# Patient Record
Sex: MALE | Race: WHITE | NOT HISPANIC OR LATINO
[De-identification: names, ages, dates, MRNs, and addresses within clinical notes are randomized per-mention and may not be internally consistent; named-entity substitution may affect disease eponyms.]

---

## 2017-01-06 ENCOUNTER — APPOINTMENT (OUTPATIENT)
Dept: CARDIOLOGY | Facility: CLINIC | Age: 72
End: 2017-01-06
Payer: MEDICARE

## 2017-01-06 PROCEDURE — 99214 OFFICE O/P EST MOD 30 MIN: CPT

## 2017-01-12 ENCOUNTER — OUTPATIENT (OUTPATIENT)
Dept: OUTPATIENT SERVICES | Facility: HOSPITAL | Age: 72
LOS: 1 days | End: 2017-01-12

## 2017-01-31 ENCOUNTER — OUTPATIENT (OUTPATIENT)
Dept: OUTPATIENT SERVICES | Facility: HOSPITAL | Age: 72
LOS: 1 days | End: 2017-01-31

## 2017-02-21 ENCOUNTER — OUTPATIENT (OUTPATIENT)
Dept: OUTPATIENT SERVICES | Facility: HOSPITAL | Age: 72
LOS: 1 days | End: 2017-02-21

## 2017-07-07 ENCOUNTER — OUTPATIENT (OUTPATIENT)
Dept: OUTPATIENT SERVICES | Facility: HOSPITAL | Age: 72
LOS: 1 days | End: 2017-07-07

## 2017-09-22 ENCOUNTER — APPOINTMENT (OUTPATIENT)
Dept: CARDIOLOGY | Facility: CLINIC | Age: 72
End: 2017-09-22
Payer: COMMERCIAL

## 2017-09-22 PROCEDURE — 99214 OFFICE O/P EST MOD 30 MIN: CPT

## 2017-09-28 ENCOUNTER — APPOINTMENT (OUTPATIENT)
Dept: CARDIOLOGY | Facility: CLINIC | Age: 72
End: 2017-09-28
Payer: COMMERCIAL

## 2017-09-28 PROCEDURE — 93880 EXTRACRANIAL BILAT STUDY: CPT

## 2017-09-28 PROCEDURE — 93306 TTE W/DOPPLER COMPLETE: CPT

## 2017-10-13 ENCOUNTER — APPOINTMENT (OUTPATIENT)
Dept: CARDIOLOGY | Facility: CLINIC | Age: 72
End: 2017-10-13
Payer: COMMERCIAL

## 2017-10-13 PROCEDURE — 99215 OFFICE O/P EST HI 40 MIN: CPT

## 2017-12-08 ENCOUNTER — OUTPATIENT (OUTPATIENT)
Dept: OUTPATIENT SERVICES | Facility: HOSPITAL | Age: 72
LOS: 1 days | End: 2017-12-08

## 2018-02-15 ENCOUNTER — RX RENEWAL (OUTPATIENT)
Age: 73
End: 2018-02-15

## 2018-03-06 ENCOUNTER — MEDICATION RENEWAL (OUTPATIENT)
Age: 73
End: 2018-03-06

## 2018-03-06 DIAGNOSIS — Z86.79 PERSONAL HISTORY OF OTHER DISEASES OF THE CIRCULATORY SYSTEM: ICD-10-CM

## 2018-03-06 DIAGNOSIS — Z86.19 PERSONAL HISTORY OF OTHER INFECTIOUS AND PARASITIC DISEASES: ICD-10-CM

## 2018-03-06 DIAGNOSIS — E66.3 OVERWEIGHT: ICD-10-CM

## 2018-03-06 DIAGNOSIS — Z98.890 OTHER SPECIFIED POSTPROCEDURAL STATES: ICD-10-CM

## 2018-03-06 DIAGNOSIS — Z86.39 PERSONAL HISTORY OF OTHER ENDOCRINE, NUTRITIONAL AND METABOLIC DISEASE: ICD-10-CM

## 2018-03-06 DIAGNOSIS — Z85.46 PERSONAL HISTORY OF MALIGNANT NEOPLASM OF PROSTATE: ICD-10-CM

## 2018-03-06 DIAGNOSIS — R80.9 PROTEINURIA, UNSPECIFIED: ICD-10-CM

## 2018-03-06 DIAGNOSIS — I47.2 VENTRICULAR TACHYCARDIA: ICD-10-CM

## 2018-03-06 DIAGNOSIS — Z87.39 PERSONAL HISTORY OF OTHER DISEASES OF THE MUSCULOSKELETAL SYSTEM AND CONNECTIVE TISSUE: ICD-10-CM

## 2018-03-06 DIAGNOSIS — Z78.9 OTHER SPECIFIED HEALTH STATUS: ICD-10-CM

## 2018-03-06 RX ORDER — ASPIRIN 81 MG/1
81 TABLET ORAL DAILY
Refills: 0 | Status: ACTIVE | COMMUNITY

## 2018-03-26 ENCOUNTER — OUTPATIENT (OUTPATIENT)
Dept: OUTPATIENT SERVICES | Facility: HOSPITAL | Age: 73
LOS: 1 days | End: 2018-03-26

## 2018-05-06 ENCOUNTER — RX RENEWAL (OUTPATIENT)
Age: 73
End: 2018-05-06

## 2018-05-07 ENCOUNTER — RX RENEWAL (OUTPATIENT)
Age: 73
End: 2018-05-07

## 2018-05-11 ENCOUNTER — APPOINTMENT (OUTPATIENT)
Dept: CARDIOLOGY | Facility: CLINIC | Age: 73
End: 2018-05-11
Payer: COMMERCIAL

## 2018-05-11 ENCOUNTER — NON-APPOINTMENT (OUTPATIENT)
Age: 73
End: 2018-05-11

## 2018-05-11 VITALS
HEIGHT: 70 IN | WEIGHT: 236 LBS | DIASTOLIC BLOOD PRESSURE: 70 MMHG | SYSTOLIC BLOOD PRESSURE: 128 MMHG | HEART RATE: 92 BPM | BODY MASS INDEX: 33.79 KG/M2

## 2018-05-11 DIAGNOSIS — R53.83 OTHER FATIGUE: ICD-10-CM

## 2018-05-11 PROCEDURE — 99215 OFFICE O/P EST HI 40 MIN: CPT

## 2018-05-11 PROCEDURE — 93000 ELECTROCARDIOGRAM COMPLETE: CPT

## 2018-05-21 ENCOUNTER — OUTPATIENT (OUTPATIENT)
Dept: OUTPATIENT SERVICES | Facility: HOSPITAL | Age: 73
LOS: 1 days | End: 2018-05-21

## 2018-05-29 ENCOUNTER — OUTPATIENT (OUTPATIENT)
Dept: OUTPATIENT SERVICES | Facility: HOSPITAL | Age: 73
LOS: 1 days | End: 2018-05-29

## 2018-08-03 ENCOUNTER — RX RENEWAL (OUTPATIENT)
Age: 73
End: 2018-08-03

## 2018-09-04 ENCOUNTER — RX RENEWAL (OUTPATIENT)
Age: 73
End: 2018-09-04

## 2018-09-12 ENCOUNTER — MEDICATION RENEWAL (OUTPATIENT)
Age: 73
End: 2018-09-12

## 2018-11-09 ENCOUNTER — APPOINTMENT (OUTPATIENT)
Dept: CARDIOLOGY | Facility: CLINIC | Age: 73
End: 2018-11-09
Payer: COMMERCIAL

## 2018-11-09 VITALS
SYSTOLIC BLOOD PRESSURE: 126 MMHG | WEIGHT: 240 LBS | HEIGHT: 70 IN | DIASTOLIC BLOOD PRESSURE: 58 MMHG | HEART RATE: 78 BPM | OXYGEN SATURATION: 97 % | BODY MASS INDEX: 34.36 KG/M2

## 2018-11-09 DIAGNOSIS — R60.0 LOCALIZED EDEMA: ICD-10-CM

## 2018-11-09 PROCEDURE — 99215 OFFICE O/P EST HI 40 MIN: CPT

## 2018-11-09 NOTE — REASON FOR VISIT
[Follow-Up - Clinic] : a clinic follow-up of [Abnormal ECG] : an abnormal ECG [Cardiomyopathy] : cardiomyopathy [Coronary Artery Disease] : coronary artery disease [Hyperlipidemia] : hyperlipidemia [Hypertension] : hypertension [Medication Management] : Medication management [Mitral Regurgitation] : mitral regurgitation [Peripheral Vascular Disease] : peripheral vascular disease

## 2018-11-10 PROBLEM — R60.0 BILATERAL LEG EDEMA: Status: ACTIVE | Noted: 2018-11-09

## 2018-11-10 NOTE — HISTORY OF PRESENT ILLNESS
[FreeTextEntry1] : NATANAEL ROWAN  is a 73 year old  M\par with a history of HTN, hyperlipidemia, known coronary artery disease s/p MI/CABG, dilated iCMP, PAD. \par There is no history of symptomatic congestive heart failure rheumatic heart disease or atrial fibrillation.\par \par In 2004, he presented with symptoms of dizziness, near syncope and was diagnosed with an acute myocardial infarction. Initially was seen at [facility] and underwent thrombolysis. Subsequently received 2 stents. \par \par Follow up echocardiogram demonstrated reduced LV function. This led to cardiac catheterization and subsequent bypass surgery. Catheterization in June 2014 LVEF 25% left main three-vessel disease four-vessel CABG [facility] Dr. Goodman June 2014.\par \par There was persistently reduced LV function and he has been seen by Dr. Santos and Dr. Valentino with adjustment of medical therapy. Also he was seen by Dr. Lugo, declines electrophysiology evaluation and AICD. \par \par After TENISHA, referred to vascular surgery. Initially prescribed exercise regimen for claudication. Persistent symptoms. Underwent endarterectomy with Dr. Montano. He reports less pain. No further claudication. \par \par At baseline, he is physically active. 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 \par No significant dyspnea on exertion, orthopnea\par There is dependent LE edema He does note indentation in his sock.\par No symptomatic palpitations, lightheadedness, dizziness or syncope. \par \par Echocardiogram. September 2017. Ejection fraction 35-40%. End diastolic dimension 6.7 cm left atrium 5 cm moderate mitral regurgitation mild tricuspid regurgitation normal pulmonary pressures. \par \par Carotid Doppler. September 2017. Moderate plaque.\par \par Cardiac catheterization from June 2014: Distal left main 70% stenosis. Ejection fraction at 30%. \par \par Nuclear stress test. July 2015. Gildardo protocol. 7 minutes. Heart rate 146. No symptoms. Large perfusion defect involving inferolateral & apical regions. Fixed defect. No ischemia. Dilated left ventricle. Global hypokinesis. Severely reduced LV function. Nondiagnostic EKG due to rate-related left bundle branch block. Ischemic cardiomyopathy. Large infarction in the apical & lateral regions. Infarction involving the inferolateral, apical and anterior regions.\par \par Last bloodwork hemoglobin 13.6, creatinine 0.8, . After review of blood requested change to medical therapy. This has been declined. \par

## 2018-11-10 NOTE — PHYSICAL EXAM
[General Appearance - Well Developed] : well developed [Normal Appearance] : normal appearance [Well Groomed] : well groomed [General Appearance - Well Nourished] : well nourished [No Deformities] : no deformities [General Appearance - In No Acute Distress] : no acute distress [Normal Conjunctiva] : the conjunctiva exhibited no abnormalities [Eyelids - No Xanthelasma] : the eyelids demonstrated no xanthelasmas [Normal Oral Mucosa] : normal oral mucosa [No Oral Pallor] : no oral pallor [No Oral Cyanosis] : no oral cyanosis [Normal Jugular Venous A Waves Present] : normal jugular venous A waves present [Normal Jugular Venous V Waves Present] : normal jugular venous V waves present [No Jugular Venous Donovan A Waves] : no jugular venous donovan A waves [Respiration, Rhythm And Depth] : normal respiratory rhythm and effort [Exaggerated Use Of Accessory Muscles For Inspiration] : no accessory muscle use [Auscultation Breath Sounds / Voice Sounds] : lungs were clear to auscultation bilaterally [Heart Rate And Rhythm] : heart rate and rhythm were normal [Heart Sounds] : normal S1 and S2 [Abdomen Soft] : soft [Abdomen Tenderness] : non-tender [Abdomen Mass (___ Cm)] : no abdominal mass palpated [Abnormal Walk] : normal gait [Gait - Sufficient For Exercise Testing] : the gait was sufficient for exercise testing [Nail Clubbing] : no clubbing of the fingernails [Cyanosis, Localized] : no localized cyanosis [Petechial Hemorrhages (___cm)] : no petechial hemorrhages [Skin Color & Pigmentation] : normal skin color and pigmentation [] : no rash [No Venous Stasis] : no venous stasis [Skin Lesions] : no skin lesions [No Skin Ulcers] : no skin ulcer [No Xanthoma] : no  xanthoma was observed [Oriented To Time, Place, And Person] : oriented to person, place, and time [Affect] : the affect was normal [Mood] : the mood was normal [No Anxiety] : not feeling anxious [FreeTextEntry1] : HSM apex

## 2018-11-10 NOTE — ASSESSMENT
[FreeTextEntry1] : Old myocardial infarction \par Presence of aortocoronary bypass graft \par Abnormal electrocardiogram\par Asymptomatic without angina symptoms.\par Last stress with fixed defects\par Daily antiplatelet therapy. \par High-intensity statin therapy. \par Beta blocker. He declines up titration of beta blocker. \par Continue ACE inhibitor.\par \par Ischemic cardiomyopathy, dilated\par Cardiomegaly\par MR\par Severely reduced LV function, despite optimal medical therapy. \par Edema\par Follow LV size and function\par Check BNP\par Beta blocker. He declines up titration of beta blocker. \par Continue ACE inhibitor. If renal function is stable, then addition of spironolactone. ? Entresto\par Discussed risk of sudden cardiac death associated with reduced LV function, iCMP. Understands risk of sudden cardiac death. Declines implantable defibrillator placement. Understands risks and benefits. \par Does not require SBE ppx\par \par Peripheral vascular disease\par Prior endarterectomy, improvement in claudication\par carotid disease\par Atherosclerosis of aorta\par Aspirin statin and blood pressure control\par Vascular follow up\par \par Hyperlipidemia, dyslipidemia, low HDL\par Labs requested\par No adverse effects\par Adjunctive dietary measures\par \par Declines change to medications and device therapy.

## 2018-11-27 ENCOUNTER — APPOINTMENT (OUTPATIENT)
Dept: CARDIOLOGY | Facility: CLINIC | Age: 73
End: 2018-11-27
Payer: COMMERCIAL

## 2018-11-27 PROCEDURE — 93306 TTE W/DOPPLER COMPLETE: CPT

## 2019-01-04 ENCOUNTER — APPOINTMENT (OUTPATIENT)
Dept: CARDIOLOGY | Facility: CLINIC | Age: 74
End: 2019-01-04
Payer: COMMERCIAL

## 2019-01-04 VITALS
WEIGHT: 230 LBS | HEIGHT: 69 IN | BODY MASS INDEX: 34.07 KG/M2 | OXYGEN SATURATION: 97 % | DIASTOLIC BLOOD PRESSURE: 64 MMHG | SYSTOLIC BLOOD PRESSURE: 102 MMHG | HEART RATE: 75 BPM

## 2019-01-04 DIAGNOSIS — I51.7 CARDIOMEGALY: ICD-10-CM

## 2019-01-04 PROCEDURE — 99215 OFFICE O/P EST HI 40 MIN: CPT

## 2019-01-04 NOTE — ASSESSMENT
[FreeTextEntry1] : Ischemic cardiomyopathy, dilated\par Moderate valvular heart disease. \par Severely reduced LV function despite optimal medical therapy. \par Shared decision-making\par Discussed risk of sudden cardiac death associated with reduced LV function, iCMP. \par Declines implantable defibrillator placement. Understands risks and benefits. \par All of his questions have been answered. \par Euvolemic\par Check BNP\par Beta blocker. \par Continue ACE inhibitor. \par If renal function is stable, then addition of spironolactone. ? Entresto\par Does not require SBE ppx\par \par Old myocardial infarction \par s/p PCI then CABG\par Last stress with fixed defects\par No angina sx\par Daily antiplatelet therapy. \par High-intensity statin therapy. \par Beta blocker. He declines up titration of beta blocker. \par Continue ACE inhibitor.\par \par Peripheral vascular disease\par Prior endarterectomy, improvement in claudication\par Carotid disease\par Atherosclerosis of aorta\par Differential blood pressures (RUE>>LUE).  Asx\par Aspirin statin and blood pressure control \par \par Dyslipidemia, low HDL\par Labs requested\par No adverse effects\par Adjunctive dietary measures\par \par Declines change to medications and device therapy !!

## 2019-01-04 NOTE — HISTORY OF PRESENT ILLNESS
[FreeTextEntry1] : NATANAEL ROWAN  is a 73 year old  M\par with a history of known coronary artery disease s/p MI/CABG, dilated iCMP, PAD, HTN, hyperlipidemia, \par There is no history of symptomatic congestive heart failure, rheumatic heart disease or atrial fibrillation.\par \par In 2004, he presented with symptoms of dizziness, near syncope and was diagnosed with an acute myocardial infarction. \par Initially was seen at [facility] and underwent thrombolysis. Subsequently received 2 stents. \par \par Follow up echocardiogram demonstrated reduced LV function. \par Catheterization in June 2014 LVEF 25% left main three-vessel disease \par Four-vessel CABG [facility] Dr. Goodman June 2014.\par \par There was persistently reduced LV function and he has been seen by Dr. Santos and Dr. Valentino with adjustment of medical therapy. \par Also he was seen by Dr. Lugo, declines electrophysiology evaluation, [facility]. \par \par After TENISHA, referred to vascular surgery. \par Initially prescribed exercise regimen for claudication. \par Persistent symptoms. \par Underwent endarterectomy with Dr. Montano. \par He reports less pain. \par No further claudication. \par \par At baseline, he is physically active. 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 \par No significant dyspnea on exertion, orthopnea\par No symptomatic palpitations, lightheadedness, dizziness or syncope. \par \par Echocardiogram demonstrates a dilated left ventricle with ejection fraction of 30. There is moderate mitral regurgitation, moderate aortic insufficiency. \par \par Carotid Doppler. September 2017. Moderate plaque.\par \par Cardiac catheterization from June 2014: Distal left main 70% stenosis. Ejection fraction at 30%. \par \par Nuclear stress test. July 2015. Gildardo protocol. 7 minutes. Heart rate 146. No symptoms. Large perfusion defect involving inferolateral & apical regions. Fixed defect. No ischemia. Dilated left ventricle. Global hypokinesis. Severely reduced LV function. Nondiagnostic EKG due to rate-related left bundle branch block. Ischemic cardiomyopathy. Large infarction in the apical & lateral regions. Infarction involving the inferolateral, apical and anterior regions.\par \par Last bloodwork hemoglobin 13.6, creatinine 0.8, .

## 2019-02-20 ENCOUNTER — OUTPATIENT (OUTPATIENT)
Dept: OUTPATIENT SERVICES | Facility: HOSPITAL | Age: 74
LOS: 1 days | End: 2019-02-20

## 2019-03-08 ENCOUNTER — NON-APPOINTMENT (OUTPATIENT)
Age: 74
End: 2019-03-08

## 2019-03-08 ENCOUNTER — APPOINTMENT (OUTPATIENT)
Dept: CARDIOLOGY | Facility: CLINIC | Age: 74
End: 2019-03-08
Payer: COMMERCIAL

## 2019-03-08 VITALS
HEIGHT: 69 IN | DIASTOLIC BLOOD PRESSURE: 70 MMHG | WEIGHT: 230 LBS | SYSTOLIC BLOOD PRESSURE: 140 MMHG | OXYGEN SATURATION: 97 % | BODY MASS INDEX: 34.07 KG/M2 | HEART RATE: 73 BPM

## 2019-03-08 DIAGNOSIS — R94.31 ABNORMAL ELECTROCARDIOGRAM [ECG] [EKG]: ICD-10-CM

## 2019-03-08 PROCEDURE — 99215 OFFICE O/P EST HI 40 MIN: CPT

## 2019-03-08 PROCEDURE — 93000 ELECTROCARDIOGRAM COMPLETE: CPT

## 2019-03-10 PROBLEM — R94.31 ABNORMAL ECG: Status: ACTIVE | Noted: 2018-05-12

## 2019-03-10 NOTE — HISTORY OF PRESENT ILLNESS
[FreeTextEntry1] : NATANAEL ROWAN  is a 73 year old  M\par with a history of known coronary artery disease s/p MI/CABG, dilated iCMP, PAD, HTN, hyperlipidemia, \par There is no history of symptomatic congestive heart failure, rheumatic heart disease or atrial fibrillation.\par \par In 2004, he presented with symptoms of dizziness, near syncope and was diagnosed with an acute myocardial infarction. \par Initially was seen at [facility] and underwent thrombolysis. Subsequently received 2 stents. \par \par Follow up echocardiogram demonstrated reduced LV function. \par Catheterization in June 2014 LVEF 25% left main three-vessel disease \par Four-vessel CABG [facility] Dr. Goodman June 2014.\par \par There was persistently reduced LV function and he has been seen by Dr. Santos and Dr. Valentino with adjustment of medical therapy. \par Also he was seen by Dr. Lugo, declined electrophysiology evaluation, AICD. \par \par After TENISHA, referred to vascular surgery. \par Initially prescribed exercise regimen for claudication. \par Persistent symptoms. \par Underwent endarterectomy with Dr. Montano. \par He reports less pain. \par No further claudication. \par \par At baseline, he is physically active. 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 \par No significant dyspnea on exertion, orthopnea\par No symptomatic palpitations, lightheadedness, dizziness or syncope. \par \par He reports his cholesterol was improved on Crestor. \par \par EKG demonstrates normal sinus rhythm with PVC IVCD and nonspecific ST changes.\par \par Echocardiogram demonstrates a dilated left ventricle with ejection fraction of 30. There is moderate mitral regurgitation, moderate aortic insufficiency. \par \par Carotid Doppler. September 2017. Moderate plaque.\par \par Cardiac catheterization from June 2014: Distal left main 70% stenosis. Ejection fraction at 30%. \par \par Nuclear stress test. July 2015. Gildardo protocol. 7 minutes. Heart rate 146. No symptoms. Large perfusion defect involving inferolateral & apical regions. Fixed defect. No ischemia. Dilated left ventricle. Global hypokinesis. Severely reduced LV function. Nondiagnostic EKG due to rate-related left bundle branch block. Ischemic cardiomyopathy. Large infarction in the apical & lateral regions. Infarction involving the inferolateral, apical and anterior regions.\par \par Blood work from February 2019, hemoglobin 14.0, potassium 4.3, creatinine 0.9, HDL 42, , hemoglobin A1c 7.1, , TSH 4.8.\par \par \par

## 2019-03-10 NOTE — ASSESSMENT
[FreeTextEntry1] : Ischemic cardiomyopathy, dilated\par Moderate valvular heart disease. \par Severely reduced LV function despite optimal medical therapy. \par Shared decision-making\par Discussed risk of sudden cardiac death associated with reduced LV function, iCMP. \par Referred to electrophysiology for ICD.\par PreviouslydDeclines implantable defibrillator placement. Understands risks and benefits. \par All of his questions have been answered. \par Euvolemic\par Beta blocker. \par Continue ACE inhibitor. \par If renal function is stable, then addition of spironolactone. ? Entresto\par Does not require SBE ppx\par \par CAD\par Old myocardial infarction \par s/p PCI then CABG\par Last stress with fixed defects\par No angina sx\par Daily antiplatelet therapy. \par High-intensity statin therapy. \par Beta blocker. He declines up titration of beta blocker. \par Continue ACE inhibitor.\par \par Peripheral vascular disease\par Prior endarterectomy, improvement in claudication\par Carotid disease\par Atherosclerosis of aorta\par Differential blood pressures (RUE>>LUE).  Asx\par Aspirin statin and blood pressure control \par \par Dyslipidemia, low HDL\par Return to Crestor. \par Potential candidate for clinical trial. \par No adverse effects\par Adjunctive dietary measures\par \par Follow up with primary physician regarding elevated TSH and hemoglobin A1c.\par

## 2019-03-26 ENCOUNTER — APPOINTMENT (OUTPATIENT)
Dept: ELECTROPHYSIOLOGY | Facility: CLINIC | Age: 74
End: 2019-03-26
Payer: COMMERCIAL

## 2019-03-26 VITALS
BODY MASS INDEX: 35.29 KG/M2 | HEART RATE: 92 BPM | WEIGHT: 239 LBS | SYSTOLIC BLOOD PRESSURE: 100 MMHG | OXYGEN SATURATION: 95 % | DIASTOLIC BLOOD PRESSURE: 74 MMHG

## 2019-03-26 PROCEDURE — 99204 OFFICE O/P NEW MOD 45 MIN: CPT

## 2019-03-26 NOTE — PHYSICAL EXAM
[Normal Appearance] : normal appearance [Well Groomed] : well groomed [General Appearance - Well Nourished] : well nourished [No Deformities] : no deformities [Normal Oral Mucosa] : normal oral mucosa [Normal Jugular Venous A Waves Present] : normal jugular venous A waves present [No Jugular Venous Donovan A Waves] : no jugular venous donovan A waves [Exaggerated Use Of Accessory Muscles For Inspiration] : no accessory muscle use [Heart Rate And Rhythm] : heart rate and rhythm were normal [Heart Sounds] : normal S1 and S2 [Abdomen Mass (___ Cm)] : no abdominal mass palpated [Gait - Sufficient For Exercise Testing] : the gait was sufficient for exercise testing [Petechial Hemorrhages (___cm)] : no petechial hemorrhages [Skin Lesions] : no skin lesions [No Skin Ulcers] : no skin ulcer [No Xanthoma] : no  xanthoma was observed [Oriented To Time, Place, And Person] : oriented to person, place, and time [No Anxiety] : not feeling anxious [General Appearance - Well Developed] : well developed [General Appearance - In No Acute Distress] : no acute distress [Normal Conjunctiva] : the conjunctiva exhibited no abnormalities [Eyelids - No Xanthelasma] : the eyelids demonstrated no xanthelasmas [No Oral Pallor] : no oral pallor [No Oral Cyanosis] : no oral cyanosis [Normal Jugular Venous V Waves Present] : normal jugular venous V waves present [5th Left ICS - MCL] : palpated at the 5th LICS in the midclavicular line [No Precordial Heave] : no precordial heave was noted [Normal Rate] : normal [Rhythm Regular] : regular [II] : a grade 2 [1+] : left 1+ [No Pitting Edema] : no pitting edema present [Respiration, Rhythm And Depth] : normal respiratory rhythm and effort [Auscultation Breath Sounds / Voice Sounds] : lungs were clear to auscultation bilaterally [Abdomen Soft] : soft [Abdomen Tenderness] : non-tender [Abnormal Walk] : normal gait [Nail Clubbing] : no clubbing of the fingernails [Cyanosis, Localized] : no localized cyanosis [Skin Color & Pigmentation] : normal skin color and pigmentation [] : no rash [No Venous Stasis] : no venous stasis [Affect] : the affect was normal [Mood] : the mood was normal [FreeTextEntry1] : overweight [Pericardial Rub] : no pericardial rub

## 2019-03-26 NOTE — HISTORY OF PRESENT ILLNESS
[FreeTextEntry1] : Patient is a 72 yo man seen in evaluation for primary prevention ICD\par He has prior h/o MI  2004 ( had thrombolysis at [facility]) and s/p CABG for 3 V D in 2014 at [facility]. He had prior coronary stents. His EF has been 30%. ICD evaluation was previously recommended but he declined. He denies chest pain, SOB, dizziness, lightheadedness, syncope or presyncope \par He has h/o HTN and PVD. No h/o CHF or AF\par He  is physically active. 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 There is moderate mitral regurgitation, moderate aortic insufficiency. \par \par \par Blood work from February 2019, hemoglobin 14.0, potassium 4.3, creatinine 0.9, HDL 42, , hemoglobin A1c 7.1, , TSH 4.8.\par \par \par

## 2019-03-26 NOTE — REVIEW OF SYSTEMS
[see HPI] : see HPI [Lower Ext Edema] : lower extremity edema [Joint Pain] : joint pain [Negative] : Heme/Lymph [Feeling Fatigued] : not feeling fatigued [Shortness Of Breath] : no shortness of breath [Dyspnea on exertion] : not dyspnea during exertion [FreeTextEntry2] : Gout

## 2019-03-26 NOTE — DISCUSSION/SUMMARY
[FreeTextEntry1] : He has an ischemic cardiomyopathy with EF 30%. He meets criteria for ICD based on MADIT II criteria. \par The mortality benefits were explained. The procedure and risk were also explained. \par He does not want ICD. I have explained the potential risk for SCD/Syncope

## 2019-03-26 NOTE — HISTORY OF PRESENT ILLNESS
[FreeTextEntry1] : Patient is a 74 yo man seen in evaluation for primary prevention ICD\par He has prior h/o MI  2004 ( had thrombolysis at [facility]) and s/p CABG for 3 V D in 2014 at [facility]. He had prior coronary stents. His EF has been 30%. ICD evaluation was previously recommended but he declined. He denies chest pain, SOB, dizziness, lightheadedness, syncope or presyncope \par He has h/o HTN and PVD. No h/o CHF or AF\par He  is physically active. 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 There is moderate mitral regurgitation, moderate aortic insufficiency. \par \par \par Blood work from February 2019, hemoglobin 14.0, potassium 4.3, creatinine 0.9, HDL 42, , hemoglobin A1c 7.1, , TSH 4.8.\par \par \par

## 2020-01-06 RX ORDER — ROSUVASTATIN CALCIUM 20 MG/1
20 TABLET, FILM COATED ORAL DAILY
Qty: 90 | Refills: 0 | Status: DISCONTINUED | COMMUNITY
Start: 2019-03-08 | End: 2020-01-06

## 2020-02-19 ENCOUNTER — OUTPATIENT (OUTPATIENT)
Dept: OUTPATIENT SERVICES | Facility: HOSPITAL | Age: 75
LOS: 1 days | End: 2020-02-19

## 2020-07-01 ENCOUNTER — APPOINTMENT (OUTPATIENT)
Dept: CARDIOLOGY | Facility: CLINIC | Age: 75
End: 2020-07-01

## 2020-08-02 DIAGNOSIS — I10 ESSENTIAL (PRIMARY) HYPERTENSION: ICD-10-CM

## 2020-08-03 PROBLEM — I10 HYPERTENSION: Status: ACTIVE | Noted: 2018-02-15

## 2020-09-15 ENCOUNTER — NON-APPOINTMENT (OUTPATIENT)
Age: 75
End: 2020-09-15

## 2020-09-15 ENCOUNTER — APPOINTMENT (OUTPATIENT)
Dept: CARDIOLOGY | Facility: CLINIC | Age: 75
End: 2020-09-15
Payer: COMMERCIAL

## 2020-09-15 VITALS
OXYGEN SATURATION: 98 % | DIASTOLIC BLOOD PRESSURE: 68 MMHG | HEART RATE: 58 BPM | WEIGHT: 238 LBS | BODY MASS INDEX: 35.25 KG/M2 | SYSTOLIC BLOOD PRESSURE: 142 MMHG | HEIGHT: 69 IN

## 2020-09-15 DIAGNOSIS — Z00.00 ENCOUNTER FOR GENERAL ADULT MEDICAL EXAMINATION W/OUT ABNORMAL FINDINGS: ICD-10-CM

## 2020-09-15 PROCEDURE — 99214 OFFICE O/P EST MOD 30 MIN: CPT

## 2020-09-15 PROCEDURE — 93000 ELECTROCARDIOGRAM COMPLETE: CPT

## 2020-09-19 ENCOUNTER — OUTPATIENT (OUTPATIENT)
Dept: OUTPATIENT SERVICES | Facility: HOSPITAL | Age: 75
LOS: 1 days | End: 2020-09-19

## 2020-09-23 NOTE — ASSESSMENT
[FreeTextEntry1] : Ischemic cardiomyopathy, dilated\par Moderate valvular heart disease. \par Severely reduced LV function despite optimal medical therapy. \par Shared decision-making\par Discussed risk of sudden cardiac death associated with reduced LV function, iCMP. \par Saw electrophysiology.  Declines defibrillator.  \par Declines up titration of medications.  \par All of his questions have been answered. \par Euvolemic\par Beta blocker. \par Continue ACE inhibitor. \par If renal function is stable, then addition of spironolactone. ? Entresto\par Does not require SBE ppx\par \par CAD\par Old myocardial infarction \par s/p PCI then CABG\par Last stress with fixed defects\par No angina sx\par Declined stress test.  \par Daily antiplatelet therapy. \par High-intensity statin therapy. \par Beta blocker. He declines up titration of beta blocker. \par Continue ACE inhibitor.\par \par Peripheral vascular disease\par Prior endarterectomy, improvement in claudication\par Carotid disease\par Atherosclerosis of aorta\par Differential blood pressures (RUE>LUE).  Asx\par Aspirin statin and blood pressure control \par \par Dyslipidemia, low HDL\par Return to Crestor. \par Potential candidate for clinical trial. \par No adverse effects\par Adjunctive dietary measures\par Blood work and ultrasound imaging have been requested. \par

## 2020-09-23 NOTE — HISTORY OF PRESENT ILLNESS
[FreeTextEntry1] : NATANAEL ROWAN  is a 75 year old  M\par \par with a history of known coronary artery disease s/p MI/CABG, dilated iCMP, PAD, HTN, hyperlipidemia, \par There is no history of symptomatic congestive heart failure, rheumatic heart disease or atrial fibrillation.\par \par In 2004, he presented with symptoms of dizziness, near syncope and was diagnosed with an acute myocardial infarction. \par Initially was seen at [facility] and underwent thrombolysis. Subsequently received 2 stents. \par \par Follow up echocardiogram demonstrated reduced LV function. \par Catheterization in June 2014 LVEF 25% left main three-vessel disease \par Four-vessel CABG [facility] Dr. Goodman June 2014.\par \par There was persistently reduced LV function and he has been seen by Dr. Santos and Dr. Valentino with adjustment of medical therapy. \par Also he was seen by Dr. Lugo, declined electrophysiology evaluation, AICD. \par \par After TENISHA, referred to vascular surgery. \par Initially prescribed exercise regimen for claudication. \par Persistent symptoms. \par Underwent endarterectomy with Dr. Montano. \par He reports less pain. \par No further claudication. \par \par At baseline, he is physically active. 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 \par No significant dyspnea on exertion, orthopnea\par No symptomatic palpitations, lightheadedness, dizziness or syncope. \par \par No recent medical follow-up.  No interim medical events.  \par \par EKG demonstrates normal sinus rhythm with frequent PVCs.\par \par Echocardiogram demonstrates a dilated left ventricle with ejection fraction of 30. There is moderate mitral regurgitation, moderate aortic insufficiency. \par \par Carotid Doppler. September 2017. Moderate plaque.\par \par Cardiac catheterization from June 2014: Distal left main 70% stenosis. Ejection fraction at 30%. \par \par Nuclear stress test. July 2015. Gildardo protocol. 7 minutes. Heart rate 146. No symptoms. Large perfusion defect involving inferolateral & apical regions. Fixed defect. No ischemia. Dilated left ventricle. Global hypokinesis. Severely reduced LV function. Nondiagnostic EKG due to rate-related left bundle branch block. Ischemic cardiomyopathy. Large infarction in the apical & lateral regions. Infarction involving the inferolateral, apical and anterior regions.\par \par Blood work from February 2019, hemoglobin 14.0, potassium 4.3, creatinine 0.9, HDL 42, , hemoglobin A1c 7.1, , TSH 4.8.\par \par

## 2020-09-25 ENCOUNTER — APPOINTMENT (OUTPATIENT)
Dept: CARDIOLOGY | Facility: CLINIC | Age: 75
End: 2020-09-25
Payer: COMMERCIAL

## 2020-09-25 PROCEDURE — 93306 TTE W/DOPPLER COMPLETE: CPT

## 2020-09-25 PROCEDURE — 93880 EXTRACRANIAL BILAT STUDY: CPT

## 2020-09-25 PROCEDURE — 93979 VASCULAR STUDY: CPT

## 2020-11-06 ENCOUNTER — APPOINTMENT (OUTPATIENT)
Dept: CARDIOLOGY | Facility: CLINIC | Age: 75
End: 2020-11-06
Payer: COMMERCIAL

## 2020-11-06 VITALS
SYSTOLIC BLOOD PRESSURE: 128 MMHG | HEART RATE: 50 BPM | DIASTOLIC BLOOD PRESSURE: 62 MMHG | BODY MASS INDEX: 35.25 KG/M2 | OXYGEN SATURATION: 96 % | HEIGHT: 69 IN | WEIGHT: 238 LBS

## 2020-11-06 DIAGNOSIS — I71.4 ABDOMINAL AORTIC ANEURYSM, W/OUT RUPTURE: ICD-10-CM

## 2020-11-06 DIAGNOSIS — I65.29 OCCLUSION AND STENOSIS OF UNSPECIFIED CAROTID ARTERY: ICD-10-CM

## 2020-11-06 DIAGNOSIS — I34.0 NONRHEUMATIC MITRAL (VALVE) INSUFFICIENCY: ICD-10-CM

## 2020-11-06 PROCEDURE — 99215 OFFICE O/P EST HI 40 MIN: CPT

## 2020-11-06 RX ORDER — EZETIMIBE 10 MG/1
10 TABLET ORAL
Qty: 90 | Refills: 0 | Status: DISCONTINUED | COMMUNITY
Start: 2020-09-29 | End: 2020-11-06

## 2020-11-07 PROBLEM — I65.29 CAROTID ATHEROSCLEROSIS, UNSPECIFIED LATERALITY: Status: ACTIVE | Noted: 2020-11-07

## 2020-11-07 PROBLEM — I34.0 NON-RHEUMATIC MITRAL REGURGITATION: Status: ACTIVE | Noted: 2018-05-12

## 2020-11-07 PROBLEM — I71.4 ABDOMINAL AORTIC ANEURYSM WITHOUT RUPTURE: Status: ACTIVE | Noted: 2020-11-07

## 2020-11-09 ENCOUNTER — NON-APPOINTMENT (OUTPATIENT)
Age: 75
End: 2020-11-09

## 2020-11-09 NOTE — HISTORY OF PRESENT ILLNESS
[FreeTextEntry1] : NATANAEL ROWAN  is a 75 year old  M\par \par \par \par with a history of known coronary artery disease s/p MI/CABG, dilated iCMP, PAD, HTN, hyperlipidemia (elevated CRP), \par There is no history of symptomatic congestive heart failure, rheumatic heart disease or atrial fibrillation.\par \par In 2004, he presented with symptoms of dizziness, near syncope and was diagnosed with an acute myocardial infarction. \par Initially was seen at [facility] and underwent thrombolysis. Subsequently received 2 stents. \par \par Follow up echocardiogram demonstrated reduced LV function. \par Catheterization in June 2014 LVEF 25% left main three-vessel disease \par Four-vessel CABG [facility] Dr. Goodman June 2014.\par \par There was persistently reduced LV function and he has been seen by Dr. Santos and Dr. Valentino with adjustment of medical therapy. \par Also he was seen by Dr. Lugo, declined electrophysiology evaluation, AICD. \par \par After TENISHA, referred to vascular surgery. \par Initially prescribed exercise regimen for claudication. \par Persistent symptoms. \par Underwent endarterectomy with Dr. Montano. \par He reports less pain. \par No further claudication. \par \par At baseline, he is physically active. \par He works in [employer], long hours at [employer] course in physical labor without limitations\par There has been no chest pain, pressure or discomfort. \par No significant dyspnea on exertion, orthopnea\par No symptomatic palpitations, lightheadedness, dizziness or syncope. \par \par Recommended addition of Zetia to regimen he declined\par \par Abdominal ultrasound aortic dimensions 3 cm moderate atherosclerosis \par Echocardiogram ejection fraction 20s moderate mitral regurgitation \par Carotid Doppler left side moderate stenosis\par \par Blood work September 2020 total cholesterol 195 HDL 43  hemoglobin A1c 7.1 \par last potassium 4.5 creatinine 0.9 note his C-reactive protein is also elevated BNP elevated \par EKG demonstrates normal sinus rhythm with frequent PVCs.\par Echocardiogram demonstrates a dilated left ventricle with ejection fraction of 30. There is moderate mitral regurgitation, moderate aortic insufficiency. \par Carotid Doppler. September 2017. Moderate plaque.\par Cardiac catheterization from June 2014: Distal left main 70% stenosis. Ejection fraction at 30%. \par \par Nuclear stress test. July 2015. Gildardo protocol. 7 minutes. Heart rate 146. No symptoms. Large perfusion defect involving inferolateral & apical regions. Fixed defect. No ischemia. Dilated left ventricle. Global hypokinesis. Severely reduced LV function. Nondiagnostic EKG due to rate-related left bundle branch block. Ischemic cardiomyopathy. Large infarction in the apical & lateral regions. Infarction involving the inferolateral, apical and anterior regions.\par

## 2020-11-09 NOTE — ASSESSMENT
[FreeTextEntry1] : I discussed change to Entresto, addition of aldactone he does not want any changes to his medications \par I discussed risk of sudden cardiac death he declines defibrillator\par \par Ischemic cardiomyopathy, dilated\par Moderate valvular heart disease\par BNP elevated\par Severely reduced LV function despite optimal medical therapy. \par Shared decision-making\par Discussed risk of sudden cardiac death associated with reduced LV function, iCMP. \par Saw electrophysiology.  Declines defibrillator.  \par Declines up titration of medications.  \par All of his questions have been answered. \par Euvolemic\par Beta blocker. \par Continue ACE inhibitor. \par Does not require SBE ppx\par \par CAD\par Old myocardial infarction \par s/p PCI then CABG\par Repeat stress test due to drop EF.  \par Daily antiplatelet therapy. \par High-intensity statin therapy. \par Beta blocker. He declines up titration of beta blocker. \par Continue ACE inhibitor.\par \par Peripheral vascular disease\par Prior endarterectomy, improvement in claudication\par Carotid disease\par Atherosclerosis of aorta\par Differential blood pressures (RUE>LUE).  Asx\par Aspirin statin and blood pressure control\par Monitor carotid, ao\par \par Dyslipidemia, low HDL\par CRP elevated\par Potential candidate for clinical trial. \par \par Follow-up with primary physician regarding Lyme disease and glucose management

## 2021-01-04 ENCOUNTER — APPOINTMENT (OUTPATIENT)
Dept: CARDIOLOGY | Facility: CLINIC | Age: 76
End: 2021-01-04

## 2021-01-08 ENCOUNTER — APPOINTMENT (OUTPATIENT)
Dept: CARDIOLOGY | Facility: CLINIC | Age: 76
End: 2021-01-08

## 2021-08-04 ENCOUNTER — RX RENEWAL (OUTPATIENT)
Age: 76
End: 2021-08-04

## 2021-09-03 ENCOUNTER — RX RENEWAL (OUTPATIENT)
Age: 76
End: 2021-09-03

## 2021-12-09 ENCOUNTER — RX RENEWAL (OUTPATIENT)
Age: 76
End: 2021-12-09

## 2021-12-27 ENCOUNTER — OUTPATIENT (OUTPATIENT)
Dept: OUTPATIENT SERVICES | Facility: HOSPITAL | Age: 76
LOS: 1 days | End: 2021-12-27

## 2022-01-10 ENCOUNTER — NON-APPOINTMENT (OUTPATIENT)
Age: 77
End: 2022-01-10

## 2022-02-18 ENCOUNTER — NON-APPOINTMENT (OUTPATIENT)
Age: 77
End: 2022-02-18

## 2022-02-18 ENCOUNTER — APPOINTMENT (OUTPATIENT)
Dept: CARDIOLOGY | Facility: CLINIC | Age: 77
End: 2022-02-18
Payer: COMMERCIAL

## 2022-02-18 VITALS
TEMPERATURE: 98 F | WEIGHT: 215 LBS | HEIGHT: 69 IN | OXYGEN SATURATION: 97 % | BODY MASS INDEX: 31.84 KG/M2 | SYSTOLIC BLOOD PRESSURE: 96 MMHG | HEART RATE: 75 BPM | DIASTOLIC BLOOD PRESSURE: 50 MMHG

## 2022-02-18 DIAGNOSIS — I25.2 OLD MYOCARDIAL INFARCTION: ICD-10-CM

## 2022-02-18 DIAGNOSIS — E78.5 HYPERLIPIDEMIA, UNSPECIFIED: ICD-10-CM

## 2022-02-18 DIAGNOSIS — Z95.5 PRESENCE OF CORONARY ANGIOPLASTY IMPLANT AND GRAFT: ICD-10-CM

## 2022-02-18 DIAGNOSIS — I25.5 ISCHEMIC CARDIOMYOPATHY: ICD-10-CM

## 2022-02-18 DIAGNOSIS — I73.9 PERIPHERAL VASCULAR DISEASE, UNSPECIFIED: ICD-10-CM

## 2022-02-18 DIAGNOSIS — Z95.1 PRESENCE OF AORTOCORONARY BYPASS GRAFT: ICD-10-CM

## 2022-02-18 PROCEDURE — 99214 OFFICE O/P EST MOD 30 MIN: CPT

## 2022-02-18 PROCEDURE — 93000 ELECTROCARDIOGRAM COMPLETE: CPT

## 2022-02-18 RX ORDER — ATORVASTATIN CALCIUM 20 MG/1
20 TABLET, FILM COATED ORAL
Qty: 90 | Refills: 3 | Status: ACTIVE | COMMUNITY
Start: 2018-05-06 | End: 1900-01-01

## 2022-02-18 NOTE — HISTORY OF PRESENT ILLNESS
[FreeTextEntry1] : NATANAEL ROWAN  is a 76 year old  M\par \par with a history of known coronary artery disease s/p MI/CABG, dilated iCMP, PAD, HTN, hyperlipidemia (elevated CRP), \par There is no history of symptomatic congestive heart failure, rheumatic heart disease or atrial fibrillation.\par \par In 2004, he presented with symptoms of dizziness, near syncope and was diagnosed with an acute myocardial infarction. \par Initially was seen at [facility] and underwent thrombolysis. Subsequently received 2 stents. \par \par Follow up echocardiogram demonstrated reduced LV function. \par Catheterization in June 2014 LVEF 25% left main three-vessel disease \par Four-vessel CABG [facility] Dr. Goodman June 2014.\par \par There was persistently reduced LV function and he has been seen by Dr. Santos and Dr. Valentino with adjustment of medical therapy. \par Also he was seen by Dr. Lugo, declined electrophysiology evaluation, AICD. \par \par After TENISHA, referred to vascular surgery. \par Initially prescribed exercise regimen for claudication. \par Persistent symptoms. \par Underwent endarterectomy with Dr. Montano. \par He reports less pain. \par No further claudication. \par \par At baseline, he is physically active. \par He works in [employer], long hours at [employer] course in physical labor without limitations\par There has been no chest pain, pressure or discomfort. \par No significant dyspnea on exertion, orthopnea\par No symptomatic palpitations, lightheadedness, dizziness or syncope. \par \par Today he is seen with his wife \par in the off-season he is active chopping wood \par he has symptoms of joint pain which he relates to the Lipitor\par he would like to reduce his medications\par he believes he is on too much statin \par I discussed use of injectable lipid-lowering therapy \par \par December 2021 hemoglobin 13.6 creatinine 0.8 LDL 89 A1c 6.8 TSH 5.3 \par Abdominal ultrasound aortic dimensions 3 cm moderate atherosclerosis \par Echocardiogram ejection fraction 20s moderate mitral regurgitation \par Carotid Doppler left side moderate stenosisd \par EKG demonstrates normal sinus rhythm with frequent PVCs.\par Cardiac catheterization from June 2014: Distal left main 70% stenosis. Ejection fraction at 30%. \par \par Nuclear stress test. July 2015. Gildardo protocol. 7 minutes. Heart rate 146. No symptoms. Large perfusion defect involving inferolateral & apical regions. Fixed defect. No ischemia. Dilated left ventricle. Global hypokinesis. Severely reduced LV function. Nondiagnostic EKG due to rate-related left bundle branch block. Ischemic cardiomyopathy. Large infarction in the apical & lateral regions. Infarction involving the inferolateral, apical and anterior regions.

## 2022-02-18 NOTE — REASON FOR VISIT
[Hyperlipidemia] : hyperlipidemia [Coronary Artery Disease] : coronary artery disease [Carotid, Aortic and Peripheral Vascular Disease] : carotid, aortic and peripheral vascular disease

## 2022-03-01 ENCOUNTER — APPOINTMENT (OUTPATIENT)
Dept: CARDIOLOGY | Facility: CLINIC | Age: 77
End: 2022-03-01
Payer: COMMERCIAL

## 2022-03-01 PROCEDURE — 93979 VASCULAR STUDY: CPT

## 2022-03-01 PROCEDURE — 93306 TTE W/DOPPLER COMPLETE: CPT

## 2022-03-01 PROCEDURE — 93880 EXTRACRANIAL BILAT STUDY: CPT

## 2022-03-08 RX ORDER — LISINOPRIL 10 MG/1
10 TABLET ORAL
Qty: 90 | Refills: 3 | Status: ACTIVE | COMMUNITY
Start: 2018-02-15 | End: 1900-01-01

## 2022-04-22 RX ORDER — CARVEDILOL 6.25 MG/1
6.25 TABLET, FILM COATED ORAL
Qty: 180 | Refills: 3 | Status: ACTIVE | COMMUNITY
Start: 2018-05-06 | End: 1900-01-01

## 2022-05-16 ENCOUNTER — NON-APPOINTMENT (OUTPATIENT)
Age: 77
End: 2022-05-16

## 2022-07-08 RX ORDER — EZETIMIBE 10 MG/1
10 TABLET ORAL DAILY
Qty: 90 | Refills: 0 | Status: ACTIVE | COMMUNITY
Start: 2022-02-18 | End: 1900-01-01

## 2022-07-24 ENCOUNTER — APPOINTMENT (OUTPATIENT)
Dept: NEUROSURGERY | Facility: HOSPITAL | Age: 77
End: 2022-07-24

## 2022-07-24 ENCOUNTER — EMERGENCY (EMERGENCY)
Facility: HOSPITAL | Age: 77
LOS: 1 days | Discharge: HOME | End: 2022-07-24
Admitting: EMERGENCY MEDICINE

## 2022-07-24 ENCOUNTER — TRANSCRIPTION ENCOUNTER (OUTPATIENT)
Age: 77
End: 2022-07-24

## 2022-07-24 ENCOUNTER — INPATIENT (INPATIENT)
Facility: HOSPITAL | Age: 77
LOS: 38 days | Discharge: NURSING, REHAB OR LONG-TERM CARE | DRG: 23 | End: 2022-09-01
Attending: HOSPITALIST | Admitting: NEUROLOGICAL SURGERY
Payer: COMMERCIAL

## 2022-07-24 VITALS
RESPIRATION RATE: 24 BRPM | SYSTOLIC BLOOD PRESSURE: 127 MMHG | DIASTOLIC BLOOD PRESSURE: 85 MMHG | OXYGEN SATURATION: 93 % | TEMPERATURE: 98 F | HEART RATE: 79 BPM

## 2022-07-24 DIAGNOSIS — E78.5 HYPERLIPIDEMIA, UNSPECIFIED: ICD-10-CM

## 2022-07-24 DIAGNOSIS — R29.705 NIHSS SCORE 5: ICD-10-CM

## 2022-07-24 DIAGNOSIS — I63.89 OTHER CEREBRAL INFARCTION: ICD-10-CM

## 2022-07-24 DIAGNOSIS — R53.1 WEAKNESS: ICD-10-CM

## 2022-07-24 DIAGNOSIS — I10 ESSENTIAL (PRIMARY) HYPERTENSION: ICD-10-CM

## 2022-07-24 LAB
ALBUMIN SERPL ELPH-MCNC: 3.8 G/DL — SIGNIFICANT CHANGE UP (ref 3.3–5.2)
ALP SERPL-CCNC: 101 U/L — SIGNIFICANT CHANGE UP (ref 40–120)
ALT FLD-CCNC: 26 U/L — SIGNIFICANT CHANGE UP
ANION GAP SERPL CALC-SCNC: 11 MMOL/L — SIGNIFICANT CHANGE UP (ref 5–17)
ANION GAP SERPL CALC-SCNC: 14 MMOL/L — SIGNIFICANT CHANGE UP (ref 5–17)
APTT BLD: 26.7 SEC — LOW (ref 27.5–35.5)
AST SERPL-CCNC: 30 U/L — SIGNIFICANT CHANGE UP
BASE EXCESS BLDA CALC-SCNC: 1 MMOL/L — SIGNIFICANT CHANGE UP (ref -2–3)
BASOPHILS # BLD AUTO: 0.05 K/UL — SIGNIFICANT CHANGE UP (ref 0–0.2)
BASOPHILS NFR BLD AUTO: 0.4 % — SIGNIFICANT CHANGE UP (ref 0–2)
BILIRUB DIRECT SERPL-MCNC: 0.3 MG/DL — SIGNIFICANT CHANGE UP (ref 0–0.3)
BILIRUB INDIRECT FLD-MCNC: 1 MG/DL — SIGNIFICANT CHANGE UP (ref 0.2–1)
BILIRUB SERPL-MCNC: 1.2 MG/DL — SIGNIFICANT CHANGE UP (ref 0.4–2)
BLD GP AB SCN SERPL QL: SIGNIFICANT CHANGE UP
BLOOD GAS COMMENTS ARTERIAL: SIGNIFICANT CHANGE UP
BUN SERPL-MCNC: 15.8 MG/DL — SIGNIFICANT CHANGE UP (ref 8–20)
BUN SERPL-MCNC: 17.5 MG/DL — SIGNIFICANT CHANGE UP (ref 8–20)
CALCIUM SERPL-MCNC: 8.1 MG/DL — LOW (ref 8.6–10.2)
CALCIUM SERPL-MCNC: 8.6 MG/DL — SIGNIFICANT CHANGE UP (ref 8.6–10.2)
CHLORIDE SERPL-SCNC: 104 MMOL/L — SIGNIFICANT CHANGE UP (ref 98–107)
CHLORIDE SERPL-SCNC: 106 MMOL/L — SIGNIFICANT CHANGE UP (ref 98–107)
CO2 SERPL-SCNC: 20 MMOL/L — LOW (ref 22–29)
CO2 SERPL-SCNC: 20 MMOL/L — LOW (ref 22–29)
CREAT SERPL-MCNC: 0.61 MG/DL — SIGNIFICANT CHANGE UP (ref 0.5–1.3)
CREAT SERPL-MCNC: 0.66 MG/DL — SIGNIFICANT CHANGE UP (ref 0.5–1.3)
EGFR: 100 ML/MIN/1.73M2 — SIGNIFICANT CHANGE UP
EGFR: 97 ML/MIN/1.73M2 — SIGNIFICANT CHANGE UP
EOSINOPHIL # BLD AUTO: 0.12 K/UL — SIGNIFICANT CHANGE UP (ref 0–0.5)
EOSINOPHIL NFR BLD AUTO: 1 % — SIGNIFICANT CHANGE UP (ref 0–6)
GAS PNL BLDA: SIGNIFICANT CHANGE UP
GLUCOSE SERPL-MCNC: 146 MG/DL — HIGH (ref 70–99)
GLUCOSE SERPL-MCNC: 266 MG/DL — HIGH (ref 70–99)
HCO3 BLDA-SCNC: 26 MMOL/L — SIGNIFICANT CHANGE UP (ref 21–28)
HCT VFR BLD CALC: 41.5 % — SIGNIFICANT CHANGE UP (ref 39–50)
HGB BLD-MCNC: 13.7 G/DL — SIGNIFICANT CHANGE UP (ref 13–17)
HOROWITZ INDEX BLDA+IHG-RTO: 100 — SIGNIFICANT CHANGE UP
IMM GRANULOCYTES NFR BLD AUTO: 1 % — SIGNIFICANT CHANGE UP (ref 0–1.5)
INR BLD: 1.14 RATIO — SIGNIFICANT CHANGE UP (ref 0.88–1.16)
INR BLD: 1.17 RATIO — HIGH (ref 0.88–1.16)
LYMPHOCYTES # BLD AUTO: 0.75 K/UL — LOW (ref 1–3.3)
LYMPHOCYTES # BLD AUTO: 6.1 % — LOW (ref 13–44)
MAGNESIUM SERPL-MCNC: 1.5 MG/DL — LOW (ref 1.6–2.6)
MCHC RBC-ENTMCNC: 31.7 PG — SIGNIFICANT CHANGE UP (ref 27–34)
MCHC RBC-ENTMCNC: 33 GM/DL — SIGNIFICANT CHANGE UP (ref 32–36)
MCV RBC AUTO: 96.1 FL — SIGNIFICANT CHANGE UP (ref 80–100)
MONOCYTES # BLD AUTO: 0.64 K/UL — SIGNIFICANT CHANGE UP (ref 0–0.9)
MONOCYTES NFR BLD AUTO: 5.2 % — SIGNIFICANT CHANGE UP (ref 2–14)
MRSA PCR RESULT.: SIGNIFICANT CHANGE UP
NEUTROPHILS # BLD AUTO: 10.53 K/UL — HIGH (ref 1.8–7.4)
NEUTROPHILS NFR BLD AUTO: 86.3 % — HIGH (ref 43–77)
NT-PROBNP SERPL-SCNC: 3029 PG/ML — HIGH (ref 0–300)
PCO2 BLDA: 43 MMHG — SIGNIFICANT CHANGE UP (ref 35–48)
PH BLDA: 7.39 — SIGNIFICANT CHANGE UP (ref 7.35–7.45)
PHOSPHATE SERPL-MCNC: 3.8 MG/DL — SIGNIFICANT CHANGE UP (ref 2.4–4.7)
PLATELET # BLD AUTO: 220 K/UL — SIGNIFICANT CHANGE UP (ref 150–400)
PO2 BLDA: 129 MMHG — HIGH (ref 83–108)
POTASSIUM SERPL-MCNC: 4.1 MMOL/L — SIGNIFICANT CHANGE UP (ref 3.5–5.3)
POTASSIUM SERPL-MCNC: 5.4 MMOL/L — HIGH (ref 3.5–5.3)
POTASSIUM SERPL-SCNC: 4.1 MMOL/L — SIGNIFICANT CHANGE UP (ref 3.5–5.3)
POTASSIUM SERPL-SCNC: 5.4 MMOL/L — HIGH (ref 3.5–5.3)
PROT SERPL-MCNC: 6.8 G/DL — SIGNIFICANT CHANGE UP (ref 6.6–8.7)
PROTHROM AB SERPL-ACNC: 13.3 SEC — SIGNIFICANT CHANGE UP (ref 10.5–13.4)
PROTHROM AB SERPL-ACNC: 13.6 SEC — HIGH (ref 10.5–13.4)
RBC # BLD: 4.32 M/UL — SIGNIFICANT CHANGE UP (ref 4.2–5.8)
RBC # FLD: 15.2 % — HIGH (ref 10.3–14.5)
S AUREUS DNA NOSE QL NAA+PROBE: SIGNIFICANT CHANGE UP
SAO2 % BLDA: 99.3 % — HIGH (ref 94–98)
SODIUM SERPL-SCNC: 135 MMOL/L — SIGNIFICANT CHANGE UP (ref 135–145)
SODIUM SERPL-SCNC: 140 MMOL/L — SIGNIFICANT CHANGE UP (ref 135–145)
TROPONIN T SERPL-MCNC: <0.01 NG/ML — SIGNIFICANT CHANGE UP (ref 0–0.06)
TROPONIN T SERPL-MCNC: <0.01 NG/ML — SIGNIFICANT CHANGE UP (ref 0–0.06)
WBC # BLD: 12.21 K/UL — HIGH (ref 3.8–10.5)
WBC # FLD AUTO: 12.21 K/UL — HIGH (ref 3.8–10.5)

## 2022-07-24 PROCEDURE — 71045 X-RAY EXAM CHEST 1 VIEW: CPT | Mod: 26,77

## 2022-07-24 PROCEDURE — 99285 EMERGENCY DEPT VISIT HI MDM: CPT

## 2022-07-24 PROCEDURE — 0042T: CPT | Mod: MA

## 2022-07-24 PROCEDURE — 70496 CT ANGIOGRAPHY HEAD: CPT | Mod: 26,MA

## 2022-07-24 PROCEDURE — 70496 CT ANGIOGRAPHY HEAD: CPT | Mod: 26

## 2022-07-24 PROCEDURE — 93306 TTE W/DOPPLER COMPLETE: CPT | Mod: 26

## 2022-07-24 PROCEDURE — 70450 CT HEAD/BRAIN W/O DYE: CPT | Mod: 26,59

## 2022-07-24 PROCEDURE — 71045 X-RAY EXAM CHEST 1 VIEW: CPT | Mod: 26

## 2022-07-24 PROCEDURE — 93010 ELECTROCARDIOGRAM REPORT: CPT | Mod: 76

## 2022-07-24 PROCEDURE — 70498 CT ANGIOGRAPHY NECK: CPT | Mod: 26,MA

## 2022-07-24 PROCEDURE — 61645 PERQ ART M-THROMBECT &/NFS: CPT | Mod: LT

## 2022-07-24 RX ORDER — ENOXAPARIN SODIUM 100 MG/ML
40 INJECTION SUBCUTANEOUS EVERY 24 HOURS
Refills: 0 | Status: DISCONTINUED | OUTPATIENT
Start: 2022-07-24 | End: 2022-08-21

## 2022-07-24 RX ORDER — PROPOFOL 10 MG/ML
5 INJECTION, EMULSION INTRAVENOUS
Qty: 1000 | Refills: 0 | Status: DISCONTINUED | OUTPATIENT
Start: 2022-07-24 | End: 2022-07-26

## 2022-07-24 RX ORDER — CHLORHEXIDINE GLUCONATE 213 G/1000ML
1 SOLUTION TOPICAL DAILY
Refills: 0 | Status: DISCONTINUED | OUTPATIENT
Start: 2022-07-24 | End: 2022-09-01

## 2022-07-24 RX ORDER — LABETALOL HCL 100 MG
10 TABLET ORAL
Refills: 0 | Status: DISCONTINUED | OUTPATIENT
Start: 2022-07-24 | End: 2022-07-28

## 2022-07-24 RX ORDER — DEXTROSE 50 % IN WATER 50 %
25 SYRINGE (ML) INTRAVENOUS ONCE
Refills: 0 | Status: DISCONTINUED | OUTPATIENT
Start: 2022-07-24 | End: 2022-09-01

## 2022-07-24 RX ORDER — FAMOTIDINE 10 MG/ML
20 INJECTION INTRAVENOUS DAILY
Refills: 0 | Status: DISCONTINUED | OUTPATIENT
Start: 2022-07-24 | End: 2022-07-29

## 2022-07-24 RX ORDER — ACETAMINOPHEN 500 MG
650 TABLET ORAL EVERY 6 HOURS
Refills: 0 | Status: DISCONTINUED | OUTPATIENT
Start: 2022-07-24 | End: 2022-07-24

## 2022-07-24 RX ORDER — MAGNESIUM SULFATE 500 MG/ML
2 VIAL (ML) INJECTION
Refills: 0 | Status: COMPLETED | OUTPATIENT
Start: 2022-07-24 | End: 2022-07-25

## 2022-07-24 RX ORDER — NOREPINEPHRINE BITARTRATE/D5W 8 MG/250ML
0.05 PLASTIC BAG, INJECTION (ML) INTRAVENOUS
Qty: 8 | Refills: 0 | Status: DISCONTINUED | OUTPATIENT
Start: 2022-07-24 | End: 2022-07-28

## 2022-07-24 RX ORDER — GLUCAGON INJECTION, SOLUTION 0.5 MG/.1ML
1 INJECTION, SOLUTION SUBCUTANEOUS ONCE
Refills: 0 | Status: DISCONTINUED | OUTPATIENT
Start: 2022-07-24 | End: 2022-09-01

## 2022-07-24 RX ORDER — FENTANYL CITRATE 50 UG/ML
25 INJECTION INTRAVENOUS EVERY 4 HOURS
Refills: 0 | Status: DISCONTINUED | OUTPATIENT
Start: 2022-07-24 | End: 2022-07-25

## 2022-07-24 RX ORDER — FAMOTIDINE 10 MG/ML
20 INJECTION INTRAVENOUS DAILY
Refills: 0 | Status: DISCONTINUED | OUTPATIENT
Start: 2022-07-24 | End: 2022-07-24

## 2022-07-24 RX ORDER — CHLORHEXIDINE GLUCONATE 213 G/1000ML
15 SOLUTION TOPICAL
Refills: 0 | Status: DISCONTINUED | OUTPATIENT
Start: 2022-07-24 | End: 2022-07-24

## 2022-07-24 RX ORDER — SODIUM CHLORIDE 9 MG/ML
1000 INJECTION INTRAMUSCULAR; INTRAVENOUS; SUBCUTANEOUS
Refills: 0 | Status: DISCONTINUED | OUTPATIENT
Start: 2022-07-24 | End: 2022-07-25

## 2022-07-24 RX ORDER — FUROSEMIDE 40 MG
10 TABLET ORAL ONCE
Refills: 0 | Status: COMPLETED | OUTPATIENT
Start: 2022-07-24 | End: 2022-07-24

## 2022-07-24 RX ORDER — HYDRALAZINE HCL 50 MG
10 TABLET ORAL
Refills: 0 | Status: DISCONTINUED | OUTPATIENT
Start: 2022-07-24 | End: 2022-07-28

## 2022-07-24 RX ORDER — ASPIRIN/CALCIUM CARB/MAGNESIUM 324 MG
300 TABLET ORAL DAILY
Refills: 0 | Status: DISCONTINUED | OUTPATIENT
Start: 2022-07-24 | End: 2022-07-24

## 2022-07-24 RX ORDER — ONDANSETRON 8 MG/1
4 TABLET, FILM COATED ORAL EVERY 6 HOURS
Refills: 0 | Status: DISCONTINUED | OUTPATIENT
Start: 2022-07-24 | End: 2022-09-01

## 2022-07-24 RX ORDER — FENTANYL CITRATE 50 UG/ML
50 INJECTION INTRAVENOUS ONCE
Refills: 0 | Status: DISCONTINUED | OUTPATIENT
Start: 2022-07-24 | End: 2022-07-24

## 2022-07-24 RX ORDER — ACETAMINOPHEN 500 MG
650 TABLET ORAL EVERY 6 HOURS
Refills: 0 | Status: DISCONTINUED | OUTPATIENT
Start: 2022-07-25 | End: 2022-07-31

## 2022-07-24 RX ORDER — FENTANYL CITRATE 50 UG/ML
50 INJECTION INTRAVENOUS EVERY 4 HOURS
Refills: 0 | Status: DISCONTINUED | OUTPATIENT
Start: 2022-07-24 | End: 2022-07-25

## 2022-07-24 RX ORDER — CHLORHEXIDINE GLUCONATE 213 G/1000ML
15 SOLUTION TOPICAL EVERY 12 HOURS
Refills: 0 | Status: DISCONTINUED | OUTPATIENT
Start: 2022-07-24 | End: 2022-08-10

## 2022-07-24 RX ORDER — SODIUM CHLORIDE 9 MG/ML
1000 INJECTION INTRAMUSCULAR; INTRAVENOUS; SUBCUTANEOUS
Refills: 0 | Status: DISCONTINUED | OUTPATIENT
Start: 2022-07-24 | End: 2022-07-24

## 2022-07-24 RX ORDER — ACETAMINOPHEN 500 MG
1000 TABLET ORAL ONCE
Refills: 0 | Status: COMPLETED | OUTPATIENT
Start: 2022-07-24 | End: 2022-07-24

## 2022-07-24 RX ADMIN — Medication 25 GRAM(S): at 21:13

## 2022-07-24 RX ADMIN — Medication 1000 MILLIGRAM(S): at 21:43

## 2022-07-24 RX ADMIN — Medication 10 MILLIGRAM(S): at 17:51

## 2022-07-24 RX ADMIN — FAMOTIDINE 20 MILLIGRAM(S): 10 INJECTION INTRAVENOUS at 17:26

## 2022-07-24 RX ADMIN — Medication 10 MILLIGRAM(S): at 14:11

## 2022-07-24 RX ADMIN — ENOXAPARIN SODIUM 40 MILLIGRAM(S): 100 INJECTION SUBCUTANEOUS at 21:13

## 2022-07-24 RX ADMIN — PROPOFOL 3 MICROGRAM(S)/KG/MIN: 10 INJECTION, EMULSION INTRAVENOUS at 21:13

## 2022-07-24 RX ADMIN — Medication 10 MILLIGRAM(S): at 15:28

## 2022-07-24 RX ADMIN — CHLORHEXIDINE GLUCONATE 1 APPLICATION(S): 213 SOLUTION TOPICAL at 13:50

## 2022-07-24 RX ADMIN — Medication 10 MILLIGRAM(S): at 13:48

## 2022-07-24 RX ADMIN — Medication 400 MILLIGRAM(S): at 21:13

## 2022-07-24 RX ADMIN — CHLORHEXIDINE GLUCONATE 15 MILLILITER(S): 213 SOLUTION TOPICAL at 17:26

## 2022-07-24 RX ADMIN — FENTANYL CITRATE 50 MICROGRAM(S): 50 INJECTION INTRAVENOUS at 16:49

## 2022-07-24 NOTE — H&P ADULT - CRITICAL CARE ATTENDING COMMENT
76M with LM1 occlusion, s/p TICI 2B MT, no tPA as wake-up stroke.  Acute on chronic HFrEF, LVHF <10%, reduced RV syst function, 1st degree AVbl. ?Component of neurogenic CMP.  Acute hypoxemic  resp failure, intubated.  PMH of CAD/CABG/HFrEF, HTN, HLD.    Pt with worsening mental status, desaturating; required intubation.   Denied any chest pain or discomfort prior    Plan:  neurochecks q1hr  sedation with Prop ggt  repeat CTh now   repeat EKG, trops; pads on  ASA if negative CTh  Cardiology consult  Levo PRN to maintain MAP >65, may require addition of Milrinone   gentle diuresis, maintain euvolemia to -500ml, monitor renal function  SCDs, SQL if stable Cth      Pt is critically ill and at high risk of rapid deterioration/death due to abovementioned conditions.   Still requires critical care interventions - ongoing frequent evaluations, interventions and management adjustment by the Attending and ICU team, - and included review of relevant history, clinical examination, review of data and images, discussion of treatment with the multidisciplinary team and any consultants involved in this patient’s care as well as family discussion.

## 2022-07-24 NOTE — PATIENT PROFILE ADULT - DOMESTIC TRAVEL HIGH RISK QUESTION
Hospitalist Progress Note      Synopsis: Patient admitted on 9/22/2021 Patient presented to the emergency department after having a syncopal event. Patient was walking to the bathroom became nauseated and can't remember anything after that. Patient reporting loose stools. Denies chest pain, shortness of breath, cough, fever, chills. Vital signs assessed in emergency department within normal limits and stable. Laboratory studies notable for BUN 33, creatinine 1.1, glucose 174, alk phos 127, WBC 15.2. CT head was unremarkable. CT C-spine was unremarkable with the exception of 5 mm right apical pleural-parenchymal nodule. Subjective    Clinically improving. Feeling better. Stable overnight. No other overnight issues reported. No CP, SOB, palpitations, blurred vision, HA, lightheadedness, LOC or focal neurological deficits    Exam:  BP (!) 179/78   Pulse 60   Temp 98.7 °F (37.1 °C) (Temporal)   Resp 18   Ht 5' 3\" (1.6 m)   Wt 112 lb (50.8 kg)   LMP  (LMP Unknown)   SpO2 95%   BMI 19.84 kg/m²   General appearance: No apparent distress, appears stated age and cooperative. HEENT: Pupils equal, round, and reactive to light. Conjunctivae/corneas clear. Neck: Supple. No jugular venous distention. Trachea midline. Respiratory:  Normal respiratory effort. Clear to auscultation, bilaterally without Rales/Wheezes/Rhonchi. Cardiovascular: Regular rate and rhythm with normal S1/S2 without murmurs, rubs or gallops. Abdomen: Soft, non-tender, non-distended with normal bowel sounds. Musculoskeletal: No clubbing, cyanosis or edema bilaterally. Brisk capillary refill. 2+ lower extremity pulses (dorsalis pedis).    Skin:  No rashes    Neurologic: awake, alert and following commands     Medications:  Reviewed    Infusion Medications    sodium chloride       Scheduled Medications    amLODIPine  5 mg Oral Daily    lisinopril  10 mg Oral Daily    sodium chloride flush  10 mL IntraVENous 2 times per day    enoxaparin  40 mg SubCUTAneous Daily    pantoprazole  40 mg Oral BID AC     PRN Meds: sodium chloride flush, sodium chloride, promethazine **OR** ondansetron, polyethylene glycol, acetaminophen **OR** acetaminophen, perflutren lipid microspheres    I/O    Intake/Output Summary (Last 24 hours) at 9/24/2021 7619  Last data filed at 9/23/2021 1421  Gross per 24 hour   Intake 532 ml   Output --   Net 532 ml       Labs:   Recent Labs     09/22/21  0146 09/23/21  0534   WBC 15.2* 6.1   HGB 14.2 13.8   HCT 44.4 43.1    204       Recent Labs     09/22/21  0146 09/23/21  0534    138   K 4.6 5.0    105   CO2 28 28   BUN 33* 29*   CREATININE 1.1* 0.8   CALCIUM 10.1 9.7       Recent Labs     09/22/21  0146 09/23/21  0534   PROT 5.8* 5.8*   ALKPHOS 127* 95   ALT 9 9   AST 18 17   BILITOT 0.3 0.3       No results for input(s): INR in the last 72 hours. No results for input(s): José Gaetano in the last 72 hours. Chronic labs:  Lab Results   Component Value Date    CHOL 271 (H) 09/16/2016    TRIG 268 (H) 09/16/2016    HDL 58 09/16/2016    LDLCALC 159 (H) 09/16/2016    TSH 0.803 09/22/2021    INR 1.0 07/20/2019    LABA1C 5.6 09/16/2016       Radiology:  Imaging studies reviewed today. ASSESSMENT:    Active Problems:    Syncope and collapse  Resolved Problems:    * No resolved hospital problems. *       PLAN:    -Troponins trended and negative  -CT of the spine did not show any acute findings  -Thyroid nodule-incidental-no further imaging recommended. TSH ordered and within normal limits  -Echo ordered-pending  -Orthostatic vitals negative   -5 mm right apical pleural parenchymal nodule-asymptomatic-follow-up imaging in 12 months  -On telemetry we will continue    Diet: ADULT DIET;  Regular  Code Status: Full Code  PT/OT Eval Status:   18/24  DVT Prophylaxis:   LMWH  Recommended disposition at discharge:  home    +++++++++++++++++++++++++++++++++++++++++++++++++  Jose Alfredo Luna MD   [facility] Stuart Renae.  +++++++++++++++++++++++++++++++++++++++++++++++++  NOTE: This report was transcribed using voice recognition software. Every effort was made to ensure accuracy; however, inadvertent computerized transcription errors may be present. No

## 2022-07-24 NOTE — H&P ADULT - NSHPREVIEWOFSYSTEMS_GEN_ALL_CORE
Constitutional: no fevers, chills, or new weakness  Eyes: No double vision, no change in visual acuity, no blurry vision, no occular discharge  Neurologic: + Right sided weakness, + R numbness. No headache, dizziness  Ears, nose, mouth throat:  No ottorrhea. No change in hearing, No anosmia, No oral lesions, No sore throat  Cardiovascular: No palpitations, no chest pain, no nocturnal or positional dyspnea.  Respiratory: + cough. No shortness of breath,   Gastrointestinal: No change in bowel habits, no change in appetite  Genitourinary: No Frequency, No Dysuria, no Hematuria  Musculoskeletal: + R sided weakness. No muscle wasting    All other systems reviewed and are negative

## 2022-07-24 NOTE — CHART NOTE - NSCHARTNOTEFT_GEN_A_CORE
Interventional Neuro- Radiology   Procedure Note PA-C    Procedure: Catheter Cerebral Angiogram and mechanical thrombectomy    Pre- Procedure Diagnosis: Left ICA occlusion   Post- Procedure Diagnosis: Left MCA occlusion, TICI 2B resultant     : Dr Michael Pierre   Physician Assistant: Delmi Saxena PA-C    Nurse:                   Tiara Pérez RN,  Trutpi Good RN            Radiologic Tech:  Robi Lara LRT,    Sage Szymanski LRT  Anesthesiologist: Dr Miguel A Real   Sheath:               8 Burkinan short sheath  6 Burkinan BMX sheath 80cm Purple Catheter 105cm  Fluoro time:         25 minutes  Milligray:              614 mGy      I/Os: EBL less than 10cc  IV fluids: Urine output 550cc Contrast 240  46cc             Vitals: /93       HR 91     Spo2 100%          Preliminary Report:  Using a 8 Burkinan short sheath to the right groin under general sedation left internal carotid artery and a [place] CT demonstrated a Left MCA occlusion. Patient underwent successful mechanical thrombectomy, with a TICI 2B resultant. Official note to follow.  Patient tolerated procedure well, hemodynamically stable, no change in neurological status compared to baseline. Results discussed with neuro ICU team, patient and patient's family. Right groin sheath was removed, manual compression held to hemostasis for 20 minutes, no active bleeding, no hematoma, quick clot and safeguard balloon dressing applied at 9:15am. Interventional Neuro- Radiology   Procedure Note PA-C    Procedure: Catheter Cerebral Angiogram and mechanical thrombectomy    Pre- Procedure Diagnosis: Left ICA occlusion   Post- Procedure Diagnosis: Left MCA occlusion, TICI 2B resultant     : Dr Michael Pierre   Physician Assistant: Delmi Saxena PA-C    Nurse:                   Tiara Pérez RN,  Trupti Good RN            Radiologic Tech:  Robi Lara LRT,    Sage Szymanski LRT  Anesthesiologist:  Dr Miguel A Real   Sheath:                8 Barbadian short sheath  6 Barbadian BMX sheath 80cm Purple Catheter 105cm  Fluoro time:          25 minutes  Milligray:              614 mGy      I/Os: EBL less than 10cc  IV fluids: Urine output 550cc Contrast 240  46cc             Vitals: /93       HR 91     Spo2 100%          Preliminary Report:  Using a 8 Barbadian short sheath to the right groin under general sedation left internal carotid artery and a [place]raine CT demonstrated a Left MCA occlusion. Patient underwent successful mechanical thrombectomy, with a TICI 2B resultant. Official note to follow.  Patient tolerated procedure well, hemodynamically stable, no change in neurological status compared to baseline. Results discussed with neuro ICU team, patient and patient's family. Right groin sheath was removed, manual compression held to hemostasis for 20 minutes, no active bleeding, no hematoma, quick clot and safeguard balloon dressing applied at 9:15am. Interventional Neuro- Radiology   Procedure Note PA-C    Procedure: Catheter Cerebral Angiogram and mechanical thrombectomy    Pre- Procedure Diagnosis: Left ICA occlusion   Post- Procedure Diagnosis: Left MCA occlusion, TICI 2B resultant     : Dr Michael Pierre   Physician Assistant: Delmi Saxena PA-C    Nurse:                  Tiara Pérez RN,  Trupti Good RN            Radiologic Tech:  Robi Lara LRT,    Sage Szymanski LRT  Anesthesiologist:  Dr Miguel A Real   Sheath:                8 Welsh short sheath  6 Welsh BMX sheath 80cm Purple Catheter 105cm  Fluoro time:         25 minutes  Milligray:              614 mGy      I/Os: EBL less than 10cc  IV fluids: Urine output 550cc Contrast 240  46cc             Vitals: /93       HR 91     Spo2 100%          Preliminary Report:  Using a 8 Welsh short sheath to the right groin under general sedation left internal carotid artery and a [place] CT demonstrated a Left MCA occlusion. Patient underwent successful mechanical thrombectomy, with a TICI 2B resultant. Official note to follow.  Patient tolerated procedure well, hemodynamically stable, no change in neurological status compared to baseline. Results discussed with neuro ICU team, patient and patient's family. Right groin sheath was removed, manual compression held to hemostasis for 20 minutes, no active bleeding, no hematoma, quick clot and safeguard balloon dressing applied at 9:15am. Interventional Neuro- Radiology   Procedure Note PA-C    Procedure: Catheter Cerebral Angiogram and mechanical thrombectomy    Pre- Procedure Diagnosis: Left ICA occlusion   Post- Procedure Diagnosis: Left MCA occlusion, TICI 2B resultant     : Dr Michael Pierre   Physician Assistant: Delmi Saxena PA-C    Nurse:                  Tiara Pérez RN,  Trupti Good RN            Radiologic Tech:  Robi Lara LRT,    Sage Szymanski LRT  Anesthesiologist:  Dr Miguel A Real   Sheath:                8 Brazilian short sheath  6 Brazilian BMX sheath 80cm Purple Catheter 105cm  Fluoro time:         25 minutes  Milligray:              614 mGy      I/Os: EBL less than 10cc  IV fluids: Urine output 550cc Contrast 240  46cc             Vitals: /93       HR 91     Spo2 100%          Preliminary Report:  Using a 8 Brazilian short sheath to the right groin under general sedation left internal carotid artery and a [place]raine CT demonstrated a Left MCA occlusion. Patient underwent successful mechanical thrombectomy, with a TICI 2B resultant. Official note to follow.  Patient tolerated procedure well, hemodynamically stable, no change in neurological status compared to baseline. Results discussed with neuro ICU team, patient and patient's family. Right groin sheath was removed, manual compression held to hemostasis for 20 minutes, no active bleeding, no hematoma, quick clot and safeguard balloon dressing applied at 9:15am. Interventional Neuro- Radiology   Procedure Note PA-C    Procedure: Catheter Cerebral Angiogram and stroke intervention with mechanical thrombectomy    Pre- Procedure Diagnosis: Left ICA occlusion   Post- Procedure Diagnosis: Left M1 occlusion, TICI 2B resultant     : Dr Michael Pierre   Physician Assistant: Delmi Saxena PA-C    Nurse:                  Tiara Pérez RN,  Trupti Good RN            Radiologic Tech:  Robi Lara LRT,    Sage Szymanski LRT  Anesthesiologist:  Dr Miguel A Real   Sheath:                8 Dominican short sheath  6 Dominican BMX sheath 80cm Purple Catheter 105cm  Fluoro time:         25 minutes  Milligray:              614 mGy  ACT:                     135      I/Os: EBL less than 10cc  IV fluids: Urine output 550cc Contrast 240  46cc             Vitals: /93       HR 91     Spo2 100%          Preliminary Report:  Using a 8 Dominican short sheath to the right groin under general sedation left internal carotid artery and a [place] CT demonstrated a Left MCA occlusion. Patient underwent successful mechanical thrombectomy, with a TICI 2B resultant. Official note to follow.  Patient tolerated procedure well, hemodynamically stable, no change in neurological status compared to baseline. Results discussed with neuro ICU team and patient's family. Patient's son and patient's wife. Right groin sheath was removed, manual compression held to hemostasis for 20 minutes, no active bleeding, no hematoma, quick clot and safeguard balloon dressing applied at 9:15am. Disposition Neuro Neuro ICU bed 5. Interventional Neuro- Radiology   Procedure Note PA-C    Procedure: Catheter Cerebral Angiogram and stroke intervention with mechanical thrombectomy    Pre- Procedure Diagnosis: Left ICA occlusion   Post- Procedure Diagnosis: Left M1 occlusion, TICI 2B resultant     : Dr Michael Pierre   Physician Assistant: Delmi Saxena PA-C    Nurse:                  Tiara Pérez RN,  Trupti Good RN            Radiologic Tech:  Robi Lara LRT,    Sage Szymanski LRT  Anesthesiologist:  Dr Miguel A Real   Sheath:                8 Surinamese short sheath  6 Surinamese BMX sheath 80cm Purple Catheter 105cm  Fluoro time:         25 minutes  Milligray:              614 mGy  ACT:                    135      I/Os: EBL less than 10cc  IV fluids: Urine output 550cc Contrast 240  46cc             Vitals: /93       HR 91     Spo2 100%          Preliminary Report:  Using a 8 Surinamese short sheath to the right groin under general sedation left internal carotid artery and a [place] CT demonstrated a Left MCA occlusion. Patient underwent successful mechanical thrombectomy, with a TICI 2B resultant. Official note to follow.  Patient tolerated procedure well, hemodynamically stable, no change in neurological status compared to baseline. Results discussed with neuro ICU team and patient's family. Patient's son and patient's wife. Right groin sheath was removed, manual compression held to hemostasis for 20 minutes, no active bleeding, no hematoma, quick clot and safeguard balloon dressing applied at 9:15am. Disposition Neuro Neuro ICU bed 5. Interventional Neuro- Radiology   Procedure Note PA-C    Procedure: Catheter Cerebral Angiogram and stroke intervention with mechanical thrombectomy    Pre- Procedure Diagnosis: Left ICA occlusion   Post- Procedure Diagnosis: Left M1 occlusion, TICI 2B resultant     : Dr Michael Pierre   Physician Assistant: Delmi Saxena PA-C    Nurse:                  Tiara Pérez RN,  Trupti Good RN            Radiologic Tech:  Robi Lara LRT,    Sage Szymanski LRT  Anesthesiologist:  Dr Miguel A Real   Sheath:                8 Stateless short sheath  6 Stateless BMX sheath 80cm Purple Catheter 105cm  Fluoro time:         25 minutes  Milligray:              614 mGy  ACT:                    135      I/Os: EBL less than 10cc  IV fluids: Urine output 550cc Contrast 240  46cc             Vitals: /93       HR 91     Spo2 100%          Preliminary Report:  Using a 8 Stateless short sheath to the right groin under general sedation left internal carotid artery and a [place] CT demonstrated a Left MCA occlusion. Patient underwent successful mechanical thrombectomy, with a TICI 2B resultant. Official note to follow.  Patient tolerated procedure well, hemodynamically stable, no change in neurological status compared to baseline. Results discussed with neuro ICU team and patient's family. Patient's son and patient's wife. Right groin sheath was removed, manual compression held to hemostasis for 20 minutes, no active bleeding, no hematoma, quick clot and safeguard balloon dressing applied at 9:15am. Disposition Neuro ICU bed 5. Interventional Neuro- Radiology   Procedure Note PA-C    Procedure: Catheter Cerebral Angiogram and stroke intervention with mechanical thrombectomy    Pre- Procedure Diagnosis: Left ICA occlusion   Post- Procedure Diagnosis: Left M1 occlusion, TICI 2B resultant     : Dr Michael Pierre   Physician Assistant: Delmi Saxena PA-C    Nurse:                  Tiara Pérez RN,  Trupti Good RN            Radiologic Tech:  Robi Lara LRT,    Sage Szymanski LRT  Anesthesiologist:  Dr Miguel A Real   Sheath:                8 Cayman Islander short sheath  6 Cay[place] Islander BMX sheath 80cm Purple Catheter 105cm  Fluoro time:         25 minutes  Milligray:              614 mGy  ACT:                    135      I/Os: EBL less than 10cc  IV fluids: 450cc Urine output 550cc Contrast 240  46cc             Vitals: /93       HR 91     Spo2 100%          Preliminary Report:  Using a 8 Cayman Islander short sheath to the right groin under general sedation left internal carotid artery and a [place] CT demonstrated a Left MCA occlusion. Patient underwent successful mechanical thrombectomy, with a TICI 2B resultant. Official note to follow.  Patient tolerated procedure well, hemodynamically stable, no change in neurological status compared to baseline. Results discussed with neuro ICU team and patient's family. Patient's son and patient's wife. Right groin sheath was removed, manual compression held to hemostasis for 20 minutes, no active bleeding, no hematoma, quick clot and safeguard balloon dressing applied at 9:15am. Disposition Neuro ICU bed 5. Interventional Neuro- Radiology   Procedure Note PA-C    Procedure: Catheter Cerebral Angiogram and stroke intervention with mechanical thrombectomy    Pre- Procedure Diagnosis: Left ICA occlusion   Post- Procedure Diagnosis: Left M1 occlusion, TICI 2B resultant     : Dr Michael Pierre   Physician Assistant: Delmi Saxena PA-C    Nurse:                  Tiara Pérez RN,  Trupti Good RN            Radiologic Tech:  Robi Lara LRT,    Sage Szymanski LRT  Anesthesiologist:  Dr Miguel A Real   Sheath:                8 Argentine short sheath 6 Argentine BMX sheath 80cm Purple Catheter 105cm  Fluoro time:         25 minutes  Milligray:              614 mGy  ACT:                    135      I/Os: EBL less than 10cc  IV fluids: 450cc Urine output 550cc Contrast 240  46cc             Vitals: /93       HR 91     Spo2 100%          Preliminary Report:  Using a 8 Argentine short sheath to the right groin under general sedation left internal carotid artery and a [place]raine CT demonstrated a Left MCA occlusion. Patient underwent successful mechanical thrombectomy, with a TICI 2B resultant. Official note to follow.  Patient tolerated procedure well, hemodynamically stable, no change in neurological status compared to baseline. Results discussed with neuro ICU team and patient's family. Patient's son and patient's wife. Right groin sheath was removed, manual compression held to hemostasis for 20 minutes, no active bleeding, no hematoma, quick clot and safeguard balloon dressing applied at 9:15am. Disposition Neuro ICU bed 5.

## 2022-07-24 NOTE — PATIENT PROFILE ADULT - FALL HARM RISK - HARM RISK INTERVENTIONS
Assistance with ambulation/Assistance OOB with selected safe patient handling equipment/Communicate Risk of Fall with Harm to all staff/Monitor for mental status changes/Monitor gait and stability/Reinforce activity limits and safety measures with patient and family/Tailored Fall Risk Interventions/Visual Cue: Yellow wristband and red socks/Bed in lowest position, wheels locked, appropriate side rails in place/Call bell, personal items and telephone in reach/Instruct patient to call for assistance before getting out of bed or chair/Non-slip footwear when patient is out of bed/[place] to call system/Physically safe environment - no spills, clutter or unnecessary equipment/Purposeful Proactive Rounding/Room/bathroom lighting operational, light cord in reach

## 2022-07-24 NOTE — DISCHARGE NOTE NURSING/CASE MANAGEMENT/SOCIAL WORK - PATIENT PORTAL LINK FT
You can access the FollowMyHealth Patient Portal offered by [facility] by registering at the following website: [email or web address]. By joining [employer]’s FollowMyHealth portal, you will also be able to view your health information using other applications (apps) compatible with our system.

## 2022-07-24 NOTE — H&P ADULT - NSHPPHYSICALEXAM_GEN_ALL_CORE
Constitutional: NAD, lying in bed   Neuro  	* Mental Status:  GCS 15: E4, V5, M6. Awake, alert, oriented to conversation. + mild expressive aphasia & word finding difficulty, mild dysarthria.   	* Cranial Nerves: PERRL, EOMI, tongue midline, no gaze deviation. Mild R facial droop.   	* Motor: RUE 5/5, LUE 5/5, RLE 5/5, LLE 5/5, RUE/RLE drift does not hit bed  	* Sensory: decreased sensation RUE/RLE when compared to L   	* Reflexes: Not assessed  	* Gait: Not assessed  Cardiovascular:  Regular rate and rhythm.  Eyes: See neurologic examination with detailed examination of eyes.  ENT: No JVD, Trachea Midline.  Respiratory: Clear to auscultation.  Gastrointestinal: Soft, nontender, nondistended.  Genitourinary: Brown [  ]  No Brown [ x ]   Musculoskeletal: No muscle wasting noted, (See neurologic assessment for full muscle strength assessment) No pretibial edema appreciated, no appreciable calf tenderness.  Skin:  no skin breakdown  Hematologic / Lymph / Immunologic: No bleeding from IV sites or wounds, No lymphadenopathy, No Hives or allergic type skin lesions

## 2022-07-24 NOTE — DISCHARGE NOTE NURSING/CASE MANAGEMENT/SOCIAL WORK - NSDCPEFALRISK_GEN_ALL_CORE
For information on Fall & Injury Prevention, visit: [email or web address] OR  [email or web address] OR  https://www.cdc.gov/steadi/patient.html

## 2022-07-24 NOTE — H&P ADULT - ASSESSMENT
76M with PMH CABG, HTN, HLD who presented with R facial droop, R sided weakness, found to have L M1 occlusion. Tx to [facility] for neuro IR intervention.      Plan:  Neuro:  - Q1 hour Neuro checks, Q1 hour Vitals  - HOB 30 degrees, Neck midline position  - Maintain normothermia, PO acetaminophen for temp>38 C or pain  - MRI per stroke w/u  - Pain management & Sedation: tylenol PRN   - Turn and Position Q2  /  Activity ad estefania, with assistance  	  CV:  - SBP Goal <140 s/p intervention  - BP regimen: hydralazine / labetalol PRN, nicardipine as needeed   - L radial line  - TTE per stroke w/u  - Lipid profile     Pulm:  - Supplemental O2 PRN to maintain Spo2>92%  - Chest PT, OOB, Pulmonary Toilet    GI:  - Nutrition: NPO   - Zofran PRN   	  Gu:  - Brown placement  - IV fluids while NPO  - I&O Q1 hour  - Monitor Electrolytes & Renal Function    Heme:  - Monitor H&H  - Chemical DVT prophylaxis: 	* Chemical DVT prophylaxis is contraindicated due to risk of bleeding  - Mechanical DVT Prophylaxis: Maintain B/L LE sequential compression devices  	  ID:  - Monitor WBC and Temperature  	    Endo  - Monitor BGL, maintain <180  - A1C, TSH

## 2022-07-24 NOTE — CHART NOTE - NSCHARTNOTEFT_GEN_A_CORE
Interventional Neuro Radiology  Pre-Procedure Note PA-C      This is a 76 year old right hand dominant male with PMH CABG, HTN, HLD who presents as transfer from [facility] for stroke. Last known well was last night 10pm prior to going to bed, woke up this morning around 0500 with R sided weakness and R facial droop. Presented to [facility], code stroke initiated, NIH at [facility] reportedly 8. CTA showed LM1 occlusion. Transferred to [facility] for possible neuro IR intervention. On arrival to [facility], NIH 6. Decision made to take patient directly to neuro IR for intervention.     NIH 6, mRS 0    NIH SS:  DATE: 7/24/22  TIME: 0710  1A: Level of consciousness (0-3): 0  1B: Questions (0-2): 0    1C: Commands (0-2): 0  2: Gaze (0-2): 0  3: Visual fields (0-3): 0  4: Facial palsy (0-3): 1  MOTOR:  5A: Left arm motor drift (0-4): 0  5B: Right arm motor drift (0-4): 1  6A: Left leg motor drift (0-4): 0  6B: Right leg motor drift (0-4): 1  7: Limb ataxia (0-2): 0  SENSORY:  8: Sensation (0-2): 1  SPEECH:  9: Language (0-3): 1  10: Dysarthria (0-2): 1  EXTINCTION:  11: Extinction/inattention (0-2): 0    TOTAL SCORE: 6 (24 Jul 2022 07:34)      Allergies: No Known Allergies      PAST MEDICAL & SURGICAL HISTORY:  HTN (hypertension)      HLD (hyperlipidemia)      S/P CABG x 1          Social History:     FAMILY HISTORY:      Current Medications: aspirin Suppository 300 milliGRAM(s) Rectal daily  sodium chloride 0.9%. 1000 milliLiter(s) IV Continuous <Continuous>      Labs:                   Blood Bank:       Assessment/Plan:     This is a 76y  year old right  hand dominant Male  presents with   Patient presents to neuro-IR for selective cerebral angiography.   Procedure, goals, risks, benefits and alternatives  were discussed with patient and patient's family.  All questions were answered.  Risks include but are not limited to stroke, vessel injury, hemorrhage, and or right  groin hematoma.  Patient demonstrates understanding  of all risks involved with this procedure and wishes to continue.   Appropriate  content was obtained from patient and consent is in the patient's chart. Interventional Neuro Radiology  Pre-Procedure Note PA-C      This is a 76 year old right hand dominant male with PMH CABG, HTN, HLD who presents as transfer from [facility] for stroke. Last known well was last night 10pm prior to going to bed, woke up this morning around 0500 with R sided weakness and R facial droop. Presented to [facility], code stroke initiated, NIH at [facility] reportedly 8. CTA showed LM1 occlusion. Transferred to [facility] for possible neuro IR intervention. On arrival to [facility], NIH 6. Decision made to take patient directly to neuro IR for intervention.     NIH 6, mRS 0    NIH SS:  DATE: 7/24/22  TIME: 0710  1A: Level of consciousness (0-3): 0  1B: Questions (0-2): 0    1C: Commands (0-2): 0  2: Gaze (0-2): 0  3: Visual fields (0-3): 0  4: Facial palsy (0-3): 1  MOTOR:  5A: Left arm motor drift (0-4): 0  5B: Right arm motor drift (0-4): 1  6A: Left leg motor drift (0-4): 0  6B: Right leg motor drift (0-4): 1  7: Limb ataxia (0-2): 0  SENSORY:  8: Sensation (0-2): 1  SPEECH:  9: Language (0-3): 1  10: Dysarthria (0-2): 1  EXTINCTION:  11: Extinction/inattention (0-2): 0    TOTAL SCORE: 6 (24 Jul 2022 07:34)    Allergies: No Known Allergies  PMHX: hypertension, hyperlipidemia)  PSHX: CABG x 1  Social History:   FAMILY HISTORY:      Current Medications: aspirin Suppository 300 milliGRAM(s) Rectal daily  sodium chloride 0.9%. 1000 milliLiter(s) IV Continuous       Labs:                   Blood Bank:       Assessment/Plan:   This is a 76 year old right hand dominant male with a                                                                                        Patient presents to neuro-IR for selective cerebral angiography.   Procedure, goals, risks, benefits and alternatives  were discussed with patient and patient's family.  All questions were answered.  Risks include but are not limited to stroke, vessel injury, hemorrhage, and or right  groin hematoma.  Patient demonstrates understanding  of all risks involved with this procedure and wishes to continue.   Appropriate  content was obtained from patient and consent is in the patient's chart. Interventional Neuro Radiology  Pre-Procedure Note PA-C      This is a 76 year old right hand dominant male with PMH CABG, HTN, HLD who presents as transfer from [facility] for stroke. Last known well was last night 10pm prior to going to bed, woke up this morning around 0500 with R sided weakness and R facial droop. Presented to [facility], code stroke initiated, NIH at [facility] reportedly 8. CTA showed LM1 occlusion. Transferred to [facility] for possible neuro IR intervention. On arrival to [facility], NIH 6. Decision made to take patient directly to neuro IR for intervention.     NIH 6, mRS 0    NIH SS:  DATE: 7/24/22  TIME: 0710  1A: Level of consciousness (0-3): 0  1B: Questions (0-2): 0    1C: Commands (0-2): 0  2: Gaze (0-2): 0  3: Visual fields (0-3): 0  4: Facial palsy (0-3): 1  MOTOR:  5A: Left arm motor drift (0-4): 0  5B: Right arm motor drift (0-4): 1  6A: Left leg motor drift (0-4): 0  6B: Right leg motor drift (0-4): 1  7: Limb ataxia (0-2): 0  SENSORY:  8: Sensation (0-2): 1  SPEECH:  9: Language (0-3): 1  10: Dysarthria (0-2): 1  EXTINCTION:  11: Extinction/inattention (0-2): 0    TOTAL SCORE: 6 (24 Jul 2022 07:34)    Allergies: No Known Allergies  PMHX: hypertension, hyperlipidemia)  PSHX: CABG x 1  Social History:   FAMILY HISTORY:      Current Medications: aspirin Suppository 300 milliGRAM(s) Rectal daily  sodium chloride 0.9%. 1000 milliLiter(s) IV Continuous   Covid: not detected     Labs:                   Blood Bank: O positive available       Assessment/Plan:   This is a 76 year old right hand dominant male with a left MCA occlusion,                                                                                         Patient presents to neuro-IR for selective cerebral angiography.   Procedure, goals, risks, benefits and alternatives  were discussed with patient and patient's family.  All questions were answered.  Risks include but are not limited to stroke, vessel injury, hemorrhage, and or right  groin hematoma.  Patient demonstrates understanding  of all risks involved with this procedure and wishes to continue.   Appropriate  content was obtained from patient and consent is in the patient's chart. Interventional Neuro Radiology  Pre-Procedure Note PA-C      This is a 76 year old right hand dominant male with PMH CABG, HTN, HLD who presents as transfer from [facility] for stroke. Last known well was last night 10pm prior to going to bed, woke up this morning around 0500 with R sided weakness and R facial droop. Presented to [facility], code stroke initiated, NIH at [facility] reportedly 8. CTA showed LM1 occlusion. Transferred to [facility] for possible neuro IR intervention. On arrival to [facility], NIH 6. Patient was transported to IR suite for a catheter cerebral angiogram and stroke therapy.     NIH 6, mRS 0    NIH SS:  DATE: 7/24/22  TIME: 07:10  1A: Level of consciousness (0-3): 0  1B: Questions (0-2): 0    1C: Commands (0-2): 0  2: Gaze (0-2): 0  3: Visual fields (0-3): 0  4: Facial palsy (0-3): 1  MOTOR:  5A: Left arm motor drift (0-4): 0  5B: Right arm motor drift (0-4): 1  6A: Left leg motor drift (0-4): 0  6B: Right leg motor drift (0-4): 1  7: Limb ataxia (0-2): 0  SENSORY:  8: Sensation (0-2): 1  SPEECH:  9: Language (0-3): 1  10: Dysarthria (0-2): 1  EXTINCTION:  11: Extinction/inattention (0-2): 0    TOTAL SCORE: 6 (24 Jul 2022 07:34)    Allergies: No Known Allergies  PMHX: hypertension, hyperlipidemia)  PSHX: CABG x 1  Social History:    FAMILY HISTORY: non-contributory       Current Medications: aspirin Suppository 300 milliGRAM(s) Rectal daily  sodium chloride 0.9%. 1000 milliLiter(s) IV Continuous   Covid: not detected     CBC:            13.7  12.21  41.5   220    135 104  17.5     5.4   20  0.66  266      Blood Bank: O positive available       Assessment/Plan:   This is a 76 year old right hand dominant male with a left MCA occlusion, NIHSS of 6, transported to IR/ angiosuite for a catheter cerebral angiogram and stroke therapy. Procedure, goals, risks, benefits and alternatives were discussed with patient. All questions were answered. Risks include but are not limited to stroke, vessel injury, hemorrhage, right wrist pain and or right groin hematoma. Patient demonstrates understanding of all risks involved with this procedure and wishes to continue. Appropriate consent was obtained from patient and consent is in the patient's chart. Interventional Neuro Radiology  Pre-Procedure Note PA-C      This is a 76 year old right hand dominant male with PMH CABG, HTN, HLD who [place] [facility] [place]. Last known well was last night 10pm prior to going to bed, patient woke up this morning around 05:00 with right sided weakness and right facial droop. [place] [facility], NIHSS [place] [facility] [place]. [place] Angio revealed a Left M1 occlusion. Patient was Transferred to [facility] for possible Neuro IR intervention. On arrival to [facility], NIH 6. Patient was transported to IR suite for a catheter cerebral angiogram and stroke therapy.     NIH 6, mRS 0    NIH SS:  DATE: 7/24/22  TIME: 07:10  1A: Level of consciousness (0-3): 0  1B: Questions (0-2): 0    1C: Commands (0-2): 0  2: Gaze (0-2): 0  3: Visual fields (0-3): 0  4: Facial palsy (0-3): 1  MOTOR:  5A: Left arm motor drift (0-4): 0  5B: Right arm motor drift (0-4): 1  6A: Left leg motor drift (0-4): 0  6B: Right leg motor drift (0-4): 1  7: Limb ataxia (0-2): 0  SENSORY:  8: Sensation (0-2): 1  SPEECH:  9: Language (0-3): 1  10: Dysarthria (0-2): 1  EXTINCTION:  11: Extinction/inattention (0-2): 0    TOTAL SCORE: 6 (24 Jul 2022 07:34)    Allergies: No Known Allergies  PMHX: hypertension, hyperlipidemia)  PSHX: CABG x 1  Social History:    FAMILY HISTORY: non-contributory       Current Medications: aspirin Suppository 300 milliGRAM(s) Rectal daily  sodium chloride 0.9%. 1000 milliLiter(s) IV Continuous   Covid: not detected     CBC:            13.7  12.21  41.5   220    135 104  17.5     5.4   20  0.66  266      Blood Bank: O positive available       Assessment/Plan:   This is a 76 year old right hand dominant male with a left MCA occlusion, NIHSS of 6, transported to IR/ angiosuite for a catheter cerebral angiogram and stroke therapy. Procedure, goals, risks, benefits and alternatives were discussed with patient. All questions were answered. Risks include but are not limited to stroke, vessel injury, hemorrhage, right wrist pain and or right groin hematoma. Patient demonstrates understanding of all risks involved with this procedure and wishes to continue. Appropriate consent was obtained from patient and consent is in the patient's chart.

## 2022-07-24 NOTE — H&P ADULT - HISTORY OF PRESENT ILLNESS
76M with PMH CABG, HTN, HLD who [place] [facility] [place]. Last known well was last night 10pm prior to going to bed, woke up this morning around 0500 with R sided weakness and R facial droop. [place] [facility] code stroke initiated, NIH at [facility] reportedly 8. CTA showed LM1 occlusion. Transferred to [facility] for possible neuro IR intervention. On arrival to [facility], NIH 6. Decision made to take patient directly to neuro IR for intervention.     NIH 6, mRS 0    NIH SS:  DATE: 7/24/22  TIME: 0710  1A: Level of consciousness (0-3): 0  1B: Questions (0-2): 0    1C: Commands (0-2): 0  2: Gaze (0-2): 0  3: Visual fields (0-3): 0  4: Facial palsy (0-3): 1  MOTOR:  5A: Left arm motor drift (0-4): 0  5B: Right arm motor drift (0-4): 1  6A: Left leg motor drift (0-4): 0  6B: Right leg motor drift (0-4): 1  7: Limb ataxia (0-2): 0  SENSORY:  8: Sensation (0-2): 1  SPEECH:  9: Language (0-3): 1  10: Dysarthria (0-2): 1  EXTINCTION:  11: Extinction/inattention (0-2): 0    TOTAL SCORE: 6

## 2022-07-25 DIAGNOSIS — I63.9 CEREBRAL INFARCTION, UNSPECIFIED: ICD-10-CM

## 2022-07-25 DIAGNOSIS — I25.10 ATHEROSCLEROTIC HEART DISEASE OF NATIVE CORONARY ARTERY WITHOUT ANGINA PECTORIS: ICD-10-CM

## 2022-07-25 DIAGNOSIS — I25.5 ISCHEMIC CARDIOMYOPATHY: ICD-10-CM

## 2022-07-25 LAB
A1C WITH ESTIMATED AVERAGE GLUCOSE RESULT: 7 % — HIGH (ref 4–5.6)
ANION GAP SERPL CALC-SCNC: 12 MMOL/L — SIGNIFICANT CHANGE UP (ref 5–17)
APPEARANCE UR: ABNORMAL
BACTERIA # UR AUTO: ABNORMAL
BILIRUB UR-MCNC: NEGATIVE — SIGNIFICANT CHANGE UP
BUN SERPL-MCNC: 16.6 MG/DL — SIGNIFICANT CHANGE UP (ref 8–20)
CALCIUM SERPL-MCNC: 8.2 MG/DL — LOW (ref 8.6–10.2)
CHLORIDE SERPL-SCNC: 99 MMOL/L — SIGNIFICANT CHANGE UP (ref 98–107)
CHOLEST SERPL-MCNC: 167 MG/DL — SIGNIFICANT CHANGE UP
CK SERPL-CCNC: 82 U/L — SIGNIFICANT CHANGE UP (ref 30–200)
CO2 SERPL-SCNC: 24 MMOL/L — SIGNIFICANT CHANGE UP (ref 22–29)
COLOR SPEC: YELLOW — SIGNIFICANT CHANGE UP
CREAT SERPL-MCNC: 0.69 MG/DL — SIGNIFICANT CHANGE UP (ref 0.5–1.3)
DIFF PNL FLD: ABNORMAL
EGFR: 96 ML/MIN/1.73M2 — SIGNIFICANT CHANGE UP
EPI CELLS # UR: SIGNIFICANT CHANGE UP
ESTIMATED AVERAGE GLUCOSE: 154 MG/DL — HIGH (ref 68–114)
GLUCOSE BLDC GLUCOMTR-MCNC: 137 MG/DL — HIGH (ref 70–99)
GLUCOSE BLDC GLUCOMTR-MCNC: 149 MG/DL — HIGH (ref 70–99)
GLUCOSE BLDC GLUCOMTR-MCNC: 154 MG/DL — HIGH (ref 70–99)
GLUCOSE SERPL-MCNC: 183 MG/DL — HIGH (ref 70–99)
GLUCOSE UR QL: NEGATIVE — SIGNIFICANT CHANGE UP
HCT VFR BLD CALC: 38.6 % — LOW (ref 39–50)
HCV AB S/CO SERPL IA: 0.1 S/CO — SIGNIFICANT CHANGE UP (ref 0–0.99)
HCV AB SERPL-IMP: SIGNIFICANT CHANGE UP
HDLC SERPL-MCNC: 44 MG/DL — SIGNIFICANT CHANGE UP
HGB BLD-MCNC: 13.2 G/DL — SIGNIFICANT CHANGE UP (ref 13–17)
KETONES UR-MCNC: NEGATIVE — SIGNIFICANT CHANGE UP
LACTATE SERPL-SCNC: 1.2 MMOL/L — SIGNIFICANT CHANGE UP (ref 0.5–2)
LEUKOCYTE ESTERASE UR-ACNC: ABNORMAL
LIPID PNL WITH DIRECT LDL SERPL: 97 MG/DL — SIGNIFICANT CHANGE UP
MAGNESIUM SERPL-MCNC: 2.7 MG/DL — HIGH (ref 1.6–2.6)
MCHC RBC-ENTMCNC: 32.3 PG — SIGNIFICANT CHANGE UP (ref 27–34)
MCHC RBC-ENTMCNC: 34.2 GM/DL — SIGNIFICANT CHANGE UP (ref 32–36)
MCV RBC AUTO: 94.4 FL — SIGNIFICANT CHANGE UP (ref 80–100)
NITRITE UR-MCNC: POSITIVE
NON HDL CHOLESTEROL: 123 MG/DL — SIGNIFICANT CHANGE UP
PH UR: 5 — SIGNIFICANT CHANGE UP (ref 5–8)
PHOSPHATE SERPL-MCNC: 3.9 MG/DL — SIGNIFICANT CHANGE UP (ref 2.4–4.7)
PLATELET # BLD AUTO: 200 K/UL — SIGNIFICANT CHANGE UP (ref 150–400)
POTASSIUM SERPL-MCNC: 4.2 MMOL/L — SIGNIFICANT CHANGE UP (ref 3.5–5.3)
POTASSIUM SERPL-SCNC: 4.2 MMOL/L — SIGNIFICANT CHANGE UP (ref 3.5–5.3)
PROT UR-MCNC: 100
RBC # BLD: 4.09 M/UL — LOW (ref 4.2–5.8)
RBC # FLD: 15.5 % — HIGH (ref 10.3–14.5)
RBC CASTS # UR COMP ASSIST: ABNORMAL /HPF (ref 0–4)
SODIUM SERPL-SCNC: 135 MMOL/L — SIGNIFICANT CHANGE UP (ref 135–145)
SP GR SPEC: 1.02 — SIGNIFICANT CHANGE UP (ref 1.01–1.02)
TRIGL SERPL-MCNC: 132 MG/DL — SIGNIFICANT CHANGE UP
TROPONIN T SERPL-MCNC: <0.01 NG/ML — SIGNIFICANT CHANGE UP (ref 0–0.06)
TSH SERPL-MCNC: 0.91 UIU/ML — SIGNIFICANT CHANGE UP (ref 0.27–4.2)
UROBILINOGEN FLD QL: NEGATIVE — SIGNIFICANT CHANGE UP
WBC # BLD: 13.75 K/UL — HIGH (ref 3.8–10.5)
WBC # FLD AUTO: 13.75 K/UL — HIGH (ref 3.8–10.5)
WBC UR QL: ABNORMAL /HPF (ref 0–5)

## 2022-07-25 PROCEDURE — 99222 1ST HOSP IP/OBS MODERATE 55: CPT

## 2022-07-25 PROCEDURE — 99291 CRITICAL CARE FIRST HOUR: CPT

## 2022-07-25 RX ORDER — ASPIRIN/CALCIUM CARB/MAGNESIUM 324 MG
81 TABLET ORAL DAILY
Refills: 0 | Status: DISCONTINUED | OUTPATIENT
Start: 2022-07-25 | End: 2022-09-01

## 2022-07-25 RX ORDER — SENNA PLUS 8.6 MG/1
2 TABLET ORAL AT BEDTIME
Refills: 0 | Status: DISCONTINUED | OUTPATIENT
Start: 2022-07-25 | End: 2022-07-30

## 2022-07-25 RX ORDER — MILRINONE LACTATE 1 MG/ML
0.38 INJECTION, SOLUTION INTRAVENOUS
Qty: 20 | Refills: 0 | Status: DISCONTINUED | OUTPATIENT
Start: 2022-07-25 | End: 2022-07-25

## 2022-07-25 RX ORDER — INSULIN LISPRO 100/ML
VIAL (ML) SUBCUTANEOUS EVERY 6 HOURS
Refills: 0 | Status: DISCONTINUED | OUTPATIENT
Start: 2022-07-25 | End: 2022-08-03

## 2022-07-25 RX ORDER — SODIUM CHLORIDE 9 MG/ML
1000 INJECTION, SOLUTION INTRAVENOUS
Refills: 0 | Status: DISCONTINUED | OUTPATIENT
Start: 2022-07-25 | End: 2022-09-01

## 2022-07-25 RX ORDER — POLYETHYLENE GLYCOL 3350 17 G/17G
17 POWDER, FOR SOLUTION ORAL DAILY
Refills: 0 | Status: DISCONTINUED | OUTPATIENT
Start: 2022-07-25 | End: 2022-07-30

## 2022-07-25 RX ORDER — DEXTROSE 50 % IN WATER 50 %
15 SYRINGE (ML) INTRAVENOUS ONCE
Refills: 0 | Status: DISCONTINUED | OUTPATIENT
Start: 2022-07-25 | End: 2022-09-01

## 2022-07-25 RX ORDER — FUROSEMIDE 40 MG
20 TABLET ORAL DAILY
Refills: 0 | Status: DISCONTINUED | OUTPATIENT
Start: 2022-07-25 | End: 2022-07-29

## 2022-07-25 RX ORDER — FENTANYL CITRATE 50 UG/ML
25 INJECTION INTRAVENOUS EVERY 4 HOURS
Refills: 0 | Status: DISCONTINUED | OUTPATIENT
Start: 2022-07-25 | End: 2022-07-30

## 2022-07-25 RX ORDER — ATORVASTATIN CALCIUM 80 MG/1
80 TABLET, FILM COATED ORAL DAILY
Refills: 0 | Status: DISCONTINUED | OUTPATIENT
Start: 2022-07-25 | End: 2022-07-31

## 2022-07-25 RX ADMIN — Medication 650 MILLIGRAM(S): at 15:25

## 2022-07-25 RX ADMIN — Medication 650 MILLIGRAM(S): at 22:00

## 2022-07-25 RX ADMIN — PROPOFOL 3 MICROGRAM(S)/KG/MIN: 10 INJECTION, EMULSION INTRAVENOUS at 23:58

## 2022-07-25 RX ADMIN — SENNA PLUS 2 TABLET(S): 8.6 TABLET ORAL at 15:26

## 2022-07-25 RX ADMIN — Medication 9.38 MICROGRAM(S)/KG/MIN: at 11:40

## 2022-07-25 RX ADMIN — PROPOFOL 3 MICROGRAM(S)/KG/MIN: 10 INJECTION, EMULSION INTRAVENOUS at 15:37

## 2022-07-25 RX ADMIN — Medication 650 MILLIGRAM(S): at 21:26

## 2022-07-25 RX ADMIN — CHLORHEXIDINE GLUCONATE 15 MILLILITER(S): 213 SOLUTION TOPICAL at 15:26

## 2022-07-25 RX ADMIN — Medication 2: at 18:11

## 2022-07-25 RX ADMIN — ATORVASTATIN CALCIUM 80 MILLIGRAM(S): 80 TABLET, FILM COATED ORAL at 11:27

## 2022-07-25 RX ADMIN — POLYETHYLENE GLYCOL 3350 17 GRAM(S): 17 POWDER, FOR SOLUTION ORAL at 15:26

## 2022-07-25 RX ADMIN — Medication 81 MILLIGRAM(S): at 11:26

## 2022-07-25 RX ADMIN — ENOXAPARIN SODIUM 40 MILLIGRAM(S): 100 INJECTION SUBCUTANEOUS at 21:25

## 2022-07-25 RX ADMIN — CHLORHEXIDINE GLUCONATE 15 MILLILITER(S): 213 SOLUTION TOPICAL at 05:15

## 2022-07-25 RX ADMIN — Medication 20 MILLIGRAM(S): at 06:39

## 2022-07-25 RX ADMIN — CHLORHEXIDINE GLUCONATE 1 APPLICATION(S): 213 SOLUTION TOPICAL at 11:27

## 2022-07-25 RX ADMIN — PROPOFOL 3 MICROGRAM(S)/KG/MIN: 10 INJECTION, EMULSION INTRAVENOUS at 05:16

## 2022-07-25 RX ADMIN — Medication 650 MILLIGRAM(S): at 16:25

## 2022-07-25 RX ADMIN — FAMOTIDINE 20 MILLIGRAM(S): 10 INJECTION INTRAVENOUS at 11:35

## 2022-07-25 RX ADMIN — Medication 25 GRAM(S): at 00:30

## 2022-07-25 NOTE — CONSULT NOTE ADULT - PROBLEM SELECTOR RECOMMENDATION 2
-pt denied chest pain prior to intubation as per notes  -EKG with ischemic changes, no previous available  -troponin negative x2  -continue ASA and statin/Zetia  -will restart beta blocker when off pressors  -keep K>4 and Mg>2 to minimize ectopy

## 2022-07-25 NOTE — PROGRESS NOTE ADULT - ASSESSMENT
Assessment:  76M who presented with R sided weakness and droop, found to have L M1 occlusion. Taken for thrombectomy, TICI 2B.   - POD1  - Exam worsened post-angio requiring reintubation      Plan:  - Seen and discussed with Dr. Pierre  - Q1 hour neuro checks  - SBP goal 100-160, requiring levo to maintain SBP goal   - ASA 81, lovenox 40   - LDL 97, atorvastatin 80  - A1C 7%  - TTE performed, results above   - MRI pending   - Cardiology following, plan for IMTIAZ  - Further care per NSICU team

## 2022-07-25 NOTE — CONSULT NOTE ADULT - ASSESSMENT
IMPRESSION:  - Left MCA Stroke.  S/P suction thrombectomy for L MCA M1 occlusion with TICI 2b reperfusion.    Mechanism ESUS  cardioembolic versus Large artery atherosclerosis    ASSESSMENT/ PLAN:     - Neuro checks and vital signs Q 2 hours.  - SBP goal permissive up to 160 mm hg for 24 hours.  - ASA 81 mg PO or 300 NE QD.  - Lipitor for LDL goal of < 70 .  - Telemetry monitoring.  - CT/CTA/CTP -images and reports were reviewed.   - MRI Brain stroke protocol when stable.  - Check fasting Lipid panel and HbA1c  - TTE  -Reports as above were reviewed. . EF < 10%  - Cardiology consultation appreciated..  - SCD/ SQ Lovenox for DVT prophylaxis.

## 2022-07-25 NOTE — PROGRESS NOTE ADULT - SUBJECTIVE AND OBJECTIVE BOX
Patient is a 76y old  Male who presents with a chief complaint of stroke (25 Jul 2022 00:00)    HPI:  76M with PMH CABG, HTN, HLD who presents as transfer from [facility] for stroke. Last known well was last night 10pm prior to going to bed, woke up this morning around 0500 with R sided weakness and R facial droop. Presented to [facility], code stroke initiated, NIH at [facility] reportedly 8. CTA showed LM1 occlusion. Transferred to [facility] for possible neuro IR intervention. On arrival to [facility], NIH 6. Decision made to take patient directly to neuro IR for intervention.     NIH 6, mRS 0 (24 Jul 2022 07:34)      Interval history:  Patient seen and examined by neuro IR team. Patient's exam poor post procedure, aphasic with R neglect, declined yesterday afternoon, required reintubation.       Vital Signs Last 24 Hrs  T(C): 37.9 (25 Jul 2022 18:30), Max: 38.1 (24 Jul 2022 22:15)  T(F): 100.2 (25 Jul 2022 18:30), Max: 100.6 (24 Jul 2022 22:15)  HR: 82 (25 Jul 2022 18:30) (73 - 108)  BP: 107/82 (25 Jul 2022 17:00) (66/55 - 121/59)  BP(mean): 89 (25 Jul 2022 17:00) (46 - 93)  RR: 23 (25 Jul 2022 18:30) (16 - 36)  SpO2: 97% (25 Jul 2022 18:30) (95% - 100%)    Parameters below as of 25 Jul 2022 11:15  Patient On (Oxygen Delivery Method): ventilator,cpap/psv      Physical Exam:  Constitutional: intubated, lightly sedated   Neuro  * Mental Status: EO to noxious?, not following commands, moves LUE/LLE spontaneously   * Cranial Nerves: PERRL brisk, +cough  * Motor: LUE spontaneous / antigravity, LLE spontaneous, RUE/RLE no movement to noxious   * Sensory: Sensation intact LUE/LLE, unable to determine RUE/RLE   * Reflexes: not assessed   Groin: mild ecchymoses, no hematoma      LABS:                        13.2   13.75 )-----------( 200      ( 25 Jul 2022 03:51 )             38.6     07-25    135  |  99  |  16.6  ----------------------------<  183<H>  4.2   |  24.0  |  0.69    Ca    8.2<L>      25 Jul 2022 03:51  Phos  3.9     07-25  Mg     2.7     07-25    TPro  6.8  /  Alb  3.8  /  TBili  1.2  /  DBili  0.3  /  AST  30  /  ALT  26  /  AlkPhos  101  07-24    PT/INR - ( 24 Jul 2022 15:45 )   PT: 13.3 sec;   INR: 1.14 ratio    PTT - ( 24 Jul 2022 15:45 )  PTT:26.7 sec      Medications:  MEDICATIONS  (STANDING):  aspirin  chewable 81 milliGRAM(s) Oral daily  atorvastatin 80 milliGRAM(s) Oral daily  chlorhexidine 0.12% Liquid 15 milliLiter(s) Oral Mucosa every 12 hours  chlorhexidine 2% Cloths 1 Application(s) Topical daily  dextrose 5%. 1000 milliLiter(s) (50 mL/Hr) IV Continuous <Continuous>  dextrose 5%. 1000 milliLiter(s) (100 mL/Hr) IV Continuous <Continuous>  dextrose 50% Injectable 25 Gram(s) IV Push once  enoxaparin Injectable 40 milliGRAM(s) SubCutaneous every 24 hours  famotidine Injectable 20 milliGRAM(s) IV Push daily  furosemide   Injectable 20 milliGRAM(s) IV Push daily  glucagon  Injectable 1 milliGRAM(s) IntraMuscular once  insulin lispro (ADMELOG) corrective regimen sliding scale   SubCutaneous every 6 hours  norepinephrine Infusion 0.05 MICROgram(s)/kG/Min (9.38 mL/Hr) IV Continuous <Continuous>  polyethylene glycol 3350 17 Gram(s) Oral daily  propofol Infusion 5 MICROgram(s)/kG/Min (3 mL/Hr) IV Continuous <Continuous>  senna 2 Tablet(s) Oral at bedtime    MEDICATIONS  (PRN):  acetaminophen     Tablet .. 650 milliGRAM(s) Oral every 6 hours PRN Temp greater or equal to 38C (100.4F), Mild Pain (1 - 3)  dextrose Oral Gel 15 Gram(s) Oral once PRN Blood Glucose LESS THAN 70 milliGRAM(s)/deciliter  fentaNYL    Injectable 50 MICROGram(s) IV Push every 4 hours PRN Severe Pain (7 - 10)  fentaNYL    Injectable 25 MICROGram(s) IV Push every 4 hours PRN Moderate Pain (4 - 6)  hydrALAZINE Injectable 10 milliGRAM(s) IV Push every 2 hours PRN systolic bloodpressure >160  labetalol Injectable 10 milliGRAM(s) IV Push every 2 hours PRN Systolic blood pressure > 160  ondansetron Injectable 4 milliGRAM(s) IV Push every 6 hours PRN Nausea and/or Vomiting      RADIOLOGY & ADDITIONAL STUDIES:  < from: CT Head No Cont (07.24.22 @ 18:40) >  IMPRESSION:  Suspicion for small acute infarct involving the left insula and its   junction with the left frontal lobe.  No hemorrhagic conversion.    --- End of Report ---    SEVERO CHANEL MD; Attending Radiologist  This document has been electronically signed. Jul 24 2022  7:11PM    < end of copied text >      7/24/22 TTE:  Summary:   1. Endocardial visualization was enhanced with intravenous echo contrast.   2. Severely enlarged left atrium.   3. Global diffuse akinesis. Left ventricular ejection fraction, by   visual estimation, is <10%.   4. Elevated mean left atrial pressure. (>30 mm Hg)   5. Normal right atrial size.   6. Mildly enlarged right ventricle. Moderately reduced RV systolic   function.   7. Mild mitral valve regurgitation.   8. Mild tricuspid regurgitation.   9. Estimated pulmonary artery systolic pressure is 42 mmHg - mild   pulmonary hypertension.  10. There is no evidence of pericardial effusion.  11. Team informed of the findings.    MD Millie, RPVI Electronically signed on 7/24/2022 at 3:49:14 PM

## 2022-07-25 NOTE — CONSULT NOTE ADULT - ASSESSMENT
This is 75 y/o male with hx of CAD s/p stents x2 (2004) and CABG x4 ([facility], 2014), ischemic cardiomyopathy (declined AICD), HTN, HLD, PVD s/p femoral endarterectomy who presented with right-sided weakness and facial droop [place] [facility] [place] was found to have acute stroke. Pt was transferred to [facility] for neuro IR intervention and underwent mechanical thrombectomy for left MCA occlusion. Post procedure pt developed acute hypoxic respiratory failure and was intubated. He is now sedated on propofol drip, not following commands but moving all extremities. EKG shows SR with 1st degree AVB and TWI in lateral leads, no previous EKG available. Telemetry shows SR with frequent multifocal PVCs. Echocardiogram reveals LVEF<10% and moderately reduced RV function. Troponin negative x2, proBNP 3029. Pt follows with Dr. Jonny Clemnets from [place].

## 2022-07-25 NOTE — PROGRESS NOTE ADULT - SUBJECTIVE AND OBJECTIVE BOX
HPI:  76M with PMH CABG, HTN, HLD who presents as transfer from [facility] for stroke. Last known well was last night 10pm prior to going to bed, woke up this morning around 0500 with R sided weakness and R facial droop. Presented to [facility], code stroke initiated, NIH at [facility] reportedly 8. CTA showed LM1 occlusion. Transferred to [facility] for possible neuro IR intervention. On arrival to [facility], NIH 6. Decision made to take patient directly to neuro IR for intervention.   NIH 6, mRS 0    NIH SS:  DATE: 7/24/22  TIME: 0710  1A: Level of consciousness (0-3): 0  1B: Questions (0-2): 0    1C: Commands (0-2): 0  2: Gaze (0-2): 0  3: Visual fields (0-3): 0  4: Facial palsy (0-3): 1  MOTOR:  5A: Left arm motor drift (0-4): 0  5B: Right arm motor drift (0-4): 1  6A: Left leg motor drift (0-4): 0  6B: Right leg motor drift (0-4): 1  7: Limb ataxia (0-2): 0  SENSORY:  8: Sensation (0-2): 1  SPEECH:  9: Language (0-3): 1  10: Dysarthria (0-2): 1  EXTINCTION:  11: Extinction/inattention (0-2): 0    TOTAL SCORE: 6 (24 Jul 2022 07:34)    No o/n events.    VITALS:  T(C): , Max: 38.3 (07-25-22 @ 21:00)  HR:  (73 - 108)  BP:  (71/50 - 109/82)  ABP:  (91/52 - 158/62)  RR:  (16 - 36)  SpO2:  (93% - 100%)  Wt(kg): --  Device: Avea, Mode: CPAP with PS, FiO2: 30, PEEP: 5, PS: 10, MAP: 10    07-24-22 @ 07:01  -  07-25-22 @ 07:00  --------------------------------------------------------  IN: 1048.1 mL / OUT: 3410 mL / NET: -2361.9 mL    07-25-22 @ 07:01  -  07-25-22 @ 23:28  --------------------------------------------------------  IN: 830.8 mL / OUT: 1365 mL / NET: -534.2 mL      LABS:  Na: 135 (07-25 @ 03:51), 140 (07-24 @ 15:45), 135 (07-24 @ 10:27)  K: 4.2 (07-25 @ 03:51), 4.1 (07-24 @ 15:45), 5.4 (07-24 @ 10:27)  Cl: 99 (07-25 @ 03:51), 106 (07-24 @ 15:45), 104 (07-24 @ 10:27)  CO2: 24.0 (07-25 @ 03:51), 20.0 (07-24 @ 15:45), 20.0 (07-24 @ 10:27)  BUN: 16.6 (07-25 @ 03:51), 15.8 (07-24 @ 15:45), 17.5 (07-24 @ 10:27)  Cr: 0.69 (07-25 @ 03:51), 0.61 (07-24 @ 15:45), 0.66 (07-24 @ 10:27)  Glu: 183(07-25 @ 03:51), 146(07-24 @ 15:45), 266(07-24 @ 10:27)    Hgb: 13.2 (07-25 @ 03:51), 13.7 (07-24 @ 10:27)  Hct: 38.6 (07-25 @ 03:51), 41.5 (07-24 @ 10:27)  WBC: 13.75 (07-25 @ 03:51), 12.21 (07-24 @ 10:27)  Plt: 200 (07-25 @ 03:51), 220 (07-24 @ 10:27)      IMAGING:   Recent imaging studies were reviewed.    MEDICATIONS:  acetaminophen     Tablet .. 650 milliGRAM(s) Oral every 6 hours PRN  aspirin  chewable 81 milliGRAM(s) Oral daily  atorvastatin 80 milliGRAM(s) Oral daily  chlorhexidine 0.12% Liquid 15 milliLiter(s) Oral Mucosa every 12 hours  chlorhexidine 2% Cloths 1 Application(s) Topical daily  dextrose 5%. 1000 milliLiter(s) IV Continuous <Continuous>  dextrose 5%. 1000 milliLiter(s) IV Continuous <Continuous>  dextrose 50% Injectable 25 Gram(s) IV Push once  dextrose Oral Gel 15 Gram(s) Oral once PRN  enoxaparin Injectable 40 milliGRAM(s) SubCutaneous every 24 hours  famotidine Injectable 20 milliGRAM(s) IV Push daily  fentaNYL    Injectable 25 MICROGram(s) IV Push every 4 hours PRN  furosemide   Injectable 20 milliGRAM(s) IV Push daily  glucagon  Injectable 1 milliGRAM(s) IntraMuscular once  hydrALAZINE Injectable 10 milliGRAM(s) IV Push every 2 hours PRN  insulin lispro (ADMELOG) corrective regimen sliding scale   SubCutaneous every 6 hours  labetalol Injectable 10 milliGRAM(s) IV Push every 2 hours PRN  norepinephrine Infusion 0.05 MICROgram(s)/kG/Min IV Continuous <Continuous>  ondansetron Injectable 4 milliGRAM(s) IV Push every 6 hours PRN  polyethylene glycol 3350 17 Gram(s) Oral daily  propofol Infusion 5 MICROgram(s)/kG/Min IV Continuous <Continuous>  senna 2 Tablet(s) Oral at bedtime    EXAMINATION:  General:  calm, NAD  Neuro:  awake, alert, no FC, EO to verbal, moves L side, RLE wdrl, RUE 0/5.  Cards:  S1S2 present  Respiratory:  no respiratory distress, intubated.  Abdomen:  soft  Extremities:  no edema  Skin:  warm/dry

## 2022-07-25 NOTE — CONSULT NOTE ADULT - SUBJECTIVE AND OBJECTIVE BOX
[facility]                                              CARDIOLOGY AT [address], [place] [address]                                             Telephone: [phone]. Fax:[phone]                                                       CARDIOLOGY CONSULTATION NOTE                                                                                             History obtained by: Patient and medical record  Community Cardiologist: Dr Jonny Castillo   obtained: n/a  Reason for Consultation: cardiomyopathy  Available out pt records reviewed: Yes     Chief complaint:  transferred from [facility] for neuro IR intervention      HPI:  This is 75 y/o male with hx of CAD s/p stents x2 (2004) and CABG x4 ([facility], 2014), ischemic cardiomyopathy (declined AICD), HTN, HLD, PVD s/p femoral endarterectomy who presented with right-sided weakness and facial droop to [facility] and was found to have acute stroke. Pt was transferred to [facility] for neuro IR intervention and underwent mechanical thrombectomy for left MCA occlusion. Post procedure pt developed acute hypoxic respiratory failure and was intubated. He is now sedated on propofol drip, not following commands but moving all extremities. EKG shows SR with 1st degree AVB and TWI in lateral leads, no previous EKG available. Telemetry shows SR with frequent multifocal PVCs. Echocardiogram reveals LVEF<10% and moderately reduced RV function. Troponin negative x2, proBNP 3029. Pt follows with Dr. Jonny Clements from [place].         CARDIAC TESTING   ECHO:  < from: TTE Echo Complete w/ Contrast w/ Doppler (07.24.22 @ 14:01) >  Summary:   1. Endocardial visualization was enhanced with intravenous echo contrast.   2. Severely enlarged left atrium.   3. Global diffuse akinesis. Left ventricular ejection fraction, by   visual estimation, is <10%.   4. Elevated mean left atrial pressure. (>30 mm Hg)   5. Normal right atrial size.   6. Mildly enlarged right ventricle. Moderately reduced RV systolic   function.   7. Mild mitral valve regurgitation.   8. Mild tricuspid regurgitation.   9. Estimated pulmonary artery systolic pressure is 42 mmHg - mild   pulmonary hypertension.  10. There is no evidence of pericardial effusion.  11. Team informed of the findings.    MD Millie, RPVI Electronically signed on 7/24/2022 at 3:49:14 PM    < end of copied text >      STRESS: n/a    CATH: n/a    ELECTROPHYSIOLOGY: n/a    PAST MEDICAL HISTORY  HTN (hypertension)    HLD (hyperlipidemia)    S/P CABG x 4  s/p PCI and stents x2  ischemic cardiomyopathy  PVD s/p femoral endarterectomy        PAST SURGICAL HISTORY      SOCIAL HISTORY:    CIGARETTES:   unknown  ALCOHOL: unknown  DRUGS: unknown    FAMILY HISTORY: unable to obtain    Family History of Cardiovascular Disease:  Yes [  ] No [  ]  Coronary Artery Disease in first degree relative: Yes [  ] No [  ]  Sudden Cardiac Death in First degree relative: Yes [  ] No [  ]    HOME MEDICATIONS:  ASA 81 mg  Lipitor 40 mg  Zetia 10 mg  Coreg 6.25 mg bid  Lisinopril 10 mg        CURRENT CARDIAC MEDICATIONS:  hydrALAZINE Injectable 10 milliGRAM(s) IV Push every 2 hours PRN systolic bloodpressure >160  labetalol Injectable 10 milliGRAM(s) IV Push every 2 hours PRN Systolic blood pressure > 160  norepinephrine Infusion 0.05 MICROgram(s)/kG/Min IV Continuous <Continuous>      CURRENT OTHER MEDICATIONS:  acetaminophen     Tablet .. 650 milliGRAM(s) Oral every 6 hours PRN Temp greater or equal to 38C (100.4F), Mild Pain (1 - 3)  fentaNYL    Injectable 50 MICROGram(s) IV Push every 4 hours PRN Severe Pain (7 - 10)  fentaNYL    Injectable 25 MICROGram(s) IV Push every 4 hours PRN Moderate Pain (4 - 6)  ondansetron Injectable 4 milliGRAM(s) IV Push every 6 hours PRN Nausea and/or Vomiting  propofol Infusion 5 MICROgram(s)/kG/Min (3 mL/Hr) IV Continuous <Continuous>  famotidine Injectable 20 milliGRAM(s) IV Push daily  chlorhexidine 0.12% Liquid 15 milliLiter(s) Oral Mucosa every 12 hours  chlorhexidine 2% Cloths 1 Application(s) Topical daily  dextrose 50% Injectable 25 Gram(s) IV Push once, Stop order after: 1 Doses  enoxaparin Injectable 40 milliGRAM(s) SubCutaneous every 24 hours  glucagon  Injectable 1 milliGRAM(s) IntraMuscular once, Stop order after: 1 Doses  magnesium sulfate  IVPB 2 Gram(s) IV Intermittent every 2 hours, Stop order after: 2 Doses  sodium chloride 0.9%. 1000 milliLiter(s) (30 mL/Hr) IV Continuous <Continuous>      ALLERGIES:   No Known Allergies      REVIEW OF SYMPTOMS: pt sedated, unable to obtain    VITAL SIGNS:  T(C): 37.9 (07-24-22 @ 23:45), Max: 38.1 (07-24-22 @ 22:15)  T(F): 100.2 (07-24-22 @ 23:45), Max: 100.6 (07-24-22 @ 22:15)  HR: 77 (07-24-22 @ 23:45) (66 - 89)  BP: 82/30 (07-24-22 @ 23:30) (66/55 - 168/93)  RR: 21 (07-24-22 @ 23:45) (14 - 28)  SpO2: 98% (07-24-22 @ 23:45) (91% - 100%)    INTAKE AND OUTPUT:     07-24 @ 07:01  -  07-25 @ 00:02  --------------------------------------------------------  IN: 609 mL / OUT: 2275 mL / NET: -1666 mL        PHYSICAL EXAM:  Constitutional: intubated on mechanical ventilator  HEENT: Atraumatic and normocephalic , neck is supple . no JVD. No carotid bruit.  CNS: sedated, moves all extremities, does not follow commands  Respiratory: CTAB, unlabored   Cardiovascular: RRR normal s1 s2. No murmurs  Gastrointestinal: Soft, non-tender. +Bowel sounds.   Extremities: 2+ Peripheral Pulses, No clubbing, cyanosis, or edema  Psychiatric: sedated  Skin: Warm and dry, no ulcers on extremities     LABS:  ( 24 Jul 2022 18:58 )  Troponin T  <0.01,  CPK  X    , CKMB  X    , BNP X        , ( 24 Jul 2022 10:27 )  Troponin T  <0.01,  CPK  X    , CKMB  X    , BNP 3029<H>                              13.7   12.21 )-----------( 220      ( 24 Jul 2022 10:27 )             41.5     07-24    140  |  106  |  15.8  ----------------------------<  146<H>  4.1   |  20.0<L>  |  0.61    Ca    8.6      24 Jul 2022 15:45  Phos  3.8     07-24  Mg     1.5     07-24    TPro  6.8  /  Alb  3.8  /  TBili  1.2  /  DBili  0.3  /  AST  30  /  ALT  26  /  AlkPhos  101  07-24    PT/INR - ( 24 Jul 2022 15:45 )   PT: 13.3 sec;   INR: 1.14 ratio         PTT - ( 24 Jul 2022 15:45 )  PTT:26.7 sec        INTERPRETATION OF TELEMETRY: SR with frequent PVCs    ECG: SR with 1 degree AVB, TWI in lateral leads  Prior ECG: No     RADIOLOGY & ADDITIONAL STUDIES:    X-ray:    CT scan:   < from: CT Head No Cont (07.24.22 @ 18:40) >  IMPRESSION:  Suspicion for small acute infarct involving the left insula and its   junction with the left frontal lobe.  No hemorrhagic conversion.    --- End of Report ---      SEVERO CHANEL MD; Attending Radiologist  This document has been electronically signed. Jul 24 2022  7:11PM    < end of copied text >    MRI:   US:

## 2022-07-25 NOTE — CONSULT NOTE ADULT - SUBJECTIVE AND OBJECTIVE BOX
Neurology consult    NATANAEL ROWAN 76y Male     Patient is a 76y old  Male who presents with a chief complaint of stroke (25 Jul 2022 18:37)      HPI:  76M with PMH CABG, HTN, HLD who presents as transfer from [facility] for stroke. Last known well was last night 10pm prior to going to bed, woke up this morning around 0500 with R sided weakness and R facial droop. Presented to [facility], code stroke initiated, NIH at [facility] reportedly 8. CTA showed LM1 occlusion. Transferred to [facility] for possible neuro IR intervention. On arrival to [facility], NIH 6. Decision made to take patient directly to neuro IR for intervention.     Initial NIH 6, mRS 0    PMH: HTN (hypertension)    HLD (hyperlipidemia)    S/P CABG x 1         PSH:       FAMILY HISTORY:      SOCIAL HISTORY:  No history of tobacco or alcohol use     Allergies    No Known Allergies    Intolerances            Vital Signs Last 24 Hrs  T(C): 37.9 (25 Jul 2022 19:00), Max: 38.1 (24 Jul 2022 22:15)  T(F): 100.2 (25 Jul 2022 19:00), Max: 100.6 (24 Jul 2022 22:15)  HR: 106 (25 Jul 2022 20:11) (73 - 108)  BP: 94/69 (25 Jul 2022 19:00) (66/55 - 121/59)  BP(mean): 76 (25 Jul 2022 19:00) (46 - 93)  RR: 28 (25 Jul 2022 20:11) (16 - 36)  SpO2: 95% (25 Jul 2022 20:11) (95% - 100%)    Parameters below as of 25 Jul 2022 20:11  Patient On (Oxygen Delivery Method): ventilator    O2 Concentration (%): 30  MEDICATIONS    acetaminophen     Tablet .. 650 milliGRAM(s) Oral every 6 hours PRN  aspirin  chewable 81 milliGRAM(s) Oral daily  atorvastatin 80 milliGRAM(s) Oral daily  chlorhexidine 0.12% Liquid 15 milliLiter(s) Oral Mucosa every 12 hours  chlorhexidine 2% Cloths 1 Application(s) Topical daily  dextrose 5%. 1000 milliLiter(s) IV Continuous <Continuous>  dextrose 5%. 1000 milliLiter(s) IV Continuous <Continuous>  dextrose 50% Injectable 25 Gram(s) IV Push once  dextrose Oral Gel 15 Gram(s) Oral once PRN  enoxaparin Injectable 40 milliGRAM(s) SubCutaneous every 24 hours  famotidine Injectable 20 milliGRAM(s) IV Push daily  fentaNYL    Injectable 50 MICROGram(s) IV Push every 4 hours PRN  fentaNYL    Injectable 25 MICROGram(s) IV Push every 4 hours PRN  furosemide   Injectable 20 milliGRAM(s) IV Push daily  glucagon  Injectable 1 milliGRAM(s) IntraMuscular once  hydrALAZINE Injectable 10 milliGRAM(s) IV Push every 2 hours PRN  insulin lispro (ADMELOG) corrective regimen sliding scale   SubCutaneous every 6 hours  labetalol Injectable 10 milliGRAM(s) IV Push every 2 hours PRN  norepinephrine Infusion 0.05 MICROgram(s)/kG/Min IV Continuous <Continuous>  ondansetron Injectable 4 milliGRAM(s) IV Push every 6 hours PRN  polyethylene glycol 3350 17 Gram(s) Oral daily  propofol Infusion 5 MICROgram(s)/kG/Min IV Continuous <Continuous>  senna 2 Tablet(s) Oral at bedtime        LABS:  CBC Full  -  ( 25 Jul 2022 03:51 )  WBC Count : 13.75 K/uL  RBC Count : 4.09 M/uL  Hemoglobin : 13.2 g/dL  Hematocrit : 38.6 %  Platelet Count - Automated : 200 K/uL  Mean Cell Volume : 94.4 fl  Mean Cell Hemoglobin : 32.3 pg  Mean Cell Hemoglobin Concentration : 34.2 gm/dL  Auto Neutrophil # : x  Auto Lymphocyte # : x  Auto Monocyte # : x  Auto Eosinophil # : x  Auto Basophil # : x  Auto Neutrophil % : x  Auto Lymphocyte % : x  Auto Monocyte % : x  Auto Eosinophil % : x  Auto Basophil % : x      07-25    135  |  99  |  16.6  ----------------------------<  183<H>  4.2   |  24.0  |  0.69    Ca    8.2<L>      25 Jul 2022 03:51  Phos  3.9     07-25  Mg     2.7     07-25    TPro  6.8  /  Alb  3.8  /  TBili  1.2  /  DBili  0.3  /  AST  30  /  ALT  26  /  AlkPhos  101  07-24    LIVER FUNCTIONS - ( 24 Jul 2022 18:58 )  Alb: 3.8 g/dL / Pro: 6.8 g/dL / ALK PHOS: 101 U/L / ALT: 26 U/L / AST: 30 U/L / GGT: x           Hemoglobin A1C:   Lipid Panel 07-25 @ 03:51  Total Cholesterol, Serum 167  LDL --  Triglycerides 132      PT/INR - ( 24 Jul 2022 15:45 )   PT: 13.3 sec;   INR: 1.14 ratio         PTT - ( 24 Jul 2022 15:45 )  PTT:26.7 sec          On Neurological Examination:  Patient is intubated and sedated for respiratory failure.  Sedation was briefly held for exam.  Mental Status - No Eye opening to noxious..    Cranial Nerves -Pupils are 2 and reactive. , EOMs are conjugate in primary gaze.,  Cough present .  Motor Exam -   Right side -no movement to noxious. Left side withdraws to noxious stim.         RADIOLOGY ( All neurological imaging studies were independently reviewed and interpreted by me)  CTH   CTA  CTP    IMPRESSION:    CT PERFUSION:  The CT perfusion is technically limited by truncation of the arterial input function and venous outflow function.    Core infarct (CBF < 30%:): 0 ml  Penumbra (Tmax >6s): 4 mL  Mismatched volume: 0 ml  Mismatched ratio: None    Interpretation:  Based on CBF < 30%, there is no evidence of a core infarct. At CBF < 34%, a small perfusion defect of 4 mL is located in the left inferior frontal gyrus and corresponds to matched perfusion abnormality of Tmax >6 seconds. On Tmax > 4s, there is a much larger perfusion defect throughout the majority of the left frontoparietal convexity, which may represent an ischemic penumbra in the left MCA vascular territory.        CT ANGIOGRAPHY NECK:  1. Poor opacification of the cervical vasculature.  2. Suspicion for moderate greater than 50% stenosis in the proximal right internal carotid artery. Suspicion for moderate to severe stenosis in the proximal left internal carotid artery that may be greater than 70%. No evidence of ICA occlusion in the neck.  3. The right vertebral artery is grossly patent.. The V1 segment of the left vertebral artery cannot be visualized. The V2 and V3 segments the left vertebral artery are grossly patent with multiple stenoses.  4. There is limited visualization of the common carotid artery origins and subclavian artery origins. Underlying stenoses cannot be excluded. There is suspicion for a severe stenosis in the proximal left subclavian artery.      CT ANGIOGRAPHY BRAIN:  1. Poor opacification of the intracranial vasculature.  2. There appears to be a focal filling defect at the left MCA bifurcation with distal flow. There is marked attenuation of M2 branches of the left middle cerebral artery. Vessel density analysis of the MCA territory shows a greater than 50% reduction of vessel density in the left MCA territory compared to the contralateral side.  3. There are mild to moderate stenoses in the supraclinoid segments of the internal carotid arteries bilaterally, without occlusion.  4. There are stenoses in the intradural vertebral arteries bilaterally, without occlusion.    Repeat CTA or MRI/MRA is recommended for further evaluation.  Peak arterial opacification on the CT perfusion occurs at approximately 38 seconds. If the CTA is repeated, a long delay is required after contrast injection to achieve adequate opacification of the vasculature.    The findings were discussed with JACK Light in the emergency room on 07/24/2022 at 5:45 AM. Read back verification was obtained.    --- End of Report ---            SEVERO CHANEL MD; Attending Radiologist  This document has been electronically signed    MRI:  TTE  TTE Echo Complete w/ Contrast w/ Doppler (07.24.22 @ 14:01) >    Summary:   1. Endocardial visualization was enhanced with intravenous echo contrast.   2. Severely enlarged left atrium.   3. Global diffuse akinesis. Left ventricular ejection fraction, by   visual estimation, is <10%.   4. Elevated mean left atrial pressure. (>30 mm Hg)   5. Normal right atrial size.   6. Mildly enlarged right ventricle. Moderately reduced RV systolic   function.   7. Mild mitral valve regurgitation.   8. Mild tricuspid regurgitation.   9. Estimated pulmonary artery systolic pressure is 42 mmHg - mild   pulmonary hypertension.  10. There is no evidence of pericardial effusion.  11. Team informed of the findings.    MD Millie, RPVI Electronically signed on 7/24/2022 at 3:49:14 PM

## 2022-07-25 NOTE — PROGRESS NOTE ADULT - ASSESSMENT
76M with LM1 occlusion, s/p TICI 2B MT, no tPA as wake-up stroke.  Acute on chronic HFrEF, LVHF <10%, reduced RV syst function, 1st degree AVbl. ?Component of neurogenic CMP.  Acute hypoxemic  resp failure, intubated.  PMH of CAD/CABG/HFrEF, HTN, HLD.    Plan:  neurochecks q1hr  sedation with Prop ggt, switch to Precedex  repeat CTh in am   ASA, statin  Cardiology involved  Levo PRN to maintain MAP >65, may require addition of Milrinone if increasing Levo requirements  gentle diuresis, maintain euvolemia to -500ml, monitor renal function, lactate, get VBG in am  vent support  monitor e-lytes  SCDs, SQL

## 2022-07-26 LAB
ANION GAP SERPL CALC-SCNC: 13 MMOL/L — SIGNIFICANT CHANGE UP (ref 5–17)
BUN SERPL-MCNC: 18.1 MG/DL — SIGNIFICANT CHANGE UP (ref 8–20)
CALCIUM SERPL-MCNC: 8.8 MG/DL — SIGNIFICANT CHANGE UP (ref 8.6–10.2)
CHLORIDE SERPL-SCNC: 103 MMOL/L — SIGNIFICANT CHANGE UP (ref 98–107)
CO2 SERPL-SCNC: 25 MMOL/L — SIGNIFICANT CHANGE UP (ref 22–29)
CREAT SERPL-MCNC: 0.7 MG/DL — SIGNIFICANT CHANGE UP (ref 0.5–1.3)
EGFR: 95 ML/MIN/1.73M2 — SIGNIFICANT CHANGE UP
GLUCOSE BLDC GLUCOMTR-MCNC: 144 MG/DL — HIGH (ref 70–99)
GLUCOSE BLDC GLUCOMTR-MCNC: 156 MG/DL — HIGH (ref 70–99)
GLUCOSE BLDC GLUCOMTR-MCNC: 160 MG/DL — HIGH (ref 70–99)
GLUCOSE BLDC GLUCOMTR-MCNC: 160 MG/DL — HIGH (ref 70–99)
GLUCOSE SERPL-MCNC: 175 MG/DL — HIGH (ref 70–99)
GRAM STN FLD: SIGNIFICANT CHANGE UP
HCT VFR BLD CALC: 43.7 % — SIGNIFICANT CHANGE UP (ref 39–50)
HGB BLD-MCNC: 14.2 G/DL — SIGNIFICANT CHANGE UP (ref 13–17)
LACTATE SERPL-SCNC: 1.3 MMOL/L — SIGNIFICANT CHANGE UP (ref 0.5–2)
MAGNESIUM SERPL-MCNC: 2.1 MG/DL — SIGNIFICANT CHANGE UP (ref 1.6–2.6)
MCHC RBC-ENTMCNC: 30.9 PG — SIGNIFICANT CHANGE UP (ref 27–34)
MCHC RBC-ENTMCNC: 32.5 GM/DL — SIGNIFICANT CHANGE UP (ref 32–36)
MCV RBC AUTO: 95.2 FL — SIGNIFICANT CHANGE UP (ref 80–100)
PHOSPHATE SERPL-MCNC: 2.6 MG/DL — SIGNIFICANT CHANGE UP (ref 2.4–4.7)
PLATELET # BLD AUTO: 203 K/UL — SIGNIFICANT CHANGE UP (ref 150–400)
POTASSIUM SERPL-MCNC: 4.1 MMOL/L — SIGNIFICANT CHANGE UP (ref 3.5–5.3)
POTASSIUM SERPL-SCNC: 4.1 MMOL/L — SIGNIFICANT CHANGE UP (ref 3.5–5.3)
PROCALCITONIN SERPL-MCNC: 0.43 NG/ML — HIGH (ref 0.02–0.1)
RBC # BLD: 4.59 M/UL — SIGNIFICANT CHANGE UP (ref 4.2–5.8)
RBC # FLD: 15.3 % — HIGH (ref 10.3–14.5)
SODIUM SERPL-SCNC: 141 MMOL/L — SIGNIFICANT CHANGE UP (ref 135–145)
SPECIMEN SOURCE: SIGNIFICANT CHANGE UP
WBC # BLD: 15.78 K/UL — HIGH (ref 3.8–10.5)
WBC # FLD AUTO: 15.78 K/UL — HIGH (ref 3.8–10.5)

## 2022-07-26 PROCEDURE — 71045 X-RAY EXAM CHEST 1 VIEW: CPT | Mod: 26

## 2022-07-26 PROCEDURE — 76775 US EXAM ABDO BACK WALL LIM: CPT | Mod: 26

## 2022-07-26 PROCEDURE — 99232 SBSQ HOSP IP/OBS MODERATE 35: CPT

## 2022-07-26 PROCEDURE — 70551 MRI BRAIN STEM W/O DYE: CPT | Mod: 26

## 2022-07-26 PROCEDURE — 93010 ELECTROCARDIOGRAM REPORT: CPT

## 2022-07-26 PROCEDURE — 70450 CT HEAD/BRAIN W/O DYE: CPT | Mod: 26

## 2022-07-26 RX ORDER — CEFTRIAXONE 500 MG/1
1000 INJECTION, POWDER, FOR SOLUTION INTRAMUSCULAR; INTRAVENOUS EVERY 24 HOURS
Refills: 0 | Status: COMPLETED | OUTPATIENT
Start: 2022-07-27 | End: 2022-07-29

## 2022-07-26 RX ORDER — DEXMEDETOMIDINE HYDROCHLORIDE IN 0.9% SODIUM CHLORIDE 4 UG/ML
0.2 INJECTION INTRAVENOUS
Qty: 200 | Refills: 0 | Status: DISCONTINUED | OUTPATIENT
Start: 2022-07-26 | End: 2022-07-27

## 2022-07-26 RX ORDER — CEFTRIAXONE 500 MG/1
1000 INJECTION, POWDER, FOR SOLUTION INTRAMUSCULAR; INTRAVENOUS ONCE
Refills: 0 | Status: COMPLETED | OUTPATIENT
Start: 2022-07-26 | End: 2022-07-26

## 2022-07-26 RX ORDER — CEFTRIAXONE 500 MG/1
INJECTION, POWDER, FOR SOLUTION INTRAMUSCULAR; INTRAVENOUS
Refills: 0 | Status: COMPLETED | OUTPATIENT
Start: 2022-07-26 | End: 2022-07-30

## 2022-07-26 RX ADMIN — Medication 81 MILLIGRAM(S): at 12:15

## 2022-07-26 RX ADMIN — Medication 650 MILLIGRAM(S): at 05:35

## 2022-07-26 RX ADMIN — DEXMEDETOMIDINE HYDROCHLORIDE IN 0.9% SODIUM CHLORIDE 5 MICROGRAM(S)/KG/HR: 4 INJECTION INTRAVENOUS at 17:39

## 2022-07-26 RX ADMIN — FAMOTIDINE 20 MILLIGRAM(S): 10 INJECTION INTRAVENOUS at 12:15

## 2022-07-26 RX ADMIN — PROPOFOL 3 MICROGRAM(S)/KG/MIN: 10 INJECTION, EMULSION INTRAVENOUS at 15:45

## 2022-07-26 RX ADMIN — PROPOFOL 3 MICROGRAM(S)/KG/MIN: 10 INJECTION, EMULSION INTRAVENOUS at 05:35

## 2022-07-26 RX ADMIN — PROPOFOL 3 MICROGRAM(S)/KG/MIN: 10 INJECTION, EMULSION INTRAVENOUS at 21:50

## 2022-07-26 RX ADMIN — POLYETHYLENE GLYCOL 3350 17 GRAM(S): 17 POWDER, FOR SOLUTION ORAL at 12:15

## 2022-07-26 RX ADMIN — CHLORHEXIDINE GLUCONATE 15 MILLILITER(S): 213 SOLUTION TOPICAL at 17:33

## 2022-07-26 RX ADMIN — CHLORHEXIDINE GLUCONATE 1 APPLICATION(S): 213 SOLUTION TOPICAL at 12:16

## 2022-07-26 RX ADMIN — DEXMEDETOMIDINE HYDROCHLORIDE IN 0.9% SODIUM CHLORIDE 5 MICROGRAM(S)/KG/HR: 4 INJECTION INTRAVENOUS at 21:50

## 2022-07-26 RX ADMIN — SENNA PLUS 2 TABLET(S): 8.6 TABLET ORAL at 21:50

## 2022-07-26 RX ADMIN — Medication 650 MILLIGRAM(S): at 06:05

## 2022-07-26 RX ADMIN — Medication 20 MILLIGRAM(S): at 05:29

## 2022-07-26 RX ADMIN — CEFTRIAXONE 100 MILLIGRAM(S): 500 INJECTION, POWDER, FOR SOLUTION INTRAMUSCULAR; INTRAVENOUS at 12:36

## 2022-07-26 RX ADMIN — Medication 2: at 23:16

## 2022-07-26 RX ADMIN — DEXMEDETOMIDINE HYDROCHLORIDE IN 0.9% SODIUM CHLORIDE 5 MICROGRAM(S)/KG/HR: 4 INJECTION INTRAVENOUS at 23:24

## 2022-07-26 RX ADMIN — ENOXAPARIN SODIUM 40 MILLIGRAM(S): 100 INJECTION SUBCUTANEOUS at 21:49

## 2022-07-26 RX ADMIN — Medication 2: at 17:34

## 2022-07-26 RX ADMIN — ATORVASTATIN CALCIUM 80 MILLIGRAM(S): 80 TABLET, FILM COATED ORAL at 12:15

## 2022-07-26 RX ADMIN — Medication 9.38 MICROGRAM(S)/KG/MIN: at 23:24

## 2022-07-26 RX ADMIN — Medication 62.5 MILLIMOLE(S): at 07:50

## 2022-07-26 RX ADMIN — CHLORHEXIDINE GLUCONATE 15 MILLILITER(S): 213 SOLUTION TOPICAL at 05:27

## 2022-07-26 RX ADMIN — Medication 2: at 05:27

## 2022-07-26 RX ADMIN — Medication 9.38 MICROGRAM(S)/KG/MIN: at 05:35

## 2022-07-26 NOTE — PROGRESS NOTE ADULT - PROBLEM SELECTOR PLAN 1
telemetry monitoring  LV function now worsened (EF 20-25% in 3/2022, now <10%)  daily for gentle diuresis with Lasix 20mg   monitor bun/creatine with diuresis  hold Coreg and Lisinopril due to hypotension (pt currently on Levophed drip).  Would not start milrinone as exam is not suggestive of cardiogenic shock. Continue levophed for a  MAP >70  IN: 1401.4 mL / OUT: 2215 mL / NET: -813.6 mL

## 2022-07-26 NOTE — PROGRESS NOTE ADULT - SUBJECTIVE AND OBJECTIVE BOX
HPI:  76M with PMH CABG, HTN, HLD who [place] [facility] [place]. Last known well was last night 10pm prior to going to bed, woke up this morning around 0500 with R sided weakness and R facial droop. Presented to [facility], code stroke initiated, NIH at [facility] reportedly 8. CTA showed LM1 occlusion. Transferred to [facility] for possible neuro IR intervention. On arrival to [facility], NIH 6. Decision made to take patient directly to neuro IR for intervention.   NIH 6, mRS 0    NIH SS:  DATE: 7/24/22  TIME: 0710  1A: Level of consciousness (0-3): 0  1B: Questions (0-2): 0    1C: Commands (0-2): 0  2: Gaze (0-2): 0  3: Visual fields (0-3): 0  4: Facial palsy (0-3): 1  MOTOR:  5A: Left arm motor drift (0-4): 0  5B: Right arm motor drift (0-4): 1  6A: Left leg motor drift (0-4): 0  6B: Right leg motor drift (0-4): 1  7: Limb ataxia (0-2): 0  SENSORY:  8: Sensation (0-2): 1  SPEECH:  9: Language (0-3): 1  10: Dysarthria (0-2): 1  EXTINCTION:  11: Extinction/inattention (0-2): 0    TOTAL SCORE: 6 (24 Jul 2022 07:34)    No o/n events.    VITALS:  T(C): , Max: 38.3 (07-25-22 @ 21:00)  HR:  (73 - 108)  BP:  (71/50 - 109/82)  ABP:  (91/52 - 158/62)  RR:  (16 - 36)  SpO2:  (93% - 100%)  Wt(kg): --  Device: Avea, Mode: CPAP with PS, FiO2: 30, PEEP: 5, PS: 10, MAP: 10    07-24-22 @ 07:01  -  07-25-22 @ 07:00  --------------------------------------------------------  IN: 1048.1 mL / OUT: 3410 mL / NET: -2361.9 mL    07-25-22 @ 07:01  -  07-25-22 @ 23:28  --------------------------------------------------------  IN: 830.8 mL / OUT: 1365 mL / NET: -534.2 mL      LABS:  Na: 135 (07-25 @ 03:51), 140 (07-24 @ 15:45), 135 (07-24 @ 10:27)  K: 4.2 (07-25 @ 03:51), 4.1 (07-24 @ 15:45), 5.4 (07-24 @ 10:27)  Cl: 99 (07-25 @ 03:51), 106 (07-24 @ 15:45), 104 (07-24 @ 10:27)  CO2: 24.0 (07-25 @ 03:51), 20.0 (07-24 @ 15:45), 20.0 (07-24 @ 10:27)  BUN: 16.6 (07-25 @ 03:51), 15.8 (07-24 @ 15:45), 17.5 (07-24 @ 10:27)  Cr: 0.69 (07-25 @ 03:51), 0.61 (07-24 @ 15:45), 0.66 (07-24 @ 10:27)  Glu: 183(07-25 @ 03:51), 146(07-24 @ 15:45), 266(07-24 @ 10:27)    Hgb: 13.2 (07-25 @ 03:51), 13.7 (07-24 @ 10:27)  Hct: 38.6 (07-25 @ 03:51), 41.5 (07-24 @ 10:27)  WBC: 13.75 (07-25 @ 03:51), 12.21 (07-24 @ 10:27)  Plt: 200 (07-25 @ 03:51), 220 (07-24 @ 10:27)      IMAGING:   Recent imaging studies were reviewed.    MEDICATIONS:  acetaminophen     Tablet .. 650 milliGRAM(s) Oral every 6 hours PRN  aspirin  chewable 81 milliGRAM(s) Oral daily  atorvastatin 80 milliGRAM(s) Oral daily  chlorhexidine 0.12% Liquid 15 milliLiter(s) Oral Mucosa every 12 hours  chlorhexidine 2% Cloths 1 Application(s) Topical daily  dextrose 5%. 1000 milliLiter(s) IV Continuous <Continuous>  dextrose 5%. 1000 milliLiter(s) IV Continuous <Continuous>  dextrose 50% Injectable 25 Gram(s) IV Push once  dextrose Oral Gel 15 Gram(s) Oral once PRN  enoxaparin Injectable 40 milliGRAM(s) SubCutaneous every 24 hours  famotidine Injectable 20 milliGRAM(s) IV Push daily  fentaNYL    Injectable 25 MICROGram(s) IV Push every 4 hours PRN  furosemide   Injectable 20 milliGRAM(s) IV Push daily  glucagon  Injectable 1 milliGRAM(s) IntraMuscular once  hydrALAZINE Injectable 10 milliGRAM(s) IV Push every 2 hours PRN  insulin lispro (ADMELOG) corrective regimen sliding scale   SubCutaneous every 6 hours  labetalol Injectable 10 milliGRAM(s) IV Push every 2 hours PRN  norepinephrine Infusion 0.05 MICROgram(s)/kG/Min IV Continuous <Continuous>  ondansetron Injectable 4 milliGRAM(s) IV Push every 6 hours PRN  polyethylene glycol 3350 17 Gram(s) Oral daily  propofol Infusion 5 MICROgram(s)/kG/Min IV Continuous <Continuous>  senna 2 Tablet(s) Oral at bedtime    EXAMINATION:  General:  calm, NAD  Neuro:  awake, alert, no FC, EO to verbal, moves L side, RLE wdrl, RUE 0/5.  Cards:  S1S2 present  Respiratory:  no respiratory distress, intubated.  Abdomen:  soft  Extremities:  no edema  Skin:  warm/dry HPI:  76M with PMH CABG, HTN, HLD who presents as transfer from [facility] for stroke. Last known well was last night 10pm prior to going to bed, woke up this morning around 0500 with R sided weakness and R facial droop. Presented to [facility], code stroke initiated, NIH at [facility] reportedly 8. CTA showed LM1 occlusion. Transferred to [facility] for possible neuro IR intervention. On arrival to [facility], NIH 6. Decision made to take patient directly to neuro IR for intervention.   NIH 6, mRS 0    NIH SS:  DATE: 7/24/22  TIME: 0710  1A: Level of consciousness (0-3): 0  1B: Questions (0-2): 0    1C: Commands (0-2): 0  2: Gaze (0-2): 0  3: Visual fields (0-3): 0  4: Facial palsy (0-3): 1  MOTOR:  5A: Left arm motor drift (0-4): 0  5B: Right arm motor drift (0-4): 1  6A: Left leg motor drift (0-4): 0  6B: Right leg motor drift (0-4): 1  7: Limb ataxia (0-2): 0  SENSORY:  8: Sensation (0-2): 1  SPEECH:  9: Language (0-3): 1  10: Dysarthria (0-2): 1  EXTINCTION:  11: Extinction/inattention (0-2): 0    TOTAL SCORE: 6 (24 Jul 2022 07:34)    No o/n events.  Evolving large L MCA stroke wit small heme transformation.  VS, labs, imaging reviewed.    EXAMINATION:  General:  calm, NAD  Neuro:  somnolent, no FC, EO to noxious, moves L side, RLE wdrl, RUE 0/5.  Cards:  S1S2 present  Respiratory:  no respiratory distress, intubated.  Abdomen:  soft  Extremities:  no edema  Skin:  warm/dry

## 2022-07-26 NOTE — PROGRESS NOTE ADULT - SUBJECTIVE AND OBJECTIVE BOX
[facility]                                                         CARDIOLOGY AT [place] [address]                                                         Telephone: [phone]. Fax:[phone]                                                                             PROGRESS NOTE    Reason for follow up:   Stroke and neuro intervention  Update:   Lying in neuro ICU, vented on pressor,  norepi to maintain MAP > 70, afebrile on a cooling blanket, procalcitonin elevated also     Review of symptoms:   Cardiac:  No chest pain. No dyspnea. No palpitations.  Respiratory: no cough. No dyspnea  Gastrointestinal: No diarrhea. No abdominal pain. No bleeding.   Neuro: No focal neuro complaints.  Vitals:  T(C): 37.5 (07-26-22 @ 16:00), Max: 38.3 (07-25-22 @ 21:00)  HR: 96 (07-26-22 @ 16:05) (82 - 107)  BP: 156/78 (07-26-22 @ 16:00) (79/57 - 171/90)  RR: 27 (07-26-22 @ 16:00) (19 - 36)  SpO2: 97% (07-26-22 @ 16:05) (93% - 100%)    I&O's Summary  25 Jul 2022 07:01  -  26 Jul 2022 07:00  --------------------------------------------------------  IN: 1401.4 mL / OUT: 2215 mL / NET: -813.6 mL  26 Jul 2022 07:01  -  26 Jul 2022 16:14  --------------------------------------------------------  IN: 920 mL / OUT: 700 mL / NET: 220 mL    Weight (kg): 100 (07-24 @ 12:26)    PHYSICAL EXAM:  Appearance: vented  HEENT:  Atraumatic. Normocephalic.  dry oral muscoa  Neurologic: A & O x 0, obtunded  Cardiovascular: RRR S1 S2, No murmur, no rubs/gallops. No JVD  Respiratory: Lungs clear to auscultation, unlabored   Gastrointestinal:  Soft, Non-tender, + BS  Lower Extremities: 2+ Peripheral Pulses, No clubbing, cyanosis, or edema  Psychiatry: Patient is obtunded.    Skin: cool and dry.      CURRENT CARDIAC MEDICATIONS:  furosemide   Injectable 20 milliGRAM(s) IV Push daily  hydrALAZINE Injectable 10 milliGRAM(s) IV Push every 2 hours PRN  labetalol Injectable 10 milliGRAM(s) IV Push every 2 hours PRN  norepinephrine Infusion 0.05 MICROgram(s)/kG/Min IV Continuous <Continuous>    CURRENT OTHER MEDICATIONS:  cefTRIAXone   IVPB      acetaminophen     Tablet .. 650 milliGRAM(s) Oral every 6 hours PRN Temp greater or equal to 38C (100.4F), Mild Pain (1 - 3)  fentaNYL    Injectable 25 MICROGram(s) IV Push every 4 hours PRN agitation/vent synchrony  ondansetron Injectable 4 milliGRAM(s) IV Push every 6 hours PRN Nausea and/or Vomiting  propofol Infusion 5 MICROgram(s)/kG/Min (3 mL/Hr) IV Continuous <Continuous>  famotidine Injectable 20 milliGRAM(s) IV Push daily  polyethylene glycol 3350 17 Gram(s) Oral daily  senna 2 Tablet(s) Oral at bedtime  atorvastatin 80 milliGRAM(s) Oral daily  dextrose 50% Injectable 25 Gram(s) IV Push once, Stop order after: 1 Doses  dextrose Oral Gel 15 Gram(s) Oral once, Stop order after: 1 Doses PRN Blood Glucose LESS THAN 70 milliGRAM(s)/deciliter  glucagon  Injectable 1 milliGRAM(s) IntraMuscular once, Stop order after: 1 Doses  insulin lispro (ADMELOG) corrective regimen sliding scale   SubCutaneous every 6 hours  aspirin  chewable 81 milliGRAM(s) Oral daily  chlorhexidine 0.12% Liquid 15 milliLiter(s) Oral Mucosa every 12 hours  chlorhexidine 2% Cloths 1 Application(s) Topical daily  dextrose 5%. 1000 milliLiter(s) (50 mL/Hr) IV Continuous <Continuous>  dextrose 5%. 1000 milliLiter(s) (100 mL/Hr) IV Continuous <Continuous>  enoxaparin Injectable 40 milliGRAM(s) SubCutaneous every 24 hours    LABS:	 	  ( 25 Jul 2022 03:51 )  Troponin T  <0.01,  CPK  82   , CKMB  X    , BNP X        , ( 24 Jul 2022 18:58 )  Troponin T  <0.01,  CPK  X    , CKMB  X    , BNP X        , ( 24 Jul 2022 10:27 )  Troponin T  <0.01,  CPK  X    , CKMB  X    , BNP 3029<H>                       14.2   15.78 )-----------( 203      ( 26 Jul 2022 04:21 )             43.7     07-26  141  |  103  |  18.1  ----------------------------<  175<H>  4.1   |  25.0  |  0.70  Ca    8.8      26 Jul 2022 04:21  Phos  2.6     07-26  Mg     2.1     07-26  TPro  6.8  /  Alb  3.8  /  TBili  1.2  /  DBili  0.3  /  AST  30  /  ALT  26  /  AlkPhos  101  07-24  PT/INR/PTT ( 24 Jul 2022 15:45 )      13.3         :       26.7                  .        .                   .              .           .       1.14        .                                       Lipid Profile: Date: 07-25 @ 03:51  Total cholesterol 167; Direct LDL: --; HDL: 44; Triglycerides:132  HgA1c:   TSH: Thyroid Stimulating Hormone, Serum: 0.91 uIU/mL  TELEMETRY:  Sr PJC, PVC's, multiple focal pvc's.     	                                                                [facility]                                                         CARDIOLOGY AT [place] [address]                                                         Telephone: [phone]. Fax:[phone]                                                                             PROGRESS NOTE    Reason for follow up:   Stroke  Update:   Lying in neuro ICU, vented on pressor,  norepi to maintain MAP > 70, afebrile on a cooling blanket, procalcitonin elevated also     Review of symptoms:   Cardiac:  No chest pain. No dyspnea. No palpitations.  Respiratory: no cough. No dyspnea  Gastrointestinal: No diarrhea. No abdominal pain. No bleeding.   Neuro: No focal neuro complaints.  Vitals:  T(C): 37.5 (07-26-22 @ 16:00), Max: 38.3 (07-25-22 @ 21:00)  HR: 96 (07-26-22 @ 16:05) (82 - 107)  BP: 156/78 (07-26-22 @ 16:00) (79/57 - 171/90)  RR: 27 (07-26-22 @ 16:00) (19 - 36)  SpO2: 97% (07-26-22 @ 16:05) (93% - 100%)    I&O's Summary  25 Jul 2022 07:01  -  26 Jul 2022 07:00  --------------------------------------------------------  IN: 1401.4 mL / OUT: 2215 mL / NET: -813.6 mL  26 Jul 2022 07:01  -  26 Jul 2022 16:14  --------------------------------------------------------  IN: 920 mL / OUT: 700 mL / NET: 220 mL    Weight (kg): 100 (07-24 @ 12:26)    PHYSICAL EXAM:  Appearance: vented  HEENT:  Atraumatic. Normocephalic.  dry oral muscoa  Neurologic: A & O x 0, obtunded  Cardiovascular: RRR S1 S2, No murmur, no rubs/gallops. No JVD  Respiratory: Lungs clear to auscultation, unlabored   Gastrointestinal:  Soft, Non-tender, + BS  Lower Extremities: 2+ Peripheral Pulses, No clubbing, cyanosis, or edema  Psychiatry: Patient is obtunded.    Skin: cool and dry.      CURRENT CARDIAC MEDICATIONS:  furosemide   Injectable 20 milliGRAM(s) IV Push daily  hydrALAZINE Injectable 10 milliGRAM(s) IV Push every 2 hours PRN  labetalol Injectable 10 milliGRAM(s) IV Push every 2 hours PRN  norepinephrine Infusion 0.05 MICROgram(s)/kG/Min IV Continuous <Continuous>    CURRENT OTHER MEDICATIONS:  cefTRIAXone   IVPB      acetaminophen     Tablet .. 650 milliGRAM(s) Oral every 6 hours PRN Temp greater or equal to 38C (100.4F), Mild Pain (1 - 3)  fentaNYL    Injectable 25 MICROGram(s) IV Push every 4 hours PRN agitation/vent synchrony  ondansetron Injectable 4 milliGRAM(s) IV Push every 6 hours PRN Nausea and/or Vomiting  propofol Infusion 5 MICROgram(s)/kG/Min (3 mL/Hr) IV Continuous <Continuous>  famotidine Injectable 20 milliGRAM(s) IV Push daily  polyethylene glycol 3350 17 Gram(s) Oral daily  senna 2 Tablet(s) Oral at bedtime  atorvastatin 80 milliGRAM(s) Oral daily  dextrose 50% Injectable 25 Gram(s) IV Push once, Stop order after: 1 Doses  dextrose Oral Gel 15 Gram(s) Oral once, Stop order after: 1 Doses PRN Blood Glucose LESS THAN 70 milliGRAM(s)/deciliter  glucagon  Injectable 1 milliGRAM(s) IntraMuscular once, Stop order after: 1 Doses  insulin lispro (ADMELOG) corrective regimen sliding scale   SubCutaneous every 6 hours  aspirin  chewable 81 milliGRAM(s) Oral daily  chlorhexidine 0.12% Liquid 15 milliLiter(s) Oral Mucosa every 12 hours  chlorhexidine 2% Cloths 1 Application(s) Topical daily  dextrose 5%. 1000 milliLiter(s) (50 mL/Hr) IV Continuous <Continuous>  dextrose 5%. 1000 milliLiter(s) (100 mL/Hr) IV Continuous <Continuous>  enoxaparin Injectable 40 milliGRAM(s) SubCutaneous every 24 hours    LABS:	 	  ( 25 Jul 2022 03:51 )  Troponin T  <0.01,  CPK  82   , CKMB  X    , BNP X        , ( 24 Jul 2022 18:58 )  Troponin T  <0.01,  CPK  X    , CKMB  X    , BNP X        , ( 24 Jul 2022 10:27 )  Troponin T  <0.01,  CPK  X    , CKMB  X    , BNP 3029<H>                       14.2   15.78 )-----------( 203      ( 26 Jul 2022 04:21 )             43.7     07-26  141  |  103  |  18.1  ----------------------------<  175<H>  4.1   |  25.0  |  0.70  Ca    8.8      26 Jul 2022 04:21  Phos  2.6     07-26  Mg     2.1     07-26  TPro  6.8  /  Alb  3.8  /  TBili  1.2  /  DBili  0.3  /  AST  30  /  ALT  26  /  AlkPhos  101  07-24  PT/INR/PTT ( 24 Jul 2022 15:45 )      13.3         :       26.7                  .        .                   .              .           .       1.14        .                                       Lipid Profile: Date: 07-25 @ 03:51  Total cholesterol 167; Direct LDL: --; HDL: 44; Triglycerides:132  HgA1c:   TSH: Thyroid Stimulating Hormone, Serum: 0.91 uIU/mL  TELEMETRY:  Sr PJC, PVC's, multiple focal pvc's.     	                                                                [facility]                                                         CARDIOLOGY AT [address], [place] [address]                                                         Telephone: [phone]. Fax:[phone]                                                                             PROGRESS NOTE    Reason for follow up:   Stroke  Update:   Lying in neuro ICU, vented on pressor,  norepi to maintain MAP > 70, afebrile on a cooling blanket, procalcitonin elevated also     Review of symptoms:   Unable to attain  Vitals:  T(C): 37.5 (07-26-22 @ 16:00), Max: 38.3 (07-25-22 @ 21:00)  HR: 96 (07-26-22 @ 16:05) (82 - 107)  BP: 156/78 (07-26-22 @ 16:00) (79/57 - 171/90)  RR: 27 (07-26-22 @ 16:00) (19 - 36)  SpO2: 97% (07-26-22 @ 16:05) (93% - 100%)    I&O's Summary  25 Jul 2022 07:01  -  26 Jul 2022 07:00  --------------------------------------------------------  IN: 1401.4 mL / OUT: 2215 mL / NET: -813.6 mL  26 Jul 2022 07:01  -  26 Jul 2022 16:14  --------------------------------------------------------  IN: 920 mL / OUT: 700 mL / NET: 220 mL    Weight (kg): 100 (07-24 @ 12:26)    PHYSICAL EXAM:  Appearance: vented  HEENT:  Atraumatic. Normocephalic.  dry oral muscoa  Neurologic: A & O x 0, obtunded  Cardiovascular: RRR S1 S2, No murmur, no rubs/gallops. No JVD  Respiratory: Lungs clear to auscultation, unlabored   Gastrointestinal:  Soft, Non-tender, + BS  Lower Extremities: 2+ Peripheral Pulses, No clubbing, cyanosis, or edema  Psychiatry: Patient is obtunded.    Skin: cool and dry.      CURRENT CARDIAC MEDICATIONS:  furosemide   Injectable 20 milliGRAM(s) IV Push daily  hydrALAZINE Injectable 10 milliGRAM(s) IV Push every 2 hours PRN  labetalol Injectable 10 milliGRAM(s) IV Push every 2 hours PRN  norepinephrine Infusion 0.05 MICROgram(s)/kG/Min IV Continuous <Continuous>    CURRENT OTHER MEDICATIONS:  cefTRIAXone   IVPB      acetaminophen     Tablet .. 650 milliGRAM(s) Oral every 6 hours PRN Temp greater or equal to 38C (100.4F), Mild Pain (1 - 3)  fentaNYL    Injectable 25 MICROGram(s) IV Push every 4 hours PRN agitation/vent synchrony  ondansetron Injectable 4 milliGRAM(s) IV Push every 6 hours PRN Nausea and/or Vomiting  propofol Infusion 5 MICROgram(s)/kG/Min (3 mL/Hr) IV Continuous <Continuous>  famotidine Injectable 20 milliGRAM(s) IV Push daily  polyethylene glycol 3350 17 Gram(s) Oral daily  senna 2 Tablet(s) Oral at bedtime  atorvastatin 80 milliGRAM(s) Oral daily  dextrose 50% Injectable 25 Gram(s) IV Push once, Stop order after: 1 Doses  dextrose Oral Gel 15 Gram(s) Oral once, Stop order after: 1 Doses PRN Blood Glucose LESS THAN 70 milliGRAM(s)/deciliter  glucagon  Injectable 1 milliGRAM(s) IntraMuscular once, Stop order after: 1 Doses  insulin lispro (ADMELOG) corrective regimen sliding scale   SubCutaneous every 6 hours  aspirin  chewable 81 milliGRAM(s) Oral daily  chlorhexidine 0.12% Liquid 15 milliLiter(s) Oral Mucosa every 12 hours  chlorhexidine 2% Cloths 1 Application(s) Topical daily  dextrose 5%. 1000 milliLiter(s) (50 mL/Hr) IV Continuous <Continuous>  dextrose 5%. 1000 milliLiter(s) (100 mL/Hr) IV Continuous <Continuous>  enoxaparin Injectable 40 milliGRAM(s) SubCutaneous every 24 hours    LABS:	 	  ( 25 Jul 2022 03:51 )  Troponin T  <0.01,  CPK  82   , CKMB  X    , BNP X        , ( 24 Jul 2022 18:58 )  Troponin T  <0.01,  CPK  X    , CKMB  X    , BNP X        , ( 24 Jul 2022 10:27 )  Troponin T  <0.01,  CPK  X    , CKMB  X    , BNP 3029<H>                       14.2   15.78 )-----------( 203      ( 26 Jul 2022 04:21 )             43.7     07-26  141  |  103  |  18.1  ----------------------------<  175<H>  4.1   |  25.0  |  0.70  Ca    8.8      26 Jul 2022 04:21  Phos  2.6     07-26  Mg     2.1     07-26  TPro  6.8  /  Alb  3.8  /  TBili  1.2  /  DBili  0.3  /  AST  30  /  ALT  26  /  AlkPhos  101  07-24  PT/INR/PTT ( 24 Jul 2022 15:45 )      13.3         :       26.7                  .        .                   .              .           .       1.14        .                                       Lipid Profile: Date: 07-25 @ 03:51  Total cholesterol 167; Direct LDL: --; HDL: 44; Triglycerides:132  HgA1c:   TSH: Thyroid Stimulating Hormone, Serum: 0.91 uIU/mL  TELEMETRY:  Sr PJC, PVC's, multiple focal pvc's.

## 2022-07-26 NOTE — DIETITIAN INITIAL EVALUATION ADULT - PERTINENT MEDS FT
MEDICATIONS  (STANDING):  aspirin  chewable 81 milliGRAM(s) Oral daily  atorvastatin 80 milliGRAM(s) Oral daily  chlorhexidine 0.12% Liquid 15 milliLiter(s) Oral Mucosa every 12 hours  chlorhexidine 2% Cloths 1 Application(s) Topical daily  dextrose 5%. 1000 milliLiter(s) (50 mL/Hr) IV Continuous <Continuous>  dextrose 5%. 1000 milliLiter(s) (100 mL/Hr) IV Continuous <Continuous>  dextrose 50% Injectable 25 Gram(s) IV Push once  enoxaparin Injectable 40 milliGRAM(s) SubCutaneous every 24 hours  famotidine Injectable 20 milliGRAM(s) IV Push daily  furosemide   Injectable 20 milliGRAM(s) IV Push daily  glucagon  Injectable 1 milliGRAM(s) IntraMuscular once  insulin lispro (ADMELOG) corrective regimen sliding scale   SubCutaneous every 6 hours  norepinephrine Infusion 0.05 MICROgram(s)/kG/Min (9.38 mL/Hr) IV Continuous <Continuous>  polyethylene glycol 3350 17 Gram(s) Oral daily  propofol Infusion 5 MICROgram(s)/kG/Min (3 mL/Hr) IV Continuous <Continuous>  senna 2 Tablet(s) Oral at bedtime    MEDICATIONS  (PRN):  acetaminophen     Tablet .. 650 milliGRAM(s) Oral every 6 hours PRN Temp greater or equal to 38C (100.4F), Mild Pain (1 - 3)  dextrose Oral Gel 15 Gram(s) Oral once PRN Blood Glucose LESS THAN 70 milliGRAM(s)/deciliter  fentaNYL    Injectable 25 MICROGram(s) IV Push every 4 hours PRN agitation/vent synchrony  hydrALAZINE Injectable 10 milliGRAM(s) IV Push every 2 hours PRN systolic bloodpressure >160  labetalol Injectable 10 milliGRAM(s) IV Push every 2 hours PRN Systolic blood pressure > 160  ondansetron Injectable 4 milliGRAM(s) IV Push every 6 hours PRN Nausea and/or Vomiting

## 2022-07-26 NOTE — PROGRESS NOTE ADULT - ASSESSMENT
76M hx CAD s/p PCI x2, CABG, carotid stenosis, PVD endarterectomy, ICM LVEF 20% in march 2022, presents with L MCA M1 occlusion s/p M.T. Tici 2 B reperfusion. Course c/b HfRef ef 10%, NSVT, pulmonary vascular congestion and fever pending work up.     PLAN:  - Pt seen and examined with Dr. Pierre  -neuro checks q1 hour  -plan for MRI tonight  -SBP goal requiring levo to maintain SBP goal  -continue aspirin for stroke prophylaxis, continue  lovenox for DVT prophylaxis  - LDL 97, continue statin  - A1C 7%MISS  - TTE performed. cardiology consulted appreciated. recommending future IMTIAZ for CVA work up and possible LHC if ef remains <10%  -PT/OT/SLP when appropriate  - Further care per NSICU team      76M hx CAD s/p PCI x2, CABG, carotid stenosis, PVD endarterectomy, ICM LVEF 20% in march 2022, presents with L MCA M1 occlusion s/p M.T. Tici 2 B reperfusion. Course c/b HfRef ef 10%, NSVT, pulmonary vascular congestion and fever pending work up.     PLAN:  - Pt seen and examined with Dr. Pierre  -neuro checks q1 hour  -plan for MRI tonight  -SBP goal requiring levo to maintain SBP goal  -continue aspirin for stroke prophylaxis, continue  lovenox for DVT prophylaxis  - LDL 97, continue statin  - A1C 7%MISS  - TTE performed. cardiology consulted appreciated. recommending future IMTIAZ for CVA work up and possible LHC if ef remains <10%  -PT/OT/SLP when appropriate  - Further care per NSICU team   - no further KAYCEE interventions  -recommend stroke work up with stroke neurology team to follow

## 2022-07-26 NOTE — PROGRESS NOTE ADULT - ASSESSMENT
IMPRESSION:  - Left MCA Stroke.  S/P suction thrombectomy for L MCA M1 occlusion with TICI 2b reperfusion. on 7-24-22.    Mechanism ESUS  cardioembolic versus Large artery atherosclerosis    ASSESSMENT/ PLAN:     - Neuro checks and vital signs Q 2 hours.  - SBP goal permissive up to 160 mm Hg for 24 hours.  - ASA 81 mg PO or 300 CA QD.  - Lipitor for LDL goal of < 70 .  - Telemetry monitoring.  - CT/CTA/CTP -images and reports were reviewed.   - MRI Brain stroke protocol when stable.- if delayed will repeat CT head.  - Check fasting Lipid panel and HbA1c  - TTE  -Reports as above were reviewed. . EF < 10%  - Cardiology consultation appreciated..  - SCD/ SQ Lovenox for DVT prophylaxis.

## 2022-07-26 NOTE — DIETITIAN INITIAL EVALUATION ADULT - NS FNS DIET ORDER
Diet, NPO with Tube Feed:   Tube Feeding Modality: Nasogastric  Glucerna 1.5 [place] (GLUCERNA1.5)  Total Volume for 24 Hours (mL): 1440  Continuous  Starting Tube Feed Rate {mL per Hour}: 10  Increase Tube Feed Rate by (mL): 10     Every 4 hours  Until Goal Tube Feed Rate (mL per Hour): 60  Tube Feed Duration (in Hours): 24  Tube Feed Start Time: 12:30 (07-25-22 @ 12:02)

## 2022-07-26 NOTE — PROGRESS NOTE ADULT - ASSESSMENT
76M with LM1 occlusion, s/p TICI 2B MT, no tPA as wake-up stroke.  Acute on chronic HFrEF, LVHF <10%, reduced RV syst function, 1st degree AVbl. ?Component of neurogenic CMP.  Acute hypoxemic  resp failure, intubated.  PMH of CAD/CABG/HFrEF, HTN, HLD.    Plan:  neurochecks q1hr  sedation with Prop ggt, switch to Precedex  repeat CTh in am   ASA, statin  Cardiology involved  Levo PRN to maintain MAP >65, may require addition of Milrinone if increasing Levo requirements  gentle diuresis, maintain euvolemia to -500ml, monitor renal function, lactate, get VBG in am  vent support  monitor e-lytes  SCDs, SQL     76M with LM1 occlusion, s/p TICI 2B MT, no tPA as wake-up stroke.  Large evolving L MCA stroke with small area of hemorrhage.  Acute on chronic HFrEF, LVHF <10%, reduced RV syst function, 1st degree AVbl. ?Component of neurogenic CMP.  Acute hypoxemic  resp failure, intubated.  PMH of CAD/CABG/HFrEF, HTN, HLD.  UTI.    Plan:  neurochecks q1hr  sedation with Prop ggt, switch to Precedex   ASA, statin  Cardiology involved; plan for IMTIAZ in am  Levo PRN to maintain MAP >65, may require addition of Milrinone if increasing Levo requirements  gentle diuresis, maintain euvolemia to -500ml, monitor renal function, lactate, get VBG in am  vent support  monitor e-lytes  SCDs, SQL  Ceftriaxone IV for UTI, renal US - pending

## 2022-07-26 NOTE — DIETITIAN INITIAL EVALUATION ADULT - PERTINENT LABORATORY DATA
07-26    141  |  103  |  18.1  ----------------------------<  175<H>  4.1   |  25.0  |  0.70    Ca    8.8      26 Jul 2022 04:21  Phos  2.6     07-26  Mg     2.1     07-26    TPro  6.8  /  Alb  3.8  /  TBili  1.2  /  DBili  0.3  /  AST  30  /  ALT  26  /  AlkPhos  101  07-24  POCT Blood Glucose.: 160 mg/dL (07-26-22 @ 05:23)  A1C with Estimated Average Glucose Result: 7.0 % (07-25-22 @ 03:51)

## 2022-07-26 NOTE — PROGRESS NOTE ADULT - SUBJECTIVE AND OBJECTIVE BOX
HPI:  76M with PMH CABG, HTN, HLD who [place] [facility] [place]. Last known well was last night 10pm prior to going to bed, woke up this morning around 0500 with R sided weakness and R facial droop. [place] [facility] initiated, NIH at [facility] [place] 8. CTA showed LM1 occlusion. Transferred to [facility] for possible neuro IR intervention. On arrival to [facility], NIH 6. Decision made to take patient directly to neuro IR for intervention.     NIH 6, mRS 0    NIH SS:  DATE: 22  TIME: 0710  1A: Level of consciousness (0-3): 0  1B: Questions (0-2): 0    1C: Commands (0-2): 0  2: Gaze (0-2): 0  3: Visual fields (0-3): 0  4: Facial palsy (0-3): 1  MOTOR:  5A: Left arm motor drift (0-4): 0  5B: Right arm motor drift (0-4): 1  6A: Left leg motor drift (0-4): 0  6B: Right leg motor drift (0-4): 1  7: Limb ataxia (0-2): 0  SENSORY:  8: Sensation (0-2): 1  SPEECH:  9: Language (0-3): 1  10: Dysarthria (0-2): 1  EXTINCTION:  11: Extinction/inattention (0-2): 0    TOTAL SCORE: 6 (2022 07:34)      INTERVAL HPI/OVERNIGHT EVENTS:  Sedation paused for my exam. no spontaneous EO, midline gaze, no tracking/attending. not following commands. L side localizes to noxious, RUE extensor posturing, RLE TF. pan culture for fever. +UA. abx started    Vital Signs Last 24 Hrs  T(C): 37.4 (2022 16:30), Max: 38.3 (2022 21:00)  T(F): 99.3 (2022 16:30), Max: 100.9 (2022 21:00)  HR: 100 (2022 16:30) (82 - 107)  BP: 148/95 (2022 16:30) (79/57 - 171/90)  BP(mean): 106 (2022 16:30) (65 - 140)  RR: 23 (2022 16:30) (19 - 36)  SpO2: 99% (2022 16:30) (93% - 100%)    Parameters below as of 2022 10:00      O2 Concentration (%): 30    PHYSICAL EXAM:  GENERAL: NAD, sedated. propofol paused for my exam  HEAD:  Atraumatic, normocephalic  WOUND: R groin dressing removed. c/d/i. no active bleeding, no groin hematoma  JORGE COMA SCORE: E- V- M- = 7T       E:  1= no opening       V: 1T= intubated       M: 5= localizes  MENTAL STATUS: Sedated; no EO, nonverbal/ intubated, not following commands   CRANIAL NERVES: Midline gaze does not track/ attend PERRL 3mm b/l. +corneals weaker on right side. no blink to threat on right eye. LUE localizing to noxious., LLE spontaneously moving. RUE extensor posturing, RLE TF.   REFLEXES: PERRL. Corneals L>R, +gag/cough  MOTOR: LUE localizing to noxious, LLE spontaneously moving, RUE extensor posturing, RLE TF  SENSATION: grossly intact to light touch all extremities  COORDINATION: Gait not assessed    LABS:                        14.2   15.78 )-----------( 203      ( 2022 04:21 )             43.7     -    141  |  103  |  18.1  ----------------------------<  175<H>  4.1   |  25.0  |  0.70    Ca    8.8      2022 04:21  Phos  2.6     -  Mg     2.1     -    TPro  6.8  /  Alb  3.8  /  TBili  1.2  /  DBili  0.3  /  AST  30  /  ALT  26  /  AlkPhos  101  07-24      Urinalysis Basic - ( 2022 22:56 )    Color: Yellow / Appearance: Slightly Turbid / S.025 / pH: x  Gluc: x / Ketone: Negative  / Bili: Negative / Urobili: Negative   Blood: x / Protein: 100 / Nitrite: Positive   Leuk Esterase: Moderate / RBC: 6-10 /HPF / WBC 26-50 /HPF   Sq Epi: x / Non Sq Epi: Occasional / Bacteria: TNTC         @ 07: @ 07:00  --------------------------------------------------------  IN: 1401.4 mL / OUT: 2215 mL / NET: -813.6 mL     @ 07: @ 16:54  --------------------------------------------------------  IN: 920 mL / OUT: 700 mL / NET: 220 mL        RADIOLOGY & ADDITIONAL TESTS:  < from: IR Neuro (22 @ 10:12) >      Findings:    Left common and internal carotid artery: There is atherosclerotic plaque   at the left carotid bifurcation causing a mild degree of stenosis.   Intracranially, there is normal transit of contrast through the arterial,   capillary and venous phases. There is a complete occlusion within the   distal M1 portion of the middle cerebral artery.  There is no evidence of   intracranial aneurysm, arteriovenous malformation or arteriovenous   shunting. Multiple superficial cortical veins are identified. The   superior sagittal sinus drains into both transverse and sigmoid sinuses.   The vein of Rosa Elena is identified. The basal vein of Jess is   identified.  The internal cerebral vein drains into the great vein of   [place] and the straight sinus. The sylvian venous system drains into the   cavernous and inferior petrosal sinus.    Left internal carotid artery, post-procedure: The M1 segment of the left   middle cerebral artery is now recanalized. The superior M2 division in   the proximal posterior M2 division are recanalized but a major branch   within the posterior MCA territory remains occluded. TICI 2B reperfusion.    There is no evidence of contrast extravasation or vascular injury.    ZEFERINO CT:  The cerebral vasculature is now faintly opacified with   contrast.  There is no evidence of subarachnoid or intraparenchymal   hemorrhage.  The ventricles are normal in their configuration.    Abdominal aortogram: There is significant atherosclerotic disease of the   abdominal aorta and bilateral iliac arteries. There is no evidence of   contrast extravasation, pseudoaneurysm or vascular injury.      Impression: Successful mechanical thrombectomy of the occluded left MCA   resulting in TICI 2B reperfusion.    --- End of Report ---            GEORGETTE WELLS LINK   This document has been electronically signed. 2022 10:31AM    < end of copied text >

## 2022-07-26 NOTE — PROGRESS NOTE ADULT - ASSESSMENT
This is 75 y/o male with hx of CAD s/p stents x2 (2004) and CABG x4 ([facility], 2014), ischemic cardiomyopathy (declined AICD), HTN, HLD, PVD s/p femoral endarterectomy who presented with right-sided weakness and facial droop to [facility] [place] and was found to have acute stroke. Pt was transferred to [facility] for neuro IR intervention and underwent mechanical thrombectomy for left MCA occlusion. Post procedure pt developed acute hypoxic respiratory failure and was intubated. He is now sedated on propofol drip, not following commands but moving all extremities. EKG shows SR with 1st degree AVB and TWI in lateral leads, no previous EKG available. Telemetry shows SR with frequent multifocal PVCs. Echocardiogram reveals LVEF<10% and moderately reduced RV function. Troponin negative x2, proBNP 3029. Pt follows with Dr. Jonny Clements from [place].    This is 75 y/o male with hx of CAD s/p stents x2 (2004) and CABG x4 ([facility], 2014), ischemic cardiomyopathy (declined AICD), HTN, HLD, PVD s/p femoral endarterectomy who [place] right-sided weakness [place] [facility] [place] found to have [place]. Pt was transferred to [facility] for neuro IR intervention and underwent mechanical thrombectomy for left MCA occlusion. Post procedure pt developed acute hypoxic respiratory failure and was intubated. He is now sedated on propofol drip, not following commands but moving all extremities. EKG shows SR with 1st degree AVB and TWI in lateral leads, no previous EKG available. Telemetry shows SR with frequent multifocal PVCs. Echocardiogram reveals LVEF<10% and moderately reduced RV function. Troponin negative x2, proBNP 3029. Pt follows with Dr. Jonny Clements from [place].

## 2022-07-26 NOTE — PROGRESS NOTE ADULT - SUBJECTIVE AND OBJECTIVE BOX
Neurology   NATANAEL ROWAN 76y Male     Patient is a 76y old  Male who presents with a chief complaint of stroke (2022 18:37)      HPI:  76M with PMH CABG, HTN, HLD who [place] [facility] [place]. Last known well was last night 10pm prior to going to bed, woke up this morning around 0500 with R sided weakness and R facial droop. [place]ed [place] [facility] [place], code stroke initiated, NIH at [facility] [place] reportedly 8. CTA showed LM1 occlusion. Transferred to [facility] for possible neuro IR intervention. On arrival to [facility], NIH 6. Decision made to take patient directly to neuro IR for intervention.     Initial NIH 6, mRS 0    PMH: HTN (hypertension)    HLD (hyperlipidemia)    S/P CABG x 1         PSH:       FAMILY HISTORY:      SOCIAL HISTORY:  No history of tobacco or alcohol use     Allergies    No Known Allergies    Intolerances            Vital Signs Last 24 Hrs  T(C): 37.4 (2022 16:30), Max: 38.3 (2022 21:00)  T(F): 99.3 (2022 16:30), Max: 100.9 (2022 21:00)  HR: 100 (2022 16:30) (82 - 107)  BP: 148/95 (2022 16:30) (79/57 - 171/90)  BP(mean): 106 (2022 16:30) (65 - 140)  RR: 23 (2022 16:30) (19 - 36)  SpO2: 99% (2022 16:30) (93% - 100%)    Parameters below as of 2022 10:00      O2 Concentration (%): 30    MEDICATIONS    acetaminophen     Tablet .. 650 milliGRAM(s) Oral every 6 hours PRN  aspirin  chewable 81 milliGRAM(s) Oral daily  atorvastatin 80 milliGRAM(s) Oral daily  cefTRIAXone   IVPB      chlorhexidine 0.12% Liquid 15 milliLiter(s) Oral Mucosa every 12 hours  chlorhexidine 2% Cloths 1 Application(s) Topical daily  dextrose 5%. 1000 milliLiter(s) IV Continuous <Continuous>  dextrose 5%. 1000 milliLiter(s) IV Continuous <Continuous>  dextrose 50% Injectable 25 Gram(s) IV Push once  dextrose Oral Gel 15 Gram(s) Oral once PRN  enoxaparin Injectable 40 milliGRAM(s) SubCutaneous every 24 hours  famotidine Injectable 20 milliGRAM(s) IV Push daily  fentaNYL    Injectable 25 MICROGram(s) IV Push every 4 hours PRN  furosemide   Injectable 20 milliGRAM(s) IV Push daily  glucagon  Injectable 1 milliGRAM(s) IntraMuscular once  hydrALAZINE Injectable 10 milliGRAM(s) IV Push every 2 hours PRN  insulin lispro (ADMELOG) corrective regimen sliding scale   SubCutaneous every 6 hours  labetalol Injectable 10 milliGRAM(s) IV Push every 2 hours PRN  norepinephrine Infusion 0.05 MICROgram(s)/kG/Min IV Continuous <Continuous>  ondansetron Injectable 4 milliGRAM(s) IV Push every 6 hours PRN  polyethylene glycol 3350 17 Gram(s) Oral daily  propofol Infusion 5 MICROgram(s)/kG/Min IV Continuous <Continuous>  senna 2 Tablet(s) Oral at bedtime         LABS:  CBC Full  -  ( 2022 04:21 )  WBC Count : 15.78 K/uL  RBC Count : 4.59 M/uL  Hemoglobin : 14.2 g/dL  Hematocrit : 43.7 %  Platelet Count - Automated : 203 K/uL  Mean Cell Volume : 95.2 fl  Mean Cell Hemoglobin : 30.9 pg  Mean Cell Hemoglobin Concentration : 32.5 gm/dL  Auto Neutrophil # : x  Auto Lymphocyte # : x  Auto Monocyte # : x  Auto Eosinophil # : x  Auto Basophil # : x  Auto Neutrophil % : x  Auto Lymphocyte % : x  Auto Monocyte % : x  Auto Eosinophil % : x  Auto Basophil % : x    Urinalysis Basic - ( 2022 22:56 )    Color: Yellow / Appearance: Slightly Turbid / S.025 / pH: x  Gluc: x / Ketone: Negative  / Bili: Negative / Urobili: Negative   Blood: x / Protein: 100 / Nitrite: Positive   Leuk Esterase: Moderate / RBC: 6-10 /HPF / WBC 26-50 /HPF   Sq Epi: x / Non Sq Epi: Occasional / Bacteria: TNTC          141  |  103  |  18.1  ----------------------------<  175<H>  4.1   |  25.0  |  0.70    Ca    8.8      2022 04:21  Phos  2.6       Mg     2.1         TPro  6.8  /  Alb  3.8  /  TBili  1.2  /  DBili  0.3  /  AST  30  /  ALT  26  /  AlkPhos  101      LIVER FUNCTIONS - ( 2022 18:58 )  Alb: 3.8 g/dL / Pro: 6.8 g/dL / ALK PHOS: 101 U/L / ALT: 26 U/L / AST: 30 U/L / GGT: x           On Neurological Examination:  Patient is intubated and sedated for respiratory failure.  Sedation was briefly held for exam.  Mental Status - No Eye opening to noxious..    Cranial Nerves -Pupils are 2 and reactive. , EOMs are conjugate in primary gaze.,  Cough present .  Motor Exam -   Right side -trace movement to noxious. Left UE localizes briskly and Left LE withdraws to noxious stim.         RADIOLOGY ( All neurological imaging studies were independently reviewed and interpreted by me)  [facility]   CTA  CTP    IMPRESSION:    CT PERFUSION:  The CT perfusion is technically limited by truncation of the arterial input function and venous outflow function.    Core infarct (CBF < 30%:): 0 ml  Penumbra (Tmax >6s): 4 mL  Mismatched volume: 0 ml  Mismatched ratio: None    Interpretation:  Based on CBF < 30%, there is no evidence of a core infarct. At CBF < 34%, a small perfusion defect of 4 mL is located in the left inferior frontal gyrus and corresponds to matched perfusion abnormality of Tmax >6 seconds. On Tmax > 4s, there is a much larger perfusion defect throughout the majority of the left frontoparietal convexity, which may represent an ischemic penumbra in the left MCA vascular territory.        CT ANGIOGRAPHY NECK:  1. Poor opacification of the cervical vasculature.  2. Suspicion for moderate greater than 50% stenosis in the proximal right internal carotid artery. Suspicion for moderate to severe stenosis in the proximal left internal carotid artery that may be greater than 70%. No evidence of ICA occlusion in the neck.  3. The right vertebral artery is grossly patent.. The V1 segment of the left vertebral artery cannot be visualized. The V2 and V3 segments the left vertebral artery are grossly patent with multiple stenoses.  4. There is limited visualization of the common carotid artery origins and subclavian artery origins. Underlying stenoses cannot be excluded. There is suspicion for a severe stenosis in the proximal left subclavian artery.      CT ANGIOGRAPHY BRAIN:  1. Poor opacification of the intracranial vasculature.  2. There appears to be a focal filling defect at the left MCA bifurcation with distal flow. There is marked attenuation of M2 branches of the left middle cerebral artery. Vessel density analysis of the MCA territory shows a greater than 50% reduction of vessel density in the left MCA territory compared to the contralateral side.  3. There are mild to moderate stenoses in the supraclinoid segments of the internal carotid arteries bilaterally, without occlusion.  4. There are stenoses in the intradural vertebral arteries bilaterally, without occlusion.    Repeat CTA or MRI/MRA is recommended for further evaluation.  Peak arterial opacification on the CT perfusion occurs at approximately 38 seconds. If the CTA is repeated, a long delay is required after contrast injection to achieve adequate opacification of the vasculature.    The findings were discussed with JACK Light in the emergency room on 2022 at 5:45 AM. Read back verification was obtained.    --- End of Report ---            SEVERO CHANEL MD; Attending Radiologist  This document has been electronically signed    MRI:  TTE  TTE Echo Complete w/ Contrast w/ Doppler (22 @ 14:01) >    Summary:   1. Endocardial visualization was enhanced with intravenous echo contrast.   2. Severely enlarged left atrium.   3. Global diffuse akinesis. Left ventricular ejection fraction, by   visual estimation, is <10%.   4. Elevated mean left atrial pressure. (>30 mm Hg)   5. Normal right atrial size.   6. Mildly enlarged right ventricle. Moderately reduced RV systolic   function.   7. Mild mitral valve regurgitation.   8. Mild tricuspid regurgitation.   9. Estimated pulmonary artery systolic pressure is 42 mmHg - mild   pulmonary hypertension.  10. There is no evidence of pericardial effusion.  11. Team informed of the findings.    MD Millie, RPVI Electronically signed on 2022 at 3:49:14 PM

## 2022-07-26 NOTE — PROGRESS NOTE ADULT - PROBLEM SELECTOR PLAN 3
s/p mechanical thrombectomy  continue ASA and statin  no evidence of AFib on telemetry.  Ct telemetry  IMTIAZ s/p mechanical thrombectomy  continue ASA and statin  no evidence of AFib on telemetry.  Ct telemetry  IMTIAZ to evaluate etiology of stroke  Declined AICD in the past   with EF < 10% consider LHC.

## 2022-07-26 NOTE — DIETITIAN INITIAL EVALUATION ADULT - OTHER INFO
Pt is a 76 year old male with PMH CABG, HTN, HLD who presents as transfer from [facility] [place] for stroke. Last known well was last night 10pm prior to going to bed, woke up this morning around 0500 with R sided weakness and R facial droop. Presented to [facility] [place], code stroke initiated, NIH at [facility] reportedly 8. CTA showed LM1 occlusion. Transferred to [facility] for possible neuro IR intervention. On arrival to [facility], NIH 6. Decision made to take patient directly to neuro IR for intervention.   Pt with with LM1 occlusion, Acute hypoxemic  resp failure, intubated on vent support.

## 2022-07-26 NOTE — DIETITIAN INITIAL EVALUATION ADULT - NSFNSGIIOFT_GEN_A_CORE
07-25-22 @ 07:01  -  07-26-22 @ 07:00  --------------------------------------------------------  OUT:  Total OUT: 0 mL    Total NET: 320 mL

## 2022-07-26 NOTE — PROGRESS NOTE ADULT - PROBLEM SELECTOR PLAN 2
EKG with ischemic changes, no previous available  troponin negative x2, ct to trend  continue ASA and statin/Zetia   will restart beta blocker when off pressors  keep K>4 and Mg>2 to minimize ectopy.  When tolerating PO cardiac diet/DASH diet

## 2022-07-27 LAB
ANION GAP SERPL CALC-SCNC: 12 MMOL/L — SIGNIFICANT CHANGE UP (ref 5–17)
BUN SERPL-MCNC: 31.3 MG/DL — HIGH (ref 8–20)
CALCIUM SERPL-MCNC: 8.9 MG/DL — SIGNIFICANT CHANGE UP (ref 8.4–10.5)
CHLORIDE SERPL-SCNC: 100 MMOL/L — SIGNIFICANT CHANGE UP (ref 98–107)
CO2 SERPL-SCNC: 27 MMOL/L — SIGNIFICANT CHANGE UP (ref 22–29)
CREAT SERPL-MCNC: 0.71 MG/DL — SIGNIFICANT CHANGE UP (ref 0.5–1.3)
EGFR: 95 ML/MIN/1.73M2 — SIGNIFICANT CHANGE UP
GLUCOSE BLDC GLUCOMTR-MCNC: 124 MG/DL — HIGH (ref 70–99)
GLUCOSE BLDC GLUCOMTR-MCNC: 155 MG/DL — HIGH (ref 70–99)
GLUCOSE BLDC GLUCOMTR-MCNC: 160 MG/DL — HIGH (ref 70–99)
GLUCOSE SERPL-MCNC: 138 MG/DL — HIGH (ref 70–99)
HCT VFR BLD CALC: 42.1 % — SIGNIFICANT CHANGE UP (ref 39–50)
HGB BLD-MCNC: 14 G/DL — SIGNIFICANT CHANGE UP (ref 13–17)
MAGNESIUM SERPL-MCNC: 2.2 MG/DL — SIGNIFICANT CHANGE UP (ref 1.6–2.6)
MCHC RBC-ENTMCNC: 31.3 PG — SIGNIFICANT CHANGE UP (ref 27–34)
MCHC RBC-ENTMCNC: 33.3 GM/DL — SIGNIFICANT CHANGE UP (ref 32–36)
MCV RBC AUTO: 94.2 FL — SIGNIFICANT CHANGE UP (ref 80–100)
PHOSPHATE SERPL-MCNC: 3.2 MG/DL — SIGNIFICANT CHANGE UP (ref 2.4–4.7)
PLATELET # BLD AUTO: 191 K/UL — SIGNIFICANT CHANGE UP (ref 150–400)
POTASSIUM SERPL-MCNC: 4.1 MMOL/L — SIGNIFICANT CHANGE UP (ref 3.5–5.3)
POTASSIUM SERPL-SCNC: 4.1 MMOL/L — SIGNIFICANT CHANGE UP (ref 3.5–5.3)
RBC # BLD: 4.47 M/UL — SIGNIFICANT CHANGE UP (ref 4.2–5.8)
RBC # FLD: 15.2 % — HIGH (ref 10.3–14.5)
SODIUM SERPL-SCNC: 139 MMOL/L — SIGNIFICANT CHANGE UP (ref 135–145)
WBC # BLD: 11.66 K/UL — HIGH (ref 3.8–10.5)
WBC # FLD AUTO: 11.66 K/UL — HIGH (ref 3.8–10.5)

## 2022-07-27 PROCEDURE — 99233 SBSQ HOSP IP/OBS HIGH 50: CPT

## 2022-07-27 PROCEDURE — 99232 SBSQ HOSP IP/OBS MODERATE 35: CPT

## 2022-07-27 RX ADMIN — Medication 650 MILLIGRAM(S): at 06:15

## 2022-07-27 RX ADMIN — DEXMEDETOMIDINE HYDROCHLORIDE IN 0.9% SODIUM CHLORIDE 5 MICROGRAM(S)/KG/HR: 4 INJECTION INTRAVENOUS at 10:08

## 2022-07-27 RX ADMIN — Medication 2: at 17:39

## 2022-07-27 RX ADMIN — ATORVASTATIN CALCIUM 80 MILLIGRAM(S): 80 TABLET, FILM COATED ORAL at 11:39

## 2022-07-27 RX ADMIN — POLYETHYLENE GLYCOL 3350 17 GRAM(S): 17 POWDER, FOR SOLUTION ORAL at 11:39

## 2022-07-27 RX ADMIN — ENOXAPARIN SODIUM 40 MILLIGRAM(S): 100 INJECTION SUBCUTANEOUS at 21:42

## 2022-07-27 RX ADMIN — CHLORHEXIDINE GLUCONATE 1 APPLICATION(S): 213 SOLUTION TOPICAL at 11:38

## 2022-07-27 RX ADMIN — CEFTRIAXONE 100 MILLIGRAM(S): 500 INJECTION, POWDER, FOR SOLUTION INTRAMUSCULAR; INTRAVENOUS at 11:46

## 2022-07-27 RX ADMIN — FAMOTIDINE 20 MILLIGRAM(S): 10 INJECTION INTRAVENOUS at 11:39

## 2022-07-27 RX ADMIN — CHLORHEXIDINE GLUCONATE 15 MILLILITER(S): 213 SOLUTION TOPICAL at 05:23

## 2022-07-27 RX ADMIN — Medication 650 MILLIGRAM(S): at 05:45

## 2022-07-27 RX ADMIN — Medication 650 MILLIGRAM(S): at 22:40

## 2022-07-27 RX ADMIN — Medication 20 MILLIGRAM(S): at 05:23

## 2022-07-27 RX ADMIN — Medication 650 MILLIGRAM(S): at 00:00

## 2022-07-27 RX ADMIN — SENNA PLUS 2 TABLET(S): 8.6 TABLET ORAL at 21:42

## 2022-07-27 RX ADMIN — Medication 2: at 11:46

## 2022-07-27 RX ADMIN — CHLORHEXIDINE GLUCONATE 15 MILLILITER(S): 213 SOLUTION TOPICAL at 17:40

## 2022-07-27 RX ADMIN — Medication 81 MILLIGRAM(S): at 11:38

## 2022-07-27 NOTE — PROGRESS NOTE ADULT - SUBJECTIVE AND OBJECTIVE BOX
[facility]                                                         CARDIOLOGY AT [facility]                                                                  [address] [address]                                                         Telephone: [phone]. Fax:[phone]                                                                             PROGRESS NOTE    Reason for follow up: Follow up on CVA  Update: Remains intubated     Review of symptoms:   Unable to get    Vitals:  T(C): 38 (07-27-22 @ 09:00), Max: 38.4 (07-27-22 @ 06:00)  HR: 89 (07-27-22 @ 09:09) (73 - 100)  BP: 101/60 (07-27-22 @ 09:00) (78/47 - 171/90)  RR: 21 (07-27-22 @ 09:00) (16 - 28)  SpO2: 99% (07-27-22 @ 09:09) (89% - 100%)  Wt(kg): --  I&O's Summary    26 Jul 2022 07:01  -  27 Jul 2022 07:00  --------------------------------------------------------  IN: 1420.1 mL / OUT: 1665 mL / NET: -244.9 mL    27 Jul 2022 07:01  -  27 Jul 2022 09:31  --------------------------------------------------------  IN: 10.2 mL / OUT: 420 mL / NET: -409.8 mL    Weight (kg): 100 (07-24 @ 12:26)      PHYSICAL EXAM:  Appearance: Comfortable. No acute distress  Orally intubated  Ngt in place  HEENT:  Atraumatic. Normocephalic.  Normal oral mucosa  Neurologic: A & O x 3, no gross focal deficits.  Cardiovascular: RRR S1 S2, Regular  Respiratory: Lungs clear to auscultation, unlabored   Gastrointestinal:  Soft, Non-tender, + BS  Lower Extremities: No edema  Psychiatry: Patient is calm. No agitation.   Skin: warm and dry.      CURRENT MEDICATIONS:  MEDICATIONS  (STANDING):  aspirin  chewable 81 milliGRAM(s) Oral daily  atorvastatin 80 milliGRAM(s) Oral daily  cefTRIAXone   IVPB 1000 milliGRAM(s) IV Intermittent every 24 hours  chlorhexidine 0.12% Liquid 15 milliLiter(s) Oral Mucosa every 12 hours  chlorhexidine 2% Cloths 1 Application(s) Topical daily  dexMEDEtomidine Infusion 0.2 MICROgram(s)/kG/Hr (5 mL/Hr) IV Continuous <Continuous>  enoxaparin Injectable 40 milliGRAM(s) SubCutaneous every 24 hours  famotidine Injectable 20 milliGRAM(s) IV Push daily  furosemide   Injectable 20 milliGRAM(s) IV Push daily  glucagon  Injectable 1 milliGRAM(s) IntraMuscular once  insulin lispro (ADMELOG) corrective regimen sliding scale   SubCutaneous every 6 hours  norepinephrine Infusion 0.05 MICROgram(s)/kG/Min (9.38 mL/Hr) IV Continuous <Continuous>  polyethylene glycol 3350 17 Gram(s) Oral daily  senna 2 Tablet(s) Oral at bedtime      LABS:	 	  CARDIAC MARKERS ( 25 Jul 2022 03:51 )  x     / <0.01 ng/mL / 82 U/L / x     / x      p-BNP 25 Jul 2022 03:51: x    , CARDIAC MARKERS ( 24 Jul 2022 18:58 )  x     / <0.01 ng/mL / x     / x     / x      p-BNP 24 Jul 2022 18:58: x    , CARDIAC MARKERS ( 24 Jul 2022 10:27 )  x     / <0.01 ng/mL / x     / x     / x      p-BNP 24 Jul 2022 10:27: 3029 pg/mL                          14.0   11.66 )-----------( 191      ( 27 Jul 2022 04:14 )             42.1     07-27    139  |  100  |  31.3<H>  ----------------------------<  138<H>  4.1   |  27.0  |  0.71    Ca    8.9      27 Jul 2022 04:14  Phos  3.2     07-27  Mg     2.2     07-27      proBNP: Serum Pro-Brain Natriuretic Peptide: 3029 pg/mL (07-24 @ 10:27)    Lipid Profile: Date: 07-25 @ 03:51  Total cholesterol 167; Direct LDL: --; HDL: 44; Triglycerides:132    HgA1c:   TSH: Thyroid Stimulating Hormone, Serum: 0.91 uIU/mL    TELEMETRY  NSR no atrial fib    < from: TTE Echo Complete w/ Contrast w/ Doppler (07.24.22 @ 14:01) >  Summary:   1. Endocardial visualization was enhanced with intravenous echo contrast.   2. Severely enlarged left atrium.   3. Global diffuse akinesis. Left ventricular ejection fraction, by   visual estimation, is <10%.   4. Elevated mean left atrial pressure. (>30 mm Hg)   5. Normal right atrial size.   6. Mildly enlarged right ventricle. Moderately reduced RV systolic   function.   7. Mild mitral valve regurgitation.   8. Mild tricuspid regurgitation.   9. Estimated pulmonary artery systolic pressure is 42 mmHg - mild   pulmonary hypertension.  10. There is no evidence of pericardial effusion.  11. Team informed of the findings.      < end of copied text >

## 2022-07-27 NOTE — PROGRESS NOTE ADULT - ASSESSMENT
76M with LM1 occlusion, s/p TICI 2B MT, no tPA as wake-up stroke.  Large evolving L MCA stroke with small area of hemorrhage.  Acute on chronic HFrEF, LVHF <10%, reduced RV syst function, 1st degree AVbl. ?Component of neurogenic CMP.  Acute hypoxemic  resp failure, intubated.  PMH of CAD/CABG/HFrEF, HTN, HLD.  UTI.    Plan:  neurochecks q1hr  sedation with Prop ggt, switch to Precedex   ASA, statin  Cardiology involved; plan for IMTIAZ in am  Levo PRN to maintain MAP >65, may require addition of Milrinone if increasing Levo requirements  gentle diuresis, maintain euvolemia to -500ml, monitor renal function, lactate, get VBG in am  vent support  monitor e-lytes  SCDs, SQL  Ceftriaxone IV for UTI, renal US - pending     76M with LM1 occlusion, s/p TICI 2B MT, no tPA as wake-up stroke.  Large evolving L MCA stroke with small area of hemorrhage.  Acute on chronic HFrEF, LVHF <10%, reduced RV syst function, 1st degree AVbl. ?Component of neurogenic CMP.  Acute hypoxemic  resp failure, intubated.  PMH of CAD/CABG/HFrEF ( lipitor , coreg , lisinopril ), HTN, HLD.  UTI.    Plan:  neurochecks q1hr  sedation with Prop ggt, switch to Precedex   ASA, statin  ABG in am   Check EEG  Cardiology involved; cardiology will wait when patient afebrile   Pull back ETT 1.5 cm   Levo PRN to maintain MAP >65, may require addition of Milrinone if increasing Levo requirements  gentle diuresis, maintain euvolemia to -500ml, monitor renal function, lactate,   GI prophylaxsis - pepcid  vent support  monitor e-lytes  SCDs, SQL   S/P fever- Ceftriaxone IV for UTI,   Renal ultrasound- no mass, no calculi  DVT prophylaxsis

## 2022-07-27 NOTE — PROGRESS NOTE ADULT - ASSESSMENT
car 75 y/o male with hx of CAD s/p stents x2 (2004) and CABG x4 ([facility], 2014), ischemic cardiomyopathy (declined AICD), HTN, HLD, PVD s/p femoral endarterectomy who presented with right-sided weakness and facial droop to [facility] and was found to have acute stroke. Pt was transferred to [facility] for neuro IR intervention and underwent mechanical thrombectomy for left MCA occlusion. Post procedure pt developed acute hypoxic respiratory failure and was intubated. He is now sedated on propofol drip, not following commands but moving all extremities. EKG shows SR with 1st degree AVB and TWI in lateral leads, no previous EKG available. Telemetry shows SR with frequent multifocal PVCs. Echocardiogram reveals LVEF<10% and moderately reduced RV function. Troponin negative x2, proBNP 3029.

## 2022-07-27 NOTE — CHART NOTE - NSCHARTNOTEFT_GEN_A_CORE
IMTIAZ cancelled due to fever  Call placed to son Emily who appears very frustrated  All questions answered for him  Number to reach me was provided

## 2022-07-27 NOTE — PROGRESS NOTE ADULT - SUBJECTIVE AND OBJECTIVE BOX
HPI:  76M with PMH CABG, HTN, HLD who presents as transfer from [facility] for stroke. Last known well was last night 10pm prior to going to bed, woke up this morning around 0500 with R sided weakness and R facial droop. Presented to [facility], code stroke initiated, NIH at [facility] reportedly 8. CTA showed LM1 occlusion. Transferred to [facility] for possible neuro IR intervention. On arrival to [facility], NIH 6. Decision made to take patient directly to neuro IR for intervention.   NIH 6, mRS 0    NIH SS:  DATE: 7/24/22  TIME: 0710  1A: Level of consciousness (0-3): 0  1B: Questions (0-2): 0    1C: Commands (0-2): 0  2: Gaze (0-2): 0  3: Visual fields (0-3): 0  4: Facial palsy (0-3): 1  MOTOR:  5A: Left arm motor drift (0-4): 0  5B: Right arm motor drift (0-4): 1  6A: Left leg motor drift (0-4): 0  6B: Right leg motor drift (0-4): 1  7: Limb ataxia (0-2): 0  SENSORY:  8: Sensation (0-2): 1  SPEECH:  9: Language (0-3): 1  10: Dysarthria (0-2): 1  EXTINCTION:  11: Extinction/inattention (0-2): 0    TOTAL SCORE: 6 (24 Jul 2022 07:34)    No o/n events.  Evolving large L MCA stroke wit small heme transformation.    ICU Vital Signs Last 24 Hrs  T(C): 38 (27 Jul 2022 09:00), Max: 38.4 (27 Jul 2022 06:00)  T(F): 100.4 (27 Jul 2022 09:00), Max: 101.1 (27 Jul 2022 06:00)  HR: 89 (27 Jul 2022 09:09) (73 - 100)  BP: 101/60 (27 Jul 2022 09:00) (78/47 - 171/90)  BP(mean): 73 (27 Jul 2022 09:00) (53 - 140)  RR: 21 (27 Jul 2022 09:00) (16 - 28)  SpO2: 99% (27 Jul 2022 09:09) (89% - 100%)    O2 Parameters below as of 27 Jul 2022 08:00  Patient On (Oxygen Delivery Method): ventilator    O2 Concentration (%): 50      26 Jul 2022 07:01  -  27 Jul 2022 07:00  --------------------------------------------------------  IN:    Dexmedetomidine: 66.3 mL    Enteral Tube Flush: 100 mL    Glucerna 1.5: 740 mL    IV PiggyBack: 250 mL    Norepinephrine: 140.8 mL    Propofol: 123 mL  Total IN: 1420.1 mL    OUT:    Indwelling Catheter - Urethral (mL): 1665 mL  Total OUT: 1665 mL    Total NET: -244.9 mL      27 Jul 2022 07:01  -  27 Jul 2022 09:45  --------------------------------------------------------  IN:    Dexmedetomidine: 10.2 mL  Total IN: 10.2 mL    OUT:    Glucerna 1.5: 0 mL    Indwelling Catheter - Urethral (mL): 420 mL    Norepinephrine: 0 mL  Total OUT: 420 mL    Total NET: -409.8 mL    MEDICATIONS  (STANDING):  aspirin  chewable 81 milliGRAM(s) Oral daily  atorvastatin 80 milliGRAM(s) Oral daily  cefTRIAXone   IVPB      cefTRIAXone   IVPB 1000 milliGRAM(s) IV Intermittent every 24 hours  chlorhexidine 0.12% Liquid 15 milliLiter(s) Oral Mucosa every 12 hours  chlorhexidine 2% Cloths 1 Application(s) Topical daily  dexMEDEtomidine Infusion 0.2 MICROgram(s)/kG/Hr (5 mL/Hr) IV Continuous <Continuous>  dextrose 5%. 1000 milliLiter(s) (50 mL/Hr) IV Continuous <Continuous>  dextrose 5%. 1000 milliLiter(s) (100 mL/Hr) IV Continuous <Continuous>  dextrose 50% Injectable 25 Gram(s) IV Push once  enoxaparin Injectable 40 milliGRAM(s) SubCutaneous every 24 hours  famotidine Injectable 20 milliGRAM(s) IV Push daily  furosemide   Injectable 20 milliGRAM(s) IV Push daily  glucagon  Injectable 1 milliGRAM(s) IntraMuscular once  insulin lispro (ADMELOG) corrective regimen sliding scale   SubCutaneous every 6 hours  norepinephrine Infusion 0.05 MICROgram(s)/kG/Min (9.38 mL/Hr) IV Continuous <Continuous>  polyethylene glycol 3350 17 Gram(s) Oral daily  senna 2 Tablet(s) Oral at bedtime    MEDICATIONS  (PRN):  acetaminophen     Tablet .. 650 milliGRAM(s) Oral every 6 hours PRN Temp greater or equal to 38C (100.4F), Mild Pain (1 - 3)  dextrose Oral Gel 15 Gram(s) Oral once PRN Blood Glucose LESS THAN 70 milliGRAM(s)/deciliter  fentaNYL    Injectable 25 MICROGram(s) IV Push every 4 hours PRN agitation/vent synchrony  hydrALAZINE Injectable 10 milliGRAM(s) IV Push every 2 hours PRN systolic bloodpressure >160  labetalol Injectable 10 milliGRAM(s) IV Push every 2 hours PRN Systolic blood pressure > 160  ondansetron Injectable 4 milliGRAM(s) IV Push every 6 hours PRN Nausea and/or Vomiting    07-27    139  |  100  |  31.3<H>  ----------------------------<  138<H>  4.1   |  27.0  |  0.71    Ca    8.9      27 Jul 2022 04:14  Phos  3.2     07-27  Mg     2.2     07-27                          14.0   11.66 )-----------( 191      ( 27 Jul 2022 04:14 )             42.1     CAPILLARY BLOOD GLUCOSE      POCT Blood Glucose.: 124 mg/dL (27 Jul 2022 05:09)  POCT Blood Glucose.: 160 mg/dL (26 Jul 2022 23:13)  POCT Blood Glucose.: 156 mg/dL (26 Jul 2022 17:32)  POCT Blood Glucose.: 144 mg/dL (26 Jul 2022 11:59)      Culture - Sputum (collected 25 Jul 2022 23:15)  Source: ET Tube ET Tube  Gram Stain (26 Jul 2022 14:53):    Moderate polymorphonuclear leukocytes per low power field    Rare Squamous epithelial cells per low power field    Rare Gram Positive Cocci in Pairs and Chains per oil power field    Rare Gram Positive Rods per oil power field    Culture - Blood (collected 25 Jul 2022 23:05)  Source: .Blood Blood-Peripheral  Preliminary Report (27 Jul 2022 03:01):    No growth to date.    Culture - Blood (collected 25 Jul 2022 23:05)  Source: .Blood Blood-Peripheral  Preliminary Report (27 Jul 2022 03:01):    No growth to date.      Culture - Sputum (collected 25 Jul 2022 23:15)  Source: ET Tube ET Tube  Gram Stain (26 Jul 2022 14:53):    Moderate polymorphonuclear leukocytes per low power field    Rare Squamous epithelial cells per low power field    Rare Gram Positive Cocci in Pairs and Chains per oil power field    Rare Gram Positive Rods per oil power field    Culture - Blood (collected 25 Jul 2022 23:05)  Source: .Blood Blood-Peripheral  Preliminary Report (27 Jul 2022 03:01):    No growth to date.    Culture - Blood (collected 25 Jul 2022 23:05)  Source: .Blood Blood-Peripheral  Preliminary Report (27 Jul 2022 03:01):    No growth to date.    US Renal (07.26.22 @ 23:01) >      INTERPRETATION:  CLINICAL INFORMATION: Acute kidney injury.    COMPARISON: None available.    TECHNIQUE: Sonography of the kidneys and bladder.    FINDINGS:  Right kidney: 9.9 cm. No renal mass, hydronephrosis or calculi.    Left kidney: 11.5 cm. No renal mass, hydronephrosis or calculi.    Urinary bladder: Within normal limits.    IMPRESSION:  No renal mass, hydronephrosis or calculus is visualized sonographically.      MR Head No Cont (07.26.22 @ 20:58) >  COMPARISON: CT head 7/26/2022.    FINDINGS:  Acute infarcts are seen throughout the left MCA territory with   involvement of the left precentral gyrus. Hemorrhagic transformation is   again seen within the left inferior frontal lobe and insula, grossly   stable since comparison study.    Additional acute punctate infarct is seen within the medial left frontal   lobe (4-31).    Trace hemorrhage noted within the bilateral occipital horns. No   extra-axial fluid collections. The skull base flow voids are present.    The visualized intraorbital contents are normal. Mucosal thickening with   air-fluid level in the left sphenoid sinus. The mastoid air cells are   clear. The visualized osseous structures, soft tissues and partially   visualized parotid glands appear normal.    IMPRESSION:    Acute left MCA territory infarcts with hemorrhagic transformation,   grossly stable since comparison CT.    Additional punctate acute infarct involving the medial left frontal lobe   in the SUGEY territory.      CT Head No Cont (07.26.22 @ 20:25) >  INTERPRETATION:  CLINICAL INDICATION: Left M1 occlusion status post TICI   2B recanalization, follow-up    5mm axial sections of the brain were obtained from base to vertex,   without the intravenous administration of contrast material. Coronal and   sagittal computer generated reconstructed views are available.    Comparison is made with the prior CT of 7/24/2022.    Mild atrophy is identified with ventricular and sulcal prominence. There   is more lucency identified in the left insular cortex and left frontal   cortex compared to the prior exam consistent with normal evolution of an   infarct in the left middle cerebral artery territory. There is asmall   amount of hemorrhage in the left frontal region and left anterior   temporal lobe which is new since the prior exam.    Impression: Large lucency in the left frontal temporal cortex in the left   middle cerebral artery distribution compared with 7/24/2022 consistent   with normal evolving acute left middle cerebral artery infarct. Small   amount of new hemorrhage.    Xray Chest 1 View- PORTABLE-Urgent (Xray Chest 1 View- PORTABLE-Urgent .) (07.24.22 @ 20:45) >  IMPRESSION:    ET tube and NG tube, in adequate position.    Cardiomegaly. Unchanged pulmonary vascular congestion. Mild hazy opacity   right midlung field. Underlying infiltrate not excluded.     TTE Echo Complete w/ Contrast w/ Doppler (07.24.22 @ 14:01) >  Summary:   1. Endocardial visualization was enhanced with intravenous echo contrast.   2. Severely enlarged left atrium.   3. Global diffuse akinesis. Left ventricular ejection fraction, by   visual estimation, is <10%.   4. Elevated mean left atrial pressure. (>30 mm Hg)   5. Normal right atrial size.   6. Mildly enlarged right ventricle. Moderately reduced RV systolic   function.   7. Mild mitral valve regurgitation.   8. Mild tricuspid regurgitation.   9. Estimated pulmonary artery systolic pressure is 42 mmHg - mild   pulmonary hypertension.  10. There is no evidence of pericardial effusion.          EXAMINATION:  General:  calm, NAD  Neuro:  somnolent, no FC, EO to noxious, moves L side, RLE wdrl, RUE 0/5.Cards:  S1S2 present  Respiratory:  no respiratory distress, intubated.  Abdomen:  soft  Extremities:  no edema  Skin:  warm/dry HPI:  76M with PMH CABG, HTN, HLD who presents as transfer from [facility] for stroke. Last known well was last night 10pm prior to going to bed, woke up this morning around 0500 with R sided weakness and R facial droop. Presented to [facility], code stroke initiated, NIH at [facility] reportedly 8. CTA showed LM1 occlusion. Transferred to [facility] for possible neuro IR intervention. On arrival to [facility], NIH 6. Decision made to take patient directly to neuro IR for intervention.   NIH 6, mRS 0    NIH SS:  DATE: 7/24/22  TIME: 0710  1A: Level of consciousness (0-3): 0  1B: Questions (0-2): 0    1C: Commands (0-2): 0  2: Gaze (0-2): 0  3: Visual fields (0-3): 0  4: Facial palsy (0-3): 1  MOTOR:  5A: Left arm motor drift (0-4): 0  5B: Right arm motor drift (0-4): 1  6A: Left leg motor drift (0-4): 0  6B: Right leg motor drift (0-4): 1  7: Limb ataxia (0-2): 0  SENSORY:  8: Sensation (0-2): 1  SPEECH:  9: Language (0-3): 1  10: Dysarthria (0-2): 1  EXTINCTION:  11: Extinction/inattention (0-2): 0    TOTAL SCORE: 6 (24 Jul 2022 07:34)    No o/n events.  Evolving large L MCA stroke wit small heme transformation.    ICU Vital Signs Last 24 Hrs  T(C): 38 (27 Jul 2022 09:00), Max: 38.4 (27 Jul 2022 06:00)  T(F): 100.4 (27 Jul 2022 09:00), Max: 101.1 (27 Jul 2022 06:00)  HR: 89 (27 Jul 2022 09:09) (73 - 100)  BP: 101/60 (27 Jul 2022 09:00) (78/47 - 171/90)  BP(mean): 73 (27 Jul 2022 09:00) (53 - 140)  RR: 21 (27 Jul 2022 09:00) (16 - 28)  SpO2: 99% (27 Jul 2022 09:09) (89% - 100%)    O2 Parameters below as of 27 Jul 2022 08:00  Patient On (Oxygen Delivery Method): ventilator    O2 Concentration (%): 50      26 Jul 2022 07:01  -  27 Jul 2022 07:00  --------------------------------------------------------  IN:    Dexmedetomidine: 66.3 mL    Enteral Tube Flush: 100 mL    Glucerna 1.5: 740 mL    IV PiggyBack: 250 mL    Norepinephrine: 140.8 mL    Propofol: 123 mL  Total IN: 1420.1 mL    OUT:    Indwelling Catheter - Urethral (mL): 1665 mL  Total OUT: 1665 mL    Total NET: -244.9 mL      27 Jul 2022 07:01  -  27 Jul 2022 09:45  --------------------------------------------------------  IN:    Dexmedetomidine: 10.2 mL  Total IN: 10.2 mL    OUT:    Glucerna 1.5: 0 mL    Indwelling Catheter - Urethral (mL): 420 mL    Norepinephrine: 0 mL  Total OUT: 420 mL    Total NET: -409.8 mL    MEDICATIONS  (STANDING):  aspirin  chewable 81 milliGRAM(s) Oral daily  atorvastatin 80 milliGRAM(s) Oral daily  cefTRIAXone   IVPB      cefTRIAXone   IVPB 1000 milliGRAM(s) IV Intermittent every 24 hours  chlorhexidine 0.12% Liquid 15 milliLiter(s) Oral Mucosa every 12 hours  chlorhexidine 2% Cloths 1 Application(s) Topical daily  dexMEDEtomidine Infusion 0.2 MICROgram(s)/kG/Hr (5 mL/Hr) IV Continuous <Continuous>  dextrose 5%. 1000 milliLiter(s) (50 mL/Hr) IV Continuous <Continuous>  dextrose 5%. 1000 milliLiter(s) (100 mL/Hr) IV Continuous <Continuous>  dextrose 50% Injectable 25 Gram(s) IV Push once  enoxaparin Injectable 40 milliGRAM(s) SubCutaneous every 24 hours  famotidine Injectable 20 milliGRAM(s) IV Push daily  furosemide   Injectable 20 milliGRAM(s) IV Push daily  glucagon  Injectable 1 milliGRAM(s) IntraMuscular once  insulin lispro (ADMELOG) corrective regimen sliding scale   SubCutaneous every 6 hours  norepinephrine Infusion 0.05 MICROgram(s)/kG/Min (9.38 mL/Hr) IV Continuous <Continuous>  polyethylene glycol 3350 17 Gram(s) Oral daily  senna 2 Tablet(s) Oral at bedtime    MEDICATIONS  (PRN):  acetaminophen     Tablet .. 650 milliGRAM(s) Oral every 6 hours PRN Temp greater or equal to 38C (100.4F), Mild Pain (1 - 3)  dextrose Oral Gel 15 Gram(s) Oral once PRN Blood Glucose LESS THAN 70 milliGRAM(s)/deciliter  fentaNYL    Injectable 25 MICROGram(s) IV Push every 4 hours PRN agitation/vent synchrony  hydrALAZINE Injectable 10 milliGRAM(s) IV Push every 2 hours PRN systolic bloodpressure >160  labetalol Injectable 10 milliGRAM(s) IV Push every 2 hours PRN Systolic blood pressure > 160  ondansetron Injectable 4 milliGRAM(s) IV Push every 6 hours PRN Nausea and/or Vomiting    Home Medications:      07-27    139  |  100  |  31.3<H>  ----------------------------<  138<H>  4.1   |  27.0  |  0.71    Ca    8.9      27 Jul 2022 04:14  Phos  3.2     07-27  Mg     2.2     07-27                          14.0   11.66 )-----------( 191      ( 27 Jul 2022 04:14 )             42.1     CAPILLARY BLOOD GLUCOSE      POCT Blood Glucose.: 124 mg/dL (27 Jul 2022 05:09)  POCT Blood Glucose.: 160 mg/dL (26 Jul 2022 23:13)  POCT Blood Glucose.: 156 mg/dL (26 Jul 2022 17:32)  POCT Blood Glucose.: 144 mg/dL (26 Jul 2022 11:59)          Culture - Sputum (collected 25 Jul 2022 23:15)  Source: ET Tube ET Tube  Gram Stain (26 Jul 2022 14:53):    Moderate polymorphonuclear leukocytes per low power field    Rare Squamous epithelial cells per low power field    Rare Gram Positive Cocci in Pairs and Chains per oil power field    Rare Gram Positive Rods per oil power field    Culture - Blood (collected 25 Jul 2022 23:05)  Source: .Blood Blood-Peripheral  Preliminary Report (27 Jul 2022 03:01):    No growth to date.    Culture - Blood (collected 25 Jul 2022 23:05)  Source: .Blood Blood-Peripheral  Preliminary Report (27 Jul 2022 03:01):    No growth to date.      Culture - Sputum (collected 25 Jul 2022 23:15)  Source: ET Tube ET Tube  Gram Stain (26 Jul 2022 14:53):    Moderate polymorphonuclear leukocytes per low power field    Rare Squamous epithelial cells per low power field    Rare Gram Positive Cocci in Pairs and Chains per oil power field    Rare Gram Positive Rods per oil power field    Culture - Blood (collected 25 Jul 2022 23:05)  Source: .Blood Blood-Peripheral  Preliminary Report (27 Jul 2022 03:01):    No growth to date.    Culture - Blood (collected 25 Jul 2022 23:05)  Source: .Blood Blood-Peripheral  Preliminary Report (27 Jul 2022 03:01):    No growth to date.    US Renal (07.26.22 @ 23:01) >      INTERPRETATION:  CLINICAL INFORMATION: Acute kidney injury.    COMPARISON: None available.    TECHNIQUE: Sonography of the kidneys and bladder.    FINDINGS:  Right kidney: 9.9 cm. No renal mass, hydronephrosis or calculi.    Left kidney: 11.5 cm. No renal mass, hydronephrosis or calculi.    Urinary bladder: Within normal limits.    IMPRESSION:  No renal mass, hydronephrosis or calculus is visualized sonographically.      MR Head No Cont (07.26.22 @ 20:58) >  COMPARISON: CT head 7/26/2022.    FINDINGS:  Acute infarcts are seen throughout the left MCA territory with   involvement of the left precentral gyrus. Hemorrhagic transformation is   again seen within the left inferior frontal lobe and insula, grossly   stable since comparison study.    Additional acute punctate infarct is seen within the medial left frontal   lobe (4-31).    Trace hemorrhage noted within the bilateral occipital horns. No   extra-axial fluid collections. The skull base flow voids are present.    The visualized intraorbital contents are normal. Mucosal thickening with   air-fluid level in the left sphenoid sinus. The mastoid air cells are   clear. The visualized osseous structures, soft tissues and partially   visualized parotid glands appear normal.    IMPRESSION:    Acute left MCA territory infarcts with hemorrhagic transformation,   grossly stable since comparison CT.    Additional punctate acute infarct involving the medial left frontal lobe   in the SUGEY territory.      CT Head No Cont (07.26.22 @ 20:25) >  INTERPRETATION:  CLINICAL INDICATION: Left M1 occlusion status post TICI   2B recanalization, follow-up    5mm axial sections of the brain were obtained from base to vertex,   without the intravenous administration of contrast material. Coronal and   sagittal computer generated reconstructed views are available.    Comparison is made with the prior CT of 7/24/2022.    Mild atrophy is identified with ventricular and sulcal prominence. There   is more lucency identified in the left insular cortex and left frontal   cortex compared to the prior exam consistent with normal evolution of an   infarct in the left middle cerebral artery territory. There is asmall   amount of hemorrhage in the left frontal region and left anterior   temporal lobe which is new since the prior exam.    Impression: Large lucency in the left frontal temporal cortex in the left   middle cerebral artery distribution compared with 7/24/2022 consistent   with normal evolving acute left middle cerebral artery infarct. Small   amount of new hemorrhage.    Xray Chest 1 View- PORTABLE-Urgent (Xray Chest 1 View- PORTABLE-Urgent .) (07.24.22 @ 20:45) >  IMPRESSION:    ET tube and NG tube, in adequate position.    Cardiomegaly. Unchanged pulmonary vascular congestion. Mild hazy opacity   right midlung field. Underlying infiltrate not excluded.     TTE Echo Complete w/ Contrast w/ Doppler (07.24.22 @ 14:01) >  Summary:   1. Endocardial visualization was enhanced with intravenous echo contrast.   2. Severely enlarged left atrium.   3. Global diffuse akinesis. Left ventricular ejection fraction, by   visual estimation, is <10%.   4. Elevated mean left atrial pressure. (>30 mm Hg)   5. Normal right atrial size.   6. Mildly enlarged right ventricle. Moderately reduced RV systolic   function.   7. Mild mitral valve regurgitation.   8. Mild tricuspid regurgitation.   9. Estimated pulmonary artery systolic pressure is 42 mmHg - mild   pulmonary hypertension.  10. There is no evidence of pericardial effusion.          EXAMINATION:  General:  calm, NAD  Neuro:  somnolent, no FC, EO to noxious, moves L side, RLE wdrl, RUE 0/5.Cards:  S1S2 present  Respiratory:  no respiratory distress, intubated.  Abdomen:  soft  Extremities:  no edema  Skin:  warm/dry

## 2022-07-27 NOTE — PROGRESS NOTE ADULT - PROBLEM SELECTOR PLAN 2
c/w asa atorvastatin   Nor epi for b/p support   Hx of EF 10% and CVA will plan on long term anticoag when cleared by Neuro  Telemetry monitoring no atrial fibrillation       Cardiac meds  aspirin  chewable 81 milliGRAM(s) Oral daily  atorvastatin 80 milliGRAM(s) Oral daily  furosemide   Injectable 20 milliGRAM(s) IV Push daily  norepinephrine Infusion 0.05 MICROgram(s)/kG/Min (9.38 mL/Hr) IV Continuous <Continuous>

## 2022-07-27 NOTE — PROGRESS NOTE ADULT - SUBJECTIVE AND OBJECTIVE BOX
Neurology   NATANAEL ROWAN 76y Male     Patient is a 76y old  Male who presents with a chief complaint of stroke (2022 18:37)      HPI:  76M with PMH CABG, HTN, HLD who presents as transfer from [facility] for stroke. Last known well was last night 10pm prior to going to bed, woke up this morning around 0500 with R sided weakness and R facial droop. Presented to [facility], code stroke initiated, NIH at [facility] reportedly 8. CTA showed LM1 occlusion. Transferred to [facility] for possible neuro IR intervention. On arrival to [facility], NIH 6. Decision made to take patient directly to neuro IR for intervention.     Initial NIH 6, mRS 0    PMH: HTN (hypertension)    HLD (hyperlipidemia)    S/P CABG x 1         PSH:       FAMILY HISTORY:      SOCIAL HISTORY:  No history of tobacco or alcohol use     Allergies    No Known Allergies    Intolerances      Vital Signs Last 24 Hrs  T(C): 37.6 (2022 16:00), Max: 38.4 (2022 06:00)  T(F): 99.7 (2022 16:00), Max: 101.1 (2022 06:00)  HR: 90 (2022 16:00) (73 - 99)  BP: 126/65 (2022 16:00) (78/47 - 149/66)  BP(mean): 84 (2022 16:00) (53 - 109)  RR: 24 (2022 16:00) (16 - 28)  SpO2: 98% (2022 16:00) (89% - 100%)    Parameters below as of 2022 08:00  Patient On (Oxygen Delivery Method): ventilator    O2 Concentration (%): 50    MEDICATIONS    acetaminophen     Tablet .. 650 milliGRAM(s) Oral every 6 hours PRN  aspirin  chewable 81 milliGRAM(s) Oral daily  atorvastatin 80 milliGRAM(s) Oral daily  cefTRIAXone   IVPB      cefTRIAXone   IVPB 1000 milliGRAM(s) IV Intermittent every 24 hours  chlorhexidine 0.12% Liquid 15 milliLiter(s) Oral Mucosa every 12 hours  chlorhexidine 2% Cloths 1 Application(s) Topical daily  dexMEDEtomidine Infusion 0.2 MICROgram(s)/kG/Hr IV Continuous <Continuous>  dextrose 5%. 1000 milliLiter(s) IV Continuous <Continuous>  dextrose 5%. 1000 milliLiter(s) IV Continuous <Continuous>  dextrose 50% Injectable 25 Gram(s) IV Push once  dextrose Oral Gel 15 Gram(s) Oral once PRN  enoxaparin Injectable 40 milliGRAM(s) SubCutaneous every 24 hours  famotidine Injectable 20 milliGRAM(s) IV Push daily  fentaNYL    Injectable 25 MICROGram(s) IV Push every 4 hours PRN  furosemide   Injectable 20 milliGRAM(s) IV Push daily  glucagon  Injectable 1 milliGRAM(s) IntraMuscular once  hydrALAZINE Injectable 10 milliGRAM(s) IV Push every 2 hours PRN  insulin lispro (ADMELOG) corrective regimen sliding scale   SubCutaneous every 6 hours  labetalol Injectable 10 milliGRAM(s) IV Push every 2 hours PRN  norepinephrine Infusion 0.05 MICROgram(s)/kG/Min IV Continuous <Continuous>  ondansetron Injectable 4 milliGRAM(s) IV Push every 6 hours PRN  polyethylene glycol 3350 17 Gram(s) Oral daily  senna 2 Tablet(s) Oral at bedtime         LABS:  CBC Full  -  ( 2022 04:14 )  WBC Count : 11.66 K/uL  RBC Count : 4.47 M/uL  Hemoglobin : 14.0 g/dL  Hematocrit : 42.1 %  Platelet Count - Automated : 191 K/uL  Mean Cell Volume : 94.2 fl  Mean Cell Hemoglobin : 31.3 pg  Mean Cell Hemoglobin Concentration : 33.3 gm/dL  Auto Neutrophil # : x  Auto Lymphocyte # : x  Auto Monocyte # : x  Auto Eosinophil # : x  Auto Basophil # : x  Auto Neutrophil % : x  Auto Lymphocyte % : x  Auto Monocyte % : x  Auto Eosinophil % : x  Auto Basophil % : x    Urinalysis Basic - ( 2022 22:56 )    Color: Yellow / Appearance: Slightly Turbid / S.025 / pH: x  Gluc: x / Ketone: Negative  / Bili: Negative / Urobili: Negative   Blood: x / Protein: 100 / Nitrite: Positive   Leuk Esterase: Moderate / RBC: 6-10 /HPF / WBC 26-50 /HPF   Sq Epi: x / Non Sq Epi: Occasional / Bacteria: TNTC          139  |  100  |  31.3<H>  ----------------------------<  138<H>  4.1   |  27.0  |  0.71    Ca    8.9      2022 04:14  Phos  3.2     07-27  Mg     2.2     07-27        Hemoglobin A1C:     On Neurological Examination:  Patient is intubated and sedated for respiratory failure.  Sedation was briefly held for exam.  Mental Status - No Eye opening to noxious..    Cranial Nerves -Pupils are 2 and reactive. , EOMs are conjugate in primary gaze and on occulocephalics.   Cough reflex is present .  Motor Exam -   Right side -trace movement to noxious. Left UE localizes briskly and Left LE withdraws to noxious stim.         RADIOLOGY ( All neurological imaging studies were independently reviewed and interpreted by me)  [facility]   CTA  CTP    IMPRESSION:    CT PERFUSION:  The CT perfusion is technically limited by truncation of the arterial input function and venous outflow function.    Core infarct (CBF < 30%:): 0 ml  Penumbra (Tmax >6s): 4 mL  Mismatched volume: 0 ml  Mismatched ratio: None    Interpretation:  Based on CBF < 30%, there is no evidence of a core infarct. At CBF < 34%, a small perfusion defect of 4 mL is located in the left inferior frontal gyrus and corresponds to matched perfusion abnormality of Tmax >6 seconds. On Tmax > 4s, there is a much larger perfusion defect throughout the majority of the left frontoparietal convexity, which may represent an ischemic penumbra in the left MCA vascular territory.        CT ANGIOGRAPHY NECK:  1. Poor opacification of the cervical vasculature.  2. Suspicion for moderate greater than 50% stenosis in the proximal right internal carotid artery. Suspicion for moderate to severe stenosis in the proximal left internal carotid artery that may be greater than 70%. No evidence of ICA occlusion in the neck.  3. The right vertebral artery is grossly patent.. The V1 segment of the left vertebral artery cannot be visualized. The V2 and V3 segments the left vertebral artery are grossly patent with multiple stenoses.  4. There is limited visualization of the common carotid artery origins and subclavian artery origins. Underlying stenoses cannot be excluded. There is suspicion for a severe stenosis in the proximal left subclavian artery.      CT ANGIOGRAPHY BRAIN:  1. Poor opacification of the intracranial vasculature.  2. There appears to be a focal filling defect at the left MCA bifurcation with distal flow. There is marked attenuation of M2 branches of the left middle cerebral artery. Vessel density analysis of the MCA territory shows a greater than 50% reduction of vessel density in the left MCA territory compared to the contralateral side.  3. There are mild to moderate stenoses in the supraclinoid segments of the internal carotid arteries bilaterally, without occlusion.  4. There are stenoses in the intradural vertebral arteries bilaterally, without occlusion.    Repeat CTA or MRI/MRA is recommended for further evaluation.  Peak arterial opacification on the CT perfusion occurs at approximately 38 seconds. If the CTA is repeated, a long delay is required after contrast injection to achieve adequate opacification of the vasculature.    The findings were discussed with JACK Light in the emergency room on 2022 at 5:45 AM. Read back verification was obtained.      SEVERO CHANEL MD; Attending Radiologist  This document has been electronically signed    MRI:    MR Head No Cont (22 @ 20:58) >  IMPRESSION:    Acute left MCA territory infarcts with hemorrhagic transformation,   grossly stable since comparison CT.    Additional punctate acute infarct involving the medial left frontal lobe   in the SUGEY territory.    ORION ANDRADE MD; Attending Radiologist        TTE  TTE Echo Complete w/ Contrast w/ Doppler (22 @ 14:01) >    Summary:   1. Endocardial visualization was enhanced with intravenous echo contrast.   2. Severely enlarged left atrium.   3. Global diffuse akinesis. Left ventricular ejection fraction, by   visual estimation, is <10%.   4. Elevated mean left atrial pressure. (>30 mm Hg)   5. Normal right atrial size.   6. Mildly enlarged right ventricle. Moderately reduced RV systolic   function.   7. Mild mitral valve regurgitation.   8. Mild tricuspid regurgitation.   9. Estimated pulmonary artery systolic pressure is 42 mmHg - mild   pulmonary hypertension.  10. There is no evidence of pericardial effusion.  11. Team informed of the findings.    MD Millie, RPVI Electronically signed on 2022 at 3:49:14 PM

## 2022-07-27 NOTE — PROGRESS NOTE ADULT - ASSESSMENT
IMPRESSION:  - Left MCA Stroke.  S/P suction thrombectomy for L MCA M1 occlusion with TICI 2b reperfusion. on 7-24-22.    Mechanism ESUS  cardioembolic versus Large artery atherosclerosis    ASSESSMENT/ PLAN:     - Neuro checks and vital signs Q 2 hours.  - SBP goal keep normotensive.  - ASA 81 mg PO or 300 NH QD.   - Will eventually need anticoagulation .  - Lipitor for LDL goal of < 70 .  - Telemetry monitoring.  - CT/CTA/CTP -images and reports were reviewed.   - MRI Brain stroke protocol  -images and reports were reviewed. .  - TTE  -Reports as above were reviewed. . EF < 10%.  - Needs IMTIAZ  - Cardiology consultation appreciated..  - SCD/ SQ Lovenox for DVT prophylaxis.

## 2022-07-28 LAB
-  AMIKACIN: SIGNIFICANT CHANGE UP
-  AMOXICILLIN/CLAVULANIC ACID: SIGNIFICANT CHANGE UP
-  AMPICILLIN/SULBACTAM: SIGNIFICANT CHANGE UP
-  AMPICILLIN: SIGNIFICANT CHANGE UP
-  AZTREONAM: SIGNIFICANT CHANGE UP
-  CEFAZOLIN: SIGNIFICANT CHANGE UP
-  CEFEPIME: SIGNIFICANT CHANGE UP
-  CEFOXITIN: SIGNIFICANT CHANGE UP
-  CEFTRIAXONE: SIGNIFICANT CHANGE UP
-  CIPROFLOXACIN: SIGNIFICANT CHANGE UP
-  ERTAPENEM: SIGNIFICANT CHANGE UP
-  GENTAMICIN: SIGNIFICANT CHANGE UP
-  IMIPENEM: SIGNIFICANT CHANGE UP
-  LEVOFLOXACIN: SIGNIFICANT CHANGE UP
-  MEROPENEM: SIGNIFICANT CHANGE UP
-  NITROFURANTOIN: SIGNIFICANT CHANGE UP
-  PIPERACILLIN/TAZOBACTAM: SIGNIFICANT CHANGE UP
-  TIGECYCLINE: SIGNIFICANT CHANGE UP
-  TOBRAMYCIN: SIGNIFICANT CHANGE UP
-  TRIMETHOPRIM/SULFAMETHOXAZOLE: SIGNIFICANT CHANGE UP
ANION GAP SERPL CALC-SCNC: 11 MMOL/L — SIGNIFICANT CHANGE UP (ref 5–17)
BUN SERPL-MCNC: 45.8 MG/DL — HIGH (ref 8–20)
CALCIUM SERPL-MCNC: 9 MG/DL — SIGNIFICANT CHANGE UP (ref 8.4–10.5)
CHLORIDE SERPL-SCNC: 104 MMOL/L — SIGNIFICANT CHANGE UP (ref 98–107)
CO2 SERPL-SCNC: 29 MMOL/L — SIGNIFICANT CHANGE UP (ref 22–29)
CREAT SERPL-MCNC: 0.75 MG/DL — SIGNIFICANT CHANGE UP (ref 0.5–1.3)
CULTURE RESULTS: SIGNIFICANT CHANGE UP
CULTURE RESULTS: SIGNIFICANT CHANGE UP
EGFR: 94 ML/MIN/1.73M2 — SIGNIFICANT CHANGE UP
GAS PNL BLDA: SIGNIFICANT CHANGE UP
GLUCOSE BLDC GLUCOMTR-MCNC: 174 MG/DL — HIGH (ref 70–99)
GLUCOSE BLDC GLUCOMTR-MCNC: 177 MG/DL — HIGH (ref 70–99)
GLUCOSE BLDC GLUCOMTR-MCNC: 185 MG/DL — HIGH (ref 70–99)
GLUCOSE BLDC GLUCOMTR-MCNC: 187 MG/DL — HIGH (ref 70–99)
GLUCOSE SERPL-MCNC: 180 MG/DL — HIGH (ref 70–99)
HCT VFR BLD CALC: 41.6 % — SIGNIFICANT CHANGE UP (ref 39–50)
HGB BLD-MCNC: 13.8 G/DL — SIGNIFICANT CHANGE UP (ref 13–17)
MAGNESIUM SERPL-MCNC: 2.6 MG/DL — SIGNIFICANT CHANGE UP (ref 1.6–2.6)
MCHC RBC-ENTMCNC: 31.7 PG — SIGNIFICANT CHANGE UP (ref 27–34)
MCHC RBC-ENTMCNC: 33.2 GM/DL — SIGNIFICANT CHANGE UP (ref 32–36)
MCV RBC AUTO: 95.4 FL — SIGNIFICANT CHANGE UP (ref 80–100)
METHOD TYPE: SIGNIFICANT CHANGE UP
ORGANISM # SPEC MICROSCOPIC CNT: SIGNIFICANT CHANGE UP
ORGANISM # SPEC MICROSCOPIC CNT: SIGNIFICANT CHANGE UP
PHOSPHATE SERPL-MCNC: 3.3 MG/DL — SIGNIFICANT CHANGE UP (ref 2.4–4.7)
PLATELET # BLD AUTO: 219 K/UL — SIGNIFICANT CHANGE UP (ref 150–400)
POTASSIUM SERPL-MCNC: 4.3 MMOL/L — SIGNIFICANT CHANGE UP (ref 3.5–5.3)
POTASSIUM SERPL-SCNC: 4.3 MMOL/L — SIGNIFICANT CHANGE UP (ref 3.5–5.3)
RBC # BLD: 4.36 M/UL — SIGNIFICANT CHANGE UP (ref 4.2–5.8)
RBC # FLD: 15.4 % — HIGH (ref 10.3–14.5)
SARS-COV-2 RNA SPEC QL NAA+PROBE: SIGNIFICANT CHANGE UP
SODIUM SERPL-SCNC: 144 MMOL/L — SIGNIFICANT CHANGE UP (ref 135–145)
SPECIMEN SOURCE: SIGNIFICANT CHANGE UP
SPECIMEN SOURCE: SIGNIFICANT CHANGE UP
WBC # BLD: 10.51 K/UL — HIGH (ref 3.8–10.5)
WBC # FLD AUTO: 10.51 K/UL — HIGH (ref 3.8–10.5)

## 2022-07-28 PROCEDURE — 99232 SBSQ HOSP IP/OBS MODERATE 35: CPT

## 2022-07-28 PROCEDURE — 99233 SBSQ HOSP IP/OBS HIGH 50: CPT

## 2022-07-28 PROCEDURE — 93010 ELECTROCARDIOGRAM REPORT: CPT

## 2022-07-28 RX ORDER — ACETAMINOPHEN 500 MG
1000 TABLET ORAL ONCE
Refills: 0 | Status: COMPLETED | OUTPATIENT
Start: 2022-07-28 | End: 2022-07-28

## 2022-07-28 RX ORDER — CARVEDILOL PHOSPHATE 80 MG/1
6.25 CAPSULE, EXTENDED RELEASE ORAL EVERY 12 HOURS
Refills: 0 | Status: DISCONTINUED | OUTPATIENT
Start: 2022-07-28 | End: 2022-07-29

## 2022-07-28 RX ORDER — METOPROLOL TARTRATE 50 MG
5 TABLET ORAL ONCE
Refills: 0 | Status: COMPLETED | OUTPATIENT
Start: 2022-07-28 | End: 2022-07-28

## 2022-07-28 RX ADMIN — FAMOTIDINE 20 MILLIGRAM(S): 10 INJECTION INTRAVENOUS at 12:25

## 2022-07-28 RX ADMIN — Medication 2: at 12:30

## 2022-07-28 RX ADMIN — Medication 650 MILLIGRAM(S): at 06:44

## 2022-07-28 RX ADMIN — Medication 650 MILLIGRAM(S): at 13:25

## 2022-07-28 RX ADMIN — Medication 2: at 00:54

## 2022-07-28 RX ADMIN — Medication 2: at 17:32

## 2022-07-28 RX ADMIN — ENOXAPARIN SODIUM 40 MILLIGRAM(S): 100 INJECTION SUBCUTANEOUS at 21:46

## 2022-07-28 RX ADMIN — Medication 5 MILLIGRAM(S): at 12:25

## 2022-07-28 RX ADMIN — CARVEDILOL PHOSPHATE 6.25 MILLIGRAM(S): 80 CAPSULE, EXTENDED RELEASE ORAL at 17:31

## 2022-07-28 RX ADMIN — CEFTRIAXONE 100 MILLIGRAM(S): 500 INJECTION, POWDER, FOR SOLUTION INTRAMUSCULAR; INTRAVENOUS at 12:24

## 2022-07-28 RX ADMIN — Medication 2: at 05:36

## 2022-07-28 RX ADMIN — Medication 5 MILLIGRAM(S): at 21:46

## 2022-07-28 RX ADMIN — Medication 20 MILLIGRAM(S): at 05:37

## 2022-07-28 RX ADMIN — CHLORHEXIDINE GLUCONATE 1 APPLICATION(S): 213 SOLUTION TOPICAL at 12:45

## 2022-07-28 RX ADMIN — CHLORHEXIDINE GLUCONATE 15 MILLILITER(S): 213 SOLUTION TOPICAL at 17:32

## 2022-07-28 RX ADMIN — Medication 400 MILLIGRAM(S): at 18:48

## 2022-07-28 RX ADMIN — Medication 1000 MILLIGRAM(S): at 19:18

## 2022-07-28 RX ADMIN — Medication 650 MILLIGRAM(S): at 12:28

## 2022-07-28 RX ADMIN — ATORVASTATIN CALCIUM 80 MILLIGRAM(S): 80 TABLET, FILM COATED ORAL at 12:25

## 2022-07-28 RX ADMIN — Medication 650 MILLIGRAM(S): at 06:55

## 2022-07-28 RX ADMIN — CHLORHEXIDINE GLUCONATE 15 MILLILITER(S): 213 SOLUTION TOPICAL at 05:37

## 2022-07-28 RX ADMIN — Medication 81 MILLIGRAM(S): at 12:25

## 2022-07-28 NOTE — PROGRESS NOTE ADULT - PROBLEM SELECTOR PLAN 1
Plan for IMTIAZ tomorrow if afrebrile  Telemetry monitoring no atrial fibrillation  NPO after midnight

## 2022-07-28 NOTE — PROGRESS NOTE ADULT - PROBLEM SELECTOR PLAN 2
b/p soft for heart failure meds - restart when able  c/w asa atorvastatin   Nor epi titrated off.    Hx of EF 10% and CVA will plan on long term anticoag when cleared by Neuro

## 2022-07-28 NOTE — PROGRESS NOTE ADULT - ASSESSMENT
75 y/o male with hx of CAD s/p stents x2 (2004) and CABG x4 ([facility], 2014), ischemic cardiomyopathy (declined AICD), HTN, HLD, PVD s/p femoral endarterectomy who presented with right-sided weakness and facial droop to [facility] and was found to have acute stroke. Pt was transferred to [facility] for neuro IR intervention and underwent mechanical thrombectomy for left MCA occlusion. Post procedure pt developed acute hypoxic respiratory failure and was intubated. He is now sedated on propofol drip, not following commands but moving all extremities. EKG shows SR with 1st degree AVB and TWI in lateral leads, no previous EKG available. Telemetry shows SR with frequent multifocal PVCs. Echocardiogram reveals LVEF<10% and moderately reduced RV function. Troponin negative x2, proBNP 3029.

## 2022-07-28 NOTE — PROGRESS NOTE ADULT - SUBJECTIVE AND OBJECTIVE BOX
[facility]                                                         CARDIOLOGY AT [facility]                                                                  [address] [place] [place] [address]- [facility]                                                         Telephone: [phone]. Fax:[phone]                                                                             PROGRESS NOTE    Reason for follow up:  Stroke  Update:   Remains vented, febrile diaphoretic, Pressor currently being held due to stabilized bp.    Review of symptoms:   Unable to attain    Vitals:  T(C): 37.8 (07-28-22 @ 10:00), Max: 38.2 (07-27-22 @ 22:00)  HR: 105 (07-28-22 @ 10:00) (85 - 122)  BP: 151/74 (07-28-22 @ 10:00) (86/64 - 173/95)  RR: 26 (07-28-22 @ 10:00) (20 - 35)  SpO2: 97% (07-28-22 @ 10:00) (97% - 100%)  I&O's Summary    27 Jul 2022 07:01  -  28 Jul 2022 07:00  --------------------------------------------------------  IN: 1198.3 mL / OUT: 1555 mL / NET: -356.7 mL  28 Jul 2022 07:01  -  28 Jul 2022 10:57  --------------------------------------------------------  IN: 240 mL / OUT: 425 mL / NET: -185 mL  Weight (kg): 100 (07-24 @ 12:26)    PHYSICAL EXAM:  Appearance: obtunded, not following commands, VANESSA with passive ROM only  HEENT:  ET tube in place, dry oral mucosa  Neurologic: A & O x 0, no responding to tactile or verbal stimuli  Cardiovascular: irregular S1 S2, No murmur, no rubs/gallops. No JVD  Respiratory:  coarse bs,   Gastrointestinal:  Soft, Non-tender, + BS  Lower Extremities: + Peripheral Pulses, No clubbing, cyanosis, or edema  Psychiatry: Obtunded  Skin:  cool and wet    CURRENT CARDIAC MEDICATIONS:  furosemide   Injectable 20 milliGRAM(s) IV Push daily  norepinephrine Infusion 0.05 MICROgram(s)/kG/Min IV Continuous <Continuous - currently being held due to elevated bp 130/65    CURRENT OTHER MEDICATIONS:  cefTRIAXone   IVPB      cefTRIAXone   IVPB 1000 milliGRAM(s) IV Intermittent every 24 hours  acetaminophen     Tablet .. 650 milliGRAM(s) Oral every 6 hours PRN Temp greater or equal to 38C (100.4F), Mild Pain (1 - 3)  fentaNYL    Injectable 25 MICROGram(s) IV Push every 4 hours PRN agitation/vent synchrony  ondansetron Injectable 4 milliGRAM(s) IV Push every 6 hours PRN Nausea and/or Vomiting  famotidine Injectable 20 milliGRAM(s) IV Push daily  polyethylene glycol 3350 17 Gram(s) Oral daily  senna 2 Tablet(s) Oral at bedtime  atorvastatin 80 milliGRAM(s) Oral daily  insulin lispro (ADMELOG) corrective regimen sliding scale   SubCutaneous every 6 hours  aspirin  chewable 81 milliGRAM(s) Oral daily  chlorhexidine 0.12% Liquid 15 milliLiter(s) Oral Mucosa every 12 hours  chlorhexidine 2% Cloths 1 Application(s) Topical daily  enoxaparin Injectable 40 milliGRAM(s) SubCutaneous every 24 hours    LABS:	 	  ( 25 Jul 2022 03:51 )  Troponin T  <0.01,  CPK  82   , CKMB  X    , BNP X        , ( 24 Jul 2022 18:58 )  Troponin T  <0.01,  CPK  X    , CKMB  X    , BNP X        , ( 24 Jul 2022 10:27 )  Troponin T  <0.01,  CPK  X    , CKMB  X    , BNP 3029<H>                        13.8   10.51 )-----------( 219      ( 28 Jul 2022 04:09 )             41.6     07-28  144  |  104  |  45.8<H>  ----------------------------<  180<H>  4.3   |  29.0  |  0.75  Ca    9.0      28 Jul 2022 04:09  Phos  3.3     07-28  Mg     2.6     07-28    PT/INR/PTT ( 24 Jul 2022 15:45 )      13.3         :       26.7                  .        .                   .              .           .       1.14        .                                       Lipid Profile: Date: 07-25 @ 03:51  Total cholesterol 167; Direct LDL: --; HDL: 44; Triglycerides:132    TSH: Thyroid Stimulating Hormone, Serum: 0.91 uIU/mL    TELEMETRY:   Sr-st with frequent NSVT runs 3 to 4 beats, bigemini and multiple focal

## 2022-07-28 NOTE — PROGRESS NOTE ADULT - SUBJECTIVE AND OBJECTIVE BOX
HPI:  76M with PMH CABG, HTN, HLD who presents as [place] from [facility] for [place]. Last known well was last night 10pm prior to going to bed, woke up this morning around 0500 with R sided weakness and R facial droop. Presented to [facility], code stroke initiated, NIH at [facility] reportedly 8. CTA showed LM1 occlusion. Transferred to [facility] for possible neuro IR intervention. On arrival to [facility], NIH 6. Decision made to take patient directly to neuro IR for intervention.   NIH 6, mRS 0    NIH SS:  DATE: 7/24/22  TIME: 0710  1A: Level of consciousness (0-3): 0  1B: Questions (0-2): 0    1C: Commands (0-2): 0  2: Gaze (0-2): 0  3: Visual fields (0-3): 0  4: Facial palsy (0-3): 1  MOTOR:  5A: Left arm motor drift (0-4): 0  5B: Right arm motor drift (0-4): 1  6A: Left leg motor drift (0-4): 0  6B: Right leg motor drift (0-4): 1  7: Limb ataxia (0-2): 0  SENSORY:  8: Sensation (0-2): 1  SPEECH:  9: Language (0-3): 1  10: Dysarthria (0-2): 1  EXTINCTION:  11: Extinction/inattention (0-2): 0    TOTAL SCORE: 6 (24 Jul 2022 07:34)    No o/n events.  Evolving large L MCA stroke wit small heme transformation.    ICU Vital Signs Last 24 Hrs  T(C): 37.6 (28 Jul 2022 08:00), Max: 38.2 (27 Jul 2022 22:00)  T(F): 99.7 (28 Jul 2022 08:00), Max: 100.8 (27 Jul 2022 22:00)  HR: 105 (28 Jul 2022 08:00) (85 - 122)  BP: 129/73 (28 Jul 2022 08:00) (86/64 - 173/95)  BP(mean): 89 (28 Jul 2022 08:00) (64 - 118)  RR: 25 (28 Jul 2022 08:00) (19 - 35)  SpO2: 98% (28 Jul 2022 08:00) (97% - 100%)    O2 Parameters below as of 28 Jul 2022 08:00  Patient On (Oxygen Delivery Method): ventilator,C-pap    O2 Concentration (%): 40      27 Jul 2022 07:01  -  28 Jul 2022 07:00  --------------------------------------------------------  IN:    Dexmedetomidine: 61.2 mL    Enteral Tube Flush: 100 mL    Glucerna 1.5: 1020 mL    Norepinephrine: 17.1 mL  Total IN: 1198.3 mL    OUT:    Indwelling Catheter - Urethral (mL): 1555 mL  Total OUT: 1555 mL    Total NET: -356.7 mL      28 Jul 2022 07:01  -  28 Jul 2022 08:30  --------------------------------------------------------  IN:    Glucerna 1.5: 60 mL  Total IN: 60 mL    OUT:    Indwelling Catheter - Urethral (mL): 325 mL  Total OUT: 325 mL    Total NET: -265 mL    MEDICATIONS  (STANDING):  aspirin  chewable 81 milliGRAM(s) Oral daily  atorvastatin 80 milliGRAM(s) Oral daily  cefTRIAXone   IVPB      cefTRIAXone   IVPB 1000 milliGRAM(s) IV Intermittent every 24 hours  chlorhexidine 0.12% Liquid 15 milliLiter(s) Oral Mucosa every 12 hours  chlorhexidine 2% Cloths 1 Application(s) Topical daily  dextrose 5%. 1000 milliLiter(s) (50 mL/Hr) IV Continuous <Continuous>  dextrose 5%. 1000 milliLiter(s) (100 mL/Hr) IV Continuous <Continuous>  dextrose 50% Injectable 25 Gram(s) IV Push once  enoxaparin Injectable 40 milliGRAM(s) SubCutaneous every 24 hours  famotidine Injectable 20 milliGRAM(s) IV Push daily  furosemide   Injectable 20 milliGRAM(s) IV Push daily  glucagon  Injectable 1 milliGRAM(s) IntraMuscular once  insulin lispro (ADMELOG) corrective regimen sliding scale   SubCutaneous every 6 hours  norepinephrine Infusion 0.05 MICROgram(s)/kG/Min (9.38 mL/Hr) IV Continuous <Continuous>  polyethylene glycol 3350 17 Gram(s) Oral daily  senna 2 Tablet(s) Oral at bedtime    MEDICATIONS  (PRN):  acetaminophen     Tablet .. 650 milliGRAM(s) Oral every 6 hours PRN Temp greater or equal to 38C (100.4F), Mild Pain (1 - 3)  dextrose Oral Gel 15 Gram(s) Oral once PRN Blood Glucose LESS THAN 70 milliGRAM(s)/deciliter  fentaNYL    Injectable 25 MICROGram(s) IV Push every 4 hours PRN agitation/vent synchrony  ondansetron Injectable 4 milliGRAM(s) IV Push every 6 hours PRN Nausea and/or Vomiting          07-28    144  |  104  |  45.8<H>  ----------------------------<  180<H>  4.3   |  29.0  |  0.75    Ca    9.0      28 Jul 2022 04:09  Phos  3.3     07-28  Mg     2.6     07-28        07-27    139  |  100  |  31.3<H>  ----------------------------<  138<H>  4.1   |  27.0  |  0.71    Ca    8.9      27 Jul 2022 04:14  Phos  3.2     07-27  Mg     2.2     07-27                            13.8   10.51 )-----------( 219      ( 28 Jul 2022 04:09 )             41.6              CAPILLARY BLOOD GLUCOSE  POCT Blood Glucose.: 174 mg/dL (28 Jul 2022 05:20)  POCT Blood Glucose.: 187 mg/dL (27 Jul 2022 23:59)  POCT Blood Glucose.: 155 mg/dL (27 Jul 2022 17:37)  POCT Blood Glucose.: 160 mg/dL (27 Jul 2022 11:38)        Culture - Sputum (collected 25 Jul 2022 23:15)  Source: ET Tube ET Tube  Gram Stain (26 Jul 2022 14:53):    Moderate polymorphonuclear leukocytes per low power field    Rare Squamous epithelial cells per low power field    Rare Gram Positive Cocci in Pairs and Chains per oil power field    Rare Gram Positive Rods per oil power field    Culture - Blood (collected 25 Jul 2022 23:05)  Source: .Blood Blood-Peripheral  Preliminary Report (27 Jul 2022 03:01):    No growth to date.    Culture - Blood (collected 25 Jul 2022 23:05)  Source: .Blood Blood-Peripheral  Preliminary Report (27 Jul 2022 03:01):    No growth to date.      Culture - Sputum (collected 25 Jul 2022 23:15)  Source: ET Tube ET Tube  Gram Stain (26 Jul 2022 14:53):    Moderate polymorphonuclear leukocytes per low power field    Rare Squamous epithelial cells per low power field    Rare Gram Positive Cocci in Pairs and Chains per oil power field    Rare Gram Positive Rods per oil power field    Culture - Blood (collected 25 Jul 2022 23:05)  Source: .Blood Blood-Peripheral  Preliminary Report (27 Jul 2022 03:01):    No growth to date.    Culture - Blood (collected 25 Jul 2022 23:05)  Source: .Blood Blood-Peripheral  Preliminary Report (27 Jul 2022 03:01):    No growth to date.    US Renal (07.26.22 @ 23:01) >      INTERPRETATION:  CLINICAL INFORMATION: Acute kidney injury.    COMPARISON: None available.    TECHNIQUE: Sonography of the kidneys and bladder.    FINDINGS:  Right kidney: 9.9 cm. No renal mass, hydronephrosis or calculi.    Left kidney: 11.5 cm. No renal mass, hydronephrosis or calculi.    Urinary bladder: Within normal limits.    IMPRESSION:  No renal mass, hydronephrosis or calculus is visualized sonographically.      MR Head No Cont (07.26.22 @ 20:58) >  COMPARISON: CT head 7/26/2022.    FINDINGS:  Acute infarcts are seen throughout the left MCA territory with   involvement of the left precentral gyrus. Hemorrhagic transformation is   again seen within the left inferior frontal lobe and insula, grossly   stable since comparison study.    Additional acute punctate infarct is seen within the medial left frontal   lobe (4-31).    Trace hemorrhage noted within the bilateral occipital horns. No   extra-axial fluid collections. The skull base flow voids are present.    The visualized intraorbital contents are normal. Mucosal thickening with   air-fluid level in the left sphenoid sinus. The mastoid air cells are   clear. The visualized osseous structures, soft tissues and partially   visualized parotid glands appear normal.    IMPRESSION:    Acute left MCA territory infarcts with hemorrhagic transformation,   grossly stable since comparison CT.    Additional punctate acute infarct involving the medial left frontal lobe   in the SUGEY territory.      CT Head No Cont (07.26.22 @ 20:25) >  INTERPRETATION:  CLINICAL INDICATION: Left M1 occlusion status post TICI   2B recanalization, follow-up    5mm axial sections of the brain were obtained from base to vertex,   without the intravenous administration of contrast material. Coronal and   sagittal computer generated reconstructed views are available.    Comparison is made with the prior CT of 7/24/2022.    Mild atrophy is identified with ventricular and sulcal prominence. There   is more lucency identified in the left insular cortex and left frontal   cortex compared to the prior exam consistent with normal evolution of an   infarct in the left middle cerebral artery territory. There is asmall   amount of hemorrhage in the left frontal region and left anterior   temporal lobe which is new since the prior exam.    Impression: Large lucency in the left frontal temporal cortex in the left   middle cerebral artery distribution compared with 7/24/2022 consistent   with normal evolving acute left middle cerebral artery infarct. Small   amount of new hemorrhage.    Xray Chest 1 View- PORTABLE-Urgent (Xray Chest 1 View- PORTABLE-Urgent .) (07.24.22 @ 20:45) >  IMPRESSION:    ET tube and NG tube, in adequate position.    Cardiomegaly. Unchanged pulmonary vascular congestion. Mild hazy opacity   right midlung field. Underlying infiltrate not excluded.     TTE Echo Complete w/ Contrast w/ Doppler (07.24.22 @ 14:01) >  Summary:   1. Endocardial visualization was enhanced with intravenous echo contrast.   2. Severely enlarged left atrium.   3. Global diffuse akinesis. Left ventricular ejection fraction, by   visual estimation, is <10%.   4. Elevated mean left atrial pressure. (>30 mm Hg)   5. Normal right atrial size.   6. Mildly enlarged right ventricle. Moderately reduced RV systolic   function.   7. Mild mitral valve regurgitation.   8. Mild tricuspid regurgitation.   9. Estimated pulmonary artery systolic pressure is 42 mmHg - mild   pulmonary hypertension.  10. There is no evidence of pericardial effusion.          EXAMINATION:  General:  calm, NAD  Neuro:  somnolent, no FC, EO to noxious, moves L side, RLE wdrl, RUE 0/5.Cards:  S1S2 present  Respiratory:  no respiratory distress, intubated.  Abdomen:  soft  Extremities:  no edema  Skin:  warm/dry HPI:  76M with PMH CABG, HTN, HLD who presents as transfer from [facility] [place] [facility] for stroke. Last known well was last night 10pm prior to going to bed, woke up this morning around 0500 with R sided weakness and R facial droop. Presented to [facility], code stroke initiated, NIH at [facility] reportedly 8. CTA showed LM1 occlusion. Transferred to [facility] for possible neuro IR intervention. On arrival to [facility], NIH 6. Decision made to take patient directly to neuro IR for intervention.   NIH 6, mRS 0    NIH SS:  DATE: 7/24/22  TIME: 0710  1A: Level of consciousness (0-3): 0  1B: Questions (0-2): 0    1C: Commands (0-2): 0  2: Gaze (0-2): 0  3: Visual fields (0-3): 0  4: Facial palsy (0-3): 1  MOTOR:  5A: Left arm motor drift (0-4): 0  5B: Right arm motor drift (0-4): 1  6A: Left leg motor drift (0-4): 0  6B: Right leg motor drift (0-4): 1  7: Limb ataxia (0-2): 0  SENSORY:  8: Sensation (0-2): 1  SPEECH:  9: Language (0-3): 1  10: Dysarthria (0-2): 1  EXTINCTION:  11: Extinction/inattention (0-2): 0    TOTAL SCORE: 6 (24 Jul 2022 07:34)    No o/n events.  Evolving large L MCA stroke wit small heme transformation.    ICU Vital Signs Last 24 Hrs  T(C): 37.6 (28 Jul 2022 08:00), Max: 38.2 (27 Jul 2022 22:00)  T(F): 99.7 (28 Jul 2022 08:00), Max: 100.8 (27 Jul 2022 22:00)  HR: 105 (28 Jul 2022 08:00) (85 - 122)  BP: 129/73 (28 Jul 2022 08:00) (86/64 - 173/95)  BP(mean): 89 (28 Jul 2022 08:00) (64 - 118)  RR: 25 (28 Jul 2022 08:00) (19 - 35)  SpO2: 98% (28 Jul 2022 08:00) (97% - 100%)    O2 Parameters below as of 28 Jul 2022 08:00  Patient On (Oxygen Delivery Method): ventilator,C-pap    O2 Concentration (%): 40      27 Jul 2022 07:01  -  28 Jul 2022 07:00  --------------------------------------------------------  IN:    Dexmedetomidine: 61.2 mL    Enteral Tube Flush: 100 mL    Glucerna 1.5: 1020 mL    Norepinephrine: 17.1 mL  Total IN: 1198.3 mL    OUT:    Indwelling Catheter - Urethral (mL): 1555 mL  Total OUT: 1555 mL    Total NET: -356.7 mL      28 Jul 2022 07:01  -  28 Jul 2022 08:30  --------------------------------------------------------  IN:    Glucerna 1.5: 60 mL  Total IN: 60 mL    OUT:    Indwelling Catheter - Urethral (mL): 325 mL  Total OUT: 325 mL    Total NET: -265 mL    MEDICATIONS  (STANDING):  aspirin  chewable 81 milliGRAM(s) Oral daily  atorvastatin 80 milliGRAM(s) Oral daily  cefTRIAXone   IVPB      cefTRIAXone   IVPB 1000 milliGRAM(s) IV Intermittent every 24 hours  chlorhexidine 0.12% Liquid 15 milliLiter(s) Oral Mucosa every 12 hours  chlorhexidine 2% Cloths 1 Application(s) Topical daily  dextrose 5%. 1000 milliLiter(s) (50 mL/Hr) IV Continuous <Continuous>  dextrose 5%. 1000 milliLiter(s) (100 mL/Hr) IV Continuous <Continuous>  dextrose 50% Injectable 25 Gram(s) IV Push once  enoxaparin Injectable 40 milliGRAM(s) SubCutaneous every 24 hours  famotidine Injectable 20 milliGRAM(s) IV Push daily  furosemide   Injectable 20 milliGRAM(s) IV Push daily  glucagon  Injectable 1 milliGRAM(s) IntraMuscular once  insulin lispro (ADMELOG) corrective regimen sliding scale   SubCutaneous every 6 hours  norepinephrine Infusion 0.05 MICROgram(s)/kG/Min (9.38 mL/Hr) IV Continuous <Continuous>  polyethylene glycol 3350 17 Gram(s) Oral daily  senna 2 Tablet(s) Oral at bedtime    MEDICATIONS  (PRN):  acetaminophen     Tablet .. 650 milliGRAM(s) Oral every 6 hours PRN Temp greater or equal to 38C (100.4F), Mild Pain (1 - 3)  dextrose Oral Gel 15 Gram(s) Oral once PRN Blood Glucose LESS THAN 70 milliGRAM(s)/deciliter  fentaNYL    Injectable 25 MICROGram(s) IV Push every 4 hours PRN agitation/vent synchrony  ondansetron Injectable 4 milliGRAM(s) IV Push every 6 hours PRN Nausea and/or Vomiting          07-28    144  |  104  |  45.8<H>  ----------------------------<  180<H>  4.3   |  29.0  |  0.75    Ca    9.0      28 Jul 2022 04:09  Phos  3.3     07-28  Mg     2.6     07-28    ABG - ( 28 Jul 2022 08:57 )  pH, Arterial: 7.510 pH, Blood: x     /  pCO2: 42    /  pO2: 141   / HCO3: 34    / Base Excess: 10.5  /  SaO2: 97.2        07-27    139  |  100  |  31.3<H>  ----------------------------<  138<H>  4.1   |  27.0  |  0.71    Ca    8.9      27 Jul 2022 04:14  Phos  3.2     07-27  Mg     2.2     07-27                            13.8   10.51 )-----------( 219      ( 28 Jul 2022 04:09 )             41.6              CAPILLARY BLOOD GLUCOSE  POCT Blood Glucose.: 174 mg/dL (28 Jul 2022 05:20)  POCT Blood Glucose.: 187 mg/dL (27 Jul 2022 23:59)  POCT Blood Glucose.: 155 mg/dL (27 Jul 2022 17:37)  POCT Blood Glucose.: 160 mg/dL (27 Jul 2022 11:38)      Culture - Urine (collected 25 Jul 2022 23:44)  Source: Clean Catch Clean Catch (Midstream)  Preliminary Report (27 Jul 2022 11:52):    >100,000 CFU/ml Escherichia coli    Culture - Sputum (collected 25 Jul 2022 23:15)  Source: ET Tube ET Tube  Gram Stain (26 Jul 2022 14:53):    Moderate polymorphonuclear leukocytes per low power field    Rare Squamous epithelial cells per low power field    Rare Gram Positive Cocci in Pairs and Chains per oil power field    Rare Gram Positive Rods per oil power field  Final Report (28 Jul 2022 10:15):    Normal Respiratory Margo present    Culture - Blood (collected 25 Jul 2022 23:05)  Source: .Blood Blood-Peripheral  Preliminary Report (27 Jul 2022 03:01):    No growth to date.    Culture - Blood (collected 25 Jul 2022 23:05)  Source: .Blood Blood-Peripheral  Preliminary Report (27 Jul 2022 03:01):    No growth to date.      US Renal (07.26.22 @ 23:01) >      INTERPRETATION:  CLINICAL INFORMATION: Acute kidney injury.    COMPARISON: None available.    TECHNIQUE: Sonography of the kidneys and bladder.    FINDINGS:  Right kidney: 9.9 cm. No renal mass, hydronephrosis or calculi.    Left kidney: 11.5 cm. No renal mass, hydronephrosis or calculi.    Urinary bladder: Within normal limits.    IMPRESSION:  No renal mass, hydronephrosis or calculus is visualized sonographically.      MR Head No Cont (07.26.22 @ 20:58) >  COMPARISON: CT head 7/26/2022.    FINDINGS:  Acute infarcts are seen throughout the left MCA territory with   involvement of the left precentral gyrus. Hemorrhagic transformation is   again seen within the left inferior frontal lobe and insula, grossly   stable since comparison study.    Additional acute punctate infarct is seen within the medial left frontal   lobe (4-31).    Trace hemorrhage noted within the bilateral occipital horns. No   extra-axial fluid collections. The skull base flow voids are present.    The visualized intraorbital contents are normal. Mucosal thickening with   air-fluid level in the left sphenoid sinus. The mastoid air cells are   clear. The visualized osseous structures, soft tissues and partially   visualized parotid glands appear normal.    IMPRESSION:    Acute left MCA territory infarcts with hemorrhagic transformation,   grossly stable since comparison CT.    Additional punctate acute infarct involving the medial left frontal lobe   in the SUGEY territory.      CT Head No Cont (07.26.22 @ 20:25) >  INTERPRETATION:  CLINICAL INDICATION: Left M1 occlusion status post TICI   2B recanalization, follow-up    5mm axial sections of the brain were obtained from base to vertex,   without the intravenous administration of contrast material. Coronal and   sagittal computer generated reconstructed views are available.    Comparison is made with the prior CT of 7/24/2022.    Mild atrophy is identified with ventricular and sulcal prominence. There   is more lucency identified in the left insular cortex and left frontal   cortex compared to the prior exam consistent with normal evolution of an   infarct in the left middle cerebral artery territory. There is asmall   amount of hemorrhage in the left frontal region and left anterior   temporal lobe which is new since the prior exam.    Impression: Large lucency in the left frontal temporal cortex in the left   middle cerebral artery distribution compared with 7/24/2022 consistent   with normal evolving acute left middle cerebral artery infarct. Small   amount of new hemorrhage.    Xray Chest 1 View- PORTABLE-Urgent (Xray Chest 1 View- PORTABLE-Urgent .) (07.24.22 @ 20:45) >  IMPRESSION:    ET tube and NG tube, in adequate position.    Cardiomegaly. Unchanged pulmonary vascular congestion. Mild hazy opacity   right midlung field. Underlying infiltrate not excluded.     TTE Echo Complete w/ Contrast w/ Doppler (07.24.22 @ 14:01) >  Summary:   1. Endocardial visualization was enhanced with intravenous echo contrast.   2. Severely enlarged left atrium.   3. Global diffuse akinesis. Left ventricular ejection fraction, by   visual estimation, is <10%.   4. Elevated mean left atrial pressure. (>30 mm Hg)   5. Normal right atrial size.   6. Mildly enlarged right ventricle. Moderately reduced RV systolic   function.   7. Mild mitral valve regurgitation.   8. Mild tricuspid regurgitation.   9. Estimated pulmonary artery systolic pressure is 42 mmHg - mild   pulmonary hypertension.  10. There is no evidence of pericardial effusion.          EXAMINATION:  General:  calm, NAD  Neuro:  somnolent, no FC,  attempts EO to noxious, moves L side, RLE wdrl, RUE 0/5.Cards:  S1S2 present  Respiratory:  no respiratory distress, intubated.  Abdomen:  soft  Extremities:  no edema  Skin:  warm/dry

## 2022-07-28 NOTE — ADVANCED PRACTICE NURSE CONSULT - ASSESSMENT
Received patient laying supine in bed, turned to left (AirTAP body positioning system with wedges in place), HOB elevated 30 degrees. Pt intubated and sedated, total care with ADLs. Patient bedbound, very limited mobility in bed. 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 Ordered for NPO with tube feed (Jevity 1.5) diet, current Blaine score of 11. With assistance, patient turned patient to side for skin assessment.  Patient with large semi-formed fecal incontinence episode at time of assessment.  Patient cleaned with primary RN and NA present. Skin assessment as below:    Patient found to have Moisture Associated Skin Damage (MASD) to the intergluteal fold.  B/L buttock and gluteal cleft with evoling partial-thickness injury with noted butterfly pattern or kissing lesions on Left and Right Buttocks due to IAD and Intertriginous Dermatitis (ITD).      ·	Coccyx into B/L Upper Buttocks - DTI with blistering to center (7.7x5.5cm) - persistent purple intact discoloration, irregular diffuse borders, boggy, warm, with intact scattered blister formation to center at gluteal cleft.  Periwound skin moist, boggy, blanching, intact

## 2022-07-28 NOTE — PROGRESS NOTE ADULT - ASSESSMENT
76M with LM1 occlusion, s/p TICI 2B MT, no tPA as wake-up stroke.  Large evolving L MCA stroke with small area of hemorrhage.  Acute on chronic HFrEF, LVHF <10%, reduced RV syst function, 1st degree AVbl. ?Component of neurogenic CMP.  Acute hypoxemic  resp failure, intubated.  PMH of CAD/CABG/HFrEF ( lipitor , coreg , lisinopril ), HTN, HLD.  UTI.    Plan:  neurochecks q1hr  sedation with Prop ggt, switch to Precedex   ASA, statin  ABG in am   Check EEG  Cardiology involved; cardiology will wait when patient afebrile   Pull back ETT 1.5 cm   Levo PRN to maintain MAP >65, may require addition of Milrinone if increasing Levo requirements  gentle diuresis, maintain euvolemia to -500ml, monitor renal function, lactate,   GI prophylaxsis - pepcid  vent support  monitor e-lytes  SCDs, SQL   S/P fever- Ceftriaxone IV for UTI,   Renal ultrasound- no mass, no calculi  DVT prophylaxsis     76M with LM1 occlusion, s/p TICI 2B MT, no tPA as wake-up stroke.  Large evolving L MCA stroke with small area of hemorrhage.  Acute on chronic HFrEF, LVHF <10%, reduced RV syst function, 1st degree AVbl. ?Component of neurogenic CMP.  Acute hypoxemic  resp failure, intubated.  PMH of CAD/CABG/HFrEF ( lipitor , coreg , lisinopril ), HTN, HLD.  Ischemic Cardiomyopathy  UTI.    Plan:  neurochecks q1hr  Off sedation n, fentanyl prn   CHF  - Coreg 6.25mg  12 and vasotec 5mg q12  ASA, statin  IMTIAZ on Fri / NPO after MN   Off Levo  Check EKG  Check EEG  Cardiology involved; cardiology will wait when patient afebrile   Pull back ETT 1.5 cm   Hold  diuresis, maintain euvolemia to -500ml, monitor renal function, lactate,   GI prophylaxsis - pepcid  vent support- CPAP 5/10- ABG noted - Met alkalosis   monitor e-lytes  SCDs, SQL   S/P fever- Ceftriaxone IV for UTI- F/U susceptibility , W/U - Blood < CXR  - non significant   Renal ultrasound- no mass, no calculi  DVT prophylaxsis

## 2022-07-28 NOTE — ADVANCED PRACTICE NURSE CONSULT - RECOMMEDATIONS
·	Coccyx - Cavilon (liquid skin barrier), Cover center of wound with Aquacel or Adaptic, cover with Allevyn foam dressing (DO NOT PLACE ALLEVYN DIRECTLY ONTO SKIN).    ·	Turn and Reposition Q2H,   ·	Offload sacral-coccygeal area with positioner pillow, alternate sides Q2H,   ·	Incontinence care with repositioning Q2H    -Continue turning/positioning patient from side-to-side q2h while in bed, q1h when/if OOB chair, or in accordance w/ pt's plan of care. Utilize pillows and/or Spry positioner pillow to assist w/ turning/positioning. When/if OOB chair, utilize pillows or chair cushion to offload pressure.   -Continue to offload heels from bed surface with soft pillow under calfs or by applying offloading boots to BLEs.   -Continue applying Coloplast Gudelia Protect moisture barrier cream to buttock and perineal area daily and prn after each incontinent episode.    -Continue utilizing one underpad underneath patient to contain incontinence episodes; change pad when saturated/soiled.   -Consider utilizing condom catheter (if patient candidate) to contain any urinary incontinence.   -Consider utilizing external fecal  (if patient candidate) or fecal management system/rectal tube/DigniShield (if patient candidate; MD order required) to contain loose fecal incontinence.   -Continue nutrition consult for optimal wound healing & nutritional status.   -Assess skin/wound qshift, report changes to primary provider.     Plan of Care: Primary RN made aware of above recs. Spoke w/ covering MD Jaron Archuleta in regards to above. No further needs/recs from [facility] service at this time. Staff RN to perform routine skin/wound assessment and manage wound care. Questions or concerns or if wound worsens and reconsult needed, please contact [facility]

## 2022-07-28 NOTE — PROGRESS NOTE ADULT - SUBJECTIVE AND OBJECTIVE BOX
Neurology   NATANAEL ROWAN 76y Male     Patient is a 76y old  Male who presents with a chief complaint of stroke (25 Jul 2022 18:37)      HPI:  76M with PMH CABG, HTN, HLD who presents as transfer from [facility] for stroke. Last known well was last night 10pm prior to going to bed, woke up this morning around 0500 with R sided weakness and R facial droop. Presented to [facility], code stroke initiated, NIH at [facility] reportedly 8. CTA showed LM1 occlusion. Transferred to [facility] for possible neuro IR intervention. On arrival to [facility], NIH 6. Decision made to take patient directly to neuro IR for intervention.     Initial NIH 6, mRS 0    PMH: HTN (hypertension)    HLD (hyperlipidemia)    S/P CABG x 1         PSH:       FAMILY HISTORY:      SOCIAL HISTORY:  No history of tobacco or alcohol use     Allergies    No Known Allergies    Intolerances      Vital Signs Last 24 Hrs  T(C): 38.1 (28 Jul 2022 22:00), Max: 38.6 (28 Jul 2022 13:00)  T(F): 100.6 (28 Jul 2022 22:00), Max: 101.5 (28 Jul 2022 13:00)  HR: 96 (28 Jul 2022 22:00) (92 - 122)  BP: 132/70 (28 Jul 2022 22:00) (120/61 - 173/95)  BP(mean): 84 (28 Jul 2022 22:00) (71 - 118)  RR: 29 (28 Jul 2022 22:00) (20 - 35)  SpO2: 97% (28 Jul 2022 22:00) (95% - 100%)    Parameters below as of 28 Jul 2022 20:40  Patient On (Oxygen Delivery Method): ventilator    O2 Concentration (%): 40    MEDICATIONS    acetaminophen     Tablet .. 650 milliGRAM(s) Oral every 6 hours PRN  aspirin  chewable 81 milliGRAM(s) Oral daily  atorvastatin 80 milliGRAM(s) Oral daily  carvedilol 6.25 milliGRAM(s) Oral every 12 hours  cefTRIAXone   IVPB      cefTRIAXone   IVPB 1000 milliGRAM(s) IV Intermittent every 24 hours  chlorhexidine 0.12% Liquid 15 milliLiter(s) Oral Mucosa every 12 hours  chlorhexidine 2% Cloths 1 Application(s) Topical daily  dextrose 5%. 1000 milliLiter(s) IV Continuous <Continuous>  dextrose 5%. 1000 milliLiter(s) IV Continuous <Continuous>  dextrose 50% Injectable 25 Gram(s) IV Push once  dextrose Oral Gel 15 Gram(s) Oral once PRN  enalapril 5 milliGRAM(s) Oral <User Schedule>  enoxaparin Injectable 40 milliGRAM(s) SubCutaneous every 24 hours  famotidine Injectable 20 milliGRAM(s) IV Push daily  fentaNYL    Injectable 25 MICROGram(s) IV Push every 4 hours PRN  furosemide   Injectable 20 milliGRAM(s) IV Push daily  glucagon  Injectable 1 milliGRAM(s) IntraMuscular once  insulin lispro (ADMELOG) corrective regimen sliding scale   SubCutaneous every 6 hours  ondansetron Injectable 4 milliGRAM(s) IV Push every 6 hours PRN  polyethylene glycol 3350 17 Gram(s) Oral daily  senna 2 Tablet(s) Oral at bedtime         LABS:  CBC Full  -  ( 28 Jul 2022 04:09 )  WBC Count : 10.51 K/uL  RBC Count : 4.36 M/uL  Hemoglobin : 13.8 g/dL  Hematocrit : 41.6 %  Platelet Count - Automated : 219 K/uL  Mean Cell Volume : 95.4 fl  Mean Cell Hemoglobin : 31.7 pg  Mean Cell Hemoglobin Concentration : 33.2 gm/dL  Auto Neutrophil # : x  Auto Lymphocyte # : x  Auto Monocyte # : x  Auto Eosinophil # : x  Auto Basophil # : x  Auto Neutrophil % : x  Auto Lymphocyte % : x  Auto Monocyte % : x  Auto Eosinophil % : x  Auto Basophil % : x      07-28    144  |  104  |  45.8<H>  ----------------------------<  180<H>  4.3   |  29.0  |  0.75    Ca    9.0      28 Jul 2022 04:09  Phos  3.3     07-28  Mg     2.6     07-28      On Neurological Examination:  Patient is intubated and sedated for respiratory failure.  Sedation was briefly held for exam.  Mental Status - No Eye opening to noxious..    Cranial Nerves -Pupils are 2 and reactive. , EOMs are conjugate in primary gaze and on occulocephalics.   Cough reflex is present .  Motor Exam -   Right side -trace movement to noxious. Left UE localizes briskly and Left LE withdraws to noxious stim.         RADIOLOGY ( All neurological imaging studies were independently reviewed and interpreted by me)  CTH   CTA  CTP    IMPRESSION:    CT PERFUSION:  The CT perfusion is technically limited by truncation of the arterial input function and venous outflow function.    Core infarct (CBF < 30%:): 0 ml  Penumbra (Tmax >6s): 4 mL  Mismatched volume: 0 ml  Mismatched ratio: None    Interpretation:  Based on CBF < 30%, there is no evidence of a core infarct. At CBF < 34%, a small perfusion defect of 4 mL is located in the left inferior frontal gyrus and corresponds to matched perfusion abnormality of Tmax >6 seconds. On Tmax > 4s, there is a much larger perfusion defect throughout the majority of the left frontoparietal convexity, which may represent an ischemic penumbra in the left MCA vascular territory.        CT ANGIOGRAPHY NECK:  1. Poor opacification of the cervical vasculature.  2. Suspicion for moderate greater than 50% stenosis in the proximal right internal carotid artery. Suspicion for moderate to severe stenosis in the proximal left internal carotid artery that may be greater than 70%. No evidence of ICA occlusion in the neck.  3. The right vertebral artery is grossly patent.. The V1 segment of the left vertebral artery cannot be visualized. The V2 and V3 segments the left vertebral artery are grossly patent with multiple stenoses.  4. There is limited visualization of the common carotid artery origins and subclavian artery origins. Underlying stenoses cannot be excluded. There is suspicion for a severe stenosis in the proximal left subclavian artery.      CT ANGIOGRAPHY BRAIN:  1. Poor opacification of the intracranial vasculature.  2. There appears to be a focal filling defect at the left MCA bifurcation with distal flow. There is marked attenuation of M2 branches of the left middle cerebral artery. Vessel density analysis of the MCA territory shows a greater than 50% reduction of vessel density in the left MCA territory compared to the contralateral side.  3. There are mild to moderate stenoses in the supraclinoid segments of the internal carotid arteries bilaterally, without occlusion.  4. There are stenoses in the intradural vertebral arteries bilaterally, without occlusion.    Repeat CTA or MRI/MRA is recommended for further evaluation.  Peak arterial opacification on the CT perfusion occurs at approximately 38 seconds. If the CTA is repeated, a long delay is required after contrast injection to achieve adequate opacification of the vasculature.    The findings were discussed with JACK Light in the emergency room on 07/24/2022 at 5:45 AM. Read back verification was obtained.      SEVERO CHANEL MD; Attending Radiologist  This document has been electronically signed    MRI:    MR Head No Cont (07.26.22 @ 20:58) >  IMPRESSION:    Acute left MCA territory infarcts with hemorrhagic transformation,   grossly stable since comparison CT.    Additional punctate acute infarct involving the medial left frontal lobe   in the SUGEY territory.    ORION ANDRADE MD; Attending Radiologist        TTE  TTE Echo Complete w/ Contrast w/ Doppler (07.24.22 @ 14:01) >    Summary:   1. Endocardial visualization was enhanced with intravenous echo contrast.   2. Severely enlarged left atrium.   3. Global diffuse akinesis. Left ventricular ejection fraction, by   visual estimation, is <10%.   4. Elevated mean left atrial pressure. (>30 mm Hg)   5. Normal right atrial size.   6. Mildly enlarged right ventricle. Moderately reduced RV systolic   function.   7. Mild mitral valve regurgitation.   8. Mild tricuspid regurgitation.   9. Estimated pulmonary artery systolic pressure is 42 mmHg - mild   pulmonary hypertension.  10. There is no evidence of pericardial effusion.  11. Team informed of the findings.    MD Millie, RPVI Electronically signed on 7/24/2022 at 3:49:14 PM

## 2022-07-28 NOTE — PROGRESS NOTE ADULT - ASSESSMENT
IMPRESSION:  - Left MCA Stroke.  S/P suction thrombectomy for L MCA M1 occlusion with TICI 2b reperfusion. on 7-24-22.    Mechanism ESUS  cardioembolic versus Large artery atherosclerosis    ASSESSMENT/ PLAN:     - Neuro checks and vital signs Q 2 hours.  - SBP goal keep normotensive.  - ASA 81 mg PO or 300 AK QD.   - Will repeat CT head on 8-1-22 and if stable will clear him for anticoagulation. .  - Lipitor for LDL goal of < 70 .  - Telemetry monitoring.  - CT/CTA/CTP -images and reports were reviewed.   - MRI Brain stroke protocol  -images and reports were reviewed. .  - TTE  -Reports as above were reviewed. . EF < 10%.  - Needs IMTIAZ  - Cardiology consultation appreciated..  - SCD/ SQ Lovenox for DVT prophylaxis.

## 2022-07-29 LAB
ALBUMIN SERPL ELPH-MCNC: 3.3 G/DL — SIGNIFICANT CHANGE UP (ref 3.3–5.2)
ALP SERPL-CCNC: 203 U/L — HIGH (ref 40–120)
ALT FLD-CCNC: 89 U/L — HIGH
ANION GAP SERPL CALC-SCNC: 10 MMOL/L — SIGNIFICANT CHANGE UP (ref 5–17)
AST SERPL-CCNC: 126 U/L — HIGH
BASOPHILS # BLD AUTO: 0.04 K/UL — SIGNIFICANT CHANGE UP (ref 0–0.2)
BASOPHILS NFR BLD AUTO: 0.3 % — SIGNIFICANT CHANGE UP (ref 0–2)
BILIRUB DIRECT SERPL-MCNC: 0.3 MG/DL — SIGNIFICANT CHANGE UP (ref 0–0.3)
BILIRUB INDIRECT FLD-MCNC: 0.4 MG/DL — SIGNIFICANT CHANGE UP (ref 0.2–1)
BILIRUB SERPL-MCNC: 0.8 MG/DL — SIGNIFICANT CHANGE UP (ref 0.4–2)
BUN SERPL-MCNC: 53.4 MG/DL — HIGH (ref 8–20)
CALCIUM SERPL-MCNC: 9.1 MG/DL — SIGNIFICANT CHANGE UP (ref 8.4–10.5)
CHLORIDE SERPL-SCNC: 110 MMOL/L — HIGH (ref 98–107)
CO2 SERPL-SCNC: 32 MMOL/L — HIGH (ref 22–29)
CREAT SERPL-MCNC: 0.84 MG/DL — SIGNIFICANT CHANGE UP (ref 0.5–1.3)
EGFR: 90 ML/MIN/1.73M2 — SIGNIFICANT CHANGE UP
EOSINOPHIL # BLD AUTO: 0.04 K/UL — SIGNIFICANT CHANGE UP (ref 0–0.5)
EOSINOPHIL NFR BLD AUTO: 0.3 % — SIGNIFICANT CHANGE UP (ref 0–6)
GLUCOSE BLDC GLUCOMTR-MCNC: 144 MG/DL — HIGH (ref 70–99)
GLUCOSE BLDC GLUCOMTR-MCNC: 156 MG/DL — HIGH (ref 70–99)
GLUCOSE BLDC GLUCOMTR-MCNC: 174 MG/DL — HIGH (ref 70–99)
GLUCOSE BLDC GLUCOMTR-MCNC: 201 MG/DL — HIGH (ref 70–99)
GLUCOSE SERPL-MCNC: 172 MG/DL — HIGH (ref 70–99)
HCT VFR BLD CALC: 41 % — SIGNIFICANT CHANGE UP (ref 39–50)
HCT VFR BLD CALC: 43.6 % — SIGNIFICANT CHANGE UP (ref 39–50)
HGB BLD-MCNC: 13.1 G/DL — SIGNIFICANT CHANGE UP (ref 13–17)
HGB BLD-MCNC: 14.2 G/DL — SIGNIFICANT CHANGE UP (ref 13–17)
IMM GRANULOCYTES NFR BLD AUTO: 0.8 % — SIGNIFICANT CHANGE UP (ref 0–1.5)
LIDOCAIN IGE QN: 32 U/L — SIGNIFICANT CHANGE UP (ref 22–51)
LYMPHOCYTES # BLD AUTO: 0.85 K/UL — LOW (ref 1–3.3)
LYMPHOCYTES # BLD AUTO: 6.7 % — LOW (ref 13–44)
MAGNESIUM SERPL-MCNC: 3.2 MG/DL — HIGH (ref 1.6–2.6)
MCHC RBC-ENTMCNC: 31.6 PG — SIGNIFICANT CHANGE UP (ref 27–34)
MCHC RBC-ENTMCNC: 31.6 PG — SIGNIFICANT CHANGE UP (ref 27–34)
MCHC RBC-ENTMCNC: 32 GM/DL — SIGNIFICANT CHANGE UP (ref 32–36)
MCHC RBC-ENTMCNC: 32.6 GM/DL — SIGNIFICANT CHANGE UP (ref 32–36)
MCV RBC AUTO: 96.9 FL — SIGNIFICANT CHANGE UP (ref 80–100)
MCV RBC AUTO: 98.8 FL — SIGNIFICANT CHANGE UP (ref 80–100)
MONOCYTES # BLD AUTO: 1.23 K/UL — HIGH (ref 0–0.9)
MONOCYTES NFR BLD AUTO: 9.7 % — SIGNIFICANT CHANGE UP (ref 2–14)
NEUTROPHILS # BLD AUTO: 10.47 K/UL — HIGH (ref 1.8–7.4)
NEUTROPHILS NFR BLD AUTO: 82.2 % — HIGH (ref 43–77)
PHOSPHATE SERPL-MCNC: 4 MG/DL — SIGNIFICANT CHANGE UP (ref 2.4–4.7)
PLATELET # BLD AUTO: 201 K/UL — SIGNIFICANT CHANGE UP (ref 150–400)
PLATELET # BLD AUTO: 245 K/UL — SIGNIFICANT CHANGE UP (ref 150–400)
POTASSIUM SERPL-MCNC: 4.6 MMOL/L — SIGNIFICANT CHANGE UP (ref 3.5–5.3)
POTASSIUM SERPL-SCNC: 4.6 MMOL/L — SIGNIFICANT CHANGE UP (ref 3.5–5.3)
PROCALCITONIN SERPL-MCNC: 0.79 NG/ML — HIGH (ref 0.02–0.1)
PROT SERPL-MCNC: 8.2 G/DL — SIGNIFICANT CHANGE UP (ref 6.6–8.7)
RBC # BLD: 4.15 M/UL — LOW (ref 4.2–5.8)
RBC # BLD: 4.5 M/UL — SIGNIFICANT CHANGE UP (ref 4.2–5.8)
RBC # FLD: 15.7 % — HIGH (ref 10.3–14.5)
RBC # FLD: 16 % — HIGH (ref 10.3–14.5)
SODIUM SERPL-SCNC: 152 MMOL/L — HIGH (ref 135–145)
WBC # BLD: 10.12 K/UL — SIGNIFICANT CHANGE UP (ref 3.8–10.5)
WBC # BLD: 12.73 K/UL — HIGH (ref 3.8–10.5)
WBC # FLD AUTO: 10.12 K/UL — SIGNIFICANT CHANGE UP (ref 3.8–10.5)
WBC # FLD AUTO: 12.73 K/UL — HIGH (ref 3.8–10.5)

## 2022-07-29 PROCEDURE — 99291 CRITICAL CARE FIRST HOUR: CPT

## 2022-07-29 PROCEDURE — 71045 X-RAY EXAM CHEST 1 VIEW: CPT | Mod: 26

## 2022-07-29 PROCEDURE — 95720 EEG PHY/QHP EA INCR W/VEEG: CPT

## 2022-07-29 PROCEDURE — 99232 SBSQ HOSP IP/OBS MODERATE 35: CPT

## 2022-07-29 RX ORDER — METOPROLOL TARTRATE 50 MG
5 TABLET ORAL ONCE
Refills: 0 | Status: COMPLETED | OUTPATIENT
Start: 2022-07-29 | End: 2022-07-29

## 2022-07-29 RX ORDER — FAMOTIDINE 10 MG/ML
20 INJECTION INTRAVENOUS EVERY 12 HOURS
Refills: 0 | Status: DISCONTINUED | OUTPATIENT
Start: 2022-07-29 | End: 2022-08-11

## 2022-07-29 RX ORDER — METOPROLOL TARTRATE 50 MG
25 TABLET ORAL EVERY 8 HOURS
Refills: 0 | Status: DISCONTINUED | OUTPATIENT
Start: 2022-07-29 | End: 2022-07-30

## 2022-07-29 RX ADMIN — ENOXAPARIN SODIUM 40 MILLIGRAM(S): 100 INJECTION SUBCUTANEOUS at 21:54

## 2022-07-29 RX ADMIN — Medication 81 MILLIGRAM(S): at 12:11

## 2022-07-29 RX ADMIN — CHLORHEXIDINE GLUCONATE 15 MILLILITER(S): 213 SOLUTION TOPICAL at 05:35

## 2022-07-29 RX ADMIN — CHLORHEXIDINE GLUCONATE 1 APPLICATION(S): 213 SOLUTION TOPICAL at 12:15

## 2022-07-29 RX ADMIN — Medication 2: at 12:18

## 2022-07-29 RX ADMIN — Medication 650 MILLIGRAM(S): at 08:45

## 2022-07-29 RX ADMIN — CHLORHEXIDINE GLUCONATE 15 MILLILITER(S): 213 SOLUTION TOPICAL at 17:22

## 2022-07-29 RX ADMIN — Medication 5 MILLIGRAM(S): at 08:17

## 2022-07-29 RX ADMIN — Medication 5 MILLIGRAM(S): at 14:56

## 2022-07-29 RX ADMIN — CEFTRIAXONE 100 MILLIGRAM(S): 500 INJECTION, POWDER, FOR SOLUTION INTRAMUSCULAR; INTRAVENOUS at 12:16

## 2022-07-29 RX ADMIN — Medication 650 MILLIGRAM(S): at 00:21

## 2022-07-29 RX ADMIN — Medication 20 MILLIGRAM(S): at 05:35

## 2022-07-29 RX ADMIN — ATORVASTATIN CALCIUM 80 MILLIGRAM(S): 80 TABLET, FILM COATED ORAL at 12:11

## 2022-07-29 RX ADMIN — Medication 25 MILLIGRAM(S): at 21:54

## 2022-07-29 RX ADMIN — Medication 4: at 00:21

## 2022-07-29 RX ADMIN — FAMOTIDINE 20 MILLIGRAM(S): 10 INJECTION INTRAVENOUS at 12:15

## 2022-07-29 RX ADMIN — SENNA PLUS 2 TABLET(S): 8.6 TABLET ORAL at 21:54

## 2022-07-29 RX ADMIN — Medication 650 MILLIGRAM(S): at 00:51

## 2022-07-29 RX ADMIN — CARVEDILOL PHOSPHATE 6.25 MILLIGRAM(S): 80 CAPSULE, EXTENDED RELEASE ORAL at 05:35

## 2022-07-29 RX ADMIN — Medication 650 MILLIGRAM(S): at 09:45

## 2022-07-29 RX ADMIN — Medication 2: at 17:22

## 2022-07-29 RX ADMIN — Medication 5 MILLIGRAM(S): at 22:18

## 2022-07-29 NOTE — PROGRESS NOTE ADULT - ASSESSMENT
76M with LM1 occlusion, s/p TICI 2B MT, no tPA as wake-up stroke.  Large evolving L MCA stroke with small area of hemorrhage.  Acute on chronic HFrEF, LVHF <10%, reduced RV syst function, 1st degree AVbl. ?Component of neurogenic CMP.  Acute hypoxemic  resp failure, intubated.  PMH of CAD/CABG/HFrEF ( lipitor , coreg , lisinopril ), HTN, HLD.  Ischemic Cardiomyopathy  UTI.    Plan:  neurochecks q1hr  Off sedation n, fentanyl prn   CHF  - Coreg 6.25mg  12 and vasotec 5mg q12  Hold Lasix - re-evaluate daily  ASA, statin  IMTIAZ on Fri / NPO after MN   Check EKG  Check EEG  Cardiology involved; cardiology will wait when patient afebrile    Hold  diuresis, maintain euvolemia to -500ml, monitor renal function, lactate,   GI prophylaxsis - pepcid  vent support- CPAP 5/10- ABG noted - Met alkalosis   monitor e-lytes- metabolic alkalosis , non compensated, Correct electrolytes   SCDs, SQL   S/P fever- Ceftriaxone IV for UTI- Pan Sensitive - Day 4/7   Recurrent fever- check CBC with diff, lipase, LFT, procalcitonin, CXR , Blood Culture x2 ,   Renal ultrasound- no mass, no calculi  DVT prophylaxsis     76M with LM1 occlusion, s/p TICI 2B MT, no tPA as wake-up stroke.  Large evolving L MCA stroke with small area of hemorrhage.  Acute on chronic HFrEF, LVHF <10%, reduced RV syst function, 1st degree AVbl. ?Component of neurogenic CMP.  Acute hypoxemic  resp failure, intubated.  PMH of CAD/CABG/HFrEF ( lipitor , coreg , lisinopril ), HTN, HLD.  Ischemic Cardiomyopathy  UTI.    Plan:  neurochecks q1hr  Off sedation n, fentanyl prn   CHF  - Coreg 6.25mg  12 and vasotec 5mg q12  Occ PVS - Lopressor X1 and Normal electrolytes.   Hold Lasix - re-evaluate daily  ASA, statin  IMTIAZ on Fri / NPO after MN   Check EKG  Check EEG  Cardiology involved; cardiology will wait when patient afebrile    Hold  diuresis, maintain euvolemia to -500ml, monitor renal function, lactate,   GI prophylaxsis - pepcid  vent support- CPAP 5/10- ABG noted - Met alkalosis   monitor e-lytes- metabolic alkalosis , non compensated, Correct electrolytes   SCDs, SQL   S/P fever- Ceftriaxone IV for UTI- Pan Sensitive - Day 4/7   Recurrent fever- check CBC with diff, lipase, LFT, procalcitonin, CXR , Blood Culture x2 ,   Renal ultrasound- no mass, no calculi  DVT prophylaxsis     76M with LM1 occlusion, s/p TICI 2B MT, no tPA as wake-up stroke.  Large evolving L MCA stroke with small area of hemorrhage.  Acute on chronic HFrEF, LVHF <10%, reduced RV syst function, 1st degree AVbl. ?Component of neurogenic CMP.  Acute hypoxemic  resp failure, intubated.  PMH of CAD/CABG/HFrEF ( lipitor , coreg , lisinopril ), HTN, HLD.  Ischemic Cardiomyopathy  UTI.    Plan:  neurochecks q1hr  Off sedation n, fentanyl prn   CHF  - Coreg 6.25mg  12 and vasotec 5mg q12  Occ PVS - Lopressor X1 and Normal electrolytes.   Hold Lasix - re-evaluate daily  ASA, statin  IMTIAZ on Fri / NPO after MN   Check EKG  Check EEG  Cardiology involved; cardiology will wait when patient afebrile    Hold  diuresis, maintain euvolemia to -500ml, monitor renal function, lactate,   GI prophylaxis - pepcid  vent support- CPAP 5/10- ABG noted - Met alkalosis   monitor e-lytes- metabolic alkalosis , non compensated, Correct electrolytes   SCDs, SQL  S/P fever- Ceftriaxone IV for UTI- Pan Sensitive - Day 4/7   Recurrent fever- check CBC with diff, lipase, LFT, procalcitonin, CXR , Blood Culture x2 ,   Renal ultrasound- no mass, no calculi  Contraction alkalosis - hold lasix , free H20 q 6, monitor I/O;  Na and Bun/Cr - increased   DVT prophylaxsis

## 2022-07-29 NOTE — PROGRESS NOTE ADULT - ASSESSMENT
IMPRESSION:  - Left MCA Stroke.  S/P suction thrombectomy for L MCA M1 occlusion with TICI 2b reperfusion. on 7-24-22.    Mechanism ESUS  cardioembolic versus Large artery atherosclerosis    ASSESSMENT/ PLAN:     - Neuro checks and vital signs Q 2 hours.  - SBP goal keep normotensive.  - ASA 81 mg PO or 300 MN QD.   - Will repeat CT head on 8-1-22 and if stable will clear him for anticoagulation. .  - Lipitor for LDL goal of < 70 .  - Telemetry monitoring.  - CT/CTA/CTP -images and reports were reviewed.   - MRI Brain stroke protocol  -images and reports were reviewed. .  - TTE  -Reports as above were reviewed. . EF < 10%.  - Needs IMTIAZ (  postponed due to fever)  - Cardiology consultation appreciated..  - SCD/ SQ Lovenox for DVT prophylaxis.

## 2022-07-29 NOTE — PROGRESS NOTE ADULT - SUBJECTIVE AND OBJECTIVE BOX
Neurology   NATANAEL ROWAN 76y Male           HPI:  76M with PMH CABG, HTN, HLD who presents as transfer from [facility] for stroke. Last known well was last night 10pm prior to going to bed, woke up this morning around 0500 with R sided weakness and R facial droop. Presented to [facility], code stroke initiated, NIH at [facility] reportedly 8. CTA showed LM1 occlusion. Transferred to [facility] for possible neuro IR intervention. On arrival to [facility], NIH 6. Decision made to take patient directly to neuro IR for intervention.     Initial NIH 6, mRS 0    PMH: HTN (hypertension)    HLD (hyperlipidemia)    S/P CABG x 1         PSH:       FAMILY HISTORY:      SOCIAL HISTORY:  No history of tobacco or alcohol use     Allergies    No Known Allergies    Intolerances          Vital Signs Last 24 Hrs  T(C): 37 (29 Jul 2022 15:00), Max: 38.7 (29 Jul 2022 10:30)  T(F): 98.6 (29 Jul 2022 15:00), Max: 101.7 (29 Jul 2022 10:30)  HR: 83 (29 Jul 2022 15:00) (83 - 106)  BP: 124/51 (29 Jul 2022 15:00) (96/75 - 155/55)  BP(mean): 67 (29 Jul 2022 15:00) (67 - 109)  RR: 21 (29 Jul 2022 15:00) (17 - 30)  SpO2: 96% (29 Jul 2022 15:00) (94% - 99%)    Parameters below as of 29 Jul 2022 10:00  Patient On (Oxygen Delivery Method): ventilator        MEDICATIONS    acetaminophen     Tablet .. 650 milliGRAM(s) Oral every 6 hours PRN  aspirin  chewable 81 milliGRAM(s) Oral daily  atorvastatin 80 milliGRAM(s) Oral daily  carvedilol 6.25 milliGRAM(s) Oral every 12 hours  chlorhexidine 0.12% Liquid 15 milliLiter(s) Oral Mucosa every 12 hours  chlorhexidine 2% Cloths 1 Application(s) Topical daily  dextrose 5%. 1000 milliLiter(s) IV Continuous <Continuous>  dextrose 5%. 1000 milliLiter(s) IV Continuous <Continuous>  dextrose 50% Injectable 25 Gram(s) IV Push once  dextrose Oral Gel 15 Gram(s) Oral once PRN  enalapril 5 milliGRAM(s) Oral <User Schedule>  enoxaparin Injectable 40 milliGRAM(s) SubCutaneous every 24 hours  famotidine Injectable 20 milliGRAM(s) IV Push every 12 hours  fentaNYL    Injectable 25 MICROGram(s) IV Push every 4 hours PRN  glucagon  Injectable 1 milliGRAM(s) IntraMuscular once  insulin lispro (ADMELOG) corrective regimen sliding scale   SubCutaneous every 6 hours  ondansetron Injectable 4 milliGRAM(s) IV Push every 6 hours PRN  polyethylene glycol 3350 17 Gram(s) Oral daily  senna 2 Tablet(s) Oral at bedtime         LABS:  CBC Full  -  ( 29 Jul 2022 14:00 )  WBC Count : 12.73 K/uL  RBC Count : 4.15 M/uL  Hemoglobin : 13.1 g/dL  Hematocrit : 41.0 %  Platelet Count - Automated : 201 K/uL  Mean Cell Volume : 98.8 fl  Mean Cell Hemoglobin : 31.6 pg  Mean Cell Hemoglobin Concentration : 32.0 gm/dL  Auto Neutrophil # : 10.47 K/uL  Auto Lymphocyte # : 0.85 K/uL  Auto Monocyte # : 1.23 K/uL  Auto Eosinophil # : 0.04 K/uL  Auto Basophil # : 0.04 K/uL  Auto Neutrophil % : 82.2 %  Auto Lymphocyte % : 6.7 %  Auto Monocyte % : 9.7 %  Auto Eosinophil % : 0.3 %  Auto Basophil % : 0.3 %      07-29    152<H>  |  110<H>  |  53.4<H>  ----------------------------<  172<H>  4.6   |  32.0<H>  |  0.84    Ca    9.1      29 Jul 2022 03:00  Phos  4.0     07-29  Mg     3.2     07-29    TPro  8.2  /  Alb  3.3  /  TBili  0.8  /  DBili  0.3  /  AST  126<H>  /  ALT  89<H>  /  AlkPhos  203<H>  07-29    LIVER FUNCTIONS - ( 29 Jul 2022 14:00 )  Alb: 3.3 g/dL / Pro: 8.2 g/dL / ALK PHOS: 203 U/L / ALT: 89 U/L / AST: 126 U/L / GGT: x           On Neurological Examination:  Patient is intubated off sedation.  Mental Status - No Eye opening to noxious..    Cranial Nerves -Pupils are 2 and reactive. , EOMs are conjugate in primary gaze and on occulocephalics.   Cough reflex is present .  Motor Exam -   Right side -trace movement to noxious. Left UE trace withdrawal  and Left LE withdraws to noxious stim.         RADIOLOGY ( All neurological imaging studies were independently reviewed and interpreted by me)  [facility]   CTA  CTP    IMPRESSION:    CT PERFUSION:  The CT perfusion is technically limited by truncation of the arterial input function and venous outflow function.    Core infarct (CBF < 30%:): 0 ml  Penumbra (Tmax >6s): 4 mL  Mismatched volume: 0 ml  Mismatched ratio: None    Interpretation:  Based on CBF < 30%, there is no evidence of a core infarct. At CBF < 34%, a small perfusion defect of 4 mL is located in the left inferior frontal gyrus and corresponds to matched perfusion abnormality of Tmax >6 seconds. On Tmax > 4s, there is a much larger perfusion defect throughout the majority of the left frontoparietal convexity, which may represent an ischemic penumbra in the left MCA vascular territory.        CT ANGIOGRAPHY NECK:  1. Poor opacification of the cervical vasculature.  2. Suspicion for moderate greater than 50% stenosis in the proximal right internal carotid artery. Suspicion for moderate to severe stenosis in the proximal left internal carotid artery that may be greater than 70%. No evidence of ICA occlusion in the neck.  3. The right vertebral artery is grossly patent.. The V1 segment of the left vertebral artery cannot be visualized. The V2 and V3 segments the left vertebral artery are grossly patent with multiple stenoses.  4. There is limited visualization of the common carotid artery origins and subclavian artery origins. Underlying stenoses cannot be excluded. There is suspicion for a severe stenosis in the proximal left subclavian artery.      CT ANGIOGRAPHY BRAIN:  1. Poor opacification of the intracranial vasculature.  2. There appears to be a focal filling defect at the left MCA bifurcation with distal flow. There is marked attenuation of M2 branches of the left middle cerebral artery. Vessel density analysis of the MCA territory shows a greater than 50% reduction of vessel density in the left MCA territory compared to the contralateral side.  3. There are mild to moderate stenoses in the supraclinoid segments of the internal carotid arteries bilaterally, without occlusion.  4. There are stenoses in the intradural vertebral arteries bilaterally, without occlusion.    Repeat CTA or MRI/MRA is recommended for further evaluation.  Peak arterial opacification on the CT perfusion occurs at approximately 38 seconds. If the CTA is repeated, a long delay is required after contrast injection to achieve adequate opacification of the vasculature.    The findings were discussed with JACK Light in the emergency room on 07/24/2022 at 5:45 AM. Read back verification was obtained.      SEVERO CHANEL MD; Attending Radiologist  This document has been electronically signed    MRI:    MR Head No Cont (07.26.22 @ 20:58) >  IMPRESSION:    Acute left MCA territory infarcts with hemorrhagic transformation,   grossly stable since comparison CT.    Additional punctate acute infarct involving the medial left frontal lobe   in the SUGEY territory.    ORION ANDRADE MD; Attending Radiologist        TTE  TTE Echo Complete w/ Contrast w/ Doppler (07.24.22 @ 14:01) >    Summary:   1. Endocardial visualization was enhanced with intravenous echo contrast.   2. Severely enlarged left atrium.   3. Global diffuse akinesis. Left ventricular ejection fraction, by   visual estimation, is <10%.   4. Elevated mean left atrial pressure. (>30 mm Hg)   5. Normal right atrial size.   6. Mildly enlarged right ventricle. Moderately reduced RV systolic   function.   7. Mild mitral valve regurgitation.   8. Mild tricuspid regurgitation.   9. Estimated pulmonary artery systolic pressure is 42 mmHg - mild   pulmonary hypertension.  10. There is no evidence of pericardial effusion.  11. Team informed of the findings.    MD Millie, RPVI Electronically signed on 7/24/2022 at 3:49:14 PM

## 2022-07-29 NOTE — PROGRESS NOTE ADULT - ASSESSMENT
77 y/o male with hx of CAD s/p stents x2 (2004) and CABG x4 ([facility], 2014), ischemic cardiomyopathy (declined AICD), HTN, HLD, PVD s/p femoral endarterectomy who presented with right-sided weakness and facial droop to [facility] and was found to have acute stroke. Pt was transferred to [facility] for neuro IR intervention and underwent mechanical thrombectomy for left MCA occlusion. Post procedure pt developed acute hypoxic respiratory failure and was intubated. He is now sedated on propofol drip, not following commands, not on pressors.  JODI randomly on left.

## 2022-07-29 NOTE — PROGRESS NOTE ADULT - PROBLEM SELECTOR PLAN 1
IMTIAZ if not febrile  NPO since midnight last night.   Telemonitoring with no afib and no acute noted.

## 2022-07-29 NOTE — PROGRESS NOTE ADULT - SUBJECTIVE AND OBJECTIVE BOX
HPI:  76M with PMH CABG, HTN, HLD who presents as transfer from [facility] for stroke. Last known well was last night 10pm prior to going to bed, woke up this morning around 0500 with R sided weakness and R facial droop. Presented to [facility], code stroke initiated, NIH at [facility] reportedly 8. CTA showed LM1 occlusion. Transferred to [facility] for possible neuro IR intervention. On arrival to [facility], NIH 6. Decision made to take patient directly to neuro IR for intervention.   NIH 6, mRS 0    NIH SS:  DATE: 7/24/22  TIME: 0710  1A: Level of consciousness (0-3): 0  1B: Questions (0-2): 0    1C: Commands (0-2): 0  2: Gaze (0-2): 0  3: Visual fields (0-3): 0  4: Facial palsy (0-3): 1  MOTOR:  5A: Left arm motor drift (0-4): 0  5B: Right arm motor drift (0-4): 1  6A: Left leg motor drift (0-4): 0  6B: Right leg motor drift (0-4): 1  7: Limb ataxia (0-2): 0  SENSORY:  8: Sensation (0-2): 1  SPEECH:  9: Language (0-3): 1  10: Dysarthria (0-2): 1  EXTINCTION:  11: Extinction/inattention (0-2): 0    TOTAL SCORE: 6 (24 Jul 2022 07:34)    No o/n events.  Evolving large L MCA stroke wit small heme transformation.    ICU Vital Signs Last 24 Hrs  T(C): 37.7 (29 Jul 2022 12:09), Max: 38.7 (29 Jul 2022 10:30)  T(F): 99.9 (29 Jul 2022 12:09), Max: 101.7 (29 Jul 2022 10:30)  HR: 91 (29 Jul 2022 12:40) (91 - 106)  BP: 111/77 (29 Jul 2022 11:00) (96/75 - 155/55)  BP(mean): 86 (29 Jul 2022 11:00) (75 - 109)  RR: 20 (29 Jul 2022 11:00) (17 - 30)  SpO2: 95% (29 Jul 2022 12:40) (94% - 99%)    O2 Parameters below as of 29 Jul 2022 10:00  Patient On (Oxygen Delivery Method): ventilator        28 Jul 2022 07:01  -  29 Jul 2022 07:00  --------------------------------------------------------  IN:    Enteral Tube Flush: 250 mL    Glucerna 1.5: 960 mL    IV PiggyBack: 100 mL    IV PiggyBack: 100 mL  Total IN: 1410 mL    OUT:    Indwelling Catheter - Urethral (mL): 2190 mL  Total OUT: 2190 mL    Total NET: -780 mL    27 Jul 2022 07:01  -  28 Jul 2022 07:00  --------------------------------------------------------  IN:    Dexmedetomidine: 61.2 mL    Enteral Tube Flush: 100 mL    Glucerna 1.5: 1020 mL    Norepinephrine: 17.1 mL  Total IN: 1198.3 mL    OUT:    Indwelling Catheter - Urethral (mL): 1555 mL  Total OUT: 1555 mL    Total NET: -356.7 mL      28 Jul 2022 07:01  -  28 Jul 2022 08:30  --------------------------------------------------------  IN:    Glucerna 1.5: 60 mL  Total IN: 60 mL    OUT:    Indwelling Catheter - Urethral (mL): 325 mL  Total OUT: 325 mL    Total NET: -265 mL    MEDICATIONS  (STANDING):  aspirin  chewable 81 milliGRAM(s) Oral daily  atorvastatin 80 milliGRAM(s) Oral daily  carvedilol 6.25 milliGRAM(s) Oral every 12 hours  chlorhexidine 0.12% Liquid 15 milliLiter(s) Oral Mucosa every 12 hours  chlorhexidine 2% Cloths 1 Application(s) Topical daily  dextrose 5%. 1000 milliLiter(s) (50 mL/Hr) IV Continuous <Continuous>  dextrose 5%. 1000 milliLiter(s) (100 mL/Hr) IV Continuous <Continuous>  dextrose 50% Injectable 25 Gram(s) IV Push once  enalapril 5 milliGRAM(s) Oral <User Schedule>  enoxaparin Injectable 40 milliGRAM(s) SubCutaneous every 24 hours  famotidine Injectable 20 milliGRAM(s) IV Push daily  furosemide   Injectable 20 milliGRAM(s) IV Push daily  glucagon  Injectable 1 milliGRAM(s) IntraMuscular once  insulin lispro (ADMELOG) corrective regimen sliding scale   SubCutaneous every 6 hours  polyethylene glycol 3350 17 Gram(s) Oral daily  senna 2 Tablet(s) Oral at bedtime    MEDICATIONS  (PRN):  acetaminophen     Tablet .. 650 milliGRAM(s) Oral every 6 hours PRN Temp greater or equal to 38C (100.4F), Mild Pain (1 - 3)  dextrose Oral Gel 15 Gram(s) Oral once PRN Blood Glucose LESS THAN 70 milliGRAM(s)/deciliter  fentaNYL    Injectable 25 MICROGram(s) IV Push every 4 hours PRN agitation/vent synchrony  ondansetron Injectable 4 milliGRAM(s) IV Push every 6 hours PRN Nausea and/or Vomiting      07-29    152<H>  |  110<H>  |  53.4<H>  ----------------------------<  172<H>  4.6   |  32.0<H>  |  0.84    Ca    9.1      29 Jul 2022 03:00  Phos  4.0     07-29  Mg     3.2     07-29                            14.2   10.12 )-----------( 245      ( 29 Jul 2022 03:00 )             43.6     CAPILLARY BLOOD GLUCOSE  POCT Blood Glucose.: 174 mg/dL (29 Jul 2022 12:17)  POCT Blood Glucose.: 144 mg/dL (29 Jul 2022 05:39)  POCT Blood Glucose.: 201 mg/dL (29 Jul 2022 00:11)  POCT Blood Glucose.: 177 mg/dL (28 Jul 2022 17:30)      Culture - Urine (collected 25 Jul 2022 23:44)  Source: Clean Catch Clean Catch (Midstream)  Preliminary Report (27 Jul 2022 11:52):    >100,000 CFU/ml Escherichia coli- Pan Sens    Culture - Sputum (collected 25 Jul 2022 23:15)  Source: ET Tube ET Tube  Gram Stain (26 Jul 2022 14:53):    Moderate polymorphonuclear leukocytes per low power field    Rare Squamous epithelial cells per low power field    Rare Gram Positive Cocci in Pairs and Chains per oil power field    Rare Gram Positive Rods per oil power field  Final Report (28 Jul 2022 10:15):    Normal Respiratory Margo present    Culture - Blood (collected 25 Jul 2022 23:05)  Source: .Blood Blood-Peripheral  Preliminary Report (27 Jul 2022 03:01):    No growth to date.    Culture - Blood (collected 25 Jul 2022 23:05)  Source: .Blood Blood-Peripheral  Preliminary Report (27 Jul 2022 03:01):    No growth to date.      US Renal (07.26.22 @ 23:01) >      INTERPRETATION:  CLINICAL INFORMATION: Acute kidney injury.    COMPARISON: None available.    TECHNIQUE: Sonography of the kidneys and bladder.    FINDINGS:  Right kidney: 9.9 cm. No renal mass, hydronephrosis or calculi.    Left kidney: 11.5 cm. No renal mass, hydronephrosis or calculi.    Urinary bladder: Within normal limits.    IMPRESSION:  No renal mass, hydronephrosis or calculus is visualized sonographically.      MR Head No Cont (07.26.22 @ 20:58) >  COMPARISON: CT head 7/26/2022.    FINDINGS:  Acute infarcts are seen throughout the left MCA territory with   involvement of the left precentral gyrus. Hemorrhagic transformation is   again seen within the left inferior frontal lobe and insula, grossly   stable since comparison study.    Additional acute punctate infarct is seen within the medial left frontal   lobe (4-31).    Trace hemorrhage noted within the bilateral occipital horns. No   extra-axial fluid collections. The skull base flow voids are present.    The visualized intraorbital contents are normal. Mucosal thickening with   air-fluid level in the left sphenoid sinus. The mastoid air cells are   clear. The visualized osseous structures, soft tissues and partially   visualized parotid glands appear normal.    IMPRESSION:    Acute left MCA territory infarcts with hemorrhagic transformation,   grossly stable since comparison CT.    Additional punctate acute infarct involving the medial left frontal lobe   in the SUGEY territory.      CT Head No Cont (07.26.22 @ 20:25) >  INTERPRETATION:  CLINICAL INDICATION: Left M1 occlusion status post TICI   2B recanalization, follow-up    5mm axial sections of the brain were obtained from base to vertex,   without the intravenous administration of contrast material. Coronal and   sagittal computer generated reconstructed views are available.    Comparison is made with the prior CT of 7/24/2022.    Mild atrophy is identified with ventricular and sulcal prominence. There   is more lucency identified in the left insular cortex and left frontal   cortex compared to the prior exam consistent with normal evolution of an   infarct in the left middle cerebral artery territory. There is asmall   amount of hemorrhage in the left frontal region and left anterior   temporal lobe which is new since the prior exam.    Impression: Large lucency in the left frontal temporal cortex in the left   middle cerebral artery distribution compared with 7/24/2022 consistent   with normal evolving acute left middle cerebral artery infarct. Small   amount of new hemorrhage.    Xray Chest 1 View- PORTABLE-Urgent (Xray Chest 1 View- PORTABLE-Urgent .) (07.24.22 @ 20:45) >  IMPRESSION:    ET tube and NG tube, in adequate position.    Cardiomegaly. Unchanged pulmonary vascular congestion. Mild hazy opacity   right midlung field. Underlying infiltrate not excluded.     TTE Echo Complete w/ Contrast w/ Doppler (07.24.22 @ 14:01) >  Summary:   1. Endocardial visualization was enhanced with intravenous echo contrast.   2. Severely enlarged left atrium.   3. Global diffuse akinesis. Left ventricular ejection fraction, by   visual estimation, is <10%.   4. Elevated mean left atrial pressure. (>30 mm Hg)   5. Normal right atrial size.   6. Mildly enlarged right ventricle. Moderately reduced RV systolic   function.   7. Mild mitral valve regurgitation.   8. Mild tricuspid regurgitation.   9. Estimated pulmonary artery systolic pressure is 42 mmHg - mild   pulmonary hypertension.  10. There is no evidence of pericardial effusion.          EXAMINATION:  General:  calm, NAD  Neuro:  somnolent, no FC,  attempts EO to noxious, moves L side, RLE wdrl, RUE 0/5.Cards:  S1S2 present  Respiratory:  no respiratory distress, intubated.  Abdomen:  soft  Extremities:  no edema  Skin:  warm/dry

## 2022-07-29 NOTE — PROGRESS NOTE ADULT - SUBJECTIVE AND OBJECTIVE BOX
[facility]                                                         CARDIOLOGY AT [facility]                                                                  [address] [facility], [address] [place] [address]                                                         Telephone: [phone]. Fax:[phone]                                                                             PROGRESS NOTE    Reason for follow up:   Stroke  Update:   Remains vented, febrile this am at 38.3, Pressors have been discontinued.  IMTIAZ bedside if afebrile with IV Tylenol  Ruby Hawkins.    Review of symptoms:   Unable to obtain    Vitals:  T(C): 38.4 (07-29-22 @ 09:00), Max: 38.6 (07-28-22 @ 13:00)  HR: 101 (07-29-22 @ 09:00) (92 - 110)  BP: 96/75 (07-29-22 @ 09:00) (96/75 - 162/76)  RR: 17 (07-29-22 @ 09:00) (17 - 30)  SpO2: 94% (07-29-22 @ 09:00) (94% - 99%)  I&O's Summary  28 Jul 2022 07:01  -  29 Jul 2022 07:00  --------------------------------------------------------  IN: 1410 mL / OUT: 2190 mL / NET: -780 mL  29 Jul 2022 07:01  -  29 Jul 2022 09:35  --------------------------------------------------------  IN: 0 mL / OUT: 265 mL / NET: -265 mL  Weight (kg): 100 (07-24 @ 12:26)    PHYSICAL EXAM:  Appearance: Comfortable. No acute distress  HEENT:  ET in place, dry muscoa  Neurologic: A & O x 0, eyes closed,  Unresponsive to tactile stimuli,  moving left extremities randomly.     Cardiovascular: RRR S1 S2, No murmur, no rubs/gallops. No JVD  Respiratory: Coarse bs bilaterally    Gastrointestinal:  Soft, Non-tender, + BS  Lower Extremities: + Peripheral Pulses, No clubbing, cyanosis, or edema  Psychiatry: Patient is calm. No agitation.   Skin: wet and cool     CURRENT CARDIAC MEDICATIONS:  carvedilol 6.25 milliGRAM(s) Oral every 12 hours  enalapril 5 milliGRAM(s) Oral <User Schedule>  furosemide   Injectable 20 milliGRAM(s) IV Push daily    CURRENT OTHER MEDICATIONS:  cefTRIAXone   IVPB      cefTRIAXone   IVPB 1000 milliGRAM(s) IV Intermittent every 24 hours  acetaminophen     Tablet .. 650 milliGRAM(s) Oral every 6 hours PRN Temp greater or equal to 38C (100.4F), Mild Pain (1 - 3)  fentaNYL    Injectable 25 MICROGram(s) IV Push every 4 hours PRN agitation/vent synchrony  ondansetron Injectable 4 milliGRAM(s) IV Push every 6 hours PRN Nausea and/or Vomiting  famotidine Injectable 20 milliGRAM(s) IV Push daily  polyethylene glycol 3350 17 Gram(s) Oral daily  senna 2 Tablet(s) Oral at bedtime  atorvastatin 80 milliGRAM(s) Oral daily  dextrose 50% Injectable 25 Gram(s) IV Push once, Stop order after: 1 Doses  dextrose Oral Gel 15 Gram(s) Oral once, Stop order after: 1 Doses PRN Blood Glucose LESS THAN 70 milliGRAM(s)/deciliter  glucagon  Injectable 1 milliGRAM(s) IntraMuscular once, Stop order after: 1 Doses  insulin lispro (ADMELOG) corrective regimen sliding scale   SubCutaneous every 6 hours  aspirin  chewable 81 milliGRAM(s) Oral daily  chlorhexidine 0.12% Liquid 15 milliLiter(s) Oral Mucosa every 12 hours  chlorhexidine 2% Cloths 1 Application(s) Topical daily  enoxaparin Injectable 40 milliGRAM(s) SubCutaneous every 24 hours      LABS:	 	  ( 25 Jul 2022 03:51 )  Troponin T  <0.01,  CPK  82   , CKMB  X    , BNP X        , ( 24 Jul 2022 18:58 )  Troponin T  <0.01,  CPK  X    , CKMB  X    , BNP X        , ( 24 Jul 2022 10:27 )  Troponin T  <0.01,  CPK  X    , CKMB  X    , BNP 3029<H>                        14.2   10.12 )-----------( 245      ( 29 Jul 2022 03:00 )             43.6   07-29  152<H>  |  110<H>  |  53.4<H>  ----------------------------<  172<H>  4.6   |  32.0<H>  |  0.84  Ca    9.1      29 Jul 2022 03:00  Phos  4.0     07-29  Mg     3.2     07-29  PT/INR/PTT ( 24 Jul 2022 15:45 )      13.3         :       26.7                  .        .                   .              .           .       1.14        .                                       Lipid Profile: Date: 07-25 @ 03:51  Total cholesterol 167; Direct LDL: --; HDL: 44; Triglycerides:132  TSH: Thyroid Stimulating Hormone, Serum: 0.91 uIU/mL    TELEMETRY:

## 2022-07-29 NOTE — PROGRESS NOTE ADULT - PROBLEM [facility] PLAN 2
c/w asa atorvastatin   Nor epi titrated off.    Hx of EF 10% and CVA will plan on long term anticoag when cleared by Neuro.  Ct lasix 20 IV daily  Coreg and enalapril started c/w asa atorvastatin   Nor epi titrated off.    Hx of EF 10% and CVA will plan on long term anticoag when cleared by Neuro.  Coreg and enalapril started

## 2022-07-30 LAB
ANION GAP SERPL CALC-SCNC: 14 MMOL/L — SIGNIFICANT CHANGE UP (ref 5–17)
BUN SERPL-MCNC: 90.6 MG/DL — HIGH (ref 8–20)
CALCIUM SERPL-MCNC: 9.1 MG/DL — SIGNIFICANT CHANGE UP (ref 8.4–10.5)
CHLORIDE SERPL-SCNC: 106 MMOL/L — SIGNIFICANT CHANGE UP (ref 98–107)
CO2 SERPL-SCNC: 29 MMOL/L — SIGNIFICANT CHANGE UP (ref 22–29)
CREAT SERPL-MCNC: 1.21 MG/DL — SIGNIFICANT CHANGE UP (ref 0.5–1.3)
EGFR: 62 ML/MIN/1.73M2 — SIGNIFICANT CHANGE UP
GLUCOSE BLDC GLUCOMTR-MCNC: 181 MG/DL — HIGH (ref 70–99)
GLUCOSE BLDC GLUCOMTR-MCNC: 183 MG/DL — HIGH (ref 70–99)
GLUCOSE BLDC GLUCOMTR-MCNC: 189 MG/DL — HIGH (ref 70–99)
GLUCOSE BLDC GLUCOMTR-MCNC: 208 MG/DL — HIGH (ref 70–99)
GLUCOSE SERPL-MCNC: 185 MG/DL — HIGH (ref 70–99)
GRAM STN FLD: SIGNIFICANT CHANGE UP
HCT VFR BLD CALC: 42.2 % — SIGNIFICANT CHANGE UP (ref 39–50)
HGB BLD-MCNC: 13.7 G/DL — SIGNIFICANT CHANGE UP (ref 13–17)
MAGNESIUM SERPL-MCNC: 3.4 MG/DL — HIGH (ref 1.6–2.6)
MCHC RBC-ENTMCNC: 31.4 PG — SIGNIFICANT CHANGE UP (ref 27–34)
MCHC RBC-ENTMCNC: 32.5 GM/DL — SIGNIFICANT CHANGE UP (ref 32–36)
MCV RBC AUTO: 96.8 FL — SIGNIFICANT CHANGE UP (ref 80–100)
NT-PROBNP SERPL-SCNC: 1768 PG/ML — HIGH (ref 0–300)
PHOSPHATE SERPL-MCNC: 4.7 MG/DL — SIGNIFICANT CHANGE UP (ref 2.4–4.7)
PLATELET # BLD AUTO: 258 K/UL — SIGNIFICANT CHANGE UP (ref 150–400)
POTASSIUM SERPL-MCNC: 4.3 MMOL/L — SIGNIFICANT CHANGE UP (ref 3.5–5.3)
POTASSIUM SERPL-SCNC: 4.3 MMOL/L — SIGNIFICANT CHANGE UP (ref 3.5–5.3)
RBC # BLD: 4.36 M/UL — SIGNIFICANT CHANGE UP (ref 4.2–5.8)
RBC # FLD: 16.1 % — HIGH (ref 10.3–14.5)
SODIUM SERPL-SCNC: 149 MMOL/L — HIGH (ref 135–145)
SPECIMEN SOURCE: SIGNIFICANT CHANGE UP
TROPONIN T SERPL-MCNC: 0.16 NG/ML — HIGH (ref 0–0.06)
TROPONIN T SERPL-MCNC: 0.23 NG/ML — HIGH (ref 0–0.06)
WBC # BLD: 11.46 K/UL — HIGH (ref 3.8–10.5)
WBC # FLD AUTO: 11.46 K/UL — HIGH (ref 3.8–10.5)

## 2022-07-30 PROCEDURE — 70450 CT HEAD/BRAIN W/O DYE: CPT | Mod: 26

## 2022-07-30 PROCEDURE — 76705 ECHO EXAM OF ABDOMEN: CPT | Mod: 26

## 2022-07-30 PROCEDURE — 99232 SBSQ HOSP IP/OBS MODERATE 35: CPT

## 2022-07-30 RX ORDER — METOPROLOL TARTRATE 50 MG
5 TABLET ORAL ONCE
Refills: 0 | Status: COMPLETED | OUTPATIENT
Start: 2022-07-30 | End: 2022-07-30

## 2022-07-30 RX ORDER — CIPROFLOXACIN LACTATE 400MG/40ML
500 VIAL (ML) INTRAVENOUS EVERY 12 HOURS
Refills: 0 | Status: DISCONTINUED | OUTPATIENT
Start: 2022-07-30 | End: 2022-08-01

## 2022-07-30 RX ORDER — METOPROLOL TARTRATE 50 MG
50 TABLET ORAL EVERY 8 HOURS
Refills: 0 | Status: DISCONTINUED | OUTPATIENT
Start: 2022-07-30 | End: 2022-08-03

## 2022-07-30 RX ORDER — FENTANYL CITRATE 50 UG/ML
25 INJECTION INTRAVENOUS EVERY 4 HOURS
Refills: 0 | Status: DISCONTINUED | OUTPATIENT
Start: 2022-07-30 | End: 2022-08-03

## 2022-07-30 RX ADMIN — CHLORHEXIDINE GLUCONATE 15 MILLILITER(S): 213 SOLUTION TOPICAL at 06:16

## 2022-07-30 RX ADMIN — Medication 50 MILLIGRAM(S): at 21:52

## 2022-07-30 RX ADMIN — Medication 25 MILLIGRAM(S): at 06:20

## 2022-07-30 RX ADMIN — Medication 50 MILLIGRAM(S): at 13:26

## 2022-07-30 RX ADMIN — Medication 5 MILLIGRAM(S): at 08:55

## 2022-07-30 RX ADMIN — Medication 500 MILLIGRAM(S): at 17:38

## 2022-07-30 RX ADMIN — Medication 5 MILLIGRAM(S): at 21:52

## 2022-07-30 RX ADMIN — Medication 4: at 17:39

## 2022-07-30 RX ADMIN — POLYETHYLENE GLYCOL 3350 17 GRAM(S): 17 POWDER, FOR SOLUTION ORAL at 13:26

## 2022-07-30 RX ADMIN — ENOXAPARIN SODIUM 40 MILLIGRAM(S): 100 INJECTION SUBCUTANEOUS at 21:52

## 2022-07-30 RX ADMIN — Medication 2: at 06:21

## 2022-07-30 RX ADMIN — Medication 500 MILLIGRAM(S): at 06:20

## 2022-07-30 RX ADMIN — ATORVASTATIN CALCIUM 80 MILLIGRAM(S): 80 TABLET, FILM COATED ORAL at 13:26

## 2022-07-30 RX ADMIN — Medication 81 MILLIGRAM(S): at 13:27

## 2022-07-30 RX ADMIN — Medication 5 MILLIGRAM(S): at 12:20

## 2022-07-30 RX ADMIN — CHLORHEXIDINE GLUCONATE 15 MILLILITER(S): 213 SOLUTION TOPICAL at 17:37

## 2022-07-30 RX ADMIN — FAMOTIDINE 20 MILLIGRAM(S): 10 INJECTION INTRAVENOUS at 13:26

## 2022-07-30 RX ADMIN — Medication 2: at 00:20

## 2022-07-30 RX ADMIN — CHLORHEXIDINE GLUCONATE 1 APPLICATION(S): 213 SOLUTION TOPICAL at 13:27

## 2022-07-30 RX ADMIN — FAMOTIDINE 20 MILLIGRAM(S): 10 INJECTION INTRAVENOUS at 00:20

## 2022-07-30 NOTE — EEG REPORT - NS EEG TEXT BOX
[facility]   COMPREHENSIVE EPILEPSY CENTER   REPORT OF LONG-TERM VIDEO EEG     [facility]: [address], [place] [address], Ph#: [phone]  [facility]: [id number], [place] [address], Ph#: [phone]  [facility]: [address] [place] [address], Ph#: [phone]    Patient Name: NATANAEL ROWAN  Age and : 76y ([birth year])  MRN #: [id number]  Location: [place] [address]  Referring Physician: Michael Pierre    Start Time/Date: 15:09 on 22  End Time/Date: 08:00 on 22  Duration: 16h 20m    _____________________________________________________________  STUDY INFORMATION    EEG Recording Technique:  The patient underwent continuous Video-EEG monitoring, using Telemetry System hardware on the XLTek Digital System. EEG and video data were stored on a computer hard drive with important events saved in digital archive files. The material was reviewed by a physician (electroencephalographer / epileptologist) on a daily basis. Williams and seizure detection algorithms were utilized and reviewed. An EEG Technician attended to the patient, and was available throughout daytime work hours.  The epilepsy center neurologist was available in person or on call 24-hours per day.    EEG Placement and Labeling of Electrodes:  The EEG was performed utilizing 20 channel referential EEG connections (coronal over temporal over parasagittal montage) using all standard 10-20 electrode placements with EKG, with additional electrodes placed in the inferior temporal region using the modified 10-10 montage electrode placements for elective admissions, or if deemed necessary. Recording was at a sampling rate of 256 samples per second per channel. Time synchronized digital video recording was done simultaneously with EEG recording. A low light infrared camera was used for low light recording.     _____________________________________________________________  HISTORY    Patient is a 76y old  Male who presents with a chief complaint of stroke (2022 16:08)      PERTINENT MEDICATION:  none    _____________________________________________________________  STUDY INTERPRETATION    Findings: The background was continuous and reactive. During wakefulness, the posterior dominant rhythm consisted of asymmetric, poorly-modulated 7 Hz activity, with amplitude to 30 uV, that was better forms over the right hemisphere.    Background Slowing:  Diffuse theta and polymorphic delta slowing.    Focal Slowing:   Continuous theta/delta max slowing in the left frontotemporal region.     Sleep Background:  Drowsiness was characterized by fragmentation, attenuation, and slowing of the background activity.    Stage II sleep transients were not recorded.    Other Non-Epileptiform Findings:  None were present.  Excess diffuse beta activity.  Attenuation of voltage / and fast activity in the xx.  Breach effect max in the xx characterized by higher amplitude, sharply contoured waveforms and fast activities.    Interictal Epileptiform Activity:   - Occasional left frontal/frontotemporal (Fp1/F3/F7) sharp wave discharges.  - Rare right frontal (Fp2/F4) sharp wave discharges.    Events:  No event or seizure recorded.    Activation Procedures:   Hyperventilation was not performed.    Photic stimulation was not performed.     Artifacts:  Intermittent myogenic and movement artifacts were noted.    ECG:  The heart rate on single channel ECG was predominantly between 70-80 BPM.    _____________________________________________________________  EEG SUMMARY/CLASSIFICATION    Abnormal EEG in an altered patient.  - Occasional left frontal/frontotemporal (Fp1/F3/F7) sharp wave discharges.  - Rare right frontal (Fp2/F4) sharp wave discharges.  - Continuous theta/delta max slowing in the left frontotemporal region.   - Mild to moderate generalized slowing.    _____________________________________________________________  EEG IMPRESSION/CLINICAL CORRELATE    Abnormal EEG study.  1. Potential epileptogenic foci in the left frontotemporal and right frontal regions.   2. Structural abnormality in the left frontotemporal region.   3. Mild to moderate nonspecific diffuse or multifocal cerebral dysfunction.   4. No seizure seen.     _____________________________________________________________    Mayi Prather MD  Director, Epilepsy/EMU - [facility]  [facility]   COMPREHENSIVE EPILEPSY CENTER   REPORT OF LONG-TERM VIDEO EEG     [facility]: [address], [place] [address], Ph#: [phone]  [facility]: [id number], [place] [address], Ph#: [phone]  [facility]: [address] [place] [address], Ph#: [phone]    Patient Name: NATANAEL ROWAN  Age and : 76y ([birth year])  MRN #: [id number]  Location: [place] [address]  Referring Physician: Michael Pierre    Start Time/Date: 15:09 on 22  End Time/Date: 08:00 on 22  Duration: 16h 20m    _____________________________________________________________  STUDY INFORMATION    EEG Recording Technique:  The patient underwent continuous Video-EEG monitoring, using Telemetry System hardware on the XLTek Digital System. EEG and video data were stored on a computer hard drive with important events saved in digital archive files. The material was reviewed by a physician (electroencephalographer / epileptologist) on a daily basis. Williams and seizure detection algorithms were utilized and reviewed. An EEG Technician attended to the patient, and was available throughout daytime work hours.  The epilepsy center neurologist was available in person or on call 24-hours per day.    EEG Placement and Labeling of Electrodes:  The EEG was performed utilizing 20 channel referential EEG connections (coronal over temporal over parasagittal montage) using all standard 10-20 electrode placements with EKG, with additional electrodes placed in the inferior temporal region using the modified 10-10 montage electrode placements for elective admissions, or if deemed necessary. Recording was at a sampling rate of 256 samples per second per channel. Time synchronized digital video recording was done simultaneously with EEG recording. A low light infrared camera was used for low light recording.     _____________________________________________________________  HISTORY    Patient is a 76y old  Male who presents with a chief complaint of stroke (2022 16:08)      PERTINENT MEDICATION:  none    _____________________________________________________________  STUDY INTERPRETATION    Findings: The background was continuous and reactive. During wakefulness, the posterior dominant rhythm consisted of asymmetric, poorly-modulated 7 Hz activity, with amplitude to 30 uV, that was better forms over the right hemisphere.    Background Slowing:  Diffuse theta and polymorphic delta slowing.    Focal Slowing:   Continuous theta/delta max slowing in the left frontotemporal region.     Sleep Background:  Drowsiness was characterized by fragmentation, attenuation, and slowing of the background activity.    Stage II sleep transients were not recorded.    Other Non-Epileptiform Findings:  None were present.    Interictal Epileptiform Activity:   - Occasional left frontal/frontotemporal (Fp1/F3/F7) sharp wave discharges.  - Rare right frontal (Fp2/F4) sharp wave discharges.    Events:  No event or seizure recorded.    Activation Procedures:   Hyperventilation was not performed.    Photic stimulation was not performed.     Artifacts:  Intermittent myogenic and movement artifacts were noted.    ECG:  The heart rate on single channel ECG was predominantly between 70-80 BPM.    _____________________________________________________________  EEG SUMMARY/CLASSIFICATION    Abnormal EEG in an altered patient.  - Occasional left frontal/frontotemporal (Fp1/F3/F7) sharp wave discharges.  - Rare right frontal (Fp2/F4) sharp wave discharges.  - Continuous theta/delta max slowing in the left frontotemporal region.   - Mild to moderate generalized slowing.    _____________________________________________________________  EEG IMPRESSION/CLINICAL CORRELATE    Abnormal EEG study.  1. Potential epileptogenic foci in the left frontotemporal and right frontal regions.   2. Structural abnormality in the left frontotemporal region.   3. Mild to moderate nonspecific diffuse or multifocal cerebral dysfunction.   4. No seizure seen.     _____________________________________________________________    Mayi Prather MD  Director, Epilepsy/EMU - [facility]

## 2022-07-30 NOTE — PROGRESS NOTE ADULT - ASSESSMENT
IMPRESSION:  - Left MCA Stroke.  S/P suction thrombectomy for L MCA M1 occlusion with TICI 2b reperfusion. on 7-24-22.    Mechanism ESUS  cardioembolic versus Large artery atherosclerosis    ASSESSMENT/ PLAN:     - Neuro checks and vital signs Q 2 hours.  - SBP goal keep normotensive.  - ASA 81 mg PO or 300 OR QD.   - Will repeat CT head on 8-1-22 and if stable will clear him for anticoagulation. .  - Lipitor for LDL goal of < 70 .  - Telemetry monitoring.  - CT/CTA/CTP -images and reports were reviewed.   - MRI Brain stroke protocol  -images and reports were reviewed. .  - TTE  -Reports as above were reviewed. . EF < 10%.  - Needs IMTIAZ (  postponed due to fever)  - Cardiology consultation appreciated..  - SCD/ SQ Lovenox for DVT prophylaxis.

## 2022-07-30 NOTE — OCCUPATIONAL THERAPY INITIAL EVALUATION ADULT - LIVES WITH, PROFILE
Return call to patient; she is requesting medical records from 2017- 2018.  Transferred to Medical Release.   spouse

## 2022-07-30 NOTE — OCCUPATIONAL THERAPY INITIAL EVALUATION ADULT - PERSONAL SAFETY AND JUDGMENT, REHAB EVAL
Left wrist restraint due to spontaneous movement of left UE, to prevent pulling of lines, maintain patient safety, due to decreased awareness./impaired

## 2022-07-30 NOTE — OCCUPATIONAL THERAPY INITIAL EVALUATION ADULT - LEVEL OF INDEPENDENCE: SUPINE/SIT, REHAB EVAL
Assess once pt more alert.  Pt unarousable at time of eval, not safe to attempt mobility at this time.  If OOB desired, use of monisha lift recommended.

## 2022-07-30 NOTE — OCCUPATIONAL THERAPY INITIAL EVALUATION ADULT - PERTINENT HX OF CURRENT PROBLEM, REHAB EVAL
Acute left MCA territory infarcts with hemorrhagic transformation, additional punctate acute infarct involving the medial left frontal lobe.

## 2022-07-30 NOTE — OCCUPATIONAL THERAPY INITIAL EVALUATION ADULT - LEVEL OF INDEPENDENCE: BED TO CHAIR, REHAB EVAL
Not safe at time of eval.  If OOB desired, use of mechanical lift recommended.  Formal assessment held until pt more alert and able to participate.

## 2022-07-30 NOTE — PROGRESS NOTE ADULT - SUBJECTIVE AND OBJECTIVE BOX
HPI:  76M with PMH CABG, HTN, HLD who [place] [facility] [place]. Last known well was last night 10pm prior to going to bed, woke up this morning around 0500 with R sided weakness and R facial droop. [place]ed [place] [facility] [place], code [place] initiated, NIH at [facility] [place] reportedly 8. CTA showed LM1 occlusion. Transferred to [facility] for possible neuro IR intervention. On arrival to [facility], NIH 6. Decision made to take patient directly to neuro IR for intervention.   NIH 6, mRS 0    NIH SS:  DATE: 7/24/22  TIME: 0710  1A: Level of consciousness (0-3): 0  1B: Questions (0-2): 0    1C: Commands (0-2): 0  2: Gaze (0-2): 0  3: Visual fields (0-3): 0  4: Facial palsy (0-3): 1  MOTOR:  5A: Left arm motor drift (0-4): 0  5B: Right arm motor drift (0-4): 1  6A: Left leg motor drift (0-4): 0  6B: Right leg motor drift (0-4): 1  7: Limb ataxia (0-2): 0  SENSORY:  8: Sensation (0-2): 1  SPEECH:  9: Language (0-3): 1  10: Dysarthria (0-2): 1  EXTINCTION:  11: Extinction/inattention (0-2): 0    TOTAL SCORE: 6 (24 Jul 2022 07:34)    No o/n events.  Evolving large L MCA stroke wit small heme transformation.    ICU Vital Signs Last 24 Hrs  T(C): 38.2 (30 Jul 2022 08:00), Max: 38.7 (29 Jul 2022 10:30)  T(F): 100.8 (30 Jul 2022 08:00), Max: 101.7 (29 Jul 2022 10:30)  HR: 97 (30 Jul 2022 08:01) (79 - 102)  BP: 134/67 (30 Jul 2022 08:00) (105/62 - 149/74)  BP(mean): 85 (30 Jul 2022 08:00) (67 - 95)  RR: 26 (30 Jul 2022 08:00) (15 - 27)  SpO2: 98% (30 Jul 2022 08:01) (94% - 100%)    O2 Parameters below as of 30 Jul 2022 08:00  Patient On (Oxygen Delivery Method): ventilator        29 Jul 2022 07:01  -  30 Jul 2022 07:00  --------------------------------------------------------  IN:    Enteral Tube Flush: 800 mL    Glucerna 1.5: 900 mL  Total IN: 1700 mL    OUT:    Indwelling Catheter - Urethral (mL): 265 mL    Voided (mL): 600 mL  Total OUT: 865 mL    Total NET: 835 mL      30 Jul 2022 07:01  -  30 Jul 2022 09:25  --------------------------------------------------------  IN:    Glucerna 1.5: 120 mL  Total IN: 120 mL    OUT:  Total OUT: 0 mL    Total NET: 120 mL          28 Jul 2022 07:01  -  29 Jul 2022 07:00  Total NET: -780 mL    27 Jul 2022 07:01  -  28 Jul 2022 07:00  Total NET: -356.7 mL      MEDICATIONS  (STANDING):  aspirin  chewable 81 milliGRAM(s) Oral daily  atorvastatin 80 milliGRAM(s) Oral daily  chlorhexidine 0.12% Liquid 15 milliLiter(s) Oral Mucosa every 12 hours  chlorhexidine 2% Cloths 1 Application(s) Topical daily  ciprofloxacin     Tablet 500 milliGRAM(s) Oral every 12 hours  dextrose 5%. 1000 milliLiter(s) (50 mL/Hr) IV Continuous <Continuous>  dextrose 5%. 1000 milliLiter(s) (100 mL/Hr) IV Continuous <Continuous>  dextrose 50% Injectable 25 Gram(s) IV Push once  enalapril 5 milliGRAM(s) Oral <User Schedule>  enoxaparin Injectable 40 milliGRAM(s) SubCutaneous every 24 hours  famotidine Injectable 20 milliGRAM(s) IV Push every 12 hours  glucagon  Injectable 1 milliGRAM(s) IntraMuscular once  insulin lispro (ADMELOG) corrective regimen sliding scale   SubCutaneous every 6 hours  metoprolol tartrate 25 milliGRAM(s) Oral every 8 hours  polyethylene glycol 3350 17 Gram(s) Oral daily  senna 2 Tablet(s) Oral at bedtime    MEDICATIONS  (PRN):  acetaminophen     Tablet .. 650 milliGRAM(s) Oral every 6 hours PRN Temp greater or equal to 38C (100.4F), Mild Pain (1 - 3)  dextrose Oral Gel 15 Gram(s) Oral once PRN Blood Glucose LESS THAN 70 milliGRAM(s)/deciliter  fentaNYL    Injectable 25 MICROGram(s) IV Push every 4 hours PRN agitation/vent synchrony  ondansetron Injectable 4 milliGRAM(s) IV Push every 6 hours PRN Nausea and/or Vomiting    07-30    149<H>  |  106  |  90.6<H>  ----------------------------<  185<H>  4.3   |  29.0  |  1.21    Ca    9.1      30 Jul 2022 01:24  Phos  4.7     07-30  Mg     3.4     07-30    TPro  8.2  /  Alb  3.3  /  TBili  0.8  /  DBili  0.3  /  AST  126<H>  /  ALT  89<H>  /  AlkPhos  203<H>  07-29 07-29    152<H>  |  110<H>  |  53.4<H>  ----------------------------<  172<H>  4.6   |  32.0<H>  |  0.84    Procalcitonin- 0.79                            13.7   11.46 )-----------( 258      ( 30 Jul 2022 01:24 )             42.2       CAPILLARY BLOOD GLUCOSE  POCT Blood Glucose.: 181 mg/dL (30 Jul 2022 06:21)  POCT Blood Glucose.: 189 mg/dL (30 Jul 2022 00:07)  POCT Blood Glucose.: 156 mg/dL (29 Jul 2022 17:21)  POCT Blood Glucose.: 174 mg/dL (29 Jul 2022 12:17)      Culture - Urine (collected 25 Jul 2022 23:44)  Source: Clean Catch Clean Catch (Midstream)  Preliminary Report (27 Jul 2022 11:52):    >100,000 CFU/ml Escherichia coli- Pan Sens    Culture - Sputum (collected 25 Jul 2022 23:15)  Source: ET Tube ET Tube  Gram Stain (26 Jul 2022 14:53):    Moderate polymorphonuclear leukocytes per low power field    Rare Squamous epithelial cells per low power field    Rare Gram Positive Cocci in Pairs and Chains per oil power field    Rare Gram Positive Rods per oil power field  Final Report (28 Jul 2022 10:15):    Normal Respiratory Margo present    Culture - Blood (collected 25 Jul 2022 23:05)  Source: .Blood Blood-Peripheral  Preliminary Report (27 Jul 2022 03:01):    No growth to date.    Culture - Blood (collected 25 Jul 2022 23:05)  Source: .Blood Blood-Peripheral  Preliminary Report (27 Jul 2022 03:01):    No growth to date.      US Renal (07.26.22 @ 23:01) >      INTERPRETATION:  CLINICAL INFORMATION: Acute kidney injury.    COMPARISON: None available.    TECHNIQUE: Sonography of the kidneys and bladder.    FINDINGS:  Right kidney: 9.9 cm. No renal mass, hydronephrosis or calculi.    Left kidney: 11.5 cm. No renal mass, hydronephrosis or calculi.    Urinary bladder: Within normal limits.    IMPRESSION:  No renal mass, hydronephrosis or calculus is visualized sonographically.      MR Head No Cont (07.26.22 @ 20:58) >  COMPARISON: CT head 7/26/2022.    FINDINGS:  Acute infarcts are seen throughout the left MCA territory with   involvement of the left precentral gyrus. Hemorrhagic transformation is   again seen within the left inferior frontal lobe and insula, grossly   stable since comparison study.    Additional acute punctate infarct is seen within the medial left frontal   lobe (4-31).    Trace hemorrhage noted within the bilateral occipital horns. No   extra-axial fluid collections. The skull base flow voids are present.    The visualized intraorbital contents are normal. Mucosal thickening with   air-fluid level in the left sphenoid sinus. The mastoid air cells are   clear. The visualized osseous structures, soft tissues and partially   visualized parotid glands appear normal.    IMPRESSION:    Acute left MCA territory infarcts with hemorrhagic transformation,   grossly stable since comparison CT.    Additional punctate acute infarct involving the medial left frontal lobe   in the SUGEY territory.      CT Head No Cont (07.26.22 @ 20:25) >  INTERPRETATION:  CLINICAL INDICATION: Left M1 occlusion status post TICI   2B recanalization, follow-up    5mm axial sections of the brain were obtained from base to vertex,   without the intravenous administration of contrast material. Coronal and   sagittal computer generated reconstructed views are available.    Comparison is made with the prior CT of 7/24/2022.    Mild atrophy is identified with ventricular and sulcal prominence. There   is more lucency identified in the left insular cortex and left frontal   cortex compared to the prior exam consistent with normal evolution of an   infarct in the left middle cerebral artery territory. There is asmall   amount of hemorrhage in the left frontal region and left anterior   temporal lobe which is new since the prior exam.    Impression: Large lucency in the left frontal temporal cortex in the left   middle cerebral artery distribution compared with 7/24/2022 consistent   with normal evolving acute left middle cerebral artery infarct. Small   amount of new hemorrhage.    Xray Chest 1 View- PORTABLE-Urgent (Xray Chest 1 View- PORTABLE-Urgent .) (07.24.22 @ 20:45) >  IMPRESSION:    ET tube and NG tube, in adequate position.    Cardiomegaly. Unchanged pulmonary vascular congestion. Mild hazy opacity   right midlung field. Underlying infiltrate not excluded.     TTE Echo Complete w/ Contrast w/ Doppler (07.24.22 @ 14:01) >  Summary:   1. Endocardial visualization was enhanced with intravenous echo contrast.   2. Severely enlarged left atrium.   3. Global diffuse akinesis. Left ventricular ejection fraction, by   visual estimation, is <10%.   4. Elevated mean left atrial pressure. (>30 mm Hg)   5. Normal right atrial size.   6. Mildly enlarged right ventricle. Moderately reduced RV systolic   function.   7. Mild mitral valve regurgitation.   8. Mild tricuspid regurgitation.   9. Estimated pulmonary artery systolic pressure is 42 mmHg - mild   pulmonary hypertension.  10. There is no evidence of pericardial effusion.          EXAMINATION:  General:  calm, NAD  Neuro:  somnolent, no FC,  attempts EO to noxious, moves L side, RLE wdrl, RUE 0/5.Cards:  S1S2 present  Respiratory:  no respiratory distress, intubated.  Abdomen:  soft  Extremities:  no edema  Skin:  warm/dry HPI:  76M with PMH CABG, HTN, HLD who presents as transfer from [facility] [place] for stroke. Last known well was last night 10pm prior to going to bed, woke up this morning around 0500 with R sided weakness and R facial droop. Presented to [facility] [place], code stroke initiated, NIH at [facility] [place] reportedly 8. CTA showed LM1 occlusion. Transferred to [facility] for possible neuro IR intervention. On arrival to [facility], NIH 6. Decision made to take patient directly to neuro IR for intervention.   NIH 6, mRS 0    NIH SS:  DATE: 7/24/22  TIME: 0710  1A: Level of consciousness (0-3): 0  1B: Questions (0-2): 0    1C: Commands (0-2): 0  2: Gaze (0-2): 0  3: Visual fields (0-3): 0  4: Facial palsy (0-3): 1  MOTOR:  5A: Left arm motor drift (0-4): 0  5B: Right arm motor drift (0-4): 1  6A: Left leg motor drift (0-4): 0  6B: Right leg motor drift (0-4): 1  7: Limb ataxia (0-2): 0  SENSORY:  8: Sensation (0-2): 1  SPEECH:  9: Language (0-3): 1  10: Dysarthria (0-2): 1  EXTINCTION:  11: Extinction/inattention (0-2): 0    TOTAL SCORE: 6 (24 Jul 2022 07:34)    No o/n events.  Evolving large L MCA stroke wit small heme transformation.    ICU Vital Signs Last 24 Hrs  T(C): 38.2 (30 Jul 2022 08:00), Max: 38.7 (29 Jul 2022 10:30)  T(F): 100.8 (30 Jul 2022 08:00), Max: 101.7 (29 Jul 2022 10:30)  HR: 97 (30 Jul 2022 08:01) (79 - 102)  BP: 134/67 (30 Jul 2022 08:00) (105/62 - 149/74)  BP(mean): 85 (30 Jul 2022 08:00) (67 - 95)  RR: 26 (30 Jul 2022 08:00) (15 - 27)  SpO2: 98% (30 Jul 2022 08:01) (94% - 100%)    O2 Parameters below as of 30 Jul 2022 08:00  Patient On (Oxygen Delivery Method): ventilator        29 Jul 2022 07:01  -  30 Jul 2022 07:00  --------------------------------------------------------  IN:    Enteral Tube Flush: 800 mL    Glucerna 1.5: 900 mL  Total IN: 1700 mL    OUT:    Indwelling Catheter - Urethral (mL): 265 mL    Voided (mL): 600 mL  Total OUT: 865 mL    Total NET: 835 mL      30 Jul 2022 07:01  -  30 Jul 2022 09:25  --------------------------------------------------------  IN:    Glucerna 1.5: 120 mL  Total IN: 120 mL    OUT:  Total OUT: 0 mL    Total NET: 120 mL          28 Jul 2022 07:01  -  29 Jul 2022 07:00  Total NET: -780 mL    27 Jul 2022 07:01  -  28 Jul 2022 07:00  Total NET: -356.7 mL      MEDICATIONS  (STANDING):  aspirin  chewable 81 milliGRAM(s) Oral daily  atorvastatin 80 milliGRAM(s) Oral daily  chlorhexidine 0.12% Liquid 15 milliLiter(s) Oral Mucosa every 12 hours  chlorhexidine 2% Cloths 1 Application(s) Topical daily  ciprofloxacin     Tablet 500 milliGRAM(s) Oral every 12 hours  dextrose 5%. 1000 milliLiter(s) (50 mL/Hr) IV Continuous <Continuous>  dextrose 5%. 1000 milliLiter(s) (100 mL/Hr) IV Continuous <Continuous>  dextrose 50% Injectable 25 Gram(s) IV Push once  enalapril 5 milliGRAM(s) Oral <User Schedule>  enoxaparin Injectable 40 milliGRAM(s) SubCutaneous every 24 hours  famotidine Injectable 20 milliGRAM(s) IV Push every 12 hours  glucagon  Injectable 1 milliGRAM(s) IntraMuscular once  insulin lispro (ADMELOG) corrective regimen sliding scale   SubCutaneous every 6 hours  metoprolol tartrate 25 milliGRAM(s) Oral every 8 hours  polyethylene glycol 3350 17 Gram(s) Oral daily  senna 2 Tablet(s) Oral at bedtime    MEDICATIONS  (PRN):  acetaminophen     Tablet .. 650 milliGRAM(s) Oral every 6 hours PRN Temp greater or equal to 38C (100.4F), Mild Pain (1 - 3)  dextrose Oral Gel 15 Gram(s) Oral once PRN Blood Glucose LESS THAN 70 milliGRAM(s)/deciliter  fentaNYL    Injectable 25 MICROGram(s) IV Push every 4 hours PRN agitation/vent synchrony  ondansetron Injectable 4 milliGRAM(s) IV Push every 6 hours PRN Nausea and/or Vomiting    07-30    149<H>  |  106  |  90.6<H>  ----------------------------<  185<H>  4.3   |  29.0  |  1.21    Ca    9.1      30 Jul 2022 01:24  Phos  4.7     07-30  Mg     3.4     07-30    TPro  8.2  /  Alb  3.3  /  TBili  0.8  /  DBili  0.3  /  AST  126<H>  /  ALT  89<H>  /  AlkPhos  203<H>  07-29 07-29    152<H>  |  110<H>  |  53.4<H>  ----------------------------<  172<H>  4.6   |  32.0<H>  |  0.84    Procalcitonin- 0.43---->0.79                            13.7   11.46 )-----------( 258      ( 30 Jul 2022 01:24 )             42.2       CAPILLARY BLOOD GLUCOSE  POCT Blood Glucose.: 181 mg/dL (30 Jul 2022 06:21)  POCT Blood Glucose.: 189 mg/dL (30 Jul 2022 00:07)  POCT Blood Glucose.: 156 mg/dL (29 Jul 2022 17:21)  POCT Blood Glucose.: 174 mg/dL (29 Jul 2022 12:17)      Culture - Urine (collected 25 Jul 2022 23:44)  Source: Clean Catch Clean Catch (Midstream)  Preliminary Report (27 Jul 2022 11:52):    >100,000 CFU/ml Escherichia coli- Pan Sens      Sputum - P      Blood - P     CXR- CLEAR     Culture - Sputum (collected 25 Jul 2022 23:15)  Source: ET Tube ET Tube  Gram Stain (26 Jul 2022 14:53):    Moderate polymorphonuclear leukocytes per low power field    Rare Squamous epithelial cells per low power field    Rare Gram Positive Cocci in Pairs and Chains per oil power field    Rare Gram Positive Rods per oil power field  Final Report (28 Jul 2022 10:15):    Normal Respiratory Margo present    Culture - Blood (collected 25 Jul 2022 23:05)  Source: .Blood Blood-Peripheral  Preliminary Report (27 Jul 2022 03:01):    No growth to date.    Culture - Blood (collected 25 Jul 2022 23:05)  Source: .Blood Blood-Peripheral  Preliminary Report (27 Jul 2022 03:01):    No growth to date.      US Renal (07.26.22 @ 23:01) >      INTERPRETATION:  CLINICAL INFORMATION: Acute kidney injury.    COMPARISON: None available.    TECHNIQUE: Sonography of the kidneys and bladder.    FINDINGS:  Right kidney: 9.9 cm. No renal mass, hydronephrosis or calculi.    Left kidney: 11.5 cm. No renal mass, hydronephrosis or calculi.    Urinary bladder: Within normal limits.    IMPRESSION:  No renal mass, hydronephrosis or calculus is visualized sonographically.      MR Head No Cont (07.26.22 @ 20:58) >  COMPARISON: CT head 7/26/2022.    FINDINGS:  Acute infarcts are seen throughout the left MCA territory with   involvement of the left precentral gyrus. Hemorrhagic transformation is   again seen within the left inferior frontal lobe and insula, grossly   stable since comparison study.    Additional acute punctate infarct is seen within the medial left frontal   lobe (4-31).    Trace hemorrhage noted within the bilateral occipital horns. No   extra-axial fluid collections. The skull base flow voids are present.    The visualized intraorbital contents are normal. Mucosal thickening with   air-fluid level in the left sphenoid sinus. The mastoid air cells are   clear. The visualized osseous structures, soft tissues and partially   visualized parotid glands appear normal.    IMPRESSION:    Acute left MCA territory infarcts with hemorrhagic transformation,   grossly stable since comparison CT.    Additional punctate acute infarct involving the medial left frontal lobe   in the SUGEY territory.      CT Head No Cont (07.26.22 @ 20:25) >  INTERPRETATION:  CLINICAL INDICATION: Left M1 occlusion status post TICI   2B recanalization, follow-up    5mm axial sections of the brain were obtained from base to vertex,   without the intravenous administration of contrast material. Coronal and   sagittal computer generated reconstructed views are available.    Comparison is made with the prior CT of 7/24/2022.    Mild atrophy is identified with ventricular and sulcal prominence. There   is more lucency identified in the left insular cortex and left frontal   cortex compared to the prior exam consistent with normal evolution of an   infarct in the left middle cerebral artery territory. There is asmall   amount of hemorrhage in the left frontal region and left anterior   temporal lobe which is new since the prior exam.    Impression: Large lucency in the left frontal temporal cortex in the left   middle cerebral artery distribution compared with 7/24/2022 consistent   with normal evolving acute left middle cerebral artery infarct. Small   amount of new hemorrhage.    Xray Chest 1 View- PORTABLE-Urgent (Xray Chest 1 View- PORTABLE-Urgent .) (07.24.22 @ 20:45) >  IMPRESSION:    ET tube and NG tube, in adequate position.    Cardiomegaly. Unchanged pulmonary vascular congestion. Mild hazy opacity   right midlung field. Underlying infiltrate not excluded.     TTE Echo Complete w/ Contrast w/ Doppler (07.24.22 @ 14:01) >  Summary:   1. Endocardial visualization was enhanced with intravenous echo contrast.   2. Severely enlarged left atrium.   3. Global diffuse akinesis. Left ventricular ejection fraction, by   visual estimation, is <10%.   4. Elevated mean left atrial pressure. (>30 mm Hg)   5. Normal right atrial size.   6. Mildly enlarged right ventricle. Moderately reduced RV systolic   function.   7. Mild mitral valve regurgitation.   8. Mild tricuspid regurgitation.   9. Estimated pulmonary artery systolic pressure is 42 mmHg - mild   pulmonary hypertension.  10. There is no evidence of pericardial effusion.          EXAMINATION:  General:  calm, NAD  Neuro:  somnolent, no FC,  attempts EO to noxious, moves L side, RLE wdrl, RUE 0/5.Cards:  S1S2 present  Respiratory:  no respiratory distress, intubated.  Abdomen:  soft  Extremities:  no edema  Skin:  warm/dry

## 2022-07-30 NOTE — OCCUPATIONAL THERAPY INITIAL EVALUATION ADULT - RANGE OF MOTION EXAMINATION, UPPER EXTREMITY
Bilateral passive ROM to 90 degrees shoulder flexion and abduction (limited by writer due to right UE flaccidity, left UE resistance at 90), bilateral elbows to digits passively WFL without signs of discomfort.

## 2022-07-30 NOTE — PROGRESS NOTE ADULT - SUBJECTIVE AND OBJECTIVE BOX
Neurology   NATANAEL ROWAN 76y Male           HPI:  76M with PMH CABG, HTN, HLD who presents as transfer from [facility] for stroke. Last known well was last night 10pm prior to going to bed, woke up this morning around 0500 with R sided weakness and R facial droop. Presented to [facility], code stroke initiated, NIH at [facility] reportedly 8. CTA showed LM1 occlusion. Transferred to [facility] for possible neuro IR intervention. On arrival to [facility], NIH 6. Decision made to take patient directly to neuro IR for intervention.     Initial NIH 6, mRS 0    PMH: HTN (hypertension)    HLD (hyperlipidemia)    S/P CABG x 1         PSH:       FAMILY HISTORY:      SOCIAL HISTORY:  No history of tobacco or alcohol use     Allergies    No Known Allergies    Intolerances          Vital Signs Last 24 Hrs  T(C): 38.2 (30 Jul 2022 08:00), Max: 38.2 (30 Jul 2022 07:00)  T(F): 100.8 (30 Jul 2022 08:00), Max: 100.8 (30 Jul 2022 07:00)  HR: 106 (30 Jul 2022 12:22) (79 - 106)  BP: 134/67 (30 Jul 2022 08:00) (107/86 - 149/74)  BP(mean): 85 (30 Jul 2022 08:00) (67 - 95)  RR: 26 (30 Jul 2022 08:00) (15 - 27)  SpO2: 96% (30 Jul 2022 12:22) (96% - 100%)    Parameters below as of 30 Jul 2022 08:00  Patient On (Oxygen Delivery Method): ventilator        MEDICATIONS    acetaminophen     Tablet .. 650 milliGRAM(s) Oral every 6 hours PRN  aspirin  chewable 81 milliGRAM(s) Oral daily  atorvastatin 80 milliGRAM(s) Oral daily  chlorhexidine 0.12% Liquid 15 milliLiter(s) Oral Mucosa every 12 hours  chlorhexidine 2% Cloths 1 Application(s) Topical daily  ciprofloxacin     Tablet 500 milliGRAM(s) Oral every 12 hours  dextrose 5%. 1000 milliLiter(s) IV Continuous <Continuous>  dextrose 5%. 1000 milliLiter(s) IV Continuous <Continuous>  dextrose 50% Injectable 25 Gram(s) IV Push once  dextrose Oral Gel 15 Gram(s) Oral once PRN  enalapril 5 milliGRAM(s) Oral <User Schedule>  enoxaparin Injectable 40 milliGRAM(s) SubCutaneous every 24 hours  famotidine Injectable 20 milliGRAM(s) IV Push every 12 hours  fentaNYL    Injectable 25 MICROGram(s) IV Push every 4 hours PRN  glucagon  Injectable 1 milliGRAM(s) IntraMuscular once  insulin lispro (ADMELOG) corrective regimen sliding scale   SubCutaneous every 6 hours  metoprolol tartrate 50 milliGRAM(s) Oral every 8 hours  metoprolol tartrate Injectable 5 milliGRAM(s) IV Push once  ondansetron Injectable 4 milliGRAM(s) IV Push every 6 hours PRN  polyethylene glycol 3350 17 Gram(s) Oral daily  senna 2 Tablet(s) Oral at bedtime         LABS:  CBC Full  -  ( 30 Jul 2022 01:24 )  WBC Count : 11.46 K/uL  RBC Count : 4.36 M/uL  Hemoglobin : 13.7 g/dL  Hematocrit : 42.2 %  Platelet Count - Automated : 258 K/uL  Mean Cell Volume : 96.8 fl  Mean Cell Hemoglobin : 31.4 pg  Mean Cell Hemoglobin Concentration : 32.5 gm/dL  Auto Neutrophil # : x  Auto Lymphocyte # : x  Auto Monocyte # : x  Auto Eosinophil # : x  Auto Basophil # : x  Auto Neutrophil % : x  Auto Lymphocyte % : x  Auto Monocyte % : x  Auto Eosinophil % : x  Auto Basophil % : x      07-30    149<H>  |  106  |  90.6<H>  ----------------------------<  185<H>  4.3   |  29.0  |  1.21    Ca    9.1      30 Jul 2022 01:24  Phos  4.7     07-30  Mg     3.4     07-30    TPro  8.2  /  Alb  3.3  /  TBili  0.8  /  DBili  0.3  /  AST  126<H>  /  ALT  89<H>  /  AlkPhos  203<H>  07-29    LIVER FUNCTIONS - ( 29 Jul 2022 14:00 )  Alb: 3.3 g/dL / Pro: 8.2 g/dL / ALK PHOS: 203 U/L / ALT: 89 U/L / AST: 126 U/L / GGT: x           Hemoglobin A1C:         On Neurological Examination:  Patient is intubated off sedation.  Mental Status - No Eye opening to noxious..    Cranial Nerves -Pupils are 2 and reactive. , EOMs are conjugate in primary gaze and on occulocephalics.   Cough reflex is present .  Motor Exam -   Right side -trace movement to noxious. Left UE trace withdrawal  and Left LE withdraws to noxious stim.         RADIOLOGY ( All neurological imaging studies were independently reviewed and interpreted by me)  CTH   CTA  CTP    IMPRESSION:    CT PERFUSION:  The CT perfusion is technically limited by truncation of the arterial input function and venous outflow function.    Core infarct (CBF < 30%:): 0 ml  Penumbra (Tmax >6s): 4 mL  Mismatched volume: 0 ml  Mismatched ratio: None    Interpretation:  Based on CBF < 30%, there is no evidence of a core infarct. At CBF < 34%, a small perfusion defect of 4 mL is located in the left inferior frontal gyrus and corresponds to matched perfusion abnormality of Tmax >6 seconds. On Tmax > 4s, there is a much larger perfusion defect throughout the majority of the left frontoparietal convexity, which may represent an ischemic penumbra in the left MCA vascular territory.        CT ANGIOGRAPHY NECK:  1. Poor opacification of the cervical vasculature.  2. Suspicion for moderate greater than 50% stenosis in the proximal right internal carotid artery. Suspicion for moderate to severe stenosis in the proximal left internal carotid artery that may be greater than 70%. No evidence of ICA occlusion in the neck.  3. The right vertebral artery is grossly patent.. The V1 segment of the left vertebral artery cannot be visualized. The V2 and V3 segments the left vertebral artery are grossly patent with multiple stenoses.  4. There is limited visualization of the common carotid artery origins and subclavian artery origins. Underlying stenoses cannot be excluded. There is suspicion for a severe stenosis in the proximal left subclavian artery.      CT ANGIOGRAPHY BRAIN:  1. Poor opacification of the intracranial vasculature.  2. There appears to be a focal filling defect at the left MCA bifurcation with distal flow. There is marked attenuation of M2 branches of the left middle cerebral artery. Vessel density analysis of the MCA territory shows a greater than 50% reduction of vessel density in the left MCA territory compared to the contralateral side.  3. There are mild to moderate stenoses in the supraclinoid segments of the internal carotid arteries bilaterally, without occlusion.  4. There are stenoses in the intradural vertebral arteries bilaterally, without occlusion.    Repeat CTA or MRI/MRA is recommended for further evaluation.  Peak arterial opacification on the CT perfusion occurs at approximately 38 seconds. If the CTA is repeated, a long delay is required after contrast injection to achieve adequate opacification of the vasculature.    The findings were discussed with JACK Light in the emergency room on 07/24/2022 at 5:45 AM. Read back verification was obtained.      SEVERO CHANEL MD; Attending Radiologist  This document has been electronically signed    MRI:    MR Head No Cont (07.26.22 @ 20:58) >  IMPRESSION:    Acute left MCA territory infarcts with hemorrhagic transformation,   grossly stable since comparison CT.    Additional punctate acute infarct involving the medial left frontal lobe   in the SUGEY territory.    ORION ANDRADE MD; Attending Radiologist        TTE  TTE Echo Complete w/ Contrast w/ Doppler (07.24.22 @ 14:01) >    Summary:   1. Endocardial visualization was enhanced with intravenous echo contrast.   2. Severely enlarged left atrium.   3. Global diffuse akinesis. Left ventricular ejection fraction, by   visual estimation, is <10%.   4. Elevated mean left atrial pressure. (>30 mm Hg)   5. Normal right atrial size.   6. Mildly enlarged right ventricle. Moderately reduced RV systolic   function.   7. Mild mitral valve regurgitation.   8. Mild tricuspid regurgitation.   9. Estimated pulmonary artery systolic pressure is 42 mmHg - mild   pulmonary hypertension.  10. There is no evidence of pericardial effusion.  11. Team informed of the findings.    MD Millie, RPVI Electronically signed on 7/24/2022 at 3:49:14 PM

## 2022-07-30 NOTE — OCCUPATIONAL THERAPY INITIAL EVALUATION ADULT - GENERAL OBSERVATIONS, REHAB EVAL
Pt received supine in bed, +bilateral CAIR boots, +IV, +ace, +cardiac monitor with , +vent, +OG tube, +continuous EEG, +left wrist restraint.

## 2022-07-30 NOTE — OCCUPATIONAL THERAPY INITIAL EVALUATION ADULT - PLANNED THERAPY INTERVENTIONS, OT EVAL
Coma stim initially/ADL retraining/balance training/bed mobility training/cognitive, visual perceptual/fine motor coordination training/motor coordination training/neuromuscular re-education/ROM/strengthening/transfer training

## 2022-07-30 NOTE — PROGRESS NOTE ADULT - ASSESSMENT
76M with LM1 occlusion, s/p TICI 2B MT, no tPA as wake-up stroke.  Large evolving L MCA stroke with small area of hemorrhage.  Acute on chronic HFrEF, LVHF <10%, reduced RV syst function, 1st degree AVbl. ?Component of neurogenic CMP.  Acute hypoxemic  resp failure, intubated.  PMH of CAD/CABG/HFrEF ( lipitor , coreg , lisinopril ), HTN, HLD.  Ischemic Cardiomyopathy  UTI.    Plan:  neurochecks q1hr  Off sedation n, fentanyl prn   ASA, statin  MRI as above  F/U EEG report- 7/29  Abnormal EEG study.  1. Potential epileptogenic foci in the left frontotemporal and right frontal regions.   2. Structural abnormality in the left frontotemporal region.   3. Mild to moderate nonspecific diffuse or multifocal cerebral dysfunction.   4. No seizure seen.       Resp- Resp failure secondary to neuro injury  Maintain O2 > 92 %  - Wean FIO2 to 30 %  - monitor e-lytes- metabolic alkalosis , non compensated, Correct electrolytes     Card- Ischemic cardiomyopathy HFrEF - Compensated CHF, Ectopy  Lopressor + ACE- Inhibitor if BP Hold Aldosterone antag due to volume status  IMTIAZ rescheduled for Mon due to current fever  monitor e-lytes- metabolic alkalosis , non compensated, Correct electrolytes   Check EKG  Check EEG  Cardiology involved; cardiology will wait when patient afebrile    Hold  diuresis, maintain euvolemia to -500ml, monitor renal function, lactate,       Renal- Uremia - likely secondary to vol status  - monitor e-lytes- metabolic alkalosis , non compensated, Correct electrolytes   Hold Lasix - re-evaluate daily  Increase Free H2O      GI prophylaxis -   pepcid; Stool softeners  vent support- CPAP 5/10- ABG noted - Met alkalosis   monitor e-lytes- metabolic alkalosis , non compensated, Correct electrolytes   SCDs, SQL    ID  S/P fever- Ceftriaxone IV for UTI- Pan Sensitive - Day 4/7   Recurrent fever- check CBC with diff, lipase, LFT, procalcitonin, CXR , Blood Culture x2   No obvious source of fever- cultures performed and awaiting results  Transaminitis with elevated alk Phos - will obtain RUQ ultrasound  Renal ultrasound- no mass, no calculi  Contraction alkalosis - hold lasix , Increase free H20 q 6, monitor I/O;  Na and Bun/Cr - increased     Heme  DVT prophylaxsis  Stable WBC     76M with LM1 occlusion, s/p TICI 2B MT, no tPA as wake-up stroke.  Large evolving L MCA stroke with small area of hemorrhage.  Acute on chronic HFrEF, LVHF <10%, reduced RV syst function, 1st degree AVbl. ?Component of neurogenic CMP.  Acute hypoxemic  resp failure, intubated.  PMH of CAD/CABG/HFrEF ( lipitor , coreg , lisinopril ), HTN, HLD.  Ischemic Cardiomyopathy  UTI.    Plan:  neurochecks q1hr  Off sedation n, fentanyl prn   ASA, statin  CT Of Brain today  MRI as above  F/U EEG report- 7/29  Abnormal EEG study.  1. Potential epileptogenic foci in the left frontotemporal and right frontal regions.   2. Structural abnormality in the left frontotemporal region.   3. Mild to moderate nonspecific diffuse or multifocal cerebral dysfunction.   4. No seizure seen.       Resp- Resp failure secondary to neuro injury  Maintain O2 > 92 %  - Wean FIO2 to 30 %  CXR - No infiltrate- 7/29/22  - monitor e-lytes- metabolic alkalosis , non compensated, Correct electrolytes     Card- Ischemic cardiomyopathy HFrEF - Compensated CHF, Ectopy  Lopressor 50mg q8  + ACE- Inhibitor increase to 7.5 q 12 if BP Hold Aldosterone antag due to volume status  IMTIAZ rescheduled for Mon due to current fever  monitor e-lytes- metabolic alkalosis , non compensated, Correct electrolytes   Check EKG- troponin ; Pro BNP   Cardiology involved; cardiology will wait when patient afebrile    Hold  diuresis, maintain euvolemia to -500ml, monitor renal function, lactate,       Renal- Uremia - likely secondary to vol status  - monitor e-lytes- metabolic alkalosis , non compensated, Correct electrolytes   Hold Lasix - re-evaluate daily  Increase Free H2O      GI prophylaxis -   pepcid; Stool softeners  Last BM 7/30/22    ID  S/P fever- Ceftriaxone IV for UTI- Pan Sensitive - Day 4/7; ? prostatitis - therfore change to Cipro   Recurrent fever- check CBC with diff, lipase, LFT- transaminitis , procalcitonin, CXR- Clear  , Blood Culture x2 - P   No obvious source of fever- cultures performed and awaiting results  Transaminitis with elevated alk Phos - will obtain RUQ ultrasound  Renal ultrasound- no mass, no calculi    Heme  DVT prophylaxsis  Stable WBC

## 2022-07-30 NOTE — OCCUPATIONAL THERAPY INITIAL EVALUATION ADULT - ADDITIONAL COMMENTS
Pt unable to provide any information, as he is intubated and unarousable.  As per son's report in chart, pt lives in private home with 1 step to enter, no steps inside.  He was independent prior to admission and used cane as needed for Gout flareups only, otherwise no device.  He drives.

## 2022-07-30 NOTE — OCCUPATIONAL THERAPY INITIAL EVALUATION ADULT - IMPAIRMENTS CONTRIBUTING IMPAIRED BED MOBILITY, REHAB EVAL
impaired balance/cognition/impaired coordination/decreased flexibility/impaired motor control/abnormal muscle tone/impaired postural control/decreased ROM/decreased sensation/impaired sensory feedback/decreased strength

## 2022-07-31 DIAGNOSIS — I63.9 CEREBRAL INFARCTION, UNSPECIFIED: ICD-10-CM

## 2022-07-31 DIAGNOSIS — I49.3 VENTRICULAR PREMATURE DEPOLARIZATION: ICD-10-CM

## 2022-07-31 DIAGNOSIS — I47.2 VENTRICULAR TACHYCARDIA: ICD-10-CM

## 2022-07-31 LAB
ALBUMIN SERPL ELPH-MCNC: 2.8 G/DL — LOW (ref 3.3–5.2)
ALBUMIN SERPL ELPH-MCNC: 2.9 G/DL — LOW (ref 3.3–5.2)
ALP SERPL-CCNC: 328 U/L — HIGH (ref 40–120)
ALP SERPL-CCNC: 334 U/L — HIGH (ref 40–120)
ALT FLD-CCNC: 501 U/L — HIGH
ALT FLD-CCNC: 559 U/L — HIGH
ANION GAP SERPL CALC-SCNC: 10 MMOL/L — SIGNIFICANT CHANGE UP (ref 5–17)
ANION GAP SERPL CALC-SCNC: 17 MMOL/L — SIGNIFICANT CHANGE UP (ref 5–17)
AST SERPL-CCNC: 798 U/L — HIGH
AST SERPL-CCNC: 844 U/L — HIGH
BASE EXCESS BLDA CALC-SCNC: 9.6 MMOL/L — HIGH (ref -2–3)
BASOPHILS # BLD AUTO: 0.05 K/UL — SIGNIFICANT CHANGE UP (ref 0–0.2)
BASOPHILS NFR BLD AUTO: 0.4 % — SIGNIFICANT CHANGE UP (ref 0–2)
BILIRUB DIRECT SERPL-MCNC: 0.2 MG/DL — SIGNIFICANT CHANGE UP (ref 0–0.3)
BILIRUB INDIRECT FLD-MCNC: 0.3 MG/DL — SIGNIFICANT CHANGE UP (ref 0.2–1)
BILIRUB SERPL-MCNC: 0.4 MG/DL — SIGNIFICANT CHANGE UP (ref 0.4–2)
BILIRUB SERPL-MCNC: 0.5 MG/DL — SIGNIFICANT CHANGE UP (ref 0.4–2)
BLOOD GAS COMMENTS ARTERIAL: SIGNIFICANT CHANGE UP
BUN SERPL-MCNC: 105.2 MG/DL — HIGH (ref 8–20)
BUN SERPL-MCNC: 99.7 MG/DL — HIGH (ref 8–20)
CALCIUM SERPL-MCNC: 8.4 MG/DL — SIGNIFICANT CHANGE UP (ref 8.4–10.5)
CALCIUM SERPL-MCNC: 8.8 MG/DL — SIGNIFICANT CHANGE UP (ref 8.4–10.5)
CHLORIDE SERPL-SCNC: 108 MMOL/L — HIGH (ref 98–107)
CHLORIDE SERPL-SCNC: 112 MMOL/L — HIGH (ref 98–107)
CHLORIDE UR-SCNC: <27 MMOL/L — SIGNIFICANT CHANGE UP
CHLORIDE UR-SCNC: <27 MMOL/L — SIGNIFICANT CHANGE UP
CO2 SERPL-SCNC: 27 MMOL/L — SIGNIFICANT CHANGE UP (ref 22–29)
CO2 SERPL-SCNC: 30 MMOL/L — HIGH (ref 22–29)
CREAT ?TM UR-MCNC: 57 MG/DL — SIGNIFICANT CHANGE UP
CREAT SERPL-MCNC: 0.95 MG/DL — SIGNIFICANT CHANGE UP (ref 0.5–1.3)
CREAT SERPL-MCNC: 1.07 MG/DL — SIGNIFICANT CHANGE UP (ref 0.5–1.3)
CULTURE RESULTS: SIGNIFICANT CHANGE UP
CULTURE RESULTS: SIGNIFICANT CHANGE UP
EGFR: 72 ML/MIN/1.73M2 — SIGNIFICANT CHANGE UP
EGFR: 83 ML/MIN/1.73M2 — SIGNIFICANT CHANGE UP
EOSINOPHIL # BLD AUTO: 0.05 K/UL — SIGNIFICANT CHANGE UP (ref 0–0.5)
EOSINOPHIL NFR BLD AUTO: 0.4 % — SIGNIFICANT CHANGE UP (ref 0–6)
GAS PNL BLDA: SIGNIFICANT CHANGE UP
GLUCOSE BLDC GLUCOMTR-MCNC: 162 MG/DL — HIGH (ref 70–99)
GLUCOSE BLDC GLUCOMTR-MCNC: 179 MG/DL — HIGH (ref 70–99)
GLUCOSE BLDC GLUCOMTR-MCNC: 197 MG/DL — HIGH (ref 70–99)
GLUCOSE BLDC GLUCOMTR-MCNC: 200 MG/DL — HIGH (ref 70–99)
GLUCOSE BLDC GLUCOMTR-MCNC: 212 MG/DL — HIGH (ref 70–99)
GLUCOSE SERPL-MCNC: 209 MG/DL — HIGH (ref 70–99)
GLUCOSE SERPL-MCNC: 209 MG/DL — HIGH (ref 70–99)
HCO3 BLDA-SCNC: 33 MMOL/L — HIGH (ref 21–28)
HCT VFR BLD CALC: 46.4 % — SIGNIFICANT CHANGE UP (ref 39–50)
HGB BLD-MCNC: 15.1 G/DL — SIGNIFICANT CHANGE UP (ref 13–17)
HOROWITZ INDEX BLDA+IHG-RTO: SIGNIFICANT CHANGE UP
IMM GRANULOCYTES NFR BLD AUTO: 2 % — HIGH (ref 0–1.5)
LACTATE SERPL-SCNC: 3.2 MMOL/L — HIGH (ref 0.5–2)
LYMPHOCYTES # BLD AUTO: 1.04 K/UL — SIGNIFICANT CHANGE UP (ref 1–3.3)
LYMPHOCYTES # BLD AUTO: 7.8 % — LOW (ref 13–44)
MAGNESIUM SERPL-MCNC: 3.6 MG/DL — HIGH (ref 1.6–2.6)
MCHC RBC-ENTMCNC: 31.2 PG — SIGNIFICANT CHANGE UP (ref 27–34)
MCHC RBC-ENTMCNC: 32.5 GM/DL — SIGNIFICANT CHANGE UP (ref 32–36)
MCV RBC AUTO: 95.9 FL — SIGNIFICANT CHANGE UP (ref 80–100)
MONOCYTES # BLD AUTO: 1.4 K/UL — HIGH (ref 0–0.9)
MONOCYTES NFR BLD AUTO: 10.5 % — SIGNIFICANT CHANGE UP (ref 2–14)
NEUTROPHILS # BLD AUTO: 10.53 K/UL — HIGH (ref 1.8–7.4)
NEUTROPHILS NFR BLD AUTO: 78.9 % — HIGH (ref 43–77)
OSMOLALITY SERPL: 374 MOSMOL/KG — HIGH (ref 280–301)
OSMOLALITY UR: 702 MOSM/KG — SIGNIFICANT CHANGE UP (ref 300–1000)
PCO2 BLDA: 42 MMHG — SIGNIFICANT CHANGE UP (ref 35–48)
PH BLDA: 7.5 — HIGH (ref 7.35–7.45)
PHOSPHATE SERPL-MCNC: 3.5 MG/DL — SIGNIFICANT CHANGE UP (ref 2.4–4.7)
PLATELET # BLD AUTO: 304 K/UL — SIGNIFICANT CHANGE UP (ref 150–400)
PO2 BLDA: 103 MMHG — SIGNIFICANT CHANGE UP (ref 83–108)
POTASSIUM SERPL-MCNC: 4.5 MMOL/L — SIGNIFICANT CHANGE UP (ref 3.5–5.3)
POTASSIUM SERPL-MCNC: 4.8 MMOL/L — SIGNIFICANT CHANGE UP (ref 3.5–5.3)
POTASSIUM SERPL-SCNC: 4.5 MMOL/L — SIGNIFICANT CHANGE UP (ref 3.5–5.3)
POTASSIUM SERPL-SCNC: 4.8 MMOL/L — SIGNIFICANT CHANGE UP (ref 3.5–5.3)
POTASSIUM UR-SCNC: 58 MMOL/L — SIGNIFICANT CHANGE UP
PROT SERPL-MCNC: 7.4 G/DL — SIGNIFICANT CHANGE UP (ref 6.6–8.7)
PROT SERPL-MCNC: 7.8 G/DL — SIGNIFICANT CHANGE UP (ref 6.6–8.7)
RBC # BLD: 4.84 M/UL — SIGNIFICANT CHANGE UP (ref 4.2–5.8)
RBC # FLD: 16.3 % — HIGH (ref 10.3–14.5)
SAO2 % BLDA: 99.7 % — HIGH (ref 94–98)
SODIUM SERPL-SCNC: 152 MMOL/L — HIGH (ref 135–145)
SODIUM SERPL-SCNC: 152 MMOL/L — HIGH (ref 135–145)
SODIUM UR-SCNC: <30 MMOL/L — SIGNIFICANT CHANGE UP
SPECIMEN SOURCE: SIGNIFICANT CHANGE UP
SPECIMEN SOURCE: SIGNIFICANT CHANGE UP
TROPONIN T SERPL-MCNC: 0.19 NG/ML — HIGH (ref 0–0.06)
TROPONIN T SERPL-MCNC: 0.2 NG/ML — HIGH (ref 0–0.06)
TROPONIN T SERPL-MCNC: 0.25 NG/ML — HIGH (ref 0–0.06)
WBC # BLD: 12.92 K/UL — HIGH (ref 3.8–10.5)
WBC # FLD AUTO: 12.92 K/UL — HIGH (ref 3.8–10.5)

## 2022-07-31 PROCEDURE — 99232 SBSQ HOSP IP/OBS MODERATE 35: CPT

## 2022-07-31 PROCEDURE — 36215 PLACE CATHETER IN ARTERY: CPT

## 2022-07-31 PROCEDURE — 99223 1ST HOSP IP/OBS HIGH 75: CPT

## 2022-07-31 PROCEDURE — 95720 EEG PHY/QHP EA INCR W/VEEG: CPT

## 2022-07-31 RX ORDER — ALBUMIN HUMAN 25 %
250 VIAL (ML) INTRAVENOUS ONCE
Refills: 0 | Status: COMPLETED | OUTPATIENT
Start: 2022-07-31 | End: 2022-07-31

## 2022-07-31 RX ORDER — SODIUM CHLORIDE 9 MG/ML
250 INJECTION, SOLUTION INTRAVENOUS ONCE
Refills: 0 | Status: DISCONTINUED | OUTPATIENT
Start: 2022-07-31 | End: 2022-07-31

## 2022-07-31 RX ORDER — SODIUM CHLORIDE 9 MG/ML
250 INJECTION INTRAMUSCULAR; INTRAVENOUS; SUBCUTANEOUS ONCE
Refills: 0 | Status: COMPLETED | OUTPATIENT
Start: 2022-07-31 | End: 2022-07-31

## 2022-07-31 RX ORDER — HUMAN INSULIN 100 [IU]/ML
4 INJECTION, SUSPENSION SUBCUTANEOUS EVERY 8 HOURS
Refills: 0 | Status: DISCONTINUED | OUTPATIENT
Start: 2022-07-31 | End: 2022-08-01

## 2022-07-31 RX ADMIN — Medication 125 MILLILITER(S): at 22:33

## 2022-07-31 RX ADMIN — Medication 50 MILLIGRAM(S): at 21:17

## 2022-07-31 RX ADMIN — Medication 4: at 05:07

## 2022-07-31 RX ADMIN — Medication 2: at 19:24

## 2022-07-31 RX ADMIN — CHLORHEXIDINE GLUCONATE 1 APPLICATION(S): 213 SOLUTION TOPICAL at 12:52

## 2022-07-31 RX ADMIN — Medication 50 MILLIGRAM(S): at 13:33

## 2022-07-31 RX ADMIN — Medication 2: at 00:30

## 2022-07-31 RX ADMIN — Medication 81 MILLIGRAM(S): at 12:52

## 2022-07-31 RX ADMIN — Medication 500 MILLIGRAM(S): at 05:08

## 2022-07-31 RX ADMIN — CHLORHEXIDINE GLUCONATE 15 MILLILITER(S): 213 SOLUTION TOPICAL at 05:08

## 2022-07-31 RX ADMIN — CHLORHEXIDINE GLUCONATE 15 MILLILITER(S): 213 SOLUTION TOPICAL at 19:27

## 2022-07-31 RX ADMIN — Medication 5 MILLIGRAM(S): at 09:28

## 2022-07-31 RX ADMIN — FAMOTIDINE 20 MILLIGRAM(S): 10 INJECTION INTRAVENOUS at 12:52

## 2022-07-31 RX ADMIN — Medication 4: at 15:18

## 2022-07-31 RX ADMIN — ENOXAPARIN SODIUM 40 MILLIGRAM(S): 100 INJECTION SUBCUTANEOUS at 21:17

## 2022-07-31 RX ADMIN — FAMOTIDINE 20 MILLIGRAM(S): 10 INJECTION INTRAVENOUS at 00:38

## 2022-07-31 RX ADMIN — Medication 5 MILLIGRAM(S): at 21:17

## 2022-07-31 RX ADMIN — Medication 50 MILLIGRAM(S): at 05:09

## 2022-07-31 RX ADMIN — Medication 125 MILLILITER(S): at 15:57

## 2022-07-31 RX ADMIN — SODIUM CHLORIDE 250 MILLILITER(S): 9 INJECTION INTRAMUSCULAR; INTRAVENOUS; SUBCUTANEOUS at 15:20

## 2022-07-31 RX ADMIN — Medication 500 MILLIGRAM(S): at 17:57

## 2022-07-31 RX ADMIN — HUMAN INSULIN 4 UNIT(S): 100 INJECTION, SUSPENSION SUBCUTANEOUS at 21:51

## 2022-07-31 NOTE — PROGRESS NOTE ADULT - SUBJECTIVE AND OBJECTIVE BOX
Neurology   NATANAEL ROWAN 76y Male           HPI:  76M with PMH CABG, HTN, HLD who presents as transfer from [facility] for stroke. Last known well was last night 10pm prior to going to bed, woke up this morning around 0500 with R sided weakness and R facial droop. Presented to [facility], code stroke initiated, NIH at [facility] reportedly 8. CTA showed LM1 occlusion. Transferred to [facility] for possible neuro IR intervention. On arrival to [facility], NIH 6. Decision made to take patient directly to neuro IR for intervention.     Initial NIH 6, mRS 0    PMH: HTN (hypertension)    HLD (hyperlipidemia)    S/P CABG x 1         PSH:       FAMILY HISTORY:      SOCIAL HISTORY:  No history of tobacco or alcohol use     Allergies    No Known Allergies    Intolerances        Vital Signs Last 24 Hrs  T(C): 36.7 (31 Jul 2022 11:00), Max: 38.3 (30 Jul 2022 17:00)  T(F): 98.1 (31 Jul 2022 11:00), Max: 100.9 (30 Jul 2022 17:00)  HR: 94 (31 Jul 2022 11:23) (81 - 100)  BP: 143/111 (31 Jul 2022 11:00) (90/71 - 160/74)  BP(mean): 122 (31 Jul 2022 11:00) (64 - 122)  RR: 24 (31 Jul 2022 11:00) (16 - 29)  SpO2: 98% (31 Jul 2022 11:23) (96% - 100%)    Parameters below as of 31 Jul 2022 08:00  Patient On (Oxygen Delivery Method): ventilator        MEDICATIONS    aspirin  chewable 81 milliGRAM(s) Oral daily  chlorhexidine 0.12% Liquid 15 milliLiter(s) Oral Mucosa every 12 hours  chlorhexidine 2% Cloths 1 Application(s) Topical daily  ciprofloxacin     Tablet 500 milliGRAM(s) Oral every 12 hours  dextrose 5%. 1000 milliLiter(s) IV Continuous <Continuous>  dextrose 5%. 1000 milliLiter(s) IV Continuous <Continuous>  dextrose 50% Injectable 25 Gram(s) IV Push once  dextrose Oral Gel 15 Gram(s) Oral once PRN  enalapril 5 milliGRAM(s) Oral <User Schedule>  enoxaparin Injectable 40 milliGRAM(s) SubCutaneous every 24 hours  famotidine Injectable 20 milliGRAM(s) IV Push every 12 hours  fentaNYL    Injectable 25 MICROGram(s) IV Push every 4 hours PRN  glucagon  Injectable 1 milliGRAM(s) IntraMuscular once  insulin lispro (ADMELOG) corrective regimen sliding scale   SubCutaneous every 6 hours  metoprolol tartrate 50 milliGRAM(s) Oral every 8 hours  multiple electrolytes Injection Type 1 Bolus 250 milliLiter(s) IV Bolus once  ondansetron Injectable 4 milliGRAM(s) IV Push every 6 hours PRN         LABS:  CBC Full  -  ( 31 Jul 2022 03:09 )  WBC Count : 12.92 K/uL  RBC Count : 4.84 M/uL  Hemoglobin : 15.1 g/dL  Hematocrit : 46.4 %  Platelet Count - Automated : 304 K/uL  Mean Cell Volume : 95.9 fl  Mean Cell Hemoglobin : 31.2 pg  Mean Cell Hemoglobin Concentration : 32.5 gm/dL  Auto Neutrophil # : 10.53 K/uL  Auto Lymphocyte # : 1.04 K/uL  Auto Monocyte # : 1.40 K/uL  Auto Eosinophil # : 0.05 K/uL  Auto Basophil # : 0.05 K/uL  Auto Neutrophil % : 78.9 %  Auto Lymphocyte % : 7.8 %  Auto Monocyte % : 10.5 %  Auto Eosinophil % : 0.4 %  Auto Basophil % : 0.4 %      07-31    152<H>  |  108<H>  |  99.7<H>  ----------------------------<  209<H>  4.8   |  27.0  |  1.07    Ca    8.8      31 Jul 2022 03:09  Phos  3.5     07-31  Mg     3.6     07-31    TPro  7.8  /  Alb  2.9<L>  /  TBili  0.5  /  DBili  x   /  AST  844<H>  /  ALT  501<H>  /  AlkPhos  334<H>  07-31    LIVER FUNCTIONS - ( 31 Jul 2022 03:09 )  Alb: 2.9 g/dL / Pro: 7.8 g/dL / ALK PHOS: 334 U/L / ALT: 501 U/L / AST: 844 U/L / GGT: x             On Neurological Examination:  Patient is intubated off sedation.  Mental Status - No Eye opening to noxious..    Cranial Nerves -Pupils are 2 and reactive. , EOMs are conjugate in primary gaze and on occulocephalics.   Cough reflex is present .  Motor Exam -   Right side -trace movement to noxious. Left UE and LE trace withdrawal  to noxious stim.         RADIOLOGY ( All neurological imaging studies were independently reviewed and interpreted by me)  [facility]   CTA  CTP    IMPRESSION:    CT PERFUSION:  The CT perfusion is technically limited by truncation of the arterial input function and venous outflow function.    Core infarct (CBF < 30%:): 0 ml  Penumbra (Tmax >6s): 4 mL  Mismatched volume: 0 ml  Mismatched ratio: None    Interpretation:  Based on CBF < 30%, there is no evidence of a core infarct. At CBF < 34%, a small perfusion defect of 4 mL is located in the left inferior frontal gyrus and corresponds to matched perfusion abnormality of Tmax >6 seconds. On Tmax > 4s, there is a much larger perfusion defect throughout the majority of the left frontoparietal convexity, which may represent an ischemic penumbra in the left MCA vascular territory.        CT ANGIOGRAPHY NECK:  1. Poor opacification of the cervical vasculature.  2. Suspicion for moderate greater than 50% stenosis in the proximal right internal carotid artery. Suspicion for moderate to severe stenosis in the proximal left internal carotid artery that may be greater than 70%. No evidence of ICA occlusion in the neck.  3. The right vertebral artery is grossly patent.. The V1 segment of the left vertebral artery cannot be visualized. The V2 and V3 segments the left vertebral artery are grossly patent with multiple stenoses.  4. There is limited visualization of the common carotid artery origins and subclavian artery origins. Underlying stenoses cannot be excluded. There is suspicion for a severe stenosis in the proximal left subclavian artery.      CT ANGIOGRAPHY BRAIN:  1. Poor opacification of the intracranial vasculature.  2. There appears to be a focal filling defect at the left MCA bifurcation with distal flow. There is marked attenuation of M2 branches of the left middle cerebral artery. Vessel density analysis of the MCA territory shows a greater than 50% reduction of vessel density in the left MCA territory compared to the contralateral side.  3. There are mild to moderate stenoses in the supraclinoid segments of the internal carotid arteries bilaterally, without occlusion.  4. There are stenoses in the intradural vertebral arteries bilaterally, without occlusion.    Repeat CTA or MRI/MRA is recommended for further evaluation.  Peak arterial opacification on the CT perfusion occurs at approximately 38 seconds. If the CTA is repeated, a long delay is required after contrast injection to achieve adequate opacification of the vasculature.    The findings were discussed with JACK Ligth in the emergency room on 07/24/2022 at 5:45 AM. Read back verification was obtained.      SEVERO CHANEL MD; Attending Radiologist  This document has been electronically signed    MRI:    MR Head No Cont (07.26.22 @ 20:58) >  IMPRESSION:    Acute left MCA territory infarcts with hemorrhagic transformation,   grossly stable since comparison CT.    Additional punctate acute infarct involving the medial left frontal lobe   in the SUGEY territory.    ORION ANDRADE MD; Attending Radiologist        TTE  TTE Echo Complete w/ Contrast w/ Doppler (07.24.22 @ 14:01) >    Summary:   1. Endocardial visualization was enhanced with intravenous echo contrast.   2. Severely enlarged left atrium.   3. Global diffuse akinesis. Left ventricular ejection fraction, by   visual estimation, is <10%.   4. Elevated mean left atrial pressure. (>30 mm Hg)   5. Normal right atrial size.   6. Mildly enlarged right ventricle. Moderately reduced RV systolic   function.   7. Mild mitral valve regurgitation.   8. Mild tricuspid regurgitation.   9. Estimated pulmonary artery systolic pressure is 42 mmHg - mild   pulmonary hypertension.  10. There is no evidence of pericardial effusion.  11. Team informed of the findings.    MD Millie, RPVI Electronically signed on 7/24/2022 at 3:49:14 PM      < from: CT Head No Cont (07.30.22 @ 12:47) >    IMPRESSION:  Aging inferior left frontal and inferolateral left parietal infarcts.   Mild stable cortical blood products associated with the frontal infarct.    VERNELL LOYA MD; Attending Radiologist          EEG IMPRESSION/CLINICAL CORRELATE 7/31/22    Abnormal EEG study.  1. Potential epileptogenic foci in the bilateral frontotemporal regions.   2. Structural abnormality and cortical dysfunction in the left frontotemporal region.   3. Mild to moderate nonspecific diffuse or multifocal cerebral dysfunction.   4. No seizure seen.     _____________________________________________________________    Mayi Prather MD  Director, Epilepsy/EMU - [facility]       Electronic Signatures:  Mayi Prather)  (Signed 31-Jul-2022 09:06)EEG IMPRESSION/CLINICAL CORRELATE

## 2022-07-31 NOTE — PROGRESS NOTE ADULT - ASSESSMENT
77 y/o male with hx of CAD s/p stents x2 (2004) and CABG x4 ([facility], 2014), ischemic cardiomyopathy (declined AICD), HTN, HLD, PVD s/p femoral endarterectomy who presented with right-sided weakness and facial droop to [facility] and was found to have acute stroke. Pt was transferred to [facility] for neuro IR intervention and underwent mechanical thrombectomy for left MCA occlusion. Post procedure pt developed acute hypoxic respiratory failure and was int

## 2022-07-31 NOTE — PROGRESS NOTE ADULT - SUBJECTIVE AND OBJECTIVE BOX
HPI:  76M with PMH CABG, HTN, HLD who presents as transfer from [facility] [place] for stroke. Last known well was last night 10pm prior to going to bed, woke up this morning around 0500 with R sided weakness and R facial droop. Presented to [facility], code stroke initiated, NIH at [facility] reportedly 8. CTA showed LM1 occlusion. Transferred to [facility] for possible neuro IR intervention. On arrival to [facility], NIH 6. Decision made to take patient directly to neuro IR for intervention.   NIH 6, mRS 0    NIH SS:  DATE: 7/24/22  TIME: 0710  1A: Level of consciousness (0-3): 0  1B: Questions (0-2): 0    1C: Commands (0-2): 0  2: Gaze (0-2): 0  3: Visual fields (0-3): 0  4: Facial palsy (0-3): 1  MOTOR:  5A: Left arm motor drift (0-4): 0  5B: Right arm motor drift (0-4): 1  6A: Left leg motor drift (0-4): 0  6B: Right leg motor drift (0-4): 1  7: Limb ataxia (0-2): 0  SENSORY:  8: Sensation (0-2): 1  SPEECH:  9: Language (0-3): 1  10: Dysarthria (0-2): 1  EXTINCTION:  11: Extinction/inattention (0-2): 0    TOTAL SCORE: 6 (24 Jul 2022 07:34)    No o/n events.  Evolving large L MCA stroke wit small heme transformation.    ICU Vital Signs Last 24 Hrs  T(C): 38.2 (30 Jul 2022 08:00), Max: 38.7 (29 Jul 2022 10:30)  T(F): 100.8 (30 Jul 2022 08:00), Max: 101.7 (29 Jul 2022 10:30)  HR: 97 (30 Jul 2022 08:01) (79 - 102)  BP: 134/67 (30 Jul 2022 08:00) (105/62 - 149/74)  BP(mean): 85 (30 Jul 2022 08:00) (67 - 95)  RR: 26 (30 Jul 2022 08:00) (15 - 27)  SpO2: 98% (30 Jul 2022 08:01) (94% - 100%)    O2 Parameters below as of 30 Jul 2022 08:00  Patient On (Oxygen Delivery Method): ventilator        29 Jul 2022 07:01  -  30 Jul 2022 07:00  --------------------------------------------------------  IN:    Enteral Tube Flush: 800 mL    Glucerna 1.5: 900 mL  Total IN: 1700 mL    OUT:    Indwelling Catheter - Urethral (mL): 265 mL    Voided (mL): 600 mL  Total OUT: 865 mL    Total NET: 835 mL      30 Jul 2022 07:01  -  30 Jul 2022 09:25  --------------------------------------------------------  IN:    Glucerna 1.5: 120 mL  Total IN: 120 mL    OUT:  Total OUT: 0 mL    Total NET: 120 mL          28 Jul 2022 07:01  -  29 Jul 2022 07:00  Total NET: -780 mL    27 Jul 2022 07:01  -  28 Jul 2022 07:00  Total NET: -356.7 mL      MEDICATIONS  (STANDING):  aspirin  chewable 81 milliGRAM(s) Oral daily  atorvastatin 80 milliGRAM(s) Oral daily  chlorhexidine 0.12% Liquid 15 milliLiter(s) Oral Mucosa every 12 hours  chlorhexidine 2% Cloths 1 Application(s) Topical daily  ciprofloxacin     Tablet 500 milliGRAM(s) Oral every 12 hours  dextrose 5%. 1000 milliLiter(s) (50 mL/Hr) IV Continuous <Continuous>  dextrose 5%. 1000 milliLiter(s) (100 mL/Hr) IV Continuous <Continuous>  dextrose 50% Injectable 25 Gram(s) IV Push once  enalapril 5 milliGRAM(s) Oral <User Schedule>  enoxaparin Injectable 40 milliGRAM(s) SubCutaneous every 24 hours  famotidine Injectable 20 milliGRAM(s) IV Push every 12 hours  glucagon  Injectable 1 milliGRAM(s) IntraMuscular once  insulin lispro (ADMELOG) corrective regimen sliding scale   SubCutaneous every 6 hours  metoprolol tartrate 25 milliGRAM(s) Oral every 8 hours  polyethylene glycol 3350 17 Gram(s) Oral daily  senna 2 Tablet(s) Oral at bedtime    MEDICATIONS  (PRN):  acetaminophen     Tablet .. 650 milliGRAM(s) Oral every 6 hours PRN Temp greater or equal to 38C (100.4F), Mild Pain (1 - 3)  dextrose Oral Gel 15 Gram(s) Oral once PRN Blood Glucose LESS THAN 70 milliGRAM(s)/deciliter  fentaNYL    Injectable 25 MICROGram(s) IV Push every 4 hours PRN agitation/vent synchrony  ondansetron Injectable 4 milliGRAM(s) IV Push every 6 hours PRN Nausea and/or Vomiting    07-30    149<H>  |  106  |  90.6<H>  ----------------------------<  185<H>  4.3   |  29.0  |  1.21    Ca    9.1      30 Jul 2022 01:24  Phos  4.7     07-30  Mg     3.4     07-30    TPro  8.2  /  Alb  3.3  /  TBili  0.8  /  DBili  0.3  /  AST  126<H>  /  ALT  89<H>  /  AlkPhos  203<H>  07-29 07-29    152<H>  |  110<H>  |  53.4<H>  ----------------------------<  172<H>  4.6   |  32.0<H>  |  0.84    Procalcitonin- 0.43---->0.79                            13.7   11.46 )-----------( 258      ( 30 Jul 2022 01:24 )             42.2       CAPILLARY BLOOD GLUCOSE  POCT Blood Glucose.: 181 mg/dL (30 Jul 2022 06:21)  POCT Blood Glucose.: 189 mg/dL (30 Jul 2022 00:07)  POCT Blood Glucose.: 156 mg/dL (29 Jul 2022 17:21)  POCT Blood Glucose.: 174 mg/dL (29 Jul 2022 12:17)    BNP -- 3000----> 1760  Trop - 0.16---> 0.25      Culture - Urine (collected 25 Jul 2022 23:44)  Source: Clean Catch Clean Catch (Midstream)  Preliminary Report (27 Jul 2022 11:52):    >100,000 CFU/ml Escherichia coli- Pan Sens      Sputum - P      Blood - 7/29 - No growth     CXR- CLEAR - 7/29    US Abdomen Upper Quadrant Right (07.30.22 @ 20:27) >  INTERPRETATION:  CLINICAL INFORMATION: Abnormal liver function tests.    COMPARISON: Renal ultrasound dated 07/26/2022.    TECHNIQUE: Sonography of the right upper quadrant.    FINDINGS:  Liver: Normal in size and echogenicity. No focal lesions identified. The   main portal vein is patent with normal directional flow.  Bile ducts: No intrahepatic biliary dilatation. Common bile duct is not   well visualized.  Gallbladder: The gallbladder is only mildly distended. No stones or   gallbladder wall thickening.  Pancreas: Poorly visualized.  Right kidney: 10.4 cm. No hydronephrosis. There is 1.9 cm simple   appearing cyst in the lower pole.  Ascites: None.  IVC: Visualized portions are within normal limits.    IMPRESSION:  The liver is normal in appearance.    Limited visualization of the pancreas and common bile duct.      US Renal (07.26.22 @ 23:01) >      INTERPRETATION:  CLINICAL INFORMATION: Acute kidney injury.    COMPARISON: None available.    TECHNIQUE: Sonography of the kidneys and bladder.    FINDINGS:  Right kidney: 9.9 cm. No renal mass, hydronephrosis or calculi.    Left kidney: 11.5 cm. No renal mass, hydronephrosis or calculi.    Urinary bladder: Within normal limits.    IMPRESSION:  No renal mass, hydronephrosis or calculus is visualized sonographically.      MR Head No Cont (07.26.22 @ 20:58) >  COMPARISON: CT head 7/26/2022.    FINDINGS:  Acute infarcts are seen throughout the left MCA territory with   involvement of the left precentral gyrus. Hemorrhagic transformation is   again seen within the left inferior frontal lobe and insula, grossly   stable since comparison study.    Additional acute punctate infarct is seen within the medial left frontal   lobe (4-31).    Trace hemorrhage noted within the bilateral occipital horns. No   extra-axial fluid collections. The skull base flow voids are present.    The visualized intraorbital contents are normal. Mucosal thickening with   air-fluid level in the left sphenoid sinus. The mastoid air cells are   clear. The visualized osseous structures, soft tissues and partially   visualized parotid glands appear normal.    IMPRESSION:    Acute left MCA territory infarcts with hemorrhagic transformation,   grossly stable since comparison CT.    Additional punctate acute infarct involving the medial left frontal lobe   in the SUGEY territory.      CT Head No Cont (07.26.22 @ 20:25) >  INTERPRETATION:  CLINICAL INDICATION: Left M1 occlusion status post TICI   2B recanalization, follow-up    5mm axial sections of the brain were obtained from base to vertex,   without the intravenous administration of contrast material. Coronal and   sagittal computer generated reconstructed views are available.    Comparison is made with the prior CT of 7/24/2022.    Mild atrophy is identified with ventricular and sulcal prominence. There   is more lucency identified in the left insular cortex and left frontal   cortex compared to the prior exam consistent with normal evolution of an   infarct in the left middle cerebral artery territory. There is asmall   amount of hemorrhage in the left frontal region and left anterior   temporal lobe which is new since the prior exam.    Impression: Large lucency in the left frontal temporal cortex in the left   middle cerebral artery distribution compared with 7/24/2022 consistent   with normal evolving acute left middle cerebral artery infarct. Small   amount of new hemorrhage.    Xray Chest 1 View- PORTABLE-Urgent (Xray Chest 1 View- PORTABLE-Urgent .) (07.24.22 @ 20:45) >  IMPRESSION:    ET tube and NG tube, in adequate position.    Cardiomegaly. Unchanged pulmonary vascular congestion. Mild hazy opacity   right midlung field. Underlying infiltrate not excluded.     TTE Echo Complete w/ Contrast w/ Doppler (07.24.22 @ 14:01) >  Summary:   1. Endocardial visualization was enhanced with intravenous echo contrast.   2. Severely enlarged left atrium.   3. Global diffuse akinesis. Left ventricular ejection fraction, by   visual estimation, is <10%.   4. Elevated mean left atrial pressure. (>30 mm Hg)   5. Normal right atrial size.   6. Mildly enlarged right ventricle. Moderately reduced RV systolic   function.   7. Mild mitral valve regurgitation.   8. Mild tricuspid regurgitation.   9. Estimated pulmonary artery systolic pressure is 42 mmHg - mild   pulmonary hypertension.  10. There is no evidence of pericardial effusion.          EXAMINATION:  General:  calm, NAD  Neuro:  somnolent, no FC,  attempts EO to noxious, moves L side, RLE wdrl, RUE 0/5.Cards:  S1S2 present  Respiratory:  no respiratory distress, intubated.  Abdomen:  soft  Extremities:  no edema  Skin:  warm/dry HPI:  76M with PMH CABG, HTN, HLD who presents as transfer from [facility] [place] for stroke. Last known well was last night 10pm prior to going to bed, woke up this morning around 0500 with R sided weakness and R facial droop. Presented to [facility] [place], code stroke initiated, NIH at [facility] [place] [facility] reportedly 8. CTA showed LM1 occlusion. Transferred to [facility] for possible neuro IR intervention. On arrival to [facility], NIH 6. Decision made to take patient directly to neuro IR for intervention.   NIH 6, mRS 0    NIH SS:  DATE: 7/24/22  TIME: 0710  1A: Level of consciousness (0-3): 0  1B: Questions (0-2): 0    1C: Commands (0-2): 0  2: Gaze (0-2): 0  3: Visual fields (0-3): 0  4: Facial palsy (0-3): 1  MOTOR:  5A: Left arm motor drift (0-4): 0  5B: Right arm motor drift (0-4): 1  6A: Left leg motor drift (0-4): 0  6B: Right leg motor drift (0-4): 1  7: Limb ataxia (0-2): 0  SENSORY:  8: Sensation (0-2): 1  SPEECH:  9: Language (0-3): 1  10: Dysarthria (0-2): 1  EXTINCTION:  11: Extinction/inattention (0-2): 0    TOTAL SCORE: 6 (24 Jul 2022 07:34)    No o/n events.  Evolving large L MCA stroke wit small heme transformation.    ICU Vital Signs Last 24 Hrs  ICU Vital Signs Last 24 Hrs  T(C): 36.7 (31 Jul 2022 11:00), Max: 38.3 (30 Jul 2022 17:00)  T(F): 98.1 (31 Jul 2022 11:00), Max: 100.9 (30 Jul 2022 17:00)  HR: 94 (31 Jul 2022 11:23) (81 - 113)  BP: 143/111 (31 Jul 2022 11:00) (90/71 - 174/74)  BP(mean): 122 (31 Jul 2022 11:00) (15 - 122)  RR: 24 (31 Jul 2022 11:00) (16 - 29)  SpO2: 98% (31 Jul 2022 11:23) (96% - 100%)    O2 Parameters below as of 31 Jul 2022 08:00  Patient On (Oxygen Delivery Method): ventilator        30 Jul 2022 07:01  -  31 Jul 2022 07:00  --------------------------------------------------------  IN:    Enteral Tube Flush: 1100 mL    Glucerna 1.5: 1440 mL  Total IN: 2540 mL    OUT:    Voided (mL): 1800 mL  Total OUT: 1800 mL    Total NET: 740 mL      31 Jul 2022 07:01  -  31 Jul 2022 11:38  --------------------------------------------------------  IN:    Enteral Tube Flush: 300 mL    Glucerna 1.5: 300 mL  Total IN: 600 mL    OUT:  Total OUT: 0 mL    Total NET: 600 mL          29 Jul 2022 07:01  -  30 Jul 2022 07:00  --------------------------------------------------------  IN:    Enteral Tube Flush: 800 mL    Glucerna 1.5: 900 mL  Total IN: 1700 mL    OUT:    Indwelling Catheter - Urethral (mL): 265 mL    Voided (mL): 600 mL  Total OUT: 865 mL    Total NET: 835 mL      30 Jul 2022 07:01  -  30 Jul 2022 09:25  --------------------------------------------------------  IN:    Glucerna 1.5: 120 mL  Total IN: 120 mL    OUT:  Total OUT: 0 mL    Total NET: 120 mL          28 Jul 2022 07:01  -  29 Jul 2022 07:00  Total NET: -780 mL    27 Jul 2022 07:01  -  28 Jul 2022 07:00  Total NET: -356.7 mL      MEDICATIONS  (STANDING):  aspirin  chewable 81 milliGRAM(s) Oral daily  chlorhexidine 0.12% Liquid 15 milliLiter(s) Oral Mucosa every 12 hours  chlorhexidine 2% Cloths 1 Application(s) Topical daily  ciprofloxacin     Tablet 500 milliGRAM(s) Oral every 12 hours  dextrose 5%. 1000 milliLiter(s) (50 mL/Hr) IV Continuous <Continuous>  dextrose 5%. 1000 milliLiter(s) (100 mL/Hr) IV Continuous <Continuous>  dextrose 50% Injectable 25 Gram(s) IV Push once  enalapril 5 milliGRAM(s) Oral <User Schedule>  enoxaparin Injectable 40 milliGRAM(s) SubCutaneous every 24 hours  famotidine Injectable 20 milliGRAM(s) IV Push every 12 hours  glucagon  Injectable 1 milliGRAM(s) IntraMuscular once  insulin lispro (ADMELOG) corrective regimen sliding scale   SubCutaneous every 6 hours  metoprolol tartrate 25 milliGRAM(s) Oral every 8 hours    MEDICATIONS  (PRN):  acetaminophen     Tablet .. 650 milliGRAM(s) Oral every 6 hours PRN Temp greater or equal to 38C (100.4F), Mild Pain (1 - 3)  dextrose Oral Gel 15 Gram(s) Oral once PRN Blood Glucose LESS THAN 70 milliGRAM(s)/deciliter  fentaNYL    Injectable 25 MICROGram(s) IV Push every 4 hours PRN agitation/vent synchrony  ondansetron Injectable 4 milliGRAM(s) IV Push every 6 hours PRN Nausea and/or Vomiting      07-31    152<H>  |  108<H>  |  99.7<H>  ----------------------------<  209<H>  ( H2C03- 20)  4.8   |  27.0  |  1.07    Ca    8.8      31 Jul 2022 03:09  Phos  3.5     07-31  Mg     3.6     07-31    TPro  7.8  /  Alb  2.9<L>  /  TBili  0.5  /  DBili  x   /  AST  844<H>  /  ALT  501<H>  /  AlkPhos  334<H>  07-31 07-30    149<H>  |  106  |  90.6<H>  ----------------------------<  185<H>  4.3   |  29.0  |  1.21                        15.1   12.92 )-----------( 304      ( 31 Jul 2022 03:09 )             46.4     Ca    9.1      30 Jul 2022 01:24  Phos  4.7     07-30  Mg     3.4     07-30    TPro  8.2  /  Alb  3.3  /  TBili  0.8  /  DBili  0.3  /  AST  126<H>  /  ALT  89<H>  /  AlkPhos  203<H>  07-29          Procalcitonin- 0.43---->0.79                            15.1   12.92 )-----------( 304      ( 31 Jul 2022 03:09 )             46.4                         13.7   11.46 )-----------( 258      ( 30 Jul 2022 01:24 )             42.2       CAPILLARY BLOOD GLUCOSE  POCT Blood Glucose.: 200 mg/dL (31 Jul 2022 00:20)  POCT Blood Glucose.: 208 mg/dL (30 Jul 2022 17:34)  POCT Blood Glucose.: 183 mg/dL (30 Jul 2022 13:16)      BNP -- 3000----> 1760  Trop - 0.16---> 0.25---> 0.19      Culture - Urine (collected 25 Jul 2022 23:44)  Source: Clean Catch Clean Catch (Midstream)  Preliminary Report (27 Jul 2022 11:52):    >100,000 CFU/ml Escherichia coli- Pan Sens      Sputum - P      Blood - 7/29 - No growth     CXR- CLEAR - 7/29     CT Head No Cont (07.30.22 @ 12:47) >  CLINICAL HISTORY: stroke    TECHNIQUE:  Noncontrast CT.  Axial Acquisition.  Sagittal and coronal reformations.    COMPARISON:  Compared to study dated 7/26/2022    FINDINGS:  *  HEMORRHAGE/BRAIN PARENCHYMA: Aging inferior left frontal and   inferolateral left parietal infarcts. Mild stable cortical hemorrhage   associated with the left frontal infarct. Mild decreased associated mass   effect. Patient has moderate underlying brain atrophy. Mild chronic   periventricular white matter ischemic changes are seen  *  VENTRICLES / SHIFT:  No hydrocephalus. No midline shift.  *  EXTRA-AXIAL / BASAL CISTERNS:  No extra-axial mass.Basal cisterns   preserved.  *  CALVARIUM AND EXTRACRANIAL SOFT TISSUES:  No depressed calvarial   fracture.  *  SINUSES, ORBITS, MASTOIDS: Mild fluid within the left sphenoid sinus.   A nasogastric tube courses through the left nasal cavity    IMPRESSION:  Aging inferior left frontal and inferolateral left parietal infarcts.   Mild stable cortical blood products associated with the frontal infarct.    < end of copied text >      US Abdomen Upper Quadrant Right (07.30.22 @ 20:27) >  INTERPRETATION:  CLINICAL INFORMATION: Abnormal liver function tests.    COMPARISON: Renal ultrasound dated 07/26/2022.    TECHNIQUE: Sonography of the right upper quadrant.    FINDINGS:  Liver: Normal in size and echogenicity. No focal lesions identified. The   main portal vein is patent with normal directional flow.  Bile ducts: No intrahepatic biliary dilatation. Common bile duct is not   well visualized.  Gallbladder: The gallbladder is only mildly distended. No stones or   gallbladder wall thickening.  Pancreas: Poorly visualized.  Right kidney: 10.4 cm. No hydronephrosis. There is 1.9 cm simple   appearing cyst in the lower pole.  Ascites: None.  IVC: Visualized portions are within normal limits.    IMPRESSION:  The liver is normal in appearance.    Limited visualization of the pancreas and common bile duct.      US Renal (07.26.22 @ 23:01) >      INTERPRETATION:  CLINICAL INFORMATION: Acute kidney injury.    COMPARISON: None available.    TECHNIQUE: Sonography of the kidneys and bladder.    FINDINGS:  Right kidney: 9.9 cm. No renal mass, hydronephrosis or calculi.    Left kidney: 11.5 cm. No renal mass, hydronephrosis or calculi.    Urinary bladder: Within normal limits.    IMPRESSION:  No renal mass, hydronephrosis or calculus is visualized sonographically.      MR Head No Cont (07.26.22 @ 20:58) >  COMPARISON: CT head 7/26/2022.    FINDINGS:  Acute infarcts are seen throughout the left MCA territory with   involvement of the left precentral gyrus. Hemorrhagic transformation is   again seen within the left inferior frontal lobe and insula, grossly   stable since comparison study.    Additional acute punctate infarct is seen within the medial left frontal   lobe (4-31).    Trace hemorrhage noted within the bilateral occipital horns. No   extra-axial fluid collections. The skull base flow voids are present.    The visualized intraorbital contents are normal. Mucosal thickening with   air-fluid level in the left sphenoid sinus. The mastoid air cells are   clear. The visualized osseous structures, soft tissues and partially   visualized parotid glands appear normal.    IMPRESSION:    Acute left MCA territory infarcts with hemorrhagic transformation,   grossly stable since comparison CT.    Additional punctate acute infarct involving the medial left frontal lobe   in the SUGEY territory.      CT Head No Cont (07.26.22 @ 20:25) >  INTERPRETATION:  CLINICAL INDICATION: Left M1 occlusion status post TICI   2B recanalization, follow-up    5mm axial sections of the brain were obtained from base to vertex,   without the intravenous administration of contrast material. Coronal and   sagittal computer generated reconstructed views are available.    Comparison is made with the prior CT of 7/24/2022.    Mild atrophy is identified with ventricular and sulcal prominence. There   is more lucency identified in the left insular cortex and left frontal   cortex compared to the prior exam consistent with normal evolution of an   infarct in the left middle cerebral artery territory. There is asmall   amount of hemorrhage in the left frontal region and left anterior   temporal lobe which is new since the prior exam.    Impression: Large lucency in the left frontal temporal cortex in the left   middle cerebral artery distribution compared with 7/24/2022 consistent   with normal evolving acute left middle cerebral artery infarct. Small   amount of new hemorrhage.    Xray Chest 1 View- PORTABLE-Urgent (Xray Chest 1 View- PORTABLE-Urgent .) (07.24.22 @ 20:45) >  IMPRESSION:    ET tube and NG tube, in adequate position.    Cardiomegaly. Unchanged pulmonary vascular congestion. Mild hazy opacity   right midlung field. Underlying infiltrate not excluded.     TTE Echo Complete w/ Contrast w/ Doppler (07.24.22 @ 14:01) >  Summary:   1. Endocardial visualization was enhanced with intravenous echo contrast.   2. Severely enlarged left atrium.   3. Global diffuse akinesis. Left ventricular ejection fraction, by   visual estimation, is <10%.   4. Elevated mean left atrial pressure. (>30 mm Hg)   5. Normal right atrial size.   6. Mildly enlarged right ventricle. Moderately reduced RV systolic   function.   7. Mild mitral valve regurgitation.   8. Mild tricuspid regurgitation.   9. Estimated pulmonary artery systolic pressure is 42 mmHg - mild   pulmonary hypertension.  10. There is no evidence of pericardial effusion.          EXAMINATION:  General:  calm, NAD  Neuro:  somnolent, no FC,  attempts EO to noxious, moves L side, RLE wdrl, RUE 0/5.Cards:  S1S2 present  Respiratory:  no respiratory distress, intubated.  Abdomen:  soft  Extremities:  no edema  Skin:  warm/dry

## 2022-07-31 NOTE — PROGRESS NOTE ADULT - ASSESSMENT
IMPRESSION:  - Left MCA Stroke.  S/P suction thrombectomy for L MCA M1 occlusion with TICI 2b reperfusion. on 7-24-22.    Mechanism ESUS  cardioembolic versus Large artery atherosclerosis    ASSESSMENT/ PLAN:     - Neuro checks and vital signs Q 2 hours.  - SBP goal keep normotensive.  - ASA 81 mg PO or 300 ME QD.   - CT Head of 7-29-22 reviewed. Stable left frontal hemorrhagic products within the infarct.    - Will repeat CT head on 8-4-22 and if stable will clear him for anticoagulation. .  - Lipitor for LDL goal of < 70 .  - EEG -Report as above reviewed.   - CT/CTA/CTP -images and reports were reviewed.   - MRI Brain stroke protocol  -images and reports were reviewed. .  - TTE  -Reports as above were reviewed. . EF < 10%.  - IMTIAZ (  postponed due to fever)  - Cardiology follow up appreciated..  - SCD/ SQ Lovenox for DVT prophylaxis.

## 2022-07-31 NOTE — PROGRESS NOTE ADULT - SUBJECTIVE AND OBJECTIVE BOX
[facility]                                                         CARDIOLOGY AT [address], [place] [address]                                                         Telephone: [phone]. Fax:[phone]                                                                             PROGRESS NOTE    Reason for follow up:   Update:     Rima in NICU, vented, EEG tracing in progress, no pressors and currently afebrile  Preparation for IMTIAZ in progress unless fever spikes.  NPO tonight.    Review of symptoms:   Unable to obtain    Vitals:  T(C): 37.1 (07-31-22 @ 08:00), Max: 38.3 (07-30-22 @ 17:00)  HR: 91 (07-31-22 @ 08:00) (81 - 113)  BP: 150/51 (07-31-22 @ 08:00) (90/71 - 174/74)  RR: 23 (07-31-22 @ 08:00) (16 - 29)  SpO2: 100% (07-31-22 @ 08:00) (96% - 100%)    I&O's Summary  30 Jul 2022 07:01  -  31 Jul 2022 07:00  --------------------------------------------------------  IN: 2540 mL / OUT: 1800 mL / NET: 740 mL  31 Jul 2022 07:01  -  31 Jul 2022 08:59  --------------------------------------------------------  IN: 120 mL / OUT: 0 mL / NET: 120 mL      PHYSICAL EXAM:  Appearance: Vented, unresponsive  HEENT:  ET tube in place, head wrapping for eeg,   Neurologic: A & O x 0, random movement noted on left  Cardiovascular: IRR S1 S2, No murmur, no rubs/gallops. No JVD  Respiratory: Lungs clear to auscultation, unlabored   Gastrointestinal:  Soft, Non-tender, + BS  Lower Extremities: + Peripheral Pulses, No clubbing, cyanosis, or edema  Psychiatry: A and O x 0, nonresponsvie  Skin:  Wet and cool    CURRENT CARDIAC MEDICATIONS:  enalapril 5 milliGRAM(s) Oral <User Schedule>  metoprolol tartrate 50 milliGRAM(s) Oral every 8 hours    CURRENT OTHER MEDICATIONS:  ciprofloxacin     Tablet 500 milliGRAM(s) Oral every 12 hours  fentaNYL    Injectable 25 MICROGram(s) IV Push every 4 hours PRN agitation/vent synchrony  ondansetron Injectable 4 milliGRAM(s) IV Push every 6 hours PRN Nausea and/or Vomiting  famotidine Injectable 20 milliGRAM(s) IV Push every 12 hours  dextrose 50% Injectable 25 Gram(s) IV Push once, Stop order after: 1 Doses  dextrose Oral Gel 15 Gram(s) Oral once, Stop order after: 1 Doses PRN Blood Glucose LESS THAN 70 milliGRAM(s)/deciliter  glucagon  Injectable 1 milliGRAM(s) IntraMuscular once, Stop order after: 1 Doses  insulin lispro (ADMELOG) corrective regimen sliding scale   SubCutaneous every 6 hours  aspirin  chewable 81 milliGRAM(s) Oral daily  chlorhexidine 0.12% Liquid 15 milliLiter(s) Oral Mucosa every 12 hours  chlorhexidine 2% Cloths 1 Application(s) Topical daily  enoxaparin Injectable 40 milliGRAM(s) SubCutaneous every 24 hours    LABS:	 	  ( 31 Jul 2022 03:09 )  Troponin T  0.25<H>,  CPK  X    , CKMB  X    , BNP X        , ( 30 Jul 2022 22:23 )  Troponin T  0.23<H>,  CPK  X    , CKMB  X    , BNP X        , ( 30 Jul 2022 13:30 )  Troponin T  0.16<H>,  CPK  X    , CKMB  X    , BNP 1768<H>                       15.1   12.92 )-----------( 304      ( 31 Jul 2022 03:09 )             46.4     07-31  152<H>  |  108<H>  |  99.7<H>  ----------------------------<  209<H>  4.8   |  27.0  |  1.07  Ca    8.8      31 Jul 2022 03:09  Phos  3.5     07-31  Mg     3.6     07-31  TPro  7.8  /  Alb  2.9<L>  /  TBili  0.5  /  DBili  x   /  AST  844<H>  /  ALT  501<H>  /  AlkPhos  334<H>  07-31  PT/INR/PTT ( 24 Jul 2022 15:45 )      13.3         :       26.7                  .        .                   .              .           .       1.14        .                                       Lipid Profile: Date: 07-25 @ 03:51  Total cholesterol 167; Direct LDL: --; HDL: 44; Triglycerides:132  TSH: Thyroid Stimulating Hormone, Serum: 0.91 uIU/mL  TELEMETRY:   Sr with multple foccal pvc's, NSVT 2 and 3 beat runs, bigenmny.   100%                                                              [facility]                                                         CARDIOLOGY AT [facility]                                                                  [address] [facility] [place] [place] [address]                                                         Telephone: [phone]. Fax:[phone]                                                                             PROGRESS NOTE    Reason for follow up:   Update:   Rima in Neuro ICU, vented, EEG tracing in progress, no pressors and currently afebrile  No IMTIAZ at this time due to multi organ failure.   Telemetry with multiple PVC's 3 to 4 beats runs of NSVT.    Review of symptoms:   Unable to obtain  Vitals:  T(C): 37.1 (07-31-22 @ 08:00), Max: 38.3 (07-30-22 @ 17:00)  HR: 91 (07-31-22 @ 08:00) (81 - 113)  BP: 150/51 (07-31-22 @ 08:00) (90/71 - 174/74)  RR: 23 (07-31-22 @ 08:00) (16 - 29)  SpO2: 100% (07-31-22 @ 08:00) (96% - 100%)    I&O's Summary  30 Jul 2022 07:01  -  31 Jul 2022 07:00  --------------------------------------------------------  IN: 2540 mL / OUT: 1800 mL / NET: 740 mL  31 Jul 2022 07:01  -  31 Jul 2022 08:59  --------------------------------------------------------  IN: 120 mL / OUT: 0 mL / NET: 120 mL      PHYSICAL EXAM:  Appearance: Vented, unresponsive  HEENT:  ET tube in place, head wrapping for eeg,   Neurologic: A & O x 0, random movement noted on left  Cardiovascular: IRR S1 S2, No murmur, no rubs/gallops. No JVD  Respiratory: Lungs clear to auscultation, unlabored   Gastrointestinal:  Soft, Non-tender, + BS  Lower Extremities: + Peripheral Pulses, No clubbing, cyanosis, or edema  Psychiatry: A and O x 0, nonresponsvie  Skin:  Wet and cool    CURRENT CARDIAC MEDICATIONS:  enalapril 5 milliGRAM(s) Oral <User Schedule>  metoprolol tartrate 50 milliGRAM(s) Oral every 8 hours    CURRENT OTHER MEDICATIONS:  ciprofloxacin     Tablet 500 milliGRAM(s) Oral every 12 hours  fentaNYL    Injectable 25 MICROGram(s) IV Push every 4 hours PRN agitation/vent synchrony  ondansetron Injectable 4 milliGRAM(s) IV Push every 6 hours PRN Nausea and/or Vomiting  famotidine Injectable 20 milliGRAM(s) IV Push every 12 hours  dextrose 50% Injectable 25 Gram(s) IV Push once, Stop order after: 1 Doses  dextrose Oral Gel 15 Gram(s) Oral once, Stop order after: 1 Doses PRN Blood Glucose LESS THAN 70 milliGRAM(s)/deciliter  glucagon  Injectable 1 milliGRAM(s) IntraMuscular once, Stop order after: 1 Doses  insulin lispro (ADMELOG) corrective regimen sliding scale   SubCutaneous every 6 hours  aspirin  chewable 81 milliGRAM(s) Oral daily  chlorhexidine 0.12% Liquid 15 milliLiter(s) Oral Mucosa every 12 hours  chlorhexidine 2% Cloths 1 Application(s) Topical daily  enoxaparin Injectable 40 milliGRAM(s) SubCutaneous every 24 hours    LABS:	 	  ( 31 Jul 2022 03:09 )  Troponin T  0.25<H>,  CPK  X    , CKMB  X    , BNP X        , ( 30 Jul 2022 22:23 )  Troponin T  0.23<H>,  CPK  X    , CKMB  X    , BNP X        , ( 30 Jul 2022 13:30 )  Troponin T  0.16<H>,  CPK  X    , CKMB  X    , BNP 1768<H>                       15.1   12.92 )-----------( 304      ( 31 Jul 2022 03:09 )             46.4     07-31  152<H>  |  108<H>  |  99.7<H>  ----------------------------<  209<H>  4.8   |  27.0  |  1.07  Ca    8.8      31 Jul 2022 03:09  Phos  3.5     07-31  Mg     3.6     07-31  TPro  7.8  /  Alb  2.9<L>  /  TBili  0.5  /  DBili  x   /  AST  844<H>  /  ALT  501<H>  /  AlkPhos  334<H>  07-31  PT/INR/PTT ( 24 Jul 2022 15:45 )      13.3         :       26.7                  .        .                   .              .           .       1.14        .                                       Lipid Profile: Date: 07-25 @ 03:51  Total cholesterol 167; Direct LDL: --; HDL: 44; Triglycerides:132  TSH: Thyroid Stimulating Hormone, Serum: 0.91 uIU/mL  TELEMETRY:   Sr with multple foccal pvc's, NSVT 2 and 3 beat runs, bigenmny.   100%

## 2022-07-31 NOTE — PROGRESS NOTE ADULT - ASSESSMENT
76M with LM1 occlusion, s/p TICI 2B MT, no tPA as wake-up stroke.  Large evolving L MCA stroke with small area of hemorrhage.  Acute on chronic HFrEF, LVHF <10%, reduced RV syst function, 1st degree AVbl. ?Component of neurogenic CMP.  Acute hypoxemic  resp failure, intubated.  PMH of CAD/CABG/HFrEF ( lipitor , coreg , lisinopril ), HTN, HLD.  Ischemic Cardiomyopathy  UTI.    Plan:  neurochecks q1hr  Off sedation n, fentanyl prn   ASA, statin  CT Of Brain today  MRI as above  F/U EEG report- 7/29  Abnormal EEG study.  1. Potential epileptogenic foci in the left frontotemporal and right frontal regions.   2. Structural abnormality in the left frontotemporal region.   3. Mild to moderate nonspecific diffuse or multifocal cerebral dysfunction.   4. No seizure seen.       Resp- Resp failure secondary to neuro injury  Maintain O2 > 92 %  - Wean FIO2 to 30 %  CXR - No infiltrate- 7/29/22  - monitor e-lytes- metabolic alkalosis , non compensated, Correct electrolytes     Card- Ischemic cardiomyopathy HFrEF - Compensated CHF, Ectopy  Lopressor 50mg q8  + ACE- Inhibitor increase to 7.5 q 12 if BP Hold Aldosterone antag due to volume status  IMTIAZ rescheduled for Mon due to current fever  monitor e-lytes- metabolic alkalosis , non compensated, Correct electrolytes   Check EKG- troponin ; Pro BNP   Cardiology involved; cardiology will wait when patient afebrile    Hold  diuresis, maintain euvolemia to -500ml, monitor renal function, lactate,       Renal- Uremia - likely secondary to vol status  - monitor e-lytes- metabolic alkalosis , non compensated, Correct electrolytes   Hold Lasix - re-evaluate daily  Increase Free H2O      GI prophylaxis -   pepcid; Stool softeners  Last BM 7/30/22    ID  S/P fever- Ceftriaxone IV for UTI- Pan Sensitive - Day 4/7; ? prostatitis - therfore change to Cipro   Recurrent fever- check CBC with diff, lipase, LFT- transaminitis , procalcitonin, CXR- Clear  , Blood Culture x2 - P   No obvious source of fever- cultures performed and awaiting results  Transaminitis with elevated alk Phos - will obtain RUQ ultrasound  Renal ultrasound- no mass, no calculi    Heme  DVT prophylaxsis  Stable WBC     76M with LM1 occlusion, s/p TICI 2B MT, no tPA as wake-up stroke.  Large evolving L MCA stroke with small area of hemorrhage.  Acute on chronic HFrEF, LVHF <10%, reduced RV syst function, 1st degree AVbl. ?Component of neurogenic CMP.  Acute hypoxemic  resp failure, intubated.  PMH of CAD/CABG/HFrEF ( lipitor , coreg , lisinopril ), HTN, HLD.  Ischemic Cardiomyopathy  UTI.    Plan:  neurochecks q1hr  Off sedation n, fentanyl prn   ASA, statin  CT Of Brain today  MRI as above  F/U EEG report- 7/29  Abnormal EEG study.  1. Potential epileptogenic foci in the left frontotemporal and right frontal regions.   2. Structural abnormality in the left frontotemporal region.   3. Mild to moderate nonspecific diffuse or multifocal cerebral dysfunction.   4. No seizure seen.       Resp- Resp failure secondary to neuro injury  Maintain O2 > 92 %  - Wean FIO2 to 30 %  CXR - No infiltrate- 7/29/22  - monitor e-lytes- metabolic alkalosis , non compensated, Correct electrolytes     Card- Ischemic cardiomyopathy HFrEF - Compensated CHF, Ectopy  Lopressor 50mg q8  + ACE- Inhibitor increase to 7.5 q 12 if BP Hold Aldosterone antag due to volume status  IMTIAZ rescheduled for Mon due to current fever  monitor e-lytes- metabolic alkalosis , non compensated, Correct electrolytes   Check EKG- troponin ; Pro BNP   Cardiology involved; cardiology will wait when patient afebrile    Hold  diuresis, maintain euvolemia to -500ml, monitor renal function, lactate,       Renal- Uremia - likely contarction alkalosis  - monitor e-lytes- metabolic alkalosis , non compensated, Correct electrolytes   Hold Lasix - re-evaluate daily  Check urine for Cl  Increase Free H2O      GI prophylaxis -   pepcid; Stool softeners  Last BM 7/30/22    ID  S/P fever- Ceftriaxone IV for UTI- Pan Sensitive - Day 4/7; ? prostatitis - therfore change to Cipro   Recurrent fever- check CBC with diff, lipase, LFT- transaminitis , procalcitonin, CXR- Clear  , Blood Culture x2 - P   No obvious source of fever- cultures performed and awaiting results  Transaminitis with elevated alk Phos - will obtain RUQ ultrasound  Renal ultrasound- no mass, no calculi    Heme  DVT prophylaxsis  Stable WBC

## 2022-07-31 NOTE — PROGRESS NOTE ADULT - PROBLEM SELECTOR PLAN 2
c/w asa atorvastatin   No pressor  Hx of EF 10% and CVA will plan on long term anticoag when cleared by Neuro.  Lopressor and enalapril ct  Eventually switch enalapril to Entresto. Will need 36 hour washout period.   Patient planned for IMTIAZ due to acute CVA, however was febrile this AM. If remains febrile, will need to postpone.  Given severely decreased LVEF and CVA, can empirically anticoagulate if okay from neurology perspective   If EF remains <10%, would plan for repeat LHC pending hospital course  Baby aspirin 81mg po daily ct  discuss ICD with patient once extubated and mental status improves (previously declined). If he is not agreeable, would benefit from ILR. c/w asa atorvastatin   No pressors currently  Hx of EF 10% and CVA will plan on long term anticoag when cleared by Neuro.  Heart failure consult  Lopressor and enalapril ct  Eventually switch enalapril to Entresto. Will need 36 hour washout period.   Given severely decreased LVEF and CVA, can empirically anticoagulate if okay from neurology perspective   If EF remains <10%, would plan for repeat LHC pending hospital course  Baby aspirin 81mg po daily ct  discuss ICD with patient once extubated and mental status improves (previously declined). If he is not agreeable, would benefit from ILR.  Heart failure consult   EP consult due to NSVT multiple runs and multiple focal PVC's.

## 2022-07-31 NOTE — EEG REPORT - NS EEG TEXT BOX
[facility]   COMPREHENSIVE EPILEPSY CENTER   REPORT OF LONG-TERM VIDEO EEG     [facility]: [address], [place] [address], Ph#: [phone]  [facility]: [address], [place] [address], Ph#: [phone]  [facility]: [address] [place] [address], Ph#: [phone]    Patient Name: NATANAEL ROWAN  Age and : 76y ([birth year])  MRN #: [id number]  Location: [place] [address]  Referring Physician: Michael Pierre    Start Time/Date: 08:00 on 22  End Time/Date: 08:00 on 22  Duration: 24h    _____________________________________________________________  STUDY INFORMATION    EEG Recording Technique:  The patient underwent continuous Video-EEG monitoring, using Telemetry System hardware on the XLTek Digital System. EEG and video data were stored on a computer hard drive with important events saved in digital archive files. The material was reviewed by a physician (electroencephalographer / epileptologist) on a daily basis. Williams and seizure detection algorithms were utilized and reviewed. An EEG Technician attended to the patient, and was available throughout daytime work hours.  The epilepsy center neurologist was available in person or on call 24-hours per day.    EEG Placement and Labeling of Electrodes:  The EEG was performed utilizing 20 channel referential EEG connections (coronal over temporal over parasagittal montage) using all standard 10-20 electrode placements with EKG, with additional electrodes placed in the inferior temporal region using the modified 10-10 montage electrode placements for elective admissions, or if deemed necessary. Recording was at a sampling rate of 256 samples per second per channel. Time synchronized digital video recording was done simultaneously with EEG recording. A low light infrared camera was used for low light recording.     _____________________________________________________________  HISTORY    Patient is a 76y old  Male who presents with a chief complaint of stroke (2022 16:08)      PERTINENT MEDICATION:  none    _____________________________________________________________  STUDY INTERPRETATION    Findings: The background was continuous and reactive. During wakefulness, the posterior dominant rhythm consisted of asymmetric, poorly-modulated 7 Hz activity, with amplitude to 30 uV, that was better forms over the right hemisphere.    Background Slowing:  Diffuse theta and polymorphic delta slowing.    Focal Slowing:   Continuous theta/delta max slowing in the left frontotemporal region.     Sleep Background:  Drowsiness was characterized by fragmentation, attenuation, and slowing of the background activity.    Stage II sleep transients were not recorded.    Other Non-Epileptiform Findings:  Subtle attenuation of fast activity over the left hemisphere.    Interictal Epileptiform Activity:   - Occasional left frontal/frontotemporal (Fp1/F3/F7/T7) sharp wave discharges.  - Occasional right frontal/frontotemporal  (Fp2/F4/F8/T8) sharp wave discharges.    Events:  No event or seizure recorded.    Activation Procedures:   Hyperventilation was not performed.    Photic stimulation was not performed.     Artifacts:  Intermittent myogenic and movement artifacts were noted.    ECG:  The heart rate on single channel ECG was predominantly between 70-80 BPM.    _____________________________________________________________  EEG SUMMARY/CLASSIFICATION    Abnormal EEG in an altered patient.  - Occasional left frontal/frontotemporal (Fp1/F3/F7/T7) sharp wave discharges.  - Occasional right frontal/frontotemporal  (Fp2/F4/F8/T8) sharp wave discharges.  - Continuous theta/delta max slowing in the left frontotemporal region.   - Subtle attenuation of fast activity over the left hemisphere.  - Mild to moderate generalized slowing.    _____________________________________________________________  EEG IMPRESSION/CLINICAL CORRELATE    Abnormal EEG study.  1. Potential epileptogenic foci in the bilateral frontotemporal regions.   2. Structural abnormality and cortical dysfunction in the left frontotemporal region.   3. Mild to moderate nonspecific diffuse or multifocal cerebral dysfunction.   4. No seizure seen.     _____________________________________________________________    Mayi Prather MD  Director, Epilepsy/EMU - [facility]

## 2022-07-31 NOTE — PROGRESS NOTE ADULT - PROBLEM SELECTOR PLAN 1
IMTIAZ if not febrile  NPO tonight after midnight  Telemonitoring with no afib and no acute noted.  EEG tracing in place  Neuro following IMTIAZ on hold due to multi organ failure  Telemonitoring with no afib and no acute noted.  EEG tracing in place  Neuro following  Consider IMTIAZ when stable

## 2022-07-31 NOTE — CONSULT NOTE ADULT - ASSESSMENT
Patricia Stout is a 76 year old male with carotid stenosis, CAD s/p CABG (4V, [facility] 6/2014) with ischemic CMY (LVEF: 20-25% 3/2022; pt previously refused ICD), HTN, HL, PVD s/p endarterectomy (2015), who presented to [facility] with an acute CVA (right sided weakness and facial droop) due to LM1 occlusion who was transferred to [facility] for acute neuro-IR intervention with mechanical thrombectomy of L MCA occlusion. After his intervention he developed acute hypoxic respiratory failure requiring intubation. He has had NSR with frequent multifocal PVCs. TTE revealed a severely depressed LVEF of <10% with moderately reduced RV function. He now also has worsening renal function and transaminitis. EP consulted for NSVT and PVCs.     Patricia Stout is a 76 year old male with carotid stenosis, CAD s/p CABG (4V, [facility] 6/2014) with ischemic CMY (LVEF: 20-25% 3/2022; pt previously refused ICD), HTN, HL, PVD s/p endarterectomy (2015), who [place] [facility] [place] CVA (right sided weakness and facial droop) due to LM1 occlusion who was transferred to [facility] for acute neuro-IR intervention with mechanical thrombectomy of L MCA occlusion. After his intervention he developed acute hypoxic respiratory failure requiring intubation. He has had NSR with frequent multifocal PVCs. TTE revealed a severely depressed LVEF of <10% with moderately reduced RV function. He now also has worsening renal function and transaminitis. EP consulted for NSVT and PVCs.    Most therapy to reduce NSVT & PVCs involves medications that are hepatically eliminated (procainamide, lidocaine & amiodarone) and treatment with these meds can lead to potential drug toxicity (lidocaine - delirium/ams; amiodarone - worsening transaminitis). Would prefer to use beta blockers. Can consider using Esmolol gtt but this may lower BP, but is more easily titrated. Agree with advanced CHF and possible RHC to determine true hemodynamics to determine cause of poor end organ perfusion (kidneys, liver). Would also repeat lactate.    Recommendations:  - Augment beta blockers as tolerated by BP/HR  - Would avoid lidocaine/procainamide/amiodarone for now  - Consider advanced CHF consult +/- RHC to understand cause of poor end organ perfusion  - Check lactate  - Keep Mg>2, K>4  - If patient recovers, can revisit primary prevention ICD (if still appropriate for patient's condition)  - Can consider ILR implant for surveillance for AF (if appropriate for patient's condition)    Discussed with Dr. Jones    Thank you for this consult. We will follow with you.    Raymon Piña MD  Clinical Cardiac Electrophysiology

## 2022-07-31 NOTE — CONSULT NOTE ADULT - SUBJECTIVE AND OBJECTIVE BOX
[facility]                                                               CARDIAC ELECTROPHYSIOLOGY                                                       [facility] [place]                                                      Office: [address] [facility], [place] [address]                                                       Telephone: [phone]. Fax:[phone]    Cardiologist: Dr. Santos    HPI:  Patricia Stout is a 76 year old male with carotid stenosis, CAD s/p CABG (4V, [facility] 2014) with ischemic CMY (LVEF: 20-25% 3/2022; pt previously refused ICD), HTN, HL, PVD s/p endarterectomy (), who presented to [facility] with an acute CVA (right sided weakness and facial droop) due to LM1 occlusion who was transferred to [facility] for acute neuro-IR intervention with mechanical thrombectomy of L MCA occlusion. After his intervention he developed acute hypoxic respiratory failure requiring intubation. He has had NSR with frequent multifocal PVCs. TTE revealed a severely depressed LVEF of <10% with moderately reduced RV function.    He now also has worsening renal function and transaminitis. Interestingly, most recent lactate is normal and ABG does not show acidosis.    Currently not arousable despite not being on sedatives.    EP is consulted for frequent PVCs.    EC22: Sinus tachycardia with IVCD. Non-specific ST-T wave changes. RBRS axis PVC.    Echo:  22 TTE: LVEF: <10%. G3DD. RV mildly enlarged with moderately reduced RV function. Severely enlarged LA. Normal RA. Mild-MR. Sclerotic AoV. RVSP 41.9 mmHg. Small PFO with L>R shunting.    < from: US Abdomen Upper Quadrant Right (22 @ 20:27) >  IMPRESSION:  The liver is normal in appearance.    Limited visualization of the pancreas and common bile duct.    < from: CT Head No Cont (22 @ 12:47) >  IMPRESSION:  Aging inferior left frontal and inferolateral left parietal infarcts.   Mild stable cortical blood products associated with the frontal infarct.    < from: US Renal (22 @ 23:01) >  IMPRESSION:  No renal mass, hydronephrosis or calculus is visualized sonographically.      PAST MEDICAL & SURGICAL HISTORY:  HTN (hypertension)  HLD (hyperlipidemia)  S/P CABG x 1    REVIEW OF SYSTEMS: As per HPI. Remaining 10 point ROS were reviewed and are negative.    MEDICATIONS  (STANDING):  aspirin  chewable 81 milliGRAM(s) Oral daily  chlorhexidine 0.12% Liquid 15 milliLiter(s) Oral Mucosa every 12 hours  chlorhexidine 2% Cloths 1 Application(s) Topical daily  ciprofloxacin     Tablet 500 milliGRAM(s) Oral every 12 hours  dextrose 5%. 1000 milliLiter(s) (50 mL/Hr) IV Continuous <Continuous>  dextrose 5%. 1000 milliLiter(s) (100 mL/Hr) IV Continuous <Continuous>  dextrose 50% Injectable 25 Gram(s) IV Push once  enalapril 5 milliGRAM(s) Oral <User Schedule>  enoxaparin Injectable 40 milliGRAM(s) SubCutaneous every 24 hours  famotidine Injectable 20 milliGRAM(s) IV Push every 12 hours  glucagon  Injectable 1 milliGRAM(s) IntraMuscular once  insulin lispro (ADMELOG) corrective regimen sliding scale   SubCutaneous every 6 hours  metoprolol tartrate 50 milliGRAM(s) Oral every 8 hours  sodium chloride 0.9% Bolus 250 milliLiter(s) IV Bolus once    MEDICATIONS  (PRN):  dextrose Oral Gel 15 Gram(s) Oral once PRN Blood Glucose LESS THAN 70 milliGRAM(s)/deciliter  fentaNYL    Injectable 25 MICROGram(s) IV Push every 4 hours PRN agitation/vent synchrony  ondansetron Injectable 4 milliGRAM(s) IV Push every 6 hours PRN Nausea and/or Vomiting    Allergies  No Known Allergies    SOCIAL HISTORY:  no tobacco or etoh use    FAMILY HISTORY:      Vital Signs Last 24 Hrs  T(C): 36.9 (2022 14:00), Max: 38.3 (2022 17:00)  T(F): 98.4 (2022 14:00), Max: 100.9 (2022 17:00)  HR: 101 (2022 14:00) (81 - 101)  BP: 111/57 (2022 14:00) (90/71 - 160/74)  BP(mean): 74 (2022 14:00) (64 - 143)  RR: 28 (2022 14:00) (16 - 29)  SpO2: 98% (2022 14:00) (96% - 100%)    Parameters below as of 2022 08:00  Patient On (Oxygen Delivery Method): ventilator    Physical Exam:  Appearance: Intubated, not sedated  HEENT: Normal oral mucosa  Cardiovascular: Normal S1 S2, No JVD, No cardiac murmurs, No carotid bruits, No peripheral edema  Respiratory: Lungs clear to auscultation, unlabored, no rhonchi/rales/wheezes  Gastrointestinal:  Soft, Non-tender, + BS, no bruits	  Skin: No rashes, No ecchymoses, No cyanosis  Neurologic: Unresponsive to verbal or noxious stimuli, no spontaneous movements.  Extremities: No clubbing, cyanosis    LABS:             15.1   12.92 )-----------( 304      ( 2022 03:09 )             46.4       152<H>  |  108<H>  |  99.7<H>  ----------------------------<  209<H>  4.8   |  27.0  |  1.07    Ca    8.8      2022 03:09  Phos  3.5       Mg     3.6         TPro  7.8  /  Alb  2.9<L>  /  TBili  0.5  /  DBili  x   /  AST  844<H>  /  ALT  501<H>  /  AlkPhos  334<H>    LIVER FUNCTIONS - ( 2022 03:09 )  Alb: 2.9 g/dL / Pro: 7.8 g/dL / ALK PHOS: 334 U/L / ALT: 501 U/L / AST: 844 U/L / GGT: x           CARDIAC MARKERS ( 2022 10:45 )  x     / 0.19 ng/mL / x     / x     / x      CARDIAC MARKERS ( 2022 03:09 )  x     / 0.25 ng/mL / x     / x     / x      CARDIAC MARKERS ( 2022 22:23 )  x     / 0.23 ng/mL / x     / x     / x      CARDIAC MARKERS ( 2022 13:30 )  x     / 0.16 ng/mL / x     / x     / x

## 2022-08-01 LAB
ALBUMIN SERPL ELPH-MCNC: 2.7 G/DL — LOW (ref 3.3–5.2)
ALP SERPL-CCNC: 334 U/L — HIGH (ref 40–120)
ALT FLD-CCNC: 551 U/L — HIGH
ANION GAP SERPL CALC-SCNC: 12 MMOL/L — SIGNIFICANT CHANGE UP (ref 5–17)
ANION GAP SERPL CALC-SCNC: 12 MMOL/L — SIGNIFICANT CHANGE UP (ref 5–17)
AST SERPL-CCNC: 700 U/L — HIGH
BASOPHILS # BLD AUTO: 0.05 K/UL — SIGNIFICANT CHANGE UP (ref 0–0.2)
BASOPHILS NFR BLD AUTO: 0.3 % — SIGNIFICANT CHANGE UP (ref 0–2)
BILIRUB DIRECT SERPL-MCNC: 0.2 MG/DL — SIGNIFICANT CHANGE UP (ref 0–0.3)
BILIRUB INDIRECT FLD-MCNC: 0.3 MG/DL — SIGNIFICANT CHANGE UP (ref 0.2–1)
BILIRUB SERPL-MCNC: 0.5 MG/DL — SIGNIFICANT CHANGE UP (ref 0.4–2)
BUN SERPL-MCNC: 126.5 MG/DL — HIGH (ref 8–20)
BUN SERPL-MCNC: 134 MG/DL — HIGH (ref 8–20)
C DIFF BY PCR RESULT: DETECTED
C DIFF TOX GENS STL QL NAA+PROBE: SIGNIFICANT CHANGE UP
CALCIUM SERPL-MCNC: 8.3 MG/DL — LOW (ref 8.4–10.5)
CALCIUM SERPL-MCNC: 8.6 MG/DL — SIGNIFICANT CHANGE UP (ref 8.4–10.5)
CHLORIDE SERPL-SCNC: 109 MMOL/L — HIGH (ref 98–107)
CHLORIDE SERPL-SCNC: 111 MMOL/L — HIGH (ref 98–107)
CK MB CFR SERPL CALC: 3.7 NG/ML — SIGNIFICANT CHANGE UP (ref 0–6.7)
CK SERPL-CCNC: 726 U/L — HIGH (ref 30–200)
CO2 SERPL-SCNC: 27 MMOL/L — SIGNIFICANT CHANGE UP (ref 22–29)
CO2 SERPL-SCNC: 28 MMOL/L — SIGNIFICANT CHANGE UP (ref 22–29)
CREAT SERPL-MCNC: 1.06 MG/DL — SIGNIFICANT CHANGE UP (ref 0.5–1.3)
CREAT SERPL-MCNC: 1.23 MG/DL — SIGNIFICANT CHANGE UP (ref 0.5–1.3)
CULTURE RESULTS: SIGNIFICANT CHANGE UP
EGFR: 61 ML/MIN/1.73M2 — SIGNIFICANT CHANGE UP
EGFR: 73 ML/MIN/1.73M2 — SIGNIFICANT CHANGE UP
EOSINOPHIL # BLD AUTO: 0.12 K/UL — SIGNIFICANT CHANGE UP (ref 0–0.5)
EOSINOPHIL NFR BLD AUTO: 0.7 % — SIGNIFICANT CHANGE UP (ref 0–6)
GAS PNL BLDA: SIGNIFICANT CHANGE UP
GLUCOSE BLDC GLUCOMTR-MCNC: 197 MG/DL — HIGH (ref 70–99)
GLUCOSE BLDC GLUCOMTR-MCNC: 214 MG/DL — HIGH (ref 70–99)
GLUCOSE BLDC GLUCOMTR-MCNC: 230 MG/DL — HIGH (ref 70–99)
GLUCOSE BLDC GLUCOMTR-MCNC: 304 MG/DL — HIGH (ref 70–99)
GLUCOSE SERPL-MCNC: 235 MG/DL — HIGH (ref 70–99)
GLUCOSE SERPL-MCNC: 257 MG/DL — HIGH (ref 70–99)
GRAM STN FLD: SIGNIFICANT CHANGE UP
HAV IGM SER-ACNC: ABNORMAL
HBV CORE IGM SER-ACNC: SIGNIFICANT CHANGE UP
HBV SURFACE AG SER-ACNC: SIGNIFICANT CHANGE UP
HCT VFR BLD CALC: 42.9 % — SIGNIFICANT CHANGE UP (ref 39–50)
HCT VFR BLD CALC: 43.6 % — SIGNIFICANT CHANGE UP (ref 39–50)
HCV AB S/CO SERPL IA: 0.3 S/CO — SIGNIFICANT CHANGE UP (ref 0–0.99)
HCV AB SERPL-IMP: SIGNIFICANT CHANGE UP
HGB BLD-MCNC: 14 G/DL — SIGNIFICANT CHANGE UP (ref 13–17)
HGB BLD-MCNC: 14.4 G/DL — SIGNIFICANT CHANGE UP (ref 13–17)
IMM GRANULOCYTES NFR BLD AUTO: 2.2 % — HIGH (ref 0–1.5)
LACTATE SERPL-SCNC: 1.6 MMOL/L — SIGNIFICANT CHANGE UP (ref 0.5–2)
LYMPHOCYTES # BLD AUTO: 1.08 K/UL — SIGNIFICANT CHANGE UP (ref 1–3.3)
LYMPHOCYTES # BLD AUTO: 6 % — LOW (ref 13–44)
MAGNESIUM SERPL-MCNC: 3.6 MG/DL — HIGH (ref 1.6–2.6)
MCHC RBC-ENTMCNC: 31.1 PG — SIGNIFICANT CHANGE UP (ref 27–34)
MCHC RBC-ENTMCNC: 31.4 PG — SIGNIFICANT CHANGE UP (ref 27–34)
MCHC RBC-ENTMCNC: 32.6 GM/DL — SIGNIFICANT CHANGE UP (ref 32–36)
MCHC RBC-ENTMCNC: 33 GM/DL — SIGNIFICANT CHANGE UP (ref 32–36)
MCV RBC AUTO: 95 FL — SIGNIFICANT CHANGE UP (ref 80–100)
MCV RBC AUTO: 95.3 FL — SIGNIFICANT CHANGE UP (ref 80–100)
MONOCYTES # BLD AUTO: 0.98 K/UL — HIGH (ref 0–0.9)
MONOCYTES NFR BLD AUTO: 5.4 % — SIGNIFICANT CHANGE UP (ref 2–14)
MRSA PCR RESULT.: SIGNIFICANT CHANGE UP
NEUTROPHILS # BLD AUTO: 15.37 K/UL — HIGH (ref 1.8–7.4)
NEUTROPHILS NFR BLD AUTO: 85.4 % — HIGH (ref 43–77)
NT-PROBNP SERPL-SCNC: 1446 PG/ML — HIGH (ref 0–300)
PHOSPHATE SERPL-MCNC: 4.3 MG/DL — SIGNIFICANT CHANGE UP (ref 2.4–4.7)
PLATELET # BLD AUTO: 317 K/UL — SIGNIFICANT CHANGE UP (ref 150–400)
PLATELET # BLD AUTO: 318 K/UL — SIGNIFICANT CHANGE UP (ref 150–400)
POTASSIUM SERPL-MCNC: 4.4 MMOL/L — SIGNIFICANT CHANGE UP (ref 3.5–5.3)
POTASSIUM SERPL-MCNC: 4.8 MMOL/L — SIGNIFICANT CHANGE UP (ref 3.5–5.3)
POTASSIUM SERPL-SCNC: 4.4 MMOL/L — SIGNIFICANT CHANGE UP (ref 3.5–5.3)
POTASSIUM SERPL-SCNC: 4.8 MMOL/L — SIGNIFICANT CHANGE UP (ref 3.5–5.3)
PROT SERPL-MCNC: 6.9 G/DL — SIGNIFICANT CHANGE UP (ref 6.6–8.7)
RBC # BLD: 4.5 M/UL — SIGNIFICANT CHANGE UP (ref 4.2–5.8)
RBC # BLD: 4.59 M/UL — SIGNIFICANT CHANGE UP (ref 4.2–5.8)
RBC # FLD: 16.2 % — HIGH (ref 10.3–14.5)
RBC # FLD: 16.2 % — HIGH (ref 10.3–14.5)
S AUREUS DNA NOSE QL NAA+PROBE: SIGNIFICANT CHANGE UP
SODIUM SERPL-SCNC: 148 MMOL/L — HIGH (ref 135–145)
SODIUM SERPL-SCNC: 151 MMOL/L — HIGH (ref 135–145)
SPECIMEN SOURCE: SIGNIFICANT CHANGE UP
SPECIMEN SOURCE: SIGNIFICANT CHANGE UP
TROPONIN T SERPL-MCNC: 0.26 NG/ML — HIGH (ref 0–0.06)
WBC # BLD: 17.35 K/UL — HIGH (ref 3.8–10.5)
WBC # BLD: 17.99 K/UL — HIGH (ref 3.8–10.5)
WBC # FLD AUTO: 17.35 K/UL — HIGH (ref 3.8–10.5)
WBC # FLD AUTO: 17.99 K/UL — HIGH (ref 3.8–10.5)

## 2022-08-01 PROCEDURE — 99223 1ST HOSP IP/OBS HIGH 75: CPT

## 2022-08-01 PROCEDURE — 99291 CRITICAL CARE FIRST HOUR: CPT

## 2022-08-01 PROCEDURE — 99222 1ST HOSP IP/OBS MODERATE 55: CPT

## 2022-08-01 PROCEDURE — 71045 X-RAY EXAM CHEST 1 VIEW: CPT | Mod: 26

## 2022-08-01 PROCEDURE — 99232 SBSQ HOSP IP/OBS MODERATE 35: CPT

## 2022-08-01 RX ORDER — HYDRALAZINE HCL 50 MG
10 TABLET ORAL EVERY 6 HOURS
Refills: 0 | Status: DISCONTINUED | OUTPATIENT
Start: 2022-08-01 | End: 2022-08-02

## 2022-08-01 RX ORDER — VANCOMYCIN HCL 1 G
125 VIAL (EA) INTRAVENOUS EVERY 6 HOURS
Refills: 0 | Status: DISCONTINUED | OUTPATIENT
Start: 2022-08-01 | End: 2022-08-02

## 2022-08-01 RX ORDER — VANCOMYCIN HCL 1 G
125 VIAL (EA) INTRAVENOUS EVERY 6 HOURS
Refills: 0 | Status: DISCONTINUED | OUTPATIENT
Start: 2022-08-01 | End: 2022-08-01

## 2022-08-01 RX ORDER — HUMAN INSULIN 100 [IU]/ML
8 INJECTION, SUSPENSION SUBCUTANEOUS EVERY 8 HOURS
Refills: 0 | Status: DISCONTINUED | OUTPATIENT
Start: 2022-08-01 | End: 2022-08-02

## 2022-08-01 RX ORDER — PHENYLEPHRINE HYDROCHLORIDE 10 MG/ML
100 INJECTION INTRAVENOUS ONCE
Refills: 0 | Status: DISCONTINUED | OUTPATIENT
Start: 2022-08-01 | End: 2022-08-01

## 2022-08-01 RX ORDER — METOPROLOL TARTRATE 50 MG
5 TABLET ORAL ONCE
Refills: 0 | Status: COMPLETED | OUTPATIENT
Start: 2022-08-01 | End: 2022-08-01

## 2022-08-01 RX ORDER — METOPROLOL TARTRATE 50 MG
5 TABLET ORAL ONCE
Refills: 0 | Status: DISCONTINUED | OUTPATIENT
Start: 2022-08-01 | End: 2022-08-02

## 2022-08-01 RX ADMIN — Medication 2: at 00:01

## 2022-08-01 RX ADMIN — Medication 10 MILLIGRAM(S): at 18:07

## 2022-08-01 RX ADMIN — FAMOTIDINE 20 MILLIGRAM(S): 10 INJECTION INTRAVENOUS at 00:03

## 2022-08-01 RX ADMIN — Medication 81 MILLIGRAM(S): at 11:54

## 2022-08-01 RX ADMIN — Medication 125 MILLIGRAM(S): at 18:07

## 2022-08-01 RX ADMIN — CHLORHEXIDINE GLUCONATE 1 APPLICATION(S): 213 SOLUTION TOPICAL at 11:54

## 2022-08-01 RX ADMIN — Medication 125 MILLIGRAM(S): at 23:45

## 2022-08-01 RX ADMIN — HUMAN INSULIN 4 UNIT(S): 100 INJECTION, SUSPENSION SUBCUTANEOUS at 05:41

## 2022-08-01 RX ADMIN — Medication 4: at 05:40

## 2022-08-01 RX ADMIN — FAMOTIDINE 20 MILLIGRAM(S): 10 INJECTION INTRAVENOUS at 23:50

## 2022-08-01 RX ADMIN — HUMAN INSULIN 4 UNIT(S): 100 INJECTION, SUSPENSION SUBCUTANEOUS at 14:34

## 2022-08-01 RX ADMIN — FAMOTIDINE 20 MILLIGRAM(S): 10 INJECTION INTRAVENOUS at 11:54

## 2022-08-01 RX ADMIN — Medication 50 MILLIGRAM(S): at 22:31

## 2022-08-01 RX ADMIN — ENOXAPARIN SODIUM 40 MILLIGRAM(S): 100 INJECTION SUBCUTANEOUS at 22:24

## 2022-08-01 RX ADMIN — Medication 500 MILLIGRAM(S): at 05:39

## 2022-08-01 RX ADMIN — Medication 10 MILLIGRAM(S): at 11:54

## 2022-08-01 RX ADMIN — CHLORHEXIDINE GLUCONATE 15 MILLILITER(S): 213 SOLUTION TOPICAL at 18:08

## 2022-08-01 RX ADMIN — Medication 50 MILLIGRAM(S): at 14:34

## 2022-08-01 RX ADMIN — Medication 5 MILLIGRAM(S): at 20:28

## 2022-08-01 RX ADMIN — HUMAN INSULIN 8 UNIT(S): 100 INJECTION, SUSPENSION SUBCUTANEOUS at 22:25

## 2022-08-01 RX ADMIN — Medication 4: at 18:08

## 2022-08-01 RX ADMIN — Medication 50 MILLIGRAM(S): at 05:40

## 2022-08-01 RX ADMIN — CHLORHEXIDINE GLUCONATE 15 MILLILITER(S): 213 SOLUTION TOPICAL at 05:39

## 2022-08-01 RX ADMIN — Medication 2: at 11:55

## 2022-08-01 NOTE — PROGRESS NOTE ADULT - SUBJECTIVE AND OBJECTIVE BOX
Pt remains intubated, unarousable off sedatives >3 days.   Tele: sinus rhythm w/ intact conduction; frequent PVCs of at least 2 morphologies, occasional couplets & triplets. No sustained arrhythmias.     MEDICATIONS  (STANDING):  aspirin  chewable 81 milliGRAM(s) Oral daily  chlorhexidine 0.12% Liquid 15 milliLiter(s) Oral Mucosa every 12 hours  chlorhexidine 2% Cloths 1 Application(s) Topical daily  ciprofloxacin     Tablet 500 milliGRAM(s) Oral every 12 hours  dextrose 5%. 1000 milliLiter(s) (50 mL/Hr) IV Continuous <Continuous>  dextrose 5%. 1000 milliLiter(s) (100 mL/Hr) IV Continuous <Continuous>  dextrose 50% Injectable 25 Gram(s) IV Push once  enoxaparin Injectable 40 milliGRAM(s) SubCutaneous every 24 hours  famotidine Injectable 20 milliGRAM(s) IV Push every 12 hours  glucagon  Injectable 1 milliGRAM(s) IntraMuscular once  hydrALAZINE 10 milliGRAM(s) Oral every 6 hours  insulin lispro (ADMELOG) corrective regimen sliding scale   SubCutaneous every 6 hours  insulin NPH human recombinant 4 Unit(s) SubCutaneous every 8 hours  metoprolol tartrate 50 milliGRAM(s) Oral every 8 hours    MEDICATIONS  (PRN):  dextrose Oral Gel 15 Gram(s) Oral once PRN Blood Glucose LESS THAN 70 milliGRAM(s)/deciliter  fentaNYL    Injectable 25 MICROGram(s) IV Push every 4 hours PRN agitation/vent synchrony  ondansetron Injectable 4 milliGRAM(s) IV Push every 6 hours PRN Nausea and/or Vomiting      Allergies  No Known Allergies    Vital Signs Last 24 Hrs  T(C): 37.7 (01 Aug 2022 10:00), Max: 38.7 (01 Aug 2022 07:00)  T(F): 99.9 (01 Aug 2022 10:00), Max: 101.7 (01 Aug 2022 07:00)  HR: 99 (01 Aug 2022 10:00) (89 - 106)  BP: 112/60 (01 Aug 2022 10:00) (99/50 - 153/136)  BP(mean): 75 (01 Aug 2022 10:00) (65 - 143)  RR: 32 (01 Aug 2022 10:00) (19 - 34)  SpO2: 97% (01 Aug 2022 10:00) (94% - 99%)    Parameters below as of 01 Aug 2022 08:00  Patient On (Oxygen Delivery Method): ventilator,C-pap    Physical Exam:  Constitutional: Intubated, unarousable.   Cardiovascular: +S1S2 RRR  Pulmonary: mechanical BS  GI: soft NTND +BS  Extremities: no pedal edema, +distal pulses b/l  Neuro: unarousable with minimal response to noxious stimulus    LABS:                        14.0   17.35 )-----------( 317      ( 01 Aug 2022 04:54 )             42.9     08-01    148<H>  |  109<H>  |  126.5<H>  ----------------------------<  235<H>  4.8   |  27.0  |  1.23    Ca    8.3<L>      01 Aug 2022 04:54  Phos  4.3     08-01  Mg     3.6     08-01    TPro  7.4  /  Alb  2.8<L>  /  TBili  0.4  /  DBili  0.2  /  AST  798<H>  /  ALT  559<H>  /  AlkPhos  328<H>  07-31    RADIOLOGY & ADDITIONAL TESTS:  < from: TTE Echo Complete w/ Contrast w/ Doppler (07.24.22 @ 14:01) >  PHYSICIAN INTERPRETATION:  Left Ventricle: Endocardial visualization was enhanced with intravenous   echo contrast. The left ventricular internal cavity size is moderately   increased. Left ventricular wall thickness is normal.  Global LV systolic function was severely decreased. Left ventricular   ejection fraction, by visual estimation, is <10%. Spectral Doppler shows   restrictive pattern ofleft ventricular myocardial filling (Grade III   diastolic dysfunction). Elevated mean left atrial pressure.  Right Ventricle: The right ventricular size is mildly enlarged. RV   systolic function is moderately reduced.  Left Atrium: Severely enlarged left atrium.  Right Atrium: Normal right atrial size.  Pericardium: There is no evidence of pericardial effusion.  Mitral Valve: The mitral valve leaflets are tethered which is due to   reduced systolic function and elevated LVDP. Mild mitral valve   regurgitation is seen. The MR jet is centrally-directed.  Tricuspid Valve: The tricuspid valve is normal in structure. Mild   tricuspid regurgitation is visualized. Estimated pulmonary artery   systolic pressure is 41.9 mmHg assuming a right atrial pressure of 3   mmHg, which is consistent with mild pulmonary hypertension.  Aortic Valve: The aortic valve is trileaflet. Sclerotic aortic valve with   decreased opening. Trivial aortic valve regurgitation is seen.  Pulmonic Valve: The pulmonic valveis normal. Trace pulmonic valve   regurgitation.  Aorta: The aortic root and ascending aorta are structurally normal, with   no evidence of dilitation. There is mild aortic root calcification.  Pulmonary Artery: The main pulmonary artery is normal insize. PADP = 18   mm Hg.  Venous: The inferior vena cava was normal sized, with respiratory size   variation greater than 50%.  Shunts: A small patent foramen ovale is detected, with predominantly left   to right shunting across the atrial septum.    Summary:   1. Endocardial visualization was enhanced with intravenous echo contrast.   2. Severely enlarged left atrium.   3. Global diffuse akinesis. Left ventricular ejection fraction, by   visual estimation, is <10%.   4. Elevated mean left atrial pressure. (>30 mm Hg)   5. Normal right atrial size.   6. Mildly enlarged right ventricle. Moderately reduced RV systolic   function.   7. Mild mitral valve regurgitation.   8. Mild tricuspid regurgitation.   9. Estimated pulmonary artery systolic pressure is 42 mmHg - mild   pulmonary hypertension.  10. There is no evidence of pericardial effusion.  11. Team informed of the findings.    MD Millie, RPVI Electronically signed on 7/24/2022 at 3:49:14 PM    < end of copied text >

## 2022-08-01 NOTE — PROGRESS NOTE ADULT - PROBLEM SELECTOR PLAN 2
Heart failure consult and EP consulted.   Lopressor and enalapril ct  Eventually switch enalapril to Entresto. Will need 36 hour washout period.   Given severely decreased LVEF and CVA, can empirically anticoagulate if okay from neurology perspective   If EF remains <10%, would plan for repeat LHC pending hospital course  Baby aspirin 81mg po daily ct  discuss ICD with patient once extubated and mental status improves (previously declined). If he is not agreeable, would benefit from ILR  EP following  Urine lytes. check serum and urine osmolality.  check urine sodium.  check for DI.   check lactate.   Daily bladder scan consider ace if significant residuals.

## 2022-08-01 NOTE — PROGRESS NOTE ADULT - SUBJECTIVE AND OBJECTIVE BOX
[facility]                                                         CARDIOLOGY AT [facility]                                                                  [address] [facility], [place] [address]                                                         Telephone: [phone]. Fax:[phone]                                                                             PROGRESS NOTE    Reason for follow up:   Update:   Lying in bed, vented, no pressors.      Review of symptoms:   Unable to obtain  Vitals:  T(C): 37.5 (08-01-22 @ 12:00), Max: 38.7 (08-01-22 @ 07:00)  HR: 79 (08-01-22 @ 15:35) (79 - 102)  BP: 168/69 (08-01-22 @ 12:00) (99/50 - 168/69)  RR: 31 (08-01-22 @ 12:00) (19 - 34)  SpO2: 99% (08-01-22 @ 15:35) (94% - 99%)  I&O's Summary  31 Jul 2022 07:01  -  01 Aug 2022 07:00  --------------------------------------------------------  IN: 2850 mL / OUT: 800 mL / NET: 2050 mL  01 Aug 2022 07:01  -  01 Aug 2022 16:15  --------------------------------------------------------  IN: 600 mL / OUT: 950 mL / NET: -350 mL    PHYSICAL EXAM:  Appearance: obtunded  HEENT:  ET and NG tube  Neurologic: A & O x  O, gamez with passive rom, left side with random movement noted.    Cardiovascular: IRR S1 S2, No murmur, no rubs/gallops. No JVD  Respiratory: Lungs clear to auscultation, unlabored   Gastrointestinal:  Soft, Non-tender, + BS  Lower Extremities: + Peripheral Pulses, No clubbing, cyanosis, or edema  Psychiatry:  Unresponsive   Skin:  Cold and wet    CURRENT CARDIAC MEDICATIONS:  hydrALAZINE 10 milliGRAM(s) Oral every 6 hours  metoprolol tartrate 50 milliGRAM(s) Oral every 8 hours    CURRENT OTHER MEDICATIONS:  vancomycin    Solution 125 milliGRAM(s) Enteral Tube every 6 hours  fentaNYL    Injectable 25 MICROGram(s) IV Push every 4 hours PRN agitation/vent synchrony  ondansetron Injectable 4 milliGRAM(s) IV Push every 6 hours PRN Nausea and/or Vomiting  famotidine Injectable 20 milliGRAM(s) IV Push every 12 hours  dextrose 50% Injectable 25 Gram(s) IV Push once, Stop order after: 1 Doses  dextrose Oral Gel 15 Gram(s) Oral once, Stop order after: 1 Doses PRN Blood Glucose LESS THAN 70 milliGRAM(s)/deciliter  glucagon  Injectable 1 milliGRAM(s) IntraMuscular once, Stop order after: 1 Doses  insulin lispro (ADMELOG) corrective regimen sliding scale   SubCutaneous every 6 hours  insulin NPH human recombinant 4 Unit(s) SubCutaneous every 8 hours  aspirin  chewable 81 milliGRAM(s) Oral daily  chlorhexidine 0.12% Liquid 15 milliLiter(s) Oral Mucosa every 12 hours  chlorhexidine 2% Cloths 1 Application(s) Topical daily  enoxaparin Injectable 40 milliGRAM(s) SubCutaneous every 24 hours    LABS:	 	  ( 01 Aug 2022 09:00 )  Troponin T  X    ,  CPK  X    , CKMB  X    , BNP 1446<H>    , ( 01 Aug 2022 04:54 )  Troponin T  0.26<H>,  CPK  726<H>, CKMB  X    , BNP X        , ( 31 Jul 2022 17:44 )  Troponin T  0.20<H>,  CPK  X    , CKMB  X    , BNP X                           14.4   17.99 )-----------( 318      ( 01 Aug 2022 15:00 )             43.6     08-01  151<H>  |  111<H>  |  134.0<H>  ----------------------------<  257<H>  4.4   |  28.0  |  1.06  Ca    8.6      01 Aug 2022 15:00  Phos  4.3     08-01  Mg     3.6     08-01  TPro  6.9  /  Alb  2.7<L>  /  TBili  0.5  /  DBili  0.2  /  AST  700<H>  /  ALT  551<H>  /  AlkPhos  334<H>  08-01  PT/INR/PTT ( 24 Jul 2022 15:45 )      13.3         :       26.7                  .        .                   .              .           .       1.14        .                                       Lipid Profile: Date: 07-25 @ 03:51  Total cholesterol 167; Direct LDL: --; HDL: 44; Triglycerides:132    TELEMETRY:   SR - ST, pvc's, mutliple ectopy, NSVT 2 to 3 beats runs.

## 2022-08-01 NOTE — CONSULT NOTE ADULT - PROBLEM SELECTOR RECOMMENDATION 3
-s/p mechanical thrombectomy  -continue ASA and statin  -no evidence of AFib on telemetry
- s/p TICI 2b thrombectomy 7/24  - on ASA/statin therapy  - plan for repeat head CT 8/4 prior to starting AC

## 2022-08-01 NOTE — PROGRESS NOTE ADULT - ASSESSMENT
76M with LM1 occlusion, s/p TICI 2B MT, no tPA as wake-up stroke.  Large evolving L MCA stroke with small area of hemorrhage.  Acute on chronic HFrEF, LVHF <10%, reduced RV syst function, 1st degree AVbl. ?Component of neurogenic CMP.  Acute hypoxemic  resp failure, intubated.  PMH of CAD/CABG/HFrEF ( lipitor , coreg , lisinopril ), HTN, HLD.  Ischemic Cardiomyopathy  UTI.    Plan:  neurochecks q1hr  Off sedation n, fentanyl prn   ASA, statin  CT Of Brain today  MRI as above  F/U EEG report- 7/29  Abnormal EEG study.  1. Potential epileptogenic foci in the left frontotemporal and right frontal regions.   2. Structural abnormality in the left frontotemporal region.   3. Mild to moderate nonspecific diffuse or multifocal cerebral dysfunction.   4. No seizure seen.       Resp- Resp failure secondary to neuro injury  Maintain O2 > 92 %  - Wean FIO2 to 30 %  CXR - No infiltrate- 7/29/22  - monitor e-lytes- metabolic alkalosis , non compensated, Correct electrolytes     Card- Ischemic cardiomyopathy HFrEF - Compensated CHF, Ectopy  Lopressor 50mg q8  +  D/C ACE- Inhibitor increase with increasing BUN and  worsening pre -renal   IMTIAZ cancelled due to fever  EPS following   CHF consultation - discuss adding hydralazine as afterload reduction . Currently CI and SVV on SAGAR track noted  With frequent PVC's would hold on ionotropic support and adequate CI  monitor e-lytes- metabolic alkalosis , non compensated, Correct electrolytes   Check EKG- troponin ; Pro BNP   Cardiology involved; cardiology will wait when patient afebrile    Hold  diuresis, maintain euvolemia to -500ml, monitor renal function, lactate,       Renal- Uremia - likely contarction alkalosis  - monitor e-lytes- metabolic alkalosis , non compensated, Correct electrolytes   Hold Lasix - re-evaluate daily  FeNa- <1- pre renal  U osm- no evidence of DI      GI prophylaxis -   pepcid; Stool softeners  Last BM 7/30/22    ID  S/P fever- Ceftriaxone IV for UTI- Pan Sensitive - Day 4/7; ? prostatitis - therfore change to Cipro   Recurrent fever- check CBC with diff, lipase, LFT- transaminitis , procalcitonin, CXR- Clear  , Blood Culture x2 - P   No obvious source of fever- cultures performed and awaiting results  Transaminitis with elevated alk Phos - will obtain RUQ ultrasound  Renal ultrasound- no mass, no calculi    Heme  DVT prophylaxsis  Stable WBC     76M with LM1 occlusion, s/p TICI 2B MT, no tPA as wake-up stroke.  Large evolving L MCA stroke with small area of hemorrhage.  Acute on chronic HFrEF, LVHF <10%, reduced RV syst function, 1st degree AVbl. ?Component of neurogenic CMP.  Acute hypoxemic  resp failure, intubated.  PMH of CAD/CABG/HFrEF ( lipitor , coreg , lisinopril ), HTN, HLD.  Ischemic Cardiomyopathy  UTI.    Plan:  neurochecks q1hr  Off sedation n, fentanyl prn   ASA, statin  CT Of Brain today  MRI as above  F/U EEG report- 7/29  Abnormal EEG study.  1. Potential epileptogenic foci in the left frontotemporal and right frontal regions.   2. Structural abnormality in the left frontotemporal region.   3. Mild to moderate nonspecific diffuse or multifocal cerebral dysfunction.   4. No seizure seen.       Resp- Resp failure secondary to neuro injury  Maintain O2 > 92 %  - Wean FIO2 to 30 %  CXR - No infiltrate- 7/29/22  - monitor e-lytes- metabolic alkalosis , non compensated, Correct electrolytes     Card- Ischemic cardiomyopathy HFrEF - Compensated CHF, Ectopy  Lopressor 50mg q8  +  D/C ACE- Inhibitor increase with increasing BUN and  worsening pre -renal   IMTIAZ cancelled due to fever  EPS following   CHF consultation - discuss adding hydralazine as afterload reduction . Currently CI and SVV on SAGAR track noted  With frequent PVC's would hold on ionotropic support and adequate CI  monitor e-lytes- metabolic alkalosis , non compensated, Correct electrolytes   Check EKG- troponin ; Pro BNP   Cardiology involved; cardiology will wait when patient afebrile    Hold  diuresis, maintain euvolemia to -500ml, monitor renal function, lactate,       Renal- Uremia - likely contarction alkalosis ; pre- renal azothermia,   - monitor e-lytes- metabolic alkalosis , non compensated, Correct electrolytes   Hold Lasix - re-evaluate daily  FeNa- <1- pre renal, U Cl <30  U osm- no evidence of DI      GI prophylaxis -   pepcid; Stool softeners  Last BM 7/30/22    ID  S/P fever- Ceftriaxone IV for UTI- Pan Sensitive - Day 4/7; ? prostatitis - therfore change to Cipro   Recurrent fever- check CBC with diff, lipase, LFT- transaminitis , procalcitonin, CXR- Clear  , Blood Culture x2 - P   No obvious source of fever- cultures performed and awaiting results  Transaminitis with elevated alk Phos - will obtain RUQ ultrasound  Renal ultrasound- no mass, no calculi    Heme  DVT prophylaxsis  Stable WBC     76M with LM1 occlusion, s/p TICI 2B MT, no tPA as wake-up stroke.  Large evolving L MCA stroke with small area of hemorrhage.  Acute on chronic HFrEF, LVHF <10%, reduced RV syst function, 1st degree AVbl. ?Component of neurogenic CMP.  Acute hypoxemic  resp failure, intubated.  PMH of CAD/CABG/HFrEF ( lipitor , coreg , lisinopril ), HTN, HLD.  Ischemic Cardiomyopathy  UTI.  Metabolic Encephalopathy- stroke + uremia     Plan:  neurochecks q1hr  Off sedation n, fentanyl prn   ASA, statin  CT - stable   MRI as above  F/U EEG report- 7/29  Abnormal EEG study.  1. Potential epileptogenic foci in the left frontotemporal and right frontal regions.   2. Structural abnormality in the left frontotemporal region.   3. Mild to moderate nonspecific diffuse or multifocal cerebral dysfunction.   4. No seizure seen.       Resp- Resp failure secondary to neuro injury  Maintain O2 > 92 %  - Wean FIO2 to 30 %  - Advanced ETT tube and ? RML infiltrate - repeat Sputum   - - CXR - No infiltrate- 7/29/22  - FIO2 - 40 % ; PeeP- 5       Card- Ischemic cardiomyopathy HFrEF - Compensated CHF, Ectopy  Lopressor 50mg q8  +  D/C ACE- Inhibitor increase with increasing BUN and  worsening pre -renal   IMTIAZ cancelled due to fever  EPS following   CHF consultation - discuss adding hydralazine as afterload reduction . Currently CI and SVV on SAGAR track noted  With frequent PVC's would hold on ionotropic support and adequate CI  monitor e-lytes- metabolic alkalosis , non compensated, Correct electrolytes   Check EKG- troponin ; Pro BNP   Cardiology involved; cardiology will wait when patient afebrile    Hold  diuresis, maintain euvolemia to -500ml, monitor renal function, lactate,       Renal- Uremia - likely contarction alkalosis ; pre- renal azothermia,   - monitor e-lytes- metabolic alkalosis , non compensated, Correct electrolytes   Hold Lasix - re-evaluate daily  FeNa- <1- pre renal, U Cl <30  U osm- no evidence of DI      GI prophylaxis - Diarrhea/ Transaminitis- med induced   Less likely hepatic congestion  Check Hep B and C   Monitor LFT  C Difficile  Change formula to vital 60cc/hr  ( 21Kcal/kg )  pepcid; Stool softeners  Last BM 8/1/22    ID  S/P fever- Ceftriaxone IV for UTI- Pan Sensitive - Day 4/7; ? prostatitis - therfore change to Cipro   Recurrent fever- check CBC with diff, lipase, LFT- transaminitis , procalcitonin- , CXR- Clear  , Blood Culture x2 - P   No obvious source of fever- cultures performed and awaiting results  Transaminitis with elevated alk Phos - will obtain RUQ ultrasound  Lipase - nl   MRSA nasal swab- neg  Repeat Nasal swab  Renal ultrasound- no mass, no calculi    Heme- leucocytosis - possible stress related  Will f/U cultures  If source of potential infection - change ABX - meropenem and Vanco   DVT prophylaxsis  Stable WBC     76M with LM1 occlusion, s/p TICI 2B MT, no tPA as wake-up stroke.  Large evolving L MCA stroke with small area of hemorrhage.  Acute on chronic HFrEF, LVHF <10%, reduced RV syst function, 1st degree AVbl. ?Component of neurogenic CMP.  Acute hypoxemic  resp failure, intubated.  PMH of CAD/CABG/HFrEF ( lipitor , coreg , lisinopril ), HTN, HLD.  Ischemic Cardiomyopathy  UTI.  Metabolic Encephalopathy- stroke + uremia     Plan:  neurochecks q1hr  Off sedation n, fentanyl prn   ASA, statin  CT - stable   MRI as above  F/U EEG report- 7/29  Abnormal EEG study.  1. Potential epileptogenic foci in the left frontotemporal and right frontal regions.   2. Structural abnormality in the left frontotemporal region.   3. Mild to moderate nonspecific diffuse or multifocal cerebral dysfunction.   4. No seizure seen.       Resp- Resp failure secondary to neuro injury  Maintain O2 > 92 %  - Wean FIO2 to 30 %  - Advanced ETT tube and ? RML infiltrate - repeat Sputum   - - CXR - No infiltrate- 7/29/22  - FIO2 - 40 % ; PeeP- 5       Card- Ischemic cardiomyopathy HFrEF - Compensated CHF, Ectopy  Lopressor 50mg q8  +  D/C ACE- Inhibitor increase with increasing BUN and  worsening pre -renal   IMTIAZ cancelled due to fever  EPS following   CHF consultation - discuss adding hydralazine as afterload reduction . Currently CI and SVV on SAGAR track noted  With frequent PVC's would hold on ionotropic support and adequate CI  monitor e-lytes- metabolic alkalosis , non compensated, Correct electrolytes   Check EKG- troponin ; Pro BNP   Cardiology involved; cardiology will wait when patient afebrile    Hold  diuresis, maintain euvolemia to -500ml, monitor renal function, lactate,       Renal- Uremia - likely contarction alkalosis ; pre- renal azothermia,   - monitor e-lytes- metabolic alkalosis , non compensated, Correct electrolytes   Hold Lasix - re-evaluate daily  FeNa- <1- pre renal, U Cl <30  U osm- no evidence of DI      GI prophylaxis - Diarrhea/ Transaminitis- med induced   Less likely hepatic congestion  Check Hep B and C   Monitor LFT  C Difficile  Change formula to vital 60cc/hr  ( 21Kcal/kg )  pepcid; Stool softeners  Last BM 8/1/22    ID  S/P fever- completed course of Abx for E Coli UTI- D/C cipro ; C Diffcile  POS  125mg q6.  Recurrent fever- check CBC with diff, lipase, LFT- transaminitis , procalcitonin- , CXR- Clear  , Blood Culture x2 - P   Transaminitis with elevated alk Phos - will obtain RUQ ultrasound  Lipase - nl   MRSA nasal swab- neg  Repeat Nasal swab  Renal ultrasound- no mass, no calculi    Heme- leucocytosis - possible stress related  Will f/U cultures  If source of potential infection - change ABX - meropenem and Vanco   DVT prophylaxsis  Stable WBC     76M with LM1 occlusion, s/p TICI 2B MT, no tPA as wake-up stroke.  Large evolving L MCA stroke with small area of hemorrhage.  Acute on chronic HFrEF, LVHF <10%, reduced RV syst function, 1st degree AVbl. ?Component of neurogenic CMP.  Acute hypoxemic  resp failure, intubated.  PMH of CAD/CABG/HFrEF ( lipitor , coreg , lisinopril ), HTN, HLD.  Ischemic Cardiomyopathy  UTI.  Metabolic Encephalopathy- stroke + uremia     Plan:  neurochecks q1hr  Off sedation n, fentanyl prn   ASA, statin  CT - stable   MRI as above  F/U EEG report- 7/29  Abnormal EEG study.  1. Potential epileptogenic foci in the left frontotemporal and right frontal regions.   2. Structural abnormality in the left frontotemporal region.   3. Mild to moderate nonspecific diffuse or multifocal cerebral dysfunction.   4. No seizure seen.       Resp- Resp failure secondary to neuro injury  Maintain O2 > 92 %  - Wean FIO2 to 30 %  - Advanced ETT tube and ? RML infiltrate - repeat Sputum   - - CXR - No infiltrate- 7/29/22  - FIO2 - 40 % ; PeeP- 5       Card- Ischemic cardiomyopathy HFrEF - Compensated CHF, Ectopy  Lopressor 50mg q8  +  D/C ACE- Inhibitor increase with increasing BUN and  worsening pre -renal   IMTIAZ cancelled due to fever  EPS following   CHF consultation - discuss adding hydralazine as afterload reduction . Currently CI and SVV on SAGAR track noted  With frequent PVC's would hold on ionotropic support and adequate CI  monitor e-lytes- metabolic alkalosis , non compensated, Correct electrolytes   Check EKG- troponin ; Pro BNP   Cardiology involved; cardiology will wait when patient afebrile    Hold  diuresis, maintain euvolemia to -500ml, monitor renal function, lactate,       Renal- Uremia - likely contarction alkalosis ; pre- renal azothermia,   - monitor e-lytes- metabolic alkalosis , non compensated, Correct electrolytes   Hold Lasix - re-evaluate daily  FeNa- <1- pre renal, U Cl <30  U osm- no evidence of DI      GI prophylaxis - Diarrhea/ Transaminitis- med induced   Less likely hepatic congestion  Check Hep B and C   Monitor LFT  C Difficile  Change formula to vital 60cc/hr  ( 21Kcal/kg )  pepcid; Stool softeners  Last BM 8/1/22    ID  S/P fever- completed course of Abx for E Coli UTI- D/C cipro ; C Diffcile  POS  125mg q6 - Day 1/10  Recurrent fever- check CBC with diff, lipase, LFT- transaminitis , procalcitonin- , CXR- Clear  , Blood Culture x2 - P   Transaminitis with elevated alk Phos - will obtain RUQ ultrasound  Lipase - nl   MRSA nasal swab- neg  Repeat Nasal swab  Renal ultrasound- no mass, no calculi    Heme- leucocytosis - possible stress related  Will f/U cultures  If source of potential infection - change ABX - meropenem and Vanco   DVT prophylaxsis  Stable WBC

## 2022-08-01 NOTE — CONSULT NOTE ADULT - ASSESSMENT
75 y/o male with HFrEF, ICMP, s/p CABG ([facility] 6/2014), HTN, HLD and carotid stenosis s/p endarterectomy (2015) who initially presented to [facility] with c/o right sided weakness and right facial droop. He was transferred to [facility] 7/24 for acute neuro-IR intervention w/ mechanical thrombectomy of L MCA occlusion. He required intubation post intervention for respiratory failure. His transaminitis worsened and there was concern for shock w/ end organ dysfunction. He required vasopressors which were weaned 7/29.    Lactate has improved from 3 to 1.6. BUN increasing. Noted to have worsening hyponatremia and leukocytosis on today's labs. He was also febrile overnight. BC negative to date. Currently pt is not sedated and tolerating Cpap. BP improved.       Pertinent Studies:    TTE 7/24/22: LVEF <10%, LVIDd 6.79cm, mild RVE with moderately reduced systolic function, grade III DD, mild MR, mild TR, PASP 41.9 mmHg (RAP 3), LVOT VTI 10.3, small PFO with left to right shunting.     CTA neck showed moderate stenosis in the proximal right internal carotid artery and moderate to severe stenosis in proximal left internal carotid artery. There was no evidence of ICA occlusion in the neck.

## 2022-08-01 NOTE — CONSULT NOTE ADULT - NS ATTEND AMEND GEN_ALL_CORE FT
75 y/o with h/o chronic systolic HF ACC/AHA stage C, ICMP LVEF 22, CAD s/p PCI ’04 CABG ‘14, carotid stenosis, declined ICD, PVD, HTN, DLP who was transferred from an OSH for neuroIR intervention after waking up with R sided facial droop and R sided weakness. He was found to have left M1 occlusion on CTA and required thrombectomy on 7/24/22. His hospital course has been complicated by acute hypoxic respiratory failure post procedure requiring mechanical ventilation, hypotension requiring temporary pressors, frequent PVCs, E. coli UTI, KE and fevers. He had a TTE that showed LVEF 19%, LVIDd 6.79cm, LVOT VTI 10.3cm, moderately RV dysfunction and RVSP 42mmHg (RAP 3mmHg).   Today, the patient remains intubated. He’s currently hypertensive while on CPAP with /60 (off pressors). Clinically looks euvolemic, lukewarm extremities. His lactate peaked at 3.2 but has now normalized. He has significant leukocytosis and down-trending transaminitis.   Recommendations:   -Keep I/O close to even  -OK to use hydralazine 10mg PO q8hrs for afterload reduction   -Plan to add GDMT once extubated  -Started on Lopressor by EP. Will eventually switch to Toprol XL  -Appreciate nephrology recommendations. Needs close follow up due to uptrending BUN  -We will continue to follow with you
Patient intubated/sedated at time of exam. History obtained from chart and wife and daughter at bedside.     75 y/o M with PMH CAD s/p MI, PCI x 2 2004, 4v CABG at [facility] 6/2014, carotid stenosis with mild to moderate b/l ICA disease, PVD s/p endarterectomy 2015, ICM (LVEF 20-25% in 3/2022), last NST in 7/2015 showed large inferolateral/apical lateral/apical/anterior infarction. Patient previously refused ICD. Initially presented to [facility] with R sided weakness and facial droop; was found to have acute CVA with LM1 occlusion, s/p mechanical thrombectomy at [facility]. TTE on this admission showed LVEF <10%.   EKG with LBBB (previous outpatient EKGs with IVCD/borderline LBBB)  SR with episodes of NSVT on telemetry   Trop negative x 3  pBNP 3000  CXR with pulmonary vascular congestion  Warm extremities  Normal Cr, LFTs  No lactate/mixed venous saturation available for review  Would not start milrinone as exam is not suggestive of cardiogenic shock. Continue levophed.  MAP >70  I&O's Summary- IN: 1048.1 mL / OUT: 3410 mL / NET: -2361.9 mL  Continue lasix 20mg IV q12  Continue ASA, statin   Restart GDMT once hemodynamically stable off pressors- add beta blocker first in view of NSVT, followed by ACE/ARB/ARNI, then assess for spironolactone and SGLT2 if BP is able to tolerate   Patient will likely need eventual IMTIAZ due to acute CVA. If LVEF remains <10%, would consider [facility]   Will also discuss ICD with patient, which he previously declined. If he is not agreeable, would benefit from ILR     Will continue to follow

## 2022-08-01 NOTE — PROGRESS NOTE ADULT - ASSESSMENT
IMPRESSION:  - Left MCA Stroke.  S/P suction thrombectomy for L MCA M1 occlusion with TICI 2b reperfusion. on 7-24-22.    Mechanism ESUS  cardioembolic versus Large artery atherosclerosis    ASSESSMENT/ PLAN:     - Neuro checks and vital signs Q 2 hours.  - SBP goal keep normotensive.  - ASA 81 mg PO or 300 OH QD.   - CT Head of 7-29-22 reviewed. Stable left frontal hemorrhagic products within the infarct.    - Will repeat CT head on 8-4-22 and if stable will clear him for anticoagulation. .  - Lipitor for LDL goal of < 70 .  - EEG -Report as above reviewed.   - CT/CTA/CTP -images and reports were reviewed.   - MRI Brain stroke protocol  -images and reports were reviewed. .  - TTE  -Reports as above were reviewed. . EF < 10%.  - IMTIAZ (  postponed due to fever)  - Cardiology follow up appreciated..  - SCD/ SQ Lovenox for DVT prophylaxis.

## 2022-08-01 NOTE — PROGRESS NOTE ADULT - ASSESSMENT
75 y/o male with hx of CAD s/p stents x2 (2004) and CABG x4 ([facility], 2014), ischemic cardiomyopathy (declined AICD), HTN, HLD, PVD s/p femoral endarterectomy who presented with right-sided weakness and facial droop to [facility] and was found to have acute stroke. Pt was transferred to [facility] for neuro IR intervention and underwent mechanical thrombectomy for left MCA occlusion. Post procedure pt developed acute hypoxic respiratory failure and was int

## 2022-08-01 NOTE — PROGRESS NOTE ADULT - ASSESSMENT
76 year old male with carotid stenosis, CAD s/p CABG (4V, [facility] 6/2014) with ischemic CMY (LVEF: 20-25% 3/2022; pt previously refused ICD), HTN, HL, PVD s/p endarterectomy (2015), who [place] [facility] [place] an acute CVA (right sided weakness and facial droop) due to LM1 occlusion who was transferred to [facility] for acute neuro-IR intervention with mechanical thrombectomy of L MCA occlusion. After his intervention he developed acute hypoxic respiratory failure requiring intubation. He has had NSR with frequent multifocal PVCs. TTE revealed a severely depressed LVEF of <10% with moderately reduced RV function. He now also has worsening renal function and transaminitis. EP consulted for NSVT and PVCs.    Recommendations:  - Continue beta blocker tx as tolerated by BP/HR  - PVC suppression likely unnecessary and antiarrhythmic options limited by transaminitis, worsening renal function  - Consider advanced CHF consult +/- RHC to understand cause of poor end organ perfusion  - Consider palliative consult  - Lactate 3.2 yesterday, now normalized at 1.6.   - Monitor electrolytes closely and replete as needed to achieve and maintain K>4 & Mg>2  - Pt previously declined primary prevention ICD. Could readdress if pt recovers neuro status and w/in pt GOC.   - Could consider ILR implant for surveillance for AF  if pt recovers neuro status and w/in pt GOC.   - No acute EP intervention.   - Will sign off as to EP. Please recall EP service as pt's neuro status improves if primary prevention ICD w/in pt GOC, or with questions, concerns or further arrhythmia issues.     Will d/w Dr Brody; further recs to follow.

## 2022-08-01 NOTE — EEG REPORT - NS EEG TEXT BOX
[facility]   COMPREHENSIVE EPILEPSY CENTER   REPORT OF LONG-TERM VIDEO EEG     [facility]: [address], [place] [address], Ph#: [phone]  [facility]: [id number] [place] [address], Ph#: [phone]  [facility]: [address] [place] [address], Ph#: [phone]    Patient Name: NATANAEL ROWAN  Age and : 76y ([birth year])  MRN #: [id number]  Location: [place] [address]  Referring Physician: Michael Pierre    Start Time/Date: 08:00 on 22  End Time/Date: 08:00 on 22  Duration: 24h    _____________________________________________________________  STUDY INFORMATION    EEG Recording Technique:  The patient underwent continuous Video-EEG monitoring, using Telemetry System hardware on the XLTek Digital System. EEG and video data were stored on a computer hard drive with important events saved in digital archive files. The material was reviewed by a physician (electroencephalographer / epileptologist) on a daily basis. Williams and seizure detection algorithms were utilized and reviewed. An EEG Technician attended to the patient, and was available throughout daytime work hours.  The epilepsy center neurologist was available in person or on call 24-hours per day.    EEG Placement and Labeling of Electrodes:  The EEG was performed utilizing 20 channel referential EEG connections (coronal over temporal over parasagittal montage) using all standard 10-20 electrode placements with EKG, with additional electrodes placed in the inferior temporal region using the modified 10-10 montage electrode placements for elective admissions, or if deemed necessary. Recording was at a sampling rate of 256 samples per second per channel. Time synchronized digital video recording was done simultaneously with EEG recording. A low light infrared camera was used for low light recording.     _____________________________________________________________  HISTORY    Patient is a 76y old  Male who presents with a chief complaint of stroke (2022 16:08)      PERTINENT MEDICATION:  none    _____________________________________________________________  STUDY INTERPRETATION    Findings: The background was continuous and reactive. During wakefulness, the posterior dominant rhythm consisted of asymmetric, poorly-modulated 7 Hz activity, with amplitude to 30 uV, that was better forms over the right hemisphere.    Background Slowing:  Diffuse theta and polymorphic delta slowing.    Focal Slowing:   Continuous theta/delta max slowing in the left frontotemporal region.     Sleep Background:  Drowsiness was characterized by fragmentation, attenuation, and slowing of the background activity.    Stage II sleep transients were not recorded.    Other Non-Epileptiform Findings:  Subtle attenuation of fast activity over the left hemisphere.    Interictal Epileptiform Activity:   - Rare to occasional left frontal/frontotemporal (Fp1/F3/F7/T7) sharp wave discharges.  - Rare to occasional right frontal/frontotemporal  (Fp2/F4/F8/T8) sharp wave discharges.    Events:  No event or seizure recorded.    Activation Procedures:   Hyperventilation was not performed.    Photic stimulation was not performed.     Artifacts:  Intermittent myogenic and movement artifacts were noted.    ECG:  The heart rate on single channel ECG was predominantly between 70-80 BPM.    _____________________________________________________________  EEG SUMMARY/CLASSIFICATION    Abnormal EEG in an altered patient.  - Rare to occasional left frontal/frontotemporal (Fp1/F3/F7/T7) sharp wave discharges.  - Rare to occasional right frontal/frontotemporal  (Fp2/F4/F8/T8) sharp wave discharges.  - Continuous theta/delta max slowing in the left frontotemporal region.   - Subtle attenuation of fast activity over the left hemisphere.  - Mild to moderate generalized slowing.    _____________________________________________________________  EEG IMPRESSION/CLINICAL CORRELATE    Abnormal EEG study.  1. Potential epileptogenic foci in the bilateral frontotemporal regions.   2. Structural abnormality and cortical dysfunction in the left frontotemporal region.   3. Mild to moderate nonspecific diffuse or multifocal cerebral dysfunction.   4. No seizure seen.     Today's study demonstrated no significant change from prior 24hr recording; slight decrease in burden of epileptiform discharges.     _____________________________________________________________    Mayi Prather MD  Director, Epilepsy/EMU - [facility]

## 2022-08-01 NOTE — PROGRESS NOTE ADULT - SUBJECTIVE AND OBJECTIVE BOX
HPI:  76M with PMH CABG, HTN, HLD who [place] [facility]. Last known well was last night 10pm prior to going to bed, woke up this morning around 0500 with R sided weakness and R facial droop. [place] [facility] initiated, NIH at [facility] reportedly 8. CTA showed LM1 occlusion. Transferred to [facility] for possible neuro IR intervention. On arrival to [facility], NIH 6. Decision made to take patient directly to neuro IR for intervention.   NIH 6, mRS 0    NIH SS:  DATE: 7/24/22  TIME: 0710  1A: Level of consciousness (0-3): 0  1B: Questions (0-2): 0    1C: Commands (0-2): 0  2: Gaze (0-2): 0  3: Visual fields (0-3): 0  4: Facial palsy (0-3): 1  MOTOR:  5A: Left arm motor drift (0-4): 0  5B: Right arm motor drift (0-4): 1  6A: Left leg motor drift (0-4): 0  6B: Right leg motor drift (0-4): 1  7: Limb ataxia (0-2): 0  SENSORY:  8: Sensation (0-2): 1  SPEECH:  9: Language (0-3): 1  10: Dysarthria (0-2): 1  EXTINCTION:  11: Extinction/inattention (0-2): 0    TOTAL SCORE: 6 (24 Jul 2022 07:34)    No o/n events.  Evolving large L MCA stroke wit small heme transformation.    ICU Vital Signs Last 24 Hrs  T(C): 38.7 (01 Aug 2022 07:00), Max: 38.7 (01 Aug 2022 07:00)  T(F): 101.7 (01 Aug 2022 07:00), Max: 101.7 (01 Aug 2022 07:00)  HR: 96 (01 Aug 2022 07:00) (89 - 106)  BP: 108/52 (01 Aug 2022 07:00) (99/50 - 160/74)  BP(mean): 70 (01 Aug 2022 07:00) (65 - 143)  ABP: 112/50 (01 Aug 2022 07:00) (100/42 - 135/55)  ABP(mean): 69 (01 Aug 2022 07:00) (63 - 81)  RR: 34 (01 Aug 2022 07:00) (19 - 34)  SpO2: 96% (01 Aug 2022 07:00) (94% - 100%)    O2 Parameters below as of 31 Jul 2022 21:26        31 Jul 2022 07:01  -  01 Aug 2022 07:00    Total NET: 2050 mL          30 Jul 2022 07:01  -  31 Jul 2022 07:00    Total NET: 740 mL      29 Jul 2022 07:01  -  30 Jul 2022 07:00    Total NET: 835 mL      30 Jul 2022 07:01  -  30 Jul 2022 09:25    Total NET: 120 mL      28 Jul 2022 07:01  -  29 Jul 2022 07:00  Total NET: -780 mL    27 Jul 2022 07:01  -  28 Jul 2022 07:00  Total NET: -356.7 mL        31 Jul 2022 07:01  -  01 Aug 2022 07:00  --------------------------------------------------------  IN:    Albumin 5%  - 250 mL: 500 mL    Enteral Tube Flush: 900 mL    Glucerna 1.5: 1200 mL    Sodium Chloride 0.9% Bolus: 250 mL  Total IN: 2850 mL    OUT:    Voided (mL): 800 mL  Total OUT: 800 mL    Total NET: 2050 mL        08-01    148<H>  |  109<H>  |  126.5<H>  ----------------------------<  235<H>  4.8   |  27.0  |  1.23    Ca    8.3<L>      01 Aug 2022 04:54  Phos  4.3     08-01  Mg     3.6     08-01    TPro  7.4  /  Alb  2.8<L>  /  TBili  0.4  /  DBili  0.2  /  AST  798<H>  /  ALT  559<H>  /  AlkPhos  328<H>  07-31 07-31    152<H>  |  108<H>  |  99.7<H>  ----------------------------<  209<H>  ( H2C03- 20)  4.8   |  27.0  |  1.07    Ca    8.8      31 Jul 2022 03:09  Phos  3.5     07-31  Mg     3.6     07-31    TPro  7.8  /  Alb  2.9<L>  /  TBili  0.5  /  DBili  x   /  AST  844<H>  /  ALT  501<H>  /  AlkPhos  334<H>  07-31    07-30    149<H>  |  106  |  90.6<H>  ----------------------------<  185<H>  4.3   |  29.0  |  1.21                          14.0   17.35 )-----------( 317      ( 01 Aug 2022 04:54 )             42.9                           15.1   12.92 )-----------( 304      ( 31 Jul 2022 03:09 )             46.4     Ca    9.1      30 Jul 2022 01:24  Phos  4.7     07-30  Mg     3.4     07-30    TPro  8.2  /  Alb  3.3  /  TBili  0.8  /  DBili  0.3  /  AST  126<H>  /  ALT  89<H>  /  AlkPhos  203<H>  07-29          Procalcitonin- 0.43---->0.79                          14.0   17.35 )-----------( 317      ( 01 Aug 2022 04:54 )             42.9                           15.1   12.92 )-----------( 304      ( 31 Jul 2022 03:09 )             46.4                         13.7   11.46 )-----------( 258      ( 30 Jul 2022 01:24 )             42.2       CAPILLARY BLOOD GLUCOSE  POCT Blood Glucose.: 214 mg/dL (01 Aug 2022 05:31)  POCT Blood Glucose.: 197 mg/dL (31 Jul 2022 23:43)  POCT Blood Glucose.: 162 mg/dL (31 Jul 2022 21:22)  POCT Blood Glucose.: 179 mg/dL (31 Jul 2022 18:02)  POCT Blood Glucose.: 212 mg/dL (31 Jul 2022 13:28)        BNP -- 3000----> 1760  Trop - 0.16---> 0.25---> 0.19      Culture - Urine (collected 25 Jul 2022 23:44)  Source: Clean Catch Clean Catch (Midstream)  Preliminary Report (27 Jul 2022 11:52):    >100,000 CFU/ml Escherichia coli- Pan Sens      Sputum - Nl Resp Margo- 7/30/22      Blood - 7/29 - No growth     CXR- CLEAR - 7/29    CT Head No Cont (07.30.22 @ 12:47) >  CLINICAL HISTORY: stroke    TECHNIQUE:  Noncontrast CT.  Axial Acquisition.  Sagittal and coronal reformations.    COMPARISON:  Compared to study dated 7/26/2022    FINDINGS:  *  HEMORRHAGE/BRAIN PARENCHYMA: Aging inferior left frontal and   inferolateral left parietal infarcts. Mild stable cortical hemorrhage   associated with the left frontal infarct. Mild decreased associated mass   effect. Patient has moderate underlying brain atrophy. Mild chronic   periventricular white matter ischemic changes are seen  *  VENTRICLES / SHIFT:  No hydrocephalus. No midline shift.  *  EXTRA-AXIAL / BASAL CISTERNS:  No extra-axial mass.Basal cisterns   preserved.  *  CALVARIUM AND EXTRACRANIAL SOFT TISSUES:  No depressed calvarial   fracture.  *  SINUSES, ORBITS, MASTOIDS: Mild fluid within the left sphenoid sinus.   A nasogastric tube courses through the left nasal cavity    IMPRESSION:  Aging inferior left frontal and inferolateral left parietal infarcts.   Mild stable cortical blood products associated with the frontal infarct.    < end of copied text >      US Abdomen Upper Quadrant Right (07.30.22 @ 20:27) >  INTERPRETATION:  CLINICAL INFORMATION: Abnormal liver function tests.    COMPARISON: Renal ultrasound dated 07/26/2022.    TECHNIQUE: Sonography of the right upper quadrant.    FINDINGS:  Liver: Normal in size and echogenicity. No focal lesions identified. The   main portal vein is patent with normal directional flow.  Bile ducts: No intrahepatic biliary dilatation. Common bile duct is not   well visualized.  Gallbladder: The gallbladder is only mildly distended. No stones or   gallbladder wall thickening.  Pancreas: Poorly visualized.  Right kidney: 10.4 cm. No hydronephrosis. There is 1.9 cm simple   appearing cyst in the lower pole.  Ascites: None.  IVC: Visualized portions are within normal limits.    IMPRESSION:  The liver is normal in appearance.    Limited visualization of the pancreas and common bile duct.      US Renal (07.26.22 @ 23:01) >      INTERPRETATION:  CLINICAL INFORMATION: Acute kidney injury.    COMPARISON: None available.    TECHNIQUE: Sonography of the kidneys and bladder.    FINDINGS:  Right kidney: 9.9 cm. No renal mass, hydronephrosis or calculi.    Left kidney: 11.5 cm. No renal mass, hydronephrosis or calculi.    Urinary bladder: Within normal limits.    IMPRESSION:  No renal mass, hydronephrosis or calculus is visualized sonographically.      MR Head No Cont (07.26.22 @ 20:58) >  COMPARISON: CT head 7/26/2022.    FINDINGS:  Acute infarcts are seen throughout the left MCA territory with   involvement of the left precentral gyrus. Hemorrhagic transformation is   again seen within the left inferior frontal lobe and insula, grossly   stable since comparison study.    Additional acute punctate infarct is seen within the medial left frontal   lobe (4-31).    Trace hemorrhage noted within the bilateral occipital horns. No   extra-axial fluid collections. The skull base flow voids are present.    The visualized intraorbital contents are normal. Mucosal thickening with   air-fluid level in the left sphenoid sinus. The mastoid air cells are   clear. The visualized osseous structures, soft tissues and partially   visualized parotid glands appear normal.    IMPRESSION:    Acute left MCA territory infarcts with hemorrhagic transformation,   grossly stable since comparison CT.    Additional punctate acute infarct involving the medial left frontal lobe   in the SUGEY territory.      CT Head No Cont (07.26.22 @ 20:25) >  INTERPRETATION:  CLINICAL INDICATION: Left M1 occlusion status post TICI   2B recanalization, follow-up    5mm axial sections of the brain were obtained from base to vertex,   without the intravenous administration of contrast material. Coronal and   sagittal computer generated reconstructed views are available.    Comparison is made with the prior CT of 7/24/2022.    Mild atrophy is identified with ventricular and sulcal prominence. There   is more lucency identified in the left insular cortex and left frontal   cortex compared to the prior exam consistent with normal evolution of an   infarct in the left middle cerebral artery territory. There is asmall   amount of hemorrhage in the left frontal region and left anterior   temporal lobe which is new since the prior exam.    Impression: Large lucency in the left frontal temporal cortex in the left   middle cerebral artery distribution compared with 7/24/2022 consistent   with normal evolving acute left middle cerebral artery infarct. Small   amount of new hemorrhage.    Xray Chest 1 View- PORTABLE-Urgent (Xray Chest 1 View- PORTABLE-Urgent .) (07.24.22 @ 20:45) >  IMPRESSION:    ET tube and NG tube, in adequate position.    Cardiomegaly. Unchanged pulmonary vascular congestion. Mild hazy opacity   right midlung field. Underlying infiltrate not excluded.     TTE Echo Complete w/ Contrast w/ Doppler (07.24.22 @ 14:01) >  Summary:   1. Endocardial visualization was enhanced with intravenous echo contrast.   2. Severely enlarged left atrium.   3. Global diffuse akinesis. Left ventricular ejection fraction, by   visual estimation, is <10%.   4. Elevated mean left atrial pressure. (>30 mm Hg)   5. Normal right atrial size.   6. Mildly enlarged right ventricle. Moderately reduced RV systolic   function.   7. Mild mitral valve regurgitation.   8. Mild tricuspid regurgitation.   9. Estimated pulmonary artery systolic pressure is 42 mmHg - mild   pulmonary hypertension.  10. There is no evidence of pericardial effusion.          EXAMINATION:  General:  calm, NAD  Neuro:  somnolent, no FC,  attempts EO to noxious, moves L side, RLE wdrl, RUE 0/5.Cards:  S1S2 present  Respiratory:  no respiratory distress, intubated.  Abdomen:  soft  Extremities:  no edema  Skin:  warm/dry HPI:  76M with PMH CABG, HTN, HLD who presents as transfer from [facility] [place] for stroke. Last known well was last night 10pm prior to going to bed, woke up this morning around 0500 with R sided weakness and R facial droop. Presented to [facility] [place], code stroke initiated, NIH at [facility] [place] reportedly 8. CTA showed LM1 occlusion. Transferred to [facility] for possible neuro IR intervention. On arrival to [facility], NIH 6. Decision made to take patient directly to neuro IR for intervention.   NIH 6, mRS 0    NIH SS:  DATE: 7/24/22  TIME: 0710  1A: Level of consciousness (0-3): 0  1B: Questions (0-2): 0    1C: Commands (0-2): 0  2: Gaze (0-2): 0  3: Visual fields (0-3): 0  4: Facial palsy (0-3): 1  MOTOR:  5A: Left arm motor drift (0-4): 0  5B: Right arm motor drift (0-4): 1  6A: Left leg motor drift (0-4): 0  6B: Right leg motor drift (0-4): 1  7: Limb ataxia (0-2): 0  SENSORY:  8: Sensation (0-2): 1  SPEECH:  9: Language (0-3): 1  10: Dysarthria (0-2): 1  EXTINCTION:  11: Extinction/inattention (0-2): 0    TOTAL SCORE: 6 (24 Jul 2022 07:34)    No o/n events.  Evolving large L MCA stroke wit small heme transformation.    ICU Vital Signs Last 24 Hrs  T(C): 38.7 (01 Aug 2022 07:00), Max: 38.7 (01 Aug 2022 07:00)  T(F): 101.7 (01 Aug 2022 07:00), Max: 101.7 (01 Aug 2022 07:00)  HR: 96 (01 Aug 2022 07:00) (89 - 106)  BP: 108/52 (01 Aug 2022 07:00) (99/50 - 160/74)  BP(mean): 70 (01 Aug 2022 07:00) (65 - 143)  ABP: 112/50 (01 Aug 2022 07:00) (100/42 - 135/55)  ABP(mean): 69 (01 Aug 2022 07:00) (63 - 81)  RR: 34 (01 Aug 2022 07:00) (19 - 34)  SpO2: 96% (01 Aug 2022 07:00) (94% - 100%)    O2 Parameters below as of 31 Jul 2022 21:26        31 Jul 2022 07:01  -  01 Aug 2022 07:00    Total NET: 2050 mL          30 Jul 2022 07:01  -  31 Jul 2022 07:00    Total NET: 740 mL      29 Jul 2022 07:01  -  30 Jul 2022 07:00    Total NET: 835 mL      30 Jul 2022 07:01  -  30 Jul 2022 09:25    Total NET: 120 mL      28 Jul 2022 07:01  -  29 Jul 2022 07:00  Total NET: -780 mL    27 Jul 2022 07:01  -  28 Jul 2022 07:00  Total NET: -356.7 mL        31 Jul 2022 07:01  -  01 Aug 2022 07:00  --------------------------------------------------------  IN:    Albumin 5%  - 250 mL: 500 mL    Enteral Tube Flush: 900 mL    Glucerna 1.5: 1200 mL    Sodium Chloride 0.9% Bolus: 250 mL  Total IN: 2850 mL    OUT:    Voided (mL): 800 mL  Total OUT: 800 mL    Total NET: 2050 mL        08-01    148<H>  |  109<H>  |  126.5<H>  ----------------------------<  235<H>  4.8   |  27.0  |  1.23    Ca    8.3<L>      01 Aug 2022 04:54  Phos  4.3     08-01  Mg     3.6     08-01    TPro  7.4  /  Alb  2.8<L>  /  TBili  0.4  /  DBili  0.2  /  AST  798<H>  /  ALT  559<H>  /  AlkPhos  328<H>  07-31 07-31    152<H>  |  108<H>  |  99.7<H>  ----------------------------<  209<H>  ( H2C03- 20)  4.8   |  27.0  |  1.07    Ca    8.8      31 Jul 2022 03:09  Phos  3.5     07-31  Mg     3.6     07-31    TPro  7.8  /  Alb  2.9<L>  /  TBili  0.5  /  DBili  x   /  AST  844<H>  /  ALT  501<H>  /  AlkPhos  334<H>  07-31 07-30    149<H>  |  106  |  90.6<H>  ----------------------------<  185<H>  4.3   |  29.0  |  1.21                          14.0   17.35 )-----------( 317      ( 01 Aug 2022 04:54 )             42.9                           15.1   12.92 )-----------( 304      ( 31 Jul 2022 03:09 )             46.4     Ca    9.1      30 Jul 2022 01:24  Phos  4.7     07-30  Mg     3.4     07-30    TPro  8.2  /  Alb  3.3  /  TBili  0.8  /  DBili  0.3  /  AST  126<H>  /  ALT  89<H>  /  AlkPhos  203<H>  07-29    MEDICATIONS  (STANDING):  aspirin  chewable 81 milliGRAM(s) Oral daily  chlorhexidine 0.12% Liquid 15 milliLiter(s) Oral Mucosa every 12 hours  chlorhexidine 2% Cloths 1 Application(s) Topical daily  ciprofloxacin     Tablet 500 milliGRAM(s) Oral every 12 hours  dextrose 5%. 1000 milliLiter(s) (50 mL/Hr) IV Continuous <Continuous>  dextrose 5%. 1000 milliLiter(s) (100 mL/Hr) IV Continuous <Continuous>  dextrose 50% Injectable 25 Gram(s) IV Push once  enoxaparin Injectable 40 milliGRAM(s) SubCutaneous every 24 hours  famotidine Injectable 20 milliGRAM(s) IV Push every 12 hours  glucagon  Injectable 1 milliGRAM(s) IntraMuscular once  hydrALAZINE 10 milliGRAM(s) Oral every 6 hours  insulin lispro (ADMELOG) corrective regimen sliding scale   SubCutaneous every 6 hours  insulin NPH human recombinant 4 Unit(s) SubCutaneous every 8 hours  metoprolol tartrate 50 milliGRAM(s) Oral every 8 hours      MEDICATIONS  (PRN):  dextrose Oral Gel 15 Gram(s) Oral once PRN Blood Glucose LESS THAN 70 milliGRAM(s)/deciliter  fentaNYL    Injectable 25 MICROGram(s) IV Push every 4 hours PRN agitation/vent synchrony  ondansetron Injectable 4 milliGRAM(s) IV Push every 6 hours PRN Nausea and/or Vomiting            Procalcitonin- 0.43---->0.79                          14.0   17.35 )-----------( 317      ( 01 Aug 2022 04:54 )             42.9                           15.1   12.92 )-----------( 304      ( 31 Jul 2022 03:09 )             46.4                         13.7   11.46 )-----------( 258      ( 30 Jul 2022 01:24 )             42.2       CAPILLARY BLOOD GLUCOSE  POCT Blood Glucose.: 214 mg/dL (01 Aug 2022 05:31)  POCT Blood Glucose.: 197 mg/dL (31 Jul 2022 23:43)  POCT Blood Glucose.: 162 mg/dL (31 Jul 2022 21:22)  POCT Blood Glucose.: 179 mg/dL (31 Jul 2022 18:02)  POCT Blood Glucose.: 212 mg/dL (31 Jul 2022 13:28)        BNP -- 3000----> 1760  Trop - 0.16---> 0.25---> 0.19      Culture - Urine (collected 25 Jul 2022 23:44)  Source: Clean Catch Clean Catch (Midstream)  Preliminary Report (27 Jul 2022 11:52):    >100,000 CFU/ml Escherichia coli- Pan Sens      Sputum - Nl Resp Margo- 7/30/22      Blood - 7/29 - No growth     CXR- CLEAR - 7/29    CT Head No Cont (07.30.22 @ 12:47) >  CLINICAL HISTORY: stroke    TECHNIQUE:  Noncontrast CT.  Axial Acquisition.  Sagittal and coronal reformations.    COMPARISON:  Compared to study dated 7/26/2022    FINDINGS:  *  HEMORRHAGE/BRAIN PARENCHYMA: Aging inferior left frontal and   inferolateral left parietal infarcts. Mild stable cortical hemorrhage   associated with the left frontal infarct. Mild decreased associated mass   effect. Patient has moderate underlying brain atrophy. Mild chronic   periventricular white matter ischemic changes are seen  *  VENTRICLES / SHIFT:  No hydrocephalus. No midline shift.  *  EXTRA-AXIAL / BASAL CISTERNS:  No extra-axial mass.Basal cisterns   preserved.  *  CALVARIUM AND EXTRACRANIAL SOFT TISSUES:  No depressed calvarial   fracture.  *  SINUSES, ORBITS, MASTOIDS: Mild fluid within the left sphenoid sinus.   A nasogastric tube courses through the left nasal cavity    IMPRESSION:  Aging inferior left frontal and inferolateral left parietal infarcts.   Mild stable cortical blood products associated with the frontal infarct.    < end of copied text >      US Abdomen Upper Quadrant Right (07.30.22 @ 20:27) >  INTERPRETATION:  CLINICAL INFORMATION: Abnormal liver function tests.    COMPARISON: Renal ultrasound dated 07/26/2022.    TECHNIQUE: Sonography of the right upper quadrant.    FINDINGS:  Liver: Normal in size and echogenicity. No focal lesions identified. The   main portal vein is patent with normal directional flow.  Bile ducts: No intrahepatic biliary dilatation. Common bile duct is not   well visualized.  Gallbladder: The gallbladder is only mildly distended. No stones or   gallbladder wall thickening.  Pancreas: Poorly visualized.  Right kidney: 10.4 cm. No hydronephrosis. There is 1.9 cm simple   appearing cyst in the lower pole.  Ascites: None.  IVC: Visualized portions are within normal limits.    IMPRESSION:  The liver is normal in appearance.    Limited visualization of the pancreas and common bile duct.      US Renal (07.26.22 @ 23:01) >      INTERPRETATION:  CLINICAL INFORMATION: Acute kidney injury.    COMPARISON: None available.    TECHNIQUE: Sonography of the kidneys and bladder.    FINDINGS:  Right kidney: 9.9 cm. No renal mass, hydronephrosis or calculi.    Left kidney: 11.5 cm. No renal mass, hydronephrosis or calculi.    Urinary bladder: Within normal limits.    IMPRESSION:  No renal mass, hydronephrosis or calculus is visualized sonographically.      MR Head No Cont (07.26.22 @ 20:58) >  COMPARISON: CT head 7/26/2022.    FINDINGS:  Acute infarcts are seen throughout the left MCA territory with   involvement of the left precentral gyrus. Hemorrhagic transformation is   again seen within the left inferior frontal lobe and insula, grossly   stable since comparison study.    Additional acute punctate infarct is seen within the medial left frontal   lobe (4-31).    Trace hemorrhage noted within the bilateral occipital horns. No   extra-axial fluid collections. The skull base flow voids are present.    The visualized intraorbital contents are normal. Mucosal thickening with   air-fluid level in the left sphenoid sinus. The mastoid air cells are   clear. The visualized osseous structures, soft tissues and partially   visualized parotid glands appear normal.    IMPRESSION:    Acute left MCA territory infarcts with hemorrhagic transformation,   grossly stable since comparison CT.    Additional punctate acute infarct involving the medial left frontal lobe   in the SUGEY territory.      CT Head No Cont (07.26.22 @ 20:25) >  INTERPRETATION:  CLINICAL INDICATION: Left M1 occlusion status post TICI   2B recanalization, follow-up    5mm axial sections of the brain were obtained from base to vertex,   without the intravenous administration of contrast material. Coronal and   sagittal computer generated reconstructed views are available.    Comparison is made with the prior CT of 7/24/2022.    Mild atrophy is identified with ventricular and sulcal prominence. There   is more lucency identified in the left insular cortex and left frontal   cortex compared to the prior exam consistent with normal evolution of an   infarct in the left middle cerebral artery territory. There is asmall   amount of hemorrhage in the left frontal region and left anterior   temporal lobe which is new since the prior exam.    Impression: Large lucency in the left frontal temporal cortex in the left   middle cerebral artery distribution compared with 7/24/2022 consistent   with normal evolving acute left middle cerebral artery infarct. Small   amount of new hemorrhage.    Xray Chest 1 View- PORTABLE-Urgent (Xray Chest 1 View- PORTABLE-Urgent .) (07.24.22 @ 20:45) >  IMPRESSION:    ET tube and NG tube, in adequate position.    Cardiomegaly. Unchanged pulmonary vascular congestion. Mild hazy opacity   right midlung field. Underlying infiltrate not excluded.     TTE Echo Complete w/ Contrast w/ Doppler (07.24.22 @ 14:01) >  Summary:   1. Endocardial visualization was enhanced with intravenous echo contrast.   2. Severely enlarged left atrium.   3. Global diffuse akinesis. Left ventricular ejection fraction, by   visual estimation, is <10%.   4. Elevated mean left atrial pressure. (>30 mm Hg)   5. Normal right atrial size.   6. Mildly enlarged right ventricle. Moderately reduced RV systolic   function.   7. Mild mitral valve regurgitation.   8. Mild tricuspid regurgitation.   9. Estimated pulmonary artery systolic pressure is 42 mmHg - mild   pulmonary hypertension.  10. There is no evidence of pericardial effusion.          EXAMINATION:  General:  calm, NAD  Neuro:  somnolent, no FC,  attempts EO to noxious, moves L side, RLE wdrl, RUE 0/5.Cards:  S1S2 present  Respiratory:  no respiratory distress, intubated.  Abdomen:  soft  Extremities:  no edema  Skin:  warm/dry

## 2022-08-01 NOTE — PROGRESS NOTE ADULT - PROBLEM SELECTOR PLAN 1
IMTIAZ on hold due to multi organ failure  Telemonitoring with no afib and no acute noted.  EEG tracing in place  Neuro following  Consider IMTIAZ when stable.  SBP goal keep normotensive.  ASA 81 mg PO or 300 VA QD.

## 2022-08-01 NOTE — CONSULT NOTE ADULT - NS ATTEND OPT1A GEN_ALL_CORE
Letters printed and put in outgoing mail. Attempted to contact patient. No answer and voicemail box not set up.     Mary Womack, RN-BSN  Care Coordination, Behavioral Health       History/Exam/Medical decision making

## 2022-08-01 NOTE — CHART NOTE - NSCHARTNOTEFT_GEN_A_CORE
Source: Patient [ ]  Family [ ]   other [x ] EMR and discussed in A.M. rounds     Current Diet: Diet, NPO with Tube Feed:   Tube Feeding Modality: Nasogastric  Vital 1.5 Jonathan (VITAL1.5)  Total Volume for 24 Hours (mL): 1440  Continuous  Starting Tube Feed Rate {mL per Hour}: 10  Increase Tube Feed Rate by (mL): 10     Every 4 hours  Until Goal Tube Feed Rate (mL per Hour): 60  Tube Feed Duration (in Hours): 24  Tube Feed Start Time: 10:30 (08-01-22 @ 10:26)    Current Weight:   7/31: 99 kg   7/29: 67.4 kg   7/26: 94.4 kg   7/24: 99.9 kg   (Generalized edema)     % Weight Change: Unsure of accuracy of weights; will continue to monitor weights for trends.     Pertinent Medications: MEDICATIONS  (STANDING):  aspirin  chewable 81 milliGRAM(s) Oral daily  chlorhexidine 0.12% Liquid 15 milliLiter(s) Oral Mucosa every 12 hours  chlorhexidine 2% Cloths 1 Application(s) Topical daily  ciprofloxacin     Tablet 500 milliGRAM(s) Oral every 12 hours  dextrose 5%. 1000 milliLiter(s) (50 mL/Hr) IV Continuous <Continuous>  dextrose 5%. 1000 milliLiter(s) (100 mL/Hr) IV Continuous <Continuous>  dextrose 50% Injectable 25 Gram(s) IV Push once  enoxaparin Injectable 40 milliGRAM(s) SubCutaneous every 24 hours  famotidine Injectable 20 milliGRAM(s) IV Push every 12 hours  glucagon  Injectable 1 milliGRAM(s) IntraMuscular once  hydrALAZINE 10 milliGRAM(s) Oral every 6 hours  insulin lispro (ADMELOG) corrective regimen sliding scale   SubCutaneous every 6 hours  insulin NPH human recombinant 4 Unit(s) SubCutaneous every 8 hours  metoprolol tartrate 50 milliGRAM(s) Oral every 8 hours    MEDICATIONS  (PRN):  dextrose Oral Gel 15 Gram(s) Oral once PRN Blood Glucose LESS THAN 70 milliGRAM(s)/deciliter  fentaNYL    Injectable 25 MICROGram(s) IV Push every 4 hours PRN agitation/vent synchrony  ondansetron Injectable 4 milliGRAM(s) IV Push every 6 hours PRN Nausea and/or Vomiting    Pertinent Labs: CBC Full  -  ( 01 Aug 2022 04:54 )  WBC Count : 17.35 K/uL  RBC Count : 4.50 M/uL  Hemoglobin : 14.0 g/dL  Hematocrit : 42.9 %  Platelet Count - Automated : 317 K/uL  Mean Cell Volume : 95.3 fl  Mean Cell Hemoglobin : 31.1 pg  Mean Cell Hemoglobin Concentration : 32.6 gm/dL  Auto Neutrophil # : x  Auto Lymphocyte # : x  Auto Monocyte # : x  Auto Eosinophil # : x  Auto Basophil # : x  Auto Neutrophil % : x  Auto Lymphocyte % : x  Auto Monocyte % : x  Auto Eosinophil % : x  Auto Basophil % : x        Skin: Suspected DTI to sacrum/coccyx     Nutrition focused physical exam- not conducted at this time - found signs of malnutrition [ ]absent [ ]present    Subcutaneous fat loss: [ ] Orbital fat pads region, [ ]Buccal fat region, [ ]Triceps region,  [ ]Ribs region    Muscle wasting: [ ]Temples region, [ ]Clavicle region, [ ]Shoulder region, [ ]Scapula region, [ ]Interosseous region,  [ ]thigh region, [ ]Calf region    Estimated Needs:   [x ] no change since previous assessment  [ ] recalculated:     Hospital Course:   Pt is a 76 year old M with PMH CABG, HTN, HLD who [place] [facility] [place]. Last known well was last night 10pm prior to going to bed, woke up this morning around 0500 with R sided weakness and R facial droop. [place] [facility] code stroke initiated, NIH at [facility] reportedly 8. CTA showed LM1 occlusion. Transferred to [facility] for possible neuro IR intervention. On arrival to [facility], NIH 6. Decision made to take patient directly to neuro IR for intervention.       Current Nutrition Diagnosis:  Pt remains at high nutrition risk secondary to increased nutrient needs related to increased physiologic demand for nutrient as evidenced by s/p LM1 occlusion, s/p TICI 2B MT, acute hypoxemic resp failure requiring vent support and skin breakdown. Pt remains on vent support; currently receiving enteral feeds of Vital @ 60ml/hr (x20 hours) 1200ml/day; 1800kcal, 80gm protein. Recommendations below:     Recommendations:   1. RX: MVI and Vit. C daily (500mg).   2. Check weight daily to monitor trends   3. Consider changing enteral formula to Pivot @ 60ml/hr (x20 hours) 1200ml/day; 1800kcal, 113gm protein to better meet pt's estimated protein needs and aid in healing.     Monitoring and Evaluation:   [ ] PO intake [x ] Tolerance to diet prescription [X] Weights  [X] Follow up per protocol [X] Labs:

## 2022-08-01 NOTE — CONSULT NOTE ADULT - PROBLEM SELECTOR RECOMMENDATION 9
-telemetry monitoring  -LV function now worsened (EF 20-25% in 3/2022, now <10%)  -start inotropic support with Milrinone drip at 0.375 mcg/kg/min  -pt received Lasix 20 mg IV, will continue daily for gentle diuresis  -hold Coreg and Lisinopril due to hypotension (pt currently on Levophed drip)
- H/o CAD and CABG with known systolic dysfunction (LVEF ~25%)  - TTE showed LVEF <10% with RV dysfunction  - Likely exacerbated by neurogenic insult   - Clinically close to euvolemia on exam with lukewarm extremities  - Would avoid using inotropes as this will increase ectopy burden, pt already w/ frequent multifocal PVCs/couplets  - Gentle diuresis recommended to maintain net even fluid status  - Consider hydralazine 25mg TID for afterload reduction depending on degree of permissive HTN recommended by neuro. Slow GDMT optimization as tolerated.   - Please check markers of perfusion daily (serum lactate, LFTs, T Bili)  - Document strict I&O  - Device: previously declined ICD per reports

## 2022-08-01 NOTE — CONSULT NOTE ADULT - NSCONSULTADDITIONALINFOA_GEN_ALL_CORE
#Non Oliguric Acute Kidney Injury   #Elevated BUN  #HFrEF  #Hypernatremia  #Metabolic Alkalosis    -Creatinine on arrival ranged 0.6-0.7mg/dL (baseline renal function)  -Notable for acute kidney injury starting on 07/30/2022 with creatinine today at 1.2mg/dL   -Suspecting prerenal etiology with ? progression to ATN ? given constellation of recent diuretic / ACEi use + hypernatremia + metabolic alkalosis   -UA (prior to onset of KE) showed specific gravity 1.025, +protein, +nitrite, +leukocyte esterase, moderate blood, 6-10RBC, 26-50 WBC, TNTC bacteria  -Urine culture positive for E coli   -Recommend repeating UA pending resolution of UTI   -R abdominal U/S on 07/30/2022 showed R kidney 10.4cm with simple cyst without hydronephrosis   -Diuretics and ACEi have been held   -Remains non oliguric at this time    -Recommend changing formulation of current tube feed to one with less proteins given worsening BUN  -No acute indication for renal replacement therapy at this time  -Will continue to monitor    Thank you for the consultation.     Kwame West DO  Nephrology  Office Number [phone] #Non Oliguric Acute Kidney Injury   #Elevated BUN  #HFrEF  #Hypernatremia  #Metabolic Alkalosis    -Creatinine on arrival ranged 0.6-0.7mg/dL (baseline renal function)  -Notable for acute kidney injury starting on 07/30/2022 with creatinine today at 1.2mg/dL   -Suspecting prerenal etiology with ? progression to ATN ? given constellation of recent diuretic / ACEi use + hypernatremia + metabolic alkalosis   -UA (prior to onset of KE) showed specific gravity 1.025, +protein, +nitrite, +leukocyte esterase, moderate blood, 6-10RBC, 26-50 WBC, TNTC bacteria  -Urine culture positive for E coli   -Recommend repeating UA pending resolution of UTI   -R abdominal U/S on 07/30/2022 showed R kidney 10.4cm with simple cyst without hydronephrosis    -Remains non oliguric at this time    -Recommend changing formulation of current tube feed to one with less proteins given worsening BUN  -Hypernatremia improving with free water flushes - would continue at current volume / frequency    -Hemodynamically stable at this time - diuretics and ACEi have been held - would continue to hold at this time   -No acute indication for renal replacement therapy at this time  -Will continue to monitor    Thank you for the consultation.     Kwame West DO  Nephrology  Office Number [phone]

## 2022-08-01 NOTE — EEG REPORT - NS EEG TEXT BOX
[facility]   COMPREHENSIVE EPILEPSY CENTER   REPORT OF LONG-TERM VIDEO EEG     [facility]: [address], [place] [address], Ph#: [phone]  [facility]: [id number], [place] [address], Ph#: [phone]  [facility]: [address] [place] [address], Ph#: [phone]    Patient Name: NATANAEL ROWAN  Age and : 76y ([birth year])  MRN #: [id number]  Location: [place] [address]  Referring Physician: Michael Pierre    Start Time/Date: 08:00 on 22  End Time/Date: 08:00 on 22  Duration: 3h 12m    _____________________________________________________________  STUDY INFORMATION    EEG Recording Technique:  The patient underwent continuous Video-EEG monitoring, using Telemetry System hardware on the XLTek Digital System. EEG and video data were stored on a computer hard drive with important events saved in digital archive files. The material was reviewed by a physician (electroencephalographer / epileptologist) on a daily basis. Williams and seizure detection algorithms were utilized and reviewed. An EEG Technician attended to the patient, and was available throughout daytime work hours.  The epilepsy center neurologist was available in person or on call 24-hours per day.    EEG Placement and Labeling of Electrodes:  The EEG was performed utilizing 20 channel referential EEG connections (coronal over temporal over parasagittal montage) using all standard 10-20 electrode placements with EKG, with additional electrodes placed in the inferior temporal region using the modified 10-10 montage electrode placements for elective admissions, or if deemed necessary. Recording was at a sampling rate of 256 samples per second per channel. Time synchronized digital video recording was done simultaneously with EEG recording. A low light infrared camera was used for low light recording.     _____________________________________________________________  HISTORY    Patient is a 76y old  Male who presents with a chief complaint of stroke (2022 16:08)      PERTINENT MEDICATION:  none    _____________________________________________________________  STUDY INTERPRETATION    Findings: The background was continuous and reactive. During wakefulness, the posterior dominant rhythm consisted of asymmetric, poorly-modulated 7 Hz activity, with amplitude to 30 uV, that was better forms over the right hemisphere.    Background Slowing:  Diffuse theta and polymorphic delta slowing.    Focal Slowing:   Continuous theta/delta max slowing in the left frontotemporal region.     Sleep Background:  Drowsiness was characterized by fragmentation, attenuation, and slowing of the background activity.    Stage II sleep transients were not recorded.    Other Non-Epileptiform Findings:  Subtle attenuation of fast activity over the left hemisphere.    Interictal Epileptiform Activity:   - Rare to occasional left frontal/frontotemporal (Fp1/F3/F7/T7) sharp wave discharges.  - Rare to occasional right frontal/frontotemporal  (Fp2/F4/F8/T8) sharp wave discharges.    Events:  No event or seizure recorded.    Activation Procedures:   Hyperventilation was not performed.    Photic stimulation was not performed.     Artifacts:  Intermittent myogenic and movement artifacts were noted.    ECG:  The heart rate on single channel ECG was predominantly between 70-80 BPM.    _____________________________________________________________  EEG SUMMARY/CLASSIFICATION    Abnormal EEG in an altered patient.  - Rare to occasional left frontal/frontotemporal (Fp1/F3/F7/T7) sharp wave discharges.  - Rare to occasional right frontal/frontotemporal  (Fp2/F4/F8/T8) sharp wave discharges.  - Continuous theta/delta max slowing in the left frontotemporal region.   - Subtle attenuation of fast activity over the left hemisphere.  - Mild to moderate generalized slowing.    _____________________________________________________________  EEG IMPRESSION/CLINICAL CORRELATE    Abnormal EEG study.  1. Potential epileptogenic foci in the bilateral frontotemporal regions.   2. Structural abnormality and cortical dysfunction in the left frontotemporal region.   3. Mild to moderate nonspecific diffuse or multifocal cerebral dysfunction.   4. No seizure seen.     _____________________________________________________________    Mayi Prather MD  Director, Epilepsy/EMU - [facility]

## 2022-08-01 NOTE — CONSULT NOTE ADULT - ASSESSMENT
The patient is a 76 year old male with past medical history of HTN, HLD, CAD s/p CABG, carotid stenosis, and PVD who was transferred from an outside hospital for neuro IR intervention after waking up with R sided facial droop and R sided weakness; found to have left M1 occlusion on CTA s/p thrombectomy on 07/24/2022. Hospital course notable for acute hypoxic respiratory failure post procedure s/p mechanical ventilation. Also with ischemic cardiomyopathy, LVEF<10% and moderately reduced RV function, transiently requiring pressors. Also with E coli UTI, KE, and fevers. Nephrology was consulted for elevated BUN.

## 2022-08-01 NOTE — CONSULT NOTE ADULT - SUBJECTIVE AND OBJECTIVE BOX
[facility] Advanced Heart Failure  Office: [address] [place] [address]  Telephone: [phone]/Fax: [phone]    HPI:   75 y/o male with HFrEF, ICMP, s/p CABG ([facility] 2014), HTN, HLD and carotid stenosis s/p endarterectomy () who initially presented to [facility] with c/o right sided weakness and right facial droop. He was transferred to [facility]  for acute neuro-IR intervention w/ mechanical thrombectomy of L MCA occlusion. He underwent thrombectomy with TICI 2b reperfusion on .   Post procedure he required intubation for hypoxic respiratory failure. There was concern for shock given worsening transaminitis/end organ dysfunction. Pressors were weaned and lactate has improved. Patient now tolerating CPAP, BP improving.    TTE showed worsened systolic dysfunction ~10% from 25%.     PAST MEDICAL & SURGICAL HISTORY:  HTN (hypertension)    HLD (hyperlipidemia)    S/P CABG     HFrEF (EF ~25%)    Carotid stenosis s/p endarterectomy    REVIEW OF SYSTEMS:  Unable to obtain complete ROS due to patient being intubated.    MEDICATIONS  (STANDING):  aspirin  chewable 81 milliGRAM(s) Oral daily  chlorhexidine 0.12% Liquid 15 milliLiter(s) Oral Mucosa every 12 hours  chlorhexidine 2% Cloths 1 Application(s) Topical daily  ciprofloxacin     Tablet 500 milliGRAM(s) Oral every 12 hours  dextrose 5%. 1000 milliLiter(s) (50 mL/Hr) IV Continuous <Continuous>  dextrose 5%. 1000 milliLiter(s) (100 mL/Hr) IV Continuous <Continuous>  dextrose 50% Injectable 25 Gram(s) IV Push once  enoxaparin Injectable 40 milliGRAM(s) SubCutaneous every 24 hours  famotidine Injectable 20 milliGRAM(s) IV Push every 12 hours  glucagon  Injectable 1 milliGRAM(s) IntraMuscular once  hydrALAZINE 10 milliGRAM(s) Oral every 6 hours  insulin lispro (ADMELOG) corrective regimen sliding scale   SubCutaneous every 6 hours  insulin NPH human recombinant 4 Unit(s) SubCutaneous every 8 hours  metoprolol tartrate 50 milliGRAM(s) Oral every 8 hours    MEDICATIONS  (PRN):  dextrose Oral Gel 15 Gram(s) Oral once PRN Blood Glucose LESS THAN 70 milliGRAM(s)/deciliter  fentaNYL    Injectable 25 MICROGram(s) IV Push every 4 hours PRN agitation/vent synchrony  ondansetron Injectable 4 milliGRAM(s) IV Push every 6 hours PRN Nausea and/or Vomiting    Home Medications:  atorvastatin 20 mg oral tablet: 1 tab(s) orally once a day (2022 07:56)  carvedilol 6.25 mg oral tablet: 1 tab(s) orally 2 times a day (2022 07:56)  lisinopril 10 mg oral tablet: 1 tab(s) orally once a day (2022 07:56)    Allergies: No Known Allergies    Social History:  Unable to obtain information from patient    Family History:  Unable to obtain information from patient    ICU Vital Signs Last 24 Hrs  T(C): 37.5, Max: 38.7 ( @ 07:00)  HR: 94 (89 - 102)  BP: 168/69 (99/50 - 168/69)  BP(mean): 98 (65 - 98)  ABP: 155/63 (100/42 - 155/63)  ABP(mean): 1 (1 - 81)  RR: 31 (19 - 34)  SpO2: 97% (94% - 99%)    Weight in k.9 (22)  Weight in k.4 (22)      I&O's Summary Last 24 Hrs    IN: 2850 mL / OUT: 800 mL / NET: 2050 mL    Tele: SR, frequent multifocal PVCs    Physical Exam:  General: Intubated, in no acute distress  Neck: Neck supple. JVP not elevated  Chest: anterior rhonchi  CV: RRR. Normal S1 and S2. No murmurs, rub, or gallops. Warm peripherally.  PV: No edema noted.  Abdomen: Soft, non-distended  Skin: warm, dry, no rash  Neuro: not sedated. Opens eyes to noxious stimuli.    Labs:    ( 22 @ 04:54 )               14.0   17.35 )--------( 317                  42.9     148  |  109  |  126.5  ---------------------<  235  4.8  |  27.0  |  1.23    Ca 8.3  Phos 4.3  Mg 3.6    ( 22 @ 17:44 )  TPro  7.4  /  Alb  2.8  /  TBili  0.4  /  DBili  0.2  /  AST  798  /  ALT  559  /  AlkPhos  328  ( 22 @ 03:09 )  TPro  7.8  /  Alb  2.9  /  TBili  0.5  /  DBili  x   /  AST  844  /  ALT  501  /  AlkPhos  334      ( 22 @ 04:54 )  TropHS x     /    / CKMB x      ( 22 @ 03:51 )  TropHS x     / CK 82    / CKMB x        Serum Pro-Brain Natriuretic Peptide: 1446 (22 @ 09:00)    ABG - ( 22 @ 22:45 )  pH: 7.490 / pCO2: 43    / pO2: 78    / HCO3: 33    / Base Excess: 9.5   / SaO2: 97.1     Lactate, Blood: 1.6 (22 @ 04:54)    TTE Echo Complete w/ Contrast w/ Doppler (22 @ 14:01)     Summary:   1. Endocardial visualization was enhanced with intravenous echo contrast.   2. Severely enlarged left atrium.   3. Global diffuse akinesis. Left ventricular ejection fraction, by   visual estimation, is <10%.   4. Elevated mean left atrial pressure. (>30 mm Hg)   5. Normal right atrial size.   6. Mildly enlarged right ventricle. Moderately reduced RV systolic   function.   7. Mild mitral valve regurgitation.   8. Mild tricuspid regurgitation.   9. Estimated pulmonary artery systolic pressure is 42 mmHg - mild   pulmonary hypertension.  10. There is no evidence of pericardial effusion.  11. Team informed of the findings.    MD Millie, RPVI Electronically signed on 2022 at 3:49:14 PM    < end of copied text >

## 2022-08-01 NOTE — PHYSICAL THERAPY INITIAL EVALUATION ADULT - MANUAL MUSCLE TESTING RESULTS, REHAB EVAL
flaccid RUE and RLE, Spontaneous LUE and LLE movement noted, pt not following commands for formal assessment.

## 2022-08-01 NOTE — CONSULT NOTE ADULT - SUBJECTIVE AND OBJECTIVE BOX
The patient is a 76 year old male with past medical history of HTN, HLD, CAD s/p CABG, carotid stenosis, and PVD who was transferred from an outside hospital for neuro IR intervention after waking up with R sided facial droop and R sided weakness; found to have left M1 occlusion on CTA s/p thrombectomy on 07/24/2022. Hospital course notable for acute hypoxic respiratory failure post procedure s/p mechanical ventilation. Also with ischemic cardiomyopathy, LVEF<10% and moderately reduced RV function, transiently requiring pressors. Also with E coli UTI, KE, and fevers. Nephrology was consulted for elevated BUN.     Physical Exam  General: WDWN male in NAD  HEENT: Intubated, +NT tube  Cardiac: S1S2 RRR  Respiratory: Transmitted breath sounds  Abdomen: Soft, NT  Extremities: No appreciable edema  Neuro: Unresponsive

## 2022-08-01 NOTE — PROGRESS NOTE ADULT - SUBJECTIVE AND OBJECTIVE BOX
Neurology   NATANAEL ROWAN 76y Male           HPI:  76M with PMH CABG, HTN, HLD who presents as transfer from [facility] for stroke. Last known well was last night 10pm prior to going to bed, woke up this morning around 0500 with R sided weakness and R facial droop. Presented to [facility], code stroke initiated, NIH at [facility] reportedly 8. CTA showed LM1 occlusion. Transferred to [facility] for possible neuro IR intervention. On arrival to [facility], NIH 6. Decision made to take patient directly to neuro IR for intervention.     Initial NIH 6, mRS 0    PMH: HTN (hypertension)    HLD (hyperlipidemia)    S/P CABG x 1         PSH:       FAMILY HISTORY:      SOCIAL HISTORY:  No history of tobacco or alcohol use     Allergies    No Known Allergies    Intolerances        Vital Signs Last 24 Hrs  T(C): 37.5 (01 Aug 2022 12:00), Max: 38.7 (01 Aug 2022 07:00)  T(F): 99.5 (01 Aug 2022 12:00), Max: 101.7 (01 Aug 2022 07:00)  HR: 94 (01 Aug 2022 12:00) (89 - 106)  BP: 168/69 (01 Aug 2022 12:00) (99/50 - 168/69)  BP(mean): 98 (01 Aug 2022 12:00) (65 - 100)  RR: 31 (01 Aug 2022 12:00) (19 - 34)  SpO2: 97% (01 Aug 2022 12:00) (94% - 99%)    Parameters below as of 01 Aug 2022 12:00  Patient On (Oxygen Delivery Method): ventilator,C-pap        MEDICATIONS    aspirin  chewable 81 milliGRAM(s) Oral daily  chlorhexidine 0.12% Liquid 15 milliLiter(s) Oral Mucosa every 12 hours  chlorhexidine 2% Cloths 1 Application(s) Topical daily  ciprofloxacin     Tablet 500 milliGRAM(s) Oral every 12 hours  dextrose 5%. 1000 milliLiter(s) IV Continuous <Continuous>  dextrose 5%. 1000 milliLiter(s) IV Continuous <Continuous>  dextrose 50% Injectable 25 Gram(s) IV Push once  dextrose Oral Gel 15 Gram(s) Oral once PRN  enoxaparin Injectable 40 milliGRAM(s) SubCutaneous every 24 hours  famotidine Injectable 20 milliGRAM(s) IV Push every 12 hours  fentaNYL    Injectable 25 MICROGram(s) IV Push every 4 hours PRN  glucagon  Injectable 1 milliGRAM(s) IntraMuscular once  hydrALAZINE 10 milliGRAM(s) Oral every 6 hours  insulin lispro (ADMELOG) corrective regimen sliding scale   SubCutaneous every 6 hours  insulin NPH human recombinant 4 Unit(s) SubCutaneous every 8 hours  metoprolol tartrate 50 milliGRAM(s) Oral every 8 hours  ondansetron Injectable 4 milliGRAM(s) IV Push every 6 hours PRN         LABS:  CBC Full  -  ( 01 Aug 2022 04:54 )  WBC Count : 17.35 K/uL  RBC Count : 4.50 M/uL  Hemoglobin : 14.0 g/dL  Hematocrit : 42.9 %  Platelet Count - Automated : 317 K/uL  Mean Cell Volume : 95.3 fl  Mean Cell Hemoglobin : 31.1 pg  Mean Cell Hemoglobin Concentration : 32.6 gm/dL  Auto Neutrophil # : x  Auto Lymphocyte # : x  Auto Monocyte # : x  Auto Eosinophil # : x  Auto Basophil # : x  Auto Neutrophil % : x  Auto Lymphocyte % : x  Auto Monocyte % : x  Auto Eosinophil % : x  Auto Basophil % : x      08-01    148<H>  |  109<H>  |  126.5<H>  ----------------------------<  235<H>  4.8   |  27.0  |  1.23    Ca    8.3<L>      01 Aug 2022 04:54  Phos  4.3     08-01  Mg     3.6     08-01    TPro  7.4  /  Alb  2.8<L>  /  TBili  0.4  /  DBili  0.2  /  AST  798<H>  /  ALT  559<H>  /  AlkPhos  328<H>  07-31    LIVER FUNCTIONS - ( 31 Jul 2022 17:44 )  Alb: 2.8 g/dL / Pro: 7.4 g/dL / ALK PHOS: 328 U/L / ALT: 559 U/L / AST: 798 U/L / GGT: x           On Neurological Examination:  Patient is intubated off sedation.  Mental Status -  Slightly better today. There is  Eye opening to noxious. and at times to voice as well.    Cranial Nerves -Pupils are 2 and reactive. , EOMs are conjugate in primary gaze and on occulocephalics.   Cough reflex is present .  Motor Exam -   Right side -trace movement to noxious. Left UE withdrawal  to noxious stim. LLE moved spontaneously.         RADIOLOGY ( All neurological imaging studies were independently reviewed and interpreted by me)  [facility]   CTA  CTP    IMPRESSION:    CT PERFUSION:  The CT perfusion is technically limited by truncation of the arterial input function and venous outflow function.    Core infarct (CBF < 30%:): 0 ml  Penumbra (Tmax >6s): 4 mL  Mismatched volume: 0 ml  Mismatched ratio: None    Interpretation:  Based on CBF < 30%, there is no evidence of a core infarct. At CBF < 34%, a small perfusion defect of 4 mL is located in the left inferior frontal gyrus and corresponds to matched perfusion abnormality of Tmax >6 seconds. On Tmax > 4s, there is a much larger perfusion defect throughout the majority of the left frontoparietal convexity, which may represent an ischemic penumbra in the left MCA vascular territory.        CT ANGIOGRAPHY NECK:  1. Poor opacification of the cervical vasculature.  2. Suspicion for moderate greater than 50% stenosis in the proximal right internal carotid artery. Suspicion for moderate to severe stenosis in the proximal left internal carotid artery that may be greater than 70%. No evidence of ICA occlusion in the neck.  3. The right vertebral artery is grossly patent.. The V1 segment of the left vertebral artery cannot be visualized. The V2 and V3 segments the left vertebral artery are grossly patent with multiple stenoses.  4. There is limited visualization of the common carotid artery origins and subclavian artery origins. Underlying stenoses cannot be excluded. There is suspicion for a severe stenosis in the proximal left subclavian artery.      CT ANGIOGRAPHY BRAIN:  1. Poor opacification of the intracranial vasculature.  2. There appears to be a focal filling defect at the left MCA bifurcation with distal flow. There is marked attenuation of M2 branches of the left middle cerebral artery. Vessel density analysis of the MCA territory shows a greater than 50% reduction of vessel density in the left MCA territory compared to the contralateral side.  3. There are mild to moderate stenoses in the supraclinoid segments of the internal carotid arteries bilaterally, without occlusion.  4. There are stenoses in the intradural vertebral arteries bilaterally, without occlusion.    Repeat CTA or MRI/MRA is recommended for further evaluation.  Peak arterial opacification on the CT perfusion occurs at approximately 38 seconds. If the CTA is repeated, a long delay is required after contrast injection to achieve adequate opacification of the vasculature.    The findings were discussed with JACK Light in the emergency room on 07/24/2022 at 5:45 AM. Read back verification was obtained.      SEVERO CHANEL MD; Attending Radiologist  This document has been electronically signed    MRI:    MR Head No Cont (07.26.22 @ 20:58) >  IMPRESSION:    Acute left MCA territory infarcts with hemorrhagic transformation,   grossly stable since comparison CT.    Additional punctate acute infarct involving the medial left frontal lobe   in the SUGEY territory.    ORION ANDRADE MD; Attending Radiologist        TTE  TTE Echo Complete w/ Contrast w/ Doppler (07.24.22 @ 14:01) >    Summary:   1. Endocardial visualization was enhanced with intravenous echo contrast.   2. Severely enlarged left atrium.   3. Global diffuse akinesis. Left ventricular ejection fraction, by   visual estimation, is <10%.   4. Elevated mean left atrial pressure. (>30 mm Hg)   5. Normal right atrial size.   6. Mildly enlarged right ventricle. Moderately reduced RV systolic   function.   7. Mild mitral valve regurgitation.   8. Mild tricuspid regurgitation.   9. Estimated pulmonary artery systolic pressure is 42 mmHg - mild   pulmonary hypertension.  10. There is no evidence of pericardial effusion.  11. Team informed of the findings.    MD Millie, RPVI Electronically signed on 7/24/2022 at 3:49:14 PM      < from: CT Head No Cont (07.30.22 @ 12:47) >    IMPRESSION:  Aging inferior left frontal and inferolateral left parietal infarcts.   Mild stable cortical blood products associated with the frontal infarct.    VERNELL LOYA MD; Attending Radiologist          EEG IMPRESSION/CLINICAL CORRELATE 7/31/22    Abnormal EEG study.  1. Potential epileptogenic foci in the bilateral frontotemporal regions.   2. Structural abnormality and cortical dysfunction in the left frontotemporal region.   3. Mild to moderate nonspecific diffuse or multifocal cerebral dysfunction.   4. No seizure seen.     _____________________________________________________________    Mayi Prather MD  Director, Epilepsy/EMU [facility]       Electronic Signatures:  Mayi Prather)  (Signed 31-Jul-2022 09:06)EEG IMPRESSION/CLINICAL CORRELATE

## 2022-08-02 LAB
ALBUMIN SERPL ELPH-MCNC: 2.6 G/DL — LOW (ref 3.3–5.2)
ALP SERPL-CCNC: 310 U/L — HIGH (ref 40–120)
ALT FLD-CCNC: 425 U/L — HIGH
ANION GAP SERPL CALC-SCNC: 11 MMOL/L — SIGNIFICANT CHANGE UP (ref 5–17)
APPEARANCE UR: ABNORMAL
AST SERPL-CCNC: 416 U/L — HIGH
BACTERIA # UR AUTO: ABNORMAL
BILIRUB DIRECT SERPL-MCNC: 0.2 MG/DL — SIGNIFICANT CHANGE UP (ref 0–0.3)
BILIRUB INDIRECT FLD-MCNC: 0.2 MG/DL — SIGNIFICANT CHANGE UP (ref 0.2–1)
BILIRUB SERPL-MCNC: 0.4 MG/DL — SIGNIFICANT CHANGE UP (ref 0.4–2)
BILIRUB UR-MCNC: NEGATIVE — SIGNIFICANT CHANGE UP
BUN SERPL-MCNC: 134.3 MG/DL — HIGH (ref 8–20)
CALCIUM SERPL-MCNC: 8.9 MG/DL — SIGNIFICANT CHANGE UP (ref 8.4–10.5)
CHLORIDE SERPL-SCNC: 111 MMOL/L — HIGH (ref 98–107)
CO2 SERPL-SCNC: 28 MMOL/L — SIGNIFICANT CHANGE UP (ref 22–29)
COLOR SPEC: YELLOW — SIGNIFICANT CHANGE UP
CREAT SERPL-MCNC: 1.13 MG/DL — SIGNIFICANT CHANGE UP (ref 0.5–1.3)
DIFF PNL FLD: ABNORMAL
EGFR: 67 ML/MIN/1.73M2 — SIGNIFICANT CHANGE UP
EPI CELLS # UR: SIGNIFICANT CHANGE UP
GLUCOSE BLDC GLUCOMTR-MCNC: 257 MG/DL — HIGH (ref 70–99)
GLUCOSE BLDC GLUCOMTR-MCNC: 259 MG/DL — HIGH (ref 70–99)
GLUCOSE BLDC GLUCOMTR-MCNC: 260 MG/DL — HIGH (ref 70–99)
GLUCOSE BLDC GLUCOMTR-MCNC: 281 MG/DL — HIGH (ref 70–99)
GLUCOSE BLDC GLUCOMTR-MCNC: 286 MG/DL — HIGH (ref 70–99)
GLUCOSE BLDC GLUCOMTR-MCNC: 291 MG/DL — HIGH (ref 70–99)
GLUCOSE SERPL-MCNC: 295 MG/DL — HIGH (ref 70–99)
GLUCOSE UR QL: NEGATIVE MG/DL — SIGNIFICANT CHANGE UP
HCT VFR BLD CALC: 45 % — SIGNIFICANT CHANGE UP (ref 39–50)
HGB BLD-MCNC: 14.8 G/DL — SIGNIFICANT CHANGE UP (ref 13–17)
KETONES UR-MCNC: NEGATIVE — SIGNIFICANT CHANGE UP
LACTATE SERPL-SCNC: 1.6 MMOL/L — SIGNIFICANT CHANGE UP (ref 0.5–2)
LEUKOCYTE ESTERASE UR-ACNC: NEGATIVE — SIGNIFICANT CHANGE UP
MAGNESIUM SERPL-MCNC: 3.9 MG/DL — HIGH (ref 1.8–2.6)
MCHC RBC-ENTMCNC: 31 PG — SIGNIFICANT CHANGE UP (ref 27–34)
MCHC RBC-ENTMCNC: 32.9 GM/DL — SIGNIFICANT CHANGE UP (ref 32–36)
MCV RBC AUTO: 94.1 FL — SIGNIFICANT CHANGE UP (ref 80–100)
NITRITE UR-MCNC: NEGATIVE — SIGNIFICANT CHANGE UP
PH UR: 6 — SIGNIFICANT CHANGE UP (ref 5–8)
PHOSPHATE SERPL-MCNC: 4.8 MG/DL — HIGH (ref 2.4–4.7)
PLATELET # BLD AUTO: 337 K/UL — SIGNIFICANT CHANGE UP (ref 150–400)
POTASSIUM SERPL-MCNC: 4.8 MMOL/L — SIGNIFICANT CHANGE UP (ref 3.5–5.3)
POTASSIUM SERPL-SCNC: 4.8 MMOL/L — SIGNIFICANT CHANGE UP (ref 3.5–5.3)
PROCALCITONIN SERPL-MCNC: 0.64 NG/ML — HIGH (ref 0.02–0.1)
PROT SERPL-MCNC: 6.6 G/DL — SIGNIFICANT CHANGE UP (ref 6.6–8.7)
PROT UR-MCNC: 15
RBC # BLD: 4.78 M/UL — SIGNIFICANT CHANGE UP (ref 4.2–5.8)
RBC # FLD: 16.1 % — HIGH (ref 10.3–14.5)
RBC CASTS # UR COMP ASSIST: ABNORMAL /HPF (ref 0–4)
SODIUM SERPL-SCNC: 150 MMOL/L — HIGH (ref 135–145)
SP GR SPEC: 1.01 — SIGNIFICANT CHANGE UP (ref 1.01–1.02)
UROBILINOGEN FLD QL: NEGATIVE MG/DL — SIGNIFICANT CHANGE UP
WBC # BLD: 21.58 K/UL — HIGH (ref 3.8–10.5)
WBC # FLD AUTO: 21.58 K/UL — HIGH (ref 3.8–10.5)
WBC UR QL: SIGNIFICANT CHANGE UP /HPF (ref 0–5)

## 2022-08-02 PROCEDURE — 99497 ADVNCD CARE PLAN 30 MIN: CPT | Mod: 25

## 2022-08-02 PROCEDURE — 71045 X-RAY EXAM CHEST 1 VIEW: CPT | Mod: 26

## 2022-08-02 PROCEDURE — 99232 SBSQ HOSP IP/OBS MODERATE 35: CPT

## 2022-08-02 PROCEDURE — 99222 1ST HOSP IP/OBS MODERATE 55: CPT

## 2022-08-02 RX ORDER — VANCOMYCIN HCL 1 G
1000 VIAL (EA) INTRAVENOUS EVERY 12 HOURS
Refills: 0 | Status: DISCONTINUED | OUTPATIENT
Start: 2022-08-02 | End: 2022-08-02

## 2022-08-02 RX ORDER — METRONIDAZOLE 500 MG
500 TABLET ORAL EVERY 8 HOURS
Refills: 0 | Status: DISCONTINUED | OUTPATIENT
Start: 2022-08-02 | End: 2022-08-07

## 2022-08-02 RX ORDER — MEROPENEM 1 G/30ML
1000 INJECTION INTRAVENOUS EVERY 8 HOURS
Refills: 0 | Status: DISCONTINUED | OUTPATIENT
Start: 2022-08-02 | End: 2022-08-05

## 2022-08-02 RX ORDER — VANCOMYCIN HCL 1 G
1250 VIAL (EA) INTRAVENOUS EVERY 12 HOURS
Refills: 0 | Status: DISCONTINUED | OUTPATIENT
Start: 2022-08-02 | End: 2022-08-02

## 2022-08-02 RX ORDER — HUMAN INSULIN 100 [IU]/ML
8 INJECTION, SUSPENSION SUBCUTANEOUS EVERY 6 HOURS
Refills: 0 | Status: DISCONTINUED | OUTPATIENT
Start: 2022-08-02 | End: 2022-08-03

## 2022-08-02 RX ORDER — NOREPINEPHRINE BITARTRATE/D5W 8 MG/250ML
0.05 PLASTIC BAG, INJECTION (ML) INTRAVENOUS
Qty: 8 | Refills: 0 | Status: DISCONTINUED | OUTPATIENT
Start: 2022-08-02 | End: 2022-08-03

## 2022-08-02 RX ORDER — VANCOMYCIN HCL 1 G
1500 VIAL (EA) INTRAVENOUS EVERY 12 HOURS
Refills: 0 | Status: DISCONTINUED | OUTPATIENT
Start: 2022-08-02 | End: 2022-08-02

## 2022-08-02 RX ORDER — ALBUMIN HUMAN 25 %
250 VIAL (ML) INTRAVENOUS ONCE
Refills: 0 | Status: COMPLETED | OUTPATIENT
Start: 2022-08-02 | End: 2022-08-02

## 2022-08-02 RX ORDER — METHOCARBAMOL 500 MG/1
500 TABLET, FILM COATED ORAL EVERY 6 HOURS
Refills: 0 | Status: DISCONTINUED | OUTPATIENT
Start: 2022-08-02 | End: 2022-08-03

## 2022-08-02 RX ORDER — VANCOMYCIN HCL 1 G
500 VIAL (EA) INTRAVENOUS EVERY 6 HOURS
Refills: 0 | Status: DISCONTINUED | OUTPATIENT
Start: 2022-08-02 | End: 2022-08-07

## 2022-08-02 RX ADMIN — Medication 6: at 12:51

## 2022-08-02 RX ADMIN — Medication 166.67 MILLIGRAM(S): at 02:07

## 2022-08-02 RX ADMIN — Medication 6: at 23:28

## 2022-08-02 RX ADMIN — MEROPENEM 100 MILLIGRAM(S): 1 INJECTION INTRAVENOUS at 14:29

## 2022-08-02 RX ADMIN — CHLORHEXIDINE GLUCONATE 1 APPLICATION(S): 213 SOLUTION TOPICAL at 12:50

## 2022-08-02 RX ADMIN — Medication 81 MILLIGRAM(S): at 12:51

## 2022-08-02 RX ADMIN — Medication 125 MILLILITER(S): at 02:07

## 2022-08-02 RX ADMIN — Medication 50 MILLIGRAM(S): at 06:01

## 2022-08-02 RX ADMIN — Medication 125 MILLIGRAM(S): at 06:04

## 2022-08-02 RX ADMIN — HUMAN INSULIN 8 UNIT(S): 100 INJECTION, SUSPENSION SUBCUTANEOUS at 17:51

## 2022-08-02 RX ADMIN — HUMAN INSULIN 8 UNIT(S): 100 INJECTION, SUSPENSION SUBCUTANEOUS at 14:28

## 2022-08-02 RX ADMIN — Medication 500 MILLIGRAM(S): at 17:51

## 2022-08-02 RX ADMIN — CHLORHEXIDINE GLUCONATE 15 MILLILITER(S): 213 SOLUTION TOPICAL at 06:03

## 2022-08-02 RX ADMIN — ENOXAPARIN SODIUM 40 MILLIGRAM(S): 100 INJECTION SUBCUTANEOUS at 21:32

## 2022-08-02 RX ADMIN — FAMOTIDINE 20 MILLIGRAM(S): 10 INJECTION INTRAVENOUS at 23:36

## 2022-08-02 RX ADMIN — Medication 6: at 06:02

## 2022-08-02 RX ADMIN — Medication 100 MILLIGRAM(S): at 12:49

## 2022-08-02 RX ADMIN — Medication 100 MILLIGRAM(S): at 22:12

## 2022-08-02 RX ADMIN — Medication 250 MILLIGRAM(S): at 14:29

## 2022-08-02 RX ADMIN — CHLORHEXIDINE GLUCONATE 15 MILLILITER(S): 213 SOLUTION TOPICAL at 17:49

## 2022-08-02 RX ADMIN — Medication 6: at 00:35

## 2022-08-02 RX ADMIN — HUMAN INSULIN 8 UNIT(S): 100 INJECTION, SUSPENSION SUBCUTANEOUS at 23:27

## 2022-08-02 RX ADMIN — Medication 500 MILLIGRAM(S): at 23:31

## 2022-08-02 RX ADMIN — Medication 50 MILLIGRAM(S): at 14:29

## 2022-08-02 RX ADMIN — MEROPENEM 100 MILLIGRAM(S): 1 INJECTION INTRAVENOUS at 21:32

## 2022-08-02 RX ADMIN — HUMAN INSULIN 8 UNIT(S): 100 INJECTION, SUSPENSION SUBCUTANEOUS at 06:02

## 2022-08-02 RX ADMIN — Medication 50 MILLIGRAM(S): at 21:33

## 2022-08-02 RX ADMIN — FAMOTIDINE 20 MILLIGRAM(S): 10 INJECTION INTRAVENOUS at 12:49

## 2022-08-02 RX ADMIN — Medication 6: at 17:50

## 2022-08-02 RX ADMIN — MEROPENEM 100 MILLIGRAM(S): 1 INJECTION INTRAVENOUS at 02:07

## 2022-08-02 RX ADMIN — Medication 10 MILLIGRAM(S): at 06:01

## 2022-08-02 RX ADMIN — Medication 500 MILLIGRAM(S): at 12:49

## 2022-08-02 NOTE — PROGRESS NOTE ADULT - SUBJECTIVE AND OBJECTIVE BOX
Interval events:    VITALS:  T(C): , Max: 38.5 (08-02-22 @ 02:00)  HR:  (79 - 100)  BP:  (68/40 - 168/69)  ABP:  (70/35 - 179/66)  RR:  (19 - 33)  SpO2:  (96% - 100%)  Wt(kg): --  Mode: CPAP with PS, FiO2: 40, PEEP: 5, PS: 10    08-01-22 @ 07:01  -  08-02-22 @ 07:00  --------------------------------------------------------  IN: 2335 mL / OUT: 3050 mL / NET: -715 mL      LABS:  Na: 150 (08-02 @ 01:28), 151 (08-01 @ 15:00), 148 (08-01 @ 04:54), 152 (07-31 @ 17:44), 152 (07-31 @ 03:09)  K: 4.8 (08-02 @ 01:28), 4.4 (08-01 @ 15:00), 4.8 (08-01 @ 04:54), 4.5 (07-31 @ 17:44), 4.8 (07-31 @ 03:09)  Cl: 111 (08-02 @ 01:28), 111 (08-01 @ 15:00), 109 (08-01 @ 04:54), 112 (07-31 @ 17:44), 108 (07-31 @ 03:09)  CO2: 28.0 (08-02 @ 01:28), 28.0 (08-01 @ 15:00), 27.0 (08-01 @ 04:54), 30.0 (07-31 @ 17:44), 27.0 (07-31 @ 03:09)  BUN: 134.3 (08-02 @ 01:28), 134.0 (08-01 @ 15:00), 126.5 (08-01 @ 04:54), 105.2 (07-31 @ 17:44), 99.7 (07-31 @ 03:09)  Cr: 1.13 (08-02 @ 01:28), 1.06 (08-01 @ 15:00), 1.23 (08-01 @ 04:54), 0.95 (07-31 @ 17:44), 1.07 (07-31 @ 03:09)  Glu: 295(08-02 @ 01:28), 257(08-01 @ 15:00), 235(08-01 @ 04:54), 209(07-31 @ 17:44), 209(07-31 @ 03:09)    Hgb: 14.8 (08-02 @ 01:28), 14.4 (08-01 @ 15:00), 14.0 (08-01 @ 04:54), 15.1 (07-31 @ 03:09)  Hct: 45.0 (08-02 @ 01:28), 43.6 (08-01 @ 15:00), 42.9 (08-01 @ 04:54), 46.4 (07-31 @ 03:09)  WBC: 21.58 (08-02 @ 01:28), 17.99 (08-01 @ 15:00), 17.35 (08-01 @ 04:54), 12.92 (07-31 @ 03:09)  Plt: 337 (08-02 @ 01:28), 318 (08-01 @ 15:00), 317 (08-01 @ 04:54), 304 (07-31 @ 03:09)    MEDICATIONS:  aspirin  chewable 81 milliGRAM(s) Oral daily  chlorhexidine 0.12% Liquid 15 milliLiter(s) Oral Mucosa every 12 hours  chlorhexidine 2% Cloths 1 Application(s) Topical daily  dextrose 5%. 1000 milliLiter(s) IV Continuous <Continuous>  dextrose 5%. 1000 milliLiter(s) IV Continuous <Continuous>  dextrose 50% Injectable 25 Gram(s) IV Push once  dextrose Oral Gel 15 Gram(s) Oral once PRN  enoxaparin Injectable 40 milliGRAM(s) SubCutaneous every 24 hours  famotidine Injectable 20 milliGRAM(s) IV Push every 12 hours  fentaNYL    Injectable 25 MICROGram(s) IV Push every 4 hours PRN  glucagon  Injectable 1 milliGRAM(s) IntraMuscular once  hydrALAZINE 10 milliGRAM(s) Oral every 6 hours  insulin lispro (ADMELOG) corrective regimen sliding scale   SubCutaneous every 6 hours  insulin NPH human recombinant 8 Unit(s) SubCutaneous every 8 hours  meropenem  IVPB 1000 milliGRAM(s) IV Intermittent every 8 hours  metoprolol tartrate 50 milliGRAM(s) Oral every 8 hours  norepinephrine Infusion 0.05 MICROgram(s)/kG/Min IV Continuous <Continuous>  ondansetron Injectable 4 milliGRAM(s) IV Push every 6 hours PRN  vancomycin    Solution 125 milliGRAM(s) Enteral Tube every 6 hours  vancomycin  IVPB 1000 milliGRAM(s) IV Intermittent every 12 hours    EXAMINATION:  General:  in NAD  HEENT:  MMM  Neuro:  awake, alert, oriented x 3, follows commands, EOMI, face symmetric, no PD, DF 5/5   Cards:  RRR  Respiratory:  no respiratory distress  Abdomen:  soft  Extremities:  no LE edema    Assessment/Plan:    Interval events:    VITALS:  T(C): , Max: 38.5 (08-02-22 @ 02:00)  HR:  (79 - 100)  BP:  (68/40 - 168/69)  ABP:  (70/35 - 179/66)  RR:  (19 - 33)  SpO2:  (96% - 100%)  Wt(kg): --  Mode: CPAP with PS, FiO2: 40, PEEP: 5, PS: 10    08-01-22 @ 07:01  -  08-02-22 @ 07:00  --------------------------------------------------------  IN: 2335 mL / OUT: 3050 mL / NET: -715 mL      LABS:  Na: 150 (08-02 @ 01:28), 151 (08-01 @ 15:00), 148 (08-01 @ 04:54), 152 (07-31 @ 17:44), 152 (07-31 @ 03:09)  K: 4.8 (08-02 @ 01:28), 4.4 (08-01 @ 15:00), 4.8 (08-01 @ 04:54), 4.5 (07-31 @ 17:44), 4.8 (07-31 @ 03:09)  Cl: 111 (08-02 @ 01:28), 111 (08-01 @ 15:00), 109 (08-01 @ 04:54), 112 (07-31 @ 17:44), 108 (07-31 @ 03:09)  CO2: 28.0 (08-02 @ 01:28), 28.0 (08-01 @ 15:00), 27.0 (08-01 @ 04:54), 30.0 (07-31 @ 17:44), 27.0 (07-31 @ 03:09)  BUN: 134.3 (08-02 @ 01:28), 134.0 (08-01 @ 15:00), 126.5 (08-01 @ 04:54), 105.2 (07-31 @ 17:44), 99.7 (07-31 @ 03:09)  Cr: 1.13 (08-02 @ 01:28), 1.06 (08-01 @ 15:00), 1.23 (08-01 @ 04:54), 0.95 (07-31 @ 17:44), 1.07 (07-31 @ 03:09)  Glu: 295(08-02 @ 01:28), 257(08-01 @ 15:00), 235(08-01 @ 04:54), 209(07-31 @ 17:44), 209(07-31 @ 03:09)    Hgb: 14.8 (08-02 @ 01:28), 14.4 (08-01 @ 15:00), 14.0 (08-01 @ 04:54), 15.1 (07-31 @ 03:09)  Hct: 45.0 (08-02 @ 01:28), 43.6 (08-01 @ 15:00), 42.9 (08-01 @ 04:54), 46.4 (07-31 @ 03:09)  WBC: 21.58 (08-02 @ 01:28), 17.99 (08-01 @ 15:00), 17.35 (08-01 @ 04:54), 12.92 (07-31 @ 03:09)  Plt: 337 (08-02 @ 01:28), 318 (08-01 @ 15:00), 317 (08-01 @ 04:54), 304 (07-31 @ 03:09)    MEDICATIONS:  aspirin  chewable 81 milliGRAM(s) Oral daily  chlorhexidine 0.12% Liquid 15 milliLiter(s) Oral Mucosa every 12 hours  chlorhexidine 2% Cloths 1 Application(s) Topical daily  dextrose 5%. 1000 milliLiter(s) IV Continuous <Continuous>  dextrose 5%. 1000 milliLiter(s) IV Continuous <Continuous>  dextrose 50% Injectable 25 Gram(s) IV Push once  dextrose Oral Gel 15 Gram(s) Oral once PRN  enoxaparin Injectable 40 milliGRAM(s) SubCutaneous every 24 hours  famotidine Injectable 20 milliGRAM(s) IV Push every 12 hours  fentaNYL    Injectable 25 MICROGram(s) IV Push every 4 hours PRN  glucagon  Injectable 1 milliGRAM(s) IntraMuscular once  hydrALAZINE 10 milliGRAM(s) Oral every 6 hours  insulin lispro (ADMELOG) corrective regimen sliding scale   SubCutaneous every 6 hours  insulin NPH human recombinant 8 Unit(s) SubCutaneous every 8 hours  meropenem  IVPB 1000 milliGRAM(s) IV Intermittent every 8 hours  metoprolol tartrate 50 milliGRAM(s) Oral every 8 hours  norepinephrine Infusion 0.05 MICROgram(s)/kG/Min IV Continuous <Continuous>  ondansetron Injectable 4 milliGRAM(s) IV Push every 6 hours PRN  vancomycin    Solution 125 milliGRAM(s) Enteral Tube every 6 hours  vancomycin  IVPB 1000 milliGRAM(s) IV Intermittent every 12 hours    EXAMINATION:  General:  in NAD  HEENT:  MMM  Neuro:  awake, alert, oriented x 3, follows commands, EOMI, face symmetric, no PD, DF 5/5   Cards:  RRR  Respiratory:  no respiratory distress  Abdomen:  soft  Extremities:  no LE edema    Assessment/Plan:   76M CABG, HTN, HLD, admitted ? with LM1 occlusion, s/p TICI 2B MT, no tPA as wake-up stroke.  Large evolving L MCA stroke with small area of hemorrhage.  Acute on chronic HFrEF, LVHF <10%, reduced RV syst function, 1st degree AVbl. ?Component of neurogenic CMP.  Acute hypoxemic  resp failure, intubated.  PMH of CAD/CABG/HFrEF ( lipitor , coreg , lisinopril ), HTN, HLD.  Ischemic Cardiomyopathy  UTI.  Metabolic Encephalopathy- stroke + uremia     Plan:  neurochecks q1hr  Off sedation n, fentanyl prn   ASA, statin  CT - stable   MRI as above  F/U EEG report- 7/29  Abnormal EEG study.  1. Potential epileptogenic foci in the left frontotemporal and right frontal regions.   2. Structural abnormality in the left frontotemporal region.   3. Mild to moderate nonspecific diffuse or multifocal cerebral dysfunction.   4. No seizure seen.       Resp- Resp failure secondary to neuro injury  Maintain O2 > 92 %  - Wean FIO2 to 30 %  - Advanced ETT tube and ? RML infiltrate - repeat Sputum   - - CXR - No infiltrate- 7/29/22  - FIO2 - 40 % ; PeeP- 5       Card- Ischemic cardiomyopathy HFrEF - Compensated CHF, Ectopy  Lopressor 50mg q8  +  D/C ACE- Inhibitor increase with increasing BUN and  worsening pre -renal   IMTIAZ cancelled due to fever  EPS following   CHF consultation - discuss adding hydralazine as afterload reduction . Currently CI and SVV on SAGAR track noted  With frequent PVC's would hold on ionotropic support and adequate CI  monitor e-lytes- metabolic alkalosis , non compensated, Correct electrolytes   Check EKG- troponin ; Pro BNP   Cardiology involved; cardiology will wait when patient afebrile    Hold  diuresis, maintain euvolemia to -500ml, monitor renal function, lactate,       Renal- Uremia - likely contarction alkalosis ; pre- renal azothermia,   - monitor e-lytes- metabolic alkalosis , non compensated, Correct electrolytes   Hold Lasix - re-evaluate daily  FeNa- <1- pre renal, U Cl <30  U osm- no evidence of DI      GI prophylaxis - Diarrhea/ Transaminitis- med induced   Less likely hepatic congestion  Check Hep B and C   Monitor LFT  C Difficile  Change formula to vital 60cc/hr  ( 21Kcal/kg )  pepcid; Stool softeners  Last BM 8/1/22    ID  S/P fever- completed course of Abx for E Coli UTI- D/C cipro ; C Diffcile  POS  125mg q6 - Day 1/10  Recurrent fever- check CBC with diff, lipase, LFT- transaminitis , procalcitonin- , CXR- Clear  , Blood Culture x2 - P   Transaminitis with elevated alk Phos - will obtain RUQ ultrasound  Lipase - nl   MRSA nasal swab- neg  Repeat Nasal swab  Renal ultrasound- no mass, no calculi    Heme- leucocytosis - possible stress related  Will f/U cultures  If source of potential infection - change ABX - meropenem and Vanco   DVT prophylaxsis  Stable WBC     Interval events:  Newly diagnosed C. Diff.  Febrile, on Levo. Tachy.   Net neg 575.   Diarrhea.    VITALS:  T(C): , Max: 38.5 (08-02-22 @ 02:00)  HR:  (79 - 100)  BP:  (68/40 - 168/69)  ABP:  (70/35 - 179/66)  RR:  (19 - 33)  SpO2:  (96% - 100%)  Wt(kg): --  Mode: CPAP with PS, FiO2: 40, PEEP: 5, PS: 10    08-01-22 @ 07:01  -  08-02-22 @ 07:00  --------------------------------------------------------  IN: 2335 mL / OUT: 3050 mL / NET: -715 mL      LABS:  Na: 150 (08-02 @ 01:28), 151 (08-01 @ 15:00), 148 (08-01 @ 04:54), 152 (07-31 @ 17:44), 152 (07-31 @ 03:09)  K: 4.8 (08-02 @ 01:28), 4.4 (08-01 @ 15:00), 4.8 (08-01 @ 04:54), 4.5 (07-31 @ 17:44), 4.8 (07-31 @ 03:09)  Cl: 111 (08-02 @ 01:28), 111 (08-01 @ 15:00), 109 (08-01 @ 04:54), 112 (07-31 @ 17:44), 108 (07-31 @ 03:09)  CO2: 28.0 (08-02 @ 01:28), 28.0 (08-01 @ 15:00), 27.0 (08-01 @ 04:54), 30.0 (07-31 @ 17:44), 27.0 (07-31 @ 03:09)  BUN: 134.3 (08-02 @ 01:28), 134.0 (08-01 @ 15:00), 126.5 (08-01 @ 04:54), 105.2 (07-31 @ 17:44), 99.7 (07-31 @ 03:09)  Cr: 1.13 (08-02 @ 01:28), 1.06 (08-01 @ 15:00), 1.23 (08-01 @ 04:54), 0.95 (07-31 @ 17:44), 1.07 (07-31 @ 03:09)  Glu: 295(08-02 @ 01:28), 257(08-01 @ 15:00), 235(08-01 @ 04:54), 209(07-31 @ 17:44), 209(07-31 @ 03:09)    Hgb: 14.8 (08-02 @ 01:28), 14.4 (08-01 @ 15:00), 14.0 (08-01 @ 04:54), 15.1 (07-31 @ 03:09)  Hct: 45.0 (08-02 @ 01:28), 43.6 (08-01 @ 15:00), 42.9 (08-01 @ 04:54), 46.4 (07-31 @ 03:09)  WBC: 21.58 (08-02 @ 01:28), 17.99 (08-01 @ 15:00), 17.35 (08-01 @ 04:54), 12.92 (07-31 @ 03:09)  Plt: 337 (08-02 @ 01:28), 318 (08-01 @ 15:00), 317 (08-01 @ 04:54), 304 (07-31 @ 03:09)    MEDICATIONS:  aspirin  chewable 81 milliGRAM(s) Oral daily  chlorhexidine 0.12% Liquid 15 milliLiter(s) Oral Mucosa every 12 hours  chlorhexidine 2% Cloths 1 Application(s) Topical daily  dextrose 5%. 1000 milliLiter(s) IV Continuous <Continuous>  dextrose 5%. 1000 milliLiter(s) IV Continuous <Continuous>  dextrose 50% Injectable 25 Gram(s) IV Push once  dextrose Oral Gel 15 Gram(s) Oral once PRN  enoxaparin Injectable 40 milliGRAM(s) SubCutaneous every 24 hours  famotidine Injectable 20 milliGRAM(s) IV Push every 12 hours  fentaNYL    Injectable 25 MICROGram(s) IV Push every 4 hours PRN  glucagon  Injectable 1 milliGRAM(s) IntraMuscular once  hydrALAZINE 10 milliGRAM(s) Oral every 6 hours  insulin lispro (ADMELOG) corrective regimen sliding scale   SubCutaneous every 6 hours  insulin NPH human recombinant 8 Unit(s) SubCutaneous every 8 hours  meropenem  IVPB 1000 milliGRAM(s) IV Intermittent every 8 hours  metoprolol tartrate 50 milliGRAM(s) Oral every 8 hours  norepinephrine Infusion 0.05 MICROgram(s)/kG/Min IV Continuous <Continuous>  ondansetron Injectable 4 milliGRAM(s) IV Push every 6 hours PRN  vancomycin    Solution 125 milliGRAM(s) Enteral Tube every 6 hours  vancomycin  IVPB 1000 milliGRAM(s) IV Intermittent every 12 hours    EXAMINATION:  General:  in NAD  HEENT:  MMM  Neuro:  awake, alert, oriented x 3, follows commands, EOMI, face symmetric, no PD, DF 5/5   Cards:  RRR  Respiratory:  no respiratory distress  Abdomen:  soft  Extremities:  no LE edema    Assessment/Plan:   76M CABG, HTN, HLD, admitted ? with LM1 occlusion, s/p TICI 2B MT, no tPA as wake-up stroke.  Large evolving L MCA stroke with small area of hemorrhage.  Acute on chronic HFrEF, LVHF <10%, reduced RV syst function, no LV thrombus on definity, 1st degree AVbl. ?Component of neurogenic CMP.  Acute hypoxemic  resp failure, intubated.  PMH of CAD/CABG/HFrEF ( lipitor , coreg , lisinopril ), HTN, HLD.  Ischemic Cardiomyopathy  UTI.  Metabolic Encephalopathy- stroke + uremia     Plan:  neurochecks q1hr  Off sedation n, fentanyl prn   ASA, statin  CT - stable   MRI as above  F/U EEG report- 7/29  Abnormal EEG study.  1. Potential epileptogenic foci in the left frontotemporal and right frontal regions.   2. Structural abnormality in the left frontotemporal region.   3. Mild to moderate nonspecific diffuse or multifocal cerebral dysfunction.   4. No seizure seen.       Resp- Resp failure secondary to neuro injury  Maintain O2 > 92 %  - Wean FIO2 to 30 %  - Advanced ETT tube and ? RML infiltrate - repeat Sputum   - - CXR - No infiltrate- 7/29/22  - FIO2 - 40 % ; PeeP- 5       Card- Ischemic cardiomyopathy HFrEF - Compensated CHF, Ectopy  Lopressor 50mg q8  +  D/C ACE- Inhibitor increase with increasing BUN and  worsening pre -renal   IMTIAZ cancelled due to fever  EPS following   CHF consultation - discuss adding hydralazine as afterload reduction . Currently CI and SVV on SAGAR track noted  With frequent PVC's would hold on ionotropic support and adequate CI  monitor e-lytes- metabolic alkalosis , non compensated, Correct electrolytes   Check EKG- troponin ; Pro BNP   Cardiology involved; cardiology will wait when patient afebrile    Hold  diuresis, maintain euvolemia to -500ml, monitor renal function, lactate,       Renal- Uremia - likely contarction alkalosis ; pre- renal azothermia,   - monitor e-lytes- metabolic alkalosis , non compensated, Correct electrolytes   Hold Lasix - re-evaluate daily  FeNa- <1- pre renal, U Cl <30  U osm- no evidence of DI      GI prophylaxis - Diarrhea/ Transaminitis- med induced   Less likely hepatic congestion  Check Hep B and C   Monitor LFT  C Difficile  Change formula to vital 60cc/hr  ( 21Kcal/kg )  pepcid; Stool softeners  Last BM 8/1/22   mg q4h    ID  S/P fever- completed course of Abx for E Coli UTI- D/C cipro ; C Diffcile  POS  125mg q6 - Day 1/10  Recurrent fever- check CBC with diff, lipase, LFT- transaminitis , procalcitonin- , CXR- Clear  , Blood Culture x2 - P   Transaminitis with elevated alk Phos - will obtain RUQ ultrasound  Lipase - nl   MRSA nasal swab- neg  Repeat Nasal swab  Renal ultrasound- no mass, no calculi  vanc 500 mg q6h and flagyl 500 mg q8h    Heme- leucocytosis - possible stress related  Will f/U cultures  If source of potential infection - change ABX - meropenem and Vanco   DVT prophylaxsis  Stable WBC     Interval events:  Newly diagnosed C. Diff.  Febrile, on low dose Levo. Tachy.   Net neg 575, with diarrhea.   VEEG with rare occasional frontal sharps, mild-moderate slowing.     I personally updated the patient's son, Leeroy, today. We discussed the GOC. He states that as long as there is "any hope," he would want to continue care and that he believes that miracles happen. He will discuss trach and PEG with the patient's wife and will get back to the medical team about the decision, but he is leaning towards trach/PEG.     VITALS:  T(C): , Max: 38.5 (08-02-22 @ 02:00)  HR:  (79 - 100)  BP:  (68/40 - 168/69)  ABP:  (70/35 - 179/66)  RR:  (19 - 33)  SpO2:  (96% - 100%)  Wt(kg): --  Mode: CPAP with PS, FiO2: 40, PEEP: 5, PS: 10    08-01-22 @ 07:01  -  08-02-22 @ 07:00  --------------------------------------------------------  IN: 2335 mL / OUT: 3050 mL / NET: -715 mL      LABS:  Na: 150 (08-02 @ 01:28), 151 (08-01 @ 15:00), 148 (08-01 @ 04:54), 152 (07-31 @ 17:44), 152 (07-31 @ 03:09)  K: 4.8 (08-02 @ 01:28), 4.4 (08-01 @ 15:00), 4.8 (08-01 @ 04:54), 4.5 (07-31 @ 17:44), 4.8 (07-31 @ 03:09)  Cl: 111 (08-02 @ 01:28), 111 (08-01 @ 15:00), 109 (08-01 @ 04:54), 112 (07-31 @ 17:44), 108 (07-31 @ 03:09)  CO2: 28.0 (08-02 @ 01:28), 28.0 (08-01 @ 15:00), 27.0 (08-01 @ 04:54), 30.0 (07-31 @ 17:44), 27.0 (07-31 @ 03:09)  BUN: 134.3 (08-02 @ 01:28), 134.0 (08-01 @ 15:00), 126.5 (08-01 @ 04:54), 105.2 (07-31 @ 17:44), 99.7 (07-31 @ 03:09)  Cr: 1.13 (08-02 @ 01:28), 1.06 (08-01 @ 15:00), 1.23 (08-01 @ 04:54), 0.95 (07-31 @ 17:44), 1.07 (07-31 @ 03:09)  Glu: 295(08-02 @ 01:28), 257(08-01 @ 15:00), 235(08-01 @ 04:54), 209(07-31 @ 17:44), 209(07-31 @ 03:09)    Hgb: 14.8 (08-02 @ 01:28), 14.4 (08-01 @ 15:00), 14.0 (08-01 @ 04:54), 15.1 (07-31 @ 03:09)  Hct: 45.0 (08-02 @ 01:28), 43.6 (08-01 @ 15:00), 42.9 (08-01 @ 04:54), 46.4 (07-31 @ 03:09)  WBC: 21.58 (08-02 @ 01:28), 17.99 (08-01 @ 15:00), 17.35 (08-01 @ 04:54), 12.92 (07-31 @ 03:09)  Plt: 337 (08-02 @ 01:28), 318 (08-01 @ 15:00), 317 (08-01 @ 04:54), 304 (07-31 @ 03:09)    MEDICATIONS:  aspirin  chewable 81 milliGRAM(s) Oral daily  chlorhexidine 0.12% Liquid 15 milliLiter(s) Oral Mucosa every 12 hours  chlorhexidine 2% Cloths 1 Application(s) Topical daily  dextrose 5%. 1000 milliLiter(s) IV Continuous <Continuous>  dextrose 5%. 1000 milliLiter(s) IV Continuous <Continuous>  dextrose 50% Injectable 25 Gram(s) IV Push once  dextrose Oral Gel 15 Gram(s) Oral once PRN  enoxaparin Injectable 40 milliGRAM(s) SubCutaneous every 24 hours  famotidine Injectable 20 milliGRAM(s) IV Push every 12 hours  fentaNYL    Injectable 25 MICROGram(s) IV Push every 4 hours PRN  glucagon  Injectable 1 milliGRAM(s) IntraMuscular once  hydrALAZINE 10 milliGRAM(s) Oral every 6 hours  insulin lispro (ADMELOG) corrective regimen sliding scale   SubCutaneous every 6 hours  insulin NPH human recombinant 8 Unit(s) SubCutaneous every 8 hours  meropenem  IVPB 1000 milliGRAM(s) IV Intermittent every 8 hours  metoprolol tartrate 50 milliGRAM(s) Oral every 8 hours  norepinephrine Infusion 0.05 MICROgram(s)/kG/Min IV Continuous <Continuous>  ondansetron Injectable 4 milliGRAM(s) IV Push every 6 hours PRN  vancomycin    Solution 125 milliGRAM(s) Enteral Tube every 6 hours  vancomycin  IVPB 1000 milliGRAM(s) IV Intermittent every 12 hours    EXAMINATION:  General:  in NAD  HEENT:  MMM  Neuro:  eye closed, slightly opens L eye to noxious, PERRL, + corneals, + cough, no movement to noxious in any extremities   Cards:  RRR  Respiratory:  no respiratory distress  Abdomen:  soft  Extremities:  no LE edema    Assessment/Plan:   77yo man with CABG, PVD, HTN, HLD, and low EF (declined AICD), admitted 7/24 with LM1 occlusion, s/p TICI 2B MT, no tPA as wake-up stroke. Poor exam post thrombectomy, re-intubated for hypoxic respiratory failure. SHAHNAZ brain with a large L MCA stroke with small area of reperfusion hemorrhage.  Hospitalization notable for acute on chronic HFrEF, LVHF <10%, reduced RV syst function, no LV thrombus on Definity, 1st degree AVb block.  Also with worsening urea 2/2 pre-renal state and administration of ACE inhibitor, with high BUN, which is likely contributing to the poor exam.    Now with C diff dx 8/1 and septic shock, on abx.   VEEG with rare occasional frontal sharps, mild-moderate slowing 7/29-8/2    Plan:  neurochecks q2hr  Off sedation, fentanyl prn   On ASA 81mg daily, statin held for abnormal LFTs    Resp- Resp failure secondary to neuro injury and heart failure   Maintain O2 > 92 %, wean FIO2 to 30 %  keep full vet supports, patient tachypneic on CPAP  CXR 8/2 wth RLL opacity, unchanged    Card- Ischemic cardiomyopathy HFrEF, EF 10%. With ectopy  Lopressor 50mg q8, avoid ACE inhibitors   IMTIAZ cancelled due to fever, unstable   EPS following, patient is currently not safe for AC given large stoke 9 days ago with hemorrhagic conversion and very high BUN  CHF consultation -recommended hydralazine for afterload reduction (currently held 2/2 need for pressors)   On [place] track   K>4, Mg >2  goal euvolemia, holding diuresis today as patient is net neg    Renal- Uremia - likely 2/2 pre- renal azothermia and contraction alkalosis (FeNa- <1- pre renal, U Cl <30, renal US no abnormalities)   Hold Lasix - re-evaluate daily  goal euvolemia, holding diuresis today as patient is net neg  increase FWB to 300 mg q4h for hypernatremia and net neg fluid status/diarrhea     GI prophylaxis - Transaminitis- med induced vs less likely congestive hepatopathy (RUQ US 7/30 normal). With C diff  Monitor LFT  Change formula to vital 60cc/hr  ( 21Kcal/kg )  pepcid; Stool softeners  Last BM 8/1/22    ID: [email or web address] cipro for E. Coli. MRSA neg 8/1.   now with C Diff dx 8/1, on vanc 500 mg q6h and flagyl 500 mg q8h for severe C. Diff with shock   On meropenem for empiric coverage, stop IV vacn since MRSA neg x    Heme  Lov ppx       Patient seen and examined by attending on 8/2/2022.    Patient is critically ill due to stroke, septic shock 2/2 C. diff and at high risk for neurological deterioration or death due to: septic shock 2/2 C. Diff requiring pressors, requiring mechanical ventilation 2/2 neurologic injury

## 2022-08-02 NOTE — CONSULT NOTE ADULT - SUBJECTIVE AND OBJECTIVE BOX
This is a 75 yo M PMH of CABG, STENTS, HTN, HLD, Ischemic Cardiomyopathy. EF <10%. [place] [facility] [place] LM1 Occlusion and evolving LMCA Stroke. No TPA given as it was a "Wake-up" stroke. Abnormal EEG, Neuro signs and assessment deteriorating.  Not responding to verbal stimuli-not squeezing wife's hand. ON Vent support and NGT feedings. Consulted to support family during hospitalization, help them gain a better understanding of Patient's critical  neurologic state, and begin discussing realistic expectations.  Patient on Levophed, treating for EColi  UTI, Uremia,  RML infiltrate, and newly diagnosed with CDiff and is isolated.  HPI:  76M with PMH CABG, HTN, HLD who presents as transfer from [facility] [place] for [place]. Last known well was last night 10pm prior to going to bed, woke up this morning around 0500 with R sided weakness and R facial droop. Presented to [facility] [place], code stroke initiated, NIH at [facility] reportedly 8. CTA showed LM1 occlusion. Transferred to [facility] for possible neuro IR intervention. On arrival to [facility], NIH 6. Decision made to take patient directly to neuro IR for intervention.     NIH 6, mRS 0    NIH SS:  DATE: 22  TIME: 0710  1A: Level of consciousness (0-3): 0  1B: Questions (0-2): 0    1C: Commands (0-2): 0  2: Gaze (0-2): 0  3: Visual fields (0-3): 0  4: Facial palsy (0-3): 1  MOTOR:  5A: Left arm motor drift (0-4): 0  5B: Right arm motor drift (0-4): 1  6A: Left leg motor drift (0-4): 0  6B: Right leg motor drift (0-4): 1  7: Limb ataxia (0-2): 0  SENSORY:  8: Sensation (0-2): 1  SPEECH:  9: Language (0-3): 1  10: Dysarthria (0-2): 1  EXTINCTION:  11: Extinction/inattention (0-2): 0    TOTAL SCORE: 6 (2022 07:34)      PERTINENT PMH REVIEWED: Yes     PAST MEDICAL & SURGICAL HISTORY:  HTN (hypertension)    HLD (hyperlipidemia)    S/P CABG x 1    SOCIAL HISTORY:                      Substance history:                    Admitted from:  home  SNF  [facility]                     Taoism/spirituality: Yazidi                    Cultural concerns: No family familiar with Confucianism traditions, and involved during this hospitalization                      Surrogate- Son Leeroy Jeff   [phone] HCP-Wife Sherri Jeff [phone]    FAMILY HISTORY:    No Known Allergies    Intolerances    ADVANCE DIRECTIVES/TREATMENT PREFERENCES:  Full code, all aggressive measures desired     Baseline ADLs (prior to admission):  Independent      Karnofsky/Palliative Performance Status Version 10:  %    Present Symptoms:     Dyspnea: Acute Respiratory Failure  Nausea/Vomiting:  No  Anxiety:   No  Depression:  No  Fatigue: Encephalopathic  Loss of appetite: NGT feeding  Constipation: CDiff Diarrhea    Pain: No pain behaviors observed            Character-            Duration-            Effect-            Factors-            Frequency-            Location-            Severity-    Review of Systems: Reviewed                      Unable to obtain due to poor mentation       MEDICATIONS  (STANDING):  aspirin  chewable 81 milliGRAM(s) Oral daily  chlorhexidine 0.12% Liquid 15 milliLiter(s) Oral Mucosa every 12 hours  chlorhexidine 2% Cloths 1 Application(s) Topical daily  dextrose 5%. 1000 milliLiter(s) (50 mL/Hr) IV Continuous <Continuous>  dextrose 5%. 1000 milliLiter(s) (100 mL/Hr) IV Continuous <Continuous>  dextrose 50% Injectable 25 Gram(s) IV Push once  enoxaparin Injectable 40 milliGRAM(s) SubCutaneous every 24 hours  famotidine Injectable 20 milliGRAM(s) IV Push every 12 hours  glucagon  Injectable 1 milliGRAM(s) IntraMuscular once  insulin lispro (ADMELOG) corrective regimen sliding scale   SubCutaneous every 6 hours  insulin NPH human recombinant 8 Unit(s) SubCutaneous every 6 hours  meropenem  IVPB 1000 milliGRAM(s) IV Intermittent every 8 hours  metoprolol tartrate 50 milliGRAM(s) Oral every 8 hours  metroNIDAZOLE  IVPB 500 milliGRAM(s) IV Intermittent every 8 hours  norepinephrine Infusion 0.05 MICROgram(s)/kG/Min (9.38 mL/Hr) IV Continuous <Continuous>  vancomycin    Solution 500 milliGRAM(s) Enteral Tube every 6 hours  vancomycin  IVPB 1000 milliGRAM(s) IV Intermittent every 12 hours    MEDICATIONS  (PRN):  dextrose Oral Gel 15 Gram(s) Oral once PRN Blood Glucose LESS THAN 70 milliGRAM(s)/deciliter  fentaNYL    Injectable 25 MICROGram(s) IV Push every 4 hours PRN agitation/vent synchrony  ondansetron Injectable 4 milliGRAM(s) IV Push every 6 hours PRN Nausea and/or Vomiting    PHYSICAL EXAM:    Vital Signs Last 24 Hrs  T(C): 37.4 (02 Aug 2022 15:00), Max: 38.5 (02 Aug 2022 02:00)  T(F): 99.3 (02 Aug 2022 15:00), Max: 101.3 (02 Aug 2022 02:00)  HR: 86 (02 Aug 2022 15:35) (81 - 105)  BP: 119/76 (02 Aug 2022 15:00) (68/40 - 151/55)  BP(mean): 87 (02 Aug 2022 15:00) (37 - 105)  RR: 18 (02 Aug 2022 15:00) (18 - 32)  SpO2: 100% (02 Aug 2022 15:35) (96% - 100%)    Parameters below as of 02 Aug 2022 12:00  Patient On (Oxygen Delivery Method): ventilator  O2 Concentration (%): 40    General: Unresponsive on Vent support                    nonverbal  coma    HEENT:   dry mouth  ET tube    Lungs:  tachypnea/labored breathing      CV: normal      GI: distended                NG/ tube- continuous feeding      :  malka    MSK:   weakness  edema           bedbound    Neuro: cognitive impairment dysphagia dysarthria hemiplegia     Skin:  no rash    LABS:                        14.8   21.58 )-----------( 337      ( 02 Aug 2022 01:28 )             45.0     08-02    150<H>  |  111<H>  |  134.3<H>  ----------------------------<  295<H>  4.8   |  28.0  |  1.13    Ca    8.9      02 Aug 2022 01:28  Phos  4.8     08-02  Mg     3.9     08-02    TPro  6.6  /  Alb  2.6<L>  /  TBili  0.4  /  DBili  0.2  /  AST  416<H>  /  ALT  425<H>  /  AlkPhos  310<H>  08-02    Urinalysis Basic - ( 02 Aug 2022 08:40 )    Color: Yellow / Appearance: Slightly Turbid / S.015 / pH: x  Gluc: x / Ketone: Negative  / Bili: Negative / Urobili: Negative mg/dL   Blood: x / Protein: 15 / Nitrite: Negative   Leuk Esterase: Negative / RBC: 11-25 /HPF / WBC 0-2 /HPF   Sq Epi: x / Non Sq Epi: Occasional / Bacteria: Few    I&O's Summary    01 Aug 2022 07:01  -  02 Aug 2022 07:00  --------------------------------------------------------  IN: 2335 mL / OUT: 3050 mL / NET: -715 mL    02 Aug 2022 07:01  -  02 Aug 2022 16:30  --------------------------------------------------------  IN: 731.2 mL / OUT: 0 mL / NET: 731.2 mL    RADIOLOGY & ADDITIONAL STUDIES:  < from: Xray Chest 1 View- PORTABLE-Urgent (Xray Chest 1 View- PORTABLE-Urgent .) (22 @ 06:46) >  FINDINGS:  An AP portable chest x-ray demonstrates an endotracheal tube just below   the thoracic inlet. A nasogastric tube extends below the diaphragm and is   residing within the proximal stomach. There is a subtle right middle   versus right lower lobe infiltrate, as a change compared tothe prior   exam. There are no pleural effusions. There is no hilar or mediastinal   widening however the cardiac silhouette remains prominent with midline   sternal wires but no pulmonary edema.    IMPRESSION:  1. Endotracheal tube terminates just below the thoracic inlet.  2. Nasogastric tube resides within the proximal stomach.  3. New infiltrate in the right middle and/or right lower lobe when   compared to the prior exam, consistent with and suspicious for pneumonia.  4. Prominent cardiac silhouette with previous midline sternotomy, but no   pulmonary edema.      < end of copied text >       This is a 77 yo M PMH of CABG, STENTS, HTN, HLD, Ischemic Cardiomyopathy. EF <10%. Transferred from [facility] with LM1 Occlusion and evolving LMCA Stroke. No TPA given as it was a "Wake-up" stroke. Abnormal EEG, Neuro signs and assessment deteriorating.  Not responding to verbal stimuli-not squeezing wife's hand. ON Vent support and NGT feedings. Consulted to support family during hospitalization, help them gain a better understanding of Patient's critical  neurologic state, and begin discussing realistic expectations.  Patient on Levophed, treating for EColi  UTI, Uremia,  RML infiltrate, and newly diagnosed with CDiff and is isolated.  HPI:  76M with PMH CABG, HTN, HLD who presents as transfer from [facility] for stroke. Last known well was last night 10pm prior to going to bed, woke up this morning around 0500 with R sided weakness and R facial droop. Presented to [facility], code stroke initiated, NIH at [facility] reportedly 8. CTA showed LM1 occlusion. Transferred to [facility] for possible neuro IR intervention. On arrival to [facility], NIH 6. Decision made to take patient directly to neuro IR for intervention.     NIH 6, mRS 0      PERTINENT PMH REVIEWED: Yes     PAST MEDICAL & SURGICAL HISTORY:  HTN (hypertension)    HLD (hyperlipidemia)    S/P CABG x 1    SOCIAL HISTORY:                      Substance history:                    Admitted from:  home  SNF  [facility]                     Uatsdin/spirituality: Congregation                    Cultural concerns: No his Family is familiar with Hoahaoism traditions, and involved during this hospitalization                      Surrogate- Son Leeroy radames   [phone] HCP-Wife Sherri [facility] [phone]    No Known Allergies    ADVANCE DIRECTIVES/TREATMENT PREFERENCES:  Full code, all aggressive measures desired     Baseline ADLs (prior to admission):  Independent      Karnofsky/Palliative Performance Status Version 10:  %    Present Symptoms:     Dyspnea: Acute Respiratory Failure  Nausea/Vomiting:  No  Anxiety:   No  Depression:  No  Fatigue: Encephalopathic  Loss of appetite: NGT feeding  Constipation: CDiff Diarrhea    Pain: No pain behaviors observed            Character-            Duration-            Effect-            Factors-            Frequency-            Location-            Severity-    Review of Systems: Reviewed                      Unable to obtain due to poor mentation       MEDICATIONS  (STANDING):  aspirin  chewable 81 milliGRAM(s) Oral daily  chlorhexidine 0.12% Liquid 15 milliLiter(s) Oral Mucosa every 12 hours  chlorhexidine 2% Cloths 1 Application(s) Topical daily  dextrose 5%. 1000 milliLiter(s) (50 mL/Hr) IV Continuous <Continuous>  dextrose 5%. 1000 milliLiter(s) (100 mL/Hr) IV Continuous <Continuous>  dextrose 50% Injectable 25 Gram(s) IV Push once  enoxaparin Injectable 40 milliGRAM(s) SubCutaneous every 24 hours  famotidine Injectable 20 milliGRAM(s) IV Push every 12 hours  glucagon  Injectable 1 milliGRAM(s) IntraMuscular once  insulin lispro (ADMELOG) corrective regimen sliding scale   SubCutaneous every 6 hours  insulin NPH human recombinant 8 Unit(s) SubCutaneous every 6 hours  meropenem  IVPB 1000 milliGRAM(s) IV Intermittent every 8 hours  metoprolol tartrate 50 milliGRAM(s) Oral every 8 hours  metroNIDAZOLE  IVPB 500 milliGRAM(s) IV Intermittent every 8 hours  norepinephrine Infusion 0.05 MICROgram(s)/kG/Min (9.38 mL/Hr) IV Continuous <Continuous>  vancomycin    Solution 500 milliGRAM(s) Enteral Tube every 6 hours  vancomycin  IVPB 1000 milliGRAM(s) IV Intermittent every 12 hours    MEDICATIONS  (PRN):  dextrose Oral Gel 15 Gram(s) Oral once PRN Blood Glucose LESS THAN 70 milliGRAM(s)/deciliter  fentaNYL    Injectable 25 MICROGram(s) IV Push every 4 hours PRN agitation/vent synchrony  ondansetron Injectable 4 milliGRAM(s) IV Push every 6 hours PRN Nausea and/or Vomiting    PHYSICAL EXAM:    Vital Signs Last 24 Hrs  T(C): 37.4 (02 Aug 2022 15:00), Max: 38.5 (02 Aug 2022 02:00)  T(F): 99.3 (02 Aug 2022 15:00), Max: 101.3 (02 Aug 2022 02:00)  HR: 86 (02 Aug 2022 15:35) (81 - 105)  BP: 119/76 (02 Aug 2022 15:00) (68/40 - 151/55)  BP(mean): 87 (02 Aug 2022 15:00) (37 - 105)  RR: 18 (02 Aug 2022 15:00) (18 - 32)  SpO2: 100% (02 Aug 2022 15:35) (96% - 100%)    Parameters below as of 02 Aug 2022 12:00  Patient On (Oxygen Delivery Method): ventilator  O2 Concentration (%): 40    General: Unresponsive on Vent support                    nonverbal  coma    HEENT:   dry mouth  ET tube    Lungs:  tachypnea/labored breathing      CV: normal      GI: distended                NG/ tube- continuous feeding      :  ace    MSK:   weakness  edema           bedbound    Neuro: cognitive impairment dysphagia dysarthria hemiplegia     Skin:  no rash    LABS:                        14.8   21.58 )-----------( 337      ( 02 Aug 2022 01:28 )             45.0     08-02    150<H>  |  111<H>  |  134.3<H>  ----------------------------<  295<H>  4.8   |  28.0  |  1.13    Ca    8.9      02 Aug 2022 01:28  Phos  4.8     08-02  Mg     3.9     08-02    TPro  6.6  /  Alb  2.6<L>  /  TBili  0.4  /  DBili  0.2  /  AST  416<H>  /  ALT  425<H>  /  AlkPhos  310<H>  08-    Urinalysis Basic - ( 02 Aug 2022 08:40 )    Color: Yellow / Appearance: Slightly Turbid / S.015 / pH: x  Gluc: x / Ketone: Negative  / Bili: Negative / Urobili: Negative mg/dL   Blood: x / Protein: 15 / Nitrite: Negative   Leuk Esterase: Negative / RBC: 11-25 /HPF / WBC 0-2 /HPF   Sq Epi: x / Non Sq Epi: Occasional / Bacteria: Few    I&O's Summary    01 Aug 2022 07:  -  02 Aug 2022 07:00  --------------------------------------------------------  IN: 2335 mL / OUT: 3050 mL / NET: -715 mL    02 Aug 2022 07:01  -  02 Aug 2022 16:30  --------------------------------------------------------  IN: 731.2 mL / OUT: 0 mL / NET: 731.2 mL    RADIOLOGY & ADDITIONAL STUDIES:  < from: Xray Chest 1 View- PORTABLE-Urgent (Xray Chest 1 View- PORTABLE-Urgent .) (22 @ 06:46) >  FINDINGS:  An AP portable chest x-ray demonstrates an endotracheal tube just below   the thoracic inlet. A nasogastric tube extends below the diaphragm and is   residing within the proximal stomach. There is a subtle right middle   versus right lower lobe infiltrate, as a change compared tothe prior   exam. There are no pleural effusions. There is no hilar or mediastinal   widening however the cardiac silhouette remains prominent with midline   sternal wires but no pulmonary edema.    IMPRESSION:  1. Endotracheal tube terminates just below the thoracic inlet.  2. Nasogastric tube resides within the proximal stomach.  3. New infiltrate in the right middle and/or right lower lobe when   compared to the prior exam, consistent with and suspicious for pneumonia.  4. Prominent cardiac silhouette with previous midline sternotomy, but no   pulmonary edema.      < end of copied text >

## 2022-08-02 NOTE — CONSULT NOTE ADULT - NSCONSULTADDITIONALINFOA_GEN_ALL_CORE
Total Time Spent_60___ minutes  This includes chart review, patient assessment, discussion and collaboration with interdisciplinary team members, ACP planning    COUNSELING:  Face to face meeting to discuss Advanced Care Planning - Time Spent __30____Minutes.      Thank you for the opportunity to assist with the care of this patient.   [facility] [place] Palliative Medicine Consult Service [phone].

## 2022-08-02 NOTE — PROGRESS NOTE ADULT - ASSESSMENT
IMPRESSION:  - Left MCA Stroke.  S/P suction thrombectomy for L MCA M1 occlusion with TICI 2b reperfusion. on 7-24-22.    Mechanism ESUS  cardioembolic versus Large artery atherosclerosis    ASSESSMENT/ PLAN:     - Neuro checks and vital signs Q 2 hours.  - SBP goal keep normotensive.  - ASA 81 mg PO or 300 CO QD.   - CT Head of 7-29-22 reviewed. Stable left frontal hemorrhagic products within the infarct.    - Lipitor for LDL goal of < 70 .  - EEG -Report as above reviewed.   - CT/CTA/CTP -images and reports were reviewed.   - MRI Brain stroke protocol  -images and reports were reviewed. .  - TTE  -Reports as above were reviewed. . EF < 10%.  - IMTIAZ (  postponed due to fever)  - Cardiology follow up appreciated..  - SCD/ SQ Lovenox for DVT prophylaxis.

## 2022-08-02 NOTE — PROGRESS NOTE ADULT - ASSESSMENT
#KE  #HFrEF  #Hypernatremia  #Septic Shock    -Baseline creatinine ranges 0.6-0.7mg/dL   -Notable for acute kidney injury starting on 07/30/2022 with creatinine lately ranging 1.0-1.2mg/dL   -Suspecting prerenal etiology with ? progression to ATN ? given constellation of recent diuretic / ACEi use; also currently in septic shock   -UA (prior to onset of KE) showed specific gravity 1.025, +protein, +nitrite, +leukocyte esterase, moderate blood, 6-10RBC, 26-50 WBC, TNTC bacteria  -Urine culture positive for E coli   -R abdominal U/S on 07/30/2022 showed R kidney 10.4cm with simple cyst without hydronephrosis     -Pressor management as per primary team   -Hypernatremia worsening; free water flushes have been increased    -Would continue to hold diuretics at this time   -No acute indication for renal replacement therapy at this time    -Prognosis guarded     Kwame West DO  Nephrology

## 2022-08-02 NOTE — PROGRESS NOTE ADULT - SUBJECTIVE AND OBJECTIVE BOX
Remains critically ill - on mechanical ventilation. C diff positive. On levophed this a.m.     Physical Exam  General: WDWN male in NAD  HEENT: Intubated, +NT tube  Cardiac: S1S2 RRR  Respiratory: Transmitted breath sounds  Abdomen: Soft, NT  Extremities: No appreciable edema  Neuro: Unresponsive    Assessment and Plan: The patient is a 76 year old male with past medical history of HTN, HLD, CAD s/p CABG, carotid stenosis, and PVD who was transferred from an outside hospital for neuro IR intervention after waking up with R sided facial droop and R sided weakness; found to have left M1 occlusion on CTA s/p thrombectomy which is complicated by hemorrhagic conversion. Hospital course notable for acute hypoxic respiratory failure post procedure s/p mechanical ventilation. Also with ischemic cardiomyopathy, LVEF<10% and moderately reduced RV function. Also with E coli UTI, KE, C. diff and septic shock. Nephrology is managing KE.

## 2022-08-02 NOTE — GOALS OF CARE CONVERSATION - ADVANCED CARE PLANNING - STAFF/OTHER
I asked Patient is she wanted an , she declined-as she speaks English fairly well  She did most of talking

## 2022-08-02 NOTE — GOALS OF CARE CONVERSATION - ADVANCED CARE PLANNING - NS PRO AD NO ADVANCE DIRECTIVE
Shilo Hall called about his mom device, he said she received a new machine and she use but he don't know that one was new, and also they sent back the old, he don't know if they change or not the modem, he said he will see with her nurse what they did and than he will call us back. No

## 2022-08-02 NOTE — CONSULT NOTE ADULT - ASSESSMENT
75yo man with CABG, PVD, HTN, HLD, and low EF (declined AICD), admitted 7/24 with LM1 occlusion,  no tPA as was a wake-up stroke. Poor exam post thrombectomy, re-intubated for hypoxic respiratory failure. SHAHNAZ brain with a large L MCA stroke with small area of reperfusion hemorrhage. EEG with rare occasional frontal sharps, mild-moderate slowing 7/29-8/2; no seizure activity  Hospital course notable for acute on chronic HFrEF, LVHF <10%,  no LV thrombus on Definity, 1st degree AV block.  Worsening urea 2/2 pre-renal state and administration of ACE inhibitor,  Now with C diff dx 8/1 and septic shock.     L MCA Stroke  Evolving with small area of reperfusion hemorrhage  Neurochecks q2hr  EEG no seizure activity  Off sedation, fentanyl prn   On ASA 81mg daily, statin held for abnormal LFTs    Acute Respiratory Failure  Secondary to neuro injury and heart failure   Maintain O2 > 92 %, wean FIO2 to 30 %  Continuing  full vet supports patient tachypneic on CPAP  CXR 8/2 wth RLL opacity, unchanged-Empiric ABX     Ischemic cardiomyopathy HFrEF,  Acute on Chronic HFrEF, LVHF <10%,  no LV thrombus   EF 10%. With ectopy  Lopressor 50mg q8, avoid ACE inhibitors   IMTIAZ cancelled due to fever, unstable   CHF consultation -recommended hydralazine for afterload reduction (currently held 2/2 need for pressors)     Renal Uremia   pre- renal azothermia  High BUN/Cr  with high BUN/Cr, 134.3/1.13  Holding Lasix - re-evaluate daily  Increase FWB to 300 mg q4h for hypernatremia and net neg fluid status/diarrhea     Severe CDiff /Shock  ID following Febrile  Cipro for E. Coli. MRSA neg 8/1.   C Diff diagnosed yesterday  8/1  Vanco 500 mg q6h MRSA (-) stopping Vancomycin and flagyl 500 mg q8h   Meropenem for empiric coverage    GOC    See GOC conversation documented separately  Full Code at this time  Patient critically ill, organ failure, Neuro exam worsening  Wife is legal HCP, unless she gives full authority for Proxy decision making to son.  Son is anxious and is easily aggravated;  Family has hope for MIracle.  Taking it one day at a time, Demanding that Medical staff not speak to his Mother with clinical info without him being present.

## 2022-08-03 DIAGNOSIS — I50.42 CHRONIC COMBINED SYSTOLIC (CONGESTIVE) AND DIASTOLIC (CONGESTIVE) HEART FAILURE: ICD-10-CM

## 2022-08-03 LAB
ANION GAP SERPL CALC-SCNC: 11 MMOL/L — SIGNIFICANT CHANGE UP (ref 5–17)
BUN SERPL-MCNC: 119.5 MG/DL — HIGH (ref 8–20)
CALCIUM SERPL-MCNC: 8.3 MG/DL — LOW (ref 8.4–10.5)
CHLORIDE SERPL-SCNC: 117 MMOL/L — HIGH (ref 98–107)
CO2 SERPL-SCNC: 25 MMOL/L — SIGNIFICANT CHANGE UP (ref 22–29)
CREAT SERPL-MCNC: 0.97 MG/DL — SIGNIFICANT CHANGE UP (ref 0.5–1.3)
CULTURE RESULTS: SIGNIFICANT CHANGE UP
EGFR: 81 ML/MIN/1.73M2 — SIGNIFICANT CHANGE UP
GLUCOSE BLDC GLUCOMTR-MCNC: 209 MG/DL — HIGH (ref 70–99)
GLUCOSE BLDC GLUCOMTR-MCNC: 240 MG/DL — HIGH (ref 70–99)
GLUCOSE BLDC GLUCOMTR-MCNC: 250 MG/DL — HIGH (ref 70–99)
GLUCOSE BLDC GLUCOMTR-MCNC: 278 MG/DL — HIGH (ref 70–99)
GLUCOSE SERPL-MCNC: 315 MG/DL — HIGH (ref 70–99)
HCT VFR BLD CALC: 43.3 % — SIGNIFICANT CHANGE UP (ref 39–50)
HGB BLD-MCNC: 14 G/DL — SIGNIFICANT CHANGE UP (ref 13–17)
MAGNESIUM SERPL-MCNC: 3.6 MG/DL — HIGH (ref 1.6–2.6)
MCHC RBC-ENTMCNC: 30.6 PG — SIGNIFICANT CHANGE UP (ref 27–34)
MCHC RBC-ENTMCNC: 32.3 GM/DL — SIGNIFICANT CHANGE UP (ref 32–36)
MCV RBC AUTO: 94.5 FL — SIGNIFICANT CHANGE UP (ref 80–100)
PHOSPHATE SERPL-MCNC: 5.2 MG/DL — HIGH (ref 2.4–4.7)
PLATELET # BLD AUTO: 293 K/UL — SIGNIFICANT CHANGE UP (ref 150–400)
POTASSIUM SERPL-MCNC: 4.5 MMOL/L — SIGNIFICANT CHANGE UP (ref 3.5–5.3)
POTASSIUM SERPL-SCNC: 4.5 MMOL/L — SIGNIFICANT CHANGE UP (ref 3.5–5.3)
RBC # BLD: 4.58 M/UL — SIGNIFICANT CHANGE UP (ref 4.2–5.8)
RBC # FLD: 16.5 % — HIGH (ref 10.3–14.5)
SODIUM SERPL-SCNC: 152 MMOL/L — HIGH (ref 135–145)
SPECIMEN SOURCE: SIGNIFICANT CHANGE UP
WBC # BLD: 22.79 K/UL — HIGH (ref 3.8–10.5)
WBC # FLD AUTO: 22.79 K/UL — HIGH (ref 3.8–10.5)

## 2022-08-03 PROCEDURE — 99232 SBSQ HOSP IP/OBS MODERATE 35: CPT

## 2022-08-03 PROCEDURE — 99233 SBSQ HOSP IP/OBS HIGH 50: CPT

## 2022-08-03 RX ORDER — HUMAN INSULIN 100 [IU]/ML
12 INJECTION, SUSPENSION SUBCUTANEOUS EVERY 6 HOURS
Refills: 0 | Status: DISCONTINUED | OUTPATIENT
Start: 2022-08-03 | End: 2022-08-04

## 2022-08-03 RX ORDER — HUMAN INSULIN 100 [IU]/ML
10 INJECTION, SUSPENSION SUBCUTANEOUS EVERY 6 HOURS
Refills: 0 | Status: DISCONTINUED | OUTPATIENT
Start: 2022-08-03 | End: 2022-08-03

## 2022-08-03 RX ORDER — INSULIN LISPRO 100/ML
VIAL (ML) SUBCUTANEOUS EVERY 4 HOURS
Refills: 0 | Status: DISCONTINUED | OUTPATIENT
Start: 2022-08-03 | End: 2022-08-09

## 2022-08-03 RX ORDER — METOPROLOL TARTRATE 50 MG
75 TABLET ORAL EVERY 8 HOURS
Refills: 0 | Status: DISCONTINUED | OUTPATIENT
Start: 2022-08-03 | End: 2022-08-04

## 2022-08-03 RX ADMIN — CHLORHEXIDINE GLUCONATE 1 APPLICATION(S): 213 SOLUTION TOPICAL at 11:22

## 2022-08-03 RX ADMIN — MEROPENEM 100 MILLIGRAM(S): 1 INJECTION INTRAVENOUS at 05:04

## 2022-08-03 RX ADMIN — Medication 8: at 05:11

## 2022-08-03 RX ADMIN — HUMAN INSULIN 10 UNIT(S): 100 INJECTION, SUSPENSION SUBCUTANEOUS at 11:19

## 2022-08-03 RX ADMIN — Medication 100 MILLIGRAM(S): at 21:16

## 2022-08-03 RX ADMIN — Medication 4: at 17:09

## 2022-08-03 RX ADMIN — HUMAN INSULIN 12 UNIT(S): 100 INJECTION, SUSPENSION SUBCUTANEOUS at 17:11

## 2022-08-03 RX ADMIN — Medication 6: at 13:36

## 2022-08-03 RX ADMIN — Medication 500 MILLIGRAM(S): at 17:09

## 2022-08-03 RX ADMIN — FAMOTIDINE 20 MILLIGRAM(S): 10 INJECTION INTRAVENOUS at 11:18

## 2022-08-03 RX ADMIN — Medication 50 MILLIGRAM(S): at 05:04

## 2022-08-03 RX ADMIN — Medication 75 MILLIGRAM(S): at 21:20

## 2022-08-03 RX ADMIN — Medication 81 MILLIGRAM(S): at 11:17

## 2022-08-03 RX ADMIN — Medication 500 MILLIGRAM(S): at 05:03

## 2022-08-03 RX ADMIN — Medication 4: at 11:18

## 2022-08-03 RX ADMIN — Medication 50 MILLIGRAM(S): at 13:35

## 2022-08-03 RX ADMIN — MEROPENEM 100 MILLIGRAM(S): 1 INJECTION INTRAVENOUS at 21:20

## 2022-08-03 RX ADMIN — Medication 500 MILLIGRAM(S): at 11:18

## 2022-08-03 RX ADMIN — ENOXAPARIN SODIUM 40 MILLIGRAM(S): 100 INJECTION SUBCUTANEOUS at 21:17

## 2022-08-03 RX ADMIN — Medication 4: at 21:27

## 2022-08-03 RX ADMIN — CHLORHEXIDINE GLUCONATE 15 MILLILITER(S): 213 SOLUTION TOPICAL at 17:09

## 2022-08-03 RX ADMIN — Medication 100 MILLIGRAM(S): at 13:35

## 2022-08-03 RX ADMIN — Medication 100 MILLIGRAM(S): at 05:03

## 2022-08-03 RX ADMIN — MEROPENEM 100 MILLIGRAM(S): 1 INJECTION INTRAVENOUS at 13:35

## 2022-08-03 RX ADMIN — HUMAN INSULIN 8 UNIT(S): 100 INJECTION, SUSPENSION SUBCUTANEOUS at 05:12

## 2022-08-03 RX ADMIN — CHLORHEXIDINE GLUCONATE 15 MILLILITER(S): 213 SOLUTION TOPICAL at 05:03

## 2022-08-03 NOTE — PROGRESS NOTE ADULT - ASSESSMENT
IMPRESSION:  - Left MCA Stroke.  S/P suction thrombectomy for L MCA M1 occlusion with TICI 2b reperfusion. on 7-24-22.    Mechanism ESUS  cardioembolic versus Large artery atherosclerosis    ASSESSMENT/ PLAN:     - Neuro checks and vital signs Q 2 hours.  - SBP goal keep normotensive.  - ASA 81 mg PO or 300 OH QD.   - CT Head of 7-29-22 reviewed. Stable left frontal hemorrhagic products within the infarct.    - Lipitor for LDL goal of < 70 .  - EEG -Report as above reviewed.   - CT/CTA/CTP -images and reports were reviewed.   - MRI Brain stroke protocol  -images and reports were reviewed. .  - TTE  -Reports as above were reviewed. . EF < 10%.  - IMTIAZ (  postponed due to fever)  - Cardiology follow up appreciated..  - SCD/ SQ Lovenox for DVT prophylaxis.         IMPRESSION:  - Left MCA Stroke.  S/P suction thrombectomy for L MCA M1 occlusion with TICI 2b reperfusion. on 7-24-22.    Mechanism ESUS  cardioembolic versus Large artery atherosclerosis    ASSESSMENT/ PLAN:     - Neuro checks and vital signs Q 2 hours.  - SBP goal keep normotensive.  - ASA 81 mg PO or 300 MD QD.   - CT Head of 7-29-22 reviewed. Stable left frontal hemorrhagic products within the infarct.    - Lipitor for LDL goal of < 70 .  - EEG -Report as above reviewed.   - CT/CTA/CTP -images and reports were reviewed.   - MRI Brain stroke protocol  -images and reports were reviewed. .  - TTE  -Reports as above were reviewed. . EF < 10%.  - IMTIAZ (  postponed due to fever)  - SCD/ SQ Lovenox for DVT prophylaxis.

## 2022-08-03 NOTE — PROGRESS NOTE ADULT - PROBLEM SELECTOR PLAN 1
- known ICMP LVEF ~25%  - TTE showed LVEF <10% with RV dysfunction  - Clinically close to euvolemia on exam with lukewarm extremities  - Would avoid using inotropes as this will increase ectopy burden, pt already w/ frequent multifocal PVCs/couplets  - Gentle diuresis recommended to maintain net even fluid status. Lasix PRN. No need to dose today.  - Consider hydralazine 10 mg TID for afterload reduction depending on degree of permissive HTN recommended by neuro. Slow GDMT optimization as tolerated.   - Please check markers of perfusion daily (serum lactate, LFTs, T Bili)  - Document strict I&O  - Device: previously declined ICD per reports.

## 2022-08-03 NOTE — PROGRESS NOTE ADULT - SUBJECTIVE AND OBJECTIVE BOX
Interval events:  Levo  Is and Os    VITALS:  T(C): , Max: 38.5 (08-02-22 @ 11:30)  HR:  (73 - 105)  BP:  (92/61 - 139/45)  ABP:  (99/47 - 153/65)  RR:  (14 - 32)  SpO2:  (96% - 100%)  Wt(kg): --  Device: Avea, Mode: CPAP with PS, FiO2: 40, PEEP: 5, PS: 10, MAP: 10, PIP: 18    08-02-22 @ 07:01  -  08-03-22 @ 07:00  --------------------------------------------------------  IN: 3293.3 mL / OUT: 600 mL / NET: 2693.3 mL      LABS:  Na: 152 (08-03 @ 03:51), 150 (08-02 @ 01:28), 151 (08-01 @ 15:00), 148 (08-01 @ 04:54), 152 (07-31 @ 17:44)  K: 4.5 (08-03 @ 03:51), 4.8 (08-02 @ 01:28), 4.4 (08-01 @ 15:00), 4.8 (08-01 @ 04:54), 4.5 (07-31 @ 17:44)  Cl: 117 (08-03 @ 03:51), 111 (08-02 @ 01:28), 111 (08-01 @ 15:00), 109 (08-01 @ 04:54), 112 (07-31 @ 17:44)  CO2: 25.0 (08-03 @ 03:51), 28.0 (08-02 @ 01:28), 28.0 (08-01 @ 15:00), 27.0 (08-01 @ 04:54), 30.0 (07-31 @ 17:44)  BUN: 119.5 (08-03 @ 03:51), 134.3 (08-02 @ 01:28), 134.0 (08-01 @ 15:00), 126.5 (08-01 @ 04:54), 105.2 (07-31 @ 17:44)  Cr: 0.97 (08-03 @ 03:51), 1.13 (08-02 @ 01:28), 1.06 (08-01 @ 15:00), 1.23 (08-01 @ 04:54), 0.95 (07-31 @ 17:44)  Glu: 315(08-03 @ 03:51), 295(08-02 @ 01:28), 257(08-01 @ 15:00), 235(08-01 @ 04:54), 209(07-31 @ 17:44)    Hgb: 14.0 (08-03 @ 03:51), 14.8 (08-02 @ 01:28), 14.4 (08-01 @ 15:00), 14.0 (08-01 @ 04:54)  Hct: 43.3 (08-03 @ 03:51), 45.0 (08-02 @ 01:28), 43.6 (08-01 @ 15:00), 42.9 (08-01 @ 04:54)  WBC: 22.79 (08-03 @ 03:51), 21.58 (08-02 @ 01:28), 17.99 (08-01 @ 15:00), 17.35 (08-01 @ 04:54)  Plt: 293 (08-03 @ 03:51), 337 (08-02 @ 01:28), 318 (08-01 @ 15:00), 317 (08-01 @ 04:54)    MEDICATIONS:  aspirin  chewable 81 milliGRAM(s) Oral daily  chlorhexidine 0.12% Liquid 15 milliLiter(s) Oral Mucosa every 12 hours  chlorhexidine 2% Cloths 1 Application(s) Topical daily  dextrose 5%. 1000 milliLiter(s) IV Continuous <Continuous>  dextrose 5%. 1000 milliLiter(s) IV Continuous <Continuous>  dextrose 50% Injectable 25 Gram(s) IV Push once  dextrose Oral Gel 15 Gram(s) Oral once PRN  enoxaparin Injectable 40 milliGRAM(s) SubCutaneous every 24 hours  famotidine Injectable 20 milliGRAM(s) IV Push every 12 hours  fentaNYL    Injectable 25 MICROGram(s) IV Push every 4 hours PRN  glucagon  Injectable 1 milliGRAM(s) IntraMuscular once  insulin lispro (ADMELOG) corrective regimen sliding scale   SubCutaneous every 6 hours  insulin NPH human recombinant 10 Unit(s) SubCutaneous every 6 hours  meropenem  IVPB 1000 milliGRAM(s) IV Intermittent every 8 hours  methocarbamol 500 milliGRAM(s) Oral every 6 hours PRN  metoprolol tartrate 50 milliGRAM(s) Oral every 8 hours  metroNIDAZOLE  IVPB 500 milliGRAM(s) IV Intermittent every 8 hours  ondansetron Injectable 4 milliGRAM(s) IV Push every 6 hours PRN  vancomycin    Solution 500 milliGRAM(s) Enteral Tube every 6 hours    EXAMINATION:  General:  in NAD  HEENT:  MMM  Neuro:  eye closed, slightly opens L eye to noxious, PERRL, + corneals, + cough, no movement to noxious in any extremities   Cards:  RRR  Respiratory:  no respiratory distress  Abdomen:  soft  Extremities:  no LE edema    Assessment/Plan:   75yo man with CABG, PVD, HTN, HLD, and low EF (declined AICD), admitted 7/24 with LM1 occlusion, s/p TICI 2B MT, no tPA as wake-up stroke. Poor exam post thrombectomy, re-intubated for hypoxic respiratory failure. SHAHNAZ brain with a large L MCA stroke with small area of reperfusion hemorrhage.  Hospitalization notable for acute on chronic HFrEF, LVHF <10%, reduced RV syst function, no LV thrombus on Definity, 1st degree AVb block.  Also with worsening urea 2/2 pre-renal state and administration of ACE inhibitor, with high BUN, which is likely contributing to the poor exam.    Now with C diff dx 8/1 and septic shock, on abx.   VEEG with rare occasional frontal sharps, mild-moderate slowing 7/29-8/2    Plan:  neurochecks q2hr  Off sedation, fentanyl prn   On ASA 81mg daily, statin held for abnormal LFTs    Resp- Resp failure secondary to neuro injury and heart failure   Maintain O2 > 92 %, wean FIO2 to 30 %  keep full vet supports, patient tachypneic on CPAP  CXR 8/2 wth RLL opacity, unchanged    Card- Ischemic cardiomyopathy HFrEF, EF 10%. With ectopy  Lopressor 50mg q8, avoid ACE inhibitors   IMTIAZ cancelled due to fever, unstable   EPS following, patient is currently not safe for AC given large stoke 9 days ago with hemorrhagic conversion and very high BUN  CHF consultation -recommended hydralazine for afterload reduction (currently held 2/2 need for pressors)   On [place]in track   K>4, Mg >2  goal euvolemia, holding diuresis today as patient is net neg    Renal- Uremia - likely 2/2 pre- renal azothermia and contraction alkalosis (FeNa- <1- pre renal, U Cl <30, renal US no abnormalities)   Hold Lasix - re-evaluate daily  goal euvolemia, holding diuresis today as patient is net neg  increase FWB to 300 mg q4h for hypernatremia and net neg fluid status/diarrhea     GI prophylaxis - Transaminitis- med induced vs less likely congestive hepatopathy (RUQ US 7/30 normal). With C diff  Monitor LFT  Change formula to vital 60cc/hr  ( 21Kcal/kg )  pepcid; Stool softeners  Last BM 8/1/22    ID: izaiah cipro for E. Coli. MRSA neg 8/1.   now with C Diff dx 8/1, on vanc 500 mg q6h and flagyl 500 mg q8h for severe C. Diff with shock   On meropenem for empiric coverage, stop IV vacn since MRSA neg x    Heme  Lov ppx       Patient seen and examined by attending on 8/3/2022.    Patient is critically ill due to stroke, septic shock 2/2 C. diff and at high risk for neurological deterioration or death due to: septic shock 2/2 C. Diff requiring pressors, requiring mechanical ventilation 2/2 neurologic injury    Interval events:  Off Levo since yesterday   Is and Os not accurate from yesterday but now accurate with condom cath      VITALS:  T(C): , Max: 38.5 (08-02-22 @ 11:30)  HR:  (73 - 105)  BP:  (92/61 - 139/45)  ABP:  (99/47 - 153/65)  RR:  (14 - 32)  SpO2:  (96% - 100%)  Wt(kg): --  Device: Avea, Mode: CPAP with PS, FiO2: 40, PEEP: 5, PS: 10, MAP: 10, PIP: 18    08-02-22 @ 07:01  -  08-03-22 @ 07:00  --------------------------------------------------------  IN: 3293.3 mL / OUT: 600 mL / NET: 2693.3 mL      LABS:  Na: 152 (08-03 @ 03:51), 150 (08-02 @ 01:28), 151 (08-01 @ 15:00), 148 (08-01 @ 04:54), 152 (07-31 @ 17:44)  K: 4.5 (08-03 @ 03:51), 4.8 (08-02 @ 01:28), 4.4 (08-01 @ 15:00), 4.8 (08-01 @ 04:54), 4.5 (07-31 @ 17:44)  Cl: 117 (08-03 @ 03:51), 111 (08-02 @ 01:28), 111 (08-01 @ 15:00), 109 (08-01 @ 04:54), 112 (07-31 @ 17:44)  CO2: 25.0 (08-03 @ 03:51), 28.0 (08-02 @ 01:28), 28.0 (08-01 @ 15:00), 27.0 (08-01 @ 04:54), 30.0 (07-31 @ 17:44)  BUN: 119.5 (08-03 @ 03:51), 134.3 (08-02 @ 01:28), 134.0 (08-01 @ 15:00), 126.5 (08-01 @ 04:54), 105.2 (07-31 @ 17:44)  Cr: 0.97 (08-03 @ 03:51), 1.13 (08-02 @ 01:28), 1.06 (08-01 @ 15:00), 1.23 (08-01 @ 04:54), 0.95 (07-31 @ 17:44)  Glu: 315(08-03 @ 03:51), 295(08-02 @ 01:28), 257(08-01 @ 15:00), 235(08-01 @ 04:54), 209(07-31 @ 17:44)    Hgb: 14.0 (08-03 @ 03:51), 14.8 (08-02 @ 01:28), 14.4 (08-01 @ 15:00), 14.0 (08-01 @ 04:54)  Hct: 43.3 (08-03 @ 03:51), 45.0 (08-02 @ 01:28), 43.6 (08-01 @ 15:00), 42.9 (08-01 @ 04:54)  WBC: 22.79 (08-03 @ 03:51), 21.58 (08-02 @ 01:28), 17.99 (08-01 @ 15:00), 17.35 (08-01 @ 04:54)  Plt: 293 (08-03 @ 03:51), 337 (08-02 @ 01:28), 318 (08-01 @ 15:00), 317 (08-01 @ 04:54)    MEDICATIONS:  aspirin  chewable 81 milliGRAM(s) Oral daily  chlorhexidine 0.12% Liquid 15 milliLiter(s) Oral Mucosa every 12 hours  chlorhexidine 2% Cloths 1 Application(s) Topical daily  dextrose 5%. 1000 milliLiter(s) IV Continuous <Continuous>  dextrose 5%. 1000 milliLiter(s) IV Continuous <Continuous>  dextrose 50% Injectable 25 Gram(s) IV Push once  dextrose Oral Gel 15 Gram(s) Oral once PRN  enoxaparin Injectable 40 milliGRAM(s) SubCutaneous every 24 hours  famotidine Injectable 20 milliGRAM(s) IV Push every 12 hours  fentaNYL    Injectable 25 MICROGram(s) IV Push every 4 hours PRN  glucagon  Injectable 1 milliGRAM(s) IntraMuscular once  insulin lispro (ADMELOG) corrective regimen sliding scale   SubCutaneous every 6 hours  insulin NPH human recombinant 10 Unit(s) SubCutaneous every 6 hours  meropenem  IVPB 1000 milliGRAM(s) IV Intermittent every 8 hours  methocarbamol 500 milliGRAM(s) Oral every 6 hours PRN  metoprolol tartrate 50 milliGRAM(s) Oral every 8 hours  metroNIDAZOLE  IVPB 500 milliGRAM(s) IV Intermittent every 8 hours  ondansetron Injectable 4 milliGRAM(s) IV Push every 6 hours PRN  vancomycin    Solution 500 milliGRAM(s) Enteral Tube every 6 hours    EXAMINATION:  General:  in NAD  HEENT:  MMM  Neuro:  eye closed, slightly opens L eye to noxious, PERRL, + corneals, + cough, no movement to noxious in any extremities   Cards:  RRR  Respiratory:  no respiratory distress  Abdomen:  soft  Extremities:  no LE edema    Assessment/Plan:   77yo man with CABG, PVD, HTN, HLD, and low EF (declined AICD), admitted 7/24 with LM1 occlusion, s/p TICI 2B MT, no tPA as wake-up stroke. Poor exam post thrombectomy, re-intubated for hypoxic respiratory failure. SHAHNAZ brain with a large L MCA stroke with small area of reperfusion hemorrhage.  Hospitalization notable for acute on chronic HFrEF, LVHF <10%, reduced RV syst function, no LV thrombus on Definity, 1st degree AVb block.  Also with worsening urea 2/2 pre-renal state and administration of ACE inhibitor, with high BUN, which is likely contributing to the poor exam.    Now with C diff dx 8/1 and septic shock, on abx.   VEEG with rare occasional frontal sharps, mild-moderate slowing 7/29-8/2    Plan:  neurochecks q2hr  Off sedation, fentanyl prn   On ASA 81mg daily, statin held for abnormal LFTs    Resp- Resp failure secondary to neuro injury and heart failure   Maintain O2 > 92 %, wean FIO2 to 30 %  keep full vet supports, patient tachypneic on CPAP  CXR 8/2 wth RLL opacity, unchanged    Card- Ischemic cardiomyopathy HFrEF, EF 10%. With ectopy  Lopressor 50mg q8, avoid ACE inhibitors   IMTIAZ cancelled due to fever, unstable   EPS following, patient is currently not safe for AC given large stoke 9 days ago with hemorrhagic conversion and very high BUN  CHF consultation -recommended hydralazine for afterload reduction (currently held 2/2 need for pressors)   On [place]in track   K>4, Mg >2  goal euvolemia, holding diuresis today as patient is net neg    Renal- Uremia - likely 2/2 pre- renal azothermia and contraction alkalosis (FeNa- <1- pre renal, U Cl <30, renal US no abnormalities)   Hold Lasix - re-evaluate daily  goal euvolemia, holding diuresis today as patient is net neg  increase FWB to 300 mg q4h for hypernatremia and net neg fluid status/diarrhea     GI prophylaxis - Transaminitis- med induced vs less likely congestive hepatopathy (RUQ US 7/30 normal). With C diff  Monitor LFT  Change formula to vital 60cc/hr  ( 21Kcal/kg )  pepcid; Stool softeners  Last BM 8/1/22    ID: sp cipro for E. Coli. MRSA neg 8/1.   now with C Diff dx 8/1, on vanc 500 mg q6h and flagyl 500 mg q8h for severe C. Diff with shock   On meropenem for empiric coverage, stop IV vacn since MRSA neg x    Heme  Lov ppx     Endo:   NPH 12 q6h      Patient seen and examined by attending on 8/3/2022.    Patient is critically ill due to stroke, septic shock 2/2 C. diff and at high risk for neurological deterioration or death due to: septic shock 2/2 C. Diff requiring pressors, requiring mechanical ventilation 2/2 neurologic injury    Interval events:  Off Levo since yesterday   Is and Os not accurate from yesterday but now accurate with condom cath  With frequent PVCs    I personally spoke to patient's wife at bedside today, with her son on speakerphone. I gave them the medical updates. We again discussed the GOC and the family will get back to us regarding whether they would like to proceed with trach/PEG.     VITALS:  T(C): , Max: 38.5 (08-02-22 @ 11:30)  HR:  (73 - 105)  BP:  (92/61 - 139/45)  ABP:  (99/47 - 153/65)  RR:  (14 - 32)  SpO2:  (96% - 100%)  Wt(kg): --  Device: Avea, Mode: CPAP with PS, FiO2: 40, PEEP: 5, PS: 10, MAP: 10, PIP: 18    08-02-22 @ 07:01  -  08-03-22 @ 07:00  --------------------------------------------------------  IN: 3293.3 mL / OUT: 600 mL / NET: 2693.3 mL      LABS:  Na: 152 (08-03 @ 03:51), 150 (08-02 @ 01:28), 151 (08-01 @ 15:00), 148 (08-01 @ 04:54), 152 (07-31 @ 17:44)  K: 4.5 (08-03 @ 03:51), 4.8 (08-02 @ 01:28), 4.4 (08-01 @ 15:00), 4.8 (08-01 @ 04:54), 4.5 (07-31 @ 17:44)  Cl: 117 (08-03 @ 03:51), 111 (08-02 @ 01:28), 111 (08-01 @ 15:00), 109 (08-01 @ 04:54), 112 (07-31 @ 17:44)  CO2: 25.0 (08-03 @ 03:51), 28.0 (08-02 @ 01:28), 28.0 (08-01 @ 15:00), 27.0 (08-01 @ 04:54), 30.0 (07-31 @ 17:44)  BUN: 119.5 (08-03 @ 03:51), 134.3 (08-02 @ 01:28), 134.0 (08-01 @ 15:00), 126.5 (08-01 @ 04:54), 105.2 (07-31 @ 17:44)  Cr: 0.97 (08-03 @ 03:51), 1.13 (08-02 @ 01:28), 1.06 (08-01 @ 15:00), 1.23 (08-01 @ 04:54), 0.95 (07-31 @ 17:44)  Glu: 315(08-03 @ 03:51), 295(08-02 @ 01:28), 257(08-01 @ 15:00), 235(08-01 @ 04:54), 209(07-31 @ 17:44)    Hgb: 14.0 (08-03 @ 03:51), 14.8 (08-02 @ 01:28), 14.4 (08-01 @ 15:00), 14.0 (08-01 @ 04:54)  Hct: 43.3 (08-03 @ 03:51), 45.0 (08-02 @ 01:28), 43.6 (08-01 @ 15:00), 42.9 (08-01 @ 04:54)  WBC: 22.79 (08-03 @ 03:51), 21.58 (08-02 @ 01:28), 17.99 (08-01 @ 15:00), 17.35 (08-01 @ 04:54)  Plt: 293 (08-03 @ 03:51), 337 (08-02 @ 01:28), 318 (08-01 @ 15:00), 317 (08-01 @ 04:54)    MEDICATIONS:  aspirin  chewable 81 milliGRAM(s) Oral daily  chlorhexidine 0.12% Liquid 15 milliLiter(s) Oral Mucosa every 12 hours  chlorhexidine 2% Cloths 1 Application(s) Topical daily  dextrose 5%. 1000 milliLiter(s) IV Continuous <Continuous>  dextrose 5%. 1000 milliLiter(s) IV Continuous <Continuous>  dextrose 50% Injectable 25 Gram(s) IV Push once  dextrose Oral Gel 15 Gram(s) Oral once PRN  enoxaparin Injectable 40 milliGRAM(s) SubCutaneous every 24 hours  famotidine Injectable 20 milliGRAM(s) IV Push every 12 hours  fentaNYL    Injectable 25 MICROGram(s) IV Push every 4 hours PRN  glucagon  Injectable 1 milliGRAM(s) IntraMuscular once  insulin lispro (ADMELOG) corrective regimen sliding scale   SubCutaneous every 6 hours  insulin NPH human recombinant 10 Unit(s) SubCutaneous every 6 hours  meropenem  IVPB 1000 milliGRAM(s) IV Intermittent every 8 hours  methocarbamol 500 milliGRAM(s) Oral every 6 hours PRN  metoprolol tartrate 50 milliGRAM(s) Oral every 8 hours  metroNIDAZOLE  IVPB 500 milliGRAM(s) IV Intermittent every 8 hours  ondansetron Injectable 4 milliGRAM(s) IV Push every 6 hours PRN  vancomycin    Solution 500 milliGRAM(s) Enteral Tube every 6 hours    EXAMINATION:  General:  in NAD  HEENT:  MMM  Neuro:  eye closed, slightly opens L eye to noxious, potentially attended once on the L when examiner opened patient's eyes, does not follow commands, PERRL, BTT on the L, + cough, moves L arm and leg spontaneously no movement on the R side   Cards:  RRR  Respiratory:  no respiratory distress  Abdomen:  soft  Extremities:  no LE edema    Assessment/Plan:   77yo man with CABG, PVD, HTN, HLD, and low EF (declined AICD), admitted 7/24 with LM1 occlusion, s/p TICI 2B MT, no tPA as wake-up stroke. Poor exam post thrombectomy, re-intubated for hypoxic respiratory failure. SHAHNAZ brain with a large L MCA stroke with small area of reperfusion hemorrhage.  Hospitalization notable for acute on chronic HFrEF, LVHF <10%, reduced RV syst function, no LV thrombus on Definity, 1st degree AV block.  Also with worsening urea 2/2 pre-renal state and administration of ACE inhibitor, with high BUN, which is likely contributing to the poor exam.    Now with C diff dx 8/1 and septic shock, on abx, shock resolved.   VEEG with rare occasional frontal sharps, mild-moderate slowing 7/29-8/2  continuing GOC discussions     Plan:  neurochecks q2hr  Off sedation, fentanyl prn   On ASA 81mg daily, statin held for abnormal LFTs    Resp- Resp failure secondary to neuro injury and heart failure   Maintain O2 > 92 %, wean FIO2 to 30 %  CPAP 10/5, 40%  CXR 8/2 wth RLL opacity, unchanged    Card- Ischemic cardiomyopathy HFrEF, EF 10%. With ectopy  Lopressor increased to 76mg q8 as per heart failure recs, avoid ACE inhibitors   IMTIAZ cancelled due to fever, unstable   EPS following, patient is currently not safe for AC given large stoke 9 days ago with hemorrhagic conversion and very high BUN. Will get a CTH on day 14 from stroke if no new hemorrhagic conversion, will start AC.   CHF consultation -recommended hydralazine for afterload reduction, will restart slowly as now patient is off pressors   On [place] track   K>4, Mg >2  goal euvolemia, holding diuresis today as patient appears euvolemic on exam     Renal- Uremia - likely 2/2 pre- renal azothermia and contraction alkalosis (FeNa- <1- pre renal, U Cl <30, renal US no abnormalities), BUN and Cr now improving   Hold Lasix - re-evaluate daily  goal euvolemia  FWB to 300 mg q4h for hypernatremia and diarrhea     GI prophylaxis - Transaminitis- med induced vs less likely congestive hepatopathy (RUQ US 7/30 normal). With C diff  Monitor LFT  vital 60cc/hr  ( 21Kcal/kg )  pepcid; Stool softeners    ID: s/p cipro for E. Coli. MRSA neg 8/1.   now with C Diff dx 8/1, on vanc 500 mg q6h and flagyl 500 mg q8h for severe C. Diff with shock   On meropenem for empiric coverage    Heme  Lov ppx     Endo:   NPH 12 q6h      Patient seen and examined by attending on 8/3/2022.    Patient is critically ill due to stroke and at high risk for neurological deterioration or death due to: requiring mechanical ventilation 2/2 neurologic injury, severe heart failure, C. Diff

## 2022-08-03 NOTE — PROGRESS NOTE ADULT - ASSESSMENT
The patient is a 76 year old male with past medical history of HTN, HLD, CAD s/p CABG, carotid stenosis, and PVD who was transferred from an outside hospital for neuro IR intervention after waking up with R sided facial droop and R sided weakness; found to have left M1 occlusion on CTA s/p thrombectomy which is complicated by hemorrhagic conversion. Hospital course notable for acute hypoxic respiratory failure post procedure s/p mechanical ventilation. Also with ischemic cardiomyopathy, LVEF<10% and moderately reduced RV function. Also with E coli UTI, KE, C. diff and septic shock. Nephrology is managing KE.    #KE  #HFrEF  #Hypernatremia  #Septic Shock    -Baseline creatinine ranges 0.6-0.7mg/dL   -Notable for acute kidney injury starting on 07/30/2022; creatinine peaked at 1.2mg/dL, improved today to 0.9mg/dL    -Suspecting prerenal etiology with ? progression to ATN; currently improving (see above)   -UA (prior to onset of KE) showed specific gravity 1.025, +protein, +nitrite, +leukocyte esterase, moderate blood, 6-10RBC, 26-50 WBC, TNTC bacteria  -Urine culture positive for E coli   -R abdominal U/S on 07/30/2022 showed R kidney 10.4cm with simple cyst without hydronephrosis     -Hypernatremia worsening; free water flushes have been increased    -Would continue to hold diuretics at this time   -No acute indication for renal replacement therapy at this time given improving renal function with associated improved BUN  -Prognosis guarded     Kwame West DO  Nephrology The patient is a 76 year old male with past medical history of HTN, HLD, CAD s/p CABG, carotid stenosis, and PVD who was transferred from an outside hospital for neuro IR intervention after waking up with R sided facial droop and R sided weakness; found to have left M1 occlusion on CTA s/p thrombectomy which is complicated by hemorrhagic conversion. Hospital course notable for acute hypoxic respiratory failure post procedure s/p mechanical ventilation. Also with ischemic cardiomyopathy, LVEF<10% and moderately reduced RV function. Also with E coli UTI, KE, C. diff and septic shock. Nephrology is managing KE.    #KE, improving  #HFrEF  #Hypernatremia  #Septic Shock, resolved     -Baseline creatinine ranges 0.6-0.7mg/dL   -Notable for acute kidney injury starting on 07/30/2022; creatinine peaked at 1.2mg/dL, improved today to 0.9mg/dL    -Suspecting prerenal etiology with ? progression to ATN; currently improving (see above)   -UA (prior to onset of KE) showed specific gravity 1.025, +protein, +nitrite, +leukocyte esterase, moderate blood, 6-10RBC, 26-50 WBC, TNTC bacteria  -Urine culture positive for E coli   -R abdominal U/S on 07/30/2022 showed R kidney 10.4cm with simple cyst without hydronephrosis   -Off pressors today; hemodynamically stable    -Hypernatremia worsening; free water flushes have been increased    -Would continue to hold diuretics at this time   -No acute indication for renal replacement therapy at this time given improving renal function with associated improved BUN  -Prognosis guarded     Kwame West DO  Nephrology

## 2022-08-03 NOTE — PROGRESS NOTE ADULT - ASSESSMENT
75 y/o with h/o chronic systolic HF ACC/AHA stage C, ICMP LVEF 22, CAD s/p PCI ’04 CABG ‘14, carotid stenosis, declined ICD, PVD, HTN, DLP who was transferred from an OSH for neuroIR intervention after waking up with R sided facial droop and R sided weakness. He was found to have left M1 occlusion on CTA and required thrombectomy on 7/24/22. His hospital course has been complicated by acute hypoxic respiratory failure post procedure requiring mechanical ventilation, hypotension requiring temporary pressors, frequent PVCs, E. coli UTI, KE and fevers. He had a TTE that showed LVEF 19%, LVIDd 6.79cm, LVOT VTI 10.3cm, moderately RV dysfunction and RVSP 42mmHg (RAP 3mmHg).   The patient remains intubated. He’s mostly hypertensive with /60 (off pressors). Clinically looks euvolemic, lukewarm extremities. His lactate peaked at 3.2 but has now normalized. He has significant leukocytosis and down-trending transaminitis. He was found to have c.diff and was started on vanco/flagyl. His CxR suggests a new infiltrate in the RML/RLL. He is on free water flushes for hypernatremia.     Pertinent Studies:    TTE 7/24/22: LVEF <10%, LVIDd 6.79cm, mild RVE with moderately reduced systolic function, grade III DD, mild MR, mild TR, PASP 41.9 mmHg (RAP 3), LVOT VTI 10.3, small PFO with left to right shunting.     CTA neck showed moderate stenosis in the proximal right internal carotid artery and moderate to severe stenosis in proximal left internal carotid artery. There was no evidence of ICA occlusion in the neck.

## 2022-08-03 NOTE — PROGRESS NOTE ADULT - SUBJECTIVE AND OBJECTIVE BOX
Neurology   NATANAEL ROWAN 76y Male       HPI:  76M with PMH CABG, HTN, HLD who presents as transfer from [facility] for stroke. Last known well was last night 10pm prior to going to bed, woke up this morning around 0500 with R sided weakness and R facial droop. Presented to [facility], code stroke initiated, NIH at [facility] reportedly 8. CTA showed LM1 occlusion. Transferred to [facility] for possible neuro IR intervention. On arrival to [facility], NIH 6. Decision made to take patient directly to neuro IR for intervention.     Initial NIH 6, mRS 0    PMH: HTN (hypertension)    HLD (hyperlipidemia)    S/P CABG x 1         PSH:       FAMILY HISTORY:      SOCIAL HISTORY:  No history of tobacco or alcohol use     Allergies    No Known Allergies    Intolerances        Vital Signs Last 24 Hrs  T(C): 37.5 (01 Aug 2022 12:00), Max: 38.7 (01 Aug 2022 07:00)  T(F): 99.5 (01 Aug 2022 12:00), Max: 101.7 (01 Aug 2022 07:00)  HR: 94 (01 Aug 2022 12:00) (89 - 106)  BP: 168/69 (01 Aug 2022 12:00) (99/50 - 168/69)  BP(mean): 98 (01 Aug 2022 12:00) (65 - 100)  RR: 31 (01 Aug 2022 12:00) (19 - 34)  SpO2: 97% (01 Aug 2022 12:00) (94% - 99%)    Parameters below as of 01 Aug 2022 12:00  Patient On (Oxygen Delivery Method): ventilator,C-pap        MEDICATIONS    aspirin  chewable 81 milliGRAM(s) Oral daily  chlorhexidine 0.12% Liquid 15 milliLiter(s) Oral Mucosa every 12 hours  chlorhexidine 2% Cloths 1 Application(s) Topical daily  ciprofloxacin     Tablet 500 milliGRAM(s) Oral every 12 hours  dextrose 5%. 1000 milliLiter(s) IV Continuous <Continuous>  dextrose 5%. 1000 milliLiter(s) IV Continuous <Continuous>  dextrose 50% Injectable 25 Gram(s) IV Push once  dextrose Oral Gel 15 Gram(s) Oral once PRN  enoxaparin Injectable 40 milliGRAM(s) SubCutaneous every 24 hours  famotidine Injectable 20 milliGRAM(s) IV Push every 12 hours  fentaNYL    Injectable 25 MICROGram(s) IV Push every 4 hours PRN  glucagon  Injectable 1 milliGRAM(s) IntraMuscular once  hydrALAZINE 10 milliGRAM(s) Oral every 6 hours  insulin lispro (ADMELOG) corrective regimen sliding scale   SubCutaneous every 6 hours  insulin NPH human recombinant 4 Unit(s) SubCutaneous every 8 hours  metoprolol tartrate 50 milliGRAM(s) Oral every 8 hours  ondansetron Injectable 4 milliGRAM(s) IV Push every 6 hours PRN         LABS:  CBC Full  -  ( 01 Aug 2022 04:54 )  WBC Count : 17.35 K/uL  RBC Count : 4.50 M/uL  Hemoglobin : 14.0 g/dL  Hematocrit : 42.9 %  Platelet Count - Automated : 317 K/uL  Mean Cell Volume : 95.3 fl  Mean Cell Hemoglobin : 31.1 pg  Mean Cell Hemoglobin Concentration : 32.6 gm/dL  Auto Neutrophil # : x  Auto Lymphocyte # : x  Auto Monocyte # : x  Auto Eosinophil # : x  Auto Basophil # : x  Auto Neutrophil % : x  Auto Lymphocyte % : x  Auto Monocyte % : x  Auto Eosinophil % : x  Auto Basophil % : x      08-01    148<H>  |  109<H>  |  126.5<H>  ----------------------------<  235<H>  4.8   |  27.0  |  1.23    Ca    8.3<L>      01 Aug 2022 04:54  Phos  4.3     08-01  Mg     3.6     08-01    TPro  7.4  /  Alb  2.8<L>  /  TBili  0.4  /  DBili  0.2  /  AST  798<H>  /  ALT  559<H>  /  AlkPhos  328<H>  07-31    LIVER FUNCTIONS - ( 31 Jul 2022 17:44 )  Alb: 2.8 g/dL / Pro: 7.4 g/dL / ALK PHOS: 328 U/L / ALT: 559 U/L / AST: 798 U/L / GGT: x           On Neurological Examination:  Patient is intubated off sedation. Neuroexam remains poor overall.  Mental Status -   There is  Eye opening to noxious.   Cranial Nerves -Pupils are 2 and reactive. , EOMs are conjugate in primary gaze and on occulocephalics.   Cough reflex is present .  Motor Exam -   Right side -trace movement to noxious. Left UE withdrawal  to noxious stim. LLE moved spontaneously.         RADIOLOGY ( All neurological imaging studies were independently reviewed and interpreted by me)  CT   CTA  CTP    IMPRESSION:    CT PERFUSION:  The CT perfusion is technically limited by truncation of the arterial input function and venous outflow function.    Core infarct (CBF < 30%:): 0 ml  Penumbra (Tmax >6s): 4 mL  Mismatched volume: 0 ml  Mismatched ratio: None    Interpretation:  Based on CBF < 30%, there is no evidence of a core infarct. At CBF < 34%, a small perfusion defect of 4 mL is located in the left inferior frontal gyrus and corresponds to matched perfusion abnormality of Tmax >6 seconds. On Tmax > 4s, there is a much larger perfusion defect throughout the majority of the left frontoparietal convexity, which may represent an ischemic penumbra in the left MCA vascular territory.        CT ANGIOGRAPHY NECK:  1. Poor opacification of the cervical vasculature.  2. Suspicion for moderate greater than 50% stenosis in the proximal right internal carotid artery. Suspicion for moderate to severe stenosis in the proximal left internal carotid artery that may be greater than 70%. No evidence of ICA occlusion in the neck.  3. The right vertebral artery is grossly patent.. The V1 segment of the left vertebral artery cannot be visualized. The V2 and V3 segments the left vertebral artery are grossly patent with multiple stenoses.  4. There is limited visualization of the common carotid artery origins and subclavian artery origins. Underlying stenoses cannot be excluded. There is suspicion for a severe stenosis in the proximal left subclavian artery.      CT ANGIOGRAPHY BRAIN:  1. Poor opacification of the intracranial vasculature.  2. There appears to be a focal filling defect at the left MCA bifurcation with distal flow. There is marked attenuation of M2 branches of the left middle cerebral artery. Vessel density analysis of the MCA territory shows a greater than 50% reduction of vessel density in the left MCA territory compared to the contralateral side.  3. There are mild to moderate stenoses in the supraclinoid segments of the internal carotid arteries bilaterally, without occlusion.  4. There are stenoses in the intradural vertebral arteries bilaterally, without occlusion.    Repeat CTA or MRI/MRA is recommended for further evaluation.  Peak arterial opacification on the CT perfusion occurs at approximately 38 seconds. If the CTA is repeated, a long delay is required after contrast injection to achieve adequate opacification of the vasculature.    The findings were discussed with JACK Light in the emergency room on 07/24/2022 at 5:45 AM. Read back verification was obtained.      SEVERO CHANEL MD; Attending Radiologist  This document has been electronically signed    MRI:    MR Head No Cont (07.26.22 @ 20:58) >  IMPRESSION:    Acute left MCA territory infarcts with hemorrhagic transformation,   grossly stable since comparison CT.    Additional punctate acute infarct involving the medial left frontal lobe   in the SUGEY territory.    ORION ANDRADE MD; Attending Radiologist        TTE  TTE Echo Complete w/ Contrast w/ Doppler (07.24.22 @ 14:01) >    Summary:   1. Endocardial visualization was enhanced with intravenous echo contrast.   2. Severely enlarged left atrium.   3. Global diffuse akinesis. Left ventricular ejection fraction, by   visual estimation, is <10%.   4. Elevated mean left atrial pressure. (>30 mm Hg)   5. Normal right atrial size.   6. Mildly enlarged right ventricle. Moderately reduced RV systolic   function.   7. Mild mitral valve regurgitation.   8. Mild tricuspid regurgitation.   9. Estimated pulmonary artery systolic pressure is 42 mmHg - mild   pulmonary hypertension.  10. There is no evidence of pericardial effusion.  11. Team informed of the findings.    MD Millie, RPVI Electronically signed on 7/24/2022 at 3:49:14 PM      < from: CT Head No Cont (07.30.22 @ 12:47) >    IMPRESSION:  Aging inferior left frontal and inferolateral left parietal infarcts.   Mild stable cortical blood products associated with the frontal infarct.    VERNELL LOYA MD; Attending Radiologist          EEG IMPRESSION/CLINICAL CORRELATE 7/31/22    Abnormal EEG study.  1. Potential epileptogenic foci in the bilateral frontotemporal regions.   2. Structural abnormality and cortical dysfunction in the left frontotemporal region.   3. Mild to moderate nonspecific diffuse or multifocal cerebral dysfunction.   4. No seizure seen.     _____________________________________________________________    Mayi Prather MD  Director, Epilepsy/[facility]       Electronic Signatures:  Mayi Prather)  (Signed 31-Jul-2022 09:06)EEG IMPRESSION/CLINICAL CORRELATE     Neurology   NATANAEL ROWAN 76y Male       HPI:  76M with PMH CABG, HTN, HLD who [place] from [facility] [place]. Last known well was last night 10pm prior to going to bed, woke up this morning around 0500 with R sided weakness and R facial droop. Presented to [facility], code stroke initiated, NIH at [facility] reportedly 8. CTA showed LM1 occlusion. Transferred to [facility] for possible neuro IR intervention. On arrival to [facility], NIH 6. Decision made to take patient directly to neuro IR for intervention.     Initial NIH 6, mRS 0    PMH: HTN (hypertension)    HLD (hyperlipidemia)    S/P CABG x 1         PSH:       FAMILY HISTORY:      SOCIAL HISTORY:  No history of tobacco or alcohol use     Allergies    No Known Allergies    Intolerances        Vital Signs Last 24 Hrs  T(C): 36.2 (04 Aug 2022 04:00), Max: 38 (04 Aug 2022 01:00)  T(F): 97.2 (04 Aug 2022 04:00), Max: 100.4 (04 Aug 2022 01:00)  HR: 93 (04 Aug 2022 04:00) (73 - 103)  BP: 104/72 (04 Aug 2022 04:00) (96/50 - 149/56)  BP(mean): 79 (04 Aug 2022 04:00) (62 - 121)  RR: 16 (04 Aug 2022 04:00) (16 - 28)  SpO2: 100% (04 Aug 2022 04:00) (97% - 100%)    Parameters below as of 04 Aug 2022 04:00  Patient On (Oxygen Delivery Method): ventilator        MEDICATIONS    aspirin  chewable 81 milliGRAM(s) Oral daily  chlorhexidine 0.12% Liquid 15 milliLiter(s) Oral Mucosa every 12 hours  chlorhexidine 2% Cloths 1 Application(s) Topical daily  dextrose 5%. 1000 milliLiter(s) IV Continuous <Continuous>  dextrose 5%. 1000 milliLiter(s) IV Continuous <Continuous>  dextrose 50% Injectable 25 Gram(s) IV Push once  dextrose Oral Gel 15 Gram(s) Oral once PRN  enoxaparin Injectable 40 milliGRAM(s) SubCutaneous every 24 hours  famotidine Injectable 20 milliGRAM(s) IV Push every 12 hours  glucagon  Injectable 1 milliGRAM(s) IntraMuscular once  insulin lispro (ADMELOG) corrective regimen sliding scale   SubCutaneous every 4 hours  insulin NPH human recombinant 12 Unit(s) SubCutaneous every 6 hours  meropenem  IVPB 1000 milliGRAM(s) IV Intermittent every 8 hours  metoprolol tartrate 75 milliGRAM(s) Oral every 8 hours  metroNIDAZOLE  IVPB 500 milliGRAM(s) IV Intermittent every 8 hours  ondansetron Injectable 4 milliGRAM(s) IV Push every 6 hours PRN  vancomycin    Solution 500 milliGRAM(s) Enteral Tube every 6 hours         LABS:  CBC Full  -  ( 04 Aug 2022 03:55 )  WBC Count : 17.50 K/uL  RBC Count : 4.32 M/uL  Hemoglobin : 13.4 g/dL  Hematocrit : 40.8 %  Platelet Count - Automated : 278 K/uL  Mean Cell Volume : 94.4 fl  Mean Cell Hemoglobin : 31.0 pg  Mean Cell Hemoglobin Concentration : 32.8 gm/dL  Auto Neutrophil # : x  Auto Lymphocyte # : x  Auto Monocyte # : x  Auto Eosinophil # : x  Auto Basophil # : x  Auto Neutrophil % : x  Auto Lymphocyte % : x  Auto Monocyte % : x  Auto Eosinophil % : x  Auto Basophil % : x    Urinalysis Basic - ( 02 Aug 2022 08:40 )    Color: Yellow / Appearance: Slightly Turbid / S.015 / pH: x  Gluc: x / Ketone: Negative  / Bili: Negative / Urobili: Negative mg/dL   Blood: x / Protein: 15 / Nitrite: Negative   Leuk Esterase: Negative / RBC: 11-25 /HPF / WBC 0-2 /HPF   Sq Epi: x / Non Sq Epi: Occasional / Bacteria: Few      08-04    153<H>  |  118<H>  |  100.5<H>  ----------------------------<  253<H>  4.2   |  24.0  |  1.00    Ca    8.5      04 Aug 2022 03:55  Phos  4.5     08-04  Mg     3.0     08-04    TPro  6.6  /  Alb  2.6<L>  /  TBili  0.4  /  DBili  0.2  /  AST  416<H>  /  ALT  425<H>  /  AlkPhos  310<H>  08    LIVER FUNCTIONS - ( 02 Aug 2022 08:40 )  Alb: 2.6 g/dL / Pro: 6.6 g/dL / ALK PHOS: 310 U/L / ALT: 425 U/L / AST: 416 U/L / GGT: x           Hemoglobin A1C:     On Neurological Examination:  Patient is intubated off sedation    Mental Status -   There is  Eye opening to voice and at times spontaneous eye opening seen  Cranial Nerves -Pupils are 2 and reactive. , EOMs are conjugate in primary gaze and on occulocephalics.   Cough reflex is present .  Motor Exam -   Right side trace movement. Left UE localizes to noxious stim. LLE moved spontaneously.         RADIOLOGY ( All neurological imaging studies were independently reviewed and interpreted by me)  CT   CTA  CTP    IMPRESSION:    CT PERFUSION:  The CT perfusion is technically limited by truncation of the arterial input function and venous outflow function.    Core infarct (CBF < 30%:): 0 ml  Penumbra (Tmax >6s): 4 mL  Mismatched volume: 0 ml  Mismatched ratio: None    Interpretation:  Based on CBF < 30%, there is no evidence of a core infarct. At CBF < 34%, a small perfusion defect of 4 mL is located in the left inferior frontal gyrus and corresponds to matched perfusion abnormality of Tmax >6 seconds. On Tmax > 4s, there is a much larger perfusion defect throughout the majority of the left frontoparietal convexity, which may represent an ischemic penumbra in the left MCA vascular territory.        CT ANGIOGRAPHY NECK:  1. Poor opacification of the cervical vasculature.  2. Suspicion for moderate greater than 50% stenosis in the proximal right internal carotid artery. Suspicion for moderate to severe stenosis in the proximal left internal carotid artery that may be greater than 70%. No evidence of ICA occlusion in the neck.  3. The right vertebral artery is grossly patent.. The V1 segment of the left vertebral artery cannot be visualized. The V2 and V3 segments the left vertebral artery are grossly patent with multiple stenoses.  4. There is limited visualization of the common carotid artery origins and subclavian artery origins. Underlying stenoses cannot be excluded. There is suspicion for a severe stenosis in the proximal left subclavian artery.      CT ANGIOGRAPHY BRAIN:  1. Poor opacification of the intracranial vasculature.  2. There appears to be a focal filling defect at the left MCA bifurcation with distal flow. There is marked attenuation of M2 branches of the left middle cerebral artery. Vessel density analysis of the MCA territory shows a greater than 50% reduction of vessel density in the left MCA territory compared to the contralateral side.  3. There are mild to moderate stenoses in the supraclinoid segments of the internal carotid arteries bilaterally, without occlusion.  4. There are stenoses in the intradural vertebral arteries bilaterally, without occlusion.    Repeat CTA or MRI/MRA is recommended for further evaluation.  Peak arterial opacification on the CT perfusion occurs at approximately 38 seconds. If the CTA is repeated, a long delay is required after contrast injection to achieve adequate opacification of the vasculature.    The findings were discussed with JACK Light in the emergency room on 2022 at 5:45 AM. Read back verification was obtained.      SEVERO CHANEL MD; Attending Radiologist  This document has been electronically signed    MRI:    MR Head No Cont (22 @ 20:58) >  IMPRESSION:    Acute left MCA territory infarcts with hemorrhagic transformation,   grossly stable since comparison CT.    Additional punctate acute infarct involving the medial left frontal lobe   in the SUGEY territory.    ORION ANDRADE MD; Attending Radiologist        TTE  TTE Echo Complete w/ Contrast w/ Doppler (22 @ 14:01) >    Summary:   1. Endocardial visualization was enhanced with intravenous echo contrast.   2. Severely enlarged left atrium.   3. Global diffuse akinesis. Left ventricular ejection fraction, by   visual estimation, is <10%.   4. Elevated mean left atrial pressure. (>30 mm Hg)   5. Normal right atrial size.   6. Mildly enlarged right ventricle. Moderately reduced RV systolic   function.   7. Mild mitral valve regurgitation.   8. Mild tricuspid regurgitation.   9. Estimated pulmonary artery systolic pressure is 42 mmHg - mild   pulmonary hypertension.  10. There is no evidence of pericardial effusion.  11. Team informed of the findings.    MD Millie, RPVI Electronically signed on 2022 at 3:49:14 PM      < from: CT Head No Cont (22 @ 12:47) >    IMPRESSION:  Aging inferior left frontal and inferolateral left parietal infarcts.   Mild stable cortical blood products associated with the frontal infarct.    VERNELL LOYA MD; Attending Radiologist          EEG IMPRESSION/CLINICAL CORRELATE 22    Abnormal EEG study.  1. Potential epileptogenic foci in the bilateral frontotemporal regions.   2. Structural abnormality and cortical dysfunction in the left frontotemporal region.   3. Mild to moderate nonspecific diffuse or multifocal cerebral dysfunction.   4. No seizure seen.     _____________________________________________________________    Mayi Prather MD  Director, Epilepsy/EM[facility] - [facility]       Electronic Signatures:  Mayi Prather (MD)  (Signed 2022 09:06)EEG IMPRESSION/CLINICAL CORRELATE

## 2022-08-03 NOTE — PROGRESS NOTE ADULT - SUBJECTIVE AND OBJECTIVE BOX
INCOMPLETE NOTE    Subjective:  No overnight events    Medications:  aspirin  chewable 81 milliGRAM(s) Oral daily  chlorhexidine 0.12% Liquid 15 milliLiter(s) Oral Mucosa every 12 hours  chlorhexidine 2% Cloths 1 Application(s) Topical daily  dextrose 5%. 1000 milliLiter(s) IV Continuous <Continuous>  dextrose 5%. 1000 milliLiter(s) IV Continuous <Continuous>  dextrose 50% Injectable 25 Gram(s) IV Push once  dextrose Oral Gel 15 Gram(s) Oral once PRN  enoxaparin Injectable 40 milliGRAM(s) SubCutaneous every 24 hours  famotidine Injectable 20 milliGRAM(s) IV Push every 12 hours  fentaNYL    Injectable 25 MICROGram(s) IV Push every 4 hours PRN  glucagon  Injectable 1 milliGRAM(s) IntraMuscular once  insulin lispro (ADMELOG) corrective regimen sliding scale   SubCutaneous every 6 hours  insulin NPH human recombinant 10 Unit(s) SubCutaneous every 6 hours  meropenem  IVPB 1000 milliGRAM(s) IV Intermittent every 8 hours  methocarbamol 500 milliGRAM(s) Oral every 6 hours PRN  metoprolol tartrate 50 milliGRAM(s) Oral every 8 hours  metroNIDAZOLE  IVPB 500 milliGRAM(s) IV Intermittent every 8 hours  ondansetron Injectable 4 milliGRAM(s) IV Push every 6 hours PRN  vancomycin    Solution 500 milliGRAM(s) Enteral Tube every 6 hours    Vitals:  T(C): 36.5 (22 @ 07:00), Max: 38.5 (22 @ 11:30)  HR: 89 (22 @ 07:00) (73 - 105)  BP: 136/112 (22 @ 07:00) (92/61 - 136/112)  BP(mean): 121 (22 @ 07:00) (67 - 121)  RR: 25 (22 @ 07:00) (14 - 32)  SpO2: 100% (22 @ 07:00) (96% - 100%)    Daily     Daily Weight in k.2 (02 Aug 2022 23:19)        I&O's Summary    02 Aug 2022 07:01  -  03 Aug 2022 07:00  --------------------------------------------------------  IN: 3293.3 mL / OUT: 600 mL / NET: 2693.3 mL        Physical Exam:  Appearance: No Acute Distress  HEENT: JVP non elevated  Cardiovascular: RRR, Normal S1 S2, No murmurs/rubs/gallops  Respiratory: Clear to auscultation bilaterally  Gastrointestinal: Soft, Non-tender, non-distended	  Skin: no skin lesions  Neurologic: Non-focal  Extremities: No LE edema, warm and well perfused  Psychiatry: A & O x 3, Mood & affect appropriate      Labs:                        14.0   22.79 )-----------( 293      ( 03 Aug 2022 03:51 )             43.3         152<H>  |  117<H>  |  119.5<H>  ----------------------------<  315<H>  4.5   |  25.0  |  0.97    Ca    8.3<L>      03 Aug 2022 03:51  Phos  5.2       Mg     3.6         TPro  6.6  /  Alb  2.6<L>  /  TBili  0.4  /  DBili  0.2  /  AST  416<H>  /  ALT  425<H>  /  AlkPhos  310<H>              Serum Pro-Brain Natriuretic Peptide: 1446 pg/mL ( @ 09:00)  Serum Pro-Brain Natriuretic Peptide: 1768 pg/mL ( @ 13:30)        Lactate, Blood: 1.6 mmol/L ( @ 01:28)  Lactate, Blood: 1.6 mmol/L ( @ 04:54)  Lactate, Blood: 3.2 mmol/L ( @ 14:20)     Subjective:  No overnight events  Started on treatment for c.diff  Tele remarkable for frequent PVCs.     Medications:  aspirin  chewable 81 milliGRAM(s) Oral daily  chlorhexidine 0.12% Liquid 15 milliLiter(s) Oral Mucosa every 12 hours  chlorhexidine 2% Cloths 1 Application(s) Topical daily  dextrose 5%. 1000 milliLiter(s) IV Continuous <Continuous>  dextrose 5%. 1000 milliLiter(s) IV Continuous <Continuous>  dextrose 50% Injectable 25 Gram(s) IV Push once  dextrose Oral Gel 15 Gram(s) Oral once PRN  enoxaparin Injectable 40 milliGRAM(s) SubCutaneous every 24 hours  famotidine Injectable 20 milliGRAM(s) IV Push every 12 hours  fentaNYL    Injectable 25 MICROGram(s) IV Push every 4 hours PRN  glucagon  Injectable 1 milliGRAM(s) IntraMuscular once  insulin lispro (ADMELOG) corrective regimen sliding scale   SubCutaneous every 6 hours  insulin NPH human recombinant 10 Unit(s) SubCutaneous every 6 hours  meropenem  IVPB 1000 milliGRAM(s) IV Intermittent every 8 hours  methocarbamol 500 milliGRAM(s) Oral every 6 hours PRN  metoprolol tartrate 50 milliGRAM(s) Oral every 8 hours  metroNIDAZOLE  IVPB 500 milliGRAM(s) IV Intermittent every 8 hours  ondansetron Injectable 4 milliGRAM(s) IV Push every 6 hours PRN  vancomycin    Solution 500 milliGRAM(s) Enteral Tube every 6 hours    Vitals:  T(C): 36.5 (22 @ 07:00), Max: 38.5 (22 @ 11:30)  HR: 89 (22 @ 07:00) (73 - 105)  BP: 136/112 (22 @ 07:00) (92/61 - 136/112)  BP(mean): 121 (22 @ 07:00) (67 - 121)  RR: 25 (22 @ 07:00) (14 - 32)  SpO2: 100% (22 @ 07:00) (96% - 100%)    Daily     Daily Weight in k.2 (02 Aug 2022 23:19)        I&O's Summary    02 Aug 2022 07:01  -  03 Aug 2022 07:00  --------------------------------------------------------  IN: 3293.3 mL / OUT: 600 mL / NET: 2693.3 mL        Physical Exam:  Appearance: sedated/intubated  HEENT: JVP non elevated  Cardiovascular: RRR, Normal S1 S2, No murmurs/rubs/gallops  Respiratory: Clear to auscultation bilaterally  Gastrointestinal: Soft, Non-tender, non-distended	  Skin: no skin lesions  Neurologic: Non-focal  Extremities: No LE edema, warm and well perfused  Psychiatry: sedated      Labs:                        14.0   22.79 )-----------( 293      ( 03 Aug 2022 03:51 )             43.3         152<H>  |  117<H>  |  119.5<H>  ----------------------------<  315<H>  4.5   |  25.0  |  0.97    Ca    8.3<L>      03 Aug 2022 03:51  Phos  5.2       Mg     3.6         TPro  6.6  /  Alb  2.6<L>  /  TBili  0.4  /  DBili  0.2  /  AST  416<H>  /  ALT  425<H>  /  AlkPhos  310<H>              Serum Pro-Brain Natriuretic Peptide: 1446 pg/mL ( @ 09:00)  Serum Pro-Brain Natriuretic Peptide: 1768 pg/mL ( @ 13:30)        Lactate, Blood: 1.6 mmol/L ( @ 01:28)  Lactate, Blood: 1.6 mmol/L ( @ 04:54)  Lactate, Blood: 3.2 mmol/L ( @ 14:20)

## 2022-08-03 NOTE — PROGRESS NOTE ADULT - SUBJECTIVE AND OBJECTIVE BOX
Renal function and BUN improving today. Off pressors this morning.     Physical Exam  General: WDWN male in NAD  HEENT: Intubated, +NT tube  Cardiac: S1S2 RRR  Respiratory: Transmitted breath sounds  Abdomen: Soft, NT  Extremities: No appreciable edema  Neuro: Unresponsive

## 2022-08-03 NOTE — PROGRESS NOTE ADULT - PROBLEM SELECTOR PLAN 2
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

## 2022-08-04 LAB
ANION GAP SERPL CALC-SCNC: 11 MMOL/L — SIGNIFICANT CHANGE UP (ref 5–17)
BUN SERPL-MCNC: 100.5 MG/DL — HIGH (ref 8–20)
CALCIUM SERPL-MCNC: 8.5 MG/DL — SIGNIFICANT CHANGE UP (ref 8.4–10.5)
CHLORIDE SERPL-SCNC: 118 MMOL/L — HIGH (ref 98–107)
CO2 SERPL-SCNC: 24 MMOL/L — SIGNIFICANT CHANGE UP (ref 22–29)
CREAT SERPL-MCNC: 1 MG/DL — SIGNIFICANT CHANGE UP (ref 0.5–1.3)
EGFR: 78 ML/MIN/1.73M2 — SIGNIFICANT CHANGE UP
GLUCOSE BLDC GLUCOMTR-MCNC: 150 MG/DL — HIGH (ref 70–99)
GLUCOSE BLDC GLUCOMTR-MCNC: 166 MG/DL — HIGH (ref 70–99)
GLUCOSE BLDC GLUCOMTR-MCNC: 166 MG/DL — HIGH (ref 70–99)
GLUCOSE BLDC GLUCOMTR-MCNC: 186 MG/DL — HIGH (ref 70–99)
GLUCOSE BLDC GLUCOMTR-MCNC: 201 MG/DL — HIGH (ref 70–99)
GLUCOSE BLDC GLUCOMTR-MCNC: 234 MG/DL — HIGH (ref 70–99)
GLUCOSE SERPL-MCNC: 253 MG/DL — HIGH (ref 70–99)
HCT VFR BLD CALC: 40.8 % — SIGNIFICANT CHANGE UP (ref 39–50)
HGB BLD-MCNC: 13.4 G/DL — SIGNIFICANT CHANGE UP (ref 13–17)
MAGNESIUM SERPL-MCNC: 3 MG/DL — HIGH (ref 1.6–2.6)
MCHC RBC-ENTMCNC: 31 PG — SIGNIFICANT CHANGE UP (ref 27–34)
MCHC RBC-ENTMCNC: 32.8 GM/DL — SIGNIFICANT CHANGE UP (ref 32–36)
MCV RBC AUTO: 94.4 FL — SIGNIFICANT CHANGE UP (ref 80–100)
PHOSPHATE SERPL-MCNC: 4.5 MG/DL — SIGNIFICANT CHANGE UP (ref 2.4–4.7)
PLATELET # BLD AUTO: 278 K/UL — SIGNIFICANT CHANGE UP (ref 150–400)
POTASSIUM SERPL-MCNC: 4.2 MMOL/L — SIGNIFICANT CHANGE UP (ref 3.5–5.3)
POTASSIUM SERPL-SCNC: 4.2 MMOL/L — SIGNIFICANT CHANGE UP (ref 3.5–5.3)
RBC # BLD: 4.32 M/UL — SIGNIFICANT CHANGE UP (ref 4.2–5.8)
RBC # FLD: 16 % — HIGH (ref 10.3–14.5)
SARS-COV-2 RNA SPEC QL NAA+PROBE: SIGNIFICANT CHANGE UP
SODIUM SERPL-SCNC: 153 MMOL/L — HIGH (ref 135–145)
WBC # BLD: 17.5 K/UL — HIGH (ref 3.8–10.5)
WBC # FLD AUTO: 17.5 K/UL — HIGH (ref 3.8–10.5)

## 2022-08-04 PROCEDURE — 99232 SBSQ HOSP IP/OBS MODERATE 35: CPT

## 2022-08-04 PROCEDURE — 99233 SBSQ HOSP IP/OBS HIGH 50: CPT

## 2022-08-04 RX ORDER — METOPROLOL TARTRATE 50 MG
50 TABLET ORAL EVERY 8 HOURS
Refills: 0 | Status: DISCONTINUED | OUTPATIENT
Start: 2022-08-04 | End: 2022-08-05

## 2022-08-04 RX ORDER — NOREPINEPHRINE BITARTRATE/D5W 8 MG/250ML
0.05 PLASTIC BAG, INJECTION (ML) INTRAVENOUS
Qty: 8 | Refills: 0 | Status: DISCONTINUED | OUTPATIENT
Start: 2022-08-04 | End: 2022-08-07

## 2022-08-04 RX ORDER — HUMAN INSULIN 100 [IU]/ML
15 INJECTION, SUSPENSION SUBCUTANEOUS EVERY 6 HOURS
Refills: 0 | Status: DISCONTINUED | OUTPATIENT
Start: 2022-08-04 | End: 2022-08-06

## 2022-08-04 RX ADMIN — Medication 500 MILLIGRAM(S): at 17:22

## 2022-08-04 RX ADMIN — Medication 500 MILLIGRAM(S): at 12:22

## 2022-08-04 RX ADMIN — HUMAN INSULIN 15 UNIT(S): 100 INJECTION, SUSPENSION SUBCUTANEOUS at 12:17

## 2022-08-04 RX ADMIN — Medication 2: at 17:21

## 2022-08-04 RX ADMIN — Medication 4: at 00:46

## 2022-08-04 RX ADMIN — Medication 500 MILLIGRAM(S): at 05:07

## 2022-08-04 RX ADMIN — Medication 100 MILLIGRAM(S): at 05:28

## 2022-08-04 RX ADMIN — Medication 100 MILLIGRAM(S): at 15:33

## 2022-08-04 RX ADMIN — Medication 6: at 05:25

## 2022-08-04 RX ADMIN — FAMOTIDINE 20 MILLIGRAM(S): 10 INJECTION INTRAVENOUS at 23:56

## 2022-08-04 RX ADMIN — HUMAN INSULIN 12 UNIT(S): 100 INJECTION, SUSPENSION SUBCUTANEOUS at 00:45

## 2022-08-04 RX ADMIN — Medication 75 MILLIGRAM(S): at 05:28

## 2022-08-04 RX ADMIN — Medication 500 MILLIGRAM(S): at 00:28

## 2022-08-04 RX ADMIN — Medication 2: at 20:51

## 2022-08-04 RX ADMIN — Medication 4: at 08:36

## 2022-08-04 RX ADMIN — FAMOTIDINE 20 MILLIGRAM(S): 10 INJECTION INTRAVENOUS at 12:16

## 2022-08-04 RX ADMIN — CHLORHEXIDINE GLUCONATE 15 MILLILITER(S): 213 SOLUTION TOPICAL at 17:22

## 2022-08-04 RX ADMIN — MEROPENEM 100 MILLIGRAM(S): 1 INJECTION INTRAVENOUS at 05:27

## 2022-08-04 RX ADMIN — Medication 81 MILLIGRAM(S): at 12:15

## 2022-08-04 RX ADMIN — HUMAN INSULIN 15 UNIT(S): 100 INJECTION, SUSPENSION SUBCUTANEOUS at 23:56

## 2022-08-04 RX ADMIN — FAMOTIDINE 20 MILLIGRAM(S): 10 INJECTION INTRAVENOUS at 00:45

## 2022-08-04 RX ADMIN — MEROPENEM 100 MILLIGRAM(S): 1 INJECTION INTRAVENOUS at 21:05

## 2022-08-04 RX ADMIN — MEROPENEM 100 MILLIGRAM(S): 1 INJECTION INTRAVENOUS at 15:35

## 2022-08-04 RX ADMIN — Medication 100 MILLIGRAM(S): at 21:05

## 2022-08-04 RX ADMIN — HUMAN INSULIN 15 UNIT(S): 100 INJECTION, SUSPENSION SUBCUTANEOUS at 17:21

## 2022-08-04 RX ADMIN — Medication 50 MILLIGRAM(S): at 21:06

## 2022-08-04 RX ADMIN — HUMAN INSULIN 12 UNIT(S): 100 INJECTION, SUSPENSION SUBCUTANEOUS at 05:24

## 2022-08-04 RX ADMIN — Medication 2: at 12:16

## 2022-08-04 RX ADMIN — CHLORHEXIDINE GLUCONATE 15 MILLILITER(S): 213 SOLUTION TOPICAL at 05:07

## 2022-08-04 RX ADMIN — CHLORHEXIDINE GLUCONATE 1 APPLICATION(S): 213 SOLUTION TOPICAL at 12:16

## 2022-08-04 RX ADMIN — Medication 500 MILLIGRAM(S): at 23:56

## 2022-08-04 RX ADMIN — ENOXAPARIN SODIUM 40 MILLIGRAM(S): 100 INJECTION SUBCUTANEOUS at 20:52

## 2022-08-04 NOTE — PROGRESS NOTE ADULT - SUBJECTIVE AND OBJECTIVE BOX
Interval events:  Is and Os net +550    VITALS:  T(C): , Max: 38 (08-04-22 @ 01:00)  HR:  (80 - 103)  BP:  (96/50 - 149/56)  ABP:  (106/50 - 149/67)  RR:  (16 - 28)  SpO2:  (97% - 100%)  Wt(kg): --  Device: Avea, Mode: CPAP with PS, FiO2: 40, PEEP: 5, PS: 10, MAP: 10    08-03-22 @ 07:01  -  08-04-22 @ 07:00  --------------------------------------------------------  IN: 3400 mL / OUT: 2850 mL / NET: 550 mL    08-04-22 @ 07:01  -  08-04-22 @ 10:13  --------------------------------------------------------  IN: 450 mL / OUT: 275 mL / NET: 175 mL      LABS:  Na: 153 (08-04 @ 03:55), 152 (08-03 @ 03:51), 150 (08-02 @ 01:28), 151 (08-01 @ 15:00)  K: 4.2 (08-04 @ 03:55), 4.5 (08-03 @ 03:51), 4.8 (08-02 @ 01:28), 4.4 (08-01 @ 15:00)  Cl: 118 (08-04 @ 03:55), 117 (08-03 @ 03:51), 111 (08-02 @ 01:28), 111 (08-01 @ 15:00)  CO2: 24.0 (08-04 @ 03:55), 25.0 (08-03 @ 03:51), 28.0 (08-02 @ 01:28), 28.0 (08-01 @ 15:00)  BUN: 100.5 (08-04 @ 03:55), 119.5 (08-03 @ 03:51), 134.3 (08-02 @ 01:28), 134.0 (08-01 @ 15:00)  Cr: 1.00 (08-04 @ 03:55), 0.97 (08-03 @ 03:51), 1.13 (08-02 @ 01:28), 1.06 (08-01 @ 15:00)  Glu: 253(08-04 @ 03:55), 315(08-03 @ 03:51), 295(08-02 @ 01:28), 257(08-01 @ 15:00)    Hgb: 13.4 (08-04 @ 03:55), 14.0 (08-03 @ 03:51), 14.8 (08-02 @ 01:28), 14.4 (08-01 @ 15:00)  Hct: 40.8 (08-04 @ 03:55), 43.3 (08-03 @ 03:51), 45.0 (08-02 @ 01:28), 43.6 (08-01 @ 15:00)  WBC: 17.50 (08-04 @ 03:55), 22.79 (08-03 @ 03:51), 21.58 (08-02 @ 01:28), 17.99 (08-01 @ 15:00)  Plt: 278 (08-04 @ 03:55), 293 (08-03 @ 03:51), 337 (08-02 @ 01:28), 318 (08-01 @ 15:00)    MEDICATIONS:  aspirin  chewable 81 milliGRAM(s) Oral daily  chlorhexidine 0.12% Liquid 15 milliLiter(s) Oral Mucosa every 12 hours  chlorhexidine 2% Cloths 1 Application(s) Topical daily  dextrose 5%. 1000 milliLiter(s) IV Continuous <Continuous>  dextrose 5%. 1000 milliLiter(s) IV Continuous <Continuous>  dextrose 50% Injectable 25 Gram(s) IV Push once  dextrose Oral Gel 15 Gram(s) Oral once PRN  enoxaparin Injectable 40 milliGRAM(s) SubCutaneous every 24 hours  famotidine Injectable 20 milliGRAM(s) IV Push every 12 hours  glucagon  Injectable 1 milliGRAM(s) IntraMuscular once  insulin lispro (ADMELOG) corrective regimen sliding scale   SubCutaneous every 4 hours  insulin NPH human recombinant 12 Unit(s) SubCutaneous every 6 hours  meropenem  IVPB 1000 milliGRAM(s) IV Intermittent every 8 hours  metoprolol tartrate 75 milliGRAM(s) Oral every 8 hours  metroNIDAZOLE  IVPB 500 milliGRAM(s) IV Intermittent every 8 hours  ondansetron Injectable 4 milliGRAM(s) IV Push every 6 hours PRN  vancomycin    Solution 500 milliGRAM(s) Enteral Tube every 6 hours    EXAMINATION:  General:  in NAD  HEENT:  MMM  Neuro:  eye closed, slightly opens L eye to noxious, potentially attended once on the L when examiner opened patient's eyes, does not follow commands, PERRL, BTT on the L, + cough, moves L arm and leg spontaneously no movement on the R side   Cards:  RRR  Respiratory:  no respiratory distress  Abdomen:  soft  Extremities:  no LE edema    Assessment/Plan:   77yo man with CABG, PVD, HTN, HLD, and low EF (declined AICD), admitted 7/24 with LM1 occlusion, s/p TICI 2B MT, no tPA as wake-up stroke. Poor exam post thrombectomy, re-intubated for hypoxic respiratory failure. SHAHNAZ brain with a large L MCA stroke with small area of reperfusion hemorrhage.  Hospitalization notable for acute on chronic HFrEF, LVHF <10%, reduced RV syst function, no LV thrombus on Definity, 1st degree AV block.  Also with worsening urea 2/2 pre-renal state and administration of ACE inhibitor, with high BUN, which is likely contributing to the poor exam.    Now with C diff dx 8/1 and septic shock, on abx, shock resolved.   VEEG with rare occasional frontal sharps, mild-moderate slowing 7/29-8/2  continuing GOC discussions     Plan:  neurochecks q2hr  Off sedation, fentanyl prn   On ASA 81mg daily, statin held for abnormal LFTs    Resp- Resp failure secondary to neuro injury and heart failure   Maintain O2 > 92 %, wean FIO2 to 30 %  CPAP 10/5, 40%  CXR 8/2 wth RLL opacity, unchanged    Card- Ischemic cardiomyopathy HFrEF, EF 10%. With ectopy  Lopressor increased to 76mg q8 as per heart failure recs, avoid ACE inhibitors   IMTIAZ cancelled due to fever, unstable   EPS following, patient is currently not safe for AC given large stoke 9 days ago with hemorrhagic conversion and very high BUN. Will get a CTH on day 14 from stroke if no new hemorrhagic conversion, will start AC.   CHF consultation -recommended hydralazine for afterload reduction, will restart slowly as now patient is off pressors   On Augustin track   K>4, Mg >2  goal euvolemia, holding diuresis today as patient appears euvolemic on exam     Renal- Uremia - likely 2/2 pre- renal azothermia and contraction alkalosis (FeNa- <1- pre renal, U Cl <30, renal US no abnormalities), BUN and Cr now improving   Hold Lasix - re-evaluate daily  goal euvolemia  FWB to 300 mg q4h for hypernatremia and diarrhea     GI prophylaxis - Transaminitis- med induced vs less likely congestive hepatopathy (RUQ US 7/30 normal). With C diff  Monitor LFT  vital 60cc/hr  ( 21Kcal/kg )  pepcid; Stool softeners    ID: s/p cipro for E. Coli. MRSA neg 8/1.   now with C Diff dx 8/1, on vanc 500 mg q6h and flagyl 500 mg q8h for severe C. Diff with shock, end date 8/11  On meropenem for PNA for 7 days     Heme  Lov ppx     Endo:   NPH 15 q6h      Patient seen and examined by attending on 8/3/2022.    Patient is critically ill due to stroke and at high risk for neurological deterioration or death due to: requiring mechanical ventilation 2/2 neurologic injury, severe heart failure, C. Diff   Interval events:  Is and Os net +550    I update the son over the phone today.    VITALS:  T(C): , Max: 38 (08-04-22 @ 01:00)  HR:  (80 - 103)  BP:  (96/50 - 149/56)  ABP:  (106/50 - 149/67)  RR:  (16 - 28)  SpO2:  (97% - 100%)  Wt(kg): --  Device: Avea, Mode: CPAP with PS, FiO2: 40, PEEP: 5, PS: 10, MAP: 10    08-03-22 @ 07:01  -  08-04-22 @ 07:00  --------------------------------------------------------  IN: 3400 mL / OUT: 2850 mL / NET: 550 mL    08-04-22 @ 07:01  -  08-04-22 @ 10:13  --------------------------------------------------------  IN: 450 mL / OUT: 275 mL / NET: 175 mL      LABS:  Na: 153 (08-04 @ 03:55), 152 (08-03 @ 03:51), 150 (08-02 @ 01:28), 151 (08-01 @ 15:00)  K: 4.2 (08-04 @ 03:55), 4.5 (08-03 @ 03:51), 4.8 (08-02 @ 01:28), 4.4 (08-01 @ 15:00)  Cl: 118 (08-04 @ 03:55), 117 (08-03 @ 03:51), 111 (08-02 @ 01:28), 111 (08-01 @ 15:00)  CO2: 24.0 (08-04 @ 03:55), 25.0 (08-03 @ 03:51), 28.0 (08-02 @ 01:28), 28.0 (08-01 @ 15:00)  BUN: 100.5 (08-04 @ 03:55), 119.5 (08-03 @ 03:51), 134.3 (08-02 @ 01:28), 134.0 (08-01 @ 15:00)  Cr: 1.00 (08-04 @ 03:55), 0.97 (08-03 @ 03:51), 1.13 (08-02 @ 01:28), 1.06 (08-01 @ 15:00)  Glu: 253(08-04 @ 03:55), 315(08-03 @ 03:51), 295(08-02 @ 01:28), 257(08-01 @ 15:00)    Hgb: 13.4 (08-04 @ 03:55), 14.0 (08-03 @ 03:51), 14.8 (08-02 @ 01:28), 14.4 (08-01 @ 15:00)  Hct: 40.8 (08-04 @ 03:55), 43.3 (08-03 @ 03:51), 45.0 (08-02 @ 01:28), 43.6 (08-01 @ 15:00)  WBC: 17.50 (08-04 @ 03:55), 22.79 (08-03 @ 03:51), 21.58 (08-02 @ 01:28), 17.99 (08-01 @ 15:00)  Plt: 278 (08-04 @ 03:55), 293 (08-03 @ 03:51), 337 (08-02 @ 01:28), 318 (08-01 @ 15:00)    MEDICATIONS:  aspirin  chewable 81 milliGRAM(s) Oral daily  chlorhexidine 0.12% Liquid 15 milliLiter(s) Oral Mucosa every 12 hours  chlorhexidine 2% Cloths 1 Application(s) Topical daily  dextrose 5%. 1000 milliLiter(s) IV Continuous <Continuous>  dextrose 5%. 1000 milliLiter(s) IV Continuous <Continuous>  dextrose 50% Injectable 25 Gram(s) IV Push once  dextrose Oral Gel 15 Gram(s) Oral once PRN  enoxaparin Injectable 40 milliGRAM(s) SubCutaneous every 24 hours  famotidine Injectable 20 milliGRAM(s) IV Push every 12 hours  glucagon  Injectable 1 milliGRAM(s) IntraMuscular once  insulin lispro (ADMELOG) corrective regimen sliding scale   SubCutaneous every 4 hours  insulin NPH human recombinant 12 Unit(s) SubCutaneous every 6 hours  meropenem  IVPB 1000 milliGRAM(s) IV Intermittent every 8 hours  metoprolol tartrate 75 milliGRAM(s) Oral every 8 hours  metroNIDAZOLE  IVPB 500 milliGRAM(s) IV Intermittent every 8 hours  ondansetron Injectable 4 milliGRAM(s) IV Push every 6 hours PRN  vancomycin    Solution 500 milliGRAM(s) Enteral Tube every 6 hours    EXAMINATION:  General:  in NAD  HEENT:  MMM  Neuro:  eye closed, opens eyes to voice, briefly attends, does not follow commands, PERRL, BTT on the L, + cough, moves L arm and leg spontaneously, no movement on the R side to noxious   Cards:  irregular   Respiratory:  no respiratory distress  Abdomen:  soft  Extremities:  no LE edema    Assessment/Plan:   75yo man with CABG, PVD, HTN, HLD, and low EF (declined AICD), admitted 7/24 with LM1 occlusion, s/p TICI 2B MT, no tPA as wake-up stroke. Poor exam post thrombectomy, re-intubated for hypoxic respiratory failure. MRI brain with a large L MCA stroke with small area of reperfusion hemorrhage.  Hospitalization notable for acute on chronic HFrEF, LVHF <10%, reduced RV syst function, no LV thrombus on Definity, 1st degree AV block, with frequent PVCs.   Also with high urea 2/2 pre-renal state and administration of ACE inhibitor, which is likely contributing to the poor exam.    With C diff dx 8/1 and septic shock, on abx.  VEEG with rare occasional frontal sharps, mild-moderate slowing 7/29-8/2  continuing GOC discussions     Plan:  neurochecks q2hr  Off sedation, fentanyl prn   On ASA 81mg daily, statin held for abnormal LFTs    Resp- Resp failure secondary to neuro injury and heart failure   Maintain O2 > 92 %, wean FIO2 to 30 %  CPAP 10/5, 40%  CXR 8/2 wth RLL opacity, unchanged    Card- Ischemic cardiomyopathy HFrEF, EF 10%. With very frequent PVCs  Lopressor decreased back to 50 mg q8 due to hypotension and patient requiring Levo with no improvement in PVCs on the higher lopressor dose   avoid ACE inhibitors   IMTIAZ cancelled due to fever, unstable   EPS following, patient is currently not safe for AC given large stoke with hemorrhagic conversion and very high BUN. Will get a CTH on day 14 from stroke if no new hemorrhagic conversion, will start AC.   CHF following: recs to check daily serum lactate, LFTs, T Bili  On Augustin track   K>4, Mg >2  goal euvolemia, holding diuresis today as patient appears euvolemic on exam     Renal- Uremia - likely 2/2 pre- renal azothermia and contraction alkalosis (FeNa- <1- pre renal, U Cl <30, renal US no abnormalities), BUN and Cr now improving   Hold Lasix - re-evaluate daily  goal euvolemia  decreased FWB to 300 mg q6h for net + 1L    GI prophylaxis - Transaminitis- med induced vs less likely congestive hepatopathy (RUQ US 7/30 normal). With C diff  Monitor LFT  vital 60cc/hr  ( 21Kcal/kg )  pepcid; Stool softeners    ID: s/p cipro for E. Coli. MRSA neg 8/1.   now with C Diff dx 8/1, on vanc 500 mg q6h and flagyl 500 mg q8h for severe C. Diff with shock, end date 8/11  On meropenem for PNA for 7 days     Heme  Lov ppx     Endo:   NPH increased to 15 q6h      Patient seen and examined by attending on 8/4/2022.    Patient is critically ill due to stroke and at high risk for neurological deterioration or death due to: requiring mechanical ventilation 2/2 neurologic injury, severe heart failure, C. Diff

## 2022-08-04 NOTE — PROGRESS NOTE ADULT - ASSESSMENT
IMPRESSION:  - Left MCA Stroke.  S/P suction thrombectomy for L MCA M1 occlusion with TICI 2b reperfusion. on 7-24-22.    Mechanism ESUS  cardioembolic versus Large artery atherosclerosis    ASSESSMENT/ PLAN:     - Neuro checks and vital signs Q 2 hours.  - SBP goal keep normotensive.  - ASA 81 mg PO or 300 OK QD.   - CT Head of 7-29-22 reviewed. Stable left frontal hemorrhagic products within the infarct.    - Lipitor for LDL goal of < 70 .  - EEG -Report as above reviewed.   - CT/CTA/CTP -images and reports were reviewed.   - MRI Brain stroke protocol  -images and reports were reviewed. .  - Will need anticoagulation long term. Repeat CT head on 8-8-22 if stable can start DOAC.  - TTE  -Reports as above were reviewed. . EF < 10%.  - IMTIAZ (  postponed due to fever)  - SCD/ SQ Lovenox for DVT prophylaxis.

## 2022-08-04 NOTE — PROGRESS NOTE ADULT - SUBJECTIVE AND OBJECTIVE BOX
Remains critically ill - on mechanical ventilation. BUN continues to improve.     Physical Exam  General: WDWN male in NAD  HEENT: Intubated  Cardiac: S1S2 RRR  Respiratory: Transmitted breath sounds  Abdomen: Soft, NT  Extremities: No appreciable edema  Neuro: Unresponsive

## 2022-08-04 NOTE — PROGRESS NOTE ADULT - SUBJECTIVE AND OBJECTIVE BOX
[facility] Advanced Heart Failure  Office: [address] [place] [address]  Telephone: [phone]/Fax: [phone]    Subjective/Objective: NAEO. Pt. intubated on CPAP. BB increased yesterday, continues to have frequent PVCs. Has not required vasopressors over past 24hrs.     Medications:  aspirin  chewable 81 milliGRAM(s) Oral daily  chlorhexidine 0.12% Liquid 15 milliLiter(s) Oral Mucosa every 12 hours  chlorhexidine 2% Cloths 1 Application(s) Topical daily  dextrose 5%. 1000 milliLiter(s) IV Continuous <Continuous>  dextrose 5%. 1000 milliLiter(s) IV Continuous <Continuous>  dextrose 50% Injectable 25 Gram(s) IV Push once  dextrose Oral Gel 15 Gram(s) Oral once PRN  enoxaparin Injectable 40 milliGRAM(s) SubCutaneous every 24 hours  famotidine Injectable 20 milliGRAM(s) IV Push every 12 hours  glucagon  Injectable 1 milliGRAM(s) IntraMuscular once  insulin lispro (ADMELOG) corrective regimen sliding scale   SubCutaneous every 4 hours  insulin NPH human recombinant 12 Unit(s) SubCutaneous every 6 hours  meropenem  IVPB 1000 milliGRAM(s) IV Intermittent every 8 hours  metoprolol tartrate 75 milliGRAM(s) Oral every 8 hours  metroNIDAZOLE  IVPB 500 milliGRAM(s) IV Intermittent every 8 hours  ondansetron Injectable 4 milliGRAM(s) IV Push every 6 hours PRN  vancomycin    Solution 500 milliGRAM(s) Enteral Tube every 6 hours    Vital Signs Last 24 Hours  T(C): 37.4 (22 @ 09:00), Max: 38 (22 @ 01:00)  HR: 92 (22 @ 09:00) (80 - 103)  BP: 100/58 (22 @ 09:00) (96/50 - 149/56)  RR: 24 (22 @ 09:00) (16 - 28)  SpO2: 97% (22 @ 09:00) (97% - 100%)    Weight in k.8 ( @ 23:19)    I&O's Summary    03 Aug 2022 07:01  -  04 Aug 2022 07:00  --------------------------------------------------------  IN: 3400 mL / OUT: 2850 mL / NET: 550 mL    04 Aug 2022 07:01  -  04 Aug 2022 09:58  --------------------------------------------------------  IN: 450 mL / OUT: 0 mL / NET: 450 mL    Tele: SR, PVCs, Bigeminy     Physical Exam:  General: Intubated, in no distress  Neck: Neck supple. JVP not elevated.   Chest: Anterior rhonchi  CV: RRR. Normal S1 and S2. No murmurs, rub, or gallops. Lukewarm peripherally.  PV: No edema noted. Pulses full/equal in all four extremities.  Abdomen: Soft, non-distended  Skin: warm, no rash   Neurology: Moves LLE to noxious stimuli    Labs:                        13.4   17.50 )-----------( 278      ( 04 Aug 2022 03:55 )             40.8       153<H>  |  118<H>  |  100.5<H>  ----------------------------<  253<H>  4.2   |  24.0  |  1.00    Ca    8.5      04 Aug 2022 03:55  Phos  4.5       Mg     3.0         Serum Pro-Brain Natriuretic Peptide: 1446 pg/mL ( @ 09:00)  Serum Pro-Brain Natriuretic Peptide: 1768 pg/mL ( @ 13:30)    Lactate, Blood: 1.6 mmol/L ( @ 01:28)

## 2022-08-04 NOTE — PROGRESS NOTE ADULT - PROBLEM SELECTOR PLAN 1
- known ICMP LVEF ~25%  - TTE 7/24 showed LVEF <10% with RV dysfunction  - Clinically appears euvolemic on exam with lukewarm extremities  - Goal is to maintain net even fluid status, may use Lasix PRN however pt does not currently require. He is making adequate UOP and also losing fluid from rectal tube.  - Would avoid using inotropes as this will increase ectopy burden, pt already w/ frequent multifocal PVCs/couplets  - GDMT: On Lopressor 75mg TID (will eventually plan to switch to metoprolol succinate).   BP low normal at this time. Will ultimately benefit from medical optimization including ARB or ARNi and MRA as tolerated.  - Please check markers of perfusion daily (serum lactate, LFTs, T Bili)  - Document strict I&O  - Device: previously declined ICD per reports. Not candidate for CRT device as he does not have LBBB.

## 2022-08-04 NOTE — PROGRESS NOTE ADULT - SUBJECTIVE AND OBJECTIVE BOX
Neurology   NATANAEL ROWAN 76y Male       HPI:  76M with PMH CABG, HTN, HLD who [place] [facility]. Last known well was last night 10pm prior to going to bed, woke up this morning around 0500 with R sided weakness and R facial droop. [place] [facility] initiated, NIH at [facility] reportedly 8. CTA showed LM1 occlusion. Transferred to [facility] for possible neuro IR intervention. On arrival to [facility], NIH 6. Decision made to take patient directly to neuro IR for intervention.     Initial NIH 6, mRS 0    PMH: HTN (hypertension)    HLD (hyperlipidemia)    S/P CABG x 1         PSH:       FAMILY HISTORY:      SOCIAL HISTORY:  No history of tobacco or alcohol use     Allergies    No Known Allergies    Intolerances        Vital Signs Last 24 Hrs  T(C): 36.2 (04 Aug 2022 04:00), Max: 38 (04 Aug 2022 01:00)  T(F): 97.2 (04 Aug 2022 04:00), Max: 100.4 (04 Aug 2022 01:00)  HR: 93 (04 Aug 2022 04:00) (73 - 103)  BP: 104/72 (04 Aug 2022 04:00) (96/50 - 149/56)  BP(mean): 79 (04 Aug 2022 04:00) (62 - 121)  RR: 16 (04 Aug 2022 04:00) (16 - 28)  SpO2: 100% (04 Aug 2022 04:00) (97% - 100%)    Parameters below as of 04 Aug 2022 04:00  Patient On (Oxygen Delivery Method): ventilator      MEDICATIONS    aspirin  chewable 81 milliGRAM(s) Oral daily  chlorhexidine 0.12% Liquid 15 milliLiter(s) Oral Mucosa every 12 hours  chlorhexidine 2% Cloths 1 Application(s) Topical daily  dextrose 5%. 1000 milliLiter(s) IV Continuous <Continuous>  dextrose 5%. 1000 milliLiter(s) IV Continuous <Continuous>  dextrose 50% Injectable 25 Gram(s) IV Push once  dextrose Oral Gel 15 Gram(s) Oral once PRN  enoxaparin Injectable 40 milliGRAM(s) SubCutaneous every 24 hours  famotidine Injectable 20 milliGRAM(s) IV Push every 12 hours  glucagon  Injectable 1 milliGRAM(s) IntraMuscular once  insulin lispro (ADMELOG) corrective regimen sliding scale   SubCutaneous every 4 hours  insulin NPH human recombinant 12 Unit(s) SubCutaneous every 6 hours  meropenem  IVPB 1000 milliGRAM(s) IV Intermittent every 8 hours  metoprolol tartrate 75 milliGRAM(s) Oral every 8 hours  metroNIDAZOLE  IVPB 500 milliGRAM(s) IV Intermittent every 8 hours  ondansetron Injectable 4 milliGRAM(s) IV Push every 6 hours PRN  vancomycin    Solution 500 milliGRAM(s) Enteral Tube every 6 hours         LABS:  CBC Full  -  ( 04 Aug 2022 03:55 )  WBC Count : 17.50 K/uL  RBC Count : 4.32 M/uL  Hemoglobin : 13.4 g/dL  Hematocrit : 40.8 %  Platelet Count - Automated : 278 K/uL  Mean Cell Volume : 94.4 fl  Mean Cell Hemoglobin : 31.0 pg  Mean Cell Hemoglobin Concentration : 32.8 gm/dL  Auto Neutrophil # : x  Auto Lymphocyte # : x  Auto Monocyte # : x  Auto Eosinophil # : x  Auto Basophil # : x  Auto Neutrophil % : x  Auto Lymphocyte % : x  Auto Monocyte % : x  Auto Eosinophil % : x  Auto Basophil % : x    Urinalysis Basic - ( 02 Aug 2022 08:40 )    Color: Yellow / Appearance: Slightly Turbid / S.015 / pH: x  Gluc: x / Ketone: Negative  / Bili: Negative / Urobili: Negative mg/dL   Blood: x / Protein: 15 / Nitrite: Negative   Leuk Esterase: Negative / RBC: 11-25 /HPF / WBC 0-2 /HPF   Sq Epi: x / Non Sq Epi: Occasional / Bacteria: Few      08-04    153<H>  |  118<H>  |  100.5<H>  ----------------------------<  253<H>  4.2   |  24.0  |  1.00    Ca    8.5      04 Aug 2022 03:55  Phos  4.5     08-04  Mg     3.0     08-04    TPro  6.6  /  Alb  2.6<L>  /  TBili  0.4  /  DBili  0.2  /  AST  416<H>  /  ALT  425<H>  /  AlkPhos  310<H>  08    LIVER FUNCTIONS - ( 02 Aug 2022 08:40 )  Alb: 2.6 g/dL / Pro: 6.6 g/dL / ALK PHOS: 310 U/L / ALT: 425 U/L / AST: 416 U/L / GGT: x           Hemoglobin A1C:     On Neurological Examination:  Patient is intubated off sedation    Mental Status -   There is  Eye opening to voice and at times spontaneous eye opening seen  Cranial Nerves -Pupils are 2 and reactive. , EOMs are conjugate in primary gaze and on occulocephalics.   Cough reflex is present .  Motor Exam -   Right side trace movement. Left UE localizes to noxious stim. LLE moved spontaneously.         RADIOLOGY ( All neurological imaging studies were independently reviewed and interpreted by me)  CT   CTA  CTP    IMPRESSION:    CT PERFUSION:  The CT perfusion is technically limited by truncation of the arterial input function and venous outflow function.    Core infarct (CBF < 30%:): 0 ml  Penumbra (Tmax >6s): 4 mL  Mismatched volume: 0 ml  Mismatched ratio: None    Interpretation:  Based on CBF < 30%, there is no evidence of a core infarct. At CBF < 34%, a small perfusion defect of 4 mL is located in the left inferior frontal gyrus and corresponds to matched perfusion abnormality of Tmax >6 seconds. On Tmax > 4s, there is a much larger perfusion defect throughout the majority of the left frontoparietal convexity, which may represent an ischemic penumbra in the left MCA vascular territory.        CT ANGIOGRAPHY NECK:  1. Poor opacification of the cervical vasculature.  2. Suspicion for moderate greater than 50% stenosis in the proximal right internal carotid artery. Suspicion for moderate to severe stenosis in the proximal left internal carotid artery that may be greater than 70%. No evidence of ICA occlusion in the neck.  3. The right vertebral artery is grossly patent.. The V1 segment of the left vertebral artery cannot be visualized. The V2 and V3 segments the left vertebral artery are grossly patent with multiple stenoses.  4. There is limited visualization of the common carotid artery origins and subclavian artery origins. Underlying stenoses cannot be excluded. There is suspicion for a severe stenosis in the proximal left subclavian artery.      CT ANGIOGRAPHY BRAIN:  1. Poor opacification of the intracranial vasculature.  2. There appears to be a focal filling defect at the left MCA bifurcation with distal flow. There is marked attenuation of M2 branches of the left middle cerebral artery. Vessel density analysis of the MCA territory shows a greater than 50% reduction of vessel density in the left MCA territory compared to the contralateral side.  3. There are mild to moderate stenoses in the supraclinoid segments of the internal carotid arteries bilaterally, without occlusion.  4. There are stenoses in the intradural vertebral arteries bilaterally, without occlusion.    Repeat CTA or MRI/MRA is recommended for further evaluation.  Peak arterial opacification on the CT perfusion occurs at approximately 38 seconds. If the CTA is repeated, a long delay is required after contrast injection to achieve adequate opacification of the vasculature.    The findings were discussed with JACK Light in the emergency room on 2022 at 5:45 AM. Read back verification was obtained.      SEVERO CHANEL MD; Attending Radiologist  This document has been electronically signed    MRI:    MR Head No Cont (22 @ 20:58) >  IMPRESSION:    Acute left MCA territory infarcts with hemorrhagic transformation,   grossly stable since comparison CT.    Additional punctate acute infarct involving the medial left frontal lobe   in the SUGEY territory.    ORION ANDRADE MD; Attending Radiologist        TTE  TTE Echo Complete w/ Contrast w/ Doppler (22 @ 14:01) >    Summary:   1. Endocardial visualization was enhanced with intravenous echo contrast.   2. Severely enlarged left atrium.   3. Global diffuse akinesis. Left ventricular ejection fraction, by   visual estimation, is <10%.   4. Elevated mean left atrial pressure. (>30 mm Hg)   5. Normal right atrial size.   6. Mildly enlarged right ventricle. Moderately reduced RV systolic   function.   7. Mild mitral valve regurgitation.   8. Mild tricuspid regurgitation.   9. Estimated pulmonary artery systolic pressure is 42 mmHg - mild   pulmonary hypertension.  10. There is no evidence of pericardial effusion.  11. Team informed of the findings.    MD Millie, RPVI Electronically signed on 2022 at 3:49:14 PM      < from: CT Head No Cont (22 @ 12:47) >    IMPRESSION:  Aging inferior left frontal and inferolateral left parietal infarcts.   Mild stable cortical blood products associated with the frontal infarct.    VERNELL LOYA MD; Attending Radiologist          EEG IMPRESSION/CLINICAL CORRELATE 22    Abnormal EEG study.  1. Potential epileptogenic foci in the bilateral frontotemporal regions.   2. Structural abnormality and cortical dysfunction in the left frontotemporal region.   3. Mild to moderate nonspecific diffuse or multifocal cerebral dysfunction.   4. No seizure seen.     _____________________________________________________________    Mayi Prather MD  Director, Epilepsy/EMU - [facility]       Electronic Signatures:  Mayi Prather (MD)  (Signed 2022 09:06)EEG IMPRESSION/CLINICAL CORRELATE

## 2022-08-04 NOTE — PROGRESS NOTE ADULT - ASSESSMENT
The patient is a 76 year old male with past medical history of HTN, HLD, CAD s/p CABG, carotid stenosis, and PVD who was transferred from an outside hospital for neuro IR intervention after waking up with R sided facial droop and R sided weakness; found to have left M1 occlusion on CTA s/p thrombectomy which is complicated by hemorrhagic conversion. Hospital course notable for acute hypoxic respiratory failure post procedure s/p mechanical ventilation. Also with ischemic cardiomyopathy, LVEF<10% and moderately reduced RV function. Also with E coli UTI, KE, C. diff and septic shock. Nephrology is managing KE.    -Baseline creatinine ranges 0.6-0.7mg/dL   -Notable for acute kidney injury starting on 07/30/2022; creatinine peaked at 1.2mg/dL, lately ranging 0.9-1.0mg/dL    -Suspecting prerenal etiology with ? progression to ATN; lately improving (see above)   -UA (prior to onset of KE) showed specific gravity 1.025, +protein, +nitrite, +leukocyte esterase, moderate blood, 6-10RBC, 26-50 WBC, TNTC bacteria  -Urine culture positive for E coli   -R abdominal U/S on 07/30/2022 showed R kidney 10.4cm with simple cyst without hydronephrosis    -Persistently hypernatremic; on free water flushes - recommend addition of D5W but will defer to primary team (D5W will predominately translocate intracellularly)  -Favor holding diuretics at this time   -No acute indication for renal replacement therapy at this time    Kwame West, DO  Nephrology

## 2022-08-04 NOTE — PROGRESS NOTE ADULT - PROBLEM SELECTOR PLAN 2
- Likely related to ICMP  - Lopressor increased to 75mg TID  - Would avoid aggressive uptitration of BB due to significant bi-ventricular heart failure

## 2022-08-04 NOTE — PROGRESS NOTE ADULT - ASSESSMENT
77 y/o with h/o chronic systolic HF ACC/AHA stage C, ICMP LVEF 22, CAD s/p PCI ’04 CABG ‘14, carotid stenosis, declined ICD, PVD, HTN, DLP who was transferred from an OSH for neuroIR intervention after waking up with R sided facial droop and R sided weakness. He was found to have left M1 occlusion on CTA and required thrombectomy on 7/24/22. His hospital course has been complicated by acute hypoxic respiratory failure post procedure requiring mechanical ventilation, hypotension requiring temporary pressors, frequent PVCs, E. coli UTI, KE and fevers. He had a TTE that showed LVEF 19%, LVIDd 6.79cm, LVOT VTI 10.3cm, moderately RV dysfunction and RVSP 42mmHg (RAP 3mmHg).   The patient remains intubated. His BP is normotensive. Clinically looks euvolemic, lukewarm extremities. His lactate peaked at 3.2 but has now normalized. He has significant leukocytosis and down-trending transaminitis. He was found to have c.diff and was started on vanco/flagyl. His CxR suggests a new infiltrate in the RML/RLL. He is on free water flushes for hypernatremia.     Pertinent Studies:    TTE 7/24/22: LVEF <10%, LVIDd 6.79cm, mild RVE with moderately reduced systolic function, grade III DD, mild MR, mild TR, PASP 41.9 mmHg (RAP 3), LVOT VTI 10.3, small PFO with left to right shunting.     CTA neck showed moderate stenosis in the proximal right internal carotid artery and moderate to severe stenosis in proximal left internal carotid artery. There was no evidence of ICA occlusion in the neck.

## 2022-08-05 LAB
ALBUMIN SERPL ELPH-MCNC: 1.8 G/DL — LOW (ref 3.3–5.2)
ALP SERPL-CCNC: 232 U/L — HIGH (ref 40–120)
ALT FLD-CCNC: 247 U/L — HIGH
ANION GAP SERPL CALC-SCNC: 10 MMOL/L — SIGNIFICANT CHANGE UP (ref 5–17)
ANION GAP SERPL CALC-SCNC: 10 MMOL/L — SIGNIFICANT CHANGE UP (ref 5–17)
ANION GAP SERPL CALC-SCNC: 11 MMOL/L — SIGNIFICANT CHANGE UP (ref 5–17)
ANION GAP SERPL CALC-SCNC: 12 MMOL/L — SIGNIFICANT CHANGE UP (ref 5–17)
AST SERPL-CCNC: 171 U/L — HIGH
BILIRUB SERPL-MCNC: 0.3 MG/DL — LOW (ref 0.4–2)
BUN SERPL-MCNC: 86.8 MG/DL — HIGH (ref 8–20)
BUN SERPL-MCNC: 86.9 MG/DL — HIGH (ref 8–20)
BUN SERPL-MCNC: 89.3 MG/DL — HIGH (ref 8–20)
BUN SERPL-MCNC: 89.9 MG/DL — HIGH (ref 8–20)
CALCIUM SERPL-MCNC: 8.8 MG/DL — SIGNIFICANT CHANGE UP (ref 8.4–10.5)
CALCIUM SERPL-MCNC: 8.9 MG/DL — SIGNIFICANT CHANGE UP (ref 8.4–10.5)
CALCIUM SERPL-MCNC: 9.1 MG/DL — SIGNIFICANT CHANGE UP (ref 8.4–10.5)
CALCIUM SERPL-MCNC: 9.3 MG/DL — SIGNIFICANT CHANGE UP (ref 8.4–10.5)
CHLORIDE SERPL-SCNC: 120 MMOL/L — HIGH (ref 98–107)
CHLORIDE SERPL-SCNC: 121 MMOL/L — HIGH (ref 98–107)
CHLORIDE SERPL-SCNC: 124 MMOL/L — HIGH (ref 98–107)
CHLORIDE SERPL-SCNC: 124 MMOL/L — HIGH (ref 98–107)
CK MB CFR SERPL CALC: 5 NG/ML — SIGNIFICANT CHANGE UP (ref 0–6.7)
CO2 SERPL-SCNC: 23 MMOL/L — SIGNIFICANT CHANGE UP (ref 22–29)
CO2 SERPL-SCNC: 24 MMOL/L — SIGNIFICANT CHANGE UP (ref 22–29)
CO2 SERPL-SCNC: 25 MMOL/L — SIGNIFICANT CHANGE UP (ref 22–29)
CO2 SERPL-SCNC: 25 MMOL/L — SIGNIFICANT CHANGE UP (ref 22–29)
CREAT SERPL-MCNC: 0.98 MG/DL — SIGNIFICANT CHANGE UP (ref 0.5–1.3)
CREAT SERPL-MCNC: 1.18 MG/DL — SIGNIFICANT CHANGE UP (ref 0.5–1.3)
CREAT SERPL-MCNC: 1.36 MG/DL — HIGH (ref 0.5–1.3)
CREAT SERPL-MCNC: 1.45 MG/DL — HIGH (ref 0.5–1.3)
EGFR: 50 ML/MIN/1.73M2 — LOW
EGFR: 54 ML/MIN/1.73M2 — LOW
EGFR: 64 ML/MIN/1.73M2 — SIGNIFICANT CHANGE UP
EGFR: 80 ML/MIN/1.73M2 — SIGNIFICANT CHANGE UP
GAS PNL BLDA: SIGNIFICANT CHANGE UP
GLUCOSE BLDC GLUCOMTR-MCNC: 182 MG/DL — HIGH (ref 70–99)
GLUCOSE BLDC GLUCOMTR-MCNC: 201 MG/DL — HIGH (ref 70–99)
GLUCOSE BLDC GLUCOMTR-MCNC: 203 MG/DL — HIGH (ref 70–99)
GLUCOSE BLDC GLUCOMTR-MCNC: 203 MG/DL — HIGH (ref 70–99)
GLUCOSE BLDC GLUCOMTR-MCNC: 211 MG/DL — HIGH (ref 70–99)
GLUCOSE SERPL-MCNC: 215 MG/DL — HIGH (ref 70–99)
GLUCOSE SERPL-MCNC: 236 MG/DL — HIGH (ref 70–99)
GLUCOSE SERPL-MCNC: 243 MG/DL — HIGH (ref 70–99)
GLUCOSE SERPL-MCNC: 246 MG/DL — HIGH (ref 70–99)
HCT VFR BLD CALC: 38.2 % — LOW (ref 39–50)
HCT VFR BLD CALC: 41.5 % — SIGNIFICANT CHANGE UP (ref 39–50)
HGB BLD-MCNC: 12.4 G/DL — LOW (ref 13–17)
HGB BLD-MCNC: 13.5 G/DL — SIGNIFICANT CHANGE UP (ref 13–17)
LACTATE SERPL-SCNC: 1.2 MMOL/L — SIGNIFICANT CHANGE UP (ref 0.5–2)
LACTATE SERPL-SCNC: 1.2 MMOL/L — SIGNIFICANT CHANGE UP (ref 0.5–2)
MAGNESIUM SERPL-MCNC: 2.4 MG/DL — SIGNIFICANT CHANGE UP (ref 1.6–2.6)
MAGNESIUM SERPL-MCNC: 2.7 MG/DL — HIGH (ref 1.6–2.6)
MCHC RBC-ENTMCNC: 30.8 PG — SIGNIFICANT CHANGE UP (ref 27–34)
MCHC RBC-ENTMCNC: 30.9 PG — SIGNIFICANT CHANGE UP (ref 27–34)
MCHC RBC-ENTMCNC: 32.5 GM/DL — SIGNIFICANT CHANGE UP (ref 32–36)
MCHC RBC-ENTMCNC: 32.5 GM/DL — SIGNIFICANT CHANGE UP (ref 32–36)
MCV RBC AUTO: 95 FL — SIGNIFICANT CHANGE UP (ref 80–100)
MCV RBC AUTO: 95 FL — SIGNIFICANT CHANGE UP (ref 80–100)
NT-PROBNP SERPL-SCNC: 2903 PG/ML — HIGH (ref 0–300)
OSMOLALITY UR: 530 MOSM/KG — SIGNIFICANT CHANGE UP (ref 300–1000)
PHOSPHATE SERPL-MCNC: 4.8 MG/DL — HIGH (ref 2.4–4.7)
PHOSPHATE SERPL-MCNC: 5.1 MG/DL — HIGH (ref 2.4–4.7)
PLATELET # BLD AUTO: 313 K/UL — SIGNIFICANT CHANGE UP (ref 150–400)
PLATELET # BLD AUTO: 315 K/UL — SIGNIFICANT CHANGE UP (ref 150–400)
POTASSIUM SERPL-MCNC: 4.2 MMOL/L — SIGNIFICANT CHANGE UP (ref 3.5–5.3)
POTASSIUM SERPL-MCNC: 4.4 MMOL/L — SIGNIFICANT CHANGE UP (ref 3.5–5.3)
POTASSIUM SERPL-MCNC: 4.4 MMOL/L — SIGNIFICANT CHANGE UP (ref 3.5–5.3)
POTASSIUM SERPL-MCNC: 4.6 MMOL/L — SIGNIFICANT CHANGE UP (ref 3.5–5.3)
POTASSIUM SERPL-SCNC: 4.2 MMOL/L — SIGNIFICANT CHANGE UP (ref 3.5–5.3)
POTASSIUM SERPL-SCNC: 4.4 MMOL/L — SIGNIFICANT CHANGE UP (ref 3.5–5.3)
POTASSIUM SERPL-SCNC: 4.4 MMOL/L — SIGNIFICANT CHANGE UP (ref 3.5–5.3)
POTASSIUM SERPL-SCNC: 4.6 MMOL/L — SIGNIFICANT CHANGE UP (ref 3.5–5.3)
PROT SERPL-MCNC: 5.3 G/DL — LOW (ref 6.6–8.7)
RBC # BLD: 4.02 M/UL — LOW (ref 4.2–5.8)
RBC # BLD: 4.37 M/UL — SIGNIFICANT CHANGE UP (ref 4.2–5.8)
RBC # FLD: 16.1 % — HIGH (ref 10.3–14.5)
RBC # FLD: 16.2 % — HIGH (ref 10.3–14.5)
SODIUM SERPL-SCNC: 154 MMOL/L — HIGH (ref 135–145)
SODIUM SERPL-SCNC: 155 MMOL/L — HIGH (ref 135–145)
SODIUM SERPL-SCNC: 159 MMOL/L — HIGH (ref 135–145)
SODIUM SERPL-SCNC: 160 MMOL/L — CRITICAL HIGH (ref 135–145)
TROPONIN T SERPL-MCNC: 0.06 NG/ML — SIGNIFICANT CHANGE UP (ref 0–0.06)
WBC # BLD: 16.92 K/UL — HIGH (ref 3.8–10.5)
WBC # BLD: 17.97 K/UL — HIGH (ref 3.8–10.5)
WBC # FLD AUTO: 16.92 K/UL — HIGH (ref 3.8–10.5)
WBC # FLD AUTO: 17.97 K/UL — HIGH (ref 3.8–10.5)

## 2022-08-05 PROCEDURE — 99233 SBSQ HOSP IP/OBS HIGH 50: CPT

## 2022-08-05 PROCEDURE — 99232 SBSQ HOSP IP/OBS MODERATE 35: CPT

## 2022-08-05 RX ORDER — SODIUM CHLORIDE 9 MG/ML
1000 INJECTION, SOLUTION INTRAVENOUS
Refills: 0 | Status: DISCONTINUED | OUTPATIENT
Start: 2022-08-05 | End: 2022-08-05

## 2022-08-05 RX ORDER — ALBUMIN HUMAN 25 %
250 VIAL (ML) INTRAVENOUS ONCE
Refills: 0 | Status: COMPLETED | OUTPATIENT
Start: 2022-08-05 | End: 2022-08-05

## 2022-08-05 RX ORDER — FENTANYL CITRATE 50 UG/ML
25 INJECTION INTRAVENOUS EVERY 4 HOURS
Refills: 0 | Status: DISCONTINUED | OUTPATIENT
Start: 2022-08-05 | End: 2022-08-10

## 2022-08-05 RX ORDER — METOPROLOL TARTRATE 50 MG
50 TABLET ORAL EVERY 8 HOURS
Refills: 0 | Status: DISCONTINUED | OUTPATIENT
Start: 2022-08-05 | End: 2022-08-25

## 2022-08-05 RX ORDER — SODIUM CHLORIDE 9 MG/ML
1000 INJECTION, SOLUTION INTRAVENOUS
Refills: 0 | Status: DISCONTINUED | OUTPATIENT
Start: 2022-08-05 | End: 2022-08-07

## 2022-08-05 RX ORDER — MEROPENEM 1 G/30ML
1000 INJECTION INTRAVENOUS EVERY 8 HOURS
Refills: 0 | Status: DISCONTINUED | OUTPATIENT
Start: 2022-08-05 | End: 2022-08-06

## 2022-08-05 RX ADMIN — ENOXAPARIN SODIUM 40 MILLIGRAM(S): 100 INJECTION SUBCUTANEOUS at 21:07

## 2022-08-05 RX ADMIN — Medication 500 MILLIGRAM(S): at 05:04

## 2022-08-05 RX ADMIN — Medication 2: at 04:10

## 2022-08-05 RX ADMIN — CHLORHEXIDINE GLUCONATE 15 MILLILITER(S): 213 SOLUTION TOPICAL at 05:04

## 2022-08-05 RX ADMIN — Medication 4: at 08:44

## 2022-08-05 RX ADMIN — MEROPENEM 100 MILLIGRAM(S): 1 INJECTION INTRAVENOUS at 21:09

## 2022-08-05 RX ADMIN — HUMAN INSULIN 15 UNIT(S): 100 INJECTION, SUSPENSION SUBCUTANEOUS at 06:16

## 2022-08-05 RX ADMIN — MEROPENEM 100 MILLIGRAM(S): 1 INJECTION INTRAVENOUS at 05:03

## 2022-08-05 RX ADMIN — Medication 50 MILLIGRAM(S): at 21:07

## 2022-08-05 RX ADMIN — Medication 4: at 12:11

## 2022-08-05 RX ADMIN — Medication 100 MILLIGRAM(S): at 21:08

## 2022-08-05 RX ADMIN — Medication 125 MILLILITER(S): at 23:50

## 2022-08-05 RX ADMIN — Medication 4: at 23:44

## 2022-08-05 RX ADMIN — Medication 50 MILLIGRAM(S): at 13:31

## 2022-08-05 RX ADMIN — Medication 50 MILLIGRAM(S): at 05:02

## 2022-08-05 RX ADMIN — FENTANYL CITRATE 25 MICROGRAM(S): 50 INJECTION INTRAVENOUS at 08:40

## 2022-08-05 RX ADMIN — Medication 100 MILLIGRAM(S): at 05:03

## 2022-08-05 RX ADMIN — Medication 100 MILLIGRAM(S): at 13:30

## 2022-08-05 RX ADMIN — HUMAN INSULIN 15 UNIT(S): 100 INJECTION, SUSPENSION SUBCUTANEOUS at 12:12

## 2022-08-05 RX ADMIN — Medication 4: at 17:39

## 2022-08-05 RX ADMIN — Medication 500 MILLIGRAM(S): at 17:41

## 2022-08-05 RX ADMIN — Medication 4: at 19:52

## 2022-08-05 RX ADMIN — FENTANYL CITRATE 25 MICROGRAM(S): 50 INJECTION INTRAVENOUS at 08:55

## 2022-08-05 RX ADMIN — Medication 81 MILLIGRAM(S): at 11:34

## 2022-08-05 RX ADMIN — FAMOTIDINE 20 MILLIGRAM(S): 10 INJECTION INTRAVENOUS at 11:33

## 2022-08-05 RX ADMIN — CHLORHEXIDINE GLUCONATE 15 MILLILITER(S): 213 SOLUTION TOPICAL at 17:41

## 2022-08-05 RX ADMIN — HUMAN INSULIN 15 UNIT(S): 100 INJECTION, SUSPENSION SUBCUTANEOUS at 17:40

## 2022-08-05 RX ADMIN — MEROPENEM 100 MILLIGRAM(S): 1 INJECTION INTRAVENOUS at 13:30

## 2022-08-05 RX ADMIN — Medication 500 MILLIGRAM(S): at 11:34

## 2022-08-05 RX ADMIN — CHLORHEXIDINE GLUCONATE 1 APPLICATION(S): 213 SOLUTION TOPICAL at 11:34

## 2022-08-05 NOTE — PROCEDURE NOTE - NSPROCDETAILS_GEN_ALL_CORE
location identified, draped/prepped, sterile technique used, needle inserted/introduced/positive blood return obtained via catheter/connected to a pressurized flush line/sutured in place/hemostasis with direct pressure, dressing applied/Seldinger technique/all materials/supplies accounted for at end of procedure
patient pre-oxygenated, tube inserted, placement confirmed
location identified, draped/prepped, sterile technique used/sterile dressing applied/sterile technique, catheter placed/supine position/ultrasound guidance
location identified, draped/prepped, sterile technique used, needle inserted/introduced/positive blood return obtained via catheter/connected to a pressurized flush line/sutured in place/hemostasis with direct pressure, dressing applied/Seldinger technique/all materials/supplies accounted for at end of procedure

## 2022-08-05 NOTE — PROGRESS NOTE ADULT - SUBJECTIVE AND OBJECTIVE BOX
Remains critically ill - on mechanical ventilation. Tmax 100.6F. Polyuric with 3005cc urine output over the past 24 hours. Sodium 160. Glucose 243. BUN continues to improve.    Physical Exam  General: WDWN male in NAD  HEENT: Intubated  Cardiac: S1S2 RRR  Respiratory: Transmitted breath sounds  Abdomen: Soft, NT  Extremities: No appreciable edema  Neuro: Responsive to painful stimuli

## 2022-08-05 NOTE — PROGRESS NOTE ADULT - SUBJECTIVE AND OBJECTIVE BOX
[facility] Advanced Heart Failure  Office: [address] [place] [address]  Telephone: [phone]/Fax: [phone]    Subjective: Pt required low dose levophed infusion overnight for goal MAP >65 and Lopressor was decreased from 75mg TID to 50mg TID. Continues to have low grade fevers and hypernatremia on D5 infusion @ 50cc/hr and free water flushes.    Medications:  aspirin  chewable 81 milliGRAM(s) Oral daily  chlorhexidine 0.12% Liquid 15 milliLiter(s) Oral Mucosa every 12 hours  chlorhexidine 2% Cloths 1 Application(s) Topical daily  dextrose 5%. 1000 milliLiter(s) IV Continuous <Continuous>  dextrose 5%. 1000 milliLiter(s) IV Continuous <Continuous>  dextrose 5%. 1000 milliLiter(s) IV Continuous <Continuous>  dextrose 50% Injectable 25 Gram(s) IV Push once  dextrose Oral Gel 15 Gram(s) Oral once PRN  enoxaparin Injectable 40 milliGRAM(s) SubCutaneous every 24 hours  famotidine Injectable 20 milliGRAM(s) IV Push every 12 hours  fentaNYL    Injectable 25 MICROGram(s) IV Push every 4 hours PRN  glucagon  Injectable 1 milliGRAM(s) IntraMuscular once  insulin lispro (ADMELOG) corrective regimen sliding scale   SubCutaneous every 4 hours  insulin NPH human recombinant 15 Unit(s) SubCutaneous every 6 hours  meropenem  IVPB 1000 milliGRAM(s) IV Intermittent every 8 hours  metoprolol tartrate 50 milliGRAM(s) Oral every 8 hours  metroNIDAZOLE  IVPB 500 milliGRAM(s) IV Intermittent every 8 hours  norepinephrine Infusion 0.05 MICROgram(s)/kG/Min IV Continuous <Continuous>  ondansetron Injectable 4 milliGRAM(s) IV Push every 6 hours PRN  vancomycin    Solution 500 milliGRAM(s) Enteral Tube every 6 hours    Tele: HR 's with frequent PVC's    Vitals:  T(C): 37.7 (08-05-22 @ 12:00), Max: 38.2 (08-05-22 @ 08:45)  HR: 98 (08-05-22 @ 12:48) (75 - 105)  BP: 126/90 (08-05-22 @ 08:30) (103/53 - 148/79)  BP(mean): 102 (08-05-22 @ 08:30) (65 - 106)  RR: 24 (08-05-22 @ 12:00) (18 - 28)  SpO2: 100% (08-05-22 @ 12:48) (96% - 100%)    I&O's Summary    04 Aug 2022 07:01  -  05 Aug 2022 07:00  --------------------------------------------------------  IN: 3122.8 mL / OUT: 3305 mL / NET: -182.2 mL    05 Aug 2022 07:01  -  05 Aug 2022 13:32  --------------------------------------------------------  IN: 1080 mL / OUT: 550 mL / NET: 530 mL    Physical Exam:  General: Intubated, off sedation. Responds to physical stimuli with +LLE spontaneous nonpurposeful movement.  Neck: Neck supple. JVP does not appear elevated.  Chest: Course anterior breath sounds, clear posteriorly  CV: RRR. Normal S1 and S2. No murmurs, rub, or gallops. Lukewarm peripherally.  PV: No edema noted. Pulses full/equal in all four extremities.  Abdomen: Soft, non-distended  Skin: warm, no rash   Neurology: Off sedation, eyes opening spontaneously. Moves LLE to noxious stimuli    Labs:                        13.5   16.92 )-----------( 313      ( 05 Aug 2022 02:45 )             41.5     08-05    159<H>  |  124<H>  |  86.8<H>  ----------------------------<  236<H>  4.6   |  25.0  |  1.18    Ca    9.1      05 Aug 2022 11:16  Phos  5.1     08-05  Mg     2.7     08-05    Serum Pro-Brain Natriuretic Peptide: 2903 pg/mL (08-05 @ 11:16)  Serum Pro-Brain Natriuretic Peptide: 1446 pg/mL (08-01 @ 09:00)  Serum Pro-Brain Natriuretic Peptide: 1768 pg/mL (07-30 @ 13:30)    Lactate, Blood: 1.2 mmol/L (08-05 @ 11:16)

## 2022-08-05 NOTE — SPEECH LANGUAGE PATHOLOGY EVALUATION - SLP GENERAL OBSERVATIONS
Pt received & seen seated upright in bed, eyes open, +intubated/vent, 02 sats: 100%, left wrist restraint, nonverbal pain scale 0/10 pre/post

## 2022-08-05 NOTE — PROGRESS NOTE ADULT - ASSESSMENT
The patient is a 76 year old male with past medical history of HTN, HLD, CAD s/p CABG, carotid stenosis, and PVD who was transferred from an outside hospital for neuro IR intervention after waking up with R sided facial droop and R sided weakness; found to have left M1 occlusion on CTA s/p thrombectomy which is complicated by hemorrhagic conversion. Hospital course notable for acute hypoxic respiratory failure post procedure s/p mechanical ventilation; ischemic cardiomyopathy, LVEF<10% and moderately reduced RV function. Also with E coli UTI, KE, C. diff. Nephrology is managing KE.    -Baseline creatinine ranges 0.6-0.7mg/dL   -Notable for acute kidney injury starting on 07/30/2022; creatinine peaked at 1.2mg/dL, lately ranging 0.9-1.0mg/dL    -Suspecting prerenal etiology with ? progression to ATN ?; lately improving (see above)   -UA (prior to onset of KE) showed specific gravity 1.025, +protein, +nitrite, +leukocyte esterase, moderate blood, 6-10RBC, 26-50 WBC, TNTC bacteria  -Urine culture is positive for E coli   -R abdominal U/S on 07/30/2022 showed R kidney 10.4cm with simple cyst without hydronephrosis    -Worsening hypernatremia; on free water flushes; D5W gtt added today (of note D5W will predominately translocate intracellularly; less so in intravascular space)   -Notable for polyuria - documented 3005cc urine output over the past 24 hours (of note 24 hour net negative 185cc)    -Hypernatremia is multifactorial in etiology from insensible water loss + fever + ? DI ? - recommend checking urine osm   -Favor holding diuretics at this time   -BUN continues to downtrend/improve  -No acute indication for renal replacement therapy at this time

## 2022-08-05 NOTE — PROGRESS NOTE ADULT - PROBLEM SELECTOR PLAN 1
- known ICMP LVEF ~25%  - TTE 7/24 showed LVEF <10% with RV dysfunction  - Clinically appears euvolemic on exam with lukewarm extremities  - Goal is to maintain net even fluid status, may use Lasix PRN however pt does not currently require. He is making adequate UOP and also losing fluid from rectal tube.  - Would avoid using inotropes as this will increase ectopy burden, pt already w/ frequent multifocal PVCs/couplets  - GDMT: On Lopressor 50mg TID (will eventually plan to switch to metoprolol succinate). Will ultimately benefit from medical optimization including ARB or ARNi and MRA as tolerated  - Please check markers of perfusion daily (serum lactate, LFTs, T Bili)  - Document strict I&O  - Device: previously declined ICD per reports. Not candidate for CRT device as he does not have LBBB. - known ICMP LVEF ~25%  - TTE 7/24 showed LVEF <10% with RV dysfunction  - Clinically appears euvolemic on exam with lukewarm extremities  - Goal is to maintain net even fluid status, may use Lasix PRN however pt does not currently require. He is making adequate UOP and also losing fluid from rectal tube.  - Would avoid using inotropes as this will increase ectopy burden, pt already w/ frequent multifocal PVCs/couplets  - GDMT: On Lopressor 50mg TID (will eventually plan to switch to metoprolol succinate). Will ultimately benefit from medical optimization including ARB or ARNi and MRA as tolerated.  - Please check markers of perfusion daily (serum lactate, LFTs, T Bili)  - Document strict I&O  - Device: previously declined ICD per reports.

## 2022-08-05 NOTE — DIETITIAN NUTRITION RISK NOTIFICATION - TREATMENT: THE FOLLOWING DIET HAS BEEN RECOMMENDED
Diet, NPO with Tube Feed:   Tube Feeding Modality: Nasogastric  Nepro (NEPRORTH)  Total Volume for 24 Hours (mL): 1200  Continuous  Starting Tube Feed Rate {mL per Hour}: 10  Increase Tube Feed Rate by (mL): 10     Every 4 hours  Until Goal Tube Feed Rate (mL per Hour): 50  Tube Feed Duration (in Hours): 24  Tube Feed Start Time: 10:30 (08-05-22 @ 02:48) [Active]

## 2022-08-05 NOTE — PROGRESS NOTE ADULT - ASSESSMENT
IMPRESSION:  - Left MCA Stroke.  S/P suction thrombectomy for L MCA M1 occlusion with TICI 2b reperfusion. on 7-24-22.    Mechanism ESUS  cardioembolic versus Large artery atherosclerosis    ASSESSMENT/ PLAN:     - Neuro checks and vital signs Q 2 hours.  - SBP goal keep normotensive.  - ASA 81 mg PO or 300 SD QD.   - CT Head of 7-29-22 reviewed. Stable left frontal hemorrhagic products within the infarct.    - Lipitor for LDL goal of < 70 .  - EEG -Report as above reviewed.   - CT/CTA/CTP -images and reports were reviewed.   - MRI Brain stroke protocol  -images and reports were reviewed. .  - Will need anticoagulation long term. Repeat CT head on 8-8-22 if stable can start DOAC.  - TTE  -Reports as above were reviewed. . EF < 10%.  - IMTIAZ (  postponed due to fever)  - SCD/ SQ Lovenox for DVT prophylaxis.

## 2022-08-05 NOTE — PROGRESS NOTE ADULT - PROBLEM SELECTOR PLAN 2
- Likely related to ICMP  - Lopressor decreased to 50mg TID due to hypotension overnight  - Would avoid aggressive uptitration of BB due to significant bi-ventricular heart failure

## 2022-08-05 NOTE — PROGRESS NOTE ADULT - SUBJECTIVE AND OBJECTIVE BOX
Neurology   NATANAEL ROWAN 76y Male       HPI:  76M with PMH CABG, HTN, HLD who presents as transfer from [facility] for stroke. Last known well was last night 10pm prior to going to bed, woke up this morning around 0500 with R sided weakness and R facial droop. Presented to [facility], code stroke initiated, NIH at [facility] reportedly 8. CTA showed LM1 occlusion. Transferred to [facility] for possible neuro IR intervention. On arrival to [facility], NIH 6. Decision made to take patient directly to neuro IR for intervention.     Initial NIH 6, mRS 0    PMH: HTN (hypertension)    HLD (hyperlipidemia)    S/P CABG x 1         PSH:       FAMILY HISTORY:      SOCIAL HISTORY:  No history of tobacco or alcohol use     Allergies    No Known Allergies    Intolerances      Vital Signs Last 24 Hrs  T(C): 37 (05 Aug 2022 17:00), Max: 38.2 (05 Aug 2022 08:45)  T(F): 98.6 (05 Aug 2022 17:00), Max: 100.8 (05 Aug 2022 08:45)  HR: 92 (05 Aug 2022 17:00) (75 - 105)  BP: 126/90 (05 Aug 2022 08:30) (103/53 - 148/79)  BP(mean): 102 (05 Aug 2022 08:30) (65 - 106)  RR: 22 (05 Aug 2022 17:00) (18 - 28)  SpO2: 100% (05 Aug 2022 17:00) (96% - 100%)    Parameters below as of 05 Aug 2022 17:00  Patient On (Oxygen Delivery Method): ventilator,cpap/psv    O2 Concentration (%): 30    MEDICATIONS    aspirin  chewable 81 milliGRAM(s) Oral daily  chlorhexidine 0.12% Liquid 15 milliLiter(s) Oral Mucosa every 12 hours  chlorhexidine 2% Cloths 1 Application(s) Topical daily  dextrose 5%. 1000 milliLiter(s) IV Continuous <Continuous>  dextrose 5%. 1000 milliLiter(s) IV Continuous <Continuous>  dextrose 5%. 1000 milliLiter(s) IV Continuous <Continuous>  dextrose 50% Injectable 25 Gram(s) IV Push once  dextrose Oral Gel 15 Gram(s) Oral once PRN  enoxaparin Injectable 40 milliGRAM(s) SubCutaneous every 24 hours  famotidine Injectable 20 milliGRAM(s) IV Push every 12 hours  fentaNYL    Injectable 25 MICROGram(s) IV Push every 4 hours PRN  glucagon  Injectable 1 milliGRAM(s) IntraMuscular once  insulin lispro (ADMELOG) corrective regimen sliding scale   SubCutaneous every 4 hours  insulin NPH human recombinant 15 Unit(s) SubCutaneous every 6 hours  meropenem  IVPB 1000 milliGRAM(s) IV Intermittent every 8 hours  metoprolol tartrate 50 milliGRAM(s) Oral every 8 hours  metroNIDAZOLE  IVPB 500 milliGRAM(s) IV Intermittent every 8 hours  norepinephrine Infusion 0.05 MICROgram(s)/kG/Min IV Continuous <Continuous>  ondansetron Injectable 4 milliGRAM(s) IV Push every 6 hours PRN  vancomycin    Solution 500 milliGRAM(s) Enteral Tube every 6 hours         LABS:  CBC Full  -  ( 05 Aug 2022 02:45 )  WBC Count : 16.92 K/uL  RBC Count : 4.37 M/uL  Hemoglobin : 13.5 g/dL  Hematocrit : 41.5 %  Platelet Count - Automated : 313 K/uL  Mean Cell Volume : 95.0 fl  Mean Cell Hemoglobin : 30.9 pg  Mean Cell Hemoglobin Concentration : 32.5 gm/dL  Auto Neutrophil # : x  Auto Lymphocyte # : x  Auto Monocyte # : x  Auto Eosinophil # : x  Auto Basophil # : x  Auto Neutrophil % : x  Auto Lymphocyte % : x  Auto Monocyte % : x  Auto Eosinophil % : x  Auto Basophil % : x      08-05    159<H>  |  124<H>  |  86.8<H>  ----------------------------<  236<H>  4.6   |  25.0  |  1.18    Ca    9.1      05 Aug 2022 11:16  Phos  5.1     08-05  Mg     2.7     08-05      On Neurological Examination:  Patient is intubated off sedation    Mental Status -   There is spontaneous  Eye opening noted. Not following commands  Cranial Nerves -Pupils are 2 and reactive. , EOMs are conjugate in primary gaze and on occulocephalics.  He blinks to threat on the left side.  Cough reflex is present .  Motor Exam -   Right side trace movement. Left UE localizes to noxious stim. LLE moved spontaneously.         RADIOLOGY ( All neurological imaging studies were independently reviewed and interpreted by me)  [facility]   CTA  CTP    IMPRESSION:    CT PERFUSION:  The CT perfusion is technically limited by truncation of the arterial input function and venous outflow function.    Core infarct (CBF < 30%:): 0 ml  Penumbra (Tmax >6s): 4 mL  Mismatched volume: 0 ml  Mismatched ratio: None    Interpretation:  Based on CBF < 30%, there is no evidence of a core infarct. At CBF < 34%, a small perfusion defect of 4 mL is located in the left inferior frontal gyrus and corresponds to matched perfusion abnormality of Tmax >6 seconds. On Tmax > 4s, there is a much larger perfusion defect throughout the majority of the left frontoparietal convexity, which may represent an ischemic penumbra in the left MCA vascular territory.        CT ANGIOGRAPHY NECK:  1. Poor opacification of the cervical vasculature.  2. Suspicion for moderate greater than 50% stenosis in the proximal right internal carotid artery. Suspicion for moderate to severe stenosis in the proximal left internal carotid artery that may be greater than 70%. No evidence of ICA occlusion in the neck.  3. The right vertebral artery is grossly patent.. The V1 segment of the left vertebral artery cannot be visualized. The V2 and V3 segments the left vertebral artery are grossly patent with multiple stenoses.  4. There is limited visualization of the common carotid artery origins and subclavian artery origins. Underlying stenoses cannot be excluded. There is suspicion for a severe stenosis in the proximal left subclavian artery.      CT ANGIOGRAPHY BRAIN:  1. Poor opacification of the intracranial vasculature.  2. There appears to be a focal filling defect at the left MCA bifurcation with distal flow. There is marked attenuation of M2 branches of the left middle cerebral artery. Vessel density analysis of the MCA territory shows a greater than 50% reduction of vessel density in the left MCA territory compared to the contralateral side.  3. There are mild to moderate stenoses in the supraclinoid segments of the internal carotid arteries bilaterally, without occlusion.  4. There are stenoses in the intradural vertebral arteries bilaterally, without occlusion.    Repeat CTA or MRI/MRA is recommended for further evaluation.  Peak arterial opacification on the CT perfusion occurs at approximately 38 seconds. If the CTA is repeated, a long delay is required after contrast injection to achieve adequate opacification of the vasculature.    The findings were discussed with JACK Light in the emergency room on 07/24/2022 at 5:45 AM. Read back verification was obtained.      SEVERO CHANEL MD; Attending Radiologist  This document has been electronically signed    MRI:    MR Head No Cont (07.26.22 @ 20:58) >  IMPRESSION:    Acute left MCA territory infarcts with hemorrhagic transformation,   grossly stable since comparison CT.    Additional punctate acute infarct involving the medial left frontal lobe   in the SUGEY territory.    ORION ANDRADE MD; Attending Radiologist        TTE  TTE Echo Complete w/ Contrast w/ Doppler (07.24.22 @ 14:01) >    Summary:   1. Endocardial visualization was enhanced with intravenous echo contrast.   2. Severely enlarged left atrium.   3. Global diffuse akinesis. Left ventricular ejection fraction, by   visual estimation, is <10%.   4. Elevated mean left atrial pressure. (>30 mm Hg)   5. Normal right atrial size.   6. Mildly enlarged right ventricle. Moderately reduced RV systolic   function.   7. Mild mitral valve regurgitation.   8. Mild tricuspid regurgitation.   9. Estimated pulmonary artery systolic pressure is 42 mmHg - mild   pulmonary hypertension.  10. There is no evidence of pericardial effusion.  11. Team informed of the findings.    MD Millie, RPVI Electronically signed on 7/24/2022 at 3:49:14 PM      < from: CT Head No Cont (07.30.22 @ 12:47) >    IMPRESSION:  Aging inferior left frontal and inferolateral left parietal infarcts.   Mild stable cortical blood products associated with the frontal infarct.    VERNELL LOYA MD; Attending Radiologist          EEG IMPRESSION/CLINICAL CORRELATE 7/31/22    Abnormal EEG study.  1. Potential epileptogenic foci in the bilateral frontotemporal regions.   2. Structural abnormality and cortical dysfunction in the left frontotemporal region.   3. Mild to moderate nonspecific diffuse or multifocal cerebral dysfunction.   4. No seizure seen.     _____________________________________________________________    Mayi Prather MD  Director, Epilepsy/[facility]       Electronic Signatures:  Mayi Prather)  (Signed 31-Jul-2022 09:06)EEG IMPRESSION/CLINICAL CORRELATE

## 2022-08-05 NOTE — PROGRESS NOTE ADULT - ASSESSMENT
77 y/o with h/o chronic systolic HF ACC/AHA stage C, ICMP LVEF 22, CAD s/p PCI ’04 CABG ‘14, carotid stenosis, declined ICD, PVD, HTN, DLP who was transferred from an OSH for neuroIR intervention after waking up with R sided facial droop and R sided weakness. He was found to have left M1 occlusion on CTA and required thrombectomy on 7/24/22. His hospital course has been complicated by acute hypoxic respiratory failure post procedure requiring mechanical ventilation, hypotension requiring temporary pressors, frequent PVCs, E. coli UTI, KE and fevers. He had a TTE that showed LVEF 19%, LVIDd 6.79cm, LVOT VTI 10.3cm, moderately RV dysfunction and RVSP 42mmHg (RAP 3mmHg).   The patient remains intubated. His BP this AM is normotensive. Clinically looks euvolemic, lukewarm extremities. His lactate peaked at 3.2 but has now normalized. He has persistent leukocytosis and down-trending transaminitis. He was found to have c.diff and was started on vanco/flagyl. His CxR suggests a new infiltrate in the RML/RLL. He is on free water flushes and D5 infusion for hypernatremia.     Pertinent Studies:    TTE 7/24/22: LVEF <10%, LVIDd 6.79cm, mild RVE with moderately reduced systolic function, grade III DD, mild MR, mild TR, PASP 41.9 mmHg (RAP 3), LVOT VTI 10.3, small PFO with left to right shunting.     CTA neck showed moderate stenosis in the proximal right internal carotid artery and moderate to severe stenosis in proximal left internal carotid artery. There was no evidence of ICA occlusion in the neck.

## 2022-08-05 NOTE — PROGRESS NOTE ADULT - NSPROGADDITIONALINFOA_GEN_ALL_CORE
Plan for patient to undergo tracheostomy and PEG tube placement. From a cardiac perspective he is medically optimized to undergo planned procedures.

## 2022-08-05 NOTE — SPEECH LANGUAGE PATHOLOGY EVALUATION - COMMENTS
As per MD note: 77yo man with CABG, PVD, HTN, HLD, and low EF (declined AICD), admitted 7/24 with LM1 occlusion, s/p TICI 2B MT, no tPA as wake-up stroke. Poor exam post thrombectomy, re-intubated for hypoxic respiratory failure. MRI brain with a large L MCA stroke with small area of reperfusion hemorrhage.  Hospitalization notable for acute on chronic HFrEF, LVHF <10%, reduced RV syst function, no LV thrombus on Definity, 1st degree AV block, with frequent PVCs.   Also with high urea 2/2 pre-renal state and administration of ACE inhibitor, which is likely contributing to the poor exam.    With C diff dx 8/1 and septic shock, on abx.  VEEG with rare occasional frontal sharps, mild-moderate slowing 7/29-8/2  continuing GOC discussions" No attempt to use nonverbal communication during eval Auditory function judged to be poor, based upon coma recovery scale Pt nonverbal: intubated/on vent  Will assess as appropriate

## 2022-08-05 NOTE — PROGRESS NOTE ADULT - SUBJECTIVE AND OBJECTIVE BOX
Interval events:  Is and Os net neg 287.     VITALS:  T(C): , Max: 38.2 (08-05-22 @ 08:45)  HR:  (75 - 105)  BP:  (103/53 - 148/79)  ABP:  (88/40 - 151/61)  RR:  (18 - 28)  SpO2:  (96% - 100%)  Wt(kg): --  Device: Avea, Mode: CPAP with PS, FiO2: 30, PEEP: 5, PS: 10, MAP: 10    08-04-22 @ 07:01  -  08-05-22 @ 07:00  --------------------------------------------------------  IN: 3122.8 mL / OUT: 3305 mL / NET: -182.2 mL    08-05-22 @ 07:01  -  08-05-22 @ 10:08  --------------------------------------------------------  IN: 600 mL / OUT: 325 mL / NET: 275 mL      LABS:  Na: 160 (08-05 @ 02:45), 153 (08-04 @ 03:55), 152 (08-03 @ 03:51)  K: 4.4 (08-05 @ 02:45), 4.2 (08-04 @ 03:55), 4.5 (08-03 @ 03:51)  Cl: 124 (08-05 @ 02:45), 118 (08-04 @ 03:55), 117 (08-03 @ 03:51)  CO2: 25.0 (08-05 @ 02:45), 24.0 (08-04 @ 03:55), 25.0 (08-03 @ 03:51)  BUN: 86.9 (08-05 @ 02:45), 100.5 (08-04 @ 03:55), 119.5 (08-03 @ 03:51)  Cr: 0.98 (08-05 @ 02:45), 1.00 (08-04 @ 03:55), 0.97 (08-03 @ 03:51)  Glu: 243(08-05 @ 02:45), 253(08-04 @ 03:55), 315(08-03 @ 03:51)    Hgb: 13.5 (08-05 @ 02:45), 13.4 (08-04 @ 03:55), 14.0 (08-03 @ 03:51)  Hct: 41.5 (08-05 @ 02:45), 40.8 (08-04 @ 03:55), 43.3 (08-03 @ 03:51)  WBC: 16.92 (08-05 @ 02:45), 17.50 (08-04 @ 03:55), 22.79 (08-03 @ 03:51)  Plt: 313 (08-05 @ 02:45), 278 (08-04 @ 03:55), 293 (08-03 @ 03:51)    MEDICATIONS:  aspirin  chewable 81 milliGRAM(s) Oral daily  chlorhexidine 0.12% Liquid 15 milliLiter(s) Oral Mucosa every 12 hours  chlorhexidine 2% Cloths 1 Application(s) Topical daily  dextrose 5% + sodium chloride 0.9%. 1000 milliLiter(s) IV Continuous <Continuous>  dextrose 5%. 1000 milliLiter(s) IV Continuous <Continuous>  dextrose 5%. 1000 milliLiter(s) IV Continuous <Continuous>  dextrose 50% Injectable 25 Gram(s) IV Push once  dextrose Oral Gel 15 Gram(s) Oral once PRN  enoxaparin Injectable 40 milliGRAM(s) SubCutaneous every 24 hours  famotidine Injectable 20 milliGRAM(s) IV Push every 12 hours  fentaNYL    Injectable 25 MICROGram(s) IV Push every 4 hours PRN  glucagon  Injectable 1 milliGRAM(s) IntraMuscular once  insulin lispro (ADMELOG) corrective regimen sliding scale   SubCutaneous every 4 hours  insulin NPH human recombinant 15 Unit(s) SubCutaneous every 6 hours  meropenem  IVPB 1000 milliGRAM(s) IV Intermittent every 8 hours  metoprolol tartrate 50 milliGRAM(s) Oral every 8 hours  metroNIDAZOLE  IVPB 500 milliGRAM(s) IV Intermittent every 8 hours  norepinephrine Infusion 0.05 MICROgram(s)/kG/Min IV Continuous <Continuous>  ondansetron Injectable 4 milliGRAM(s) IV Push every 6 hours PRN  vancomycin    Solution 500 milliGRAM(s) Enteral Tube every 6 hours    EXAMINATION:  General:  in NAD  HEENT:  MMM  Neuro:  eye closed, opens eyes to voice, briefly attends, does not follow commands, PERRL, BTT on the L, + cough, moves L arm and leg spontaneously, no movement on the R side to noxious   Cards:  irregular   Respiratory:  no respiratory distress  Abdomen:  soft  Extremities:  no LE edema    Assessment/Plan:   77yo man with CABG, PVD, HTN, HLD, and low EF (declined AICD), admitted 7/24 with LM1 occlusion, s/p TICI 2B MT, no tPA as wake-up stroke. Poor exam post thrombectomy, re-intubated for hypoxic respiratory failure. MRI brain with a large L MCA stroke with small area of reperfusion hemorrhage.  Hospitalization notable for acute on chronic HFrEF, LVHF <10%, reduced RV syst function, no LV thrombus on Definity, 1st degree AV block, with frequent PVCs.   Also with high urea 2/2 pre-renal state and administration of ACE inhibitor, which is likely contributing to the poor exam.    With C diff dx 8/1 and septic shock, on abx.  VEEG with rare occasional frontal sharps, mild-moderate slowing 7/29-8/2  continuing GOC discussions     Plan:  neurochecks q4hr  Off sedation, fentanyl prn   On ASA 81mg daily, statin held for abnormal LFTs  with pressure ulcer    Resp- Resp failure secondary to neuro injury and heart failure   Maintain O2 > 92 %, wean FIO2 to 30 %  CPAP 10/5, 40%  CXR 8/2 wth RLL opacity, unchanged    Card- Ischemic cardiomyopathy HFrEF, EF 10%. With very frequent PVCs  Lopressor decreased back to 50 mg q8 due to hypotension and patient requiring Levo with no improvement in PVCs on the higher lopressor dose   avoid ACE inhibitors   IMTIAZ cancelled due to fever, unstable   EPS following, patient is currently not safe for AC given large stoke with hemorrhagic conversion and very high BUN. Will get a CTH on day 14 from stroke if no new hemorrhagic conversion, will start AC.   CHF following: recs to check daily serum lactate, LFTs, T Bili  On Augustin track   K>4, Mg >2  goal euvolemia, holding diuresis today as patient appears euvolemic on exam     Renal- Uremia - likely 2/2 pre- renal azothermia and contraction alkalosis (FeNa- <1- pre renal, U Cl <30, renal US no abnormalities), BUN and Cr now improving   Hold Lasix - re-evaluate daily  goal euvolemia  decreased FWB to 300 mg q4h for net + 1L  D5W 50cc/hr     GI prophylaxis - Transaminitis- med induced vs less likely congestive hepatopathy (RUQ US 7/30 normal). With C diff (250cc output overnight)  Monitor LFT  vital 60cc/hr  ( 21Kcal/kg )  pepcid; Stool softeners    ID: s/p cipro for E. Coli. MRSA neg 8/1.   now with C Diff dx 8/1, on vanc 500 mg q6h and flagyl 500 mg q8h for severe C. Diff with shock, end date 8/11  On meropenem for PNA for 7 days     Heme  Lov ppx     Endo:   NPH increased to 15 q6h      Patient seen and examined by attending on 8/5/2022.    Patient is critically ill due to stroke and at high risk for neurological deterioration or death due to: requiring mechanical ventilation 2/2 neurologic injury, severe heart failure, C. Diff     Interval events:  Is and Os net neg 287.   Briefly on Levo for hypotension.     VITALS:  T(C): , Max: 38.2 (08-05-22 @ 08:45)  HR:  (75 - 105)  BP:  (103/53 - 148/79)  ABP:  (88/40 - 151/61)  RR:  (18 - 28)  SpO2:  (96% - 100%)  Wt(kg): --  Device: Avea, Mode: CPAP with PS, FiO2: 30, PEEP: 5, PS: 10, MAP: 10    08-04-22 @ 07:01  -  08-05-22 @ 07:00  --------------------------------------------------------  IN: 3122.8 mL / OUT: 3305 mL / NET: -182.2 mL    08-05-22 @ 07:01  -  08-05-22 @ 10:08  --------------------------------------------------------  IN: 600 mL / OUT: 325 mL / NET: 275 mL      LABS:  Na: 160 (08-05 @ 02:45), 153 (08-04 @ 03:55), 152 (08-03 @ 03:51)  K: 4.4 (08-05 @ 02:45), 4.2 (08-04 @ 03:55), 4.5 (08-03 @ 03:51)  Cl: 124 (08-05 @ 02:45), 118 (08-04 @ 03:55), 117 (08-03 @ 03:51)  CO2: 25.0 (08-05 @ 02:45), 24.0 (08-04 @ 03:55), 25.0 (08-03 @ 03:51)  BUN: 86.9 (08-05 @ 02:45), 100.5 (08-04 @ 03:55), 119.5 (08-03 @ 03:51)  Cr: 0.98 (08-05 @ 02:45), 1.00 (08-04 @ 03:55), 0.97 (08-03 @ 03:51)  Glu: 243(08-05 @ 02:45), 253(08-04 @ 03:55), 315(08-03 @ 03:51)    Hgb: 13.5 (08-05 @ 02:45), 13.4 (08-04 @ 03:55), 14.0 (08-03 @ 03:51)  Hct: 41.5 (08-05 @ 02:45), 40.8 (08-04 @ 03:55), 43.3 (08-03 @ 03:51)  WBC: 16.92 (08-05 @ 02:45), 17.50 (08-04 @ 03:55), 22.79 (08-03 @ 03:51)  Plt: 313 (08-05 @ 02:45), 278 (08-04 @ 03:55), 293 (08-03 @ 03:51)    MEDICATIONS:  aspirin  chewable 81 milliGRAM(s) Oral daily  chlorhexidine 0.12% Liquid 15 milliLiter(s) Oral Mucosa every 12 hours  chlorhexidine 2% Cloths 1 Application(s) Topical daily  dextrose 5% + sodium chloride 0.9%. 1000 milliLiter(s) IV Continuous <Continuous>  dextrose 5%. 1000 milliLiter(s) IV Continuous <Continuous>  dextrose 5%. 1000 milliLiter(s) IV Continuous <Continuous>  dextrose 50% Injectable 25 Gram(s) IV Push once  dextrose Oral Gel 15 Gram(s) Oral once PRN  enoxaparin Injectable 40 milliGRAM(s) SubCutaneous every 24 hours  famotidine Injectable 20 milliGRAM(s) IV Push every 12 hours  fentaNYL    Injectable 25 MICROGram(s) IV Push every 4 hours PRN  glucagon  Injectable 1 milliGRAM(s) IntraMuscular once  insulin lispro (ADMELOG) corrective regimen sliding scale   SubCutaneous every 4 hours  insulin NPH human recombinant 15 Unit(s) SubCutaneous every 6 hours  meropenem  IVPB 1000 milliGRAM(s) IV Intermittent every 8 hours  metoprolol tartrate 50 milliGRAM(s) Oral every 8 hours  metroNIDAZOLE  IVPB 500 milliGRAM(s) IV Intermittent every 8 hours  norepinephrine Infusion 0.05 MICROgram(s)/kG/Min IV Continuous <Continuous>  ondansetron Injectable 4 milliGRAM(s) IV Push every 6 hours PRN  vancomycin    Solution 500 milliGRAM(s) Enteral Tube every 6 hours    EXAMINATION:  General:  in NAD  HEENT:  MMM  Neuro:  eye closed, opens eyes to voice, briefly attends, does not follow commands, PERRL, BTT on the L, + cough, moves L arm and leg spontaneously, no movement on the R side to noxious   Cards:  irregular   Respiratory:  no respiratory distress  Abdomen:  soft  Extremities:  no LE edema    Assessment/Plan:   77yo man with CABG, PVD, HTN, HLD, and low EF (declined AICD), admitted 7/24 with LM1 occlusion, s/p TICI 2B MT, no tPA as wake-up stroke. Poor exam post thrombectomy, re-intubated for hypoxic respiratory failure. MRI brain with a large L MCA stroke with small area of reperfusion hemorrhage.  Hospitalization notable for acute on chronic HFrEF, LVHF <10%, reduced RV syst function, no LV thrombus on Definity, 1st degree AV block, with frequent PVCs.   Also with high urea 2/2 pre-renal state and administration of ACE inhibitor, which is likely contributing to the poor exam.    With C diff dx 8/1 and septic shock, on abx.  VEEG with rare occasional frontal sharps, mild-moderate slowing 7/29-8/2    Continuing GOC discussions: I personally met with patient's wife, patient's son Leeroy and Leeroy wife's at bedside. I went over the clinical updates and showed them the MRI which demonstrates a relatively large L MCA stroke with some hemorrhagic conversion, the later not very severe. We discussed that although the urea may be contributing, in over 10 days patient has not regained any movement on the R side and is not able to follow commands and I am worried that there is a reasonable change that he will continue to be hemiplegic and have significant aphasia going forward. We also discussed the fact that the patient is very high risk for more complications such as infections and death from his severe heart failure and bedbound state. We discussed that prolonged care would mean a trach/PEG and a nursing home. I asked the family to think about whether this is within the patient's GOC based on who he is as an individual. They will continue to think about the GOC and let the medical team know what they decide.     Plan:  neurochecks q4hr  Off sedation, fentanyl prn   On ASA 81mg daily, statin held for abnormal LFTs  with pressure ulcer    Resp- Resp failure secondary to neuro injury and heart failure   Maintain O2 > 92 %, wean FIO2 to 30 %  CPAP 10/5, 40%  CXR 8/2 wth RLL opacity, unchanged    Card- Ischemic cardiomyopathy HFrEF, EF 10%. With very frequent PVCs  Lopressor 50 mg q8 (previously up to 75mg q8h but decreased due to hypotension with no improvement in PVCs on the higher lopressor dose)  avoid ACE inhibitors   EPS following, patient is currently not safe for AC given large stoke with hemorrhagic conversion and very high BUN. Will get a CTH on day 14 from stroke if no new hemorrhagic conversion, will start AC.   CHF following: recs to check daily serum lactate, LFTs, T Bili  On [place]in track   K>4, Mg >2  goal euvolemia, holding diuresis today as patient appears euvolemic on exam   IMTIAZ cancelled due to fever, unstable     Renal- Uremia - likely 2/2 pre- renal azothermia and contraction alkalosis (FeNa- <1- pre renal, U Cl <30, renal US no abnormalities), BUN and Cr now improving   appreciate renal recs, now on D5W 50cc/hr for severe hyponatremia   Hold Lasix - re-evaluate daily  goal euvolemia  FWB again increased to 300 mg q4h    GI prophylaxis - Transaminitis- med induced vs less likely congestive hepatopathy (RUQ US 7/30 normal). With C diff (250cc output overnight)  Monitor LFT  vital 60cc/hr  ( 21Kcal/kg )  pepcid; Stool softeners    ID: s/p cipro for E. Coli. MRSA neg 8/1.   now with C Diff dx 8/1, on vanc 500 mg q6h and flagyl 500 mg q8h for severe C. Diff with shock, end date 8/11  On meropenem for PNA for 7 days     Heme  Lov ppx     Endo:   NPH 15 q6h      Patient seen and examined by attending on 8/5/2022.    Patient is critically ill due to stroke and at high risk for neurological deterioration or death due to: requiring mechanical ventilation 2/2 neurologic injury, severe heart failure, C. Diff

## 2022-08-05 NOTE — CHART NOTE - NSCHARTNOTEFT_GEN_A_CORE
Source: Patient [ ]  Family [ ]   other [x ] EMR and discussed in A.M.     Current Diet: Diet, NPO with Tube Feed:   Tube Feeding Modality: Nasogastric  Nepro (NEPRORTH)  Total Volume for 24 Hours (mL): 1200  Continuous  Starting Tube Feed Rate {mL per Hour}: 10  Increase Tube Feed Rate by (mL): 10     Every 4 hours  Until Goal Tube Feed Rate (mL per Hour): 50  Tube Feed Duration (in Hours): 24  Tube Feed Start Time: 10:30 (08-05-22 @ 02:48)    Current Weight:   8/3: 95.8 kg   7/31: 99 kg   7/29: 67.4 kg   7/26: 94.4 kg   7/24: 99.9 kg   (Trace/dependent edema)     % Weight Change: Unsure of accuracy of weights; will continue to monitor weights for trends.      Pertinent Medications: MEDICATIONS  (STANDING):  aspirin  chewable 81 milliGRAM(s) Oral daily  chlorhexidine 0.12% Liquid 15 milliLiter(s) Oral Mucosa every 12 hours  chlorhexidine 2% Cloths 1 Application(s) Topical daily  dextrose 5%. 1000 milliLiter(s) (50 mL/Hr) IV Continuous <Continuous>  dextrose 5%. 1000 milliLiter(s) (50 mL/Hr) IV Continuous <Continuous>  dextrose 5%. 1000 milliLiter(s) (100 mL/Hr) IV Continuous <Continuous>  dextrose 50% Injectable 25 Gram(s) IV Push once  enoxaparin Injectable 40 milliGRAM(s) SubCutaneous every 24 hours  famotidine Injectable 20 milliGRAM(s) IV Push every 12 hours  glucagon  Injectable 1 milliGRAM(s) IntraMuscular once  insulin lispro (ADMELOG) corrective regimen sliding scale   SubCutaneous every 4 hours  insulin NPH human recombinant 15 Unit(s) SubCutaneous every 6 hours  meropenem  IVPB 1000 milliGRAM(s) IV Intermittent every 8 hours  metoprolol tartrate 50 milliGRAM(s) Oral every 8 hours  metroNIDAZOLE  IVPB 500 milliGRAM(s) IV Intermittent every 8 hours  norepinephrine Infusion 0.05 MICROgram(s)/kG/Min (9.38 mL/Hr) IV Continuous <Continuous>  vancomycin    Solution 500 milliGRAM(s) Enteral Tube every 6 hours    MEDICATIONS  (PRN):  dextrose Oral Gel 15 Gram(s) Oral once PRN Blood Glucose LESS THAN 70 milliGRAM(s)/deciliter  fentaNYL    Injectable 25 MICROGram(s) IV Push every 4 hours PRN Agitation  ondansetron Injectable 4 milliGRAM(s) IV Push every 6 hours PRN Nausea and/or Vomiting    Pertinent Labs: CBC Full  -  ( 05 Aug 2022 02:45 )  WBC Count : 16.92 K/uL  RBC Count : 4.37 M/uL  Hemoglobin : 13.5 g/dL  Hematocrit : 41.5 %  Platelet Count - Automated : 313 K/uL  Mean Cell Volume : 95.0 fl  Mean Cell Hemoglobin : 30.9 pg  Mean Cell Hemoglobin Concentration : 32.5 gm/dL  Auto Neutrophil # : x  Auto Lymphocyte # : x  Auto Monocyte # : x  Auto Eosinophil # : x  Auto Basophil # : x  Auto Neutrophil % : x  Auto Lymphocyte % : x  Auto Monocyte % : x  Auto Eosinophil % : x  Auto Basophil % : x        Skin: Suspected DTI to sacrum/coccyx     Nutrition focused physical exam conducted - found signs of malnutrition [ ]absent [x ]present    Subcutaneous fat loss: Mild [x ] Orbital fat pads region, [ ]Buccal fat region, [ ]Triceps region,  [ ]Ribs region    Muscle wasting: Mild [x ]Temples region, [ ]Clavicle region, [x ]Shoulder region, [ ]Scapula region, [ ]Interosseous region,  [ ]thigh region, [ ]Calf region    Estimated Needs:   [x ] no change since previous assessment  [ ] recalculated:     Hospital Course:   Pt is a 76 year old man with CABG, PVD, HTN, HLD, and low EF (declined AICD), admitted 7/24 with LM1 occlusion, s/p TICI 2B MT, no tPA as wake-up stroke. Poor exam post thrombectomy, re-intubated for hypoxic respiratory failure. MRI brain with a large L MCA stroke with small area of reperfusion hemorrhage.  Hospitalization notable for acute on chronic HFrEF, LVHF <10%, reduced RV syst function, no LV thrombus on Definity, 1st degree AV block, with frequent PVCs.   Also with high urea 2/2 pre-renal state and administration of ACE inhibitor, which is likely contributing to the poor exam.    With C diff dx 8/1 and septic shock, on abx.      Current Nutrition Diagnosis:  Pt remains at high nutrition risk secondary to Moderate (acute) protein calorie malnutrition related to inability to meet sufficient protein energy requirements in setting of Large L MCA stroke, respiratory failure, renal failure, +CDIFF, septic shock and skin breakdown as evidenced by physical signs of mild muscle/fat loss, meeting less then 75% estimated energy needs < 7 days. Enteral feeds changed to Nepro this morning. Nepro @ 50ml/hr (x20 hours) provides 1000ml/day; 1800kcal, 81gm protein; not yet meeting estimated nutrition needs. Ongoing GOC discussions continued. Recommendations below:       Recommendations:   1. RX: Nephro-nita and folic acid daily   2. RX: Vit.  C daily   3. Check weight daily to monitor trends   4. Consider increasing Nepro to 55ml/hr (x20 hours) 1100ml/day; 1980kcal, 89gm protein to better meet pt's estimated nutrition needs.     Monitoring and Evaluation:   [ x] PO intake [x ] Tolerance to diet prescription [X] Weights  [X] Follow up per protocol [X] Labs:

## 2022-08-05 NOTE — SPEECH LANGUAGE PATHOLOGY EVALUATION - SLP DIAGNOSIS
Severe Severe receptive & expressive language deficits. Based upon Coma Recovery Scale, pt scored a 4/23. Individual scores are as follows: Auditory Function Scale: 0 (none), Visual Function Scale: 0 (none), Motor Function Scale: 1 (abnormal posturing), Oromotor/Verbal Function Scale: 1 (oral reflexive movement), Communication Scale: 0 (none), Arousal Scale: 2 (eye opening without stimulation)

## 2022-08-06 LAB
ANION GAP SERPL CALC-SCNC: 10 MMOL/L — SIGNIFICANT CHANGE UP (ref 5–17)
ANION GAP SERPL CALC-SCNC: 11 MMOL/L — SIGNIFICANT CHANGE UP (ref 5–17)
BASOPHILS # BLD AUTO: 0.08 K/UL — SIGNIFICANT CHANGE UP (ref 0–0.2)
BASOPHILS NFR BLD AUTO: 0.5 % — SIGNIFICANT CHANGE UP (ref 0–2)
BUN SERPL-MCNC: 88 MG/DL — HIGH (ref 8–20)
BUN SERPL-MCNC: 90.2 MG/DL — HIGH (ref 8–20)
CALCIUM SERPL-MCNC: 8.4 MG/DL — SIGNIFICANT CHANGE UP (ref 8.4–10.5)
CALCIUM SERPL-MCNC: 8.9 MG/DL — SIGNIFICANT CHANGE UP (ref 8.4–10.5)
CHLORIDE SERPL-SCNC: 118 MMOL/L — HIGH (ref 98–107)
CHLORIDE SERPL-SCNC: 118 MMOL/L — HIGH (ref 98–107)
CK SERPL-CCNC: 172 U/L — SIGNIFICANT CHANGE UP (ref 30–200)
CO2 SERPL-SCNC: 23 MMOL/L — SIGNIFICANT CHANGE UP (ref 22–29)
CO2 SERPL-SCNC: 24 MMOL/L — SIGNIFICANT CHANGE UP (ref 22–29)
CREAT SERPL-MCNC: 1.48 MG/DL — HIGH (ref 0.5–1.3)
CREAT SERPL-MCNC: 1.54 MG/DL — HIGH (ref 0.5–1.3)
EGFR: 46 ML/MIN/1.73M2 — LOW
EGFR: 49 ML/MIN/1.73M2 — LOW
EOSINOPHIL # BLD AUTO: 0.74 K/UL — HIGH (ref 0–0.5)
EOSINOPHIL NFR BLD AUTO: 4.6 % — SIGNIFICANT CHANGE UP (ref 0–6)
GLUCOSE BLDC GLUCOMTR-MCNC: 109 MG/DL — HIGH (ref 70–99)
GLUCOSE BLDC GLUCOMTR-MCNC: 124 MG/DL — HIGH (ref 70–99)
GLUCOSE BLDC GLUCOMTR-MCNC: 167 MG/DL — HIGH (ref 70–99)
GLUCOSE BLDC GLUCOMTR-MCNC: 75 MG/DL — SIGNIFICANT CHANGE UP (ref 70–99)
GLUCOSE BLDC GLUCOMTR-MCNC: 81 MG/DL — SIGNIFICANT CHANGE UP (ref 70–99)
GLUCOSE BLDC GLUCOMTR-MCNC: 92 MG/DL — SIGNIFICANT CHANGE UP (ref 70–99)
GLUCOSE SERPL-MCNC: 120 MG/DL — HIGH (ref 70–99)
GLUCOSE SERPL-MCNC: 211 MG/DL — HIGH (ref 70–99)
HCT VFR BLD CALC: 35.1 % — LOW (ref 39–50)
HGB BLD-MCNC: 11.6 G/DL — LOW (ref 13–17)
IMM GRANULOCYTES NFR BLD AUTO: 1 % — SIGNIFICANT CHANGE UP (ref 0–1.5)
LYMPHOCYTES # BLD AUTO: 0.83 K/UL — LOW (ref 1–3.3)
LYMPHOCYTES # BLD AUTO: 5.1 % — LOW (ref 13–44)
MAGNESIUM SERPL-MCNC: 2.4 MG/DL — SIGNIFICANT CHANGE UP (ref 1.8–2.6)
MCHC RBC-ENTMCNC: 31.1 PG — SIGNIFICANT CHANGE UP (ref 27–34)
MCHC RBC-ENTMCNC: 33 GM/DL — SIGNIFICANT CHANGE UP (ref 32–36)
MCV RBC AUTO: 94.1 FL — SIGNIFICANT CHANGE UP (ref 80–100)
MONOCYTES # BLD AUTO: 0.79 K/UL — SIGNIFICANT CHANGE UP (ref 0–0.9)
MONOCYTES NFR BLD AUTO: 4.9 % — SIGNIFICANT CHANGE UP (ref 2–14)
NEUTROPHILS # BLD AUTO: 13.59 K/UL — HIGH (ref 1.8–7.4)
NEUTROPHILS NFR BLD AUTO: 83.9 % — HIGH (ref 43–77)
PHOSPHATE SERPL-MCNC: 4.8 MG/DL — HIGH (ref 2.4–4.7)
PLATELET # BLD AUTO: 263 K/UL — SIGNIFICANT CHANGE UP (ref 150–400)
POTASSIUM SERPL-MCNC: 4.1 MMOL/L — SIGNIFICANT CHANGE UP (ref 3.5–5.3)
POTASSIUM SERPL-MCNC: 4.2 MMOL/L — SIGNIFICANT CHANGE UP (ref 3.5–5.3)
POTASSIUM SERPL-SCNC: 4.1 MMOL/L — SIGNIFICANT CHANGE UP (ref 3.5–5.3)
POTASSIUM SERPL-SCNC: 4.2 MMOL/L — SIGNIFICANT CHANGE UP (ref 3.5–5.3)
RBC # BLD: 3.73 M/UL — LOW (ref 4.2–5.8)
RBC # FLD: 16 % — HIGH (ref 10.3–14.5)
SODIUM SERPL-SCNC: 152 MMOL/L — HIGH (ref 135–145)
SODIUM SERPL-SCNC: 152 MMOL/L — HIGH (ref 135–145)
WBC # BLD: 16.2 K/UL — HIGH (ref 3.8–10.5)
WBC # FLD AUTO: 16.2 K/UL — HIGH (ref 3.8–10.5)

## 2022-08-06 PROCEDURE — 99232 SBSQ HOSP IP/OBS MODERATE 35: CPT

## 2022-08-06 PROCEDURE — 71045 X-RAY EXAM CHEST 1 VIEW: CPT | Mod: 26

## 2022-08-06 RX ORDER — HUMAN INSULIN 100 [IU]/ML
10 INJECTION, SUSPENSION SUBCUTANEOUS EVERY 8 HOURS
Refills: 0 | Status: DISCONTINUED | OUTPATIENT
Start: 2022-08-06 | End: 2022-08-08

## 2022-08-06 RX ORDER — ALBUMIN HUMAN 25 %
50 VIAL (ML) INTRAVENOUS ONCE
Refills: 0 | Status: COMPLETED | OUTPATIENT
Start: 2022-08-06 | End: 2022-08-06

## 2022-08-06 RX ADMIN — HUMAN INSULIN 15 UNIT(S): 100 INJECTION, SUSPENSION SUBCUTANEOUS at 12:09

## 2022-08-06 RX ADMIN — Medication 500 MILLIGRAM(S): at 05:11

## 2022-08-06 RX ADMIN — Medication 500 MILLIGRAM(S): at 00:01

## 2022-08-06 RX ADMIN — Medication 100 MILLIGRAM(S): at 05:12

## 2022-08-06 RX ADMIN — Medication 100 MILLIGRAM(S): at 14:22

## 2022-08-06 RX ADMIN — FAMOTIDINE 20 MILLIGRAM(S): 10 INJECTION INTRAVENOUS at 23:19

## 2022-08-06 RX ADMIN — FAMOTIDINE 20 MILLIGRAM(S): 10 INJECTION INTRAVENOUS at 00:02

## 2022-08-06 RX ADMIN — MEROPENEM 100 MILLIGRAM(S): 1 INJECTION INTRAVENOUS at 05:26

## 2022-08-06 RX ADMIN — HUMAN INSULIN 15 UNIT(S): 100 INJECTION, SUSPENSION SUBCUTANEOUS at 17:08

## 2022-08-06 RX ADMIN — Medication 2: at 12:13

## 2022-08-06 RX ADMIN — HUMAN INSULIN 15 UNIT(S): 100 INJECTION, SUSPENSION SUBCUTANEOUS at 00:00

## 2022-08-06 RX ADMIN — ENOXAPARIN SODIUM 40 MILLIGRAM(S): 100 INJECTION SUBCUTANEOUS at 21:48

## 2022-08-06 RX ADMIN — Medication 81 MILLIGRAM(S): at 12:11

## 2022-08-06 RX ADMIN — Medication 100 MILLIGRAM(S): at 21:48

## 2022-08-06 RX ADMIN — Medication 500 MILLIGRAM(S): at 12:10

## 2022-08-06 RX ADMIN — CHLORHEXIDINE GLUCONATE 15 MILLILITER(S): 213 SOLUTION TOPICAL at 05:11

## 2022-08-06 RX ADMIN — Medication 50 MILLILITER(S): at 14:21

## 2022-08-06 RX ADMIN — HUMAN INSULIN 15 UNIT(S): 100 INJECTION, SUSPENSION SUBCUTANEOUS at 05:12

## 2022-08-06 RX ADMIN — Medication 500 MILLIGRAM(S): at 17:08

## 2022-08-06 RX ADMIN — CHLORHEXIDINE GLUCONATE 15 MILLILITER(S): 213 SOLUTION TOPICAL at 17:09

## 2022-08-06 RX ADMIN — Medication 500 MILLIGRAM(S): at 23:19

## 2022-08-06 RX ADMIN — FAMOTIDINE 20 MILLIGRAM(S): 10 INJECTION INTRAVENOUS at 12:16

## 2022-08-06 RX ADMIN — CHLORHEXIDINE GLUCONATE 1 APPLICATION(S): 213 SOLUTION TOPICAL at 12:16

## 2022-08-06 NOTE — PROGRESS NOTE ADULT - ASSESSMENT
Assessment/Plan:   77yo man with CABG, PVD, HTN, HLD, and low EF (declined AICD), admitted 7/24 with LM1 occlusion, s/p TICI 2B MT, no tPA as wake-up stroke. Poor exam post thrombectomy, re-intubated for hypoxic respiratory failure. MRI brain with a large L MCA stroke with small area of reperfusion hemorrhage.  Hospitalization notable for acute on chronic HFrEF, LVHF <10%, reduced RV syst function, no LV thrombus on Definity, 1st degree AV block, with frequent PVCs.   Also with high urea 2/2 pre-renal state and administration of ACE inhibitor, which is likely contributing to the poor exam.    With C diff dx 8/1 and septic shock, on abx.  VEEG with rare occasional frontal sharps, mild-moderate slowing 7/29-8/2    Continuing GOC discussions: I personally met with patient's wife, patient's son Leeroy and Leeroy wife's at bedside. I went over the clinical updates and showed them the MRI which demonstrates a relatively large L MCA stroke with some hemorrhagic conversion, the later not very severe. We discussed that although the urea may be contributing, in over 10 days patient has not regained any movement on the R side and is not able to follow commands and I am worried that there is a reasonable change that he will continue to be hemiplegic and have significant aphasia going forward. We also discussed the fact that the patient is very high risk for more complications such as infections and death from his severe heart failure and bedbound state. We discussed that prolonged care would mean a trach/PEG and a nursing home. I asked the family to think about whether this is within the patient's GOC based on who he is as an individual. They will continue to think about the GOC and let the medical team know what they decide.     Plan:  neurochecks q4hr  Off sedation, fentanyl prn   On ASA 81mg daily, statin held for abnormal LFTs  with pressure ulcer    Resp- Resp failure secondary to neuro injury and heart failure   Maintain O2 > 92 %, wean FIO2 to 30 %  CPAP 10/5, 40%  CXR 8/2 wth RLL opacity, unchanged    Card- Ischemic cardiomyopathy HFrEF, EF 10%. With very frequent PVCs  Lopressor 50 mg q8 (previously up to 75mg q8h but decreased due to hypotension with no improvement in PVCs on the higher lopressor dose)  avoid ACE inhibitors   EPS following, patient is currently not safe for AC given large stoke with hemorrhagic conversion and very high BUN. Will get a CTH on day 14 from stroke if no new hemorrhagic conversion, will start AC.   CHF following: recs to check daily serum lactate, LFTs, T Bili  On Augustin track   K>4, Mg >2  goal euvolemia, holding diuresis today as patient appears euvolemic on exam   IMTIAZ cancelled due to fever, unstable     Renal- Uremia - likely 2/2 pre- renal azothermia and contraction alkalosis (FeNa- <1- pre renal, U Cl <30, renal US no abnormalities), BUN and Cr now improving   appreciate renal recs, now on D5W 50cc/hr for severe hyponatremia   Hold Lasix - re-evaluate daily  goal euvolemia  FWB again increased to 300 mg q4h    GI prophylaxis - Transaminitis- med induced vs less likely congestive hepatopathy (RUQ US 7/30 normal). With C diff (250cc output overnight)  Monitor LFT  vital 60cc/hr  ( 21Kcal/kg )  pepcid; Stool softeners    ID: s/p cipro for E. Coli. MRSA neg 8/1.   now with C Diff dx 8/1, on vanc 500 mg q6h and flagyl 500 mg q8h for severe C. Diff with shock, end date 8/11  On meropenem for PNA for 7 days     Heme  Lov ppx     Endo:   NPH 15 q6h      Patient seen and examined by attending on 8/5/2022.    Patient is critically ill due to stroke and at high risk for neurological deterioration or death due to: requiring mechanical ventilation 2/2 neurologic injury, severe heart failure, C. Diff Assessment/Plan:   77yo man with CABG, PVD, HTN, HLD, and low EF (declined AICD), admitted 7/24 with LM1 occlusion, s/p TICI 2B MT, no tPA as wake-up stroke. Poor exam post thrombectomy, re-intubated for hypoxic respiratory failure. MRI brain with a large L MCA stroke with small area of reperfusion hemorrhage.  Hospitalization notable for acute on chronic HFrEF, LVHF <10%, reduced RV syst function, no LV thrombus on Definity, 1st degree AV block, with frequent PVCs.   Also with high urea 2/2 pre-renal state and administration of ACE inhibitor, which is likely contributing to the poor exam.    With C diff dx 8/1 and septic shock, on abx.  VEEG with rare occasional frontal sharps, mild-moderate slowing 7/29-8/2    Continuing GOC discussions: I personally met with patient's wife, patient's son Leeroy and Leeroy wife's at bedside. I went over the clinical updates and showed them the MRI which demonstrates a relatively large L MCA stroke with some hemorrhagic conversion, the later not very severe. We discussed that although the urea may be contributing, in over 10 days patient has not regained any movement on the R side and is not able to follow commands and I am worried that there is a reasonable change that he will continue to be hemiplegic and have significant aphasia going forward. We also discussed the fact that the patient is very high risk for more complications such as infections and death from his severe heart failure and bedbound state. We discussed that prolonged care would mean a trach/PEG and a nursing home. I asked the family to think about whether this is within the patient's GOC based on who he is as an individual. They will continue to think about the GOC and let the medical team know what they decide.     Plan:  neurochecks q4hr  Off sedation, fentanyl prn   On ASA 81mg daily, statin held for abnormal LFTs  with pressure ulcer    Resp- Resp failure secondary to neuro injury and heart failure   Maintain O2 > 92 %, wean FIO2 to 30 %  CPAP 10/5, 30%  CXR 8/2 wth RLL opacity, improved     Card- Ischemic cardiomyopathy HFrEF, EF 10%. With very frequent PVCs  Lopressor 50 mg q8 (previously up to 75mg q8h but decreased due to hypotension with no improvement in PVCs on the higher lopressor dose)  avoid ACE inhibitors   EPS following, patient is currently not safe for AC given large stoke with hemorrhagic conversion and very high BUN. Will get a CTH on day 14 from stroke if no new hemorrhagic conversion, will start AC.   CHF following: recs to check daily serum lactate, LFTs, T Bili  Wean Levophed - SBP > 90 or MAP > 65  On [place]in track   K>4, Mg >2  goal euvolemia, holding diuresis today as patient appears euvolemic on exam   IMTIAZ cancelled due to fever, unstable     Renal- Uremia - likely 2/2 pre- renal azothermia and contraction alkalosis (FeNa- <1- pre renal, U Cl <30, renal US no abnormalities), BUN and Cr now improving   appreciate renal recs, now on D5W 50cc/hr for severe hyponatremia   Hold Lasix - re-evaluate daily  25 % albumin 50 ccx1   goal euvolemia  FWB again increased to 300 mg q4h    GI prophylaxis - Transaminitis- med induced vs less likely congestive hepatopathy (RUQ US 7/30 normal). With C diff (250cc output overnight)  Monitor LFT  Suplena - lower protein - improve BUN   Repeat BMP at 1700   pepcid; Stool softeners    ID: s/p cipro for E. Coli. MRSA neg 8/1.   now with C Diff dx 8/1, on vanc 500 mg q6h and flagyl 500 mg q8h for severe C. Diff with shock, end date 8/11  D/C meropenem     Heme  Lov ppx     Endo:   NPH 15 q6h      Patient seen and examined by attending on 8/5/2022.    Patient is critically ill due to stroke and at high risk for neurological deterioration or death due to: requiring mechanical ventilation 2/2 neurologic injury, severe heart failure, C. Diff Assessment/Plan:   75yo man with CABG, PVD, HTN, HLD, and low EF (declined AICD), admitted 7/24 with LM1 occlusion, s/p TICI 2B MT, no tPA as wake-up stroke. Poor exam post thrombectomy, re-intubated for hypoxic respiratory failure. MRI brain with a large L MCA stroke with small area of reperfusion hemorrhage.  Hospitalization notable for acute on chronic HFrEF, LVHF <10%, reduced RV syst function, no LV thrombus on Definity, 1st degree AV block, with frequent PVCs.   Also with high urea 2/2 pre-renal state and administration of ACE inhibitor, which is likely contributing to the poor exam.    With C diff dx 8/1 and septic shock, on abx.  VEEG with rare occasional frontal sharps, mild-moderate slowing 7/29-8/2    Continuing GOC discussions: I personally met with patient's wife, patient's son Leeroy and Leeroy wife's at bedside. I went over the clinical updates and showed them the MRI which demonstrates a relatively large L MCA stroke with some hemorrhagic conversion, the later not very severe. We discussed that although the urea may be contributing, in over 10 days patient has not regained any movement on the R side and is not able to follow commands and I am worried that there is a reasonable change that he will continue to be hemiplegic and have significant aphasia going forward. We also discussed the fact that the patient is very high risk for more complications such as infections and death from his severe heart failure and bedbound state. We discussed that prolonged care would mean a trach/PEG and a nursing home. I asked the family to think about whether this is within the patient's GOC based on who he is as an individual. They will continue to think about the GOC and let the medical team know what they decide.     Plan:  neurochecks q4hr  Off sedation, fentanyl prn   On ASA 81mg daily, statin held for abnormal LFTs  with pressure ulcer    Resp- Resp failure secondary to neuro injury and heart failure   Maintain O2 > 92 %, wean FIO2 to 30 %  CPAP 10/5, 30%  CXR 8/2 wth RLL opacity, improved     Card- Ischemic cardiomyopathy HFrEF, EF 10%. With very frequent PVCs  Lopressor 50 mg q8 (previously up to 75mg q8h but decreased due to hypotension with no improvement in PVCs on the higher lopressor dose)  avoid ACE inhibitors   EPS following, patient is currently not safe for AC given large stoke with hemorrhagic conversion and very high BUN. Will get a CTH on day 14 from stroke if no new hemorrhagic conversion, will start AC.   CHF following: recs to check daily serum lactate, LFTs, T Bili  Wean Levophed - SBP > 90 or MAP > 65  On [place]in track   K>4, Mg >2  goal euvolemia, holding diuresis today as patient appears euvolemic on exam   IMTIAZ cancelled due to fever, unstable     Renal- Uremia - likely 2/2 pre- renal azothermia and contraction alkalosis (FeNa- <1- pre renal, U Cl <30, renal US no abnormalities), BUN and Cr now improving   appreciate renal recs, now on D5W 50cc/hr for severe hyponatremia   Hold Lasix - re-evaluate daily  25 % albumin 50 ccx1   goal euvolemia  FWB again increased to 300 mg q4h    GI prophylaxis - Transaminitis- med induced vs less likely congestive hepatopathy (RUQ US 7/30 normal). With C diff (250cc output overnight)  Monitor LFT  Suplena - lower protein - improve BUN   Repeat BMP at 1700   pepcid; Stool softeners    ID: s/p cipro for E. Coli. MRSA neg 8/1.   now with C Diff dx 8/1, on vanc 500 mg q6h and flagyl 500 mg q8h for severe C. Diff with shock, end date 8/11  D/C meropenem     Heme  Lov ppx     Endo: Hyperglycemia  NPH 10 q8h      Patient seen and examined by attending on 8/5/2022.    Patient is critically ill due to stroke and at high risk for neurological deterioration or death due to: requiring mechanical ventilation 2/2 neurologic injury, severe heart failure, C. Diff

## 2022-08-06 NOTE — PROGRESS NOTE ADULT - SUBJECTIVE AND OBJECTIVE BOX
76M with PMH CABG, HTN, HLD who presents as transfer from [facility] [place] for stroke. Last known well was last night 10pm prior to going to bed, woke up this morning around 0500 with R sided weakness and R facial droop. Presented to [facility], code stroke initiated, NIH at [facility] reportedly 8. CTA showed LM1 occlusion. Transferred to [facility] for possible neuro IR intervention. On arrival to [facility], NIH 6. Decision made to take patient directly to neuro IR for intervention.   NIH 6, mRS 0    Hosp Course complicated:   Decompensated ischemic cardiomyopathy  Pre- renal azothemia  Septic shock secondary to C Difficile  Encephalopathy  Cardiac arrytmia - PVC's and bigeminy  L MCA stroke    ICU Vital Signs Last 24 Hrs  T(C): 37.5 (06 Aug 2022 08:00), Max: 38.2 (05 Aug 2022 08:45)  T(F): 99.5 (06 Aug 2022 08:00), Max: 100.8 (05 Aug 2022 08:45)  HR: 98 (06 Aug 2022 08:00) (69 - 105)  BP: 140/120 (06 Aug 2022 01:30) (69/54 - 140/120)  BP(mean): 127 (06 Aug 2022 01:30) (56 - 127)  ABP: 100/59 (06 Aug 2022 08:00) (92/43 - 176/73)  ABP(mean): 77 (06 Aug 2022 08:00) (56 - 106)  RR: 24 (06 Aug 2022 08:00) (14 - 27)  SpO2: 93% (06 Aug 2022 08:00) (93% - 100%)    O2 Parameters below as of 06 Aug 2022 07:00  Patient On (Oxygen Delivery Method): ventilator        05 Aug 2022 07:01  -  06 Aug 2022 07:00  --------------------------------------------------------  IN:    Albumin 5%  - 250 mL: 250 mL    dextrose 5%: 1050 mL    dextrose 5% + sodium chloride 0.9%: 150 mL    Enteral Tube Flush: 1530 mL    IV PiggyBack: 300 mL    IV PiggyBack: 150 mL    Nepro: 1200 mL    Norepinephrine: 152.8 mL  Total IN: 4782.8 mL    OUT:    Voided (mL): 2065 mL  Total OUT: 2065 mL    Total NET: 2717.8 mL      06 Aug 2022 07:01  -  06 Aug 2022 08:23  --------------------------------------------------------  IN:    dextrose 5%: 100 mL    Nepro: 100 mL    Norepinephrine: 22.6 mL  Total IN: 222.6 mL    OUT:  Total OUT: 0 mL    Total NET: 222.6 mL      MEDICATIONS  (STANDING):  aspirin  chewable 81 milliGRAM(s) Oral daily  chlorhexidine 0.12% Liquid 15 milliLiter(s) Oral Mucosa every 12 hours  chlorhexidine 2% Cloths 1 Application(s) Topical daily  dextrose 5%. 1000 milliLiter(s) (50 mL/Hr) IV Continuous <Continuous>  dextrose 5%. 1000 milliLiter(s) (100 mL/Hr) IV Continuous <Continuous>  dextrose 5%. 1000 milliLiter(s) (50 mL/Hr) IV Continuous <Continuous>  dextrose 50% Injectable 25 Gram(s) IV Push once  enoxaparin Injectable 40 milliGRAM(s) SubCutaneous every 24 hours  famotidine Injectable 20 milliGRAM(s) IV Push every 12 hours  glucagon  Injectable 1 milliGRAM(s) IntraMuscular once  insulin lispro (ADMELOG) corrective regimen sliding scale   SubCutaneous every 4 hours  insulin NPH human recombinant 15 Unit(s) SubCutaneous every 6 hours  meropenem  IVPB 1000 milliGRAM(s) IV Intermittent every 8 hours  metoprolol tartrate 50 milliGRAM(s) Oral every 8 hours  metroNIDAZOLE  IVPB 500 milliGRAM(s) IV Intermittent every 8 hours  norepinephrine Infusion 0.05 MICROgram(s)/kG/Min (9.38 mL/Hr) IV Continuous <Continuous>  vancomycin    Solution 500 milliGRAM(s) Enteral Tube every 6 hours    MEDICATIONS  (PRN):  dextrose Oral Gel 15 Gram(s) Oral once PRN Blood Glucose LESS THAN 70 milliGRAM(s)/deciliter  fentaNYL    Injectable 25 MICROGram(s) IV Push every 4 hours PRN Agitation  ondansetron Injectable 4 milliGRAM(s) IV Push every 6 hours PRN Nausea and/or Vomiting    08-05    154<H>  |  120<H>  |  89.3<H>  ----------------------------<  215<H>  4.2   |  23.0  |  1.45<H>    Ca    8.8      05 Aug 2022 22:55  Phos  4.8     08-05  Mg     2.4     08-05    TPro  5.3<L>  /  Alb  1.8<L>  /  TBili  0.3<L>  /  DBili  x   /  AST  171<H>  /  ALT  247<H>  /  AlkPhos  232<H>  08-05      LABS:  Na: 160 (08-05 @ 02:45), 153 (08-04 @ 03:55), 152 (08-03 @ 03:51)  K: 4.4 (08-05 @ 02:45), 4.2 (08-04 @ 03:55), 4.5 (08-03 @ 03:51)  Cl: 124 (08-05 @ 02:45), 118 (08-04 @ 03:55), 117 (08-03 @ 03:51)  CO2: 25.0 (08-05 @ 02:45), 24.0 (08-04 @ 03:55), 25.0 (08-03 @ 03:51)  BUN: 86.9 (08-05 @ 02:45), 100.5 (08-04 @ 03:55), 119.5 (08-03 @ 03:51)  Cr: 0.98 (08-05 @ 02:45), 1.00 (08-04 @ 03:55), 0.97 (08-03 @ 03:51)  Glu: 243(08-05 @ 02:45), 253(08-04 @ 03:55), 315(08-03 @ 03:51)                          12.4   17.97 )-----------( 315      ( 05 Aug 2022 22:55 )             38.2       Hgb: 13.5 (08-05 @ 02:45), 13.4 (08-04 @ 03:55), 14.0 (08-03 @ 03:51)  Hct: 41.5 (08-05 @ 02:45), 40.8 (08-04 @ 03:55), 43.3 (08-03 @ 03:51)  WBC: 16.92 (08-05 @ 02:45), 17.50 (08-04 @ 03:55), 22.79 (08-03 @ 03:51)  Plt: 313 (08-05 @ 02:45), 278 (08-04 @ 03:55), 293 (08-03 @ 03:51)    < from: Xray Chest 1 View- PORTABLE-Routine (Xray Chest 1 View- PORTABLE-Routine .) (08.02.22 @ 09:41) >  ACC: [id number] EXAM:  XR CHEST PORTABLE ROUTINE 1V                          PROCEDURE DATE:  08/02/2022          INTERPRETATION:  Chest one view    HISTORY: Fever    COMPARISON STUDY: 8/1/2022    Frontal expiratory view of the chest shows the heart to be similar in   size. Endotracheal tube, sternal wires and nasogastric tube are again   noted.    The lungs show slight clearing of the lower right lung and there is no   evidence of pneumothorax nor pleural effusion.    IMPRESSION:  Right lung clearing.    : US Abdomen Upper Quadrant Right (07.30.22 @ 20:27) >      INTERPRETATION:  CLINICAL INFORMATION: Abnormal liver function tests.    COMPARISON: Renal ultrasound dated 07/26/2022.    TECHNIQUE: Sonography of the right upper quadrant.    FINDINGS:  Liver: Normal in size and echogenicity. No focal lesions identified. The   main portal vein is patent with normal directional flow.  Bile ducts: No intrahepatic biliary dilatation. Common bile duct is not   well visualized.  Gallbladder: The gallbladder is only mildly distended. No stones or   gallbladder wall thickening.  Pancreas: Poorly visualized.  Right kidney: 10.4 cm. No hydronephrosis. There is 1.9 cm simple   appearing cyst in the lower pole.  Ascites: None.  IVC: Visualized portions are within normal limits.    IMPRESSION:  The liver is normal in appearance.      CT Head No Cont (07.30.22 @ 12:47) >  IMPRESSION:  Aging inferior left frontal and inferolateral left parietal infarcts.   Mild stable cortical blood products associated with the frontal infarct.            EXAMINATION:  General:  in NAD  HEENT:  MMM  Neuro:  eye closed, opens eyes to voice, briefly attends, does not follow commands, PERRL, BTT on the L, + cough, moves L arm and leg spontaneously, no movement on the R side to noxious   Cards:  irregular   Respiratory:  no respiratory distress  Abdomen:  soft  Extremities:  no LE edema             76M with PMH CABG, HTN, HLD who presents as transfer from [facility] [place] for stroke. Last known well was last night 10pm prior to going to bed, woke up this morning around 0500 with R sided weakness and R facial droop. Presented to [facility], code stroke initiated, NIH at [facility] reportedly 8. CTA showed LM1 occlusion. Transferred to [facility] for possible neuro IR intervention. On arrival to [facility], NIH 6. Decision made to take patient directly to neuro IR for intervention.   NIH 6, mRS 0    Hosp Course complicated:   L MCA stroke  Decompensated ischemic cardiomyopathy  Pre- renal azothemia/ ATN   Septic shock secondary to C Difficile  Encephalopathy  Cardiac arrytmia - PVC's and bigeminy      ICU Vital Signs Last 24 Hrs  T(C): 37.5 (06 Aug 2022 08:00), Max: 38.2 (05 Aug 2022 08:45)  T(F): 99.5 (06 Aug 2022 08:00), Max: 100.8 (05 Aug 2022 08:45)  HR: 98 (06 Aug 2022 08:00) (69 - 105)  BP: 140/120 (06 Aug 2022 01:30) (69/54 - 140/120)  BP(mean): 127 (06 Aug 2022 01:30) (56 - 127)  ABP: 100/59 (06 Aug 2022 08:00) (92/43 - 176/73)  ABP(mean): 77 (06 Aug 2022 08:00) (56 - 106)  RR: 24 (06 Aug 2022 08:00) (14 - 27)  SpO2: 93% (06 Aug 2022 08:00) (93% - 100%)    O2 Parameters below as of 06 Aug 2022 07:00  Patient On (Oxygen Delivery Method): ventilator        05 Aug 2022 07:01  -  06 Aug 2022 07:00  --------------------------------------------------------  IN:    Albumin 5%  - 250 mL: 250 mL    dextrose 5%: 1050 mL    dextrose 5% + sodium chloride 0.9%: 150 mL    Enteral Tube Flush: 1530 mL    IV PiggyBack: 300 mL    IV PiggyBack: 150 mL    Nepro: 1200 mL    Norepinephrine: 152.8 mL  Total IN: 4782.8 mL    OUT:    Voided (mL): 2065 mL  Total OUT: 2065 mL    Total NET: 2717.8 mL      06 Aug 2022 07:01  -  06 Aug 2022 08:23  --------------------------------------------------------  IN:    dextrose 5%: 100 mL    Nepro: 100 mL    Norepinephrine: 22.6 mL  Total IN: 222.6 mL    OUT:  Total OUT: 0 mL    Total NET: 222.6 mL      MEDICATIONS  (STANDING):  aspirin  chewable 81 milliGRAM(s) Oral daily  chlorhexidine 0.12% Liquid 15 milliLiter(s) Oral Mucosa every 12 hours  chlorhexidine 2% Cloths 1 Application(s) Topical daily  dextrose 5%. 1000 milliLiter(s) (50 mL/Hr) IV Continuous <Continuous>  dextrose 5%. 1000 milliLiter(s) (100 mL/Hr) IV Continuous <Continuous>  dextrose 5%. 1000 milliLiter(s) (50 mL/Hr) IV Continuous <Continuous>  dextrose 50% Injectable 25 Gram(s) IV Push once  enoxaparin Injectable 40 milliGRAM(s) SubCutaneous every 24 hours  famotidine Injectable 20 milliGRAM(s) IV Push every 12 hours  glucagon  Injectable 1 milliGRAM(s) IntraMuscular once  insulin lispro (ADMELOG) corrective regimen sliding scale   SubCutaneous every 4 hours  insulin NPH human recombinant 15 Unit(s) SubCutaneous every 6 hours  meropenem  IVPB 1000 milliGRAM(s) IV Intermittent every 8 hours  metoprolol tartrate 50 milliGRAM(s) Oral every 8 hours  metroNIDAZOLE  IVPB 500 milliGRAM(s) IV Intermittent every 8 hours  norepinephrine Infusion 0.06 MICROgram(s)/kG/Min (9.38 mL/Hr) IV Continuous <Continuous>- stated 2 pm   vancomycin    Solution 500 milliGRAM(s) Enteral Tube every 6 hours    MEDICATIONS  (PRN):  dextrose Oral Gel 15 Gram(s) Oral once PRN Blood Glucose LESS THAN 70 milliGRAM(s)/deciliter  fentaNYL    Injectable 25 MICROGram(s) IV Push every 4 hours PRN Agitation  ondansetron Injectable 4 milliGRAM(s) IV Push every 6 hours PRN Nausea and/or Vomiting    08-06    152<H>  |  118<H>  |  90.2<H>  ----------------------------<  211<H>  4.2   |  24.0  |  1.48<H>    Ca    8.9      06 Aug 2022 08:00 Corrected Ca- 10.1  Phos  4.8     08-06  Mg     2.4     08-06    TPro  5.3<L>  /  Alb  1.8<L>  /  TBili  0.3<L>  /  DBili  x   /  AST  171<H>  /  ALT  247<H>  /  AlkPhos  232<H>  08-05        LABS:  Na: 160 (08-05 @ 02:45), 153 (08-04 @ 03:55), 152 (08-03 @ 03:51)  K: 4.4 (08-05 @ 02:45), 4.2 (08-04 @ 03:55), 4.5 (08-03 @ 03:51)  Cl: 124 (08-05 @ 02:45), 118 (08-04 @ 03:55), 117 (08-03 @ 03:51)  CO2: 25.0 (08-05 @ 02:45), 24.0 (08-04 @ 03:55), 25.0 (08-03 @ 03:51)  BUN: 86.9 (08-05 @ 02:45), 100.5 (08-04 @ 03:55), 119.5 (08-03 @ 03:51)  Cr: 0.98 (08-05 @ 02:45), 1.00 (08-04 @ 03:55), 0.97 (08-03 @ 03:51)  Glu: 243(08-05 @ 02:45), 253(08-04 @ 03:55), 315(08-03 @ 03:51)                          12.4   17.97 )-----------( 315      ( 05 Aug 2022 22:55 )             38.2       Hgb: 13.5 (08-05 @ 02:45), 13.4 (08-04 @ 03:55), 14.0 (08-03 @ 03:51)  Hct: 41.5 (08-05 @ 02:45), 40.8 (08-04 @ 03:55), 43.3 (08-03 @ 03:51)  WBC: 16.92 (08-05 @ 02:45), 17.50 (08-04 @ 03:55), 22.79 (08-03 @ 03:51)  Plt: 313 (08-05 @ 02:45), 278 (08-04 @ 03:55), 293 (08-03 @ 03:51)      Trop - neg - o.06  pro BNP- 2900     Xray Chest 1 View- PORTABLE-Routine (Xray Chest 1 View- PORTABLE-Routine .) (08.02.22 @ 09:41) >  ACC: [id number] EXAM:  XR CHEST PORTABLE ROUTINE 1V                          PROCEDURE DATE:  08/02/2022          INTERPRETATION:  Chest one view    HISTORY: Fever    COMPARISON STUDY: 8/1/2022    Frontal expiratory view of the chest shows the heart to be similar in   size. Endotracheal tube, sternal wires and nasogastric tube are again   noted.    The lungs show slight clearing of the lower right lung and there is no   evidence of pneumothorax nor pleural effusion.    IMPRESSION:  Right lung clearing.    : US Abdomen Upper Quadrant Right (07.30.22 @ 20:27) >      INTERPRETATION:  CLINICAL INFORMATION: Abnormal liver function tests.    COMPARISON: Renal ultrasound dated 07/26/2022.    TECHNIQUE: Sonography of the right upper quadrant.    FINDINGS:  Liver: Normal in size and echogenicity. No focal lesions identified. The   main portal vein is patent with normal directional flow.  Bile ducts: No intrahepatic biliary dilatation. Common bile duct is not   well visualized.  Gallbladder: The gallbladder is only mildly distended. No stones or   gallbladder wall thickening.  Pancreas: Poorly visualized.  Right kidney: 10.4 cm. No hydronephrosis. There is 1.9 cm simple   appearing cyst in the lower pole.  Ascites: None.  IVC: Visualized portions are within normal limits.    IMPRESSION:  The liver is normal in appearance.      CT Head No Cont (07.30.22 @ 12:47) >  IMPRESSION:  Aging inferior left frontal and inferolateral left parietal infarcts.   Mild stable cortical blood products associated with the frontal infarct.            EXAMINATION:  General:  in NAD  HEENT:  MMM  Neuro:  eye closed, opens eyes to voice, briefly attends, does not follow commands, PERRL, BTT on the L, + cough, moves L arm and leg spontaneously, no movement on the R side to noxious   Cards:  irregular   Respiratory:  no respiratory distress  Abdomen:  soft  Extremities:  no LE edema             76M with PMH CABG, HTN, HLD who [place] [facility] [place]. Last known well was last night 10pm prior to going to bed, woke up this morning around 0500 with R sided weakness and R facial droop. [place] [facility] [place] initiated, NIH [place] [facility] [place] 8. CTA showed LM1 occlusion. Transferred to [facility] for possible neuro IR intervention. On arrival to [facility], NIH 6. Decision made to take patient directly to neuro IR for intervention.   NIH 6, mRS 0    Hosp Course complicated:   L MCA stroke  Decompensated ischemic cardiomyopathy  Pre- renal azothemia/ ATN   Septic shock secondary to C Difficile  Encephalopathy  Cardiac arrytmia - PVC's and bigeminy      ICU Vital Signs Last 24 Hrs  T(C): 37.5 (06 Aug 2022 08:00), Max: 38.2 (05 Aug 2022 08:45)  T(F): 99.5 (06 Aug 2022 08:00), Max: 100.8 (05 Aug 2022 08:45)  HR: 98 (06 Aug 2022 08:00) (69 - 105)  BP: 140/120 (06 Aug 2022 01:30) (69/54 - 140/120)  BP(mean): 127 (06 Aug 2022 01:30) (56 - 127)  ABP: 100/59 (06 Aug 2022 08:00) (92/43 - 176/73)  ABP(mean): 77 (06 Aug 2022 08:00) (56 - 106)  RR: 24 (06 Aug 2022 08:00) (14 - 27)  SpO2: 93% (06 Aug 2022 08:00) (93% - 100%)    O2 Parameters below as of 06 Aug 2022 07:00  Patient On (Oxygen Delivery Method): ventilator        05 Aug 2022 07:01  -  06 Aug 2022 07:00  --------------------------------------------------------  IN:    Albumin 5%  - 250 mL: 250 mL    dextrose 5%: 1050 mL    dextrose 5% + sodium chloride 0.9%: 150 mL    Enteral Tube Flush: 1530 mL    IV PiggyBack: 300 mL    IV PiggyBack: 150 mL    Nepro: 1200 mL    Norepinephrine: 152.8 mL  Total IN: 4782.8 mL    OUT:    Rectal Tube (mL): 500 mL    Voided (mL): 2065 mL  Total OUT: 2565 mL    Total NET: 2217.8 mL      06 Aug 2022 07:01  -  06 Aug 2022 11:57  --------------------------------------------------------  IN:    dextrose 5%: 250 mL    Nepro: 250 mL    Norepinephrine: 56.5 mL  Total IN: 556.5 mL    OUT:    Voided (mL): 600 mL  Total OUT: 600 mL    Total NET: -43.5 mL            MEDICATIONS  (STANDING):  aspirin  chewable 81 milliGRAM(s) Oral daily  chlorhexidine 0.12% Liquid 15 milliLiter(s) Oral Mucosa every 12 hours  chlorhexidine 2% Cloths 1 Application(s) Topical daily  dextrose 5%. 1000 milliLiter(s) (50 mL/Hr) IV Continuous <Continuous>  dextrose 5%. 1000 milliLiter(s) (100 mL/Hr) IV Continuous <Continuous>  dextrose 5%. 1000 milliLiter(s) (50 mL/Hr) IV Continuous <Continuous>  dextrose 50% Injectable 25 Gram(s) IV Push once  enoxaparin Injectable 40 milliGRAM(s) SubCutaneous every 24 hours  famotidine Injectable 20 milliGRAM(s) IV Push every 12 hours  glucagon  Injectable 1 milliGRAM(s) IntraMuscular once  insulin lispro (ADMELOG) corrective regimen sliding scale   SubCutaneous every 4 hours  insulin NPH human recombinant 15 Unit(s) SubCutaneous every 6 hours  meropenem  IVPB 1000 milliGRAM(s) IV Intermittent every 8 hours  metoprolol tartrate 50 milliGRAM(s) Oral every 8 hours  metroNIDAZOLE  IVPB 500 milliGRAM(s) IV Intermittent every 8 hours  norepinephrine Infusion 0.06 MICROgram(s)/kG/Min (9.38 mL/Hr) IV Continuous <Continuous>- stated 2 pm   vancomycin    Solution 500 milliGRAM(s) Enteral Tube every 6 hours    MEDICATIONS  (PRN):  dextrose Oral Gel 15 Gram(s) Oral once PRN Blood Glucose LESS THAN 70 milliGRAM(s)/deciliter  fentaNYL    Injectable 25 MICROGram(s) IV Push every 4 hours PRN Agitation  ondansetron Injectable 4 milliGRAM(s) IV Push every 6 hours PRN Nausea and/or Vomiting    08-06    152<H>  |  118<H>  |  90.2<H>  ----------------------------<  211<H>  4.2   |  24.0  |  1.48<H>    Ca    8.9      06 Aug 2022 08:00 Corrected Ca- 10.1  Phos  4.8     08-06  Mg     2.4     08-06    TPro  5.3<L>  /  Alb  1.8<L>  /  TBili  0.3<L>  /  DBili  x   /  AST  171<H>  /  ALT  247<H>  /  AlkPhos  232<H>  08-05        LABS:  Na: 160 (08-05 @ 02:45), 153 (08-04 @ 03:55), 152 (08-03 @ 03:51)  K: 4.4 (08-05 @ 02:45), 4.2 (08-04 @ 03:55), 4.5 (08-03 @ 03:51)  Cl: 124 (08-05 @ 02:45), 118 (08-04 @ 03:55), 117 (08-03 @ 03:51)  CO2: 25.0 (08-05 @ 02:45), 24.0 (08-04 @ 03:55), 25.0 (08-03 @ 03:51)  BUN: 86.9 (08-05 @ 02:45), 100.5 (08-04 @ 03:55), 119.5 (08-03 @ 03:51)  Cr: 0.98 (08-05 @ 02:45), 1.00 (08-04 @ 03:55), 0.97 (08-03 @ 03:51)  Glu: 243(08-05 @ 02:45), 253(08-04 @ 03:55), 315(08-03 @ 03:51)                          12.4   17.97 )-----------( 315      ( 05 Aug 2022 22:55 )             38.2       Hgb: 13.5 (08-05 @ 02:45), 13.4 (08-04 @ 03:55), 14.0 (08-03 @ 03:51)  Hct: 41.5 (08-05 @ 02:45), 40.8 (08-04 @ 03:55), 43.3 (08-03 @ 03:51)  WBC: 16.92 (08-05 @ 02:45), 17.50 (08-04 @ 03:55), 22.79 (08-03 @ 03:51)  Plt: 313 (08-05 @ 02:45), 278 (08-04 @ 03:55), 293 (08-03 @ 03:51)      Trop - neg - o.06  pro BNP- 2900     Xray Chest 1 View- PORTABLE-Routine (Xray Chest 1 View- PORTABLE-Routine .) (08.02.22 @ 09:41) >  ACC: [id number] EXAM:  XR CHEST PORTABLE ROUTINE 1V                          PROCEDURE DATE:  08/02/2022          INTERPRETATION:  Chest one view    HISTORY: Fever    COMPARISON STUDY: 8/1/2022    Frontal expiratory view of the chest shows the heart to be similar in   size. Endotracheal tube, sternal wires and nasogastric tube are again   noted.    The lungs show slight clearing of the lower right lung and there is no   evidence of pneumothorax nor pleural effusion.    IMPRESSION:  Right lung clearing.    : US Abdomen Upper Quadrant Right (07.30.22 @ 20:27) >      INTERPRETATION:  CLINICAL INFORMATION: Abnormal liver function tests.    COMPARISON: Renal ultrasound dated 07/26/2022.    TECHNIQUE: Sonography of the right upper quadrant.    FINDINGS:  Liver: Normal in size and echogenicity. No focal lesions identified. The   main portal vein is patent with normal directional flow.  Bile ducts: No intrahepatic biliary dilatation. Common bile duct is not   well visualized.  Gallbladder: The gallbladder is only mildly distended. No stones or   gallbladder wall thickening.  Pancreas: Poorly visualized.  Right kidney: 10.4 cm. No hydronephrosis. There is 1.9 cm simple   appearing cyst in the lower pole.  Ascites: None.  IVC: Visualized portions are within normal limits.    IMPRESSION:  The liver is normal in appearance.      CT Head No Cont (07.30.22 @ 12:47) >  IMPRESSION:  Aging inferior left frontal and inferolateral left parietal infarcts.   Mild stable cortical blood products associated with the frontal infarct.            EXAMINATION:  General:  in NAD  HEENT:  MMM  Neuro:  eye closed, opens eyes to voice, briefly attends, does not follow commands, PERRL, BTT on the L, + cough, moves L arm and leg spontaneously, no movement on the R side to noxious   Cards:  irregular   Respiratory:  no respiratory distress  Abdomen:  soft  Extremities:  no LE edema             76M with PMH CABG, HTN, HLD who presents as transfer from [facility] [place] for stroke. Last known well was last night 10pm prior to going to bed, woke up this morning around 0500 with R sided weakness and R facial droop. Presented to [facility], code stroke initiated, NIH at [facility] reportedly 8. CTA showed LM1 occlusion. Transferred to [facility] for possible neuro IR intervention. On arrival to [facility], NIH 6. Decision made to take patient directly to neuro IR for intervention.   NIH 6, mRS 0    Hosp Course complicated:   L MCA stroke  Decompensated ischemic cardiomyopathy  Pre- renal azothemia/ ATN   Septic shock secondary to C Difficile  Encephalopathy  Cardiac arrytmia - PVC's and bigeminy      ICU Vital Signs Last 24 Hrs  T(C): 37.5 (06 Aug 2022 08:00), Max: 38.2 (05 Aug 2022 08:45)  T(F): 99.5 (06 Aug 2022 08:00), Max: 100.8 (05 Aug 2022 08:45)  HR: 98 (06 Aug 2022 08:00) (69 - 105)  BP: 140/120 (06 Aug 2022 01:30) (69/54 - 140/120)  BP(mean): 127 (06 Aug 2022 01:30) (56 - 127)  ABP: 100/59 (06 Aug 2022 08:00) (92/43 - 176/73)  ABP(mean): 77 (06 Aug 2022 08:00) (56 - 106)  RR: 24 (06 Aug 2022 08:00) (14 - 27)  SpO2: 93% (06 Aug 2022 08:00) (93% - 100%)    O2 Parameters below as of 06 Aug 2022 07:00  Patient On (Oxygen Delivery Method): ventilator        05 Aug 2022 07:01  -  06 Aug 2022 07:00  --------------------------------------------------------  IN:    Albumin 5%  - 250 mL: 250 mL    dextrose 5%: 1050 mL    dextrose 5% + sodium chloride 0.9%: 150 mL    Enteral Tube Flush: 1530 mL    IV PiggyBack: 300 mL    IV PiggyBack: 150 mL    Nepro: 1200 mL    Norepinephrine: 152.8 mL  Total IN: 4782.8 mL    OUT:    Rectal Tube (mL): 500 mL    Voided (mL): 2065 mL  Total OUT: 2565 mL    Total NET: 2217.8 mL      06 Aug 2022 07:01  -  06 Aug 2022 11:57  --------------------------------------------------------  IN:    dextrose 5%: 250 mL    Nepro: 250 mL    Norepinephrine: 56.5 mL  Total IN: 556.5 mL    OUT:    Voided (mL): 600 mL  Total OUT: 600 mL    Total NET: -43.5 mL            MEDICATIONS  (STANDING):  aspirin  chewable 81 milliGRAM(s) Oral daily  chlorhexidine 0.12% Liquid 15 milliLiter(s) Oral Mucosa every 12 hours  chlorhexidine 2% Cloths 1 Application(s) Topical daily  dextrose 5%. 1000 milliLiter(s) (50 mL/Hr) IV Continuous <Continuous>  dextrose 5%. 1000 milliLiter(s) (100 mL/Hr) IV Continuous <Continuous>  dextrose 5%. 1000 milliLiter(s) (50 mL/Hr) IV Continuous <Continuous>  dextrose 50% Injectable 25 Gram(s) IV Push once  enoxaparin Injectable 40 milliGRAM(s) SubCutaneous every 24 hours  famotidine Injectable 20 milliGRAM(s) IV Push every 12 hours  glucagon  Injectable 1 milliGRAM(s) IntraMuscular once  insulin lispro (ADMELOG) corrective regimen sliding scale   SubCutaneous every 4 hours  insulin NPH human recombinant 15 Unit(s) SubCutaneous every 6 hours  meropenem  IVPB 1000 milliGRAM(s) IV Intermittent every 8 hours  metoprolol tartrate 50 milliGRAM(s) Oral every 8 hours  metroNIDAZOLE  IVPB 500 milliGRAM(s) IV Intermittent every 8 hours  norepinephrine Infusion 0.06 MICROgram(s)/kG/Min (9.38 mL/Hr) IV Continuous <Continuous>- stated 2 pm   vancomycin    Solution 500 milliGRAM(s) Enteral Tube every 6 hours    MEDICATIONS  (PRN):  dextrose Oral Gel 15 Gram(s) Oral once PRN Blood Glucose LESS THAN 70 milliGRAM(s)/deciliter  fentaNYL    Injectable 25 MICROGram(s) IV Push every 4 hours PRN Agitation  ondansetron Injectable 4 milliGRAM(s) IV Push every 6 hours PRN Nausea and/or Vomiting    08-06 08-06    152<H>  |  118<H>  |  88.0<H>  ----------------------------<  120<H>  4.1   |  23.0  |  1.54<H>    Ca    8.4      06 Aug 2022 17:04  Phos  4.8     08-06  Mg     2.4     08-06    TPro  5.3<L>  /  Alb  1.8<L>  /  TBili  0.3<L>  /  DBili  x   /  AST  171<H>  /  ALT  247<H>  /  AlkPhos  232<H>  08-05      152<H>  |  118<H>  |  90.2<H>  ----------------------------<  211<H>  4.2   |  24.0  |  1.48<H>    Ca    8.9      06 Aug 2022 08:00 Corrected Ca- 10.1  Phos  4.8     08-06  Mg     2.4     08-06    TPro  5.3<L>  /  Alb  1.8<L>  /  TBili  0.3<L>  /  DBili  x   /  AST  171<H>  /  ALT  247<H>  /  AlkPhos  232<H>  08-05        LABS:  Na: 160 (08-05 @ 02:45), 153 (08-04 @ 03:55), 152 (08-03 @ 03:51)  K: 4.4 (08-05 @ 02:45), 4.2 (08-04 @ 03:55), 4.5 (08-03 @ 03:51)  Cl: 124 (08-05 @ 02:45), 118 (08-04 @ 03:55), 117 (08-03 @ 03:51)  CO2: 25.0 (08-05 @ 02:45), 24.0 (08-04 @ 03:55), 25.0 (08-03 @ 03:51)  BUN: 86.9 (08-05 @ 02:45), 100.5 (08-04 @ 03:55), 119.5 (08-03 @ 03:51)  Cr: 0.98 (08-05 @ 02:45), 1.00 (08-04 @ 03:55), 0.97 (08-03 @ 03:51)  Glu: 243(08-05 @ 02:45), 253(08-04 @ 03:55), 315(08-03 @ 03:51)                          12.4   17.97 )-----------( 315      ( 05 Aug 2022 22:55 )             38.2       Hgb: 13.5 (08-05 @ 02:45), 13.4 (08-04 @ 03:55), 14.0 (08-03 @ 03:51)  Hct: 41.5 (08-05 @ 02:45), 40.8 (08-04 @ 03:55), 43.3 (08-03 @ 03:51)  WBC: 16.92 (08-05 @ 02:45), 17.50 (08-04 @ 03:55), 22.79 (08-03 @ 03:51)  Plt: 313 (08-05 @ 02:45), 278 (08-04 @ 03:55), 293 (08-03 @ 03:51)      Trop - neg - o.06  pro BNP- 2900     Xray Chest 1 View- PORTABLE-Routine (Xray Chest 1 View- PORTABLE-Routine .) (08.02.22 @ 09:41) >  ACC: [id number] EXAM:  XR CHEST PORTABLE ROUTINE 1V                          PROCEDURE DATE:  08/02/2022          INTERPRETATION:  Chest one view    HISTORY: Fever    COMPARISON STUDY: 8/1/2022    Frontal expiratory view of the chest shows the heart to be similar in   size. Endotracheal tube, sternal wires and nasogastric tube are again   noted.    The lungs show slight clearing of the lower right lung and there is no   evidence of pneumothorax nor pleural effusion.    IMPRESSION:  Right lung clearing.    : US Abdomen Upper Quadrant Right (07.30.22 @ 20:27) >      INTERPRETATION:  CLINICAL INFORMATION: Abnormal liver function tests.    COMPARISON: Renal ultrasound dated 07/26/2022.    TECHNIQUE: Sonography of the right upper quadrant.    FINDINGS:  Liver: Normal in size and echogenicity. No focal lesions identified. The   main portal vein is patent with normal directional flow.  Bile ducts: No intrahepatic biliary dilatation. Common bile duct is not   well visualized.  Gallbladder: The gallbladder is only mildly distended. No stones or   gallbladder wall thickening.  Pancreas: Poorly visualized.  Right kidney: 10.4 cm. No hydronephrosis. There is 1.9 cm simple   appearing cyst in the lower pole.  Ascites: None.  IVC: Visualized portions are within normal limits.    IMPRESSION:  The liver is normal in appearance.      CT Head No Cont (07.30.22 @ 12:47) >  IMPRESSION:  Aging inferior left frontal and inferolateral left parietal infarcts.   Mild stable cortical blood products associated with the frontal infarct.            EXAMINATION:  General:  in NAD  HEENT:  MMM  Neuro:  eye closed, opens eyes to voice, briefly attends, does not follow commands, PERRL, BTT on the L, + cough, moves L arm and leg spontaneously, no movement on the R side to noxious   Cards:  irregular   Respiratory:  no respiratory distress  Abdomen:  soft  Extremities:  no LE edema

## 2022-08-06 NOTE — PROGRESS NOTE ADULT - ASSESSMENT
The patient is a 76 year old male with past medical history of HTN, HLD, CAD s/p CABG, carotid stenosis, and PVD who was transferred from an outside hospital for neuro IR intervention after waking up with R sided facial droop and R sided weakness; found to have left M1 occlusion on CTA s/p thrombectomy which is complicated by hemorrhagic conversion. Hospital course notable for acute hypoxic respiratory failure post procedure s/p mechanical ventilation; ischemic cardiomyopathy, LVEF<10% and moderately reduced RV function. Also with E coli UTI, KE, C. diff. Nephrology is managing KE.    -Baseline creatinine ranges 0.6-0.7mg/dL   -Notable for acute kidney injury starting on 07/30/2022; creatinine peaked at 1.2mg/dL, lately improved to 0.9-1.0mg/dL, however back up to 1.4mg/dL today  -Suspecting KE to be from decreased effective circulating volume (as the patient is back on pressors this a.m.)    -UA (prior to onset of KE) showed specific gravity 1.025, +protein, +nitrite, +leukocyte esterase, moderate blood, 6-10RBC, 26-50 WBC, TNTC bacteria  -Urine culture is positive for E coli   -R abdominal U/S on 07/30/2022 showed R kidney 10.4cm with simple cyst without hydronephrosis    -Agree with administration of colloid (albumin) for intravascular filling   -Notable for persistent hyperNa - DI unlikely as no longer polyuria (had one isolate episode of 3L+ of polyuria) - over the past 24 hours the patient had 2L urine output  -Suspecting hyperNa to stem from solute diuresis from tube feeds + hyperglycemia superimposed on insensible volume loss (on vent)   -Currently on D5W; agree with increase in free water flushes   -Favor holding diuretics at this time     Kwame West, DO  Nephrology

## 2022-08-06 NOTE — PROGRESS NOTE ADULT - SUBJECTIVE AND OBJECTIVE BOX
Neurology   NATANAEL ROWAN 76y Male       HPI:  76M with PMH CABG, HTN, HLD who presents as transfer from [facility] for stroke. Last known well was last night 10pm prior to going to bed, woke up this morning around 0500 with R sided weakness and R facial droop. Presented to [facility], code stroke initiated, NIH at [facility] reportedly 8. CTA showed LM1 occlusion. Transferred to [facility] for possible neuro IR intervention. On arrival to [facility], NIH 6. Decision made to take patient directly to neuro IR for intervention.     Initial NIH 6, mRS 0    PMH: HTN (hypertension)    HLD (hyperlipidemia)    S/P CABG x 1         PSH:       FAMILY HISTORY:      SOCIAL HISTORY:  No history of tobacco or alcohol use     Allergies    No Known Allergies    Intolerances      Vital Signs Last 24 Hrs  T(C): 37.7 (06 Aug 2022 17:30), Max: 38.2 (06 Aug 2022 14:30)  T(F): 99.9 (06 Aug 2022 17:30), Max: 100.8 (06 Aug 2022 14:30)  HR: 103 (06 Aug 2022 17:30) (69 - 110)  BP: 140/120 (06 Aug 2022 01:30) (69/54 - 140/120)  BP(mean): 127 (06 Aug 2022 01:30) (56 - 127)  RR: 22 (06 Aug 2022 17:30) (14 - 30)  SpO2: 96% (06 Aug 2022 17:30) (93% - 100%)    Parameters below as of 06 Aug 2022 17:00      O2 Concentration (%): 30    MEDICATIONS    aspirin  chewable 81 milliGRAM(s) Oral daily  chlorhexidine 0.12% Liquid 15 milliLiter(s) Oral Mucosa every 12 hours  chlorhexidine 2% Cloths 1 Application(s) Topical daily  dextrose 5%. 1000 milliLiter(s) IV Continuous <Continuous>  dextrose 5%. 1000 milliLiter(s) IV Continuous <Continuous>  dextrose 5%. 1000 milliLiter(s) IV Continuous <Continuous>  dextrose 50% Injectable 25 Gram(s) IV Push once  dextrose Oral Gel 15 Gram(s) Oral once PRN  enoxaparin Injectable 40 milliGRAM(s) SubCutaneous every 24 hours  famotidine Injectable 20 milliGRAM(s) IV Push every 12 hours  fentaNYL    Injectable 25 MICROGram(s) IV Push every 4 hours PRN  glucagon  Injectable 1 milliGRAM(s) IntraMuscular once  insulin lispro (ADMELOG) corrective regimen sliding scale   SubCutaneous every 4 hours  insulin NPH human recombinant 15 Unit(s) SubCutaneous every 6 hours  metoprolol tartrate 50 milliGRAM(s) Oral every 8 hours  metroNIDAZOLE  IVPB 500 milliGRAM(s) IV Intermittent every 8 hours  norepinephrine Infusion 0.05 MICROgram(s)/kG/Min IV Continuous <Continuous>  ondansetron Injectable 4 milliGRAM(s) IV Push every 6 hours PRN  vancomycin    Solution 500 milliGRAM(s) Enteral Tube every 6 hours         LABS:  CBC Full  -  ( 06 Aug 2022 17:04 )  WBC Count : 16.20 K/uL  RBC Count : 3.73 M/uL  Hemoglobin : 11.6 g/dL  Hematocrit : 35.1 %  Platelet Count - Automated : 263 K/uL  Mean Cell Volume : 94.1 fl  Mean Cell Hemoglobin : 31.1 pg  Mean Cell Hemoglobin Concentration : 33.0 gm/dL  Auto Neutrophil # : 13.59 K/uL  Auto Lymphocyte # : 0.83 K/uL  Auto Monocyte # : 0.79 K/uL  Auto Eosinophil # : 0.74 K/uL  Auto Basophil # : 0.08 K/uL  Auto Neutrophil % : 83.9 %  Auto Lymphocyte % : 5.1 %  Auto Monocyte % : 4.9 %  Auto Eosinophil % : 4.6 %  Auto Basophil % : 0.5 %      08-06    152<H>  |  118<H>  |  88.0<H>  ----------------------------<  120<H>  4.1   |  23.0  |  1.54<H>    Ca    8.4      06 Aug 2022 17:04  Phos  4.8     08-06  Mg     2.4     08-06    TPro  5.3<L>  /  Alb  1.8<L>  /  TBili  0.3<L>  /  DBili  x   /  AST  171<H>  /  ALT  247<H>  /  AlkPhos  232<H>  08-05    LIVER FUNCTIONS - ( 05 Aug 2022 22:55 )  Alb: 1.8 g/dL / Pro: 5.3 g/dL / ALK PHOS: 232 U/L / ALT: 247 U/L / AST: 171 U/L / GGT: x           Hemoglobin A1C:     On Neurological Examination:  Patient is intubated off sedation    Mental Status -   There is spontaneous  Eye opening noted. Not following commands  Cranial Nerves -Pupils are 2 and reactive. , EOMs are conjugate in primary gaze and on occulocephalics.  He blinks to threat on the left side.  Cough reflex is present .  Motor Exam -   Right side trace movement. Left UE localizes to noxious stim. LLE moved spontaneously.         RADIOLOGY ( All neurological imaging studies were independently reviewed and interpreted by me)  CTH   CTA  CTP    IMPRESSION:    CT PERFUSION:  The CT perfusion is technically limited by truncation of the arterial input function and venous outflow function.    Core infarct (CBF < 30%:): 0 ml  Penumbra (Tmax >6s): 4 mL  Mismatched volume: 0 ml  Mismatched ratio: None    Interpretation:  Based on CBF < 30%, there is no evidence of a core infarct. At CBF < 34%, a small perfusion defect of 4 mL is located in the left inferior frontal gyrus and corresponds to matched perfusion abnormality of Tmax >6 seconds. On Tmax > 4s, there is a much larger perfusion defect throughout the majority of the left frontoparietal convexity, which may represent an ischemic penumbra in the left MCA vascular territory.        CT ANGIOGRAPHY NECK:  1. Poor opacification of the cervical vasculature.  2. Suspicion for moderate greater than 50% stenosis in the proximal right internal carotid artery. Suspicion for moderate to severe stenosis in the proximal left internal carotid artery that may be greater than 70%. No evidence of ICA occlusion in the neck.  3. The right vertebral artery is grossly patent.. The V1 segment of the left vertebral artery cannot be visualized. The V2 and V3 segments the left vertebral artery are grossly patent with multiple stenoses.  4. There is limited visualization of the common carotid artery origins and subclavian artery origins. Underlying stenoses cannot be excluded. There is suspicion for a severe stenosis in the proximal left subclavian artery.      CT ANGIOGRAPHY BRAIN:  1. Poor opacification of the intracranial vasculature.  2. There appears to be a focal filling defect at the left MCA bifurcation with distal flow. There is marked attenuation of M2 branches of the left middle cerebral artery. Vessel density analysis of the MCA territory shows a greater than 50% reduction of vessel density in the left MCA territory compared to the contralateral side.  3. There are mild to moderate stenoses in the supraclinoid segments of the internal carotid arteries bilaterally, without occlusion.  4. There are stenoses in the intradural vertebral arteries bilaterally, without occlusion.    Repeat CTA or MRI/MRA is recommended for further evaluation.  Peak arterial opacification on the CT perfusion occurs at approximately 38 seconds. If the CTA is repeated, a long delay is required after contrast injection to achieve adequate opacification of the vasculature.    The findings were discussed with JACK Light in the emergency room on 07/24/2022 at 5:45 AM. Read back verification was obtained.      SEVERO CHANEL MD; Attending Radiologist  This document has been electronically signed    MRI:    MR Head No Cont (07.26.22 @ 20:58) >  IMPRESSION:    Acute left MCA territory infarcts with hemorrhagic transformation,   grossly stable since comparison CT.    Additional punctate acute infarct involving the medial left frontal lobe   in the SUGEY territory.    ORION ANDRADE MD; Attending Radiologist        TTE  TTE Echo Complete w/ Contrast w/ Doppler (07.24.22 @ 14:01) >    Summary:   1. Endocardial visualization was enhanced with intravenous echo contrast.   2. Severely enlarged left atrium.   3. Global diffuse akinesis. Left ventricular ejection fraction, by   visual estimation, is <10%.   4. Elevated mean left atrial pressure. (>30 mm Hg)   5. Normal right atrial size.   6. Mildly enlarged right ventricle. Moderately reduced RV systolic   function.   7. Mild mitral valve regurgitation.   8. Mild tricuspid regurgitation.   9. Estimated pulmonary artery systolic pressure is 42 mmHg - mild   pulmonary hypertension.  10. There is no evidence of pericardial effusion.  11. Team informed of the findings.    MD Millie, RPVI Electronically signed on 7/24/2022 at 3:49:14 PM      < from: CT Head No Cont (07.30.22 @ 12:47) >    IMPRESSION:  Aging inferior left frontal and inferolateral left parietal infarcts.   Mild stable cortical blood products associated with the frontal infarct.    VERNELL LOYA MD; Attending Radiologist          EEG IMPRESSION/CLINICAL CORRELATE 7/31/22    Abnormal EEG study.  1. Potential epileptogenic foci in the bilateral frontotemporal regions.   2. Structural abnormality and cortical dysfunction in the left frontotemporal region.   3. Mild to moderate nonspecific diffuse or multifocal cerebral dysfunction.   4. No seizure seen.     _____________________________________________________________    Mayi Prather MD  Director, Epilepsy/EMU - [facility]       Electronic Signatures:  Mayi Prather (MD)  (Signed 31-Jul-2022 09:06)EEG IMPRESSION/CLINICAL CORRELATE

## 2022-08-06 NOTE — PROGRESS NOTE ADULT - SUBJECTIVE AND OBJECTIVE BOX
On levophed this a.m. Remains hypernatremic.     Physical Exam  General: WDWN male in NAD  HEENT: Intubated  Cardiac: S1S2 RRR  Respiratory: Transmitted breath sounds  Abdomen: Soft, NT  Extremities: No appreciable edema  Neuro: Responsive to touch

## 2022-08-06 NOTE — PROGRESS NOTE ADULT - ASSESSMENT
IMPRESSION:  - Left MCA Stroke.  S/P suction thrombectomy for L MCA M1 occlusion with TICI 2b reperfusion. on 7-24-22.    Mechanism ESUS  cardioembolic versus Large artery atherosclerosis    ASSESSMENT/ PLAN:     - Neuro checks and vital signs Q 2 hours.  - SBP goal keep normotensive.  - ASA 81 mg PO or 300 KS QD.   - CT Head of 7-29-22 reviewed. Stable left frontal hemorrhagic products within the infarct.    - Lipitor for LDL goal of < 70 .  - EEG -Report as above reviewed.   - CT/CTA/CTP -images and reports were reviewed.   - MRI Brain stroke protocol  -images and reports were reviewed. .  - Will need anticoagulation long term. Repeat CT head on 8-8-22 if stable can start DOAC.  - TTE  -Reports as above were reviewed. . EF < 10%.  - IMTIAZ (  postponed due to fever)  - SCD/ SQ Lovenox for DVT prophylaxis.

## 2022-08-07 LAB
ALBUMIN SERPL ELPH-MCNC: 2.2 G/DL — LOW (ref 3.3–5.2)
ALP SERPL-CCNC: 207 U/L — HIGH (ref 40–120)
ALT FLD-CCNC: 168 U/L — HIGH
ANION GAP SERPL CALC-SCNC: 11 MMOL/L — SIGNIFICANT CHANGE UP (ref 5–17)
ANION GAP SERPL CALC-SCNC: 13 MMOL/L — SIGNIFICANT CHANGE UP (ref 5–17)
AST SERPL-CCNC: 119 U/L — HIGH
BILIRUB DIRECT SERPL-MCNC: 0.2 MG/DL — SIGNIFICANT CHANGE UP (ref 0–0.3)
BILIRUB INDIRECT FLD-MCNC: 0.3 MG/DL — SIGNIFICANT CHANGE UP (ref 0.2–1)
BILIRUB SERPL-MCNC: 0.5 MG/DL — SIGNIFICANT CHANGE UP (ref 0.4–2)
BUN SERPL-MCNC: 90.8 MG/DL — HIGH (ref 8–20)
BUN SERPL-MCNC: 92.7 MG/DL — HIGH (ref 8–20)
CALCIUM SERPL-MCNC: 8.2 MG/DL — LOW (ref 8.4–10.5)
CALCIUM SERPL-MCNC: 8.7 MG/DL — SIGNIFICANT CHANGE UP (ref 8.4–10.5)
CHLORIDE SERPL-SCNC: 113 MMOL/L — HIGH (ref 98–107)
CHLORIDE SERPL-SCNC: 113 MMOL/L — HIGH (ref 98–107)
CO2 SERPL-SCNC: 21 MMOL/L — LOW (ref 22–29)
CO2 SERPL-SCNC: 22 MMOL/L — SIGNIFICANT CHANGE UP (ref 22–29)
CREAT SERPL-MCNC: 1.98 MG/DL — HIGH (ref 0.5–1.3)
CREAT SERPL-MCNC: 2.16 MG/DL — HIGH (ref 0.5–1.3)
CULTURE RESULTS: SIGNIFICANT CHANGE UP
CULTURE RESULTS: SIGNIFICANT CHANGE UP
EGFR: 31 ML/MIN/1.73M2 — LOW
EGFR: 34 ML/MIN/1.73M2 — LOW
GLUCOSE BLDC GLUCOMTR-MCNC: 142 MG/DL — HIGH (ref 70–99)
GLUCOSE BLDC GLUCOMTR-MCNC: 203 MG/DL — HIGH (ref 70–99)
GLUCOSE BLDC GLUCOMTR-MCNC: 212 MG/DL — HIGH (ref 70–99)
GLUCOSE BLDC GLUCOMTR-MCNC: 213 MG/DL — HIGH (ref 70–99)
GLUCOSE BLDC GLUCOMTR-MCNC: 242 MG/DL — HIGH (ref 70–99)
GLUCOSE BLDC GLUCOMTR-MCNC: 266 MG/DL — HIGH (ref 70–99)
GLUCOSE SERPL-MCNC: 213 MG/DL — HIGH (ref 70–99)
GLUCOSE SERPL-MCNC: 274 MG/DL — HIGH (ref 70–99)
HCT VFR BLD CALC: 35.3 % — LOW (ref 39–50)
HGB BLD-MCNC: 11.7 G/DL — LOW (ref 13–17)
LACTATE SERPL-SCNC: 1 MMOL/L — SIGNIFICANT CHANGE UP (ref 0.5–2)
MAGNESIUM SERPL-MCNC: 2.4 MG/DL — SIGNIFICANT CHANGE UP (ref 1.6–2.6)
MCHC RBC-ENTMCNC: 31 PG — SIGNIFICANT CHANGE UP (ref 27–34)
MCHC RBC-ENTMCNC: 33.1 GM/DL — SIGNIFICANT CHANGE UP (ref 32–36)
MCV RBC AUTO: 93.6 FL — SIGNIFICANT CHANGE UP (ref 80–100)
PHOSPHATE SERPL-MCNC: 5.8 MG/DL — HIGH (ref 2.4–4.7)
PLATELET # BLD AUTO: 251 K/UL — SIGNIFICANT CHANGE UP (ref 150–400)
POTASSIUM SERPL-MCNC: 4.4 MMOL/L — SIGNIFICANT CHANGE UP (ref 3.5–5.3)
POTASSIUM SERPL-MCNC: 4.6 MMOL/L — SIGNIFICANT CHANGE UP (ref 3.5–5.3)
POTASSIUM SERPL-SCNC: 4.4 MMOL/L — SIGNIFICANT CHANGE UP (ref 3.5–5.3)
POTASSIUM SERPL-SCNC: 4.6 MMOL/L — SIGNIFICANT CHANGE UP (ref 3.5–5.3)
PROT SERPL-MCNC: 5.7 G/DL — LOW (ref 6.6–8.7)
RBC # BLD: 3.77 M/UL — LOW (ref 4.2–5.8)
RBC # FLD: 15.9 % — HIGH (ref 10.3–14.5)
SODIUM SERPL-SCNC: 146 MMOL/L — HIGH (ref 135–145)
SODIUM SERPL-SCNC: 147 MMOL/L — HIGH (ref 135–145)
SPECIMEN SOURCE: SIGNIFICANT CHANGE UP
SPECIMEN SOURCE: SIGNIFICANT CHANGE UP
WBC # BLD: 14.74 K/UL — HIGH (ref 3.8–10.5)
WBC # FLD AUTO: 14.74 K/UL — HIGH (ref 3.8–10.5)

## 2022-08-07 PROCEDURE — 99232 SBSQ HOSP IP/OBS MODERATE 35: CPT

## 2022-08-07 RX ORDER — VANCOMYCIN HCL 1 G
250 VIAL (EA) INTRAVENOUS EVERY 6 HOURS
Refills: 0 | Status: COMPLETED | OUTPATIENT
Start: 2022-08-07 | End: 2022-08-11

## 2022-08-07 RX ORDER — ALBUMIN HUMAN 25 %
250 VIAL (ML) INTRAVENOUS ONCE
Refills: 0 | Status: COMPLETED | OUTPATIENT
Start: 2022-08-07 | End: 2022-08-07

## 2022-08-07 RX ORDER — NOREPINEPHRINE BITARTRATE/D5W 8 MG/250ML
0.05 PLASTIC BAG, INJECTION (ML) INTRAVENOUS
Qty: 8 | Refills: 0 | Status: DISCONTINUED | OUTPATIENT
Start: 2022-08-07 | End: 2022-08-07

## 2022-08-07 RX ORDER — FENTANYL CITRATE 50 UG/ML
50 INJECTION INTRAVENOUS ONCE
Refills: 0 | Status: DISCONTINUED | OUTPATIENT
Start: 2022-08-07 | End: 2022-08-07

## 2022-08-07 RX ORDER — NITROGLYCERIN 6.5 MG
1 CAPSULE, EXTENDED RELEASE ORAL ONCE
Refills: 0 | Status: COMPLETED | OUTPATIENT
Start: 2022-08-07 | End: 2022-08-07

## 2022-08-07 RX ORDER — BACITRACIN ZINC 500 UNIT/G
1 OINTMENT IN PACKET (EA) TOPICAL DAILY
Refills: 0 | Status: DISCONTINUED | OUTPATIENT
Start: 2022-08-07 | End: 2022-08-09

## 2022-08-07 RX ADMIN — FAMOTIDINE 20 MILLIGRAM(S): 10 INJECTION INTRAVENOUS at 23:08

## 2022-08-07 RX ADMIN — HUMAN INSULIN 10 UNIT(S): 100 INJECTION, SUSPENSION SUBCUTANEOUS at 23:11

## 2022-08-07 RX ADMIN — HUMAN INSULIN 10 UNIT(S): 100 INJECTION, SUSPENSION SUBCUTANEOUS at 07:26

## 2022-08-07 RX ADMIN — Medication 4: at 23:12

## 2022-08-07 RX ADMIN — Medication 4: at 12:42

## 2022-08-07 RX ADMIN — Medication 250 MILLIGRAM(S): at 12:40

## 2022-08-07 RX ADMIN — FAMOTIDINE 20 MILLIGRAM(S): 10 INJECTION INTRAVENOUS at 12:40

## 2022-08-07 RX ADMIN — CHLORHEXIDINE GLUCONATE 15 MILLILITER(S): 213 SOLUTION TOPICAL at 05:09

## 2022-08-07 RX ADMIN — CHLORHEXIDINE GLUCONATE 1 APPLICATION(S): 213 SOLUTION TOPICAL at 12:40

## 2022-08-07 RX ADMIN — ENOXAPARIN SODIUM 40 MILLIGRAM(S): 100 INJECTION SUBCUTANEOUS at 22:06

## 2022-08-07 RX ADMIN — Medication 1 APPLICATION(S): at 22:07

## 2022-08-07 RX ADMIN — Medication 1 INCH(S): at 14:20

## 2022-08-07 RX ADMIN — Medication 500 MILLIGRAM(S): at 05:09

## 2022-08-07 RX ADMIN — Medication 250 MILLIGRAM(S): at 17:29

## 2022-08-07 RX ADMIN — Medication 100 MILLIGRAM(S): at 05:09

## 2022-08-07 RX ADMIN — Medication 250 MILLIGRAM(S): at 23:04

## 2022-08-07 RX ADMIN — Medication 125 MILLILITER(S): at 12:39

## 2022-08-07 RX ADMIN — CHLORHEXIDINE GLUCONATE 15 MILLILITER(S): 213 SOLUTION TOPICAL at 17:29

## 2022-08-07 RX ADMIN — Medication 50 MILLIGRAM(S): at 22:06

## 2022-08-07 RX ADMIN — Medication 1 MILLIGRAM(S): at 14:20

## 2022-08-07 RX ADMIN — Medication 6: at 17:35

## 2022-08-07 RX ADMIN — Medication 4: at 20:56

## 2022-08-07 RX ADMIN — Medication 125 MILLILITER(S): at 22:22

## 2022-08-07 RX ADMIN — HUMAN INSULIN 10 UNIT(S): 100 INJECTION, SUSPENSION SUBCUTANEOUS at 17:34

## 2022-08-07 RX ADMIN — FENTANYL CITRATE 50 MICROGRAM(S): 50 INJECTION INTRAVENOUS at 23:08

## 2022-08-07 RX ADMIN — Medication 4: at 07:27

## 2022-08-07 RX ADMIN — Medication 81 MILLIGRAM(S): at 12:40

## 2022-08-07 RX ADMIN — SODIUM CHLORIDE 50 MILLILITER(S): 9 INJECTION, SOLUTION INTRAVENOUS at 07:28

## 2022-08-07 RX ADMIN — FENTANYL CITRATE 50 MICROGRAM(S): 50 INJECTION INTRAVENOUS at 23:23

## 2022-08-07 NOTE — PROGRESS NOTE ADULT - ASSESSMENT
The patient is a 76 year old male with past medical history of HTN, HLD, CAD s/p CABG, carotid stenosis, and PVD who was transferred from an outside hospital for neuro IR intervention after waking up with R sided facial droop and R sided weakness; found to have left M1 occlusion on CTA s/p thrombectomy which is complicated by hemorrhagic conversion. Hospital course notable for acute hypoxic respiratory failure post procedure s/p mechanical ventilation; ischemic cardiomyopathy, LVEF<10% and moderately reduced RV function. Also with E coli UTI, KE, C. diff. Nephrology is managing KE.    -Baseline creatinine ranges 0.6-0.7mg/dL   -Developed prerenal acute kidney injury, which was improving to 0.9-1.0mg/dL  -Now with recurrent acute kidney injury, latest creatinine is 1.9mg/dL  -CBC with diff showed eosinophilia  -Suspecting possible AIN from recent meropenem exposure  -Meropenem was discontinued yesterday  -Discussed with primary team - administration of empiric steroids more likely to be harmful than beneficial given complex hospital course with infections, currently with C diff as well   -Not a candidate for renal bx if renal function is to decline   -Consequently will continue to monitor closely   -No indication for renal replacement therapy however poor candidate given underlying cardiac status     Kwame West DO  [facility]    The patient is a 76 year old male with past medical history of HTN, HLD, CAD s/p CABG, carotid stenosis, and PVD who was transferred from an outside hospital for neuro IR intervention after waking up with R sided facial droop and R sided weakness; found to have left M1 occlusion on CTA s/p thrombectomy which is complicated by hemorrhagic conversion. Hospital course notable for acute hypoxic respiratory failure post procedure s/p mechanical ventilation; ischemic cardiomyopathy, LVEF<10% and moderately reduced RV function. Also with E coli UTI, KE, C. diff. Nephrology is managing KE.    -Baseline creatinine ranges 0.6-0.7mg/dL   -Developed prerenal acute kidney injury, which was improving to 0.9-1.0mg/dL  -Now with recurrent acute kidney injury, latest creatinine is 1.9mg/dL  -CBC with diff showed eosinophilia  -Suspecting possible AIN from recent meropenem exposure  -Meropenem was discontinued yesterday  -Discussed with primary team - administration of empiric steroids more likely to be harmful than beneficial given complex hospital course with infections, currently with C diff as well   -Not a candidate for renal bx if renal function is to decline   -Consequently will continue to monitor closely   -Sodium is improving  -Once normonatremia is achieved, recommend stopping D5W and maintaining free water flushes at current volume and frequency  -No indication for renal replacement therapy however poor candidate given underlying cardiac status     Kwame West DO  [facility]

## 2022-08-07 NOTE — PROGRESS NOTE ADULT - SUBJECTIVE AND OBJECTIVE BOX
76M with PMH CABG, HTN, HLD who presents as [facility] from [facility] for [facility]. Last known well was last night 10pm prior to going to bed, woke up this morning around 0500 with R sided weakness and R facial droop. Presented to [facility], code stroke initiated, NIH at [facility] reportedly 8. CTA showed LM1 occlusion. Transferred to [facility] for possible neuro IR intervention. On arrival to [facility], NIH 6. Decision made to take patient directly to neuro IR for intervention.   NIH 6, mRS 0    Hosp Course complicated:   L MCA stroke  Decompensated ischemic cardiomyopathy  Pre- renal azothemia/ ATN   Septic shock secondary to C Difficile  Encephalopathy  Cardiac arrytmia - PVC's and bigeminy      ICU Vital Signs Last 24 Hrs  T(C): 36.5 (07 Aug 2022 07:00), Max: 38.2 (06 Aug 2022 14:30)  T(F): 97.7 (07 Aug 2022 07:00), Max: 100.8 (06 Aug 2022 14:30)  HR: 88 (07 Aug 2022 07:52) (75 - 110)  ABP: 102/54 (07 Aug 2022 07:00) (99/47 - 156/65)  ABP(mean): 69 (07 Aug 2022 07:00) (62 - 96)  RR: 20 (07 Aug 2022 07:00) (15 - 30)  SpO2: 100% (07 Aug 2022 07:52) (94% - 100%)    O2 Parameters below as of 07 Aug 2022 04:00  Patient On (Oxygen Delivery Method): ventilator      06 Aug 2022 07:01  -  07 Aug 2022 07:00  --------------------------------------------------------  IN:    dextrose 5%: 1200 mL    Enteral Tube Flush: 980 mL    Free Water: 900 mL    IV PiggyBack: 300 mL    IV PiggyBack: 50 mL    Jevity 1.5: 720 mL    Nepro: 400 mL    Norepinephrine: 189.5 mL  Total IN: 4739.5 mL    OUT:    Incontinent per Condom Catheter (mL): 1725 mL    Rectal Tube (mL): 200 mL    Voided (mL): 600 mL  Total OUT: 2525 mL    Total NET: 2214.5 mL      07 Aug 2022 07:01  -  07 Aug 2022 08:10  --------------------------------------------------------  IN:    dextrose 5%: 50 mL    Jevity 1.5: 60 mL    Norepinephrine: 5.6 mL  Total IN: 115.6 mL    OUT:    Incontinent per Condom Catheter (mL): 225 mL  Total OUT: 225 mL    Total NET: -109.4 mL    MEDICATIONS  (STANDING):  aspirin  chewable 81 milliGRAM(s) Oral daily  chlorhexidine 0.12% Liquid 15 milliLiter(s) Oral Mucosa every 12 hours  chlorhexidine 2% Cloths 1 Application(s) Topical daily  dextrose 5%. 1000 milliLiter(s) (50 mL/Hr) IV Continuous <Continuous>  dextrose 5%. 1000 milliLiter(s) (50 mL/Hr) IV Continuous <Continuous>  dextrose 5%. 1000 milliLiter(s) (100 mL/Hr) IV Continuous <Continuous>  dextrose 50% Injectable 25 Gram(s) IV Push once  enoxaparin Injectable 40 milliGRAM(s) SubCutaneous every 24 hours  famotidine Injectable 20 milliGRAM(s) IV Push every 12 hours  glucagon  Injectable 1 milliGRAM(s) IntraMuscular once  insulin lispro (ADMELOG) corrective regimen sliding scale   SubCutaneous every 4 hours  insulin NPH human recombinant 10 Unit(s) SubCutaneous every 8 hours  metoprolol tartrate 50 milliGRAM(s) Oral every 8 hours  metroNIDAZOLE  IVPB 500 milliGRAM(s) IV Intermittent every 8 hours  norepinephrine Infusion 0.05 MICROgram(s)/kG/Min (9.38 mL/Hr) IV Continuous <Continuous>  vancomycin    Solution 500 milliGRAM(s) Enteral Tube every 6 hours    MEDICATIONS  (PRN):  dextrose Oral Gel 15 Gram(s) Oral once PRN Blood Glucose LESS THAN 70 milliGRAM(s)/deciliter  fentaNYL    Injectable 25 MICROGram(s) IV Push every 4 hours PRN Agitation  ondansetron Injectable 4 milliGRAM(s) IV Push every 6 hours PRN Nausea and/or Vomiting      152<H>  |  118<H>  |  88.0<H>  ----------------------------<  120<H>  4.1   |  23.0  |  1.54<H>    Ca    8.4      06 Aug 2022 17:04  Phos  4.8     08-06  Mg     2.4     08-06    TPro  5.3<L>  /  Alb  1.8<L>  /  TBili  0.3<L>  /  DBili  x   /  AST  171<H>  /  ALT  247<H>  /  AlkPhos  232<H>  08-05      152<H>  |  118<H>  |  90.2<H>  ----------------------------<  211<H>  4.2   |  24.0  |  1.48<H>    Ca    8.9      06 Aug 2022 08:00 Corrected Ca- 10.1  Phos  4.8     08-06  Mg     2.4     08-06    TPro  5.3<L>  /  Alb  1.8<L>  /  TBili  0.3<L>  /  DBili  x   /  AST  171<H>  /  ALT  247<H>  /  AlkPhos  232<H>  08-05        LABS:  Na: 160 (08-05 @ 02:45), 153 (08-04 @ 03:55), 152 (08-03 @ 03:51)  K: 4.4 (08-05 @ 02:45), 4.2 (08-04 @ 03:55), 4.5 (08-03 @ 03:51)  Cl: 124 (08-05 @ 02:45), 118 (08-04 @ 03:55), 117 (08-03 @ 03:51)  CO2: 25.0 (08-05 @ 02:45), 24.0 (08-04 @ 03:55), 25.0 (08-03 @ 03:51)  BUN: 86.9 (08-05 @ 02:45), 100.5 (08-04 @ 03:55), 119.5 (08-03 @ 03:51)  Cr: 0.98 (08-05 @ 02:45), 1.00 (08-04 @ 03:55), 0.97 (08-03 @ 03:51)  Glu: 243(08-05 @ 02:45), 253(08-04 @ 03:55), 315(08-03 @ 03:51)                          11.7   14.74 )-----------( 251      ( 07 Aug 2022 02:18 )             35.3         Hgb: 13.5 (08-05 @ 02:45), 13.4 (08-04 @ 03:55), 14.0 (08-03 @ 03:51)  Hct: 41.5 (08-05 @ 02:45), 40.8 (08-04 @ 03:55), 43.3 (08-03 @ 03:51)  WBC: 16.92 (08-05 @ 02:45), 17.50 (08-04 @ 03:55), 22.79 (08-03 @ 03:51)  Plt: 313 (08-05 @ 02:45), 278 (08-04 @ 03:55), 293 (08-03 @ 03:51)      Trop - neg - o.06  pro BNP- 2900         Xray Chest 1 View- PORTABLE-Routine (Xray Chest 1 View- PORTABLE-Routine .) (08.02.22 @ 09:41) >  ACC: [id number] EXAM:  XR CHEST PORTABLE ROUTINE 1V                          PROCEDURE DATE:  08/02/2022          INTERPRETATION:  Chest one view    HISTORY: Fever    COMPARISON STUDY: 8/1/2022    Frontal expiratory view of the chest shows the heart to be similar in   size. Endotracheal tube, sternal wires and nasogastric tube are again   noted.    The lungs show slight clearing of the lower right lung and there is no   evidence of pneumothorax nor pleural effusion.    IMPRESSION:  Right lung clearing.    : US Abdomen Upper Quadrant Right (07.30.22 @ 20:27) >      INTERPRETATION:  CLINICAL INFORMATION: Abnormal liver function tests.    COMPARISON: Renal ultrasound dated 07/26/2022.    TECHNIQUE: Sonography of the right upper quadrant.    FINDINGS:  Liver: Normal in size and echogenicity. No focal lesions identified. The   main portal vein is patent with normal directional flow.  Bile ducts: No intrahepatic biliary dilatation. Common bile duct is not   well visualized.  Gallbladder: The gallbladder is only mildly distended. No stones or   gallbladder wall thickening.  Pancreas: Poorly visualized.  Right kidney: 10.4 cm. No hydronephrosis. There is 1.9 cm simple   appearing cyst in the lower pole.  Ascites: None.  IVC: Visualized portions are within normal limits.    IMPRESSION:  The liver is normal in appearance.      CT Head No Cont (07.30.22 @ 12:47) >  IMPRESSION:  Aging inferior left frontal and inferolateral left parietal infarcts.   Mild stable cortical blood products associated with the frontal infarct.            EXAMINATION:  General:  in NAD  HEENT:  MMM  Neuro:  eye closed, opens eyes to voice, briefly attends, does not follow commands, PERRL, BTT on the L, + cough, moves L arm and leg spontaneously, no movement on the R side to noxious   Cards:  irregular   Respiratory:  no respiratory distress  Abdomen:  soft  Extremities:  no LE edema             76M with PMH CABG, HTN, HLD who presents as transfer from [facility] for stroke. Last known well was last night 10pm prior to going to bed, woke up this morning around 0500 with R sided weakness and R facial droop. Presented to [facility], code stroke initiated, NIH at [facility] reportedly 8. CTA showed LM1 occlusion. Transferred to [facility] for possible neuro IR intervention. On arrival to [facility], NIH 6. Decision made to take patient directly to neuro IR for intervention.   NIH 6, mRS 0    Hosp Course complicated:   L MCA stroke  Decompensated ischemic cardiomyopathy  Pre- renal azothemia/ ATN   Septic shock secondary to C Difficile  Encephalopathy  Cardiac arrytmia - PVC's and bigeminy      ICU Vital Signs Last 24 Hrs  T(C): 36.5 (07 Aug 2022 07:00), Max: 38.2 (06 Aug 2022 14:30)  T(F): 97.7 (07 Aug 2022 07:00), Max: 100.8 (06 Aug 2022 14:30)  HR: 88 (07 Aug 2022 07:52) (75 - 110)  ABP: 102/54 (07 Aug 2022 07:00) (99/47 - 156/65)  ABP(mean): 69 (07 Aug 2022 07:00) (62 - 96)  RR: 20 (07 Aug 2022 07:00) (15 - 30)  SpO2: 100% (07 Aug 2022 07:52) (94% - 100%)    CI - 3.2- 3.7  SV- 24    O2 Parameters below as of 07 Aug 2022 04:00  Patient On (Oxygen Delivery Method): ventilator      06 Aug 2022 07:01  -  07 Aug 2022 07:00  --------------------------------------------------------  IN:    dextrose 5%: 1200 mL    Enteral Tube Flush: 980 mL    Free Water: 900 mL    IV PiggyBack: 300 mL    IV PiggyBack: 50 mL    Jevity 1.5: 720 mL    Nepro: 400 mL    Norepinephrine: 189.5 mL  Total IN: 4739.5 mL    OUT:    Incontinent per Condom Catheter (mL): 1725 mL    Rectal Tube (mL): 200 mL    Voided (mL): 600 mL  Total OUT: 2525 mL    Total NET: 2214.5 mL      07 Aug 2022 07:01  -  07 Aug 2022 08:10  --------------------------------------------------------  IN:    dextrose 5%: 50 mL    Jevity 1.5: 60 mL    Norepinephrine: 5.6 mL  Total IN: 115.6 mL    OUT:    Incontinent per Condom Catheter (mL): 225 mL  Total OUT: 225 mL    Total NET: -109.4 mL    MEDICATIONS  (STANDING):  aspirin  chewable 81 milliGRAM(s) Oral daily  chlorhexidine 0.12% Liquid 15 milliLiter(s) Oral Mucosa every 12 hours  chlorhexidine 2% Cloths 1 Application(s) Topical daily  dextrose 5%. 1000 milliLiter(s) (50 mL/Hr) IV Continuous <Continuous>  dextrose 5%. 1000 milliLiter(s) (50 mL/Hr) IV Continuous <Continuous>  dextrose 5%. 1000 milliLiter(s) (100 mL/Hr) IV Continuous <Continuous>  dextrose 50% Injectable 25 Gram(s) IV Push once  enoxaparin Injectable 40 milliGRAM(s) SubCutaneous every 24 hours  famotidine Injectable 20 milliGRAM(s) IV Push every 12 hours  glucagon  Injectable 1 milliGRAM(s) IntraMuscular once  insulin lispro (ADMELOG) corrective regimen sliding scale   SubCutaneous every 4 hours  insulin NPH human recombinant 10 Unit(s) SubCutaneous every 8 hours  metoprolol tartrate 50 milliGRAM(s) Oral every 8 hours  norepinephrine Infusion 0.05 MICROgram(s)/kG/Min (9.38 mL/Hr) IV Continuous <Continuous>  vancomycin    Solution 250 milliGRAM(s) Enteral Tube every 6 hours    MEDICATIONS  (PRN):  dextrose Oral Gel 15 Gram(s) Oral once PRN Blood Glucose LESS THAN 70 milliGRAM(s)/deciliter  fentaNYL    Injectable 25 MICROGram(s) IV Push every 4 hours PRN Agitation  ondansetron Injectable 4 milliGRAM(s) IV Push every 6 hours PRN Nausea and/or Vomiting      08-07    146<H>  |  113<H>  |  92.7<H>  ----------------------------<  213<H>  4.4   |  22.0  |  1.98<H>    Ca    8.7      07 Aug 2022 02:10  Phos  5.8     08-07  Mg     2.4     08-07    TPro  5.7<L>  /  Alb  2.2<L>  /  TBili  0.5  /  DBili  0.2  /  AST  119<H>  /  ALT  168<H>  /  AlkPhos  207<H>  08-07      152<H>  |  118<H>  |  88.0<H>  ----------------------------<  120<H>  4.1   |  23.0  |  1.54<H>    Ca    8.4      06 Aug 2022 17:04  Phos  4.8     08-06  Mg     2.4     08-06    TPro  5.3<L>  /  Alb  1.8<L>  /  TBili  0.3<L>  /  DBili  x   /  AST  171<H>  /  ALT  247<H>  /  AlkPhos  232<H>  08-05      152<H>  |  118<H>  |  90.2<H>  ----------------------------<  211<H>  4.2   |  24.0  |  1.48<H>    Ca    8.9      06 Aug 2022 08:00 Corrected Ca- 10.1  Phos  4.8     08-06  Mg     2.4     08-06    TPro  5.3<L>  /  Alb  1.8<L>  /  TBili  0.3<L>  /  DBili  x   /  AST  171<H>  /  ALT  247<H>  /  AlkPhos  232<H>  08-05        LABS:  Na: 160 (08-05 @ 02:45), 153 (08-04 @ 03:55), 152 (08-03 @ 03:51)  K: 4.4 (08-05 @ 02:45), 4.2 (08-04 @ 03:55), 4.5 (08-03 @ 03:51)  Cl: 124 (08-05 @ 02:45), 118 (08-04 @ 03:55), 117 (08-03 @ 03:51)  CO2: 25.0 (08-05 @ 02:45), 24.0 (08-04 @ 03:55), 25.0 (08-03 @ 03:51)  BUN: 86.9 (08-05 @ 02:45), 100.5 (08-04 @ 03:55), 119.5 (08-03 @ 03:51)  Cr: 0.98 (08-05 @ 02:45), 1.00 (08-04 @ 03:55), 0.97 (08-03 @ 03:51)  Glu: 243(08-05 @ 02:45), 253(08-04 @ 03:55), 315(08-03 @ 03:51)                          11.7   14.74 )-----------( 251      ( 07 Aug 2022 02:18 )             35.3         Hgb: 13.5 (08-05 @ 02:45), 13.4 (08-04 @ 03:55), 14.0 (08-03 @ 03:51)  Hct: 41.5 (08-05 @ 02:45), 40.8 (08-04 @ 03:55), 43.3 (08-03 @ 03:51)  WBC: 16.92 (08-05 @ 02:45), 17.50 (08-04 @ 03:55), 22.79 (08-03 @ 03:51)  Plt: 313 (08-05 @ 02:45), 278 (08-04 @ 03:55), 293 (08-03 @ 03:51)      Trop - neg - o.06  pro BNP- 2900         Xray Chest 1 View- PORTABLE-Routine (Xray Chest 1 View- PORTABLE-Routine .) (08.02.22 @ 09:41) >  ACC: [id number] EXAM:  XR CHEST PORTABLE ROUTINE 1V                          PROCEDURE DATE:  08/02/2022          INTERPRETATION:  Chest one view    HISTORY: Fever    COMPARISON STUDY: 8/1/2022    Frontal expiratory view of the chest shows the heart to be similar in   size. Endotracheal tube, sternal wires and nasogastric tube are again   noted.    The lungs show slight clearing of the lower right lung and there is no   evidence of pneumothorax nor pleural effusion.    IMPRESSION:  Right lung clearing.    : US Abdomen Upper Quadrant Right (07.30.22 @ 20:27) >      INTERPRETATION:  CLINICAL INFORMATION: Abnormal liver function tests.    COMPARISON: Renal ultrasound dated 07/26/2022.    TECHNIQUE: Sonography of the right upper quadrant.    FINDINGS:  Liver: Normal in size and echogenicity. No focal lesions identified. The   main portal vein is patent with normal directional flow.  Bile ducts: No intrahepatic biliary dilatation. Common bile duct is not   well visualized.  Gallbladder: The gallbladder is only mildly distended. No stones or   gallbladder wall thickening.  Pancreas: Poorly visualized.  Right kidney: 10.4 cm. No hydronephrosis. There is 1.9 cm simple   appearing cyst in the lower pole.  Ascites: None.  IVC: Visualized portions are within normal limits.    IMPRESSION:  The liver is normal in appearance.      CT Head No Cont (07.30.22 @ 12:47) >  IMPRESSION:  Aging inferior left frontal and inferolateral left parietal infarcts.   Mild stable cortical blood products associated with the frontal infarct.            EXAMINATION:  General:  in NAD  HEENT:  MMM  Neuro:  eye closed, opens eyes to voice, briefly attends, does not follow commands, PERRL, BTT on the L, + cough, moves L arm and leg spontaneously, no movement on the R side to noxious   Cards:  irregular   Respiratory:  no respiratory distress  Abdomen:  soft  Extremities:  no LE edema

## 2022-08-07 NOTE — PROGRESS NOTE ADULT - ASSESSMENT
IMPRESSION:  - Left MCA Stroke.  S/P suction thrombectomy for L MCA M1 occlusion with TICI 2b reperfusion. on 7-24-22.    Mechanism ESUS  cardioembolic versus Large artery atherosclerosis    ASSESSMENT/ PLAN:     - Neuro checks and vital signs Q 2 hours.  - SBP goal keep normotensive.  - ASA 81 mg PO or 300 MI QD.   - CT Head of 7-29-22 reviewed. Stable left frontal hemorrhagic products within the infarct.    - Lipitor for LDL goal of < 70 .  - EEG -Report as above reviewed.   - CT/CTA/CTP -images and reports were reviewed.   - MRI Brain stroke protocol  -images and reports were reviewed. .  - Will need anticoagulation long term. Repeat CT head on 8-8-22 if stable can start DOAC.  - TTE  -Reports as above were reviewed. . EF < 10%.  - IMTIAZ (  postponed due to fever)  - SCD/ SQ Lovenox for DVT prophylaxis.  - Goals of care discussion with family being held.

## 2022-08-07 NOTE — PROGRESS NOTE ADULT - ASSESSMENT
Assessment/Plan:   75yo man with CABG, PVD, HTN, HLD, and low EF (declined AICD), admitted 7/24 with LM1 occlusion, s/p TICI 2B MT, no tPA as wake-up stroke. Poor exam post thrombectomy, re-intubated for hypoxic respiratory failure. MRI brain with a large L MCA stroke with small area of reperfusion hemorrhage.  Hospitalization notable for acute on chronic HFrEF, LVHF <10%, reduced RV syst function, no LV thrombus on Definity, 1st degree AV block, with frequent PVCs.   Also with high urea 2/2 pre-renal state and administration of ACE inhibitor, which is likely contributing to the poor exam.    With C diff dx 8/1 and septic shock, on abx.  VEEG with rare occasional frontal sharps, mild-moderate slowing 7/29-8/2    Continuing GOC discussions: I personally met with patient's wife, patient's son Leeroy and Leeroy wife's at bedside. I went over the clinical updates and showed them the MRI which demonstrates a relatively large L MCA stroke with some hemorrhagic conversion, the later not very severe. We discussed that although the urea may be contributing, in over 10 days patient has not regained any movement on the R side and is not able to follow commands and I am worried that there is a reasonable change that he will continue to be hemiplegic and have significant aphasia going forward. We also discussed the fact that the patient is very high risk for more complications such as infections and death from his severe heart failure and bedbound state. We discussed that prolonged care would mean a trach/PEG and a nursing home. I asked the family to think about whether this is within the patient's GOC based on who he is as an individual. They will continue to think about the GOC and let the medical team know what they decide.     Plan:  neurochecks q4hr  Off sedation, fentanyl prn   On ASA 81mg daily, statin held for abnormal LFTs  with pressure ulcer    Resp- Resp failure secondary to neuro injury and heart failure   Maintain O2 > 92 %, wean FIO2 to 30 %  CPAP 10/5, 30%  CXR 8/2 wth RLL opacity, improved     Card- Ischemic cardiomyopathy HFrEF, EF 10%. With very frequent PVCs  Lopressor 50 mg q8 (previously up to 75mg q8h but decreased due to hypotension with no improvement in PVCs on the higher lopressor dose)  avoid ACE inhibitors   EPS following, patient is currently not safe for AC given large stoke with hemorrhagic conversion and very high BUN. Will get a CTH on day 14 from stroke if no new hemorrhagic conversion, will start AC.   CHF following: recs to check daily serum lactate, LFTs, T Bili  Wean Levophed - SBP > 90 or MAP > 65  On Augustin track   K>4, Mg >2  goal euvolemia, holding diuresis today as patient appears euvolemic on exam   IMTIAZ cancelled due to fever, unstable     Renal- Uremia - likely 2/2 pre- renal azothermia and contraction alkalosis (FeNa- <1- pre renal, U Cl <30, renal US no abnormalities), BUN and Cr now improving   appreciate renal recs, now on D5W 50cc/hr for severe hyponatremia   Hold Lasix - re-evaluate daily  25 % albumin 50 ccx1   goal euvolemia  FWB again increased to 300 mg q4h    GI prophylaxis - Transaminitis- med induced vs less likely congestive hepatopathy (RUQ US 7/30 normal). With C diff (250cc output overnight)  Monitor LFT  Suplena - lower protein - improve BUN   Repeat BMP at 1700   pepcid; Stool softeners    ID: s/p cipro for E. Coli. MRSA neg 8/1.   now with C Diff dx 8/1, on vanc 500 mg q6h and flagyl 500 mg q8h for severe C. Diff with shock, end date 8/11  D/C meropenem     Heme  Lov ppx     Endo: Hyperglycemia  NPH 10 q8h      Patient seen and examined by attending on 8/5/2022.    Patient is critically ill due to stroke and at high risk for neurological deterioration or death due to: requiring mechanical ventilation 2/2 neurologic injury, severe heart failure, C. Diff Assessment/Plan:   75yo man with CABG, PVD, HTN, HLD, and low EF (declined AICD), admitted 7/24 with LM1 occlusion, s/p TICI 2B MT, no tPA as wake-up stroke. Poor exam post thrombectomy, re-intubated for hypoxic respiratory failure. MRI brain with a large L MCA stroke with small area of reperfusion hemorrhage.  Hospitalization notable for acute on chronic HFrEF, LVHF <10%, reduced RV syst function, no LV thrombus on Definity, 1st degree AV block, with frequent PVCs.   Also with high urea 2/2 pre-renal state and administration of ACE inhibitor, which is likely contributing to the poor exam.    With C diff dx 8/1 and septic shock, on abx.  VEEG with rare occasional frontal sharps, mild-moderate slowing 7/29-8/2    Continuing GOC discussions: I personally met with patient's wife, patient's son Leeroy and Leeroy wife's at bedside. I went over the clinical updates and showed them the MRI which demonstrates a relatively large L MCA stroke with some hemorrhagic conversion, the later not very severe. We discussed that although the urea may be contributing, in over 10 days patient has not regained any movement on the R side and is not able to follow commands and I am worried that there is a reasonable change that he will continue to be hemiplegic and have significant aphasia going forward. We also discussed the fact that the patient is very high risk for more complications such as infections and death from his severe heart failure and bedbound state. We discussed that prolonged care would mean a trach/PEG and a nursing home. I asked the family to think about whether this is within the patient's GOC based on who he is as an individual. They will continue to think about the GOC and let the medical team know what they decide.     Plan:  neurochecks q4hr  Off sedation, fentanyl prn   On ASA 81mg daily, statin held for abnormal LFTs  with pressure ulcer    Resp- Resp failure secondary to neuro injury and heart failure   Maintain O2 > 92 %, wean FIO2 to 30 %  CPAP 10/5, 30%  CXR 8/2 wth RLL opacity, improved     Card- Ischemic cardiomyopathy HFrEF, EF 10%. With very frequent PVCs  Lopressor 50 mg q8 (previously up to 75mg q8h but decreased due to hypotension with no improvement in PVCs on the higher lopressor dose)  avoid ACE inhibitors   EPS following, patient is currently not safe for AC given large stoke with hemorrhagic conversion and very high BUN. Will get a CTH on day 14 from stroke if no new hemorrhagic conversion, will start AC.   CHF following: recs to check daily serum lactate, LFTs, T Bili  Wean Levophed - SBP > 90 or MAP > 65  On Augustin track   K>4, Mg >2  goal euvolemia, holding diuresis today as patient appears euvolemic on exam   IMTIAZ cancelled due to fever, unstable     Renal- Uremia - likely 2/2 pre- renal azothermia and contraction alkalosis (FeNa- <1- pre renal, U Cl <30, renal US no abnormalities), BUN and Cr now improving ; AIN   appreciate renal recs, now on D5W 50cc/hr for severe hyponatremia   Hold Lasix - re-evaluate daily  5 % albumin 250 ccx1   goal euvolemia  D5W D/C   FWB again increased to 300 mg q4h    GI prophylaxis - Improving Transaminitis- med induced vs less likely congestive hepatopathy (RUQ US 7/30 normal). With C diff (250cc output overnight)  Monitor LFT  Suplena/ Jevity - lower protein - improve BUN    pepcid;     ID: s/p Meropenem and flagyl MRSA neg 8/1.   now with C Diff dx 8/1, on vanc 500 mg q6h and flagyl 500 mg q8h for severe C. Diff with shock, end date 8/11  D/C meropenem     Heme- leucocytosis - slowly improving   Lov ppx     Endo: Hyperglycemia  NPH 10 q8h  Patient seen and examined by attending on 8/7/2022.    Patient is critically ill due to stroke and at high risk for neurological deterioration or death due to: requiring mechanical ventilation 2/2 neurologic injury, severe heart failure, C. Diff

## 2022-08-07 NOTE — INITIAL ORGAN DONATION REFERRAL - NS EXPIRED ORGANCONFIRMRES
Chief Complaint  Atrial Fibrillation    Subjective     {Problem List  Visit Diagnosis   Encounters  Notes  Medications  Labs  Result Review Imaging  Media :23}       History of Present Illness  Robbi Kennedy is a 65-year-old white/ male patient who presents to the office today for follow-up.  He has persistent atrial fibrillation, hypertension, and hyperlipidemia.  He reports compliance with all of his medications.  He denies any chest pain, shortness of breath, lightheadedness/dizziness, palpitations, or edema.  He reports improvement in symptoms since he started taking the verapamil and changed his metoprolol to twice daily instead of 4 times daily.    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   • Secondary malignant neoplasm of right lung 10/5/2021   • Thyroid disease          ALLERGY  No Known Allergies       SURGICALHX  Past Surgical History:   Procedure Laterality Date   • COLON SURGERY     • HERNIA REPAIR     • LIVER BIOPSY      4/27/21 Biopsy of liver revealed metastatic adenocarcinoma, consistent with colorectal primary   • OTHER SURGICAL HISTORY      REMOVED CANCER FROM COLON          SOC  Social History     Socioeconomic History   • Marital status:    • Number of children: 2   Tobacco Use   • Smoking status: Never Smoker   • Smokeless tobacco: Never Used   Vaping Use   • Vaping Use: Never used   Substance and Sexual Activity   • Alcohol use: Yes     Comment: occasionally, no recent use   • Drug use: Never   • Sexual activity: Defer         FAMHX  Family History   Problem Relation Age of Onset   • Prostate cancer Father    •  Heart murmur Mother    • Diabetes Mother    • Colon cancer Maternal Uncle           ARIAN  Current Outpatient Medications on File Prior to Visit   Medication Sig   • acetaminophen (TYLENOL) 500 MG tablet Take 500 mg by mouth Every 6 (Six) Hours As Needed.   • apixaban (ELIQUIS) 5 MG tablet tablet Take 5 mg by mouth 2 (Two) Times a Day.   • ascorbic acid (VITAMIN C) 500 MG tablet Take 500 mg by mouth Daily.   • atorvastatin (LIPITOR) 40 MG tablet atorvastatin 40 mg oral tablet take 1 tablet (40 mg) by oral route once daily   Active   • cyanocobalamin (VITAMIN B-12) 1000 MCG tablet Take 1,000 mcg by mouth Daily.   • HYDROcodone-acetaminophen (NORCO) 5-325 MG per tablet Take 1 tablet by mouth Every 6 (Six) Hours As Needed (cancer pain).   • levothyroxine (Synthroid) 50 MCG tablet Synthroid 50 mcg oral tablet take 1 tablet (50 mcg) by oral route once daily   Active   • loratadine (CLARITIN) 10 MG tablet loratadine 10 mg oral tablet take 1 tablet (10 mg) by oral route once daily   Active   • metoprolol tartrate (LOPRESSOR) 100 MG tablet Take 100 mg by mouth 4 (Four) Times a Day.   • multivitamin (multivitamin) tablet tablet Take 1 tablet by mouth Daily.   • ondansetron (ZOFRAN) 8 MG tablet Take 8 mg by mouth Every 8 (Eight) Hours As Needed. for nausea   • potassium chloride (K-DUR,KLOR-CON) 20 MEQ CR tablet Take 1 tablet by mouth Daily.   • verapamil SR (CALAN-SR) 120 MG CR tablet Take 1 tablet by mouth Daily.   • vitamin E 1000 UNIT capsule Take 1,000 Units by mouth Daily.   • [DISCONTINUED] hydrOXYzine (ATARAX) 25 MG tablet    • [DISCONTINUED] oxyCODONE (ROXICODONE) 5 MG immediate release tablet    • [DISCONTINUED] pantoprazole (PROTONIX) 40 MG EC tablet Daily.   • [DISCONTINUED] prochlorperazine (COMPAZINE) 10 MG tablet Take 10 mg by mouth Every 6 (Six) Hours As Needed.   • [DISCONTINUED] sennosides-docusate (PERICOLACE) 8.6-50 MG per tablet Take 1 tablet by mouth.   • [DISCONTINUED] Synthroid 25 MCG tablet   Yes "    No current facility-administered medications on file prior to visit.         Objective   /81   Pulse 78   Ht 193 cm (76\")   Wt 125 kg (275 lb)   BMI 33.47 kg/m²       Physical Exam  Constitutional:       Appearance: He is obese.   HENT:      Head: Normocephalic.   Neck:      Vascular: No carotid bruit.   Cardiovascular:      Rate and Rhythm: Normal rate and regular rhythm.      Pulses: Normal pulses.      Heart sounds: Normal heart sounds. No murmur heard.      Pulmonary:      Effort: Pulmonary effort is normal.      Breath sounds: Normal breath sounds.   Musculoskeletal:      Cervical back: Neck supple.      Right lower le+ Pitting Edema present.      Left lower le+ Pitting Edema present.   Skin:     General: Skin is dry.      Capillary Refill: Capillary refill takes less than 2 seconds.   Neurological:      Mental Status: He is alert and oriented to person, place, and time.   Psychiatric:         Behavior: Behavior normal.       Result Review :   The following data was reviewed by: REGINALDO Nails on 2022:  No results found for: PROBNP  CMP    CMP 21   Glucose 137 (A)   BUN 14   Creatinine 0.88   eGFR Non African Am 87   Sodium 137   Potassium 3.7   Chloride 103   Calcium 9.2   Albumin 3.83   Total Bilirubin 0.8   Alkaline Phosphatase 78   AST (SGOT) 23   ALT (SGPT) 24   (A) Abnormal value            CBC w/diff    CBC w/Diff 21   WBC 3.81   RBC 4.42   Hemoglobin 13.2   Hematocrit 39.9   MCV 90.3   MCH 29.9   MCHC 33.1   RDW 16.2 (A)   Platelets 154   Neutrophil Rel % 68.7   Immature Granulocyte Rel % 0.3   Lymphocyte Rel % 19.2 (A)   Monocyte Rel % 8.9   Eosinophil Rel % 2.4   Basophil Rel % 0.5   (A) Abnormal value             Lab Results   Component Value Date    TSH 3.250 2021      Lab Results   Component Value Date    FREET4 1.33 2021      No results found for: DDIMERQUANT  Magnesium   Date Value Ref Range Status   2021 1.9 1.6 - 2.4 mg/dL Final "      Digoxin   Date Value Ref Range Status   01/18/2021 0.5 0.5 - 2.0 ng/mL Final      No results found for: TROPONINT        Lipid Panel    Lipid Panel 1/18/21   Total Cholesterol 142   Triglycerides 132   HDL Cholesterol 45   VLDL Cholesterol 26   LDL Cholesterol  71      Comments are available for some flowsheets but are not being displayed.                Assessment and Plan    Diagnoses and all orders for this visit:    1. Atrial fibrillation, persistent (Primary)  Symptomatically stable at this time, continue metoprolol 100 mg twice daily and verapamil 120 mg daily.  Continue Eliquis for CVA prevention.    2. Essential hypertension  Currently controlled and without antihypertensive medication.  Continue to monitor.  Low-sodium diet discussed.    3. Hyperlipidemia LDL goal <100  Last lipid panel was 1/18/2021 with LDL of 71 which is within his goal range, he reports that he is set to have more labs drawn in the near future.  Continue atorvastatin 40 mg nightly.    Other orders  -     metoprolol tartrate (LOPRESSOR) 100 MG tablet; Take 1 tablet by mouth 2 (Two) Times a Day.  Dispense: 180 tablet; Refill: 3  -     verapamil SR (CALAN-SR) 120 MG CR tablet; Take 1 tablet by mouth Daily.  Dispense: 90 tablet; Refill: 3        Follow Up   Return in about 6 months (around 7/5/2022) for Follow up with Dr Madden.    Patient was given instructions and counseling regarding his condition or for health maintenance advice. Please see specific information pulled into the AVS if appropriate.     Robbi Kennedy  reports that he has never smoked. He has never used smokeless tobacco.           Nyla Ramírez, REGINALDO  01/05/22  09:15 EST    Dictated Utilizing Dragon Dictation

## 2022-08-07 NOTE — PROGRESS NOTE ADULT - SUBJECTIVE AND OBJECTIVE BOX
Neurology   NATANAEL ROWAN 76y Male       HPI:  76M with PMH CABG, HTN, HLD who presents as transfer from [facility] for stroke. Last known well was last night 10pm prior to going to bed, woke up this morning around 0500 with R sided weakness and R facial droop. Presented to [facility], code stroke initiated, NIH at [facility] reportedly 8. CTA showed LM1 occlusion. Transferred to [facility] for possible neuro IR intervention. On arrival to [facility], NIH 6. Decision made to take patient directly to neuro IR for intervention.     Initial NIH 6, mRS 0    PMH: HTN (hypertension)    HLD (hyperlipidemia)    S/P CABG x 1         PSH:       FAMILY HISTORY:      SOCIAL HISTORY:  No history of tobacco or alcohol use     Allergies    No Known Allergies    Intolerances          Vital Signs Last 24 Hrs  T(C): 37.8 (07 Aug 2022 13:30), Max: 38.2 (06 Aug 2022 14:30)  T(F): 100 (07 Aug 2022 13:30), Max: 100.8 (06 Aug 2022 14:30)  HR: 101 (07 Aug 2022 13:30) (75 - 110)  BP: --  BP(mean): --  RR: 24 (07 Aug 2022 13:30) (15 - 31)  SpO2: 100% (07 Aug 2022 13:30) (94% - 100%)    Parameters below as of 07 Aug 2022 13:00  Patient On (Oxygen Delivery Method): ventilator,cpap/psv        MEDICATIONS    aspirin  chewable 81 milliGRAM(s) Oral daily  chlorhexidine 0.12% Liquid 15 milliLiter(s) Oral Mucosa every 12 hours  chlorhexidine 2% Cloths 1 Application(s) Topical daily  dextrose 5%. 1000 milliLiter(s) IV Continuous <Continuous>  dextrose 5%. 1000 milliLiter(s) IV Continuous <Continuous>  dextrose 50% Injectable 25 Gram(s) IV Push once  dextrose Oral Gel 15 Gram(s) Oral once PRN  enoxaparin Injectable 40 milliGRAM(s) SubCutaneous every 24 hours  famotidine Injectable 20 milliGRAM(s) IV Push every 12 hours  fentaNYL    Injectable 25 MICROGram(s) IV Push every 4 hours PRN  glucagon  Injectable 1 milliGRAM(s) IntraMuscular once  insulin lispro (ADMELOG) corrective regimen sliding scale   SubCutaneous every 4 hours  insulin NPH human recombinant 10 Unit(s) SubCutaneous every 8 hours  metoprolol tartrate 50 milliGRAM(s) Oral every 8 hours  nitroglycerin    2% Ointment 1 Inch(s) Transdermal once  norepinephrine Infusion 0.05 MICROgram(s)/kG/Min IV Continuous <Continuous>  ondansetron Injectable 4 milliGRAM(s) IV Push every 6 hours PRN  terbutaline  Injectable 1 milliGRAM(s) SubCutaneous once  vancomycin    Solution 250 milliGRAM(s) Enteral Tube every 6 hours         LABS:  CBC Full  -  ( 07 Aug 2022 02:18 )  WBC Count : 14.74 K/uL  RBC Count : 3.77 M/uL  Hemoglobin : 11.7 g/dL  Hematocrit : 35.3 %  Platelet Count - Automated : 251 K/uL  Mean Cell Volume : 93.6 fl  Mean Cell Hemoglobin : 31.0 pg  Mean Cell Hemoglobin Concentration : 33.1 gm/dL  Auto Neutrophil # : x  Auto Lymphocyte # : x  Auto Monocyte # : x  Auto Eosinophil # : x  Auto Basophil # : x  Auto Neutrophil % : x  Auto Lymphocyte % : x  Auto Monocyte % : x  Auto Eosinophil % : x  Auto Basophil % : x      08-07    146<H>  |  113<H>  |  92.7<H>  ----------------------------<  213<H>  4.4   |  22.0  |  1.98<H>    Ca    8.7      07 Aug 2022 02:10  Phos  5.8     08-07  Mg     2.4     08-07    TPro  5.7<L>  /  Alb  2.2<L>  /  TBili  0.5  /  DBili  0.2  /  AST  119<H>  /  ALT  168<H>  /  AlkPhos  207<H>  08-07    LIVER FUNCTIONS - ( 07 Aug 2022 02:10 )  Alb: 2.2 g/dL / Pro: 5.7 g/dL / ALK PHOS: 207 U/L / ALT: 168 U/L / AST: 119 U/L / GGT: x           On Neurological Examination:  Patient is intubated off sedation    Mental Status -   TOpened eyes to noxious today. Not following commands  Cranial Nerves -Pupils are 2 and reactive. , EOMs are conjugate in primary gaze and on occulocephalics.  He blinks to threat on the left side.  Cough reflex is present .  Motor Exam -   Right side trace movement. Left UE localizes to noxious stim. LLE moved spontaneously.         RADIOLOGY ( All neurological imaging studies were independently reviewed and interpreted by me)  CT   CTA  CTP    IMPRESSION:    CT PERFUSION:  The CT perfusion is technically limited by truncation of the arterial input function and venous outflow function.    Core infarct (CBF < 30%:): 0 ml  Penumbra (Tmax >6s): 4 mL  Mismatched volume: 0 ml  Mismatched ratio: None    Interpretation:  Based on CBF < 30%, there is no evidence of a core infarct. At CBF < 34%, a small perfusion defect of 4 mL is located in the left inferior frontal gyrus and corresponds to matched perfusion abnormality of Tmax >6 seconds. On Tmax > 4s, there is a much larger perfusion defect throughout the majority of the left frontoparietal convexity, which may represent an ischemic penumbra in the left MCA vascular territory.        CT ANGIOGRAPHY NECK:  1. Poor opacification of the cervical vasculature.  2. Suspicion for moderate greater than 50% stenosis in the proximal right internal carotid artery. Suspicion for moderate to severe stenosis in the proximal left internal carotid artery that may be greater than 70%. No evidence of ICA occlusion in the neck.  3. The right vertebral artery is grossly patent.. The V1 segment of the left vertebral artery cannot be visualized. The V2 and V3 segments the left vertebral artery are grossly patent with multiple stenoses.  4. There is limited visualization of the common carotid artery origins and subclavian artery origins. Underlying stenoses cannot be excluded. There is suspicion for a severe stenosis in the proximal left subclavian artery.      CT ANGIOGRAPHY BRAIN:  1. Poor opacification of the intracranial vasculature.  2. There appears to be a focal filling defect at the left MCA bifurcation with distal flow. There is marked attenuation of M2 branches of the left middle cerebral artery. Vessel density analysis of the MCA territory shows a greater than 50% reduction of vessel density in the left MCA territory compared to the contralateral side.  3. There are mild to moderate stenoses in the supraclinoid segments of the internal carotid arteries bilaterally, without occlusion.  4. There are stenoses in the intradural vertebral arteries bilaterally, without occlusion.    Repeat CTA or MRI/MRA is recommended for further evaluation.  Peak arterial opacification on the CT perfusion occurs at approximately 38 seconds. If the CTA is repeated, a long delay is required after contrast injection to achieve adequate opacification of the vasculature.    The findings were discussed with JACK Light in the emergency room on 07/24/2022 at 5:45 AM. Read back verification was obtained.      SEVERO CHANEL MD; Attending Radiologist  This document has been electronically signed    MRI:    MR Head No Cont (07.26.22 @ 20:58) >  IMPRESSION:    Acute left MCA territory infarcts with hemorrhagic transformation,   grossly stable since comparison CT.    Additional punctate acute infarct involving the medial left frontal lobe   in the SUGEY territory.    ORION ANDARDE MD; Attending Radiologist        TTE  TTE Echo Complete w/ Contrast w/ Doppler (07.24.22 @ 14:01) >    Summary:   1. Endocardial visualization was enhanced with intravenous echo contrast.   2. Severely enlarged left atrium.   3. Global diffuse akinesis. Left ventricular ejection fraction, by   visual estimation, is <10%.   4. Elevated mean left atrial pressure. (>30 mm Hg)   5. Normal right atrial size.   6. Mildly enlarged right ventricle. Moderately reduced RV systolic   function.   7. Mild mitral valve regurgitation.   8. Mild tricuspid regurgitation.   9. Estimated pulmonary artery systolic pressure is 42 mmHg - mild   pulmonary hypertension.  10. There is no evidence of pericardial effusion.  11. Team informed of the findings.    MD Millie, RPVI Electronically signed on 7/24/2022 at 3:49:14 PM      < from: CT Head No Cont (07.30.22 @ 12:47) >    IMPRESSION:  Aging inferior left frontal and inferolateral left parietal infarcts.   Mild stable cortical blood products associated with the frontal infarct.    VERNELL LOYA MD; Attending Radiologist          EEG IMPRESSION/CLINICAL CORRELATE 7/31/22    Abnormal EEG study.  1. Potential epileptogenic foci in the bilateral frontotemporal regions.   2. Structural abnormality and cortical dysfunction in the left frontotemporal region.   3. Mild to moderate nonspecific diffuse or multifocal cerebral dysfunction.   4. No seizure seen.     _____________________________________________________________    Mayi Prather MD  Director, Epilepsy/[facility]       Electronic Signatures:  Mayi Prather)  (Signed 31-Jul-2022 09:06)EEG IMPRESSION/CLINICAL CORRELATE

## 2022-08-07 NOTE — PROGRESS NOTE ADULT - SUBJECTIVE AND OBJECTIVE BOX
Remains critically ill - on mechanical ventilation and levophed. Sodium is improving, however renal function is worsening. +Eosinophilia.     Physical Exam  General: WDWN male in NAD  HEENT: Intubated  Cardiac: S1S2 RRR  Respiratory: Transmitted breath sounds  Abdomen: Soft, NT  Extremities: No appreciable edema  Neuro: Stirs to touch

## 2022-08-08 LAB
ALBUMIN SERPL ELPH-MCNC: 2.4 G/DL — LOW (ref 3.3–5.2)
ALP SERPL-CCNC: 173 U/L — HIGH (ref 40–120)
ALT FLD-CCNC: 108 U/L — HIGH
ANION GAP SERPL CALC-SCNC: 8 MMOL/L — SIGNIFICANT CHANGE UP (ref 5–17)
AST SERPL-CCNC: 69 U/L — HIGH
BILIRUB DIRECT SERPL-MCNC: 0.1 MG/DL — SIGNIFICANT CHANGE UP (ref 0–0.3)
BILIRUB DIRECT SERPL-MCNC: 0.1 MG/DL — SIGNIFICANT CHANGE UP (ref 0–0.3)
BILIRUB INDIRECT FLD-MCNC: 0.3 MG/DL — SIGNIFICANT CHANGE UP (ref 0.2–1)
BILIRUB INDIRECT FLD-MCNC: 0.3 MG/DL — SIGNIFICANT CHANGE UP (ref 0.2–1)
BILIRUB SERPL-MCNC: 0.5 MG/DL — SIGNIFICANT CHANGE UP (ref 0.4–2)
BILIRUB SERPL-MCNC: 0.5 MG/DL — SIGNIFICANT CHANGE UP (ref 0.4–2)
BUN SERPL-MCNC: 61.2 MG/DL — HIGH (ref 8–20)
CALCIUM SERPL-MCNC: 8.1 MG/DL — LOW (ref 8.4–10.5)
CHLORIDE SERPL-SCNC: 116 MMOL/L — HIGH (ref 98–107)
CO2 SERPL-SCNC: 25 MMOL/L — SIGNIFICANT CHANGE UP (ref 22–29)
CREAT SERPL-MCNC: 1.03 MG/DL — SIGNIFICANT CHANGE UP (ref 0.5–1.3)
EGFR: 75 ML/MIN/1.73M2 — SIGNIFICANT CHANGE UP
GLUCOSE BLDC GLUCOMTR-MCNC: 134 MG/DL — HIGH (ref 70–99)
GLUCOSE BLDC GLUCOMTR-MCNC: 184 MG/DL — HIGH (ref 70–99)
GLUCOSE BLDC GLUCOMTR-MCNC: 212 MG/DL — HIGH (ref 70–99)
GLUCOSE BLDC GLUCOMTR-MCNC: 227 MG/DL — HIGH (ref 70–99)
GLUCOSE BLDC GLUCOMTR-MCNC: 261 MG/DL — HIGH (ref 70–99)
GLUCOSE SERPL-MCNC: 212 MG/DL — HIGH (ref 70–99)
HCT VFR BLD CALC: 29.8 % — LOW (ref 39–50)
HGB BLD-MCNC: 9.9 G/DL — LOW (ref 13–17)
LACTATE SERPL-SCNC: 1.1 MMOL/L — SIGNIFICANT CHANGE UP (ref 0.5–2)
MAGNESIUM SERPL-MCNC: 2 MG/DL — SIGNIFICANT CHANGE UP (ref 1.6–2.6)
MCHC RBC-ENTMCNC: 30.8 PG — SIGNIFICANT CHANGE UP (ref 27–34)
MCHC RBC-ENTMCNC: 33.2 GM/DL — SIGNIFICANT CHANGE UP (ref 32–36)
MCV RBC AUTO: 92.8 FL — SIGNIFICANT CHANGE UP (ref 80–100)
PHOSPHATE SERPL-MCNC: 3.5 MG/DL — SIGNIFICANT CHANGE UP (ref 2.4–4.7)
PLATELET # BLD AUTO: 214 K/UL — SIGNIFICANT CHANGE UP (ref 150–400)
POTASSIUM SERPL-MCNC: 4.7 MMOL/L — SIGNIFICANT CHANGE UP (ref 3.5–5.3)
POTASSIUM SERPL-SCNC: 4.7 MMOL/L — SIGNIFICANT CHANGE UP (ref 3.5–5.3)
PROT SERPL-MCNC: 5.5 G/DL — LOW (ref 6.6–8.7)
RBC # BLD: 3.21 M/UL — LOW (ref 4.2–5.8)
RBC # FLD: 15.7 % — HIGH (ref 10.3–14.5)
SODIUM SERPL-SCNC: 149 MMOL/L — HIGH (ref 135–145)
WBC # BLD: 11.7 K/UL — HIGH (ref 3.8–10.5)
WBC # FLD AUTO: 11.7 K/UL — HIGH (ref 3.8–10.5)

## 2022-08-08 PROCEDURE — 99232 SBSQ HOSP IP/OBS MODERATE 35: CPT

## 2022-08-08 PROCEDURE — 93970 EXTREMITY STUDY: CPT | Mod: 26

## 2022-08-08 PROCEDURE — 99233 SBSQ HOSP IP/OBS HIGH 50: CPT

## 2022-08-08 PROCEDURE — 71045 X-RAY EXAM CHEST 1 VIEW: CPT | Mod: 26

## 2022-08-08 RX ORDER — ALBUMIN HUMAN 25 %
250 VIAL (ML) INTRAVENOUS ONCE
Refills: 0 | Status: COMPLETED | OUTPATIENT
Start: 2022-08-08 | End: 2022-08-08

## 2022-08-08 RX ORDER — HYDRALAZINE HCL 50 MG
10 TABLET ORAL EVERY 8 HOURS
Refills: 0 | Status: DISCONTINUED | OUTPATIENT
Start: 2022-08-08 | End: 2022-08-08

## 2022-08-08 RX ORDER — HUMAN INSULIN 100 [IU]/ML
15 INJECTION, SUSPENSION SUBCUTANEOUS EVERY 8 HOURS
Refills: 0 | Status: DISCONTINUED | OUTPATIENT
Start: 2022-08-08 | End: 2022-08-17

## 2022-08-08 RX ORDER — NOREPINEPHRINE BITARTRATE/D5W 8 MG/250ML
0.05 PLASTIC BAG, INJECTION (ML) INTRAVENOUS
Qty: 8 | Refills: 0 | Status: DISCONTINUED | OUTPATIENT
Start: 2022-08-08 | End: 2022-08-11

## 2022-08-08 RX ADMIN — Medication 4: at 08:36

## 2022-08-08 RX ADMIN — HUMAN INSULIN 10 UNIT(S): 100 INJECTION, SUSPENSION SUBCUTANEOUS at 08:34

## 2022-08-08 RX ADMIN — Medication 6: at 13:30

## 2022-08-08 RX ADMIN — Medication 1 INCH(S): at 02:06

## 2022-08-08 RX ADMIN — HUMAN INSULIN 15 UNIT(S): 100 INJECTION, SUSPENSION SUBCUTANEOUS at 23:38

## 2022-08-08 RX ADMIN — HUMAN INSULIN 15 UNIT(S): 100 INJECTION, SUSPENSION SUBCUTANEOUS at 13:30

## 2022-08-08 RX ADMIN — Medication 250 MILLIGRAM(S): at 17:23

## 2022-08-08 RX ADMIN — Medication 81 MILLIGRAM(S): at 13:14

## 2022-08-08 RX ADMIN — Medication 4: at 05:34

## 2022-08-08 RX ADMIN — Medication 10 MILLIGRAM(S): at 13:16

## 2022-08-08 RX ADMIN — Medication 250 MILLIGRAM(S): at 23:39

## 2022-08-08 RX ADMIN — ENOXAPARIN SODIUM 40 MILLIGRAM(S): 100 INJECTION SUBCUTANEOUS at 21:01

## 2022-08-08 RX ADMIN — Medication 2: at 23:40

## 2022-08-08 RX ADMIN — CHLORHEXIDINE GLUCONATE 15 MILLILITER(S): 213 SOLUTION TOPICAL at 05:33

## 2022-08-08 RX ADMIN — CHLORHEXIDINE GLUCONATE 15 MILLILITER(S): 213 SOLUTION TOPICAL at 17:23

## 2022-08-08 RX ADMIN — Medication 250 MILLIGRAM(S): at 05:33

## 2022-08-08 RX ADMIN — FAMOTIDINE 20 MILLIGRAM(S): 10 INJECTION INTRAVENOUS at 13:16

## 2022-08-08 RX ADMIN — Medication 50 MILLIGRAM(S): at 13:16

## 2022-08-08 RX ADMIN — CHLORHEXIDINE GLUCONATE 1 APPLICATION(S): 213 SOLUTION TOPICAL at 13:32

## 2022-08-08 RX ADMIN — Medication 250 MILLIGRAM(S): at 13:15

## 2022-08-08 RX ADMIN — Medication 1 APPLICATION(S): at 13:14

## 2022-08-08 RX ADMIN — Medication 125 MILLILITER(S): at 13:55

## 2022-08-08 RX ADMIN — Medication 50 MILLIGRAM(S): at 23:39

## 2022-08-08 RX ADMIN — Medication 4: at 17:23

## 2022-08-08 RX ADMIN — Medication 50 MILLIGRAM(S): at 05:34

## 2022-08-08 RX ADMIN — FAMOTIDINE 20 MILLIGRAM(S): 10 INJECTION INTRAVENOUS at 23:39

## 2022-08-08 NOTE — PROGRESS NOTE ADULT - PROBLEM SELECTOR PLAN 2
- Likely related to ICMP  - Lopressor decreased to 50mg TID due to hypotension   - Would avoid aggressive uptitration of BB due to significant bi-ventricular heart failure

## 2022-08-08 NOTE — PROGRESS NOTE ADULT - PROBLEM SELECTOR PLAN 1
- Known h/o ICMP, LVEF ~25%  - TTE 7/24 showed LVEF <10% with RV dysfunction  - Clinically appears euvolemic on exam with lukewarm extremities  - Goal is to maintain net even fluid status, may use Lasix PRN however pt does not currently require. He is making adequate UOP and also losing fluid from rectal tube.  - Would avoid using inotropes as this will increase ectopy burden, pt already w/ frequent multifocal PVCs/couplets  - GDMT: On Lopressor 50mg TID (will eventually plan to switch to metoprolol succinate). Will ultimately benefit from medical optimization including ARB or ARNi and MRA as tolerated.  - Please check markers of perfusion daily (serum lactate, LFTs, T Bili)  - Document strict I&O  - Device: previously declined ICD per reports.

## 2022-08-08 NOTE — PROGRESS NOTE ADULT - ASSESSMENT
Assessment/Plan:   77yo man with CABG, PVD, HTN, HLD, and low EF (declined AICD), admitted 7/24 with LM1 occlusion, s/p TICI 2B MT, no tPA as wake-up stroke. Poor exam post thrombectomy, re-intubated for hypoxic respiratory failure. MRI brain with a large L MCA stroke with small area of reperfusion hemorrhage.  Hospitalization notable for acute on chronic HFrEF, LVHF <10%, reduced RV syst function, no LV thrombus on Definity, 1st degree AV block, with frequent PVCs.   Also with high urea 2/2 pre-renal state and administration of ACE inhibitor, which is likely contributing to the poor exam.    With C diff dx 8/1 and septic shock, on abx.  VEEG with rare occasional frontal sharps, mild-moderate slowing 7/29-8/2    Continuing GOC discussions: I personally met with patient's wife, patient's son Leeroy and Leeroy wife's at bedside. I went over the clinical updates and showed them the MRI which demonstrates a relatively large L MCA stroke with some hemorrhagic conversion, the later not very severe. We discussed that although the urea may be contributing, in over 10 days patient has not regained any movement on the R side and is not able to follow commands and I am worried that there is a reasonable change that he will continue to be hemiplegic and have significant aphasia going forward. We also discussed the fact that the patient is very high risk for more complications such as infections and death from his severe heart failure and bedbound state. We discussed that prolonged care would mean a trach/PEG and a nursing home. I asked the family to think about whether this is within the patient's GOC based on who he is as an individual. They will continue to think about the GOC and let the medical team know what they decide.     Plan:  neurochecks q4hr  Off sedation, fentanyl prn   On ASA 81mg daily, statin held for abnormal LFTs  with pressure ulcer    Resp- Resp failure secondary to neuro injury and heart failure   Maintain O2 > 92 %, wean FIO2 to 30 %  CPAP 10/5, 30%  CXR 8/2 wth RLL opacity, improved     Card- Ischemic cardiomyopathy HFrEF, EF 10%. With very frequent PVCs  Lopressor 50 mg q8 (previously up to 75mg q8h but decreased due to hypotension with no improvement in PVCs on the higher lopressor dose)  avoid ACE inhibitors   EPS following, patient is currently not safe for AC given large stoke with hemorrhagic conversion and very high BUN. Will get a CTH on day 14 from stroke if no new hemorrhagic conversion, will start AC.   CHF following: recs to check daily serum lactate, LFTs, T Bili  Wean Levophed - SBP > 90 or MAP > 65  On Augustin track   K>4, Mg >2  goal euvolemia, holding diuresis today as patient appears euvolemic on exam   IMTIAZ cancelled due to fever, unstable     Renal- Uremia - likely 2/2 pre- renal azothermia and contraction alkalosis (FeNa- <1- pre renal, U Cl <30, renal US no abnormalities), BUN and Cr now improving ; AIN   appreciate renal recs, now on D5W 50cc/hr for severe hyponatremia   Hold Lasix - re-evaluate daily  5 % albumin 250 ccx1   goal euvolemia  D5W D/C   FWB again increased to 300 mg q4h    GI prophylaxis - Improving Transaminitis- med induced vs less likely congestive hepatopathy (RUQ US 7/30 normal). With C diff (250cc output overnight)  Monitor LFT  Suplena/ Jevity - lower protein - improve BUN    pepcid;     ID: s/p Meropenem and flagyl MRSA neg 8/1.   now with C Diff dx 8/1, on vanc 500 mg q6h and flagyl 500 mg q8h for severe C. Diff with shock, end date 8/11  D/C meropenem     Heme- leucocytosis - slowly improving   Lov ppx     Endo: Hyperglycemia  NPH 10 q8h  Patient seen and examined by attending on 8/7/2022.    Patient is critically ill due to stroke and at high risk for neurological deterioration or death due to: requiring mechanical ventilation 2/2 neurologic injury, severe heart failure, C. Diff Assessment/Plan:   77yo man with CABG, PVD, HTN, HLD, and low EF (declined AICD), admitted 7/24 with LM1 occlusion, s/p TICI 2B MT, no tPA as wake-up stroke. Poor exam post thrombectomy, re-intubated for hypoxic respiratory failure. MRI brain with a large L MCA stroke with small area of reperfusion hemorrhage.  Hospitalization notable for acute on chronic HFrEF, LVHF <10%, reduced RV syst function, no LV thrombus on Definity, 1st degree AV block, with frequent PVCs.   Also with high urea 2/2 pre-renal state and administration of ACE inhibitor, which is likely contributing to the poor exam.    With C diff dx 8/1 and septic shock, on abx.  VEEG with rare occasional frontal sharps, mild-moderate slowing 7/29-8/2    Continuing GOC discussions: I personally met with patient's wife, patient's son Leeroy and Leeroy wife's at bedside. I went over the clinical updates and showed them the MRI which demonstrates a relatively large L MCA stroke with some hemorrhagic conversion, the later not very severe. We discussed that although the urea may be contributing, in over 10 days patient has not regained any movement on the R side and is not able to follow commands and I am worried that there is a reasonable change that he will continue to be hemiplegic and have significant aphasia going forward. We also discussed the fact that the patient is very high risk for more complications such as infections and death from his severe heart failure and bedbound state. We discussed that prolonged care would mean a trach/PEG and a nursing home. I asked the family to think about whether this is within the patient's GOC based on who he is as an individual. They will continue to think about the GOC and let the medical team know what they decide.     Plan:  neurochecks q4hr  Off sedation, fentanyl prn   On ASA 81mg daily, statin held for abnormal LFTs  with pressure ulcer    Resp- Resp failure secondary to neuro injury and heart failure   Maintain O2 > 92 %, wean FIO2 to 30 %  CPAP 10/5, 30%  CXR 8/2 wth RLL opacity, improved     Card- Ischemic cardiomyopathy HFrEF, EF 10%. With very frequent PVCs  Lopressor 50 mg q8 (previously up to 75mg q8h but decreased due to hypotension with no improvement in PVCs on the higher lopressor dose)  avoid ACE inhibitors   Hydralazine 10mg q 8 - with Hold orders   EPS following, patient is currently not safe for AC given large stoke with hemorrhagic conversion and very high BUN. Will get a CTH on day 14 from stroke if no new hemorrhagic conversion, will start AC.   CHF following: recs to check daily serum lactate, LFTs, T Bili  Levophed - off - SBP > 90 or MAP > 65  On Augustin track   K>4, Mg >2  goal euvolemia, holding diuresis today as patient appears euvolemic on exam   IMTIAZ cancelled due to fever, unstable     Renal- Uremia improving  - likely 2/2 pre- renal azothermia and contraction alkalosis (FeNa- <1- pre renal, U Cl <30, renal US no abnormalities), BUN and Cr now improving ; AIN   appreciate renal recs,  Hold Lasix - re-evaluate daily  5 % albumin 250 ccx1   goal euvolemia   FWB again increased to 300 mg q4h    GI prophylaxis - Improving Transaminitis- med induced vs less likely congestive hepatopathy (RUQ US 7/30 normal). With C diff (250cc output overnight)  LFT improving  Suplena/ Jevity - lower protein - improve BUN    pepcid;     ID: s/p Meropenem and flagyl MRSA neg 8/1.   now with C Diff dx 8/1, on vanc 500 mg q6h and flagyl 500 mg q8h for severe C. Diff with shock, end date 8/11  D/C meropenem and  flagyl - D/C 8/11    Heme- leucocytosis - slowly improving   Lov ppx     Endo: Hyperglycemia  NPH 15 q8h  Patient seen and examined by attending on 8/7/2022.    Patient is critically ill due to stroke and at high risk for neurological deterioration or death due to: requiring mechanical ventilation 2/2 neurologic injury, severe heart failure, C. Diff Assessment/Plan:   77yo man with CABG, PVD, HTN, HLD, and low EF (declined AICD), admitted 7/24 with LM1 occlusion, s/p TICI 2B MT, no tPA as wake-up stroke. Poor exam post thrombectomy, re-intubated for hypoxic respiratory failure. MRI brain with a large L MCA stroke with small area of reperfusion hemorrhage.  Hospitalization notable for acute on chronic HFrEF, LVHF <10%, reduced RV syst function, no LV thrombus on Definity, 1st degree AV block, with frequent PVCs.   Also with high urea 2/2 pre-renal state and administration of ACE inhibitor, which is likely contributing to the poor exam.    With C diff dx 8/1 and septic shock, on abx.  VEEG with rare occasional frontal sharps, mild-moderate slowing 7/29-8/2    Continuing GOC discussions: I personally met with patient's wife, patient's son Leeroy and Leeroy wife's at bedside. I went over the clinical updates and showed them the MRI which demonstrates a relatively large L MCA stroke with some hemorrhagic conversion, the later not very severe. We discussed that although the urea may be contributing, in over 10 days patient has not regained any movement on the R side and is not able to follow commands and I am worried that there is a reasonable change that he will continue to be hemiplegic and have significant aphasia going forward. We also discussed the fact that the patient is very high risk for more complications such as infections and death from his severe heart failure and bedbound state. We discussed that prolonged care would mean a trach/PEG and a nursing home. I asked the family to think about whether this is within the patient's GOC based on who he is as an individual. They will continue to think about the GOC and let the medical team know what they decide.     Plan:  neurochecks q4hr  Off sedation, fentanyl prn   On ASA 81mg daily, statin held for abnormal LFTs  with pressure ulcer    Resp- Resp failure secondary to neuro injury and heart failure   Maintain O2 > 92 %, wean FIO2 to 30 %  CPAP 10/5, 30%  CXR 8/2 wth RLL opacity, improved     Card- Ischemic cardiomyopathy HFrEF, EF 10%. With very frequent PVCs  Lopressor 50 mg q8 (previously up to 75mg q8h but decreased due to hypotension with no improvement in PVCs on the higher lopressor dose)  avoid ACE inhibitors   Hydralazine 10mg q 8 - held secondary to hypotension   EPS following, patient is currently not safe for AC given large stoke with hemorrhagic conversion and very high BUN. Will get a CTH on day 14 from stroke if no new hemorrhagic conversion, will start AC.   CHF following: recs to check daily serum lactate, LFTs, T Bili  Levophed - off - SBP > 90 or MAP > 65  On Augustin track - goal CI > 2.2  K>4, Mg >2  goal euvolemia, holding diuresis today as patient appears euvolemic on exam   IMTIAZ cancelled due to fever, unstable     Renal- Uremia improving  - likely 2/2 pre- renal azothermia and contraction alkalosis (FeNa- <1- pre renal, U Cl <30, renal US no abnormalities), BUN and Cr now improving ; AIN   appreciate renal recs,  Hold Lasix - re-evaluate daily  5 % albumin 250 ccx1   goal euvolemia   FWB again increased to 300 mg q4h    GI prophylaxis - Improving Transaminitis- med induced vs less likely congestive hepatopathy (RUQ US 7/30 normal). With C diff (250cc output overnight)  LFT improving  Suplena/ Jevity - lower protein - improve BUN    pepcid;     ID: s/p Meropenem and flagyl MRSA neg 8/1.   now with C Diff dx 8/1, on vanc 500 mg q6h and flagyl 500 mg q8h for severe C. Diff with shock, end date 8/11  D/C meropenem and  flagyl - D/C 8/11    Heme- leucocytosis - slowly improving   Lov ppx     Endo: Hyperglycemia  NPH 15 q8h  Patient seen and examined by attending on 8/7/2022.    Patient is critically ill due to stroke and at high risk for neurological deterioration or death due to: requiring mechanical ventilation 2/2 neurologic injury, severe heart failure, C. Diff

## 2022-08-08 NOTE — PROGRESS NOTE ADULT - ASSESSMENT
IMPRESSION:  - Left MCA Stroke.  S/P suction thrombectomy for L MCA M1 occlusion with TICI 2b reperfusion. on 7-24-22.    Mechanism ESUS  cardioembolic versus Large artery atherosclerosis    ASSESSMENT/ PLAN:     - Neuro checks and vital signs Q 2 hours.  - SBP goal keep normotensive.  - ASA 81 mg PO or 300 [place] QD.   - CT Head of 7-29-22 reviewed. Stable left frontal hemorrhagic products within the infarct.    - Lipitor for LDL goal of < 70 .  - EEG -Report as above reviewed.   - CT/CTA/CTP -images and reports were reviewed.   - MRI Brain stroke protocol  -images and reports were reviewed. .  - Will need anticoagulation long term. Repeat CT head on 8-8-22 if stable can start DOAC.  - TTE  -Reports as above were reviewed. . EF < 10%.  - IMTIAZ (  postponed due to fever)  - SCD/ SQ Lovenox for DVT prophylaxis.  - Goals of care discussion with family being held.

## 2022-08-08 NOTE — PROGRESS NOTE ADULT - SUBJECTIVE AND OBJECTIVE BOX
Remains critically ill - on mechanical ventilation. Off pressors. Renal function is improving. Sodium is bumping up.      Physical Exam  General: WDWN male in NAD  HEENT: Intubated  Cardiac: S1S2 RRR  Respiratory: Transmitted breath sounds  Abdomen: Soft, NT  Extremities: No appreciable edema  Neuro: Non purposeful movement to touch

## 2022-08-08 NOTE — PROGRESS NOTE ADULT - SUBJECTIVE AND OBJECTIVE BOX
[facility] Advanced Heart Failure  Office: [address] [place] [address]  Telephone: [phone]/Fax: [phone]    Subjective/Objective: NAEO. Pt with central fevers on cooling blanket. Remains intubated on full vent support-became tachypneic during CPAP trail this am. Not sedated, responds to noxious stimuli but not necessarily purposeful.     Medications:  aspirin  chewable 81 milliGRAM(s) Oral daily  BACItracin   Ointment 1 Application(s) Topical daily  chlorhexidine 0.12% Liquid 15 milliLiter(s) Oral Mucosa every 12 hours  chlorhexidine 2% Cloths 1 Application(s) Topical daily  dextrose 5%. 1000 milliLiter(s) IV Continuous <Continuous>  dextrose 5%. 1000 milliLiter(s) IV Continuous <Continuous>  dextrose 50% Injectable 25 Gram(s) IV Push once  dextrose Oral Gel 15 Gram(s) Oral once PRN  enoxaparin Injectable 40 milliGRAM(s) SubCutaneous every 24 hours  famotidine Injectable 20 milliGRAM(s) IV Push every 12 hours  fentaNYL    Injectable 25 MICROGram(s) IV Push every 4 hours PRN  glucagon  Injectable 1 milliGRAM(s) IntraMuscular once  insulin lispro (ADMELOG) corrective regimen sliding scale   SubCutaneous every 4 hours  insulin NPH human recombinant 15 Unit(s) SubCutaneous every 8 hours  metoprolol tartrate 50 milliGRAM(s) Oral every 8 hours  norepinephrine Infusion 0.05 MICROgram(s)/kG/Min IV Continuous <Continuous>  ondansetron Injectable 4 milliGRAM(s) IV Push every 6 hours PRN  vancomycin    Solution 250 milliGRAM(s) Enteral Tube every 6 hours    Vitals:  T(C): 37.3 (22 @ 14:00), Max: 38 (22 @ 05:00)  HR: 81 (22 @ 14:00) (81 - 106)  BP: --  RR: 22 (22 @ 14:00) (19 - 33)  SpO2: 100% (22 @ 14:00) (97% - 100%)    Weight in k.9 ( @ 23:13)    I&O's Summary    07 Aug 2022 07:01  -  08 Aug 2022 07:00  --------------------------------------------------------  IN: 4105 mL / OUT: 6155 mL / NET: -2050 mL    08 Aug 2022 07:01  -  08 Aug 2022 14:37  --------------------------------------------------------  IN: 1270 mL / OUT: 842 mL / NET: 428 mL    Tele: SR, frequent PVCs, brief AT     Physical Exam:  General: Intuabed  Neck: Neck supple. JVP not elevated.   Chest: anterior lung fields CTA  CV: RRR. Normal S1 and S2. No murmurs, rub, or gallops. Lukewarm peripherally.  PV: No edema noted.   Abdomen: Soft, non-distended  Skin: dry, no rash, generalized ecchymosis  Neurology: moves to noxious stimuli  Labs:                        9.9    11.70 )-----------( 214      ( 08 Aug 2022 04:00 )             29.8       149<H>  |  116<H>  |  61.2<H>  ----------------------------<  212<H>  4.7   |  25.0  |  1.03    Ca    8.1<L>      08 Aug 2022 04:00  Phos  3.5       Mg     2.0         TPro  5.5<L>  /  Alb  2.4<L>  /  TBili  0.5  /  DBili  0.1  /  AST  69<H>  /  ALT  108<H>  /  AlkPhos  173<H>      Serum Pro-Brain Natriuretic Peptide: 2903 pg/mL ( @ 11:16)  Creatine Kinase, Serum: 172 U/L (22 @ 22:55)  Creatine Kinase, Serum: 172 U/L (22 @ 22:55)    Lactate, Blood: 1.1 mmol/L ( @ 04:00)  Lactate, Blood: 1.0 mmol/L ( @ 02:10)  Lactate, Blood: 1.2 mmol/L ( @ 22:55)         [facility] Advanced Heart Failure  Office: [address] [place] [address]  Telephone: [phone]/Fax: [phone]    Subjective/Objective: NAEO. Pt with central fevers on cooling blanket. Remains intubated on full vent support-became tachypneic during CPAP trail this am. Not sedated, responds to noxious stimuli but not necessarily purposeful.     Medications:  aspirin  chewable 81 milliGRAM(s) Oral daily  BACItracin   Ointment 1 Application(s) Topical daily  chlorhexidine 0.12% Liquid 15 milliLiter(s) Oral Mucosa every 12 hours  chlorhexidine 2% Cloths 1 Application(s) Topical daily  dextrose 5%. 1000 milliLiter(s) IV Continuous <Continuous>  dextrose 5%. 1000 milliLiter(s) IV Continuous <Continuous>  dextrose 50% Injectable 25 Gram(s) IV Push once  dextrose Oral Gel 15 Gram(s) Oral once PRN  enoxaparin Injectable 40 milliGRAM(s) SubCutaneous every 24 hours  famotidine Injectable 20 milliGRAM(s) IV Push every 12 hours  fentaNYL    Injectable 25 MICROGram(s) IV Push every 4 hours PRN  glucagon  Injectable 1 milliGRAM(s) IntraMuscular once  insulin lispro (ADMELOG) corrective regimen sliding scale   SubCutaneous every 4 hours  insulin NPH human recombinant 15 Unit(s) SubCutaneous every 8 hours  metoprolol tartrate 50 milliGRAM(s) Oral every 8 hours  norepinephrine Infusion 0.05 MICROgram(s)/kG/Min IV Continuous <Continuous>  ondansetron Injectable 4 milliGRAM(s) IV Push every 6 hours PRN  vancomycin    Solution 250 milliGRAM(s) Enteral Tube every 6 hours    Vitals:  T(C): 37.3 (22 @ 14:00), Max: 38 (22 @ 05:00)  HR: 81 (22 @ 14:00) (81 - 106)  BP: --  RR: 22 (22 @ 14:00) (19 - 33)  SpO2: 100% (22 @ 14:00) (97% - 100%)    Weight in k.9 ( @ 23:13)    I&O's Summary    07 Aug 2022 07:01  -  08 Aug 2022 07:00  --------------------------------------------------------  IN: 4105 mL / OUT: 6155 mL / NET: -2050 mL    08 Aug 2022 07:01  -  08 Aug 2022 14:37  --------------------------------------------------------  IN: 1270 mL / OUT: 842 mL / NET: 428 mL    Tele: SR, frequent PVCs, brief AT     Physical Exam:  General: Intuabed  Neck: Neck supple. JVP not elevated.   Chest: anterior lung fields CTA  CV: Normal S1 and S2. No murmurs, rub, or gallops. Lukewarm peripherally.  PV: No edema noted.   Abdomen: Soft, non-distended  Skin: dry, no rash, generalized ecchymosis  Neurology: moves to noxious stimuli  Labs:                        9.9    11.70 )-----------( 214      ( 08 Aug 2022 04:00 )             29.8       149<H>  |  116<H>  |  61.2<H>  ----------------------------<  212<H>  4.7   |  25.0  |  1.03    Ca    8.1<L>      08 Aug 2022 04:00  Phos  3.5       Mg     2.0         TPro  5.5<L>  /  Alb  2.4<L>  /  TBili  0.5  /  DBili  0.1  /  AST  69<H>  /  ALT  108<H>  /  AlkPhos  173<H>      Serum Pro-Brain Natriuretic Peptide: 2903 pg/mL ( @ 11:16)  Creatine Kinase, Serum: 172 U/L (22 @ 22:55)  Creatine Kinase, Serum: 172 U/L (22 @ 22:55)    Lactate, Blood: 1.1 mmol/L ( @ 04:00)  Lactate, Blood: 1.0 mmol/L ( @ 02:10)  Lactate, Blood: 1.2 mmol/L ( @ 22:55)

## 2022-08-08 NOTE — PROGRESS NOTE ADULT - SUBJECTIVE AND OBJECTIVE BOX
Neurology   NATANAEL ROWAN 76y Male       HPI:  76M with PMH CABG, HTN, HLD who [place] [facility] [place]. Last known well was last night 10pm prior to going to bed, woke up this morning around 0500 with R sided weakness and R facial droop. [place] [facility] [place], NIH [place] [facility] [place] 8. CTA showed LM1 occlusion. Transferred to [facility] for possible neuro IR intervention. On arrival to [facility], NIH 6. Decision made to take patient directly to neuro IR for intervention.     Initial NIH 6, mRS 0    PMH: HTN (hypertension)    HLD (hyperlipidemia)    S/P CABG x 1         PSH:       FAMILY HISTORY:      SOCIAL HISTORY:  No history of tobacco or alcohol use     Allergies    No Known Allergies    Intolerances        Vital Signs Last 24 Hrs  T(C): 37.3 (08 Aug 2022 14:00), Max: 38 (08 Aug 2022 05:00)  T(F): 99.1 (08 Aug 2022 14:00), Max: 100.4 (08 Aug 2022 05:00)  HR: 81 (08 Aug 2022 14:00) (81 - 106)  BP: --  BP(mean): --  RR: 22 (08 Aug 2022 14:00) (19 - 33)  SpO2: 100% (08 Aug 2022 14:00) (97% - 100%)    Parameters below as of 08 Aug 2022 14:00  Patient On (Oxygen Delivery Method): ventilator    O2 Concentration (%): 30    MEDICATIONS    aspirin  chewable 81 milliGRAM(s) Oral daily  BACItracin   Ointment 1 Application(s) Topical daily  chlorhexidine 0.12% Liquid 15 milliLiter(s) Oral Mucosa every 12 hours  chlorhexidine 2% Cloths 1 Application(s) Topical daily  dextrose 5%. 1000 milliLiter(s) IV Continuous <Continuous>  dextrose 5%. 1000 milliLiter(s) IV Continuous <Continuous>  dextrose 50% Injectable 25 Gram(s) IV Push once  dextrose Oral Gel 15 Gram(s) Oral once PRN  enoxaparin Injectable 40 milliGRAM(s) SubCutaneous every 24 hours  famotidine Injectable 20 milliGRAM(s) IV Push every 12 hours  fentaNYL    Injectable 25 MICROGram(s) IV Push every 4 hours PRN  glucagon  Injectable 1 milliGRAM(s) IntraMuscular once  insulin lispro (ADMELOG) corrective regimen sliding scale   SubCutaneous every 4 hours  insulin NPH human recombinant 15 Unit(s) SubCutaneous every 8 hours  metoprolol tartrate 50 milliGRAM(s) Oral every 8 hours  norepinephrine Infusion 0.05 MICROgram(s)/kG/Min IV Continuous <Continuous>  ondansetron Injectable 4 milliGRAM(s) IV Push every 6 hours PRN  vancomycin    Solution 250 milliGRAM(s) Enteral Tube every 6 hours         LABS:  CBC Full  -  ( 08 Aug 2022 04:00 )  WBC Count : 11.70 K/uL  RBC Count : 3.21 M/uL  Hemoglobin : 9.9 g/dL  Hematocrit : 29.8 %  Platelet Count - Automated : 214 K/uL  Mean Cell Volume : 92.8 fl  Mean Cell Hemoglobin : 30.8 pg  Mean Cell Hemoglobin Concentration : 33.2 gm/dL  Auto Neutrophil # : x  Auto Lymphocyte # : x  Auto Monocyte # : x  Auto Eosinophil # : x  Auto Basophil # : x  Auto Neutrophil % : x  Auto Lymphocyte % : x  Auto Monocyte % : x  Auto Eosinophil % : x  Auto Basophil % : x      08-08    149<H>  |  116<H>  |  61.2<H>  ----------------------------<  212<H>  4.7   |  25.0  |  1.03    Ca    8.1<L>      08 Aug 2022 04:00  Phos  3.5     08-08  Mg     2.0     08-08    TPro  5.5<L>  /  Alb  2.4<L>  /  TBili  0.5  /  DBili  0.1  /  AST  69<H>  /  ALT  108<H>  /  AlkPhos  173<H>  08-08    LIVER FUNCTIONS - ( 08 Aug 2022 04:00 )  Alb: 2.4 g/dL / Pro: 5.5 g/dL / ALK PHOS: 173 U/L / ALT: 108 U/L / AST: 69 U/L / GGT: x           On Neurological Examination:  Patient is intubated off sedation    Mental Status -   TOpened eyes to noxious today. Not following commands  Cranial Nerves -Pupils are 2 and reactive. , EOMs are conjugate in primary gaze and on occulocephalics.  He blinks to threat on the left side.  Cough reflex is present .  Motor Exam -   Right side trace movement.   Left UE and  LLE moves spontaneously.         RADIOLOGY ( All neurological imaging studies were independently reviewed and interpreted by me)  [facility]   CTA  CTP    IMPRESSION:    CT PERFUSION:  The CT perfusion is technically limited by truncation of the arterial input function and venous outflow function.    Core infarct (CBF < 30%:): 0 ml  Penumbra (Tmax >6s): 4 mL  Mismatched volume: 0 ml  Mismatched ratio: None    Interpretation:  Based on CBF < 30%, there is no evidence of a core infarct. At CBF < 34%, a small perfusion defect of 4 mL is located in the left inferior frontal gyrus and corresponds to matched perfusion abnormality of Tmax >6 seconds. On Tmax > 4s, there is a much larger perfusion defect throughout the majority of the left frontoparietal convexity, which may represent an ischemic penumbra in the left MCA vascular territory.        CT ANGIOGRAPHY NECK:  1. Poor opacification of the cervical vasculature.  2. Suspicion for moderate greater than 50% stenosis in the proximal right internal carotid artery. Suspicion for moderate to severe stenosis in the proximal left internal carotid artery that may be greater than 70%. No evidence of ICA occlusion in the neck.  3. The right vertebral artery is grossly patent.. The V1 segment of the left vertebral artery cannot be visualized. The V2 and V3 segments the left vertebral artery are grossly patent with multiple stenoses.  4. There is limited visualization of the common carotid artery origins and subclavian artery origins. Underlying stenoses cannot be excluded. There is suspicion for a severe stenosis in the proximal left subclavian artery.      CT ANGIOGRAPHY BRAIN:  1. Poor opacification of the intracranial vasculature.  2. There appears to be a focal filling defect at the left MCA bifurcation with distal flow. There is marked attenuation of M2 branches of the left middle cerebral artery. Vessel density analysis of the MCA territory shows a greater than 50% reduction of vessel density in the left MCA territory compared to the contralateral side.  3. There are mild to moderate stenoses in the supraclinoid segments of the internal carotid arteries bilaterally, without occlusion.  4. There are stenoses in the intradural vertebral arteries bilaterally, without occlusion.    Repeat CTA or MRI/MRA is recommended for further evaluation.  Peak arterial opacification on the CT perfusion occurs at approximately 38 seconds. If the CTA is repeated, a long delay is required after contrast injection to achieve adequate opacification of the vasculature.    The findings were discussed with JACK Light in the emergency room on 07/24/2022 at 5:45 AM. Read back verification was obtained.      SEVERO CHANEL MD; Attending Radiologist  This document has been electronically signed    MRI:    MR Head No Cont (07.26.22 @ 20:58) >  IMPRESSION:    Acute left MCA territory infarcts with hemorrhagic transformation,   grossly stable since comparison CT.    Additional punctate acute infarct involving the medial left frontal lobe   in the SUGEY territory.    ORION ANDRADE MD; Attending Radiologist        TTE  TTE Echo Complete w/ Contrast w/ Doppler (07.24.22 @ 14:01) >    Summary:   1. Endocardial visualization was enhanced with intravenous echo contrast.   2. Severely enlarged left atrium.   3. Global diffuse akinesis. Left ventricular ejection fraction, by   visual estimation, is <10%.   4. Elevated mean left atrial pressure. (>30 mm Hg)   5. Normal right atrial size.   6. Mildly enlarged right ventricle. Moderately reduced RV systolic   function.   7. Mild mitral valve regurgitation.   8. Mild tricuspid regurgitation.   9. Estimated pulmonary artery systolic pressure is 42 mmHg - mild   pulmonary hypertension.  10. There is no evidence of pericardial effusion.  11. Team informed of the findings.    MD Millie, RPVI Electronically signed on 7/24/2022 at 3:49:14 PM      < from: CT Head No Cont (07.30.22 @ 12:47) >    IMPRESSION:  Aging inferior left frontal and inferolateral left parietal infarcts.   Mild stable cortical blood products associated with the frontal infarct.    VERNELL LOYA MD; Attending Radiologist          EEG IMPRESSION/CLINICAL CORRELATE 7/31/22    Abnormal EEG study.  1. Potential epileptogenic foci in the bilateral frontotemporal regions.   2. Structural abnormality and cortical dysfunction in the left frontotemporal region.   3. Mild to moderate nonspecific diffuse or multifocal cerebral dysfunction.   4. No seizure seen.     _____________________________________________________________    Mayi Prathre MD  Director, Epilepsy/[facility]       Electronic Signatures:  Mayi Prather)  (Signed 31-Jul-2022 09:06)EEG IMPRESSION/CLINICAL CORRELATE

## 2022-08-08 NOTE — PROGRESS NOTE ADULT - SUBJECTIVE AND OBJECTIVE BOX
76M with PMH CABG, HTN, HLD who [place] [facility] [place]. Last known well was last night 10pm prior to going to bed, woke up this morning around 0500 with R sided weakness and R facial droop. [place] [facility] [place], NIH [place] [facility] [place]. CTA showed LM1 occlusion. Transferred to [facility] for possible neuro IR intervention. On arrival to [facility], NIH 6. Decision made to take patient directly to neuro IR for intervention.   NIH 6, mRS 0    Hosp Course complicated:   L MCA stroke  Decompensated ischemic cardiomyopathy  Pre- renal azothemia/ ATN   Septic shock secondary to C Difficile  Encephalopathy  Cardiac arrytmia - PVC's and bigeminy      ICU Vital Signs Last 24 Hrs  T(C): 38 (08 Aug 2022 07:00), Max: 38 (08 Aug 2022 05:00)  T(F): 100.4 (08 Aug 2022 07:00), Max: 100.4 (08 Aug 2022 05:00)  HR: 89 (08 Aug 2022 07:00) (83 - 106)  ABP: 132/66 (08 Aug 2022 07:00) (87/42 - 147/64)  ABP(mean): 86 (08 Aug 2022 07:00) (56 - 97)  RR: 26 (08 Aug 2022 07:00) (19 - 33)  SpO2: 100% (08 Aug 2022 07:00) (97% - 100%)    O2 Parameters below as of 08 Aug 2022 04:00  Patient On (Oxygen Delivery Method): ventilator    CI - 3.2- 3.7  SV- 24    O2 Parameters below as of 07 Aug 2022 04:00  Patient On (Oxygen Delivery Method): ventilator        07 Aug 2022 07:01  -  08 Aug 2022 07:00  --------------------------------------------------------  IN:    Albumin 5%  - 250 mL: 500 mL    dextrose 5%: 200 mL    Enteral Tube Flush: 150 mL    Free Water: 1800 mL    Jevity 1.5: 1440 mL    Norepinephrine: 11.2 mL    Norepinephrine: 3.8 mL  Total IN: 4105 mL    OUT:    Incontinent per Condom Catheter (mL): 725 mL    Indwelling Catheter - Urethral (mL): 3400 mL    Rectal Tube (mL): 1000 mL    Voided (mL): 900 mL  Total OUT: 6025 mL    Total NET: -1920 mL          MEDICATIONS  (STANDING):  aspirin  chewable 81 milliGRAM(s) Oral daily  chlorhexidine 0.12% Liquid 15 milliLiter(s) Oral Mucosa every 12 hours  chlorhexidine 2% Cloths 1 Application(s) Topical daily  dextrose 5%. 1000 milliLiter(s) (50 mL/Hr) IV Continuous <Continuous>  dextrose 5%. 1000 milliLiter(s) (50 mL/Hr) IV Continuous <Continuous>  dextrose 5%. 1000 milliLiter(s) (100 mL/Hr) IV Continuous <Continuous>  dextrose 50% Injectable 25 Gram(s) IV Push once  enoxaparin Injectable 40 milliGRAM(s) SubCutaneous every 24 hours  famotidine Injectable 20 milliGRAM(s) IV Push every 12 hours  glucagon  Injectable 1 milliGRAM(s) IntraMuscular once  insulin lispro (ADMELOG) corrective regimen sliding scale   SubCutaneous every 4 hours  insulin NPH human recombinant 10 Unit(s) SubCutaneous every 8 hours  metoprolol tartrate 50 milliGRAM(s) Oral every 8 hours  norepinephrine Infusion 0.05 MICROgram(s)/kG/Min (9.38 mL/Hr) IV Continuous <Continuous>  vancomycin    Solution 250 milliGRAM(s) Enteral Tube every 6 hours    MEDICATIONS  (PRN):  dextrose Oral Gel 15 Gram(s) Oral once PRN Blood Glucose LESS THAN 70 milliGRAM(s)/deciliter  fentaNYL    Injectable 25 MICROGram(s) IV Push every 4 hours PRN Agitation  ondansetron Injectable 4 milliGRAM(s) IV Push every 6 hours PRN Nausea and/or Vomiting      08-08    149<H>  |  116<H>  |  61.2<H>  ----------------------------<  212<H>  4.7   |  25.0  |  1.03    Ca    8.1<L>      08 Aug 2022 04:00  Phos  3.5     08-08  Mg     2.0     08-08    TPro  5.5<L>  /  Alb  2.4<L>  /  TBili  0.5  /  DBili  0.1  /  AST  69<H>  /  ALT  108<H>  /  AlkPhos  173<H>  08-08                          9.9    11.70 )-----------( 214      ( 08 Aug 2022 04:00 )             29.8       Trop - neg - o.06  pro BNP- 2900     Xray Chest 1 View- PORTABLE-Routine (Xray Chest 1 View- PORTABLE-Routine .) (08.02.22 @ 09:41) >  ACC: [id number] EXAM:  XR CHEST PORTABLE ROUTINE 1V                          PROCEDURE DATE:  08/02/2022          INTERPRETATION:  Chest one view    HISTORY: Fever    COMPARISON STUDY: 8/1/2022    Frontal expiratory view of the chest shows the heart to be similar in   size. Endotracheal tube, sternal wires and nasogastric tube are again   noted.    The lungs show slight clearing of the lower right lung and there is no   evidence of pneumothorax nor pleural effusion.    IMPRESSION:  Right lung clearing.        CT Head No Cont (07.30.22 @ 12:47) >  IMPRESSION:  Aging inferior left frontal and inferolateral left parietal infarcts.   Mild stable cortical blood products associated with the frontal infarct.            EXAMINATION:  General:  in NAD  HEENT:  MMM  Neuro:  eye closed, opens eyes to voice, briefly attends, does not follow commands, PERRL, BTT on the L, + cough, moves L arm and leg spontaneously, no movement on the R side to noxious   Cards:  irregular   Respiratory:  no respiratory distress  Abdomen:  soft  Extremities:  no LE edema             76M with PMH CABG, HTN, HLD who [place] [facility] [place]. Last known well was last night 10pm prior to going to bed, woke up this morning around 0500 with R sided weakness and R facial droop. [place] [facility] [place] code [place] initiated, NIH at [facility] [place] 8. CTA showed LM1 occlusion. Transferred to [facility] for possible neuro IR intervention. On arrival to [facility], NIH 6. Decision made to take patient directly to neuro IR for intervention.   NIH 6, mRS 0    Hosp Course complicated:   L MCA stroke  Decompensated ischemic cardiomyopathy  Pre- renal azothemia/ ATN   Septic shock secondary to C Difficile  Encephalopathy  Cardiac arrytmia - PVC's and bigeminy      ICU Vital Signs Last 24 Hrs  T(C): 38 (08 Aug 2022 07:00), Max: 38 (08 Aug 2022 05:00)  T(F): 100.4 (08 Aug 2022 07:00), Max: 100.4 (08 Aug 2022 05:00)  HR: 89 (08 Aug 2022 07:00) (83 - 106)  ABP: 132/66 (08 Aug 2022 07:00) (87/42 - 147/64)  ABP(mean): 86 (08 Aug 2022 07:00) (56 - 97)  RR: 26 (08 Aug 2022 07:00) (19 - 33)  SpO2: 100% (08 Aug 2022 07:00) (97% - 100%)    O2 Parameters below as of 08 Aug 2022 04:00  Patient On (Oxygen Delivery Method): ventilator    CI - 3.0  SVV- 18    O2 Parameters below as of 07 Aug 2022 04:00  Patient On (Oxygen Delivery Method): ventilator        07 Aug 2022 07:01  -  08 Aug 2022 07:00  --------------------------------------------------------  IN:    Albumin 5%  - 250 mL: 500 mL    dextrose 5%: 200 mL    Enteral Tube Flush: 150 mL    Free Water: 1800 mL    Jevity 1.5: 1440 mL    Norepinephrine: 11.2 mL    Norepinephrine: 3.8 mL  Total IN: 4105 mL    OUT:    Incontinent per Condom Catheter (mL): 725 mL    Indwelling Catheter - Urethral (mL): 3400 mL    Rectal Tube (mL): 1000 mL    Voided (mL): 900 mL  Total OUT: 6025 mL    Total NET: -1920 mL          MEDICATIONS  (STANDING):  aspirin  chewable 81 milliGRAM(s) Oral daily  chlorhexidine 0.12% Liquid 15 milliLiter(s) Oral Mucosa every 12 hours  chlorhexidine 2% Cloths 1 Application(s) Topical daily  dextrose 5%. 1000 milliLiter(s) (50 mL/Hr) IV Continuous <Continuous>  dextrose 5%. 1000 milliLiter(s) (50 mL/Hr) IV Continuous <Continuous>  dextrose 5%. 1000 milliLiter(s) (100 mL/Hr) IV Continuous <Continuous>  dextrose 50% Injectable 25 Gram(s) IV Push once  enoxaparin Injectable 40 milliGRAM(s) SubCutaneous every 24 hours  famotidine Injectable 20 milliGRAM(s) IV Push every 12 hours  glucagon  Injectable 1 milliGRAM(s) IntraMuscular once  insulin lispro (ADMELOG) corrective regimen sliding scale   SubCutaneous every 4 hours  insulin NPH human recombinant 10 Unit(s) SubCutaneous every 8 hours  metoprolol tartrate 50 milliGRAM(s) Oral every 8 hours  vancomycin    Solution 250 milliGRAM(s) Enteral Tube every 6 hours    MEDICATIONS  (PRN):  dextrose Oral Gel 15 Gram(s) Oral once PRN Blood Glucose LESS THAN 70 milliGRAM(s)/deciliter  fentaNYL    Injectable 25 MICROGram(s) IV Push every 4 hours PRN Agitation  ondansetron Injectable 4 milliGRAM(s) IV Push every 6 hours PRN Nausea and/or Vomiting      08-08    149<H>  |  116<H>  |  61.2<H>  ----------------------------<  212<H>  4.7   |  25.0  |  1.03    Ca    8.1<L>      08 Aug 2022 04:00  Phos  3.5     08-08  Mg     2.0     08-08    TPro  5.5<L>  /  Alb  2.4<L>  /  TBili  0.5  /  DBili  0.1  /  AST  69<H>  /  ALT  108<H>  /  AlkPhos  173<H>  08-08                          9.9    11.70 )-----------( 214      ( 08 Aug 2022 04:00 )             29.8       Trop - neg - o.06  pro BNP- 2900     Xray Chest 1 View- PORTABLE-Routine (Xray Chest 1 View- PORTABLE-Routine .) (08.02.22 @ 09:41) >  ACC: [id number] EXAM:  XR CHEST PORTABLE ROUTINE 1V                          PROCEDURE DATE:  08/02/2022          INTERPRETATION:  Chest one view    HISTORY: Fever    COMPARISON STUDY: 8/1/2022    Frontal expiratory view of the chest shows the heart to be similar in   size. Endotracheal tube, sternal wires and nasogastric tube are again   noted.    The lungs show slight clearing of the lower right lung and there is no   evidence of pneumothorax nor pleural effusion.    IMPRESSION:  Right lung clearing.        CT Head No Cont (  IMPRESSION:  Aging inferior left frontal and inferolateral left parietal infarcts.   Mild stable cortical blood products associated with the frontal infarct.        EXAMINATION:  General:  in NAD  HEENT:  MMM  Neuro:  eye closed, opens eyes to voice, briefly attends, does not follow commands, PERRL, BTT on the L, + cough, moves L arm and leg spontaneously, no movement on the R side to noxious   Cards:  irregular   Respiratory:  no respiratory distress  Abdomen:  soft  Extremities:  no LE edema

## 2022-08-08 NOTE — PROGRESS NOTE ADULT - ASSESSMENT
The patient is a 76 year old male with past medical history of HTN, HLD, CAD s/p CABG, carotid stenosis, and PVD who was transferred from an outside hospital for neuro IR intervention after waking up with R sided facial droop and R sided weakness; found to have left M1 occlusion on CTA s/p thrombectomy which is complicated by hemorrhagic conversion. Hospital course notable for acute hypoxic respiratory failure post procedure s/p mechanical ventilation; ischemic cardiomyopathy, LVEF<10% and moderately reduced RV function. Also with E coli UTI, KE, C. diff. Nephrology is managing KE.    -Baseline creatinine ranges 0.6-1.0mg/dL   -Developed prerenal acute kidney (resolved)   -Then developed recurrent acute kidney injury with creatinine peaking at 2.1mg/dL  -Clinically suspicious for AIN given recent exposure to meropenem + new onset presence of eosinophilia    -Meropenem have been discontinued on 08/06/2022  -Renal function have returned back to baseline today, latest creatinine is 1.0mg/dL  -Maintained net even status over the past 24 hours (I/O 600/642); euvolemic on exam   -Sodium is up trending again; D5W discontinued yesterday  -Recommend increasing free water flushes from 300 q4h to 350 q4h    Kwame West DO  [facility]

## 2022-08-09 DIAGNOSIS — Z95.1 PRESENCE OF AORTOCORONARY BYPASS GRAFT: ICD-10-CM

## 2022-08-09 LAB
ANION GAP SERPL CALC-SCNC: 9 MMOL/L — SIGNIFICANT CHANGE UP (ref 5–17)
BUN SERPL-MCNC: 31.1 MG/DL — HIGH (ref 8–20)
CALCIUM SERPL-MCNC: 8.5 MG/DL — SIGNIFICANT CHANGE UP (ref 8.4–10.5)
CHLORIDE SERPL-SCNC: 114 MMOL/L — HIGH (ref 98–107)
CO2 SERPL-SCNC: 26 MMOL/L — SIGNIFICANT CHANGE UP (ref 22–29)
CREAT SERPL-MCNC: 0.54 MG/DL — SIGNIFICANT CHANGE UP (ref 0.5–1.3)
EGFR: 103 ML/MIN/1.73M2 — SIGNIFICANT CHANGE UP
GLUCOSE BLDC GLUCOMTR-MCNC: 126 MG/DL — HIGH (ref 70–99)
GLUCOSE BLDC GLUCOMTR-MCNC: 141 MG/DL — HIGH (ref 70–99)
GLUCOSE BLDC GLUCOMTR-MCNC: 144 MG/DL — HIGH (ref 70–99)
GLUCOSE BLDC GLUCOMTR-MCNC: 181 MG/DL — HIGH (ref 70–99)
GLUCOSE SERPL-MCNC: 121 MG/DL — HIGH (ref 70–99)
HCT VFR BLD CALC: 31.9 % — LOW (ref 39–50)
HGB BLD-MCNC: 10.5 G/DL — LOW (ref 13–17)
MAGNESIUM SERPL-MCNC: 1.9 MG/DL — SIGNIFICANT CHANGE UP (ref 1.6–2.6)
MCHC RBC-ENTMCNC: 31.2 PG — SIGNIFICANT CHANGE UP (ref 27–34)
MCHC RBC-ENTMCNC: 32.9 GM/DL — SIGNIFICANT CHANGE UP (ref 32–36)
MCV RBC AUTO: 94.7 FL — SIGNIFICANT CHANGE UP (ref 80–100)
PHOSPHATE SERPL-MCNC: 2.9 MG/DL — SIGNIFICANT CHANGE UP (ref 2.4–4.7)
PLATELET # BLD AUTO: 239 K/UL — SIGNIFICANT CHANGE UP (ref 150–400)
POTASSIUM SERPL-MCNC: 4.2 MMOL/L — SIGNIFICANT CHANGE UP (ref 3.5–5.3)
POTASSIUM SERPL-SCNC: 4.2 MMOL/L — SIGNIFICANT CHANGE UP (ref 3.5–5.3)
RBC # BLD: 3.37 M/UL — LOW (ref 4.2–5.8)
RBC # FLD: 15.7 % — HIGH (ref 10.3–14.5)
SODIUM SERPL-SCNC: 149 MMOL/L — HIGH (ref 135–145)
WBC # BLD: 11.68 K/UL — HIGH (ref 3.8–10.5)
WBC # FLD AUTO: 11.68 K/UL — HIGH (ref 3.8–10.5)

## 2022-08-09 PROCEDURE — 99232 SBSQ HOSP IP/OBS MODERATE 35: CPT

## 2022-08-09 PROCEDURE — 99233 SBSQ HOSP IP/OBS HIGH 50: CPT

## 2022-08-09 RX ORDER — METOPROLOL TARTRATE 50 MG
5 TABLET ORAL ONCE
Refills: 0 | Status: COMPLETED | OUTPATIENT
Start: 2022-08-09 | End: 2022-08-09

## 2022-08-09 RX ORDER — INSULIN LISPRO 100/ML
VIAL (ML) SUBCUTANEOUS EVERY 6 HOURS
Refills: 0 | Status: DISCONTINUED | OUTPATIENT
Start: 2022-08-09 | End: 2022-09-01

## 2022-08-09 RX ORDER — MIDODRINE HYDROCHLORIDE 2.5 MG/1
10 TABLET ORAL EVERY 8 HOURS
Refills: 0 | Status: DISCONTINUED | OUTPATIENT
Start: 2022-08-09 | End: 2022-08-12

## 2022-08-09 RX ORDER — BACITRACIN ZINC 500 UNIT/G
1 OINTMENT IN PACKET (EA) TOPICAL
Refills: 0 | Status: DISCONTINUED | OUTPATIENT
Start: 2022-08-09 | End: 2022-09-01

## 2022-08-09 RX ADMIN — Medication 1 APPLICATION(S): at 11:12

## 2022-08-09 RX ADMIN — HUMAN INSULIN 15 UNIT(S): 100 INJECTION, SUSPENSION SUBCUTANEOUS at 05:08

## 2022-08-09 RX ADMIN — FENTANYL CITRATE 25 MICROGRAM(S): 50 INJECTION INTRAVENOUS at 09:51

## 2022-08-09 RX ADMIN — FENTANYL CITRATE 25 MICROGRAM(S): 50 INJECTION INTRAVENOUS at 02:25

## 2022-08-09 RX ADMIN — Medication 81 MILLIGRAM(S): at 11:12

## 2022-08-09 RX ADMIN — Medication 250 MILLIGRAM(S): at 18:26

## 2022-08-09 RX ADMIN — FAMOTIDINE 20 MILLIGRAM(S): 10 INJECTION INTRAVENOUS at 11:14

## 2022-08-09 RX ADMIN — Medication 250 MILLIGRAM(S): at 11:11

## 2022-08-09 RX ADMIN — HUMAN INSULIN 15 UNIT(S): 100 INJECTION, SUSPENSION SUBCUTANEOUS at 21:02

## 2022-08-09 RX ADMIN — ENOXAPARIN SODIUM 40 MILLIGRAM(S): 100 INJECTION SUBCUTANEOUS at 21:01

## 2022-08-09 RX ADMIN — CHLORHEXIDINE GLUCONATE 15 MILLILITER(S): 213 SOLUTION TOPICAL at 17:28

## 2022-08-09 RX ADMIN — HUMAN INSULIN 15 UNIT(S): 100 INJECTION, SUSPENSION SUBCUTANEOUS at 14:33

## 2022-08-09 RX ADMIN — MIDODRINE HYDROCHLORIDE 10 MILLIGRAM(S): 2.5 TABLET ORAL at 18:39

## 2022-08-09 RX ADMIN — CHLORHEXIDINE GLUCONATE 1 APPLICATION(S): 213 SOLUTION TOPICAL at 11:13

## 2022-08-09 RX ADMIN — Medication 5 MILLIGRAM(S): at 21:01

## 2022-08-09 RX ADMIN — Medication 1 APPLICATION(S): at 21:02

## 2022-08-09 RX ADMIN — FENTANYL CITRATE 25 MICROGRAM(S): 50 INJECTION INTRAVENOUS at 10:30

## 2022-08-09 RX ADMIN — FENTANYL CITRATE 25 MICROGRAM(S): 50 INJECTION INTRAVENOUS at 02:40

## 2022-08-09 RX ADMIN — CHLORHEXIDINE GLUCONATE 15 MILLILITER(S): 213 SOLUTION TOPICAL at 05:09

## 2022-08-09 RX ADMIN — Medication 250 MILLIGRAM(S): at 05:09

## 2022-08-09 NOTE — PROGRESS NOTE ADULT - SUBJECTIVE AND OBJECTIVE BOX
[facility] Advanced Heart Failure  Office: [address] [place] [address]  Telephone: [phone]/Fax: [phone]    Advanced Heart Failure - FOLLOW UP NOTE    Interval History:  - febrile yesterday to 100.4F.  Completed LE DVT Ultrasound yesterday with no thrombus seen.  Back on levophed this AM.    Medications:  aspirin  chewable 81 milliGRAM(s) Oral daily  BACItracin   Ointment 1 Application(s) Topical daily  chlorhexidine 0.12% Liquid 15 milliLiter(s) Oral Mucosa every 12 hours  chlorhexidine 2% Cloths 1 Application(s) Topical daily  dextrose 5%. 1000 milliLiter(s) IV Continuous <Continuous>  dextrose 5%. 1000 milliLiter(s) IV Continuous <Continuous>  dextrose 50% Injectable 25 Gram(s) IV Push once  dextrose Oral Gel 15 Gram(s) Oral once PRN  enoxaparin Injectable 40 milliGRAM(s) SubCutaneous every 24 hours  famotidine Injectable 20 milliGRAM(s) IV Push every 12 hours  fentaNYL    Injectable 25 MICROGram(s) IV Push every 4 hours PRN  glucagon  Injectable 1 milliGRAM(s) IntraMuscular once  insulin lispro (ADMELOG) corrective regimen sliding scale   SubCutaneous every 6 hours  insulin NPH human recombinant 15 Unit(s) SubCutaneous every 8 hours  metoprolol tartrate 50 milliGRAM(s) Oral every 8 hours  norepinephrine Infusion 0.05 MICROgram(s)/kG/Min IV Continuous <Continuous>  ondansetron Injectable 4 milliGRAM(s) IV Push every 6 hours PRN  vancomycin    Solution 250 milliGRAM(s) Enteral Tube every 6 hours      Vitals:  T(C): 37.4 (08-09-22 @ 10:30), Max: 37.9 (08-08-22 @ 21:00)  HR: 102 (08-09-22 @ 10:30) (81 - 109)  BP: 125/56 (08-09-22 @ 10:30) (80/54 - 142/65)  BP(mean): 77 (08-09-22 @ 10:30) (59 - 88)  RR: 28 (08-09-22 @ 10:30) (15 - 34)  SpO2: 98% (08-09-22 @ 10:30) (96% - 100%)      I&O's Summary    08 Aug 2022 07:01  -  09 Aug 2022 07:00  --------------------------------------------------------  IN: 4113.1 mL / OUT: 3382 mL / NET: 731.1 mL    09 Aug 2022 07:01  -  09 Aug 2022 11:16  --------------------------------------------------------  IN: 605.2 mL / OUT: 255 mL / NET: 350.2 mL        Physical Exam:  General: Intubated  Neck: Neck supple. JVP not elevated.   Chest: anterior lung fields CTA, mechanical breath sounds  CV: Normal S1 and S2. No murmurs, rub, or gallops.   PV: No edema noted, warm.  Abdomen: Soft, non-distended  Skin: dry, no rash, generalized ecchymosis  Neurology: moves to noxious stimuli but not responsive to voice.    Labs:                        10.5   11.68 )-----------( 239      ( 09 Aug 2022 03:30 )             31.9     08-09    149<H>  |  114<H>  |  31.1<H>  ----------------------------<  121<H>  4.2   |  26.0  |  0.54    Ca    8.5      09 Aug 2022 03:30  Phos  2.9     08-09  Mg     1.9     08-09    TPro  5.5<L>  /  Alb  2.4<L>  /  TBili  0.5  /  DBili  0.1  /  AST  69<H>  /  ALT  108<H>  /  AlkPhos  173<H>  08-08      TELEMETRY: NSR with short 2-4 beat runs of NSVT.

## 2022-08-09 NOTE — PROGRESS NOTE ADULT - ASSESSMENT
The patient is a 76 year old male with past medical history of HTN, HLD, CAD s/p CABG, carotid stenosis, and PVD who was transferred from an outside hospital for neuro IR intervention after waking up with R sided facial droop and R sided weakness; found to have left M1 occlusion on CTA s/p thrombectomy which is complicated by hemorrhagic conversion. Hospital course notable for acute hypoxic respiratory failure post procedure s/p mechanical ventilation; ischemic cardiomyopathy, LVEF<10% and moderately reduced RV function. Also with E coli UTI, KE, C. diff. Nephrology is managing KE (resolved) and hyperNa.     -Baseline creatinine ranges 0.6-1.0mg/dL   -Developed prerenal acute kidney (resolved)   -Then developed recurrent acute kidney injury. Clinically suspicious for AIN given recent exposure to meropenem + new onset presence of eosinophilia.    -Meropenem have been discontinued on 08/06/2022 followed by resolution of KE  -Renal function remains at baseline at this time  -Remains hypernatremic, however goal sodium as per primary team is high 140's   -Given that sodium is in the high 140's - no change to current regimen - continue free water flushes at 350cc q4h   -Would avoid future use of carbapenem-based antibiotics given recent KE suspicious for AIN    Kwame West DO  [facility]

## 2022-08-09 NOTE — PROGRESS NOTE ADULT - PROBLEM SELECTOR PLAN 1
- Known h/o ICMP.  TTE 7/24 showed LVEF <10% with RV dysfunction  - Clinically appears euvolemic on exam  - Goal is to maintain net even fluid status, may use Lasix PRN however pt does not currently require.   - GDMT:                - On Lopressor 50mg TID for PVC control (will eventually plan to switch to metoprolol succinate).  However,                may need to lower further to 375.mg TID given ongoing vasopressor use.                - start on spironolactone 12.5mg daily. This should not effected BP.                 - when able from a BP perspective would like to start on hydralazine 5mg TID for afterload reduction.                       Eventually will need ARNi as tolerated.  He was on lisinopril at home.   - Please check markers of perfusion daily (serum lactate, LFTs, T Bili).  Currently no sings of hypoperfusion given the hypotension.    - monitor strict I&O and daily weights.   - Device: he has refused this in the past and this would typically would not be offered if life expectancy < 1yr ( will need to revisit based on CVA recovery).

## 2022-08-09 NOTE — PROGRESS NOTE ADULT - PROBLEM SELECTOR PLAN 2
- Likely related to ICMP  - on lopressor.  Would avoid aggressive uptitration of BB due to significant bi-ventricular heart failure

## 2022-08-09 NOTE — PROGRESS NOTE ADULT - SUBJECTIVE AND OBJECTIVE BOX
Remains critically ill - on mechanical ventilation. Sodium remains in the high 140's. Wife is at bedside.     Vital Signs Last 24 Hrs  T(C): 37.9 (09 Aug 2022 18:00), Max: 38 (09 Aug 2022 16:00)  T(F): 100.2 (09 Aug 2022 18:00), Max: 100.4 (09 Aug 2022 16:00)  HR: 107 (09 Aug 2022 18:30) (81 - 109)  BP: 111/64 (09 Aug 2022 18:00) (80/54 - 142/65)  BP(mean): 73 (09 Aug 2022 18:00) (59 - 88)  RR: 22 (09 Aug 2022 18:30) (15 - 34)  SpO2: 98% (09 Aug 2022 18:30) (96% - 100%)    Parameters below as of 09 Aug 2022 18:00    O2 Concentration (%): 30    I&O's Summary    08 Aug 2022 07:01  -  09 Aug 2022 07:00  --------------------------------------------------------  IN: 4113.1 mL / OUT: 3382 mL / NET: 731.1 mL    09 Aug 2022 07:01  -  09 Aug 2022 18:58  --------------------------------------------------------  IN: 1322 mL / OUT: 1100 mL / NET: 222 mL    Physical Exam  General: WDWN male in NAD  HEENT: Intubated  Cardiac: S1S2 RRR  Respiratory: Transmitted breath sounds  Abdomen: Soft, NT  Extremities: No appreciable edema  Neuro: Non purposeful movement to touch     08-09    149<H>  |  114<H>  |  31.1<H>  ----------------------------<  121<H>  4.2   |  26.0  |  0.54    Ca    8.5      09 Aug 2022 03:30  Phos  2.9     08-09  Mg     1.9     08-09    TPro  5.5<L>  /  Alb  2.4<L>  /  TBili  0.5  /  DBili  0.1  /  AST  69<H>  /  ALT  108<H>  /  AlkPhos  173<H>  08-08                          10.5   11.68 )-----------( 239      ( 09 Aug 2022 03:30 )             31.9

## 2022-08-09 NOTE — PROGRESS NOTE ADULT - SUBJECTIVE AND OBJECTIVE BOX
76M with PMH CABG, HTN, HLD who [place] from [facility] [place]. Last known well was last night 10pm prior to going to bed, woke up this morning around 0500 with R sided weakness and R facial droop. Presented to [facility], code stroke initiated, NIH at [facility] reportedly 8. CTA showed LM1 occlusion. Transferred to [facility] for possible neuro IR intervention. On arrival to [facility], NIH 6. Decision made to take patient directly to neuro IR for intervention.   NIH 6, mRS 0    Hosp Course complicated:   L MCA stroke  Decompensated ischemic cardiomyopathy  Pre- renal azothemia/ ATN   Septic shock secondary to C Difficile  Encephalopathy  Cardiac arrytmia - PVC's and bigeminy    ICU Vital Signs Last 24 Hrs  T(C): 37.1 (09 Aug 2022 08:00), Max: 37.9 (08 Aug 2022 21:00)  T(F): 98.8 (09 Aug 2022 08:00), Max: 100.2 (08 Aug 2022 21:00)  HR: 97 (09 Aug 2022 08:10) (81 - 101)  BP: 107/56 (09 Aug 2022 08:00) (80/54 - 127/68)  BP(mean): 72 (09 Aug 2022 08:00) (59 - 87)  ABP: 102/48 (09 Aug 2022 08:00) (87/44 - 149/71)  ABP(mean): 67 (09 Aug 2022 08:00) (56 - 101)  RR: 23 (09 Aug 2022 08:00) (15 - 34)  SpO2: 96% (09 Aug 2022 08:10) (96% - 100%)    O2 Parameters below as of 09 Aug 2022 08:00  Patient On (Oxygen Delivery Method): ventilator      CI - 3.0- 4.0  SVV- 18    O2 Parameters below as of 07 Aug 2022 04:00  Patient On (Oxygen Delivery Method): ventilator      08 Aug 2022 07:01  -  09 Aug 2022 07:00  --------------------------------------------------------  IN:    Enteral Tube Flush: 120 mL    Free Water: 2250 mL    IV PiggyBack: 250 mL    Jevity 1.5: 1440 mL    Norepinephrine: 53.1 mL  Total IN: 4113.1 mL    OUT:    Indwelling Catheter - Urethral (mL): 2582 mL    Rectal Tube (mL): 800 mL  Total OUT: 3382 mL    Total NET: 731.1 mL    MEDICATIONS  (STANDING):  aspirin  chewable 81 milliGRAM(s) Oral daily  BACItracin   Ointment 1 Application(s) Topical daily  chlorhexidine 0.12% Liquid 15 milliLiter(s) Oral Mucosa every 12 hours  chlorhexidine 2% Cloths 1 Application(s) Topical daily  dextrose 5%. 1000 milliLiter(s) (50 mL/Hr) IV Continuous <Continuous>  dextrose 5%. 1000 milliLiter(s) (100 mL/Hr) IV Continuous <Continuous>  dextrose 50% Injectable 25 Gram(s) IV Push once  enoxaparin Injectable 40 milliGRAM(s) SubCutaneous every 24 hours  famotidine Injectable 20 milliGRAM(s) IV Push every 12 hours  glucagon  Injectable 1 milliGRAM(s) IntraMuscular once  insulin lispro (ADMELOG) corrective regimen sliding scale   SubCutaneous every 4 hours  insulin NPH human recombinant 15 Unit(s) SubCutaneous every 8 hours  metoprolol tartrate 50 milliGRAM(s) Oral every 8 hours  norepinephrine Infusion 0.05 MICROgram(s)/kG/Min (9.38 mL/Hr) IV Continuous <Continuous>  vancomycin    Solution 250 milliGRAM(s) Enteral Tube every 6 hours    MEDICATIONS  (PRN):  dextrose Oral Gel 15 Gram(s) Oral once PRN Blood Glucose LESS THAN 70 milliGRAM(s)/deciliter  fentaNYL    Injectable 25 MICROGram(s) IV Push every 4 hours PRN Agitation  ondansetron Injectable 4 milliGRAM(s) IV Push every 6 hours PRN Nausea and/or Vomiting    08-09    149<H>  |  114<H>  |  31.1<H>  ----------------------------<  121<H>  4.2   |  26.0  |  0.54    Ca    8.5      09 Aug 2022 03:30  Phos  2.9     08-09  Mg     1.9     08-09    TPro  5.5<L>  /  Alb  2.4<L>  /  TBili  0.5  /  DBili  0.1  /  AST  69<H>  /  ALT  108<H>  /  AlkPhos  173<H>  08-08                          10.5   11.68 )-----------( 239      ( 09 Aug 2022 03:30 )             31.9     CAPILLARY BLOOD GLUCOSE  POCT Blood Glucose.: 144 mg/dL (09 Aug 2022 05:07)  POCT Blood Glucose.: 184 mg/dL (08 Aug 2022 23:37)  POCT Blood Glucose.: 134 mg/dL (08 Aug 2022 21:00)  POCT Blood Glucose.: 212 mg/dL (08 Aug 2022 17:20)  POCT Blood Glucose.: 261 mg/dL (08 Aug 2022 13:28)          Trop - neg - o.06  pro BNP- 2900     Xray Chest 1 View- PORTABLE-Routine (Xray Chest 1 View- PORTABLE-Routine .) (08.02.22 @ 09:41) >  ACC: [id number] EXAM:  XR CHEST PORTABLE ROUTINE 1V                          PROCEDURE DATE:  08/02/2022          INTERPRETATION:  Chest one view    HISTORY: Fever    COMPARISON STUDY: 8/1/2022    Frontal expiratory view of the chest shows the heart to be similar in   size. Endotracheal tube, sternal wires and nasogastric tube are again   noted.    The lungs show slight clearing of the lower right lung and there is no   evidence of pneumothorax nor pleural effusion.    IMPRESSION:  Right lung clearing.        CT Head No Cont (  IMPRESSION:  Aging inferior left frontal and inferolateral left parietal infarcts.   Mild stable cortical blood products associated with the frontal infarct.        EXAMINATION:  General:  in NAD  HEENT:  MMM  Neuro:  eye closed, opens eyes to voice, briefly attends, does not follow commands, PERRL, BTT on the L, + cough, moves L arm and leg spontaneously, no movement on the R side to noxious   Cards:  irregular   Respiratory:  no respiratory distress  Abdomen:  soft  Extremities:  no LE edema             76M with PMH CABG, HTN, HLD [place] [facility] [place]. Last known well was last night 10pm prior to going to bed, woke up this morning around 0500 with R sided weakness and R facial droop. [place] [facility] [place], NIH [place] [facility] [place] 8. CTA showed LM1 occlusion. Transferred to [facility] for possible neuro IR intervention. On arrival to [facility], NIH 6. Decision made to take patient directly to neuro IR for intervention.   NIH 6, mRS 0    Hosp Course complicated:   L MCA stroke  Decompensated ischemic cardiomyopathy  Pre- renal azothemia/ ATN   Septic shock secondary to C Difficile  Encephalopathy  Cardiac arrytmia - PVC's and bigeminy    ICU Vital Signs Last 24 Hrs  T(C): 37.1 (09 Aug 2022 08:00), Max: 37.9 (08 Aug 2022 21:00)  T(F): 98.8 (09 Aug 2022 08:00), Max: 100.2 (08 Aug 2022 21:00)  HR: 97 (09 Aug 2022 08:10) (81 - 101)  BP: 107/56 (09 Aug 2022 08:00) (80/54 - 127/68)  BP(mean): 72 (09 Aug 2022 08:00) (59 - 87)  ABP: 102/48 (09 Aug 2022 08:00) (87/44 - 149/71)  ABP(mean): 67 (09 Aug 2022 08:00) (56 - 101)  RR: 23 (09 Aug 2022 08:00) (15 - 34)  SpO2: 96% (09 Aug 2022 08:10) (96% - 100%)    O2 Parameters below as of 09 Aug 2022 08:00  Patient On (Oxygen Delivery Method): ventilator      CI - 3.0- 4.0  SVV- 18    O2 Parameters below as of 07 Aug 2022 04:00  Patient On (Oxygen Delivery Method): ventilator      08 Aug 2022 07:01  -  09 Aug 2022 07:00  --------------------------------------------------------  IN:    Enteral Tube Flush: 120 mL    Free Water: 2250 mL    IV PiggyBack: 250 mL    Jevity 1.5: 1440 mL    Norepinephrine: 53.1 mL  Total IN: 4113.1 mL    OUT:    Indwelling Catheter - Urethral (mL): 2582 mL    Rectal Tube (mL): 800 mL  Total OUT: 3382 mL    Total NET: 731.1 mL    MEDICATIONS  (STANDING):  aspirin  chewable 81 milliGRAM(s) Oral daily  BACItracin   Ointment 1 Application(s) Topical daily  chlorhexidine 0.12% Liquid 15 milliLiter(s) Oral Mucosa every 12 hours  chlorhexidine 2% Cloths 1 Application(s) Topical daily  dextrose 5%. 1000 milliLiter(s) (50 mL/Hr) IV Continuous <Continuous>  dextrose 5%. 1000 milliLiter(s) (100 mL/Hr) IV Continuous <Continuous>  dextrose 50% Injectable 25 Gram(s) IV Push once  enoxaparin Injectable 40 milliGRAM(s) SubCutaneous every 24 hours  famotidine Injectable 20 milliGRAM(s) IV Push every 12 hours  glucagon  Injectable 1 milliGRAM(s) IntraMuscular once  insulin lispro (ADMELOG) corrective regimen sliding scale   SubCutaneous every 4 hours  insulin NPH human recombinant 15 Unit(s) SubCutaneous every 8 hours  metoprolol tartrate 50 milliGRAM(s) Oral every 8 hours  vancomycin   Solution 250 milliGRAM(s) Enteral Tube every 6 hours    MEDICATIONS  (PRN):  dextrose Oral Gel 15 Gram(s) Oral once PRN Blood Glucose LESS THAN 70 milliGRAM(s)/deciliter  fentaNYL    Injectable 25 MICROGram(s) IV Push every 4 hours PRN Agitation  ondansetron Injectable 4 milliGRAM(s) IV Push every 6 hours PRN Nausea and/or Vomiting    08-09    149<H>  |  114<H>  |  31.1<H>  ----------------------------<  121<H>  4.2   |  26.0  |  0.54    Ca    8.5      09 Aug 2022 03:30  Phos  2.9     08-09  Mg     1.9     08-09    TPro  5.5<L>  /  Alb  2.4<L>  /  TBili  0.5  /  DBili  0.1  /  AST  69<H>  /  ALT  108<H>  /  AlkPhos  173<H>  08-08                          10.5   11.68 )-----------( 239      ( 09 Aug 2022 03:30 )             31.9     CAPILLARY BLOOD GLUCOSE  POCT Blood Glucose.: 144 mg/dL (09 Aug 2022 05:07)  POCT Blood Glucose.: 184 mg/dL (08 Aug 2022 23:37)  POCT Blood Glucose.: 134 mg/dL (08 Aug 2022 21:00)  POCT Blood Glucose.: 212 mg/dL (08 Aug 2022 17:20)  POCT Blood Glucose.: 261 mg/dL (08 Aug 2022 13:28)          Trop - neg - o.06  pro BNP- 2900     Xray Chest 1 View- PORTABLE-Routine (Xray Chest 1 View- PORTABLE-Routine .) (08.02.22 @ 09:41) >  ACC: [id number] EXAM:  XR CHEST PORTABLE ROUTINE 1V                          PROCEDURE DATE:  08/02/2022          INTERPRETATION:  Chest one view    HISTORY: Fever    COMPARISON STUDY: 8/1/2022    Frontal expiratory view of the chest shows the heart to be similar in   size. Endotracheal tube, sternal wires and nasogastric tube are again   noted.    The lungs show slight clearing of the lower right lung and there is no   evidence of pneumothorax nor pleural effusion.    IMPRESSION:  Right lung clearing.        CT Head No Cont (  IMPRESSION:  Aging inferior left frontal and inferolateral left parietal infarcts.   Mild stable cortical blood products associated with the frontal infarct.        EXAMINATION:  General:  in NAD  HEENT:  MMM  Neuro:  eye closed, opens eyes to voice, briefly attends, does not follow commands, PERRL, BTT on the L, + cough, moves L arm and leg spontaneously, no movement on the R side to noxious   Cards:  irregular   Respiratory:  no respiratory distress  Abdomen:  soft  Extremities:  no LE edema

## 2022-08-09 NOTE — PROGRESS NOTE ADULT - ASSESSMENT
Assessment/Plan:   77yo man with CABG, PVD, HTN, HLD, and low EF (declined AICD), admitted 7/24 with LM1 occlusion, s/p TICI 2B MT, no tPA as wake-up stroke. Poor exam post thrombectomy, re-intubated for hypoxic respiratory failure. MRI brain with a large L MCA stroke with small area of reperfusion hemorrhage.  Hospitalization notable for acute on chronic HFrEF, LVHF <10%, reduced RV syst function, no LV thrombus on Definity, 1st degree AV block, with frequent PVCs.   Also with high urea 2/2 pre-renal state and administration of ACE inhibitor, which is likely contributing to the poor exam.    With C diff dx 8/1 and septic shock, on abx.  VEEG with rare occasional frontal sharps, mild-moderate slowing 7/29-8/2    Continuing GOC discussions: I personally met with patient's wife, patient's son Leeroy and Leeroy wife's at bedside. I went over the clinical updates and showed them the MRI which demonstrates a relatively large L MCA stroke with some hemorrhagic conversion, the later not very severe. We discussed that although the urea may be contributing, in over 10 days patient has not regained any movement on the R side and is not able to follow commands and I am worried that there is a reasonable change that he will continue to be hemiplegic and have significant aphasia going forward. We also discussed the fact that the patient is very high risk for more complications such as infections and death from his severe heart failure and bedbound state. We discussed that prolonged care would mean a trach/PEG and a nursing home. I asked the family to think about whether this is within the patient's GOC based on who he is as an individual. They will continue to think about the GOC and let the medical team know what they decide.     Plan:  neurochecks q4hr  Off sedation, fentanyl prn   On ASA 81mg daily, statin held for abnormal LFTs  with pressure ulcer    Resp- Resp failure secondary to neuro injury and heart failure   Maintain O2 > 92 %, wean FIO2 to 30 %  CPAP 10/5, 30%  CXR 8/2 wth RLL opacity, improved     Card- Ischemic cardiomyopathy HFrEF, EF 10%. With very frequent PVCs  Lopressor 50 mg q8 (previously up to 75mg q8h but decreased due to hypotension with no improvement in PVCs on the higher lopressor dose)  avoid ACE inhibitors   Hydralazine - hypotension   EPS following, patient is currently not safe for AC given large stoke with hemorrhagic conversion and very high BUN. Will get a CTH on day 14 from stroke if no new hemorrhagic conversion, will start AC.   Levophed - off - SBP > 90 or MAP > 65  D/C Augustin Trac  K>4, Mg >2  goal euvolemia, holding diuresis today as patient appears euvolemic on exam   IMTIAZ cancelled due to fever, unstable     Renal- Uremia improving  - likely 2/2 pre- renal azothermia and contraction alkalosis (FeNa- <1- pre renal, U Cl <30, renal US no abnormalities), BUN and Cr now improving ; AIN   appreciate renal recs,  Hold Lasix - re-evaluate daily  5 % albumin 250 ccx1   goal euvolemia   FWB again increased to 300 mg q4h    GI prophylaxis - Improving Transaminitis- med induced vs less likely congestive hepatopathy (RUQ US 7/30 normal).  LFT improving  Suplena/ Jevity - lower protein - improve BUN    pepcid;     ID: s/p Meropenem and flagyl MRSA neg 8/1.   now with C Diff dx 8/1, on vanc 500 mg q6h and flagyl 500 mg q8h for severe C. Diff with shock, end date 8/11  D/C meropenem and  flagyl - D/C 8/11    Heme- leucocytosis -  improving   Lov ppx     Endo: Hyperglycemia- improving  NPH 15 q8h  Patient seen and examined by attending me 8/9/2022.    Patient is critically ill due to stroke and at high risk for neurological deterioration or death due to: requiring mechanical ventilation 2/2 neurologic injury, severe heart failure, C. Diff Assessment/Plan:   77yo man with CABG, PVD, HTN, HLD, and low EF (declined AICD), admitted 7/24 with LM1 occlusion, s/p TICI 2B MT, no tPA as wake-up stroke. Poor exam post thrombectomy, re-intubated for hypoxic respiratory failure. MRI brain with a large L MCA stroke with small area of reperfusion hemorrhage.  Hospitalization notable for acute on chronic HFrEF, LVHF <10%, reduced RV syst function, no LV thrombus on Definity, 1st degree AV block, with frequent PVCs.   Also with high urea 2/2 pre-renal state and administration of ACE inhibitor, which is likely contributing to the poor exam.    With C diff dx 8/1 and septic shock, on abx.  VEEG with rare occasional frontal sharps, mild-moderate slowing 7/29-8/2    Continuing GOC discussions: I personally met with patient's wife, patient's son Leeroy and Pazim wife's at bedside. I went over the clinical updates and showed them the MRI which demonstrates a relatively large L MCA stroke with some hemorrhagic conversion, the later not very severe. We discussed that although the urea may be contributing, in over 10 days patient has not regained any movement on the R side and is not able to follow commands and I am worried that there is a reasonable change that he will continue to be hemiplegic and have significant aphasia going forward. We also discussed the fact that the patient is very high risk for more complications such as infections and death from his severe heart failure and bedbound state. We discussed that prolonged care would mean a trach/PEG and a nursing home. I asked the family to think about whether this is within the patient's GOC based on who he is as an individual. They will continue to think about the GOC and let the medical team know what they decide.     Plan:  neurochecks q4hr  Off sedation, fentanyl prn   On ASA 81mg daily, statin held for abnormal LFTs  with pressure ulcer    Resp- Resp failure secondary to neuro injury and heart failure   Maintain O2 > 92 %, wean FIO2 to 30 %  CPAP 5/5, 30%  CXR 8/2 wth RLL opacity, improved   Awaiting for family to arrive for GOC compassionate extubation     Card- Ischemic cardiomyopathy HFrEF, EF 10%. With very frequent PVCs  Lopressor 50 mg q8 (previously up to 75mg q8h but decreased due to hypotension with no improvement in PVCs on the higher lopressor dose)  avoid ACE inhibitors   Hydralazine - hypotension   EPS following, patient is currently not safe for AC given large stoke with hemorrhagic conversion and very high BUN. Will get a CTH on day 14 from stroke if no new hemorrhagic conversion, will start AC.   Levophed - off - SBP > 90 or MAP > 65  D/C Augustin Trac  K>4, Mg >2  goal euvolemia, holding diuresis today as patient appears euvolemic on exam   IMTIAZ cancelled due to fever, unstable     Renal- Uremia improving  - likely 2/2 pre- renal azothermia and contraction alkalosis (FeNa- <1- pre renal, U Cl <30, renal US no abnormalities), BUN and Cr now improving ; AIN   appreciate renal recs,  Hold Lasix - re-evaluate daily  goal euvolemia   FWB again increased to 300 mg q4h    GI prophylaxis - Improving Transaminitis- med induced vs less likely congestive hepatopathy (RUQ US 7/30 normal).  LFT improving  Suplena/ Jevity - lower protein - improve BUN    pepcid;     ID: s/p Meropenem and flagyl MRSA neg 8/1.    C Diff dx 8/1, on vanc 500 mg q6h and flagyl 500 mg q8h for severe C. Diff with shock, end date 8/11  D/C meropenem and  flagyl - D/C 8/11    Heme-S/P  leucocytosis -    Lov ppx     Endo: Hyperglycemia- improving  NPH 15 q8h  Patient seen and examined by attending me 8/9/2022.    Patient is critically ill due to stroke and at high risk for neurological deterioration or death due to: requiring mechanical ventilation 2/2 neurologic injury, severe heart failure, C. Diff

## 2022-08-09 NOTE — PROGRESS NOTE ADULT - ASSESSMENT
75 y/o with h/o chronic systolic HF ACC/AHA stage C, ICMP LVEF 22, CAD s/p PCI ’04 CABG ‘14, carotid stenosis, declined ICD, PVD, HTN, DLP who was transferred from an OSH for neuroIR intervention after waking up with R sided facial droop and R sided weakness. He was found to have left M1 occlusion on CTA and required thrombectomy on 7/24/22. His hospital course has been complicated by acute hypoxic respiratory failure post procedure requiring mechanical ventilation, hypotension requiring temporary pressors, frequent PVCs, E. coli UTI, KE and fevers. He had a TTE that showed LVEF 19%, LVIDd 6.79cm, LVOT VTI 10.3cm, moderately RV dysfunction and RVSP 42mmHg (RAP 3mmHg).     The patient remains intubated. His course has been complicated by Cdiff for which he is being treated with vanco/flagyl. He continues to receive free water for hypernatremia. He was transiently requiring pressors again over the weekend but has been off since 8/7 pm. Also noted to have low grade fevers again requiring use of cooling blanket. Clinically looks euvolemic, lukewarm extremities. He has persistent leukocytosis and down-trending transaminitis.    Pertinent Studies:  TTE 7/24/22: LVEF <10%, LVIDd 6.79cm, mild RVE with moderately reduced systolic function, grade III DD, mild MR, mild TR, PASP 41.9 mmHg (RAP 3), LVOT VTI 10.3, small PFO with left to right shunting.     CTA neck showed moderate stenosis in the proximal right internal carotid artery and moderate to severe stenosis in proximal left internal carotid artery. There was no evidence of ICA occlusion in the neck.

## 2022-08-09 NOTE — CHART NOTE - NSCHARTNOTEFT_GEN_A_CORE
Source: Patient [ ]  Family [ ]   other [x ]    Current Diet: Diet, NPO with Tube Feed:   Tube Feeding Modality: Nasogastric  Jevity 1.5 Jonathan (JEVITY1.5)  Total Volume for 24 Hours (mL): 1440  Continuous  Starting Tube Feed Rate {mL per Hour}: 10  Increase Tube Feed Rate by (mL): 10     Every 4 hours  Until Goal Tube Feed Rate (mL per Hour): 60  Tube Feed Duration (in Hours): 24  Tube Feed Start Time: 10:30 (08-06-22 @ 15:59)    Enteral /Parenteral Nutrition: Tube feeds at goal rate of 60 ml/hr (x20 hrs) provides 1200 ml, 1800 kcal, 77g protein, 912 ml free water, and >100% of RDIs for vitamins/minerals. Receiving an additional 350 ml free water q4hrs.     Current Weight:   (8/7) 218 lbs  (8/6) 218 lbs  (8/3) 211.2 lbs  (8/2) 214.2 lbs  (7/31) 220.2 lbs  (7/30) 203.7 lbs  ? accuracy of weights, noted with 1+ generalized, 2+ L arm, b/l leg and b/l foot, 3+ R hand, and 4+ R arm edema, continue to trend and maintain strict Is&Os       Pertinent Medications: MEDICATIONS  (STANDING):  aspirin  chewable 81 milliGRAM(s) Oral daily  BACItracin   Ointment 1 Application(s) Topical daily  chlorhexidine 0.12% Liquid 15 milliLiter(s) Oral Mucosa every 12 hours  chlorhexidine 2% Cloths 1 Application(s) Topical daily  dextrose 5%. 1000 milliLiter(s) (50 mL/Hr) IV Continuous <Continuous>  dextrose 5%. 1000 milliLiter(s) (100 mL/Hr) IV Continuous <Continuous>  dextrose 50% Injectable 25 Gram(s) IV Push once  enoxaparin Injectable 40 milliGRAM(s) SubCutaneous every 24 hours  famotidine Injectable 20 milliGRAM(s) IV Push every 12 hours  glucagon  Injectable 1 milliGRAM(s) IntraMuscular once  insulin lispro (ADMELOG) corrective regimen sliding scale   SubCutaneous every 6 hours  insulin NPH human recombinant 15 Unit(s) SubCutaneous every 8 hours  metoprolol tartrate 50 milliGRAM(s) Oral every 8 hours  norepinephrine Infusion 0.05 MICROgram(s)/kG/Min (9.38 mL/Hr) IV Continuous <Continuous>  vancomycin    Solution 250 milliGRAM(s) Enteral Tube every 6 hours    MEDICATIONS  (PRN):  dextrose Oral Gel 15 Gram(s) Oral once PRN Blood Glucose LESS THAN 70 milliGRAM(s)/deciliter  fentaNYL    Injectable 25 MICROGram(s) IV Push every 4 hours PRN Agitation  ondansetron Injectable 4 milliGRAM(s) IV Push every 6 hours PRN Nausea and/or Vomiting    Pertinent Labs: CBC Full  -  ( 09 Aug 2022 03:30 )  WBC Count : 11.68 K/uL  RBC Count : 3.37 M/uL  Hemoglobin : 10.5 g/dL  Hematocrit : 31.9 %  Platelet Count - Automated : 239 K/uL  Mean Cell Volume : 94.7 fl  Mean Cell Hemoglobin : 31.2 pg  Mean Cell Hemoglobin Concentration : 32.9 gm/dL  Auto Neutrophil # : x  Auto Lymphocyte # : x  Auto Monocyte # : x  Auto Eosinophil # : x  Auto Basophil # : x  Auto Neutrophil % : x  Auto Lymphocyte % : x  Auto Monocyte % : x  Auto Eosinophil % : x  Auto Basophil % : x    08-09 Na149 mmol/L<H> Glu 121 mg/dL<H> K+ 4.2 mmol/L Cr  0.54 mg/dL BUN 31.1 mg/dL<H> Phos 2.9 mg/dL Alb n/a   PAB n/a       Skin: Suspected DTI to coccyx, IAD, moisture associated dermatitis, and wound to right FA per documentation  Blaine: 12    Nutrition focused physical exam previously  conducted - found signs of malnutrition [ ]absent [x ]present    Subcutaneous fat loss: Mild [x ] Orbital fat pads region, [ ]Buccal fat region, [ ]Triceps region,  [ ]Ribs region    Muscle wasting: Mild [x ]Temples region, [ ]Clavicle region, [x ]Shoulder region, [ ]Scapula region, [ ]Interosseous region,  [ ]thigh region, [ ]Calf region    Estimated Needs:   [x ] no change since previous assessment  [ ] recalculated:     Current Nutrition Diagnosis: Pt remains at high nutrition risk secondary to malnutrition (moderate, acute) related to inability to meet sufficient protein energy requirements in setting of Large L MCA stroke, respiratory failure, renal failure, +CDIFF, septic shock and poor skin integrity as evidenced by physical signs of mild muscle/fat loss, meeting <75% nutrient needs >7 days, and 1+ generalized edema. Tube feeds now changed to Jevity 1.5 @ 60 ml/hr (x20 hrs). Pt now made DNR, aware of poor prognosis. As discussed on rounds, trial of extubation and further family GOC ongoing. RD to follow up.     Recommendations:   1) Continue tube feeds as ordered at this time; additional free water per MD discretion.  2) Rx: MVI and vitamin C 500mg daily.  3) Obtain daily weights to monitor trends.   4) Continue to honor pt/pt family wishes regarding nutrition/hydration.     Monitoring and Evaluation:   [ ] PO intake [x ] Tolerance to diet prescription [X] Weights  [X] Follow up per protocol [X] Labs

## 2022-08-10 LAB
GLUCOSE BLDC GLUCOMTR-MCNC: 119 MG/DL — HIGH (ref 70–99)
GLUCOSE BLDC GLUCOMTR-MCNC: 170 MG/DL — HIGH (ref 70–99)
GLUCOSE BLDC GLUCOMTR-MCNC: 179 MG/DL — HIGH (ref 70–99)
GLUCOSE BLDC GLUCOMTR-MCNC: 185 MG/DL — HIGH (ref 70–99)
GLUCOSE BLDC GLUCOMTR-MCNC: 187 MG/DL — HIGH (ref 70–99)

## 2022-08-10 PROCEDURE — 99233 SBSQ HOSP IP/OBS HIGH 50: CPT

## 2022-08-10 PROCEDURE — 71045 X-RAY EXAM CHEST 1 VIEW: CPT | Mod: 26

## 2022-08-10 PROCEDURE — 99232 SBSQ HOSP IP/OBS MODERATE 35: CPT

## 2022-08-10 RX ORDER — IPRATROPIUM/ALBUTEROL SULFATE 18-103MCG
3 AEROSOL WITH ADAPTER (GRAM) INHALATION ONCE
Refills: 0 | Status: COMPLETED | OUTPATIENT
Start: 2022-08-10 | End: 2022-08-10

## 2022-08-10 RX ORDER — FUROSEMIDE 40 MG
20 TABLET ORAL ONCE
Refills: 0 | Status: COMPLETED | OUTPATIENT
Start: 2022-08-10 | End: 2022-08-10

## 2022-08-10 RX ADMIN — MIDODRINE HYDROCHLORIDE 10 MILLIGRAM(S): 2.5 TABLET ORAL at 09:22

## 2022-08-10 RX ADMIN — HUMAN INSULIN 15 UNIT(S): 100 INJECTION, SUSPENSION SUBCUTANEOUS at 13:16

## 2022-08-10 RX ADMIN — Medication 81 MILLIGRAM(S): at 13:12

## 2022-08-10 RX ADMIN — Medication 3 MILLILITER(S): at 15:34

## 2022-08-10 RX ADMIN — CHLORHEXIDINE GLUCONATE 15 MILLILITER(S): 213 SOLUTION TOPICAL at 17:16

## 2022-08-10 RX ADMIN — CHLORHEXIDINE GLUCONATE 1 APPLICATION(S): 213 SOLUTION TOPICAL at 13:13

## 2022-08-10 RX ADMIN — Medication 40 MILLIGRAM(S): at 21:12

## 2022-08-10 RX ADMIN — Medication 250 MILLIGRAM(S): at 00:19

## 2022-08-10 RX ADMIN — HUMAN INSULIN 15 UNIT(S): 100 INJECTION, SUSPENSION SUBCUTANEOUS at 05:32

## 2022-08-10 RX ADMIN — ENOXAPARIN SODIUM 40 MILLIGRAM(S): 100 INJECTION SUBCUTANEOUS at 21:12

## 2022-08-10 RX ADMIN — FAMOTIDINE 20 MILLIGRAM(S): 10 INJECTION INTRAVENOUS at 13:11

## 2022-08-10 RX ADMIN — HUMAN INSULIN 15 UNIT(S): 100 INJECTION, SUSPENSION SUBCUTANEOUS at 21:20

## 2022-08-10 RX ADMIN — Medication 40 MILLIGRAM(S): at 17:16

## 2022-08-10 RX ADMIN — Medication 1 APPLICATION(S): at 17:17

## 2022-08-10 RX ADMIN — Medication 50 MILLIGRAM(S): at 13:13

## 2022-08-10 RX ADMIN — Medication 2: at 00:20

## 2022-08-10 RX ADMIN — FAMOTIDINE 20 MILLIGRAM(S): 10 INJECTION INTRAVENOUS at 00:19

## 2022-08-10 RX ADMIN — MIDODRINE HYDROCHLORIDE 10 MILLIGRAM(S): 2.5 TABLET ORAL at 01:40

## 2022-08-10 RX ADMIN — Medication 2: at 05:33

## 2022-08-10 RX ADMIN — MIDODRINE HYDROCHLORIDE 10 MILLIGRAM(S): 2.5 TABLET ORAL at 17:16

## 2022-08-10 RX ADMIN — CHLORHEXIDINE GLUCONATE 15 MILLILITER(S): 213 SOLUTION TOPICAL at 05:34

## 2022-08-10 RX ADMIN — FENTANYL CITRATE 25 MICROGRAM(S): 50 INJECTION INTRAVENOUS at 10:12

## 2022-08-10 RX ADMIN — Medication 250 MILLIGRAM(S): at 13:13

## 2022-08-10 RX ADMIN — Medication 250 MILLIGRAM(S): at 05:32

## 2022-08-10 RX ADMIN — Medication 250 MILLIGRAM(S): at 17:17

## 2022-08-10 RX ADMIN — Medication 3 MILLILITER(S): at 21:14

## 2022-08-10 RX ADMIN — Medication 2: at 17:15

## 2022-08-10 RX ADMIN — FENTANYL CITRATE 25 MICROGRAM(S): 50 INJECTION INTRAVENOUS at 09:57

## 2022-08-10 RX ADMIN — Medication 50 MILLIGRAM(S): at 21:15

## 2022-08-10 RX ADMIN — Medication 40 MILLIGRAM(S): at 13:11

## 2022-08-10 RX ADMIN — Medication 20 MILLIGRAM(S): at 13:11

## 2022-08-10 RX ADMIN — Medication 1 APPLICATION(S): at 05:34

## 2022-08-10 NOTE — PROGRESS NOTE ADULT - CRITICAL CARE ATTENDING COMMENT
Pt is critically ill and at high risk of rapid deterioration/death due to abovementioned conditions.   Still requires critical care interventions - ongoing frequent evaluations, interventions and management adjustment by the Attending and ICU team, - and included review of relevant history, clinical examination, review of data and images, discussion of treatment with the multidisciplinary team and any consultants involved in this patient’s care as well as family discussion.
Pt is critically ill and at high risk of rapid deterioration/death due to abovementioned conditions.   Still requires critical care interventions - ongoing frequent evaluations, interventions and management adjustment by the Attending and ICU team, - and included review of relevant history, clinical examination, review of data and images, discussion of treatment with the multidisciplinary team and any consultants involved in this patient’s care as well as family discussion.
stroke
as above.

## 2022-08-10 NOTE — PROGRESS NOTE ADULT - CRITICAL CARE SERVICES PROVIDED
Patient is critically ill, requiring critical care services.

## 2022-08-10 NOTE — PROGRESS NOTE ADULT - SUBJECTIVE AND OBJECTIVE BOX
[facility] PALLIATIVE MEDICINE     CC: FOLLOW UP VISIT + GOC    INTERVAL HPI/OVERNIGHT EVENTS:  Source if other than patient:  []Family   [x]Team     Remains intubated on vent support. Off IV levophed since this morning.     D/W JOE SANTIAGO. Multiple ongoing goc by primary team with family. Plan is for eventual extubation if pt able to meet criteria and no reintubation if he decompensates with likely consideration of comfort measures thereafter. He was tolerating CPAP trial yesterday, today remains in full vent support at time of visit. Team to attempt CPAP trial.     PRESENT SYMPTOMS:     Dyspnea: intubated on full vent support  Nausea/Vomiting:  No  Anxiety:   No  Depression:  No  Fatigue: Yes    Loss of appetite: NPO  Constipation:  No +rectal tube    Pain: No            Character-            Duration-            Effect-            Factors-            Frequency-            Location-            Severity-    Pain AD Score:  [email or web address] (press ctrl + left click to view)    Review of Systems: Unable to obtain due to poor mentation/encephalopathy     MEDICATIONS  (STANDING):  aspirin  chewable 81 milliGRAM(s) Oral daily  BACItracin   Ointment 1 Application(s) Topical two times a day  chlorhexidine 0.12% Liquid 15 milliLiter(s) Oral Mucosa every 12 hours  chlorhexidine 2% Cloths 1 Application(s) Topical daily  dextrose 5%. 1000 milliLiter(s) (50 mL/Hr) IV Continuous <Continuous>  dextrose 5%. 1000 milliLiter(s) (100 mL/Hr) IV Continuous <Continuous>  dextrose 50% Injectable 25 Gram(s) IV Push once  enoxaparin Injectable 40 milliGRAM(s) SubCutaneous every 24 hours  famotidine Injectable 20 milliGRAM(s) IV Push every 12 hours  glucagon  Injectable 1 milliGRAM(s) IntraMuscular once  insulin lispro (ADMELOG) corrective regimen sliding scale   SubCutaneous every 6 hours  insulin NPH human recombinant 15 Unit(s) SubCutaneous every 8 hours  metoprolol tartrate 50 milliGRAM(s) Oral every 8 hours  midodrine 10 milliGRAM(s) Oral every 8 hours  norepinephrine Infusion 0.05 MICROgram(s)/kG/Min (9.38 mL/Hr) IV Continuous <Continuous>  vancomycin    Solution 250 milliGRAM(s) Enteral Tube every 6 hours    MEDICATIONS  (PRN):  dextrose Oral Gel 15 Gram(s) Oral once PRN Blood Glucose LESS THAN 70 milliGRAM(s)/deciliter  fentaNYL    Injectable 25 MICROGram(s) IV Push every 4 hours PRN Agitation  ondansetron Injectable 4 milliGRAM(s) IV Push every 6 hours PRN Nausea and/or Vomiting      PHYSICAL EXAM:    Vital Signs Last 24 Hrs  T(C): 38.2 (10 Aug 2022 10:30), Max: 38.3 (10 Aug 2022 09:30)  T(F): 100.8 (10 Aug 2022 10:30), Max: 100.9 (10 Aug 2022 09:30)  HR: 95 (10 Aug 2022 10:30) (89 - 109)  BP: 119/62 (10 Aug 2022 01:00) (91/61 - 151/68)  BP(mean): 81 (10 Aug 2022 01:00) (63 - 100)  RR: 23 (10 Aug 2022 10:30) (14 - 34)  SpO2: 100% (10 Aug 2022 10:30) (93% - 100%)    Parameters below as of 10 Aug 2022 10:00  Patient On (Oxygen Delivery Method): ventilator    O2 Concentration (%): 30       Karnofsky:  10%    GEN: resting comfortably and no acute distress    HEENT: mucous membrane moist  +ETT    Lungs: comfortable, nonlabored     CV: +s1/s2 irregular    GI: +BS, abdomen soft, nontender, nondistended      :  ace +rectal tube    MSK:  no cyanosis     NEURO: nonfocal. lethargic. nonverbal    Skin: warm and dry.      LABS:                          10.5   11.68 )-----------( 239      ( 09 Aug 2022 03:30 )             31.9     08-09    149<H>  |  114<H>  |  31.1<H>  ----------------------------<  121<H>  4.2   |  26.0  |  0.54    Ca    8.5      09 Aug 2022 03:30  Phos  2.9     08-09  Mg     1.9     08-09          I&O's Summary    09 Aug 2022 07:01  -  10 Aug 2022 07:00  --------------------------------------------------------  IN: 3411.1 mL / OUT: 1700 mL / NET: 1711.1 mL    10 Aug 2022 07:01  -  10 Aug 2022 11:06  --------------------------------------------------------  IN: 544.9 mL / OUT: 0 mL / NET: 544.9 mL        RADIOLOGY & ADDITIONAL STUDIES:     BLE Doppler    ACC: [id number] EXAM:  US DPLX LWR EXT VEINS COMPL BI                          PROCEDURE DATE:  08/08/2022          INTERPRETATION:  CLINICAL INFORMATION: Stroke.    COMPARISON: None available.    TECHNIQUE: Duplex sonography of the BILATERAL LOWER extremity veins with   color and spectral Doppler, with and without compression.    FINDINGS:    RIGHT:  Normal compressibility of the RIGHT common femoral, femoral and popliteal   veins.  Doppler examination shows normal spontaneous and phasic flow.  No RIGHT calf vein thrombosis is detected.    LEFT:  Normal compressibility of the LEFT common femoral, femoral and popliteal   veins.  Doppler examination shows normal spontaneous and phasic flow.  No LEFT calf vein thrombosis is detected.    IMPRESSION:  No evidence of deep venous thrombosis in either lower extremity.    --- End of Report ---    DEEPA PALACIOS MD; Attending Radiologist  This document has been electronically signed. Aug  8 2022  1:30PM    CXR    ACC: [id number] EXAM:  XR CHEST PORTABLE ROUTINE 1V                          PROCEDURE DATE:  08/02/2022          INTERPRETATION:  Chest one view    HISTORY: Fever    COMPARISON STUDY: 8/1/2022    Frontal expiratory view of the chest shows the heart to be similar in   size. Endotracheal tube, sternal wires and nasogastric tube are again   noted.    The lungs show slight clearing of the lower right lung and there is no   evidence of pneumothorax nor pleural effusion.    IMPRESSION:  Right lung clearing.        Thank you for the courtesy of this referral.    --- End of Report ---    WILBERTO FOSS MD; Attending Interventional Radiologist  This document has been electronically signed. Aug  3 2022  2:58PM      ADVANCE DIRECTIVES/TREATMENT PREFERENCES:  DNR YES NO  Completed on:                     MOLST  YES NO   Completed on:  Living Will  YES NO   Completed on:

## 2022-08-10 NOTE — PROGRESS NOTE ADULT - PROBLEM SELECTOR PLAN 2
- Likely related to ICMP  - on lopressor.  Would avoid aggressive uptitration of beta blocker due to significant bi-ventricular heart failure

## 2022-08-10 NOTE — PROGRESS NOTE ADULT - PROBLEM SELECTOR PLAN 1
- Known h/o ICMP.  TTE 7/24 showed LVEF <10% with RV dysfunction  - Clinically appears euvolemic on exam  - Goal is to maintain net even fluid status, may use Lasix PRN however pt does not currently require.   - GDMT:   - On Lopressor 50mg TID for PVC control (will eventually plan to switch to metoprolol succinate).  However,                may need to lower further to 375.mg TID given ongoing vasopressor use.    - start on spironolactone 12.5mg daily. This should not effect on BP.  - unable to tolerate trial of hydralazine 10mg TID. Was on lisinopril at home.  Will assess BP room after extubation.  - Please check markers of perfusion daily (serum lactate, LFTs, T Bili).  Currently no sings of hypoperfusion given the hypotension.    - monitor strict I&O and daily weights.   - Device: he has refused this in the past and this would typically would not be offered if life expectancy < 1yr ( will need to revisit based on CVA recovery).

## 2022-08-10 NOTE — PROGRESS NOTE ADULT - ASSESSMENT
Assessment/Plan:   77yo man with CABG, PVD, HTN, HLD, and low EF (declined AICD), admitted 7/24 with LM1 occlusion, s/p TICI 2B MT, no tPA as wake-up stroke. Poor exam post thrombectomy, re-intubated for hypoxic respiratory failure. MRI brain with a large L MCA stroke with small area of reperfusion hemorrhage.  Hospitalization notable for acute on chronic HFrEF, LVHF <10%, reduced RV syst function, no LV thrombus on Definity, 1st degree AV block, with frequent PVCs.     Plan:  neurochecks q4hr  Off sedation, fentanyl prn   On ASA 81mg daily, statin held for abnormal LFTs  with pressure ulcer    Resp- Resp failure secondary to neuro injury and heart failure   Maintain O2 > 92 %, wean FIO2 to 30 %  CPAP 5/5, 30%  CXR 8/2 wth RLL opacity, improved   Awaiting for family to arrive for [facility] compassionate extubation     Card- Ischemic cardiomyopathy HFrEF, EF 10%. With very frequent PVCs  Lopressor 50 mg q8 (previously up to 75mg q8h but decreased due to hypotension with no improvement in PVCs on the higher lopressor dose)  avoid ACE inhibitors   Hydralazine - hypotension   EPS following, patient is currently not safe for AC given large stoke with hemorrhagic conversion and very high BUN. Will get a CTH on day 14 from stroke if no new hemorrhagic conversion, will start AC.   Levophed - off - SBP > 90 or MAP > 65  K>4, Mg >2  goal euvolemia, holding diuresis today as patient appears euvolemic on exam   IMTIAZ cancelled due to fever, unstable     Renal- Uremia improving  - likely 2/2 pre- renal azothermia and contraction alkalosis (FeNa- <1- pre renal, U Cl <30, renal US no abnormalities), BUN and Cr now improving ; AIN   appreciate renal recs,  Hold Lasix - re-evaluate daily  goal euvolemia   FWB again increased to 300 mg q4h    GI prophylaxis - Improving Transaminitis- med induced vs less likely congestive hepatopathy (RUQ US 7/30 normal).  LFT improving  Suplena/ Jevity - lower protein - improve BUN    pepcid;     ID: s/p Meropenem and flagyl MRSA neg 8/1.    C Diff dx 8/1, on vanc 500 mg q6h and flagyl 500 mg q8h for severe C. Diff with shock, end date 8/11  D/C meropenem and  flagyl - D/C 8/11    Heme-S/P  leucocytosis -    Lov ppx     Endo: Hyperglycemia- improving  NPH 15 q8h  Patient seen and examined by attending me 8/9/2022.    Patient is critically ill due to stroke and at high risk for neurological deterioration or death due to: requiring mechanical ventilation 2/2 neurologic injury, severe heart failure, C. Diff Assessment/Plan:   75yo man with CABG, PVD, HTN, HLD, and low EF (declined AICD), admitted 7/24 with LM1 occlusion, s/p TICI 2B MT, no tPA as wake-up stroke. Poor exam post thrombectomy, re-intubated for hypoxic respiratory failure. MRI brain with a large L MCA stroke with small area of reperfusion hemorrhage.  Hospitalization notable for acute on chronic HFrEF, LVHF <10%, reduced RV syst function, no LV thrombus on Definity, 1st degree AV block, with frequent PVCs.     Plan:  Neuro-  neurochecks q4hr  Off sedation, fentanyl prn   On ASA 81mg daily, statin held for abnormal LFTs  with pressure ulcer    Resp- Resp failure secondary to neuro injury and heart failure   Maintain O2 > 92 %, wean FIO2 to 30 %  CPAP 10/5, 30%  Awaiting for family to arrive for [facility] compassionate extubation   solumedrol 40mg q4h pre extubation, lasix    Card- Ischemic cardiomyopathy HFrEF, EF 10%. With very frequent PVCs  avoid ACE inhibitors   EPS following, patient is currently not safe for AC given large stoke with hemorrhagic conversion and very high BUN. Will get a CTH on day 14 from stroke if no new hemorrhagic conversion, will start AC.   Levophed - off - SBP > 90 or MAP > 65  K>4, Mg >2    Renal- Uremia improving  - likely 2/2 pre- renal azothermia and contraction alkalosis (FeNa- <1- pre renal, U Cl <30, renal US no abnormalities), BUN and Cr now improving ; AIN   appreciate renal recs,  goal euvolemia   FWf  250mg q6h    GI prophylaxis - Improving Transaminitis- med induced vs less likely congestive hepatopathy (RUQ US 7/30 normal).  LFT improving  Suplena/ Jevity - lower protein - improve BUN    pepcid;     ID: s/p Meropenem and flagyl MRSA neg 8/1.    C Diff dx 8/1, on vanc 500 mg q6h and flagyl 500 mg q8h for severe C. Diff with shock, end date 8/11  D/C meropenem and  flagyl - D/C 8/11    Heme-S/P  leucocytosis -    Lov ppx     Endo: Hyperglycemia- improving  NPH 15 q8h  Patient seen and examined by attending me 8/9/2022.    Patient is critically ill due to stroke and at high risk for neurological deterioration or death due to: requiring mechanical ventilation 2/2 neurologic injury, severe heart failure, C. Diff

## 2022-08-10 NOTE — PROGRESS NOTE ADULT - SUBJECTIVE AND OBJECTIVE BOX
Back on levophed today. For palliative extubation today as per goals of care discussion.    Vital Signs Last 24 Hrs  T(C): 38.2 (10 Aug 2022 10:30), Max: 38.3 (10 Aug 2022 09:30)  T(F): 100.8 (10 Aug 2022 10:30), Max: 100.9 (10 Aug 2022 09:30)  HR: 95 (10 Aug 2022 10:30) (89 - 109)  BP: 119/62 (10 Aug 2022 01:00) (91/61 - 151/68)  BP(mean): 81 (10 Aug 2022 01:00) (63 - 100)  RR: 23 (10 Aug 2022 10:30) (14 - 34)  SpO2: 100% (10 Aug 2022 10:30) (93% - 100%)    Parameters below as of 10 Aug 2022 10:00  Patient On (Oxygen Delivery Method): ventilator    O2 Concentration (%): 30    I&O's Summary  09 Aug 2022 07:01  -  10 Aug 2022 07:00  --------------------------------------------------------  IN: 3411.1 mL / OUT: 1700 mL / NET: 1711.1 mL  10 Aug 2022 07:01  -  10 Aug 2022 10:37  --------------------------------------------------------  IN: 544.9 mL / OUT: 0 mL / NET: 544.9 mL    Physical Exam  General: WDWN male in NAD  HEENT: Intubated  Cardiac: S1S2 RRR  Respiratory: Transmitted breath sounds  Abdomen: Soft, NT  Extremities: No appreciable edema  Neuro: Unresponsive to voice and touch    08-09    149<H>  |  114<H>  |  31.1<H>  ----------------------------<  121<H>  4.2   |  26.0  |  0.54    Ca    8.5      09 Aug 2022 03:30  Phos  2.9     08-09  Mg     1.9     08-09                       10.5   11.68 )-----------( 239      ( 09 Aug 2022 03:30 )             31.9     MEDICATIONS  (STANDING):  aspirin  chewable 81 milliGRAM(s) Oral daily  BACItracin   Ointment 1 Application(s) Topical two times a day  chlorhexidine 0.12% Liquid 15 milliLiter(s) Oral Mucosa every 12 hours  chlorhexidine 2% Cloths 1 Application(s) Topical daily  dextrose 5%. 1000 milliLiter(s) (50 mL/Hr) IV Continuous <Continuous>  dextrose 5%. 1000 milliLiter(s) (100 mL/Hr) IV Continuous <Continuous>  dextrose 50% Injectable 25 Gram(s) IV Push once  enoxaparin Injectable 40 milliGRAM(s) SubCutaneous every 24 hours  famotidine Injectable 20 milliGRAM(s) IV Push every 12 hours  glucagon  Injectable 1 milliGRAM(s) IntraMuscular once  insulin lispro (ADMELOG) corrective regimen sliding scale   SubCutaneous every 6 hours  insulin NPH human recombinant 15 Unit(s) SubCutaneous every 8 hours  metoprolol tartrate 50 milliGRAM(s) Oral every 8 hours  midodrine 10 milliGRAM(s) Oral every 8 hours  norepinephrine Infusion 0.05 MICROgram(s)/kG/Min (9.38 mL/Hr) IV Continuous <Continuous>  vancomycin    Solution 250 milliGRAM(s) Enteral Tube every 6 hours    MEDICATIONS  (PRN):  dextrose Oral Gel 15 Gram(s) Oral once PRN Blood Glucose LESS THAN 70 milliGRAM(s)/deciliter  fentaNYL    Injectable 25 MICROGram(s) IV Push every 4 hours PRN Agitation  ondansetron Injectable 4 milliGRAM(s) IV Push every 6 hours PRN Nausea and/or Vomiting

## 2022-08-10 NOTE — PROGRESS NOTE ADULT - ASSESSMENT
75 y/o with h/o chronic systolic HF ACC/AHA stage C, ICMP LVEF 22, CAD s/p PCI ’04 CABG ‘14, carotid stenosis, declined ICD, PVD, HTN, DLP who was transferred from an OSH for neuroIR intervention after waking up with R sided facial droop and R sided weakness. He was found to have left M1 occlusion on CTA and required thrombectomy on 7/24/22. His hospital course has been complicated by acute hypoxic respiratory failure post procedure requiring mechanical ventilation, hypotension requiring temporary pressors, frequent PVCs, E. coli UTI, KE and fevers. He had a TTE that showed LVEF 19%, LVIDd 6.79cm, LVOT VTI 10.3cm, moderately RV dysfunction and RVSP 42mmHg (RAP 3mmHg). The patient remains intubated. His course has been complicated by Cdiff for which he is being treated with vanco/flagyl. He continues to receive free water for hypernatremia. He was transiently requiring pressors again with intermittent low grade fevers.  Planned for trial of extubation today with no plan for re-intubation per family goals of care discussion.    Pertinent Studies:  TTE 7/24/22: LVEF <10%, LVIDd 6.79cm, mild RVE with moderately reduced systolic function, grade III DD, mild MR, mild TR, PASP 41.9 mmHg (RAP 3), LVOT VTI 10.3, small PFO with left to right shunting.   CTA neck showed moderate stenosis in the proximal right internal carotid artery and moderate to severe stenosis in proximal left internal carotid artery. There was no evidence of ICA occlusion in the neck.  DVT U/S B/L LE 8/9: no DVT

## 2022-08-10 NOTE — CHART NOTE - NSCHARTNOTEFT_GEN_A_CORE
Wife Sherri and son Leeroy present at bedside. Plan for trial of extubation this evening. Patient has been medically optimized for extubation today. Spoke with wife and son, that patient would not want tracheostomy or PEG tube.  They wish to proceed with trial of extubation with no plan for reintubation. DNI signed on MOLST form. If patient does not tolerate extubation and develops respiratory distress or discomfort, then will proceed with comfort care. Wife Sherri and son Leeroy present at bedside. Plan for trial of extubation this evening. Patient has been medically optimized for extubation today. Spoke with wife and son, that patient would not want tracheostomy or PEG tube.  They wish to proceed with trial of extubation with no plan for reintubation. DNI signed on MOLST form. If patient does not tolerate extubation and develops respiratory distress or discomfort, then will proceed with comfort care per family request. Cuff leak checked by respiratory prior to extubation

## 2022-08-10 NOTE — PROGRESS NOTE ADULT - ASSESSMENT
77yo M with hx of CAD s/p CABG, PVD, HTN, HLD, HFrEF (declined AICD) admitted 7/24 large L MCA stroke s/p thrombectomy with complicated course including but not limited to persistent respiratory failure s/p reintubation on vent support, acute CHF, AV block/PVCs, KE with uremia, septicemia, c.difficle colitis and overall poor prognosis.     Large L MCA   - s/p thrombectomy 7/24 ?cardioembolic vs aortic plaque  - unable to tolerate IMTIAZ  - significant residual neurological deficits   - poor prognosis for meaningful neurological recovery   - on aspirin  - neuro input appreciated   - ongoing goc by primary team regarding trach/peg     Acute Decompensated CHF, Combined Systolic and Diastolic HF  ICM, CAD s/p CABG  Frequent PVCs  - pt had declined ICD in the past per chart  - CHF team on board, input appreciated   - CV meds per CHF team     Hypotension  - off IV Levophed since AM, c/w Midodrine - wean as toelrated     C. Difficile Colitis  - remains with rectal tube, on PO Vanco    Progressive Debility   - full assist with ADLs and will likely need LTC at [facility] if trach/peg is pursued      Palliative Care Encounter  D/W NSICU PA. Pt remains critically ill with no significant improvements in neurological status. Prognosis remains poor. Multiple goc with family by NSICU and at this time family remains hopeful and wants to continue all active medical interventions. Plan to CPAP trial again today and if criteria met will extubate with no plans to reintubate per goc. If pt should decompensate thereafter, they will likely reconsider comfort measures. Family to come in today but no clear time was set. Palliative team will continue to support and assist with complex medical decision making.

## 2022-08-10 NOTE — PROGRESS NOTE ADULT - SUBJECTIVE AND OBJECTIVE BOX
Chief complaint:   Patient is a 77y old  Male who presents with a chief complaint of CVA (09 Aug 2022 18:55)    HPI:  76M with PMH CABG, HTN, HLD who presents as transfer from [facility] for stroke. Last known well was last night 10pm prior to going to bed, woke up this morning around 0500 with R sided weakness and R facial droop. Presented to [facility], code stroke initiated, NIH at [facility] reportedly 8. CTA showed LM1 occlusion. Transferred to [facility] for possible neuro IR intervention. On arrival to [facility], NIH 6. Decision made to take patient directly to neuro IR for intervention.     NIH 6, mRS 0    NIH SS:  DATE: 7/24/22  TIME: 0710  1A: Level of consciousness (0-3): 0  1B: Questions (0-2): 0    1C: Commands (0-2): 0  2: Gaze (0-2): 0  3: Visual fields (0-3): 0  4: Facial palsy (0-3): 1  MOTOR:  5A: Left arm motor drift (0-4): 0  5B: Right arm motor drift (0-4): 1  6A: Left leg motor drift (0-4): 0  6B: Right leg motor drift (0-4): 1  7: Limb ataxia (0-2): 0  SENSORY:  8: Sensation (0-2): 1  SPEECH:  9: Language (0-3): 1  10: Dysarthria (0-2): 1  EXTINCTION:  11: Extinction/inattention (0-2): 0    TOTAL SCORE: 6 (24 Jul 2022 07:34)        24hr EVENTS:      ROS: [ ]  Unable to assess due to mental status   All other systems negative    -----------------------------------------------------------------------------------------------------------------------------------------------------------------------------------  ICU Vital Signs Last 24 Hrs  T(C): 38.2 (10 Aug 2022 09:00), Max: 38.2 (10 Aug 2022 08:00)  T(F): 100.8 (10 Aug 2022 09:00), Max: 100.8 (10 Aug 2022 08:00)  HR: 99 (10 Aug 2022 09:01) (89 - 109)  BP: 119/62 (10 Aug 2022 01:00) (91/61 - 151/68)  BP(mean): 81 (10 Aug 2022 01:00) (63 - 100)  ABP: 140/53 (10 Aug 2022 09:00) (85/37 - 148/61)  ABP(mean): 78 (10 Aug 2022 09:00) (50 - 98)  RR: 21 (10 Aug 2022 09:00) (14 - 34)  SpO2: 99% (10 Aug 2022 09:01) (93% - 100%)    O2 Parameters below as of 10 Aug 2022 08:00  Patient On (Oxygen Delivery Method): ventilator    O2 Concentration (%): 30        I&O's Summary    09 Aug 2022 07:01  -  10 Aug 2022 07:00  --------------------------------------------------------  IN: 3411.1 mL / OUT: 1700 mL / NET: 1711.1 mL    10 Aug 2022 07:01  -  10 Aug 2022 09:43  --------------------------------------------------------  IN: 544.9 mL / OUT: 0 mL / NET: 544.9 mL        MEDICATIONS  (STANDING):  aspirin  chewable 81 milliGRAM(s) Oral daily  BACItracin   Ointment 1 Application(s) Topical two times a day  chlorhexidine 0.12% Liquid 15 milliLiter(s) Oral Mucosa every 12 hours  chlorhexidine 2% Cloths 1 Application(s) Topical daily  dextrose 5%. 1000 milliLiter(s) (50 mL/Hr) IV Continuous <Continuous>  dextrose 5%. 1000 milliLiter(s) (100 mL/Hr) IV Continuous <Continuous>  dextrose 50% Injectable 25 Gram(s) IV Push once  enoxaparin Injectable 40 milliGRAM(s) SubCutaneous every 24 hours  famotidine Injectable 20 milliGRAM(s) IV Push every 12 hours  glucagon  Injectable 1 milliGRAM(s) IntraMuscular once  insulin lispro (ADMELOG) corrective regimen sliding scale   SubCutaneous every 6 hours  insulin NPH human recombinant 15 Unit(s) SubCutaneous every 8 hours  metoprolol tartrate 50 milliGRAM(s) Oral every 8 hours  midodrine 10 milliGRAM(s) Oral every 8 hours  norepinephrine Infusion 0.05 MICROgram(s)/kG/Min (9.38 mL/Hr) IV Continuous <Continuous>  vancomycin    Solution 250 milliGRAM(s) Enteral Tube every 6 hours      RESPIRATORY:  Mode: CPAP with PS  FiO2: 30  PEEP: 5  PS: 10  MAP: 17        IMAGING:   Recent imaging studies were reviewed.    LAB RESULTS:                          10.5   11.68 )-----------( 239      ( 09 Aug 2022 03:30 )             31.9           08-09    149<H>  |  114<H>  |  31.1<H>  ----------------------------<  121<H>  4.2   |  26.0  |  0.54    Ca    8.5      09 Aug 2022 03:30  Phos  2.9     08-09  Mg     1.9     08-09      -----------------------------------------------------------------------------------------------------------------------------------------------------------------------------------    PHYSICAL EXAM:  General: Calm, intubated  HEENT: MMM  Neuro:  -Mental status- No acute distress  -CN- PERRL 3mm, EOMI, tongue midline, face symmetric    CV: RRR  Pulm: clear to auscultation  Abd: Soft, nontender, nondistended  Ext: no noted edema in lower ext  Skin: warm, dry       Chief complaint:   Patient is a 77y old  Male who presents with a chief complaint of CVA (09 Aug 2022 18:55)    HPI:  76M with PMH CABG, HTN, HLD who presents as transfer from [facility] for stroke. Last known well was last night 10pm prior to going to bed, woke up this morning around 0500 with R sided weakness and R facial droop. Presented to [facility], code stroke initiated, NIH at [facility] reportedly 8. CTA showed LM1 occlusion. Transferred to [facility] for possible neuro IR intervention. On arrival to [facility], NIH 6. Decision made to take patient directly to neuro IR for intervention.     NIH 6, mRS 0    24hr EVENTS:  tolerating 10/5 CPAP    ROS: [ x]  Unable to assess due to mental status   All other systems negative    -----------------------------------------------------------------------------------------------------------------------------------------------------------------------------------  ICU Vital Signs Last 24 Hrs  T(C): 38.2 (10 Aug 2022 09:00), Max: 38.2 (10 Aug 2022 08:00)  T(F): 100.8 (10 Aug 2022 09:00), Max: 100.8 (10 Aug 2022 08:00)  HR: 99 (10 Aug 2022 09:01) (89 - 109)  BP: 119/62 (10 Aug 2022 01:00) (91/61 - 151/68)  BP(mean): 81 (10 Aug 2022 01:00) (63 - 100)  ABP: 140/53 (10 Aug 2022 09:00) (85/37 - 148/61)  ABP(mean): 78 (10 Aug 2022 09:00) (50 - 98)  RR: 21 (10 Aug 2022 09:00) (14 - 34)  SpO2: 99% (10 Aug 2022 09:01) (93% - 100%)    O2 Parameters below as of 10 Aug 2022 08:00  Patient On (Oxygen Delivery Method): ventilator    O2 Concentration (%): 30        I&O's Summary    09 Aug 2022 07:01  -  10 Aug 2022 07:00  --------------------------------------------------------  IN: 3411.1 mL / OUT: 1700 mL / NET: 1711.1 mL    10 Aug 2022 07:01  -  10 Aug 2022 09:43  --------------------------------------------------------  IN: 544.9 mL / OUT: 0 mL / NET: 544.9 mL        MEDICATIONS  (STANDING):  aspirin  chewable 81 milliGRAM(s) Oral daily  BACItracin   Ointment 1 Application(s) Topical two times a day  chlorhexidine 0.12% Liquid 15 milliLiter(s) Oral Mucosa every 12 hours  chlorhexidine 2% Cloths 1 Application(s) Topical daily  dextrose 5%. 1000 milliLiter(s) (50 mL/Hr) IV Continuous <Continuous>  dextrose 5%. 1000 milliLiter(s) (100 mL/Hr) IV Continuous <Continuous>  dextrose 50% Injectable 25 Gram(s) IV Push once  enoxaparin Injectable 40 milliGRAM(s) SubCutaneous every 24 hours  famotidine Injectable 20 milliGRAM(s) IV Push every 12 hours  glucagon  Injectable 1 milliGRAM(s) IntraMuscular once  insulin lispro (ADMELOG) corrective regimen sliding scale   SubCutaneous every 6 hours  insulin NPH human recombinant 15 Unit(s) SubCutaneous every 8 hours  metoprolol tartrate 50 milliGRAM(s) Oral every 8 hours  midodrine 10 milliGRAM(s) Oral every 8 hours  norepinephrine Infusion 0.05 MICROgram(s)/kG/Min (9.38 mL/Hr) IV Continuous <Continuous>  vancomycin    Solution 250 milliGRAM(s) Enteral Tube every 6 hours      RESPIRATORY:  Mode: CPAP with PS  FiO2: 30  PEEP: 5  PS: 10  MAP: 17        IMAGING:   Recent imaging studies were reviewed.    LAB RESULTS:                          10.5   11.68 )-----------( 239      ( 09 Aug 2022 03:30 )             31.9           08-09    149<H>  |  114<H>  |  31.1<H>  ----------------------------<  121<H>  4.2   |  26.0  |  0.54    Ca    8.5      09 Aug 2022 03:30  Phos  2.9     08-09  Mg     1.9     08-09      -----------------------------------------------------------------------------------------------------------------------------------------------------------------------------------    PHYSICAL EXAM:  General: Calm, intubated  HEENT: MMM  Neuro:  -Mental status- No acute distress, intermittent EO, no tracking, no FC  -CN- PERRL 3mm, EOMI, tongue midline, face symmetric  spont LUE and LLE 2/5  flaccid RUE/RLE    CV: RRR  Pulm: clear to auscultation  Abd: Soft, nontender, nondistended  Ext: no noted edema in lower ext  Skin: warm, dry

## 2022-08-10 NOTE — PROGRESS NOTE ADULT - ASSESSMENT
The patient is a 76 year old male with past medical history of HTN, HLD, CAD s/p CABG, carotid stenosis, and PVD who was transferred from an outside hospital for neuro IR intervention after waking up with R sided facial droop and R sided weakness; found to have left M1 occlusion on CTA s/p thrombectomy which is complicated by hemorrhagic conversion. Hospital course notable for acute hypoxic respiratory failure post procedure s/p mechanical ventilation; ischemic cardiomyopathy, LVEF<10% and moderately reduced RV function. Also with E coli UTI, KE, C. diff. Nephrology is managing KE (resolved) and hyperNa.     -Baseline creatinine ranges 0.6-1.0mg/dL   -Developed prerenal acute kidney (resolved)   -Then developed recurrent acute kidney injury. Clinically suspicious for AIN given recent exposure to meropenem + new onset eosinophilia.    -Meropenem have been discontinued on 08/06/2022 followed by resolution of KE  -Renal function remains at baseline at this time  -Remains hypernatremic, however goal sodium as per primary team is high 140's   -Given that sodium is in the high 140's - no change to current regimen - continue free water flushes at 350cc q4h   -Would avoid future use of carbapenem-based antibiotics given recent KE suspicious for AIN  -For palliative extubation later today    Kwame West DO  [facility]

## 2022-08-11 LAB
ANION GAP SERPL CALC-SCNC: 12 MMOL/L — SIGNIFICANT CHANGE UP (ref 5–17)
BUN SERPL-MCNC: 27.1 MG/DL — HIGH (ref 8–20)
CALCIUM SERPL-MCNC: 8.3 MG/DL — LOW (ref 8.4–10.5)
CHLORIDE SERPL-SCNC: 106 MMOL/L — SIGNIFICANT CHANGE UP (ref 98–107)
CO2 SERPL-SCNC: 27 MMOL/L — SIGNIFICANT CHANGE UP (ref 22–29)
CREAT SERPL-MCNC: 0.55 MG/DL — SIGNIFICANT CHANGE UP (ref 0.5–1.3)
EGFR: 102 ML/MIN/1.73M2 — SIGNIFICANT CHANGE UP
GLUCOSE BLDC GLUCOMTR-MCNC: 137 MG/DL — HIGH (ref 70–99)
GLUCOSE BLDC GLUCOMTR-MCNC: 138 MG/DL — HIGH (ref 70–99)
GLUCOSE BLDC GLUCOMTR-MCNC: 143 MG/DL — HIGH (ref 70–99)
GLUCOSE BLDC GLUCOMTR-MCNC: 152 MG/DL — HIGH (ref 70–99)
GLUCOSE BLDC GLUCOMTR-MCNC: 184 MG/DL — HIGH (ref 70–99)
GLUCOSE SERPL-MCNC: 175 MG/DL — HIGH (ref 70–99)
HCT VFR BLD CALC: 33.2 % — LOW (ref 39–50)
HGB BLD-MCNC: 11 G/DL — LOW (ref 13–17)
MAGNESIUM SERPL-MCNC: 1.9 MG/DL — SIGNIFICANT CHANGE UP (ref 1.6–2.6)
MCHC RBC-ENTMCNC: 31 PG — SIGNIFICANT CHANGE UP (ref 27–34)
MCHC RBC-ENTMCNC: 33.1 GM/DL — SIGNIFICANT CHANGE UP (ref 32–36)
MCV RBC AUTO: 93.5 FL — SIGNIFICANT CHANGE UP (ref 80–100)
PHOSPHATE SERPL-MCNC: 4.6 MG/DL — SIGNIFICANT CHANGE UP (ref 2.4–4.7)
PLATELET # BLD AUTO: 331 K/UL — SIGNIFICANT CHANGE UP (ref 150–400)
POTASSIUM SERPL-MCNC: 4.4 MMOL/L — SIGNIFICANT CHANGE UP (ref 3.5–5.3)
POTASSIUM SERPL-SCNC: 4.4 MMOL/L — SIGNIFICANT CHANGE UP (ref 3.5–5.3)
RBC # BLD: 3.55 M/UL — LOW (ref 4.2–5.8)
RBC # FLD: 14.9 % — HIGH (ref 10.3–14.5)
SODIUM SERPL-SCNC: 145 MMOL/L — SIGNIFICANT CHANGE UP (ref 135–145)
WBC # BLD: 13.14 K/UL — HIGH (ref 3.8–10.5)
WBC # FLD AUTO: 13.14 K/UL — HIGH (ref 3.8–10.5)

## 2022-08-11 PROCEDURE — 99223 1ST HOSP IP/OBS HIGH 75: CPT

## 2022-08-11 PROCEDURE — 99233 SBSQ HOSP IP/OBS HIGH 50: CPT

## 2022-08-11 PROCEDURE — 99232 SBSQ HOSP IP/OBS MODERATE 35: CPT

## 2022-08-11 RX ORDER — SPIRONOLACTONE 25 MG/1
12.5 TABLET, FILM COATED ORAL DAILY
Refills: 0 | Status: DISCONTINUED | OUTPATIENT
Start: 2022-08-11 | End: 2022-08-23

## 2022-08-11 RX ORDER — IPRATROPIUM/ALBUTEROL SULFATE 18-103MCG
3 AEROSOL WITH ADAPTER (GRAM) INHALATION EVERY 6 HOURS
Refills: 0 | Status: DISCONTINUED | OUTPATIENT
Start: 2022-08-11 | End: 2022-09-01

## 2022-08-11 RX ORDER — MAGNESIUM SULFATE 500 MG/ML
1 VIAL (ML) INJECTION ONCE
Refills: 0 | Status: COMPLETED | OUTPATIENT
Start: 2022-08-11 | End: 2022-08-11

## 2022-08-11 RX ADMIN — Medication 100 GRAM(S): at 05:07

## 2022-08-11 RX ADMIN — Medication 2: at 01:20

## 2022-08-11 RX ADMIN — ENOXAPARIN SODIUM 40 MILLIGRAM(S): 100 INJECTION SUBCUTANEOUS at 22:55

## 2022-08-11 RX ADMIN — Medication 40 MILLIGRAM(S): at 01:21

## 2022-08-11 RX ADMIN — MIDODRINE HYDROCHLORIDE 10 MILLIGRAM(S): 2.5 TABLET ORAL at 01:21

## 2022-08-11 RX ADMIN — HUMAN INSULIN 15 UNIT(S): 100 INJECTION, SUSPENSION SUBCUTANEOUS at 23:07

## 2022-08-11 RX ADMIN — HUMAN INSULIN 15 UNIT(S): 100 INJECTION, SUSPENSION SUBCUTANEOUS at 13:17

## 2022-08-11 RX ADMIN — Medication 250 MILLIGRAM(S): at 05:09

## 2022-08-11 RX ADMIN — MIDODRINE HYDROCHLORIDE 10 MILLIGRAM(S): 2.5 TABLET ORAL at 10:40

## 2022-08-11 RX ADMIN — MIDODRINE HYDROCHLORIDE 10 MILLIGRAM(S): 2.5 TABLET ORAL at 18:27

## 2022-08-11 RX ADMIN — Medication 50 MILLIGRAM(S): at 05:08

## 2022-08-11 RX ADMIN — Medication 1 APPLICATION(S): at 18:27

## 2022-08-11 RX ADMIN — Medication 1 APPLICATION(S): at 05:08

## 2022-08-11 RX ADMIN — HUMAN INSULIN 15 UNIT(S): 100 INJECTION, SUSPENSION SUBCUTANEOUS at 05:07

## 2022-08-11 RX ADMIN — Medication 250 MILLIGRAM(S): at 01:20

## 2022-08-11 RX ADMIN — Medication 50 MILLIGRAM(S): at 13:18

## 2022-08-11 RX ADMIN — Medication 40 MILLIGRAM(S): at 05:07

## 2022-08-11 RX ADMIN — Medication 2: at 13:16

## 2022-08-11 RX ADMIN — Medication 81 MILLIGRAM(S): at 13:16

## 2022-08-11 RX ADMIN — Medication 3 MILLILITER(S): at 21:29

## 2022-08-11 RX ADMIN — Medication 250 MILLIGRAM(S): at 13:16

## 2022-08-11 RX ADMIN — Medication 3 MILLILITER(S): at 09:17

## 2022-08-11 RX ADMIN — SPIRONOLACTONE 12.5 MILLIGRAM(S): 25 TABLET, FILM COATED ORAL at 10:40

## 2022-08-11 RX ADMIN — FAMOTIDINE 20 MILLIGRAM(S): 10 INJECTION INTRAVENOUS at 01:20

## 2022-08-11 RX ADMIN — Medication 3 MILLILITER(S): at 16:21

## 2022-08-11 RX ADMIN — Medication 250 MILLIGRAM(S): at 18:27

## 2022-08-11 NOTE — PROGRESS NOTE ADULT - THIS PATIENT HAS THE FOLLOWING CONDITION(S)/DIAGNOSES ON THIS ADMISSION:
Encephalopathy/Acute Respiratory Failure
stroke
Encephalopathy/Acute Respiratory Failure
stroke
Encephalopathy
Encephalopathy

## 2022-08-11 NOTE — PHYSICAL THERAPY INITIAL EVALUATION ADULT - CRITERIA FOR SKILLED THERAPEUTIC INTERVENTIONS
pending progress/impairments found/rehab potential/anticipated discharge recommendation
JUNIE/impairments found/predicted duration of therapy intervention/anticipated discharge recommendation

## 2022-08-11 NOTE — PHYSICAL THERAPY INITIAL EVALUATION ADULT - PLANNED THERAPY INTERVENTIONS, PT EVAL
increase arousal
balance training/bed mobility training/gait training/neuromuscular re-education/postural re-education/strengthening/transfer training

## 2022-08-11 NOTE — PROGRESS NOTE ADULT - ASSESSMENT
77yo man with CABG, PVD, HTN, HLD, and low EF (declined AICD), admitted 7/24 with LM1 occlusion, s/p TICI 2B MT, no tPA as wake-up stroke. Poor exam post thrombectomy, re-intubated for hypoxic respiratory failure. MRI brain with a large L MCA stroke with small area of reperfusion hemorrhage. Course complicated by Septic shock, UTI, C. dif, Urinary obstruction, respiratory failure secondary to poor mental status now s/p extubation 8/10 and transferred ot medicine for further care.     Acute CVA 7/24 with LM1 occlusion, s/p TICI 2B MT, no tPA as wake-up stroke. Poor exam post thrombectomy  Small Hemorrhagic conversion sec to reperfusion  Exam Right hemiplegia however with left sided paresis. Underwent MRI which only confirmed known CVA from initial admission. Given poor mentation post MT had EEG performed to rule out seizures did not reveal epileptic activity.  Q4 Neuro Checks  ASA  Lovenox ppx  Aspiration precautions    Combined systolic and diastolic heart failure.   ICM - TTE 7/24 showed LVEF <10% with RV dysfunction  Cont metoprolol    Lopressor 50mg TID  (Titrated to TID for frequent ectopy on monitor)   Spironolactone 12.5mg daily.   Hold Ace inhib for now ( wean off midodrine first)  May need ICD given severe CM and ectopy however. 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  Had ace placed for uptrending BUN? improved after ace placed  TOV in am    DVT Ppx: Lovenox 40   GOC: DNR / DNI   Dispo: Med Tele   75yo man with CABG, PVD, HTN, HLD, and low EF (declined AICD), admitted 7/24 with LM1 occlusion, s/p TICI 2B MT, no tPA as wake-up stroke. Poor exam post thrombectomy, re-intubated for hypoxic respiratory failure. MRI brain with a large L MCA stroke with small area of reperfusion hemorrhage. Course complicated by Septic shock, UTI, C. dif, Urinary obstruction, respiratory failure secondary to poor mental status now s/p extubation 8/10 and transferred ot medicine for further care.     Acute CVA 7/24 with LM1 occlusion, s/p TICI 2B MT, no tPA as wake-up stroke. Poor exam post thrombectomy  Small Hemorrhagic conversion sec to reperfusion  Exam Right hemiplegia however with left sided paresis. Underwent MRI which only confirmed known CVA from initial admission. Given poor mentation post MT had EEG performed to rule out seizures did not reveal epileptic activity.  Q4 Neuro Checks  ASA  Lovenox ppx  Aspiration precautions    Combined systolic and diastolic heart failure.   ICM - TTE 7/24 showed LVEF <10% with RV dysfunction  Cont metoprolol  Lopressor 50mg TID  (Titrated to TID for frequent ectopy on monitor)   Spironolactone 12.5mg daily.   Hold Ace inhib for now ( wean off midodrine first)  May need ICD given severe CM and ectopy however. 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  Had ace placed for uptrending BUN? improved after ace placed  TOV in am    DVT Ppx: Lovenox 40   GOC: DNR / DNI   Dispo: Med Tele  JUNIE 77yo man with CABG, PVD, HTN, HLD, and low EF (declined AICD), admitted 7/24 with LM1 occlusion, s/p TICI 2B MT, no tPA as wake-up stroke. Poor exam post thrombectomy, re-intubated for hypoxic respiratory failure. MRI brain with a large L MCA stroke with small area of reperfusion hemorrhage. Course complicated by Septic shock, UTI, C. dif, Urinary obstruction, respiratory failure secondary to poor mental status now s/p extubation 8/10 and transferred ot medicine for further care.     Acute CVA 7/24 with LM1 occlusion, s/p TICI 2B MT, no tPA as wake-up stroke. Poor exam post thrombectomy  Small Hemorrhagic conversion sec to reperfusion  Exam Right hemiplegia however with left sided paresis. Underwent MRI which only confirmed known CVA from initial admission. Given poor mentation post MT had EEG performed to rule out seizures did not reveal epileptic activity.  Q4 Neuro Checks  ASA  Lovenox ppx  Aspiration precautions    Combined systolic and diastolic heart failure.   ICM - TTE 7/24 showed LVEF <10% with RV dysfunction  Cont metoprolol  Lopressor 50mg TID  (Titrated to TID for frequent ectopy on monitor)   Spironolactone 12.5mg daily.   Hold Ace inhib for now ( wean off midodrine first)  May need ICD given severe CM and ectopy however. 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  Had ace placed for uptrending BUN? improved after ace placed  Failed 2x TOV   Maintain ace for now    DVT Ppx: Lovenox 40   GOC: DNR / DNI   Dispo: Med Tele  JUNIE vs Hospice  Palliative following. Ongoing GOC. If no improvement in resp and mental status would initiate discussions for comfort.

## 2022-08-11 NOTE — PROGRESS NOTE ADULT - ASSESSMENT
77 y/o with h/o chronic systolic HF ACC/AHA stage C, ICMP LVEF 22, CAD s/p PCI ’04 CABG ‘14, carotid stenosis, declined ICD, PVD, HTN, DLP who was transferred from an OSH for neuroIR intervention after waking up with R sided facial droop and R sided weakness. He was found to have left M1 occlusion on CTA and required thrombectomy on 7/24/22. His hospital course has been complicated by acute hypoxic respiratory failure post procedure requiring mechanical ventilation, hypotension requiring temporary pressors, frequent PVCs, E. coli UTI, KE and fevers. He had a TTE that showed LVEF 19%, LVIDd 6.79cm, LVOT VTI 10.3cm, moderately RV dysfunction and RVSP 42mmHg (RAP 3mmHg). The patient remains intubated. His course has been complicated by Cdiff for which he is being treated with vanco/flagyl. He continues to receive free water for hypernatremia. He was transiently requiring pressors again with intermittent low grade fevers.  Planned for trial of extubation today with no plan for re-intubation per family goals of care discussion.    Pertinent Studies:  TTE 7/24/22: LVEF <10%, LVIDd 6.79cm, mild RVE with moderately reduced systolic function, grade III DD, mild MR, mild TR, PASP 41.9 mmHg (RAP 3), LVOT VTI 10.3, small PFO with left to right shunting.   CTA neck showed moderate stenosis in the proximal right internal carotid artery and moderate to severe stenosis in proximal left internal carotid artery. There was no evidence of ICA occlusion in the neck.  DVT U/S B/L LE 8/9: no DVT 75 y/o with h/o chronic systolic HF ACC/AHA stage C, ICMP LVEF 22, CAD s/p PCI ’04 CABG ‘14, carotid stenosis, declined ICD, PVD, HTN, DLP who was transferred from an OSH for neuroIR intervention after waking up with R sided facial droop and R sided weakness. He was found to have left M1 occlusion on CTA and required thrombectomy on 7/24/22. His hospital course has been complicated by acute hypoxic respiratory failure post procedure requiring mechanical ventilation, hypotension requiring temporary pressors, frequent PVCs, E. coli UTI, KE and fevers. He had a TTE that showed LVEF 19%, LVIDd 6.79cm, LVOT VTI 10.3cm, moderately RV dysfunction and RVSP 42mmHg (RAP 3mmHg). The patient remains intubated. His course has been complicated by Cdiff for which he is being treated with vanco/flagyl. He continues to receive free water for hypernatremia. He was transiently requiring pressors again with intermittent low grade fevers.  Extubated successfully on 8/10.  There is no plan for re-intubation per family goals of care discussion.    Pertinent Studies:  TTE 7/24/22: LVEF <10%, LVIDd 6.79cm, mild RVE with moderately reduced systolic function, grade III DD, mild MR, mild TR, PASP 41.9 mmHg (RAP 3), LVOT VTI 10.3, small PFO with left to right shunting.   CTA neck showed moderate stenosis in the proximal right internal carotid artery and moderate to severe stenosis in proximal left internal carotid artery. There was no evidence of ICA occlusion in the neck.  DVT U/S B/L LE 8/9: no DVT

## 2022-08-11 NOTE — PROGRESS NOTE ADULT - PROBLEM SELECTOR PLAN 1
- Known h/o ICMP.  TTE 7/24 showed LVEF <10% with RV dysfunction  - Clinically appears euvolemic on exam  - Goal is to maintain net even fluid status, may use Lasix PRN however pt does not currently require.   - GDMT:   - On Lopressor 50mg TID for PVC control (will eventually plan to switch to metoprolol succinate).    - Start on spironolactone 12.5mg daily. This should not affect BP.  - Previously unable to tolerate trial of hydralazine 10mg TID due to hypotension. Was on lisinopril at home.   - Recommend attempting to wean midodrine and then we will plan to readdress initiating neurohormonal blockade in next day or two as pt may have improvement in BP post extubation.  - Please check markers of perfusion daily (serum lactate, LFTs, T Bili).    - Monitor strict I&O and daily weights.   - Device: he has refused this in the past and this would typically would not be offered if life expectancy < 1yr ( will need to revisit based on CVA recovery). - Known h/o ICMP.  TTE 7/24 showed LVEF <10% with RV dysfunction  - Clinically appears euvolemic on exam  - Goal is to maintain net even fluid status, may use Lasix PRN however pt does not currently require.   - GDMT:   - On Lopressor 50mg TID for PVC control (will eventually plan to switch to metoprolol succinate).    - On spironolactone 12.5mg daily.  - Previously unable to tolerate trial of hydralazine 10mg TID due to hypotension. Was on lisinopril at home.   - Recommend attempting to wean midodrine and then we will plan to readdress initiating neurohormonal blockade in next day or two as pt may have improvement in BP post extubation.  - Please check markers of perfusion daily (serum lactate, LFTs, T Bili).    - Monitor strict I&O and daily weights.   - Device: he has refused this in the past and this would typically would not be offered if life expectancy < 1yr ( will need to revisit based on CVA recovery).

## 2022-08-11 NOTE — PHYSICAL THERAPY INITIAL EVALUATION ADULT - ADDITIONAL COMMENTS
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 #) no steps inside.
Per [facility] Assessment, Pt was independent PTA, works full time at a golf course, lives with his wife, has steps to enter (? #) no steps inside.

## 2022-08-11 NOTE — PHYSICAL THERAPY INITIAL EVALUATION ADULT - DIAGNOSIS, PT EVAL
functional debility 2*2 decreased strength, poor command following and decreased cognitive status s/p neuro event.
impaired strength and cognition s/p neuro event.

## 2022-08-11 NOTE — CHART NOTE - NSCHARTNOTEFT_GEN_A_CORE
Mr. Patricia Stout, a 76 M who was diagnosed with stroke, is being downgraded from the neuro ICU to the medicine service.      HPI:  76M with PMH CABG, HTN, HLD who presents as transfer from [facility] for stroke. Last known well was last night 10pm prior to going to bed, woke up this morning around 0500 with R sided weakness and R facial droop. Presented to [facility], code stroke initiated, NIH at [facility] reportedly 8. CTA showed LM1 occlusion. Transferred to [facility] for possible neuro IR intervention. On arrival to [facility], NIH 6. Decision made to take patient directly to neuro IR for intervention.     NIH 6, mRS 0 (24 Jul 2022 07:34)      Hospital course:   On admission, patient underwent mechanical thrombectomy for L M1 occlusion, TICI 2B recanalization. Patient initially extubated post procedure, required reintubation for respiratory distress. Bedside echo revealed EF 15%, given lasix for diuresis. Formal TTE performed 7/25 showed EF <10%. Cardiology consulted, recommended ICD for low EF but patient previously refused defibrillator as outpatient. Patient then became febrile, UA positive, started on antibiotic treatment for UTI on 7/25. Patient had frequent PVCs requiring lopressor IV, cardiology recommended beta blocker, coreg started.     Patient's mental status continued to be poor post-procedure. MRI on 7/26 showed acute L MCA/SUGEY infarcts with small hemorrhagic transformation, but MRI findings did not correlate with exam. EEG started to r/o seizures, negative and discontinued on 8/1. Exam continues to be stable.     Patient's course complicated by uptrending BUN, not responsive to fluids administration. Continued for several days without improvement. Cipro (for UTI) discontinued. Tube feeding product changed to low-protein formula. Possibly 2/2 obstructive hydrocephalus, ace placed 8/7, improvement to baseline 2 days after ace placed. Removed and replaced 8/10.     Patient continued to have fevers throughout his stay, c diff positive on 8/1, began 10-day course of PO vancomycin, now completed. Patient also began having signs of sepsis including hypotension, fevers on 8/2, started empiric antibiotics of vanomycin and meropenem and IV pressors. Blood and sputum cultures sent multiple times, never positive. Continues with intermittent low-grade fevers. No longer requiring IV pressors, on midodrine 10q8.     Heart failure team consulted during admission 2/2 severely low EF, known CHF at baseline. Following closely.     Patient unable to be weaned from ventilator since admission 2/2 poor mental status. Extensive family discussions about goals of care Mr. Patricia Stout, a 76 M who was diagnosed with stroke, is being downgraded from the neuro ICU to the medicine service.      HPI:  76M with PMH CABG, HTN, HLD who presents as transfer from [place] for stroke. Last known well was last night 10pm prior to going to bed, woke up this morning around 0500 with R sided weakness and R facial droop. Presented to [place], code stroke initiated, NIH at [place] reportedly 8. CTA showed LM1 occlusion. Transferred to [facility] for possible neuro IR intervention. On arrival to [facility], NIH 6. Decision made to take patient directly to neuro IR for intervention.     NIH 6, mRS 0 (24 Jul 2022 07:34)      Hospital course:   On admission, patient underwent mechanical thrombectomy for L M1 occlusion, TICI 2B recanalization. Patient initially extubated post procedure, required reintubation for respiratory distress. Bedside echo revealed EF 15%, given lasix for diuresis. Formal TTE performed 7/25 showed EF <10%. Cardiology consulted, recommended ICD for low EF but patient previously refused defibrillator as outpatient. Patient then became febrile, UA positive, started on antibiotic treatment for UTI on 7/25. Patient had frequent PVCs requiring lopressor IV, cardiology recommended beta blocker, coreg started.     Patient's mental status continued to be poor post-procedure. MRI on 7/26 showed acute L MCA/SUGEY infarcts with small hemorrhagic transformation, but MRI findings did not correlate with exam. EEG started to r/o seizures, negative and discontinued on 8/1. Exam continues to be stable.     Patient's course complicated by uptrending BUN, not responsive to fluids administration. Continued for several days without improvement. Cipro (for UTI) discontinued. Tube feeding product changed to low-protein formula. Possibly 2/2 obstructive hydrocephalus, ace placed 8/7, improvement to baseline 2 days after ace placed. Removed and replaced 8/10.     Patient continued to have fevers throughout his stay, c diff positive on 8/1, began 10-day course of PO vancomycin, now completed. Patient also began having signs of sepsis including hypotension, fevers on 8/2, started empiric antibiotics of vanomycin and meropenem and IV pressors. Blood and sputum cultures sent multiple times, never positive. Continues with intermittent low-grade fevers. No longer requiring IV pressors, on midodrine 10q8.     Heart failure team consulted during admission 2/2 severely low EF, known CHF at baseline. Following closely.     Patient unable to be weaned from ventilator since admission 2/2 poor mental status. Extensive family discussions about goals of care. Family made patient DNR/DNI with plan for extubation with plan of transition to comfort care if respiratory distress upon extubation. Patient optimized for extubation on 8/10 with steroids, lasix, and albuterol treatments, extubated to facemask and has been maintaining respirations and saturations since extubation.       Vital Signs Last 24 Hrs  T(C): 36.4 (11 Aug 2022 08:00), Max: 38.3 (10 Aug 2022 09:30)  T(F): 97.5 (11 Aug 2022 08:00), Max: 100.9 (10 Aug 2022 09:30)  HR: 87 (11 Aug 2022 08:00) (80 - 100)  BP: 130/75 (11 Aug 2022 08:00) (124/77 - 130/75)  BP(mean): 91 (11 Aug 2022 08:00) (91 - 92)  RR: 23 (11 Aug 2022 08:00) (19 - 31)  SpO2: 96% (11 Aug 2022 08:00) (91% - 100%)    Parameters below as of 11 Aug 2022 08:00  Patient On (Oxygen Delivery Method): nasal cannula  O2 Flow (L/min): 5      Labs:                        11.0   13.14 )-----------( 331      ( 11 Aug 2022 03:02 )             33.2   08-11    145  |  106  |  27.1<H>  ----------------------------<  175<H>  4.4   |  27.0  |  0.55    Ca    8.3<L>      11 Aug 2022 03:02  Phos  4.6     08-11  Mg     1.9     08-11      Physical exam:   General: Calm, extubated  HEENT: MMM  Neuro:  -Mental status- No acute distress, intermittent EO, no tracking, no FC  -CN- PERRL 3mm, EOMI, tongue midline, face symmetric  spont LUE and LLE 2/5  flaccid RUE/RLE    CV: RRR  Pulm: clear to auscultation  Abd: Soft, nontender, nondistended  Ext: no noted edema in lower ext  Skin: warm, dry      Plan by system  Neuro:  - Q4 hour neuro checks  - No imaging pending    CV:   - SBP goal   - Midodrine 10q8 to maintain SBP   - Metoprolol 50q8 per cardiology team for PVCs, may decrease dose if SBP unable to be maintained  - Spironolactone 12.5 daily per HF team  - Lasix PRN per HF team   - No IMTIAZ per cardiology for stroke w/u  - LDL 97, statin held 2/2 transaminitis    Resp:  - Extubated 8/10, on 35% aerosol mask  - Duonebs q6    GI:  - NGT, currently NPO s/p extubation, previously on Glucerna @ 60  - Rectal tube in place 2/2 liquid stools (c ciff positive), no bowel regimen    :   - Ace replaced 8/10  - FWF 250q6   - Nephrology following     Heme:  - SCDs  - ASA 81 for stroke  - Lovenox 40     ID:   - Vanco 250q6 via NGT, course completed today 8/11 (10 day course)    Endo:  - A1C 7%  - MISS q4 hours, NPH 15q8    GOC:   - DNR / DNI with plan for transition to comfort care if patient unable to maintain respiratory status comfortably      Attestation:  Patient is stable for downgrade to medicine team on 8/11/22. If there are further neuro ICU needs, please re-call [phone].

## 2022-08-11 NOTE — PROGRESS NOTE ADULT - PROBLEM SELECTOR PLAN 2
- Likely related to ICMP  - Continue lopressor as above.  Would avoid aggressive uptitration of beta blocker due to significant bi-ventricular heart failure.

## 2022-08-11 NOTE — PROGRESS NOTE ADULT - SUBJECTIVE AND OBJECTIVE BOX
Extubated yesterday. On 5LNC. Poorly responsive to voice and touch. Off pressors this a.m.    ICU Vital Signs Last 24 Hrs  T(C): 36.5 (11 Aug 2022 10:00), Max: 38 (10 Aug 2022 11:00)  T(F): 97.7 (11 Aug 2022 10:00), Max: 100.4 (10 Aug 2022 11:00)  HR: 94 (11 Aug 2022 10:00) (80 - 100)  BP: 134/60 (11 Aug 2022 10:00) (113/52 - 134/60)  BP(mean): 80 (11 Aug 2022 10:00) (68 - 92)  ABP: 115/51 (11 Aug 2022 09:00) (101/52 - 156/65)  ABP(mean): 8 (11 Aug 2022 09:00) (8 - 95)  RR: 20 (11 Aug 2022 10:00) (19 - 27)  SpO2: 97% (11 Aug 2022 10:00) (91% - 100%)    O2 Parameters below as of 11 Aug 2022 10:00  Patient On (Oxygen Delivery Method): nasal cannula  O2 Flow (L/min): 5    I&O's Summary  10 Aug 2022 07:01  -  11 Aug 2022 07:00  --------------------------------------------------------  IN: 1644.9 mL / OUT: 3195 mL / NET: -1550.1 mL  11 Aug 2022 07:01  -  11 Aug 2022 10:39  --------------------------------------------------------  IN: 0 mL / OUT: 160 mL / NET: -160 mL    Physical Exam  General: WDWN male in NAD  Cardiac: S1S2 RRR  Respiratory: CTAB  Abdomen: Soft, NT  Extremities: No appreciable edema  Neuro: Unresponsive to voice and touch    08-11    145  |  106  |  27.1<H>  ----------------------------<  175<H>  4.4   |  27.0  |  0.55    Ca    8.3<L>      11 Aug 2022 03:02  Phos  4.6     08-11  Mg     1.9     08-11                        11.0   13.14 )-----------( 331      ( 11 Aug 2022 03:02 )             33.2     MEDICATIONS  (STANDING):  albuterol/ipratropium for Nebulization 3 milliLiter(s) Nebulizer every 6 hours  aspirin  chewable 81 milliGRAM(s) Oral daily  BACItracin   Ointment 1 Application(s) Topical two times a day  chlorhexidine 2% Cloths 1 Application(s) Topical daily  dextrose 5%. 1000 milliLiter(s) (50 mL/Hr) IV Continuous <Continuous>  dextrose 5%. 1000 milliLiter(s) (100 mL/Hr) IV Continuous <Continuous>  dextrose 50% Injectable 25 Gram(s) IV Push once  enoxaparin Injectable 40 milliGRAM(s) SubCutaneous every 24 hours  glucagon  Injectable 1 milliGRAM(s) IntraMuscular once  insulin lispro (ADMELOG) corrective regimen sliding scale   SubCutaneous every 6 hours  insulin NPH human recombinant 15 Unit(s) SubCutaneous every 8 hours  metoprolol tartrate 50 milliGRAM(s) Oral every 8 hours  midodrine 10 milliGRAM(s) Oral every 8 hours  spironolactone 12.5 milliGRAM(s) Oral daily  vancomycin    Solution 250 milliGRAM(s) Enteral Tube every 6 hours    MEDICATIONS  (PRN):  dextrose Oral Gel 15 Gram(s) Oral once PRN Blood Glucose LESS THAN 70 milliGRAM(s)/deciliter  ondansetron Injectable 4 milliGRAM(s) IV Push every 6 hours PRN Nausea and/or Vomiting

## 2022-08-11 NOTE — PROGRESS NOTE ADULT - ASSESSMENT
The patient is a 76 year old male with past medical history of HTN, HLD, CAD s/p CABG, carotid stenosis, and PVD who was transferred from an outside hospital for neuro IR intervention after waking up with R sided facial droop and R sided weakness; found to have left M1 occlusion on CTA s/p thrombectomy which is complicated by hemorrhagic conversion. Hospital course notable for acute hypoxic respiratory failure post procedure s/p mechanical ventilation; ischemic cardiomyopathy, LVEF<10% and moderately reduced RV function. Also with E coli UTI, KE, C. diff. Nephrology is managing KE (resolved) and hyperNa (resolved).    -Baseline creatinine ranges 0.6-1.0mg/dL   -Developed prerenal acute kidney (resolved)   -Then developed recurrent acute kidney injury. Clinically suspicious for AIN given recent exposure to meropenem + new onset eosinophilia.    -Meropenem have been discontinued on 08/06/2022 followed by resolution of KE  -Renal function remains at baseline at this time  -Goal sodium as per primary team - continue free water flushes at 350cc q4h   -Would avoid future use of carbapenem-based antibiotics given recent KE suspicious for AIN    Kwame West DO  [facility]       The patient is a 76 year old male with past medical history of HTN, HLD, CAD s/p CABG, carotid stenosis, and PVD who was transferred from an outside hospital for neuro IR intervention after waking up with R sided facial droop and R sided weakness; found to have left M1 occlusion on CTA s/p thrombectomy which is complicated by hemorrhagic conversion. Hospital course notable for acute hypoxic respiratory failure post procedure s/p mechanical ventilation; ischemic cardiomyopathy, LVEF<10% and moderately reduced RV function. Also with E coli UTI, KE, C. diff. Nephrology is managing KE (resolved) and hyperNa (resolved).    -Baseline creatinine ranges 0.6-1.0mg/dL   -Developed prerenal acute kidney (resolved)   -Then developed recurrent acute kidney injury. Clinically suspicious for AIN given recent exposure to meropenem + new onset eosinophilia.    -Meropenem have been discontinued on 08/06/2022 followed by resolution of KE  -Renal function remains at baseline at this time  -Goal sodium as per primary team - continue free water flushes    -Would avoid future use of carbapenem-based antibiotics given recent KE suspicious for AIN    Kwame West DO  [facility]

## 2022-08-11 NOTE — PROGRESS NOTE ADULT - ASSESSMENT
Assessment/Plan:   77yo man with CABG, PVD, HTN, HLD, and low EF (declined AICD), admitted 7/24 with LM1 occlusion, s/p TICI 2B MT, no tPA as wake-up stroke. Poor exam post thrombectomy, re-intubated for hypoxic respiratory failure. MRI brain with a large L MCA stroke with small area of reperfusion hemorrhage.  Hospitalization notable for acute on chronic HFrEF, LVHF <10%, reduced RV syst function, no LV thrombus on Definity, 1st degree AV block, with frequent PVCs.     Plan:  Neuro-  neurochecks q4hr  On ASA 81mg daily, statin held for abnormal LFTs  with pressure ulcer    Resp- Resp failure secondary to neuro injury and heart failure   extubated yesterday  DNR/DNI    Card- Ischemic cardiomyopathy HFrEF, EF 10%. With very frequent PVCs  avoid ACE inhibitors   EPS following, patient is currently not safe for AC given large stoke with hemorrhagic conversion and very high BUN. Will get a CTH on day 14 from stroke if no new hemorrhagic conversion, will start AC.   Levophed - off - SBP > 90 or MAP > 65  K>4, Mg >2    Renal- Uremia improving  - likely 2/2 pre- renal azothermia and contraction alkalosis (FeNa- <1- pre renal, U Cl <30, renal US no abnormalities), BUN and Cr now improving ; AIN   appreciate renal recs,  goal euvolemia   FWf  250mg q6h    GI prophylaxis - Improving Transaminitis- med induced vs less likely congestive hepatopathy (RUQ US 7/30 normal).  LFT improving  Suplena/ Jevity - lower protein - improve BUN    pepcid;     ID: s/p Meropenem and flagyl MRSA neg 8/1.    C Diff dx 8/1, on vanc 500 mg q6h and flagyl 500 mg q8h for severe C. Diff with shock, end date 8/11    Heme-S/P  leucocytosis -    Lov ppx     Endo: Hyperglycemia- improving  NPH 15 q8h  Patient seen and examined by attending me 8/9/2022.    Patient is critically ill due to stroke and at high risk for neurological deterioration or death due to: requiring mechanical ventilation 2/2 neurologic injury, severe heart failure, C. Diff Assessment/Plan:   75yo man with CABG, PVD, HTN, HLD, and low EF (declined AICD), admitted 7/24 with LM1 occlusion, s/p TICI 2B MT, no tPA as wake-up stroke. Poor exam post thrombectomy, re-intubated for hypoxic respiratory failure. MRI brain with a large L MCA stroke with small area of reperfusion hemorrhage.  Hospitalization notable for acute on chronic HFrEF, LVHF <10%, reduced RV syst function, no LV thrombus on Definity, 1st degree AV block, with frequent PVCs.     Plan:  Neuro-  neurochecks q4hr  On ASA 81mg daily, statin held for abnormal LFTs  midodrine    Resp- Resp failure secondary to neuro injury and heart failure   extubated yesterday  NC  DNR/DNI    Card- Ischemic cardiomyopathy HFrEF, EF 10%. With very frequent PVCs  avoid ACE inhibitors   metop, start spironolactone  EPS following, patient is currently not safe for AC given large stoke with hemorrhagic conversion and very high BUN      Renal- Uremia improving  - likely 2/2 pre- renal azothermia and contraction alkalosis (FeNa- <1- pre renal, U Cl <30, renal US no abnormalities), BUN and Cr now improving ; AIN   appreciate renal recs,  goal euvolemia   FWf  250mg q6h  ace for obs hydronephrosis    GI prophylaxis -   LFT improving  glucerna    pepcid  rectal tube- loose stools/c diff    ID: s/p Meropenem and flagyl MRSA neg 8/1.    C Diff dx 8/1, on vanc 500 mg q6h for severe C. Diff with shock, end date 8/11    Heme-S/P  leucocytosis -    Lov ppx     Endo: Hyperglycemia- improving  NPH 15 q8h  ISS    Patient is critically ill due to stroke and at high risk for neurological deterioration or death due to: requiring mechanical ventilation 2/2 neurologic injury, severe heart failure, C. Diff

## 2022-08-11 NOTE — PHYSICAL THERAPY INITIAL EVALUATION ADULT - GENERAL OBSERVATIONS, REHAB EVAL
Pt received supine in bed + telemetry//BP + NGT +O2nc, no indication of pain
Pt received supine in bed (+) Multiple lines, non verbal, no indication of pain,

## 2022-08-11 NOTE — PROGRESS NOTE ADULT - SUBJECTIVE AND OBJECTIVE BOX
[facility] Division of Hospital Medicine    Chief Complaint:      SUBJECTIVE / OVERNIGHT EVENTS:  Hospital Course  On admission, patient underwent mechanical thrombectomy for L M1 occlusion, TICI 2B recanalization. Patient initially extubated post procedure, required reintubation for respiratory distress. Bedside echo revealed EF 15%, given lasix for diuresis. Formal TTE performed 7/25 showed EF <10%. Cardiology consulted, recommended ICD for low EF but patient previously refused defibrillator as outpatient. Patient then became febrile, UA positive, started on antibiotic treatment for UTI on 7/25. Patient had frequent PVCs requiring lopressor IV, cardiology recommended beta blocker, coreg started.     Patient's mental status continued to be poor post-procedure. MRI on 7/26 showed acute L MCA/SUGEY infarcts with small hemorrhagic transformation, but MRI findings did not correlate with exam. EEG started to r/o seizures, negative and discontinued on 8/1. Exam continues to be stable.     Patient's course complicated by uptrending BUN, not responsive to fluids administration. Continued for several days without improvement. Cipro (for UTI) discontinued. Tube feeding product changed to low-protein formula. Possibly 2/2 obstructive, ace placed 8/7, improvement to baseline 2 days after ace placed. Removed and replaced 8/10.     Patient continued to have fevers throughout his stay, c diff positive on 8/1, began 10-day course of PO vancomycin, now completed. Patient also began having signs of sepsis including hypotension, fevers on 8/2, started empiric antibiotics of vancomycin and meropenem and IV pressors. Blood and sputum cultures sent multiple times, never positive. Continues with intermittent low-grade fevers. No longer requiring IV pressors, on midodrine 10q8.     Heart failure team consulted during admission 2/2 severely low EF, known CHF at baseline. Following closely.     Patient unable to be weaned from ventilator since admission 2/2 poor mental status. Extensive family discussions about goals of care. Family made patient DNR/DNI with plan for extubation with plan of transition to comfort care if respiratory distress upon extubation. Patient optimized for extubation on 8/10 with steroids, lasix, and albuterol treatments, extubated to facemask and has been maintaining respirations and saturations since extubation.     Patient denies chest pain, SOB, abd pain, N/V, fever, chills, dysuria or any other complaints. All remainder ROS negative.     MEDICATIONS  (STANDING):  albuterol/ipratropium for Nebulization 3 milliLiter(s) Nebulizer every 6 hours  aspirin  chewable 81 milliGRAM(s) Oral daily  BACItracin   Ointment 1 Application(s) Topical two times a day  chlorhexidine 2% Cloths 1 Application(s) Topical daily  dextrose 5%. 1000 milliLiter(s) (50 mL/Hr) IV Continuous <Continuous>  dextrose 5%. 1000 milliLiter(s) (100 mL/Hr) IV Continuous <Continuous>  dextrose 50% Injectable 25 Gram(s) IV Push once  enoxaparin Injectable 40 milliGRAM(s) SubCutaneous every 24 hours  glucagon  Injectable 1 milliGRAM(s) IntraMuscular once  insulin lispro (ADMELOG) corrective regimen sliding scale   SubCutaneous every 6 hours  insulin NPH human recombinant 15 Unit(s) SubCutaneous every 8 hours  metoprolol tartrate 50 milliGRAM(s) Oral every 8 hours  midodrine 10 milliGRAM(s) Oral every 8 hours  spironolactone 12.5 milliGRAM(s) Oral daily  vancomycin    Solution 250 milliGRAM(s) Enteral Tube every 6 hours    MEDICATIONS  (PRN):  dextrose Oral Gel 15 Gram(s) Oral once PRN Blood Glucose LESS THAN 70 milliGRAM(s)/deciliter  ondansetron Injectable 4 milliGRAM(s) IV Push every 6 hours PRN Nausea and/or Vomiting        I&O's Summary    10 Aug 2022 07:01  -  11 Aug 2022 07:00  --------------------------------------------------------  IN: 1644.9 mL / OUT: 3195 mL / NET: -1550.1 mL    11 Aug 2022 07:01  -  11 Aug 2022 15:40  --------------------------------------------------------  IN: 410 mL / OUT: 380 mL / NET: 30 mL        PHYSICAL EXAM:  Vital Signs Last 24 Hrs  T(C): 36.8 (11 Aug 2022 14:00), Max: 37.5 (10 Aug 2022 17:00)  T(F): 98.2 (11 Aug 2022 14:00), Max: 99.5 (10 Aug 2022 17:00)  HR: 83 (11 Aug 2022 15:00) (70 - 102)  BP: 118/53 (11 Aug 2022 15:00) (107/57 - 151/75)  BP(mean): 470 (11 Aug 2022 14:00) (68 - 470)  RR: 24 (11 Aug 2022 15:00) (18 - 27)  SpO2: 92% (11 Aug 2022 15:00) (91% - 100%)    Parameters below as of 11 Aug 2022 15:00  Patient On (Oxygen Delivery Method): nasal cannula  O2 Flow (L/min): 5          CONSTITUTIONAL: NAD, well-developed  ENMT: Moist oral mucosa, no pharyngeal injection or exudates; normal dentition  RESPIRATORY: Normal respiratory effort; lungs are clear to auscultation bilaterally  CARDIOVASCULAR: Regular rate and rhythm, normal S1 and S2, no murmur/rub/gallop; Peripheral pulses are 2+ bilaterally  ABDOMEN: Nontender to palpation, normoactive bowel sounds, no rebound/guarding;   MUSCLOSKELETAL:  No clubbing or cyanosis of digits; no joint swelling or tenderness to palpation  PSYCH: A+O to person, place, and time; affect appropriate  NEUROLOGY: CN 2-12 are intact and symmetric; no gross sensory deficits;   SKIN: No rashes; no palpable lesions    LABS:                        11.0   13.14 )-----------( 331      ( 11 Aug 2022 03:02 )             33.2     08-11    145  |  106  |  27.1<H>  ----------------------------<  175<H>  4.4   |  27.0  |  0.55    Ca    8.3<L>      11 Aug 2022 03:02  Phos  4.6     08-11  Mg     1.9     08-11                CAPILLARY BLOOD GLUCOSE      POCT Blood Glucose.: 152 mg/dL (11 Aug 2022 13:13)  POCT Blood Glucose.: 143 mg/dL (11 Aug 2022 05:03)  POCT Blood Glucose.: 184 mg/dL (11 Aug 2022 00:40)  POCT Blood Glucose.: 187 mg/dL (10 Aug 2022 21:19)  POCT Blood Glucose.: 185 mg/dL (10 Aug 2022 17:14)        RADIOLOGY & ADDITIONAL TESTS:  Results Reviewed:   Imaging Personally Reviewed:  Electrocardiogram Personally Reviewed:                                           [facility] Division of Hospital Medicine    Chief Complaint:      SUBJECTIVE / OVERNIGHT EVENTS:  Hospital Course  On admission, patient underwent mechanical thrombectomy for L M1 occlusion, TICI 2B recanalization. Patient initially extubated post procedure, required reintubation for respiratory distress. Bedside echo revealed EF 15%, given lasix for diuresis. Formal TTE performed 7/25 showed EF <10%. Cardiology consulted, recommended ICD for low EF but patient previously refused defibrillator as outpatient. Patient then became febrile, UA positive, started on antibiotic treatment for UTI on 7/25. Patient had frequent PVCs requiring lopressor IV, cardiology recommended beta blocker, coreg started.     Patient's mental status continued to be poor post-procedure. MRI on 7/26 showed acute L MCA/SUGEY infarcts with small hemorrhagic transformation, but MRI findings did not correlate with exam. EEG started to r/o seizures, negative and discontinued on 8/1. Exam continues to be stable.     Patient's course complicated by uptrending BUN, not responsive to fluids administration. Continued for several days without improvement. Cipro (for UTI) discontinued. Tube feeding product changed to low-protein formula. Possibly 2/2 obstructive, ace placed 8/7, improvement to baseline 2 days after ace placed. Removed and replaced 8/10.     Patient continued to have fevers throughout his stay, c diff positive on 8/1, began 10-day course of PO vancomycin, now completed. Patient also began having signs of sepsis including hypotension, fevers on 8/2, started empiric antibiotics of vancomycin and meropenem and IV pressors. Blood and sputum cultures sent multiple times, never positive. Continues with intermittent low-grade fevers. No longer requiring IV pressors, on midodrine 10q8.     Heart failure team consulted during admission 2/2 severely low EF, known CHF at baseline. Following closely.     Patient unable to be weaned from ventilator since admission 2/2 poor mental status. Extensive family discussions about goals of care. Family made patient DNR/DNI with plan for extubation with plan of transition to comfort care if respiratory distress upon extubation. Patient optimized for extubation on 8/10 with steroids, lasix, and albuterol treatments, extubated to facemask and has been maintaining respirations and saturations since extubation.     Patient denies chest pain, SOB, abd pain, N/V, fever, chills, dysuria or any other complaints. All remainder ROS negative.     MEDICATIONS  (STANDING):  albuterol/ipratropium for Nebulization 3 milliLiter(s) Nebulizer every 6 hours  aspirin  chewable 81 milliGRAM(s) Oral daily  BACItracin   Ointment 1 Application(s) Topical two times a day  chlorhexidine 2% Cloths 1 Application(s) Topical daily  dextrose 5%. 1000 milliLiter(s) (50 mL/Hr) IV Continuous <Continuous>  dextrose 5%. 1000 milliLiter(s) (100 mL/Hr) IV Continuous <Continuous>  dextrose 50% Injectable 25 Gram(s) IV Push once  enoxaparin Injectable 40 milliGRAM(s) SubCutaneous every 24 hours  glucagon  Injectable 1 milliGRAM(s) IntraMuscular once  insulin lispro (ADMELOG) corrective regimen sliding scale   SubCutaneous every 6 hours  insulin NPH human recombinant 15 Unit(s) SubCutaneous every 8 hours  metoprolol tartrate 50 milliGRAM(s) Oral every 8 hours  midodrine 10 milliGRAM(s) Oral every 8 hours  spironolactone 12.5 milliGRAM(s) Oral daily  vancomycin    Solution 250 milliGRAM(s) Enteral Tube every 6 hours    MEDICATIONS  (PRN):  dextrose Oral Gel 15 Gram(s) Oral once PRN Blood Glucose LESS THAN 70 milliGRAM(s)/deciliter  ondansetron Injectable 4 milliGRAM(s) IV Push every 6 hours PRN Nausea and/or Vomiting    Unable to obtain Ax, Social, Surgical, Family Hx sec to mental status    I&O's Summary    10 Aug 2022 07:01  -  11 Aug 2022 07:00  --------------------------------------------------------  IN: 1644.9 mL / OUT: 3195 mL / NET: -1550.1 mL    11 Aug 2022 07:01  -  11 Aug 2022 15:40  --------------------------------------------------------  IN: 410 mL / OUT: 380 mL / NET: 30 mL        PHYSICAL EXAM:  Vital Signs Last 24 Hrs  T(C): 36.8 (11 Aug 2022 14:00), Max: 37.5 (10 Aug 2022 17:00)  T(F): 98.2 (11 Aug 2022 14:00), Max: 99.5 (10 Aug 2022 17:00)  HR: 83 (11 Aug 2022 15:00) (70 - 102)  BP: 118/53 (11 Aug 2022 15:00) (107/57 - 151/75)  BP(mean): 470 (11 Aug 2022 14:00) (68 - 470)  RR: 24 (11 Aug 2022 15:00) (18 - 27)  SpO2: 92% (11 Aug 2022 15:00) (91% - 100%)    Parameters below as of 11 Aug 2022 15:00  Patient On (Oxygen Delivery Method): nasal cannula  O2 Flow (L/min): 5          CONSTITUTIONAL: NAD, well-developed  ENMT: Moist oral mucosa, no pharyngeal injection or exudates; normal dentition  RESPIRATORY: Normal respiratory effort; lungs are clear to auscultation bilaterally  CARDIOVASCULAR: Regular rate and rhythm, normal S1 and S2, no murmur/rub/gallop; Peripheral pulses are 2+ bilaterally  ABDOMEN: Nontender to palpation, normoactive bowel sounds, no rebound/guarding;   MUSCLOSKELETAL:  No clubbing or cyanosis of digits; no joint swelling or tenderness to palpation  PSYCH: A+O to person, place, and time; affect appropriate  NEUROLOGY: CN 2-12 are intact and symmetric; no gross sensory deficits;   SKIN: No rashes; no palpable lesions    LABS:                        11.0   13.14 )-----------( 331      ( 11 Aug 2022 03:02 )             33.2     08-11    145  |  106  |  27.1<H>  ----------------------------<  175<H>  4.4   |  27.0  |  0.55    Ca    8.3<L>      11 Aug 2022 03:02  Phos  4.6     08-11  Mg     1.9     08-11                CAPILLARY BLOOD GLUCOSE      POCT Blood Glucose.: 152 mg/dL (11 Aug 2022 13:13)  POCT Blood Glucose.: 143 mg/dL (11 Aug 2022 05:03)  POCT Blood Glucose.: 184 mg/dL (11 Aug 2022 00:40)  POCT Blood Glucose.: 187 mg/dL (10 Aug 2022 21:19)  POCT Blood Glucose.: 185 mg/dL (10 Aug 2022 17:14)        RADIOLOGY & ADDITIONAL TESTS:  Results Reviewed:   Imaging Personally Reviewed:  Electrocardiogram Personally Reviewed:

## 2022-08-11 NOTE — PROGRESS NOTE ADULT - SUBJECTIVE AND OBJECTIVE BOX
Chief complaint:   Patient is a 77y old  Male who presents with a chief complaint of stroke (10 Aug 2022 11:06)    HPI:  76M with PMH CABG, HTN, HLD who presents as transfer from [facility] for stroke. Last known well was last night 10pm prior to going to bed, woke up this morning around 0500 with R sided weakness and R facial droop. Presented to [facility], code stroke initiated, NIH at [facility] reportedly 8. CTA showed LM1 occlusion. Transferred to [facility] for possible neuro IR intervention. On arrival to [facility], NIH 6. Decision made to take patient directly to neuro IR for intervention.     NIH 6, mRS 0      24hr EVENTS:      ROS: [ ]  Unable to assess due to mental status   All other systems negative    -----------------------------------------------------------------------------------------------------------------------------------------------------------------------------------  ICU Vital Signs Last 24 Hrs  T(C): 36.4 (11 Aug 2022 08:00), Max: 38.3 (10 Aug 2022 09:30)  T(F): 97.5 (11 Aug 2022 08:00), Max: 100.9 (10 Aug 2022 09:30)  HR: 87 (11 Aug 2022 08:00) (80 - 100)  BP: 130/75 (11 Aug 2022 08:00) (124/77 - 130/75)  BP(mean): 91 (11 Aug 2022 08:00) (91 - 92)  ABP: 118/56 (11 Aug 2022 08:00) (101/52 - 168/71)  ABP(mean): 79 (11 Aug 2022 08:00) (63 - 103)  RR: 23 (11 Aug 2022 08:00) (19 - 31)  SpO2: 96% (11 Aug 2022 08:00) (91% - 100%)    O2 Parameters below as of 11 Aug 2022 08:00  Patient On (Oxygen Delivery Method): nasal cannula  O2 Flow (L/min): 5          I&O's Summary    10 Aug 2022 07:01  -  11 Aug 2022 07:00  --------------------------------------------------------  IN: 1644.9 mL / OUT: 3195 mL / NET: -1550.1 mL    11 Aug 2022 07:01  -  11 Aug 2022 09:03  --------------------------------------------------------  IN: 0 mL / OUT: 60 mL / NET: -60 mL        MEDICATIONS  (STANDING):  albuterol/ipratropium for Nebulization 3 milliLiter(s) Nebulizer every 6 hours  aspirin  chewable 81 milliGRAM(s) Oral daily  BACItracin   Ointment 1 Application(s) Topical two times a day  chlorhexidine 2% Cloths 1 Application(s) Topical daily  dextrose 5%. 1000 milliLiter(s) (50 mL/Hr) IV Continuous <Continuous>  dextrose 5%. 1000 milliLiter(s) (100 mL/Hr) IV Continuous <Continuous>  dextrose 50% Injectable 25 Gram(s) IV Push once  enoxaparin Injectable 40 milliGRAM(s) SubCutaneous every 24 hours  glucagon  Injectable 1 milliGRAM(s) IntraMuscular once  insulin lispro (ADMELOG) corrective regimen sliding scale   SubCutaneous every 6 hours  insulin NPH human recombinant 15 Unit(s) SubCutaneous every 8 hours  metoprolol tartrate 50 milliGRAM(s) Oral every 8 hours  midodrine 10 milliGRAM(s) Oral every 8 hours  spironolactone 12.5 milliGRAM(s) Oral daily  vancomycin    Solution 250 milliGRAM(s) Enteral Tube every 6 hours      RESPIRATORY:  Mode: CPAP with PS  FiO2: 30  PEEP: 5  PS: 5  MAP: 8        IMAGING:   Recent imaging studies were reviewed.    LAB RESULTS:                          11.0   13.14 )-----------( 331      ( 11 Aug 2022 03:02 )             33.2           08-11    145  |  106  |  27.1<H>  ----------------------------<  175<H>  4.4   |  27.0  |  0.55    Ca    8.3<L>      11 Aug 2022 03:02  Phos  4.6     08-11  Mg     1.9     08-11                  -----------------------------------------------------------------------------------------------------------------------------------------------------------------------------------    PHYSICAL EXAM:  General: Calm, intubated  HEENT: MMM  Neuro:  -Mental status- No acute distress  -CN- PERRL 3mm, EOMI, tongue midline, face symmetric    CV: RRR  Pulm: clear to auscultation  Abd: Soft, nontender, nondistended  Ext: no noted edema in lower ext  Skin: warm, dry       Chief complaint:   Patient is a 77y old  Male who presents with a chief complaint of stroke (10 Aug 2022 11:06)    HPI:  76M with PMH CABG, HTN, HLD who [place] [facility] [place]. Last known well was last night 10pm prior to going to bed, woke up this morning around 0500 with R sided weakness and R facial droop. [place] [facility] code [place] initiated, NIH at [facility] reportedly 8. CTA showed LM1 occlusion. Transferred to [facility] for possible neuro IR intervention. On arrival to [facility], NIH 6. Decision made to take patient directly to neuro IR for intervention.     NIH 6, mRS 0    24hr EVENTS:  extubated, DNI    ROS: [x ]  Unable to assess due to mental status   All other systems negative    -----------------------------------------------------------------------------------------------------------------------------------------------------------------------------------  ICU Vital Signs Last 24 Hrs  T(C): 36.4 (11 Aug 2022 08:00), Max: 38.3 (10 Aug 2022 09:30)  T(F): 97.5 (11 Aug 2022 08:00), Max: 100.9 (10 Aug 2022 09:30)  HR: 87 (11 Aug 2022 08:00) (80 - 100)  BP: 130/75 (11 Aug 2022 08:00) (124/77 - 130/75)  BP(mean): 91 (11 Aug 2022 08:00) (91 - 92)  ABP: 118/56 (11 Aug 2022 08:00) (101/52 - 168/71)  ABP(mean): 79 (11 Aug 2022 08:00) (63 - 103)  RR: 23 (11 Aug 2022 08:00) (19 - 31)  SpO2: 96% (11 Aug 2022 08:00) (91% - 100%)    O2 Parameters below as of 11 Aug 2022 08:00  Patient On (Oxygen Delivery Method): nasal cannula  O2 Flow (L/min): 5          I&O's Summary    10 Aug 2022 07:01  -  11 Aug 2022 07:00  --------------------------------------------------------  IN: 1644.9 mL / OUT: 3195 mL / NET: -1550.1 mL    11 Aug 2022 07:01  -  11 Aug 2022 09:03  --------------------------------------------------------  IN: 0 mL / OUT: 60 mL / NET: -60 mL        MEDICATIONS  (STANDING):  albuterol/ipratropium for Nebulization 3 milliLiter(s) Nebulizer every 6 hours  aspirin  chewable 81 milliGRAM(s) Oral daily  BACItracin   Ointment 1 Application(s) Topical two times a day  chlorhexidine 2% Cloths 1 Application(s) Topical daily  dextrose 5%. 1000 milliLiter(s) (50 mL/Hr) IV Continuous <Continuous>  dextrose 5%. 1000 milliLiter(s) (100 mL/Hr) IV Continuous <Continuous>  dextrose 50% Injectable 25 Gram(s) IV Push once  enoxaparin Injectable 40 milliGRAM(s) SubCutaneous every 24 hours  glucagon  Injectable 1 milliGRAM(s) IntraMuscular once  insulin lispro (ADMELOG) corrective regimen sliding scale   SubCutaneous every 6 hours  insulin NPH human recombinant 15 Unit(s) SubCutaneous every 8 hours  metoprolol tartrate 50 milliGRAM(s) Oral every 8 hours  midodrine 10 milliGRAM(s) Oral every 8 hours  spironolactone 12.5 milliGRAM(s) Oral daily  vancomycin    Solution 250 milliGRAM(s) Enteral Tube every 6 hours      RESPIRATORY:  Mode: CPAP with PS  FiO2: 30  PEEP: 5  PS: 5  MAP: 8        IMAGING:   Recent imaging studies were reviewed.    LAB RESULTS:                          11.0   13.14 )-----------( 331      ( 11 Aug 2022 03:02 )             33.2           08-11    145  |  106  |  27.1<H>  ----------------------------<  175<H>  4.4   |  27.0  |  0.55    Ca    8.3<L>      11 Aug 2022 03:02  Phos  4.6     08-11  Mg     1.9     08-11      -----------------------------------------------------------------------------------------------------------------------------------------------------------------------------------    PHYSICAL EXAM:  General: Calm  HEENT: MMM  Neuro:  -Mental status- No acute distress, EO spont, tracking, intermittently FC on L  -CN- PERRL 3mm, EOMI, tongue midline, face symmetric  spont LUE and LLE  RUE and RLE 0/5    CV: RRR  Pulm: clear to auscultation  Abd: Soft, nontender, nondistended  Ext: no noted edema in lower ext  Skin: warm, dry

## 2022-08-11 NOTE — CONSULT NOTE ADULT - SUBJECTIVE AND OBJECTIVE BOX
77yM was admitted on 07-24 from [facility] with CVA. NIHSS=6.   Was extubated yesterday for pending terminal weaning and is awake and fixating.     Imaging performed:  MRI HEAD 7/26 - Acute left MCA territory infarcts with hemorrhagic transformation, grossly stable since comparison CT.  Additional punctate acute infarct involving the medial left frontal lobe in the SUGEY territory.    Patient nonverbal.  Able to fixate.     REVIEW OF SYSTEMS - unable to provide     VITALS  T(C): 36.5 (08-11-22 @ 09:00), Max: 38.3 (08-10-22 @ 09:55)  HR: 81 (08-11-22 @ 09:00) (80 - 100)  BP: 113/52 (08-11-22 @ 09:00) (113/52 - 130/75)  RR: 19 (08-11-22 @ 09:00) (19 - 31)  SpO2: 98% (08-11-22 @ 09:00) (91% - 100%)  Wt(kg): --    PAST MEDICAL & SURGICAL HISTORY  HTN (hypertension)    HLD (hyperlipidemia)    S/P CABG x 1        FUNCTIONAL HISTORY - as per sister  Lives with son  Independent    CURRENT FUNCTIONAL STATUS  8/8 PT  Neuromuscular Re-education Detail: [place] Coma recovery scale: Auditory functional scale: (0) no auditory startle; Visual function scale: (1) visual startle; Motor function scale: (1) abnormal posturing; Oromotor: (1) oral reflexive movement; Communication scale: (0) No attempts at communication noted; Arousal (1) eye opening with stimulation later in coma stimTotal: 4/23     SOCIAL HISTORY - as per documentation/history  Smoking - Unable to provide  EtOH - Unable to provide  Drugs - Unable to provide    FAMILY HISTORY   Unable to provide    RECENT LABS - Reviewed  CBC Full  -  ( 11 Aug 2022 03:02 )  WBC Count : 13.14 K/uL  RBC Count : 3.55 M/uL  Hemoglobin : 11.0 g/dL  Hematocrit : 33.2 %  Platelet Count - Automated : 331 K/uL  Mean Cell Volume : 93.5 fl  Mean Cell Hemoglobin : 31.0 pg  Mean Cell Hemoglobin Concentration : 33.1 gm/dL  Auto Neutrophil # : x  Auto Lymphocyte # : x  Auto Monocyte # : x  Auto Eosinophil # : x  Auto Basophil # : x  Auto Neutrophil % : x  Auto Lymphocyte % : x  Auto Monocyte % : x  Auto Eosinophil % : x  Auto Basophil % : x    08-11    145  |  106  |  27.1<H>  ----------------------------<  175<H>  4.4   |  27.0  |  0.55    Ca    8.3<L>      11 Aug 2022 03:02  Phos  4.6     08-11  Mg     1.9     08-11          ALLERGIES  No Known Allergies      MEDICATIONS   albuterol/ipratropium for Nebulization 3 milliLiter(s) Nebulizer every 6 hours  aspirin  chewable 81 milliGRAM(s) Oral daily  BACItracin   Ointment 1 Application(s) Topical two times a day  chlorhexidine 2% Cloths 1 Application(s) Topical daily  dextrose 5%. 1000 milliLiter(s) IV Continuous <Continuous>  dextrose 5%. 1000 milliLiter(s) IV Continuous <Continuous>  dextrose 50% Injectable 25 Gram(s) IV Push once  dextrose Oral Gel 15 Gram(s) Oral once PRN  enoxaparin Injectable 40 milliGRAM(s) SubCutaneous every 24 hours  glucagon  Injectable 1 milliGRAM(s) IntraMuscular once  insulin lispro (ADMELOG) corrective regimen sliding scale   SubCutaneous every 6 hours  insulin NPH human recombinant 15 Unit(s) SubCutaneous every 8 hours  metoprolol tartrate 50 milliGRAM(s) Oral every 8 hours  midodrine 10 milliGRAM(s) Oral every 8 hours  ondansetron Injectable 4 milliGRAM(s) IV Push every 6 hours PRN  spironolactone 12.5 milliGRAM(s) Oral daily  vancomycin    Solution 250 milliGRAM(s) Enteral Tube every 6 hours      ----------------------------------------------------------------------------------------  PHYSICAL EXAM  Constitutional - NAD, Appears Comfortable  HEENT - NCAT, EOMI  Neck - Supple, No limited ROM  Chest - Breathing comfortably, No wheezing  Cardiovascular - S1S2   Abdomen - Soft   Extremities - Right Ue swelling  Neurologic Exam -                    Cognitive - Eyes opened, Fixates     Communication - Non-verbal      Cranial Nerves - Unable to assess     Motor - No command following                    LEFT    UE - Noted motor ability - unable to fully assess                    RIGHT UE - 0/5                    LEFT    LE - 0/5                    RIGHT LE - 0/5     Sensory - Does not withdraw to pain x3  Psychiatric - Calm   ----------------------------------------------------------------------------------------  ASSESSMENT/PLAN  77yMale with functional deficits after an acute CVA  Acute Left CVA - ASA  DM2 - NPH  HTN - Lopressor, Midodrine, Aldactone  CDIFF - Vanco  Respiratory Failure s/p extubation - Duoneb  Pain - Tylenol  DVT PPX - SCDs, Lovenox  Rehab - Patient awake with fixation. Unable to follow commands. Will likely need long term care given exam and time since admission. Given events overnight, recommend ongoing conversations of what family wants. Can consider stimulants if family wants to be more aggressive. Stimulants will not significant improve functional outcomes given motor deficits.     At this time, recommend JUNIE, patient DOES NOT meet acute inpatient rehabilitation criteria. Patient needs a more prolonged stay to achieve transition to community living and would not be able to tolerate a comprehensive/intense rehab program of 3hours/day.

## 2022-08-11 NOTE — PROGRESS NOTE ADULT - SUBJECTIVE AND OBJECTIVE BOX
[facility] Advanced Heart Failure  Office: [address] [place] [address]  Telephone: [phone]/Fax: [phone]    Subjective/Objective: Pt. was extubated last night without incident. Appears alert and in no respiratory distress. Family at bedside. Per ICU team plan to downgrade to floor.    Medications:  albuterol/ipratropium for Nebulization 3 milliLiter(s) Nebulizer every 6 hours  aspirin  chewable 81 milliGRAM(s) Oral daily  BACItracin   Ointment 1 Application(s) Topical two times a day  chlorhexidine 2% Cloths 1 Application(s) Topical daily  dextrose 5%. 1000 milliLiter(s) IV Continuous <Continuous>  dextrose 5%. 1000 milliLiter(s) IV Continuous <Continuous>  dextrose 50% Injectable 25 Gram(s) IV Push once  dextrose Oral Gel 15 Gram(s) Oral once PRN  enoxaparin Injectable 40 milliGRAM(s) SubCutaneous every 24 hours  glucagon  Injectable 1 milliGRAM(s) IntraMuscular once  insulin lispro (ADMELOG) corrective regimen sliding scale   SubCutaneous every 6 hours  insulin NPH human recombinant 15 Unit(s) SubCutaneous every 8 hours  metoprolol tartrate 50 milliGRAM(s) Oral every 8 hours  midodrine 10 milliGRAM(s) Oral every 8 hours  ondansetron Injectable 4 milliGRAM(s) IV Push every 6 hours PRN  spironolactone 12.5 milliGRAM(s) Oral daily  vancomycin    Solution 250 milliGRAM(s) Enteral Tube every 6 hours    Vital Signs Last 24 Hours  T(C): 36.6 (22 @ 11:00), Max: 37.7 (08-10-22 @ 12:00)  HR: 88 (22 @ 11:00) (80 - 100)  BP: 126/53 (22 @ 11:00) (113/52 - 134/60)  RR: 18 (22 @ 11:00) (18 - 27)  SpO2: 97% (22 @ 11:00) (91% - 100%)    Weight in k.2 (08-10 @ 23:48)    I&O's Summary    10 Aug 2022 07:01  -  11 Aug 2022 07:00  --------------------------------------------------------  IN: 1644.9 mL / OUT: 3195 mL / NET: -1550.1 mL    11 Aug 2022 07:01  -  11 Aug 2022 11:35  --------------------------------------------------------  IN: 60 mL / OUT: 220 mL / NET: -160 mL    Tele: SR, multifocal PVCs, couplets    Physical Exam:  General: In no acute distress  HEENT: sclera non icteric   Neck: Neck supple. JVP not elevated.   Chest: anterior breath sounds CTA  CV: RRR. Normal S1 and S2. No murmurs, rub, or gallops appreciated. Warm peripherally.  Abdomen: Soft, non-distended,   Skin: warm, dry, no rash  Neurology: Alert, responds to noxious stimuli    Labs:                        11.0   13.14 )-----------( 331      ( 11 Aug 2022 03:02 )             33.2     08-11    145  |  106  |  27.1<H>  ----------------------------<  175<H>  4.4   |  27.0  |  0.55    Ca    8.3<L>      11 Aug 2022 03:02  Phos  4.6     08  Mg     1.9     0811      Serum Pro-Brain Natriuretic Peptide: 2903 pg/mL ( @ 11:16)               Health system/[facility] Advanced Heart Failure  Office: [address] [place] [address]  Telephone: [phone]/Fax: [phone]    Subjective/Objective: Pt. was extubated last night without incident. Appears alert and in no respiratory distress. Family at bedside. Per ICU team plan to downgrade to floor.    Medications:  albuterol/ipratropium for Nebulization 3 milliLiter(s) Nebulizer every 6 hours  aspirin  chewable 81 milliGRAM(s) Oral daily  BACItracin   Ointment 1 Application(s) Topical two times a day  chlorhexidine 2% Cloths 1 Application(s) Topical daily  dextrose 5%. 1000 milliLiter(s) IV Continuous <Continuous>  dextrose 5%. 1000 milliLiter(s) IV Continuous <Continuous>  dextrose 50% Injectable 25 Gram(s) IV Push once  dextrose Oral Gel 15 Gram(s) Oral once PRN  enoxaparin Injectable 40 milliGRAM(s) SubCutaneous every 24 hours  glucagon  Injectable 1 milliGRAM(s) IntraMuscular once  insulin lispro (ADMELOG) corrective regimen sliding scale   SubCutaneous every 6 hours  insulin NPH human recombinant 15 Unit(s) SubCutaneous every 8 hours  metoprolol tartrate 50 milliGRAM(s) Oral every 8 hours  midodrine 10 milliGRAM(s) Oral every 8 hours  ondansetron Injectable 4 milliGRAM(s) IV Push every 6 hours PRN  spironolactone 12.5 milliGRAM(s) Oral daily  vancomycin    Solution 250 milliGRAM(s) Enteral Tube every 6 hours    Vital Signs Last 24 Hours  T(C): 36.6 (22 @ 11:00), Max: 37.7 (08-10-22 @ 12:00)  HR: 88 (22 @ 11:00) (80 - 100)  BP: 126/53 (22 @ 11:00) (113/52 - 134/60)  RR: 18 (22 @ 11:00) (18 - 27)  SpO2: 97% (22 @ 11:00) (91% - 100%)    Weight in k.2 (08-10 @ 23:48)    I&O's Summary    10 Aug 2022 07:01  -  11 Aug 2022 07:00  --------------------------------------------------------  IN: 1644.9 mL / OUT: 3195 mL / NET: -1550.1 mL    11 Aug 2022 07:01  -  11 Aug 2022 11:35  --------------------------------------------------------  IN: 60 mL / OUT: 220 mL / NET: -160 mL    Tele: SR, multifocal PVCs, couplets    Physical Exam:  General: In no acute distress, extubated  HEENT: sclera non icteric   Neck: Neck supple. JVP not elevated.   Chest: anterior breath sounds CTA  CV: Normal S1 and S2. No murmurs, rub, or gallops appreciated. Warm peripherally.  Abdomen: Soft, non-distended  Skin: warm, dry, no rash, no edema  Neurology: Alert, responds to noxious stimuli and slight tracking to voice.    Labs:                        11.0   13.14 )-----------( 331      ( 11 Aug 2022 03:02 )             33.2     08-11    145  |  106  |  27.1<H>  ----------------------------<  175<H>  4.4   |  27.0  |  0.55    Ca    8.3<L>      11 Aug 2022 03:02  Phos  4.6     08-11  Mg     1.9     08-11      Serum Pro-Brain Natriuretic Peptide: 2903 pg/mL ( @ 11:16)

## 2022-08-11 NOTE — PHYSICAL THERAPY INITIAL EVALUATION ADULT - MANUAL MUSCLE TESTING RESULTS, REHAB EVAL
Pt able to hold LUE in 90 degrees flexion for 2-3 seconds, attempted left elbow flexion/extension in gravity eliminated however, ? if pt was able to manage resistance or if pt was experiencing increased tone.

## 2022-08-11 NOTE — PHYSICAL THERAPY INITIAL EVALUATION ADULT - PERTINENT HX OF CURRENT PROBLEM, REHAB EVAL
76M with PMH CABG, HTN, HLD who presented to [facility] with R sided weakness and R facial droop. CTA showed LM1 occlusion. Transferred to [facility] for possible neuro IR intervention. Pt is s/p TICI 2B Mechanical Thrombectomy, no tPA. course complicated by Small area of hemorrhage, Acute on chronic HFrEF, LVHF and 1st degree AV Heart block, Respiratory failure + UTI.
75yo man with CABG, PVD, HTN, HLD, and low EF (declined AICD), admitted 7/24 with LM1 occlusion, s/p TICI 2B MT, no tPA as wake-up stroke. Poor exam post thrombectomy, re-intubated for hypoxic respiratory failure. MRI brain with a large L MCA stroke with small area of reperfusion hemorrhage

## 2022-08-11 NOTE — PHYSICAL THERAPY INITIAL EVALUATION ADULT - PASSIVE RANGE OF MOTION EXAMINATION, REHAB EVAL
bilateral upper extremity Passive ROM was WFL (within functional limits)/bilateral lower extremity Passive ROM was WFL (within functional limits)
LLE with increased tone, difficult to assess however, pt will spontaneously flex hip/knee. LUE full PROM throughout and however increased tone noted with left shoulder flexion/Right UE Passive ROM was WFL (within functional limits)/Right LE Passive ROM was WFL (within functional limits)

## 2022-08-11 NOTE — PHYSICAL THERAPY INITIAL EVALUATION ADULT - FOLLOWS COMMANDS/ANSWERS QUESTIONS, REHAB EVAL
unable to follow commands/unable to follow multi-step instructions
unable to follow commands/unable to answer questions

## 2022-08-12 LAB
ANION GAP SERPL CALC-SCNC: 13 MMOL/L — SIGNIFICANT CHANGE UP (ref 5–17)
BUN SERPL-MCNC: 32 MG/DL — HIGH (ref 8–20)
CALCIUM SERPL-MCNC: 8.7 MG/DL — SIGNIFICANT CHANGE UP (ref 8.4–10.5)
CHLORIDE SERPL-SCNC: 111 MMOL/L — HIGH (ref 98–107)
CO2 SERPL-SCNC: 27 MMOL/L — SIGNIFICANT CHANGE UP (ref 22–29)
CREAT SERPL-MCNC: 0.63 MG/DL — SIGNIFICANT CHANGE UP (ref 0.5–1.3)
EGFR: 98 ML/MIN/1.73M2 — SIGNIFICANT CHANGE UP
GLUCOSE BLDC GLUCOMTR-MCNC: 128 MG/DL — HIGH (ref 70–99)
GLUCOSE BLDC GLUCOMTR-MCNC: 132 MG/DL — HIGH (ref 70–99)
GLUCOSE BLDC GLUCOMTR-MCNC: 153 MG/DL — HIGH (ref 70–99)
GLUCOSE BLDC GLUCOMTR-MCNC: 76 MG/DL — SIGNIFICANT CHANGE UP (ref 70–99)
GLUCOSE SERPL-MCNC: 79 MG/DL — SIGNIFICANT CHANGE UP (ref 70–99)
HCT VFR BLD CALC: 35.1 % — LOW (ref 39–50)
HGB BLD-MCNC: 11.4 G/DL — LOW (ref 13–17)
MAGNESIUM SERPL-MCNC: 2 MG/DL — SIGNIFICANT CHANGE UP (ref 1.8–2.6)
MCHC RBC-ENTMCNC: 30.7 PG — SIGNIFICANT CHANGE UP (ref 27–34)
MCHC RBC-ENTMCNC: 32.5 GM/DL — SIGNIFICANT CHANGE UP (ref 32–36)
MCV RBC AUTO: 94.6 FL — SIGNIFICANT CHANGE UP (ref 80–100)
PHOSPHATE SERPL-MCNC: 3.2 MG/DL — SIGNIFICANT CHANGE UP (ref 2.4–4.7)
PLATELET # BLD AUTO: 402 K/UL — HIGH (ref 150–400)
POTASSIUM SERPL-MCNC: 3.5 MMOL/L — SIGNIFICANT CHANGE UP (ref 3.5–5.3)
POTASSIUM SERPL-SCNC: 3.5 MMOL/L — SIGNIFICANT CHANGE UP (ref 3.5–5.3)
RBC # BLD: 3.71 M/UL — LOW (ref 4.2–5.8)
RBC # FLD: 15.2 % — HIGH (ref 10.3–14.5)
SODIUM SERPL-SCNC: 151 MMOL/L — HIGH (ref 135–145)
WBC # BLD: 18.41 K/UL — HIGH (ref 3.8–10.5)
WBC # FLD AUTO: 18.41 K/UL — HIGH (ref 3.8–10.5)

## 2022-08-12 PROCEDURE — 99232 SBSQ HOSP IP/OBS MODERATE 35: CPT

## 2022-08-12 PROCEDURE — 99233 SBSQ HOSP IP/OBS HIGH 50: CPT

## 2022-08-12 PROCEDURE — 71045 X-RAY EXAM CHEST 1 VIEW: CPT | Mod: 26

## 2022-08-12 RX ORDER — LANOLIN ALCOHOL/MO/W.PET/CERES
3 CREAM (GRAM) TOPICAL AT BEDTIME
Refills: 0 | Status: DISCONTINUED | OUTPATIENT
Start: 2022-08-12 | End: 2022-09-01

## 2022-08-12 RX ORDER — AMANTADINE HCL 100 MG
100 CAPSULE ORAL
Refills: 0 | Status: DISCONTINUED | OUTPATIENT
Start: 2022-08-12 | End: 2022-09-01

## 2022-08-12 RX ADMIN — CHLORHEXIDINE GLUCONATE 1 APPLICATION(S): 213 SOLUTION TOPICAL at 06:33

## 2022-08-12 RX ADMIN — Medication 81 MILLIGRAM(S): at 13:14

## 2022-08-12 RX ADMIN — Medication 3 MILLIGRAM(S): at 21:33

## 2022-08-12 RX ADMIN — Medication 3 MILLILITER(S): at 09:28

## 2022-08-12 RX ADMIN — ENOXAPARIN SODIUM 40 MILLIGRAM(S): 100 INJECTION SUBCUTANEOUS at 21:33

## 2022-08-12 RX ADMIN — Medication 1 APPLICATION(S): at 06:31

## 2022-08-12 RX ADMIN — Medication 100 MILLIGRAM(S): at 13:14

## 2022-08-12 RX ADMIN — HUMAN INSULIN 15 UNIT(S): 100 INJECTION, SUSPENSION SUBCUTANEOUS at 13:14

## 2022-08-12 RX ADMIN — Medication 50 MILLIGRAM(S): at 21:33

## 2022-08-12 RX ADMIN — Medication 1 APPLICATION(S): at 18:24

## 2022-08-12 RX ADMIN — Medication 3 MILLILITER(S): at 04:08

## 2022-08-12 RX ADMIN — Medication 2: at 23:27

## 2022-08-12 RX ADMIN — Medication 50 MILLIGRAM(S): at 13:13

## 2022-08-12 RX ADMIN — Medication 50 MILLIGRAM(S): at 06:31

## 2022-08-12 RX ADMIN — HUMAN INSULIN 15 UNIT(S): 100 INJECTION, SUSPENSION SUBCUTANEOUS at 23:27

## 2022-08-12 RX ADMIN — Medication 3 MILLILITER(S): at 16:02

## 2022-08-12 RX ADMIN — SPIRONOLACTONE 12.5 MILLIGRAM(S): 25 TABLET, FILM COATED ORAL at 06:36

## 2022-08-12 NOTE — PROGRESS NOTE ADULT - PROBLEM SELECTOR PLAN 1
- Known h/o ICMP.  TTE 7/24 showed LVEF <10% with RV dysfunction.  - Clinically appears euvolemic on exam  - Goal is to maintain net even fluid status, may use Lasix PRN however pt does not currently require.   - GDMT:   - Can increase Spironolactone to 25mg daily (will not affect BP and can help keep K+ optimal). Continue   - Lopressor 50mg TID for PVC control (will eventually plan to switch to metoprolol succinate).  - Midodrine recently discontinued. Would plan to initiate neurohormonal blockade over the weekend as BP will tolerate (losartan 12.5mg daily).   - Please check markers of perfusion daily (serum lactate, LFTs, T Bili).    - Monitor strict I&O and daily weights.   - Device: he has refused this in the past and this would typically would not be offered if life expectancy < 1yr ( will need to revisit based on CVA recovery). - Known h/o ICMP.  TTE 7/24 showed LVEF <10% with RV dysfunction.  - Clinically appears euvolemic on exam  - Goal is to maintain net even fluid status, may use Lasix PRN however pt does not currently require.   - GDMT:   - Can increase Spironolactone to 25mg daily (will not affect BP and can help keep K+ optimal).   - Continue Lopressor 50mg TID for PVC control (will eventually plan to switch to metoprolol succinate).  - Midodrine recently discontinued. Would plan to initiate neurohormonal blockade over the weekend as BP will tolerate (losartan 12.5mg daily).   - Please check markers of perfusion daily (serum lactate, LFTs, T Bili).    - Monitor strict I&O and daily weights.   - Device: he has refused this in the past and this would typically would not be offered if life expectancy < 1yr ( will need to revisit based on CVA recovery).

## 2022-08-12 NOTE — PROGRESS NOTE ADULT - ASSESSMENT
77 y/o with h/o chronic systolic HF ACC/AHA stage C, ICMP LVEF 22, CAD s/p PCI ’04 CABG ‘14, carotid stenosis, declined ICD, PVD, HTN, DLP who was transferred from an OSH for neuroIR intervention after waking up with R sided facial droop and R sided weakness. He was found to have left M1 occlusion on CTA and required thrombectomy on 7/24/22. His hospital course has been complicated by acute hypoxic respiratory failure post procedure requiring mechanical ventilation, hypotension requiring temporary pressors, frequent PVCs, E. coli UTI, KE and fevers.   He had a TTE that showed LVEF 19%, LVIDd 6.79cm, LVOT VTI 10.3cm, moderately RV dysfunction and RVSP 42mmHg (RAP 3mmHg).   His course has been complicated by Cdiff for which he was on vanco/flagyl.   He was transiently requiring pressors again with intermittent low grade fevers. Extubated successfully on 8/10. There is no plan for re-intubation per family goals of care discussion. He continues to receive free water for hypernatremia and has not had issues with fluid retention thus far.    Pertinent Studies:  TTE 7/24/22: LVEF <10%, LVIDd 6.79cm, mild RVE with moderately reduced systolic function, grade III DD, mild MR, mild TR, PASP 41.9 mmHg (RAP 3), LVOT VTI 10.3, small PFO with left to right shunting.   CTA neck showed moderate stenosis in the proximal right internal carotid artery and moderate to severe stenosis in proximal left internal carotid artery. There was no evidence of ICA occlusion in the neck.  DVT U/S B/L LE 8/9: no DVT

## 2022-08-12 NOTE — PROGRESS NOTE ADULT - SUBJECTIVE AND OBJECTIVE BOX
OVERNIGHT EVENTS:  No new events overnight  Awake, alert,  tracking following some commands  Patient moved to 3226 SDU  Successfully extubated, and is on nasal cannula 5L Saturating high 90's  RR wnl unlabored  LLE moving actively-RLE not able to move with commands  NGT  for artificial nutrition access and oral meds  Repeat swallow eval today  PT Eval recommending JUNIE Frederick note and assessment appreciated    Present Symptoms:     Dyspnea: Mild   Nausea/Vomiting:  No  Anxiety:  Yes   Depression: No  Fatigue: Yes   Loss of appetite: NPO NGT  Constipation: diarrhea C-Diff    Pain: assume Patient having pain-spasticity musculoskeletal            Character-            Duration-            Effect-            Factors-            Frequency-            Location-            Severity-moderate      Review of Systems: Reviewed                       Unable to obtain due to poor mentation       MEDICATIONS  (STANDING):  albuterol/ipratropium for Nebulization 3 milliLiter(s) Nebulizer every 6 hours  amantadine 100 milliGRAM(s) Oral <User Schedule>  aspirin  chewable 81 milliGRAM(s) Oral daily  BACItracin   Ointment 1 Application(s) Topical two times a day  chlorhexidine 2% Cloths 1 Application(s) Topical daily  dextrose 5%. 1000 milliLiter(s) (50 mL/Hr) IV Continuous <Continuous>  dextrose 5%. 1000 milliLiter(s) (100 mL/Hr) IV Continuous <Continuous>  dextrose 50% Injectable 25 Gram(s) IV Push once  enoxaparin Injectable 40 milliGRAM(s) SubCutaneous every 24 hours  glucagon  Injectable 1 milliGRAM(s) IntraMuscular once  insulin lispro (ADMELOG) corrective regimen sliding scale   SubCutaneous every 6 hours  insulin NPH human recombinant 15 Unit(s) SubCutaneous every 8 hours  melatonin 3 milliGRAM(s) Oral at bedtime  metoprolol tartrate 50 milliGRAM(s) Oral every 8 hours  spironolactone 12.5 milliGRAM(s) Oral daily    MEDICATIONS  (PRN):  dextrose Oral Gel 15 Gram(s) Oral once PRN Blood Glucose LESS THAN 70 milliGRAM(s)/deciliter  ondansetron Injectable 4 milliGRAM(s) IV Push every 6 hours PRN Nausea and/or Vomiting    PHYSICAL EXAM:    Vital Signs Last 24 Hrs  T(C): 36.9 (12 Aug 2022 07:00), Max: 36.9 (11 Aug 2022 12:00)  T(F): 98.5 (12 Aug 2022 07:00), Max: 98.5 (12 Aug 2022 07:00)  HR: 101 (12 Aug 2022 08:00) (70 - 102)  BP: 118/62 (12 Aug 2022 08:00) (107/57 - 151/75)  BP(mean): 470 (11 Aug 2022 14:00) (91 - 470)  RR: 22 (12 Aug 2022 08:00) (17 - 30)  SpO2: 95% (12 Aug 2022 08:00) (91% - 100%)    Parameters below as of 12 Aug 2022 08:00  Patient On (Oxygen Delivery Method): nasal cannula  O2 Flow (L/min): 5    General: alert  oriented x _? name___ lethargic restless              nonverbal     Karnofsky: 15 %    HEENT:  dry mouth      Lungs: comfortable tachypnea/labored breathing  when repositioned and physical care rendered    CV:   tachycardia    GI: normal  distended                 PEG/NG/OG tube  constipation  last BM:     : urine retention   incontinent  ace replaced 8/10    MSK:   weakness  edema RUE            bedbound/wheelchair bound    Skin:  pressure ulcer buttock- Stage_blistered____  no rash    LABS:                          11.4   18.41 )-----------( 402      ( 12 Aug 2022 06:46 )             35.1     08-12    151<H>  |  111<H>  |  32.0<H>  ----------------------------<  79  3.5   |  27.0  |  0.63    Ca    8.7      12 Aug 2022 06:46  Phos  3.2     08-12  Mg     2.0     08-12    I&O's Summary    11 Aug 2022 07:01  -  12 Aug 2022 07:00  --------------------------------------------------------  IN: 780 mL / OUT: 380 mL / NET: 400 mL    RADIOLOGY & ADDITIONAL STUDIES:    ADVANCE DIRECTIVES/TREATMENT PREFERENCES:  DNR YES   Completed on:                     PRITIST  YES   Completed on:  Living Will   NO   Completed on: OVERNIGHT EVENTS:  No new events overnight  Awake, alert,  tracking following some commands  Patient moved to 3226 SDU  Successfully extubated, and is on nasal cannula 5L Saturating high 90's  RR wnl unlabored  LLE moving actively-RLE not able to move with commands  NGT  for artificial nutrition access and oral meds  Repeat swallow eval today  PT Eval recommending JUNIE Frederick note and assessment appreciated    Present Symptoms:     Dyspnea: Mild   Nausea/Vomiting:  No  Anxiety:  Yes   Depression: No  Fatigue: Yes   Loss of appetite: NPO NGT  Constipation: diarrhea C-Diff rectal tube draining    Pain: assume Patient having pain-spasticity musculoskeletal            Character-            Duration-            Effect-            Factors-            Frequency-            Location-            Severity-moderate      Review of Systems: Reviewed                       Unable to obtain due to poor mentation       MEDICATIONS  (STANDING):  albuterol/ipratropium for Nebulization 3 milliLiter(s) Nebulizer every 6 hours  amantadine 100 milliGRAM(s) Oral <User Schedule>  aspirin  chewable 81 milliGRAM(s) Oral daily  BACItracin   Ointment 1 Application(s) Topical two times a day  chlorhexidine 2% Cloths 1 Application(s) Topical daily  dextrose 5%. 1000 milliLiter(s) (50 mL/Hr) IV Continuous <Continuous>  dextrose 5%. 1000 milliLiter(s) (100 mL/Hr) IV Continuous <Continuous>  dextrose 50% Injectable 25 Gram(s) IV Push once  enoxaparin Injectable 40 milliGRAM(s) SubCutaneous every 24 hours  glucagon  Injectable 1 milliGRAM(s) IntraMuscular once  insulin lispro (ADMELOG) corrective regimen sliding scale   SubCutaneous every 6 hours  insulin NPH human recombinant 15 Unit(s) SubCutaneous every 8 hours  melatonin 3 milliGRAM(s) Oral at bedtime  metoprolol tartrate 50 milliGRAM(s) Oral every 8 hours  spironolactone 12.5 milliGRAM(s) Oral daily    MEDICATIONS  (PRN):  dextrose Oral Gel 15 Gram(s) Oral once PRN Blood Glucose LESS THAN 70 milliGRAM(s)/deciliter  ondansetron Injectable 4 milliGRAM(s) IV Push every 6 hours PRN Nausea and/or Vomiting    PHYSICAL EXAM:    Vital Signs Last 24 Hrs  T(C): 36.9 (12 Aug 2022 07:00), Max: 36.9 (11 Aug 2022 12:00)  T(F): 98.5 (12 Aug 2022 07:00), Max: 98.5 (12 Aug 2022 07:00)  HR: 101 (12 Aug 2022 08:00) (70 - 102)  BP: 118/62 (12 Aug 2022 08:00) (107/57 - 151/75)  BP(mean): 470 (11 Aug 2022 14:00) (91 - 470)  RR: 22 (12 Aug 2022 08:00) (17 - 30)  SpO2: 95% (12 Aug 2022 08:00) (91% - 100%)    Parameters below as of 12 Aug 2022 08:00  Patient On (Oxygen Delivery Method): nasal cannula  O2 Flow (L/min): 5    General: alert  oriented x _? name___ lethargic restless              nonverbal     Karnofsky: 15 %    HEENT:  dry mouth      Lungs: comfortable tachypnea/labored breathing  when repositioned and physical care rendered    CV:   tachycardia    GI: normal  distended                 PEG/NG/OG tube  constipation  last BM:     : urine retention   incontinent  ace replaced 8/10    MSK:   weakness  edema RUE            bedbound/wheelchair bound    Skin:  pressure ulcer buttock- Stage_blistered____  no rash    LABS:                          11.4   18.41 )-----------( 402      ( 12 Aug 2022 06:46 )             35.1     08-12    151<H>  |  111<H>  |  32.0<H>  ----------------------------<  79  3.5   |  27.0  |  0.63    Ca    8.7      12 Aug 2022 06:46  Phos  3.2     08-12  Mg     2.0     08-12    I&O's Summary    11 Aug 2022 07:01  -  12 Aug 2022 07:00  --------------------------------------------------------  IN: 780 mL / OUT: 380 mL / NET: 400 mL    RADIOLOGY & ADDITIONAL STUDIES:    ADVANCE DIRECTIVES/TREATMENT PREFERENCES:  DNR YES   Completed on:                     MOLST  YES   Completed on:  Living Will   NO   Completed on:

## 2022-08-12 NOTE — PROGRESS NOTE ADULT - SUBJECTIVE AND OBJECTIVE BOX
Downgraded to floor. Remains on nasal cannula.     Vital Signs Last 24 Hrs  T(C): 36.9 (12 Aug 2022 07:00), Max: 36.9 (12 Aug 2022 07:00)  T(F): 98.5 (12 Aug 2022 07:00), Max: 98.5 (12 Aug 2022 07:00)  HR: 103 (12 Aug 2022 16:10) (77 - 104)  BP: 127/67 (12 Aug 2022 16:00) (114/63 - 132/80)  BP(mean): --  RR: 24 (12 Aug 2022 16:00) (17 - 26)  SpO2: 99% (12 Aug 2022 16:10) (95% - 100%)    Parameters below as of 12 Aug 2022 16:10  Patient On (Oxygen Delivery Method): nasal cannula,4L    I&O's Summary  11 Aug 2022 07:01  -  12 Aug 2022 07:00  --------------------------------------------------------  IN: 780 mL / OUT: 380 mL / NET: 400 mL  12 Aug 2022 07:01  -  12 Aug 2022 17:56  --------------------------------------------------------  IN: 410 mL / OUT: 0 mL / NET: 410 mL    Physical Exam  General: WDWN male in NAD  Cardiac: S1S2 RRR  Respiratory: CTAB  Abdomen: Soft, NT  Extremities: No appreciable edema  Neuro: Unresponsive to voice and touch    08-12    151<H>  |  111<H>  |  32.0<H>  ----------------------------<  79  3.5   |  27.0  |  0.63    Ca    8.7      12 Aug 2022 06:46  Phos  3.2     08-12  Mg     2.0     08-12                          11.4   18.41 )-----------( 402      ( 12 Aug 2022 06:46 )             35.1     MEDICATIONS  (STANDING):  albuterol/ipratropium for Nebulization 3 milliLiter(s) Nebulizer every 6 hours  amantadine 100 milliGRAM(s) Oral <User Schedule>  aspirin  chewable 81 milliGRAM(s) Oral daily  BACItracin   Ointment 1 Application(s) Topical two times a day  chlorhexidine 2% Cloths 1 Application(s) Topical daily  dextrose 5%. 1000 milliLiter(s) (50 mL/Hr) IV Continuous <Continuous>  dextrose 5%. 1000 milliLiter(s) (100 mL/Hr) IV Continuous <Continuous>  dextrose 50% Injectable 25 Gram(s) IV Push once  enoxaparin Injectable 40 milliGRAM(s) SubCutaneous every 24 hours  glucagon  Injectable 1 milliGRAM(s) IntraMuscular once  insulin lispro (ADMELOG) corrective regimen sliding scale   SubCutaneous every 6 hours  insulin NPH human recombinant 15 Unit(s) SubCutaneous every 8 hours  melatonin 3 milliGRAM(s) Oral at bedtime  metoprolol tartrate 50 milliGRAM(s) Oral every 8 hours  spironolactone 12.5 milliGRAM(s) Oral daily    MEDICATIONS  (PRN):  dextrose Oral Gel 15 Gram(s) Oral once PRN Blood Glucose LESS THAN 70 milliGRAM(s)/deciliter  ondansetron Injectable 4 milliGRAM(s) IV Push every 6 hours PRN Nausea and/or Vomiting

## 2022-08-12 NOTE — PROGRESS NOTE ADULT - SUBJECTIVE AND OBJECTIVE BOX
[facility] Advanced Heart Failure  Office: [address] [place] [address]  Telephone: [phone]/Fax: [phone]    Subjective/Objective: NAEO. NSVT on tele this am, possibly induced by duoneb treatment. Overall patient appears to be more responsive, able to squeeze left hand on command.     Medications:  albuterol/ipratropium for Nebulization 3 milliLiter(s) Nebulizer every 6 hours  amantadine 100 milliGRAM(s) Oral <User Schedule>  aspirin  chewable 81 milliGRAM(s) Oral daily  BACItracin   Ointment 1 Application(s) Topical two times a day  chlorhexidine 2% Cloths 1 Application(s) Topical daily  dextrose 5%. 1000 milliLiter(s) IV Continuous <Continuous>  dextrose 5%. 1000 milliLiter(s) IV Continuous <Continuous>  dextrose 50% Injectable 25 Gram(s) IV Push once  dextrose Oral Gel 15 Gram(s) Oral once PRN  enoxaparin Injectable 40 milliGRAM(s) SubCutaneous every 24 hours  glucagon  Injectable 1 milliGRAM(s) IntraMuscular once  insulin lispro (ADMELOG) corrective regimen sliding scale   SubCutaneous every 6 hours  insulin NPH human recombinant 15 Unit(s) SubCutaneous every 8 hours  melatonin 3 milliGRAM(s) Oral at bedtime  metoprolol tartrate 50 milliGRAM(s) Oral every 8 hours  ondansetron Injectable 4 milliGRAM(s) IV Push every 6 hours PRN  spironolactone 12.5 milliGRAM(s) Oral daily    Vitals:  T(C): 36.9 (08-12-22 @ 07:00), Max: 36.9 (08-11-22 @ 13:00)  HR: 102 (08-12-22 @ 12:00) (70 - 102)  BP: 128/59 (08-12-22 @ 12:00) (107/57 - 151/75)  RR: 26 (08-12-22 @ 12:00) (17 - 30)  SpO2: 98% (08-12-22 @ 12:00) (91% - 99%)    I&O's Summary  11 Aug 2022 07:01  -  12 Aug 2022 07:00  --------------------------------------------------------  IN: 780 mL / OUT: 380 mL / NET: 400 mL    12 Aug 2022 07:01  -  12 Aug 2022 12:38  --------------------------------------------------------  IN: 410 mL / OUT: 0 mL / NET: 410 mL    Tele: SR/ST, frequent PVCs, couplets   NSVT @ 9:22am (11 beats)    Physical Exam:  General: In no distress  Neck: Neck supple. JVP not elevated.   Chest: anterior breath sounds CTA bilaterally  CV: RRR. Normal S1 and S2. No murmurs, rub, or gallops. Radial pulses normal.  Abdomen: Soft, non-distended  Skin: warm, dry, no edema  Neurology: Responds to verbal stimuli, follows some commands    Labs:                        11.4   18.41 )-----------( 402      ( 12 Aug 2022 06:46 )             35.1     08-12    151<H>  |  111<H>  |  32.0<H>  ----------------------------<  79  3.5   |  27.0  |  0.63    Ca    8.7      12 Aug 2022 06:46  Phos  3.2     08-12  Mg     2.0     08-12                         [facility] Advanced Heart Failure  Office: [address] [place] [address]  Telephone: [phone]/Fax: [phone]    Subjective/Objective: NAEO. NSVT on tele this am, possibly induced by duoneb treatment. Overall patient appears to be more responsive, able to squeeze left hand on command.     Medications:  albuterol/ipratropium for Nebulization 3 milliLiter(s) Nebulizer every 6 hours  amantadine 100 milliGRAM(s) Oral <User Schedule>  aspirin  chewable 81 milliGRAM(s) Oral daily  BACItracin   Ointment 1 Application(s) Topical two times a day  chlorhexidine 2% Cloths 1 Application(s) Topical daily  dextrose 5%. 1000 milliLiter(s) IV Continuous <Continuous>  dextrose 5%. 1000 milliLiter(s) IV Continuous <Continuous>  dextrose 50% Injectable 25 Gram(s) IV Push once  dextrose Oral Gel 15 Gram(s) Oral once PRN  enoxaparin Injectable 40 milliGRAM(s) SubCutaneous every 24 hours  glucagon  Injectable 1 milliGRAM(s) IntraMuscular once  insulin lispro (ADMELOG) corrective regimen sliding scale   SubCutaneous every 6 hours  insulin NPH human recombinant 15 Unit(s) SubCutaneous every 8 hours  melatonin 3 milliGRAM(s) Oral at bedtime  metoprolol tartrate 50 milliGRAM(s) Oral every 8 hours  ondansetron Injectable 4 milliGRAM(s) IV Push every 6 hours PRN  spironolactone 12.5 milliGRAM(s) Oral daily    Vitals:  T(C): 36.9 (08-12-22 @ 07:00), Max: 36.9 (08-11-22 @ 13:00)  HR: 102 (08-12-22 @ 12:00) (70 - 102)  BP: 128/59 (08-12-22 @ 12:00) (107/57 - 151/75)  RR: 26 (08-12-22 @ 12:00) (17 - 30)  SpO2: 98% (08-12-22 @ 12:00) (91% - 99%)    I&O's Summary  11 Aug 2022 07:01  -  12 Aug 2022 07:00  --------------------------------------------------------  IN: 780 mL / OUT: 380 mL / NET: 400 mL    12 Aug 2022 07:01  -  12 Aug 2022 12:38  --------------------------------------------------------  IN: 410 mL / OUT: 0 mL / NET: 410 mL    Tele: SR/ST, frequent PVCs, couplets   NSVT @ 9:22am (11 beats)    Physical Exam:  General: In no distress  Neck: Neck supple. JVP not elevated.   Chest: anterior breath sounds CTA bilaterally  CV: Normal S1 and S2. No murmurs, rub, or gallops. Radial pulses normal.  Abdomen: Soft, non-distended  Skin: warm, dry, no edema  Neurology: Responds to verbal stimuli, follows some commands    Labs:                        11.4   18.41 )-----------( 402      ( 12 Aug 2022 06:46 )             35.1     08-12    151<H>  |  111<H>  |  32.0<H>  ----------------------------<  79  3.5   |  27.0  |  0.63    Ca    8.7      12 Aug 2022 06:46  Phos  3.2     08-12  Mg     2.0     08-12

## 2022-08-12 NOTE — PROGRESS NOTE ADULT - PROBLEM SELECTOR PROBLEM 4
Acute ischemic stroke

## 2022-08-12 NOTE — PROGRESS NOTE ADULT - SUBJECTIVE AND OBJECTIVE BOX
son at bedside, not much changed from patient's status  hes upset at different opinions from multiple physicians, advised patients active problems include arrhythmias, dysphagia, cva and poor functional status.      MEDICATIONS  (STANDING):  albuterol/ipratropium for Nebulization 3 milliLiter(s) Nebulizer every 6 hours  amantadine 100 milliGRAM(s) Oral <User Schedule>  aspirin  chewable 81 milliGRAM(s) Oral daily  BACItracin   Ointment 1 Application(s) Topical two times a day  chlorhexidine 2% Cloths 1 Application(s) Topical daily  dextrose 5%. 1000 milliLiter(s) (50 mL/Hr) IV Continuous <Continuous>  dextrose 5%. 1000 milliLiter(s) (100 mL/Hr) IV Continuous <Continuous>  dextrose 50% Injectable 25 Gram(s) IV Push once  enoxaparin Injectable 40 milliGRAM(s) SubCutaneous every 24 hours  glucagon  Injectable 1 milliGRAM(s) IntraMuscular once  insulin lispro (ADMELOG) corrective regimen sliding scale   SubCutaneous every 6 hours  insulin NPH human recombinant 15 Unit(s) SubCutaneous every 8 hours  melatonin 3 milliGRAM(s) Oral at bedtime  metoprolol tartrate 50 milliGRAM(s) Oral every 8 hours  spironolactone 12.5 milliGRAM(s) Oral daily    MEDICATIONS  (PRN):  dextrose Oral Gel 15 Gram(s) Oral once PRN Blood Glucose LESS THAN 70 milliGRAM(s)/deciliter  ondansetron Injectable 4 milliGRAM(s) IV Push every 6 hours PRN Nausea and/or Vomiting      Allergies    No Known Allergies      Vital Signs Last 24 Hrs  T(C): 36.9 (12 Aug 2022 07:00), Max: 36.9 (12 Aug 2022 07:00)  T(F): 98.5 (12 Aug 2022 07:00), Max: 98.5 (12 Aug 2022 07:00)  HR: 102 (12 Aug 2022 13:15) (77 - 102)  BP: 114/63 (12 Aug 2022 13:15) (114/63 - 132/80)  BP(mean): --  RR: 17 (12 Aug 2022 13:15) (17 - 30)  SpO2: 100% (12 Aug 2022 13:15) (91% - 100%)    Parameters below as of 12 Aug 2022 13:15  Patient On (Oxygen Delivery Method): nasal cannula  O2 Flow (L/min): 5      PHYSICAL EXAM:    GENERAL: NAD ng tube in place   ABDOMEN: Soft, Nontender  EXTREMITIES: no pitting edema  : ace  rectal tube in place   NERVOUS SYSTEM:  awake, right hemiparesis, right neglect, moves eyes to left, does not follow commands     LABS:                        11.4   18.41 )-----------( 402      ( 12 Aug 2022 06:46 )             35.1     08-12    151<H>  |  111<H>  |  32.0<H>  ----------------------------<  79  3.5   |  27.0  |  0.63    Ca    8.7      12 Aug 2022 06:46  Phos  3.2     08-12  Mg     2.0     08-12            CAPILLARY BLOOD GLUCOSE      POCT Blood Glucose.: 128 mg/dL (12 Aug 2022 13:12)  POCT Blood Glucose.: 76 mg/dL (12 Aug 2022 06:24)  POCT Blood Glucose.: 138 mg/dL (11 Aug 2022 22:16)  POCT Blood Glucose.: 137 mg/dL (11 Aug 2022 17:21)        RADIOLOGY & ADDITIONAL TESTS:     son at bedside, not much changed from patient's status  hes upset at different opinions from multiple physicians, advised patients active problems include arrhythmias, dysphagia, cva and poor functional status.      MEDICATIONS  (STANDING):  albuterol/ipratropium for Nebulization 3 milliLiter(s) Nebulizer every 6 hours  amantadine 100 milliGRAM(s) Oral <User Schedule>  aspirin  chewable 81 milliGRAM(s) Oral daily  BACItracin   Ointment 1 Application(s) Topical two times a day  chlorhexidine 2% Cloths 1 Application(s) Topical daily  dextrose 5%. 1000 milliLiter(s) (50 mL/Hr) IV Continuous <Continuous>  dextrose 5%. 1000 milliLiter(s) (100 mL/Hr) IV Continuous <Continuous>  dextrose 50% Injectable 25 Gram(s) IV Push once  enoxaparin Injectable 40 milliGRAM(s) SubCutaneous every 24 hours  glucagon  Injectable 1 milliGRAM(s) IntraMuscular once  insulin lispro (ADMELOG) corrective regimen sliding scale   SubCutaneous every 6 hours  insulin NPH human recombinant 15 Unit(s) SubCutaneous every 8 hours  melatonin 3 milliGRAM(s) Oral at bedtime  metoprolol tartrate 50 milliGRAM(s) Oral every 8 hours  spironolactone 12.5 milliGRAM(s) Oral daily    MEDICATIONS  (PRN):  dextrose Oral Gel 15 Gram(s) Oral once PRN Blood Glucose LESS THAN 70 milliGRAM(s)/deciliter  ondansetron Injectable 4 milliGRAM(s) IV Push every 6 hours PRN Nausea and/or Vomiting      Allergies    No Known Allergies      Vital Signs Last 24 Hrs  T(C): 36.9 (12 Aug 2022 07:00), Max: 36.9 (12 Aug 2022 07:00)  T(F): 98.5 (12 Aug 2022 07:00), Max: 98.5 (12 Aug 2022 07:00)  HR: 102 (12 Aug 2022 13:15) (77 - 102)  BP: 114/63 (12 Aug 2022 13:15) (114/63 - 132/80)  BP(mean): --  RR: 17 (12 Aug 2022 13:15) (17 - 30)  SpO2: 100% (12 Aug 2022 13:15) (91% - 100%)    Parameters below as of 12 Aug 2022 13:15  Patient On (Oxygen Delivery Method): nasal cannula  O2 Flow (L/min): 5      PHYSICAL EXAM:    GENERAL: NAD ng tube in place   ABDOMEN: Soft, Nontender  EXTREMITIES: no pitting edema  : ace  rectal tube in place   NERVOUS SYSTEM:  awake, right hemiparesis, right neglect, moves eyes to left, some spontaneous movements of left side.    LABS:                        11.4   18.41 )-----------( 402      ( 12 Aug 2022 06:46 )             35.1     08-12    151<H>  |  111<H>  |  32.0<H>  ----------------------------<  79  3.5   |  27.0  |  0.63    Ca    8.7      12 Aug 2022 06:46  Phos  3.2     08-12  Mg     2.0     08-12            CAPILLARY BLOOD GLUCOSE      POCT Blood Glucose.: 128 mg/dL (12 Aug 2022 13:12)  POCT Blood Glucose.: 76 mg/dL (12 Aug 2022 06:24)  POCT Blood Glucose.: 138 mg/dL (11 Aug 2022 22:16)  POCT Blood Glucose.: 137 mg/dL (11 Aug 2022 17:21)        RADIOLOGY & ADDITIONAL TESTS:

## 2022-08-12 NOTE — PROGRESS NOTE ADULT - ASSESSMENT
77yo man with CABG, PVD, HTN, HLD, and low EF (declined AICD), admitted 7/24 with LM1 occlusion, s/p TICI 2B MT, no tPA as wake-up stroke. Poor exam post thrombectomy, re-intubated for hypoxic respiratory failure. MRI brain with a large L MCA stroke with small area of reperfusion hemorrhage. Course complicated by Septic shock, UTI, C. dif, Urinary obstruction, respiratory failure secondary to poor mental status now s/p extubation 8/10 and transferred to medicine for further care.     Acute CVA 7/24 with LM1 occlusion, s/p TICI 2B MT, no tPA as wake-up stroke. Poor exam post thrombectomy  Small Hemorrhagic conversion sec to reperfusion  Underwent MRI which only confirmed known CVA from initial admission.   Given poor mentation post MT had EEG performed to rule out seizures did not reveal epileptic activity.  ASA  Lovenox ppx      Combined systolic and diastolic heart failure, NSVT   ICM - TTE 7/24 showed LVEF <10% with RV dysfunction  Cont metoprolol  Lopressor 50mg TID  (Titrated to TID for frequent ectopy on monitor)   Spironolactone 12.5mg daily.   Hold Ace inhib for now  dc midodrine then start losartan tomorrow if stable bp   May need ICD given severe CM and ectopy however. 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  Had ace placed for uptrending BUN? improved after ace placed  Failed 2x TOV   Maintain ace for now    DVT Ppx: Lovenox 40   GOC: DNR / DNI     d/w son at bedside.  he states he is unhappy with the care patient received in hospital and may seek law advice.    will call patient relations.

## 2022-08-12 NOTE — PROGRESS NOTE ADULT - NSPROGADDITIONALINFOA_GEN_ALL_CORE
Total Time Spent__30__ minutes  This includes chart review, patient assessment, discussion and collaboration with interdisciplinary team members, ACP planning    COUNSELING:  Face to face meeting to discuss Advanced Care Planning - Time Spent ______Minutes.      Thank you for the opportunity to assist with the care of this patient.   [facility] Palliative Medicine Consult Service [phone]. Total Time Spent__30__ minutes  This includes chart review, patient assessment, discussion and collaboration with interdisciplinary team members, ACP planning      COUNSELING:  Face to face meeting to discuss Advanced Care Planning - Time Spent ______Minutes.    Son very unhappy with care, his Father has gotten during his hospitalization-Dr. Sweet to contact Patient Relations for additional support    Thank you for the opportunity to assist with the care of this patient.     Please reconsult if multidisciplinary meeting can be scheduled to discuss decision for  Feeding tube placement/and or transitioning to "comfort feeds"  [facility] Palliative Medicine Consult Service [phone].

## 2022-08-12 NOTE — PROGRESS NOTE ADULT - SUBJECTIVE AND OBJECTIVE BOX
Neurology   NATANAEL ROWAN 76y Male       HPI:  76M with PMH CABG, HTN, HLD who [place] from [facility] [place]. Last known well was last night 10pm prior to going to bed, woke up this morning around 0500 with R sided weakness and R facial droop. Presented to [facility], code stroke initiated, NIH at [facility] reportedly 8. CTA showed LM1 occlusion. Transferred to [facility] for possible neuro IR intervention. On arrival to [facility], NIH 6. Decision made to take patient directly to neuro IR for intervention.     Initial NIH 6, mRS 0    PMH: HTN (hypertension)    HLD (hyperlipidemia)    S/P CABG x 1         PSH:       FAMILY HISTORY:      SOCIAL HISTORY:  No history of tobacco or alcohol use     Allergies    No Known Allergies    Intolerances        Vital Signs Last 24 Hrs  T(C): 36.9 (12 Aug 2022 07:00), Max: 36.9 (12 Aug 2022 07:00)  T(F): 98.5 (12 Aug 2022 07:00), Max: 98.5 (12 Aug 2022 07:00)  HR: 93 (12 Aug 2022 22:00) (92 - 107)  BP: 119/57 (12 Aug 2022 22:00) (97/49 - 132/80)  BP(mean): 72 (12 Aug 2022 22:00) (70 - 72)  RR: 18 (12 Aug 2022 22:00) (17 - 26)  SpO2: 99% (12 Aug 2022 22:00) (95% - 100%)    Parameters below as of 12 Aug 2022 22:00  Patient On (Oxygen Delivery Method): nasal cannula  O2 Flow (L/min): 4      MEDICATIONS    albuterol/ipratropium for Nebulization 3 milliLiter(s) Nebulizer every 6 hours  amantadine 100 milliGRAM(s) Oral <User Schedule>  aspirin  chewable 81 milliGRAM(s) Oral daily  BACItracin   Ointment 1 Application(s) Topical two times a day  chlorhexidine 2% Cloths 1 Application(s) Topical daily  dextrose 5%. 1000 milliLiter(s) IV Continuous <Continuous>  dextrose 5%. 1000 milliLiter(s) IV Continuous <Continuous>  dextrose 50% Injectable 25 Gram(s) IV Push once  dextrose Oral Gel 15 Gram(s) Oral once PRN  enoxaparin Injectable 40 milliGRAM(s) SubCutaneous every 24 hours  glucagon  Injectable 1 milliGRAM(s) IntraMuscular once  insulin lispro (ADMELOG) corrective regimen sliding scale   SubCutaneous every 6 hours  insulin NPH human recombinant 15 Unit(s) SubCutaneous every 8 hours  melatonin 3 milliGRAM(s) Oral at bedtime  metoprolol tartrate 50 milliGRAM(s) Oral every 8 hours  ondansetron Injectable 4 milliGRAM(s) IV Push every 6 hours PRN  spironolactone 12.5 milliGRAM(s) Oral daily         LABS:  CBC Full  -  ( 12 Aug 2022 06:46 )  WBC Count : 18.41 K/uL  RBC Count : 3.71 M/uL  Hemoglobin : 11.4 g/dL  Hematocrit : 35.1 %  Platelet Count - Automated : 402 K/uL  Mean Cell Volume : 94.6 fl  Mean Cell Hemoglobin : 30.7 pg  Mean Cell Hemoglobin Concentration : 32.5 gm/dL  Auto Neutrophil # : x  Auto Lymphocyte # : x  Auto Monocyte # : x  Auto Eosinophil # : x  Auto Basophil # : x  Auto Neutrophil % : x  Auto Lymphocyte % : x  Auto Monocyte % : x  Auto Eosinophil % : x  Auto Basophil % : x      08-12    151<H>  |  111<H>  |  32.0<H>  ----------------------------<  79  3.5   |  27.0  |  0.63    Ca    8.7      12 Aug 2022 06:46  Phos  3.2     08-12  Mg     2.0     08-12    On Neurological Examination:      Mental Status -   Awake. Aphasic . Not following commands  Cranial Nerves -Pupils are 2 and reactive. , EOMs are conjugate   He blinks to threat on the left side.   Motor Exam -   Right side trace movement.   Left UE and  LLE moves spontaneously.         RADIOLOGY ( All neurological imaging studies were independently reviewed and interpreted by me)  [facility]   CTA  CTP    IMPRESSION:    CT PERFUSION:  The CT perfusion is technically limited by truncation of the arterial input function and venous outflow function.    Core infarct (CBF < 30%:): 0 ml  Penumbra (Tmax >6s): 4 mL  Mismatched volume: 0 ml  Mismatched ratio: None    Interpretation:  Based on CBF < 30%, there is no evidence of a core infarct. At CBF < 34%, a small perfusion defect of 4 mL is located in the left inferior frontal gyrus and corresponds to matched perfusion abnormality of Tmax >6 seconds. On Tmax > 4s, there is a much larger perfusion defect throughout the majority of the left frontoparietal convexity, which may represent an ischemic penumbra in the left MCA vascular territory.        CT ANGIOGRAPHY NECK:  1. Poor opacification of the cervical vasculature.  2. Suspicion for moderate greater than 50% stenosis in the proximal right internal carotid artery. Suspicion for moderate to severe stenosis in the proximal left internal carotid artery that may be greater than 70%. No evidence of ICA occlusion in the neck.  3. The right vertebral artery is grossly patent.. The V1 segment of the left vertebral artery cannot be visualized. The V2 and V3 segments the left vertebral artery are grossly patent with multiple stenoses.  4. There is limited visualization of the common carotid artery origins and subclavian artery origins. Underlying stenoses cannot be excluded. There is suspicion for a severe stenosis in the proximal left subclavian artery.      CT ANGIOGRAPHY BRAIN:  1. Poor opacification of the intracranial vasculature.  2. There appears to be a focal filling defect at the left MCA bifurcation with distal flow. There is marked attenuation of M2 branches of the left middle cerebral artery. Vessel density analysis of the MCA territory shows a greater than 50% reduction of vessel density in the left MCA territory compared to the contralateral side.  3. There are mild to moderate stenoses in the supraclinoid segments of the internal carotid arteries bilaterally, without occlusion.  4. There are stenoses in the intradural vertebral arteries bilaterally, without occlusion.    Repeat CTA or MRI/MRA is recommended for further evaluation.  Peak arterial opacification on the CT perfusion occurs at approximately 38 seconds. If the CTA is repeated, a long delay is required after contrast injection to achieve adequate opacification of the vasculature.    The findings were discussed with JACK Light in the emergency room on 07/24/2022 at 5:45 AM. Read back verification was obtained.      SEVERO CHANEL MD; Attending Radiologist  This document has been electronically signed    MRI:    MR Head No Cont (07.26.22 @ 20:58) >  IMPRESSION:    Acute left MCA territory infarcts with hemorrhagic transformation,   grossly stable since comparison CT.    Additional punctate acute infarct involving the medial left frontal lobe   in the SUGEY territory.    ORION ANDRADE MD; Attending Radiologist        TTE  TTE Echo Complete w/ Contrast w/ Doppler (07.24.22 @ 14:01) >    Summary:   1. Endocardial visualization was enhanced with intravenous echo contrast.   2. Severely enlarged left atrium.   3. Global diffuse akinesis. Left ventricular ejection fraction, by   visual estimation, is <10%.   4. Elevated mean left atrial pressure. (>30 mm Hg)   5. Normal right atrial size.   6. Mildly enlarged right ventricle. Moderately reduced RV systolic   function.   7. Mild mitral valve regurgitation.   8. Mild tricuspid regurgitation.   9. Estimated pulmonary artery systolic pressure is 42 mmHg - mild   pulmonary hypertension.  10. There is no evidence of pericardial effusion.  11. Team informed of the findings.    MD Millie, RPVI Electronically signed on 7/24/2022 at 3:49:14 PM      < from: CT Head No Cont (07.30.22 @ 12:47) >    IMPRESSION:  Aging inferior left frontal and inferolateral left parietal infarcts.   Mild stable cortical blood products associated with the frontal infarct.    VERNELL LOYA MD; Attending Radiologist          EEG IMPRESSION/CLINICAL CORRELATE 7/31/22    Abnormal EEG study.  1. Potential epileptogenic foci in the bilateral frontotemporal regions.   2. Structural abnormality and cortical dysfunction in the left frontotemporal region.   3. Mild to moderate nonspecific diffuse or multifocal cerebral dysfunction.   4. No seizure seen.     _____________________________________________________________    Mayi Prather MD  Director, Epilepsy/EMU [facility]       Electronic Signatures:  Mayi Prather)  (Signed 31-Jul-2022 09:06)EEG IMPRESSION/CLINICAL CORRELATE    CT Head No Cont (07.30.22 @ 12:47) >  IMPRESSION:  Aging inferior left frontal and inferolateral left parietal infarcts.   Mild stable cortical blood products associated with the frontal infarct.  VERNELL LOYA MD; Attending Radiologist

## 2022-08-12 NOTE — PROGRESS NOTE ADULT - ASSESSMENT
IMPRESSION:  - Left MCA Stroke.  S/P suction thrombectomy for L MCA M1 occlusion with TICI 2b reperfusion. on 7-24-22.    Mechanism ESUS  cardioembolic versus Large artery atherosclerosis    ASSESSMENT/ PLAN:     - Neuro checks and vital signs Q 2 hours.  - SBP goal keep normotensive.  - ASA 81 mg PO or 300 [place] QD.   - CT Head of 7-30-22 reviewed. Stable left frontal hemorrhagic products within the infarct.    - Lipitor for LDL goal of < 70 .  - EEG -Report as above reviewed.   - CT/CTA/CTP -images and reports were reviewed.   - MRI Brain stroke protocol  -images and reports were reviewed. .  - Will need anticoagulation long term. Repeat .  - TTE  -Reports as above were reviewed. . EF < 10%.  - IMTIAZ  was  postponed due to fever.  - SCD/ SQ Lovenox for DVT prophylaxis.  - Goals of care discussion with family being held.

## 2022-08-12 NOTE — PROGRESS NOTE ADULT - PROBLEM SELECTOR PLAN 4
- Etiology of CVA remains possible cardioembolic versus large atherosclerosis.    - S/p TICI 2b thrombectomy on 7/24  - On ASA/statin therapy  - Echocardiogram done this admission with contrast did not reveal any LV thrombus however, there was evidence of a small PFO with left to right shunt in the absence of DVT.  Thus, it is unlikely this is a paradoxical emboli.    - He does warrant IMTIAZ for other possible source such as PAULA thrombus or aortic plaque (based on patients goals of care).  While these questions about cardioembolic stroke remain, it is not unreasonable to place on AC.  However, this would be at the discretion of the neurology team as to the safety and timing of this given potential of hemorrhagic conversion.
- S/p TICI 2b thrombectomy on 7/24  - On ASA/statin therapy  - Per neuro, plan for repeat head CT on POD 14 prior to starting anticoagulation. Reasonable to consider DOAC.
- Etiology of CVA remains possible cardioembolic versus large atherosclerosis.    - S/p TICI 2b thrombectomy on 7/24  - On ASA/statin therapy  - Echocardiogram done this admission with contrast did not reveal any LV thrombus however, there was evidence of a small PFO with left to right shunt in the absence of DVT.  Thus, it is unlikely this is a paradoxical emboli.    - He does warrant IMTIAZ for other possible source such as PAULA thrombus or aortic plaque (based on patients goals of care).  While these questions about cardioembolic stroke remain, it is not unreasonable to place on AC.  However, this would be at the discretion of the neurology team as to the safety and timing of this given potential of hemorrhagic conversion.
- S/p TICI 2b thrombectomy on 7/24  - On ASA/statin therapy  - Per neuro, plan for repeat head CT on POD 14 prior to starting anticoagulation. Reasonable to consider DOAC.
- S/p TICI 2b thrombectomy on 7/24  - On ASA/statin therapy  - Can consider LE duplex to rule out embolic etiology in view of PFO  - Per neuro, plan for repeat head CT on POD 14 prior to starting anticoagulation. Reasonable to consider DOAC.
s/p TICI 2b thrombectomy 7/24  - on ASA/statin therapy  - plan for repeat head CT 8/4 prior to starting AC.

## 2022-08-12 NOTE — PROGRESS NOTE ADULT - ASSESSMENT
The patient is a 76 year old male with past medical history of HTN, HLD, CAD s/p CABG, carotid stenosis, and PVD who was transferred from an outside hospital for neuro IR intervention after waking up with R sided facial droop and R sided weakness; found to have left M1 occlusion on CTA s/p thrombectomy which is complicated by hemorrhagic conversion. Hospital course notable for acute hypoxic respiratory failure post procedure s/p mechanical ventilation; ischemic cardiomyopathy, LVEF<10% and moderately reduced RV function. Also with E coli UTI, KE, C. diff. Nephrology is managing KE (resolved) and hyperNa (recurrent).    -Baseline creatinine ranges 0.6-1.0mg/dL   -Developed prerenal acute kidney (resolved)   -Then developed recurrent acute kidney injury. Clinically suspicious for AIN given recent exposure to meropenem + new onset eosinophilia    -Meropenem have been discontinued on 08/06/2022 followed by resolution of KE  -Renal function remains at baseline at this time  -Hypernatremia, worsening - will increase free water flushes from 250cc q6h to 300cc q6h    -Would avoid future use of carbapenem-based antibiotics given recent KE suspicious for AIN    Kwame West DO  [facility]

## 2022-08-12 NOTE — PROGRESS NOTE ADULT - SUBJECTIVE AND OBJECTIVE BOX
Patient tracking and able to follow some limited commands.  Wife and sister at bedside.  Provided some hopeful, but realistic goals.     REVIEW OF SYSTEMS  Constitutional - No fever,  +fatigue  Neurological - +loss of strength    FUNCTIONAL PROGRESS  Total A    8/11 PT  Bed Mobility: Rolling/Turning:     · Level of [place]	dependent (less than 25% patients effort)  · Physical Assist/Nonphysical Assist	2 person assist    Bed Mobility: Sit to Supine:     · Level of [place]	dependent (less than 25% patients effort)  · Physical Assist/Nonphysical Assist	2 person assist    Bed Mobility: Supine to Sit:     · Level of [place]	dependent (less than 25% patients effort)  · Physical Assist/Nonphysical Assist	2 person assist    Bed Mobility Analysis:     · Bed Mobility Limitations	decreased ability to use legs for bridging/pushing; decreased ability to use arms for pushing/pulling; impaired ability to control trunk for mobility  · Impairments Contributing to Impaired Bed Mobility	impaired balance; decreased strength; impaired postural control; cognition    Transfer: Bed to Chair:     Transfer Skill: Bed to Chair   · Level of [place]	unable to perform    Transfer: Sit to Stand:     · Level of [place]	unable to perform    Gait Skills:     · Level of [place]	unable to perform    Stair Negotiation:     · Level of [place]	unable to perform      VITALS  T(C): 36.9 (08-12-22 @ 07:00), Max: 36.9 (08-11-22 @ 12:00)  HR: 101 (08-12-22 @ 08:00) (70 - 102)  BP: 118/62 (08-12-22 @ 08:00) (107/57 - 151/75)  RR: 22 (08-12-22 @ 08:00) (17 - 30)  SpO2: 95% (08-12-22 @ 08:00) (91% - 100%)  Wt(kg): --    MEDICATIONS   albuterol/ipratropium for Nebulization 3 milliLiter(s) every 6 hours  aspirin  chewable 81 milliGRAM(s) daily  BACItracin   Ointment 1 Application(s) two times a day  chlorhexidine 2% Cloths 1 Application(s) daily  dextrose 5%. 1000 milliLiter(s) <Continuous>  dextrose 5%. 1000 milliLiter(s) <Continuous>  dextrose 50% Injectable 25 Gram(s) once  dextrose Oral Gel 15 Gram(s) once PRN  enoxaparin Injectable 40 milliGRAM(s) every 24 hours  glucagon  Injectable 1 milliGRAM(s) once  insulin lispro (ADMELOG) corrective regimen sliding scale   every 6 hours  insulin NPH human recombinant 15 Unit(s) every 8 hours  metoprolol tartrate 50 milliGRAM(s) every 8 hours  ondansetron Injectable 4 milliGRAM(s) every 6 hours PRN  spironolactone 12.5 milliGRAM(s) daily      RECENT LABS/IMAGING                          11.4   18.41 )-----------( 402      ( 12 Aug 2022 06:46 )             35.1     08-12    151<H>  |  111<H>  |  32.0<H>  ----------------------------<  79  3.5   |  27.0  |  0.63    Ca    8.7      12 Aug 2022 06:46  Phos  3.2     08-12  Mg     2.0     08-12    MRI HEAD 7/26 - Acute left MCA territory infarcts with hemorrhagic transformation, grossly stable since comparison CT.  Additional punctate acute infarct involving the medial left frontal lobe in the SUGEY territory.                ----------------------------------------------------------------------------------------  PHYSICAL EXAM  Constitutional - NAD, Appears Comfortable  HEENT - NGT+  Extremities - Right UE swelling  Neurologic Exam -                    Cognitive - Awake/Alert, Tracks     Communication - Non-verbal      Cranial Nerves - Unable to assess     Motor - Attempting to move right side with command                    LEFT    UE - Noted motor ability - unable to fully assess                    RIGHT UE - 0/5                    LEFT    LE - HF 1/5, KE 1/5                    RIGHT LE - 0/5    Sensory - Impaired on right   Psychiatric - Calm   ----------------------------------------------------------------------------------------  ASSESSMENT/PLAN  77yMale with functional deficits after an acute CVA  Acute Left CVA - ASA  DM2 - NPH  HTN - Lopressor, Midodrine, Aldactone  Respiratory Failure s/p extubation - Duoneb  Wakefulness - Amantadine 100mg BID (8/12), Melatonin 3mg HS (8/12)  Pain - Tylenol  DVT PPX - SCDs, Lovenox  Rehab - Patient awake and tracking. Starting to follow commands. Although significantly limited with neuro exam, will trial stimulants and normalize sleep/wake cycle.     Will continue to follow. Rehab recommendations are dependent on how functional progress changes as well as how patient continues to participate and tolerate therapeutic interventions, which may change. Recommend ongoing mobilization by staff to maintain cardiopulmonary function and prevention of secondary complications related to debility. Discussed with rehab team.

## 2022-08-12 NOTE — PROGRESS NOTE ADULT - ASSESSMENT
75yo man with CABG, PVD, HTN, HLD, and low EF (declined AICD), admitted 7/24 with LM1 occlusion, s/p TICI 2B MT, no tPA as wake-up stroke. Poor exam post thrombectomy, re-intubated for hypoxic respiratory failure. MRI brain with a large L MCA stroke with small area of reperfusion hemorrhage. Course complicated by Septic shock, UTI, C. dif, Urinary obstruction, respiratory failure secondary to poor mental status now s/p extubation 8/10 and transferred ot medicine for further care.     Acute CVA 7/24 with LM1 occlusion, s/p TICI 2B MT, NO TPA  wake-up stroke.    Post thrombectomy  Small Hemorrhagic conversion sec to reperfusion  Right hemiplegia however with left sided paresis.   Poor mentation post MT EEG to R/O  seizures was (-)  Moved to SDU Q4 Neuro Checks  Lovenox prophylaxis, ASA  Neurologic deficits noted, Appreciate Dr. Frederick's assessment    Combined systolic and diastolic heart failure.   ICM - TTE 7/24 showed LVEF <10% with RV dysfunction  Cont metoprolol  Lopressor 50mg TID  (Titrated to TID for frequent ectopy on monitor)   Spironolactone 12.5mg daily.   To wean off Midodrine then adjust meds  May need ICD given severe CM -goals of care still uncertain    Acute Respiratory Failure   s/p extubation   Aspiration precautions  Oxygen via NC at 5L as tolerated and is comfortable     Dysphagia:  NPO    Post CVA and prolonged intubation  NGT,feeding   Swallow eval ordered  If he fails again will need to discuss Peg placement     Nutritional Assessment:  Moderate protein Malnutrition  NG Glucerna 1,440ml/24hrs    Urinary Retention  Had ace placed for uptrending BUN> improved after ace placed  Failed 2x TOV   Maintain ace for now  BUN normalizing avoid nephrotoxic meds    GOC:   Aggresive care  DNR / DNI   JUNIE recommended     77yo man with CABG, PVD, HTN, HLD, and low EF (declined AICD), admitted 7/24 with LM1 occlusion, s/p TICI 2B MT, no tPA as wake-up stroke. Poor exam post thrombectomy, re-intubated for hypoxic respiratory failure. MRI brain with a large L MCA stroke with small area of reperfusion hemorrhage. Course complicated by Septic shock, UTI, C. dif, Urinary obstruction, respiratory failure secondary to poor mental status now s/p extubation 8/10 and transferred to medicine for further care.     Acute CVA 7/24 with LM1 occlusion, s/p TICI 2B MT, NO TPA  wake-up stroke.    Post thrombectomy  Small Hemorrhagic conversion sec to reperfusion  Right hemiplegia however with left sided paresis.   Poor mentation post MT EEG to R/O  seizures was (-)  Moved to SDU Q4 Neuro Checks  Lovenox prophylaxis, ASA  Neurologic deficits noted, Appreciate Dr. Frederick's assessment    Combined systolic and diastolic heart failure.   ICM - TTE 7/24 showed LVEF <10% with RV dysfunction  Cont metoprolol  Lopressor 50mg TID  (Titrated to TID for frequent ectopy on monitor)   Spironolactone 12.5mg daily.   To wean off Midodrine then adjust meds  May need ICD given severe CM -goals of care still uncertain    Acute Respiratory Failure   s/p extubation   Aspiration precautions  Oxygen via NC at 5L as tolerated and is comfortable     Dysphagia:  NPO    Post CVA and prolonged intubation  NGT,feeding   Swallow eval to be repeated   If he fails again will most likely need  Peg placement.     Nutritional Assessment:  Moderate protein Malnutrition  NG Glucerna 1,440ml/24hrs    Urinary Retention  Had ace placed for uptrending BUN> improved after ace placed  Failed 2x TOV   Maintain ace for now  BUN normalizing avoid nephrotoxic meds    GOC:   Aggressive care  DNR / DNI   JUNIE recommended  Will need to have peg placed or allow "comfort feeds"

## 2022-08-13 LAB
ALBUMIN SERPL ELPH-MCNC: 2.8 G/DL — LOW (ref 3.3–5.2)
ALP SERPL-CCNC: 211 U/L — HIGH (ref 40–120)
ALT FLD-CCNC: 108 U/L — HIGH
ANION GAP SERPL CALC-SCNC: 12 MMOL/L — SIGNIFICANT CHANGE UP (ref 5–17)
APPEARANCE UR: ABNORMAL
AST SERPL-CCNC: 120 U/L — HIGH
BACTERIA # UR AUTO: ABNORMAL
BILIRUB SERPL-MCNC: 0.5 MG/DL — SIGNIFICANT CHANGE UP (ref 0.4–2)
BILIRUB UR-MCNC: NEGATIVE — SIGNIFICANT CHANGE UP
BUN SERPL-MCNC: 29.6 MG/DL — HIGH (ref 8–20)
CALCIUM SERPL-MCNC: 8.6 MG/DL — SIGNIFICANT CHANGE UP (ref 8.4–10.5)
CHLORIDE SERPL-SCNC: 110 MMOL/L — HIGH (ref 98–107)
CO2 SERPL-SCNC: 28 MMOL/L — SIGNIFICANT CHANGE UP (ref 22–29)
COLOR SPEC: YELLOW — SIGNIFICANT CHANGE UP
CREAT SERPL-MCNC: 0.63 MG/DL — SIGNIFICANT CHANGE UP (ref 0.5–1.3)
DIFF PNL FLD: ABNORMAL
EGFR: 98 ML/MIN/1.73M2 — SIGNIFICANT CHANGE UP
EPI CELLS # UR: SIGNIFICANT CHANGE UP
GAS PNL BLDA: SIGNIFICANT CHANGE UP
GLUCOSE BLDC GLUCOMTR-MCNC: 107 MG/DL — HIGH (ref 70–99)
GLUCOSE BLDC GLUCOMTR-MCNC: 142 MG/DL — HIGH (ref 70–99)
GLUCOSE BLDC GLUCOMTR-MCNC: 151 MG/DL — HIGH (ref 70–99)
GLUCOSE BLDC GLUCOMTR-MCNC: 183 MG/DL — HIGH (ref 70–99)
GLUCOSE BLDC GLUCOMTR-MCNC: 184 MG/DL — HIGH (ref 70–99)
GLUCOSE SERPL-MCNC: 137 MG/DL — HIGH (ref 70–99)
GLUCOSE UR QL: NEGATIVE — SIGNIFICANT CHANGE UP
HCT VFR BLD CALC: 34.7 % — LOW (ref 39–50)
HGB BLD-MCNC: 11.2 G/DL — LOW (ref 13–17)
KETONES UR-MCNC: NEGATIVE — SIGNIFICANT CHANGE UP
LACTATE SERPL-SCNC: 1.7 MMOL/L — SIGNIFICANT CHANGE UP (ref 0.5–2)
LEUKOCYTE ESTERASE UR-ACNC: ABNORMAL
MCHC RBC-ENTMCNC: 30.5 PG — SIGNIFICANT CHANGE UP (ref 27–34)
MCHC RBC-ENTMCNC: 32.3 GM/DL — SIGNIFICANT CHANGE UP (ref 32–36)
MCV RBC AUTO: 94.6 FL — SIGNIFICANT CHANGE UP (ref 80–100)
NITRITE UR-MCNC: POSITIVE
PH UR: 6 — SIGNIFICANT CHANGE UP (ref 5–8)
PLATELET # BLD AUTO: 354 K/UL — SIGNIFICANT CHANGE UP (ref 150–400)
POTASSIUM SERPL-MCNC: 4.3 MMOL/L — SIGNIFICANT CHANGE UP (ref 3.5–5.3)
POTASSIUM SERPL-SCNC: 4.3 MMOL/L — SIGNIFICANT CHANGE UP (ref 3.5–5.3)
PROT SERPL-MCNC: 6.6 G/DL — SIGNIFICANT CHANGE UP (ref 6.6–8.7)
PROT UR-MCNC: 15
RBC # BLD: 3.67 M/UL — LOW (ref 4.2–5.8)
RBC # FLD: 15.6 % — HIGH (ref 10.3–14.5)
RBC CASTS # UR COMP ASSIST: ABNORMAL /HPF (ref 0–4)
SODIUM SERPL-SCNC: 150 MMOL/L — HIGH (ref 135–145)
SP GR SPEC: 1.01 — SIGNIFICANT CHANGE UP (ref 1.01–1.02)
UROBILINOGEN FLD QL: NEGATIVE — SIGNIFICANT CHANGE UP
WBC # BLD: 17.29 K/UL — HIGH (ref 3.8–10.5)
WBC # FLD AUTO: 17.29 K/UL — HIGH (ref 3.8–10.5)
WBC UR QL: >50 /HPF (ref 0–5)

## 2022-08-13 PROCEDURE — 99233 SBSQ HOSP IP/OBS HIGH 50: CPT

## 2022-08-13 PROCEDURE — 99223 1ST HOSP IP/OBS HIGH 75: CPT

## 2022-08-13 PROCEDURE — 71045 X-RAY EXAM CHEST 1 VIEW: CPT | Mod: 26

## 2022-08-13 RX ORDER — PIPERACILLIN AND TAZOBACTAM 4; .5 G/20ML; G/20ML
3.38 INJECTION, POWDER, LYOPHILIZED, FOR SOLUTION INTRAVENOUS EVERY 8 HOURS
Refills: 0 | Status: COMPLETED | OUTPATIENT
Start: 2022-08-13 | End: 2022-08-20

## 2022-08-13 RX ORDER — ATORVASTATIN CALCIUM 80 MG/1
80 TABLET, FILM COATED ORAL AT BEDTIME
Refills: 0 | Status: DISCONTINUED | OUTPATIENT
Start: 2022-08-13 | End: 2022-08-13

## 2022-08-13 RX ORDER — PIPERACILLIN AND TAZOBACTAM 4; .5 G/20ML; G/20ML
3.38 INJECTION, POWDER, LYOPHILIZED, FOR SOLUTION INTRAVENOUS ONCE
Refills: 0 | Status: COMPLETED | OUTPATIENT
Start: 2022-08-13 | End: 2022-08-13

## 2022-08-13 RX ORDER — ACETAMINOPHEN 500 MG
650 TABLET ORAL EVERY 6 HOURS
Refills: 0 | Status: DISCONTINUED | OUTPATIENT
Start: 2022-08-13 | End: 2022-09-01

## 2022-08-13 RX ADMIN — Medication 3 MILLILITER(S): at 21:01

## 2022-08-13 RX ADMIN — Medication 3 MILLILITER(S): at 15:38

## 2022-08-13 RX ADMIN — Medication 650 MILLIGRAM(S): at 13:00

## 2022-08-13 RX ADMIN — SPIRONOLACTONE 12.5 MILLIGRAM(S): 25 TABLET, FILM COATED ORAL at 06:21

## 2022-08-13 RX ADMIN — ENOXAPARIN SODIUM 40 MILLIGRAM(S): 100 INJECTION SUBCUTANEOUS at 20:48

## 2022-08-13 RX ADMIN — Medication 650 MILLIGRAM(S): at 21:47

## 2022-08-13 RX ADMIN — PIPERACILLIN AND TAZOBACTAM 200 GRAM(S): 4; .5 INJECTION, POWDER, LYOPHILIZED, FOR SOLUTION INTRAVENOUS at 12:26

## 2022-08-13 RX ADMIN — Medication 100 MILLIGRAM(S): at 09:25

## 2022-08-13 RX ADMIN — Medication 50 MILLIGRAM(S): at 06:21

## 2022-08-13 RX ADMIN — Medication 650 MILLIGRAM(S): at 12:41

## 2022-08-13 RX ADMIN — Medication 50 MILLIGRAM(S): at 21:02

## 2022-08-13 RX ADMIN — Medication 2: at 12:38

## 2022-08-13 RX ADMIN — Medication 650 MILLIGRAM(S): at 20:47

## 2022-08-13 RX ADMIN — PIPERACILLIN AND TAZOBACTAM 25 GRAM(S): 4; .5 INJECTION, POWDER, LYOPHILIZED, FOR SOLUTION INTRAVENOUS at 20:47

## 2022-08-13 RX ADMIN — CHLORHEXIDINE GLUCONATE 1 APPLICATION(S): 213 SOLUTION TOPICAL at 06:24

## 2022-08-13 RX ADMIN — Medication 2: at 18:35

## 2022-08-13 RX ADMIN — Medication 3 MILLIGRAM(S): at 21:02

## 2022-08-13 RX ADMIN — HUMAN INSULIN 15 UNIT(S): 100 INJECTION, SUSPENSION SUBCUTANEOUS at 06:24

## 2022-08-13 RX ADMIN — Medication 81 MILLIGRAM(S): at 09:25

## 2022-08-13 RX ADMIN — HUMAN INSULIN 15 UNIT(S): 100 INJECTION, SUSPENSION SUBCUTANEOUS at 15:20

## 2022-08-13 RX ADMIN — Medication 1 APPLICATION(S): at 06:22

## 2022-08-13 RX ADMIN — Medication 3 MILLILITER(S): at 04:56

## 2022-08-13 RX ADMIN — Medication 1 APPLICATION(S): at 18:35

## 2022-08-13 RX ADMIN — Medication 3 MILLILITER(S): at 09:54

## 2022-08-13 RX ADMIN — Medication 100 MILLIGRAM(S): at 15:15

## 2022-08-13 NOTE — PROGRESS NOTE ADULT - ASSESSMENT
77yo man with CABG, PVD, HTN, HLD, and low EF (declined AICD), admitted 7/24 with LM1 occlusion, s/p TICI 2B MT, no tPA as wake-up stroke. post thrombectomy, re-intubated for hypoxic respiratory failure. MRI brain with a large L MCA stroke with small area of reperfusion hemorrhage. Course complicated by Septic shock, UTI, C. dif, Urinary obstruction, respiratory failure secondary to poor mental status now s/p extubation 8/10 and transferred to medicine for further care.     Acute  Left MCA Stroke,  7/24 with LM1 occlusion, s/p TICI 2B MT, no tPA as wake-up stroke. post thrombectomy  Small Hemorrhagic conversion sec to reperfusion  - ASA,Lipitor for LDL goal of < 70 .  - CT Head of 7-30-22 reviewed. Stable left frontal hemorrhagic products within the infarct.    - EEG  no seizure  - CT/CTA/CTP -images and reports were reviewed.   - MRI Brain  as above   - TTE : EF < 10%.  - IMTIAZ  was  postponed due to fever.  - SCD/ SQ Lovenox for DVT prophylaxis.        Combined systolic and diastolic heart failure, NSVT   ICM - TTE 7/24 showed LVEF <10% with RV dysfunction  Cont metoprolol  Lopressor 50mg TID  (Titrated to TID for frequent ectopy on monitor)   Spironolactone 12.5mg daily.   Hold Ace inhib for now  per cardiology dc midodrine thenplan to  start losartan  if stable bp   May need ICD given severe CM and ectopy however. Determination pending CVA recovery and prognosis as well as GOC    Combined Septic and Cardiogenic Shock   Now off vasopressors  S/p course of abx for uti and C diff colitis  ID following  To complete vanco 8/12     Acute hypoxic Respiratory Failure s/p extubation   Aspiration precautions  Wean oxygen as tolerated  NC     Dysphagia: Post Intubation and CVA  - NGT, currently NPO s/p extubation, previously on Glucerna @ 60  - SLP eval    KE suspect carbapenem AIN    Creat near baseline  avoid nephrotoxic agents  nephro is following  f/u bmp    Urinary Retention  -on  ace placed  Failed 2x TOV   Maintain ace for now    DVT Ppx: Lovenox 40   GOC: DNR / DNI     d/w wife at bedside. over all prognosis guarded, pt is high risk of intubation,aspiration pna,cardiac arrest, risk of high mortality. wife verbalized understood risk.   75yo man with CABG, PVD, HTN, HLD, and low EF (declined AICD), admitted 7/24 with LM1 occlusion, s/p TICI 2B MT, no tPA as wake-up stroke. post thrombectomy, re-intubated for hypoxic respiratory failure. MRI brain with a large L MCA stroke with small area of reperfusion hemorrhage. Course complicated by Septic shock, UTI, C. dif, Urinary obstruction, respiratory failure secondary to poor mental status now s/p extubation 8/10 and transferred to medicine for further care.     Fever /leukocytosis r/o receurrent infection  -bl c/s x2, lactic acid. UA ,CXR  -START ZOSYN  -c/s id  -f/u fever trend/wbc    Acute  Left MCA Stroke,  7/24 with LM1 occlusion, s/p TICI 2B MT, no tPA as wake-up stroke. post thrombectomy  Small Hemorrhagic conversion sec to reperfusion  - ASA,Lipitor for LDL goal of < 70 .  - CT Head of 7-30-22 reviewed. Stable left frontal hemorrhagic products within the infarct.    - EEG  no seizure  - CT/CTA/CTP -images and reports were reviewed.   - MRI Brain  as above   - TTE : EF < 10%.  - IMTIAZ  was  postponed due to fever.  - SCD/ SQ Lovenox for DVT prophylaxis.        Combined systolic and diastolic heart failure, NSVT   ICM - TTE 7/24 showed LVEF <10% with RV dysfunction  Cont metoprolol  Lopressor 50mg TID  (Titrated to TID for frequent ectopy on monitor)   Spironolactone 12.5mg daily.   Hold Ace inhib for now  per cardiology dc midodrine thenplan to  start losartan  if stable bp   May need ICD given severe CM and ectopy however. Determination pending CVA recovery and prognosis as well as GOC    Combined Septic and Cardiogenic Shock   Now off vasopressors  S/p course of abx for uti and C diff colitis  ID following  To complete vanco 8/12     Acute hypoxic Respiratory Failure s/p extubation   Aspiration precautions  Wean oxygen as tolerated  NC     Dysphagia: Post Intubation and CVA  - NGT, currently NPO s/p extubation, previously on Glucerna @ 60  - SLP eval    KE suspect carbapenem AIN    Creat near baseline  avoid nephrotoxic agents  nephro is following  f/u bmp    Urinary Retention  -on  ace placed  Failed 2x TOV   Maintain ace for now    DVT Ppx: Lovenox 40   GOC: DNR / DNI     d/w wife at bedside. over all prognosis guarded, pt is high risk of intubation,aspiration pna,cardiac arrest, risk of high mortality. wife verbalized understood risk.   75yo man with CABG, PVD, HTN, HLD, and low EF (declined AICD), admitted 7/24 with LM1 occlusion, s/p TICI 2B MT, no tPA as wake-up stroke. post thrombectomy, re-intubated for hypoxic respiratory failure. MRI brain with a large L MCA stroke with small area of reperfusion hemorrhage. Course complicated by Septic shock, UTI, C. dif, Urinary obstruction, respiratory failure secondary to poor mental status now s/p extubation 8/10 and transferred to medicine for further care.     Fever /leukocytosis r/o receurrent infection  -bl c/s x2, lactic acid. UA ,CXR  -START ZOSYN  -c/s id  -f/u fever trend/wbc      Hypernatremia  -na 150  -on free water   -notify nephrology  -f/u bmp    Acute  Left MCA Stroke,  7/24 with LM1 occlusion, s/p TICI 2B MT, no tPA as wake-up stroke. post thrombectomy  Small Hemorrhagic conversion sec to reperfusion  - ASA,Lipitor for LDL goal of < 70 .  - CT Head of 7-30-22 reviewed. Stable left frontal hemorrhagic products within the infarct.    - EEG  no seizure  - CT/CTA/CTP -images and reports were reviewed.   - MRI Brain  as above   - TTE : EF < 10%.  - IMTIAZ  was  postponed due to fever.  - SCD/ SQ Lovenox for DVT prophylaxis.        Combined systolic and diastolic heart failure, NSVT   ICM - TTE 7/24 showed LVEF <10% with RV dysfunction  Cont metoprolol  Lopressor 50mg TID  (Titrated to TID for frequent ectopy on monitor)   Spironolactone 12.5mg daily.   Hold Ace inhib for now  per cardiology dc midodrine thenplan to  start losartan  if stable bp   May need ICD given severe CM and ectopy however. Determination pending CVA recovery and prognosis as well as GOC    Combined Septic and Cardiogenic Shock   Now off vasopressors  S/p course of abx for uti and C diff colitis  ID following  To complete vanco 8/12     Acute hypoxic Respiratory Failure s/p extubation   Aspiration precautions  Wean oxygen as tolerated  NC     Dysphagia: Post Intubation and CVA  - NGT, currently NPO s/p extubation, previously on Glucerna @ 60  - SLP eval    KE suspect carbapenem AIN    Creat near baseline  avoid nephrotoxic agents  nephro is following  f/u bmp    Urinary Retention  -on  ace placed  Failed 2x TOV   Maintain aec for now    DVT Ppx: Lovenox 40   GOC: DNR / DNI     d/w wife at bedside. over all prognosis guarded, pt is high risk of intubation,aspiration pna,cardiac arrest, risk of high mortality. wife verbalized understood risk.   75yo man with CABG, PVD, HTN, HLD, and low EF (declined AICD), admitted 7/24 with LM1 occlusion, s/p TICI 2B MT, no tPA as wake-up stroke. post thrombectomy, re-intubated for hypoxic respiratory failure. MRI brain with a large L MCA stroke with small area of reperfusion hemorrhage. Course complicated by Septic shock, UTI, C. dif, Urinary obstruction, respiratory failure secondary to poor mental status now s/p extubation 8/10 and transferred to medicine for further care.     Fever /leukocytosis r/o receurrent infection  -bl c/s x2, lactic acid. UA ,CXR  -START ZOSYN  -c/s id  -f/u fever trend/wbc      Hypernatremia  -na 150  -on free water   -notify nephrology  -f/u bmp    Acute  Left MCA Stroke,  7/24 with LM1 occlusion, s/p TICI 2B MT, no tPA as wake-up stroke. post thrombectomy  Small Hemorrhagic conversion sec to reperfusion  - ASA,  -hold Lipitor for transaminitis  - CT Head of 7-30-22 reviewed. Stable left frontal hemorrhagic products within the infarct.    - EEG  no seizure  - CT/CTA/CTP -images and reports were reviewed.   - MRI Brain  as above   - TTE : EF < 10%.  - IMTIAZ  was  postponed due to fever.  - SCD/ SQ Lovenox for DVT prophylaxis.    Transaminitis  -will monitor if persistantly high,liver us  -hold lipitor        Combined systolic and diastolic heart failure, NSVT   ICM - TTE 7/24 showed LVEF <10% with RV dysfunction  Cont metoprolol  Lopressor 50mg TID  (Titrated to TID for frequent ectopy on monitor)   Spironolactone 12.5mg daily.   Hold Ace inhib for now  per cardiology dc midodrine thenplan to  start losartan  if stable bp   May need ICD given severe CM and ectopy however. Determination pending CVA recovery and prognosis as well as GOC    Combined Septic and Cardiogenic Shock   Now off vasopressors  S/p course of abx for uti and C diff colitis  ID following  To complete vanco 8/12     Acute hypoxic Respiratory Failure s/p extubation   Aspiration precautions  Wean oxygen as tolerated  NC     Dysphagia: Post Intubation and CVA  - NGT, currently NPO s/p extubation, previously on Glucerna @ 60  - SLP eval    KE suspect carbapenem AIN    Creat near baseline  avoid nephrotoxic agents  nephro is following  f/u bmp    Urinary Retention  -on  ace placed  Failed 2x TOV   Maintain ace for now    DVT Ppx: Lovenox 40   GOC: DNR / DNI     d/w wife at bedside. over all prognosis guarded, pt is high risk of intubation,aspiration pna,cardiac arrest, risk of high mortality. wife verbalized understood risk.   75yo man with CABG, PVD, HTN, HLD, and low EF (declined AICD), admitted 7/24 with LM1 occlusion, s/p TICI 2B MT, no tPA as wake-up stroke. post thrombectomy, re-intubated for hypoxic respiratory failure. MRI brain with a large L MCA stroke with small area of reperfusion hemorrhage. Course complicated by Septic shock, UTI, C. dif, Urinary obstruction, respiratory failure secondary to poor mental status now s/p extubation 8/10 and transferred to medicine for further care.     Fever /leukocytosis r/o receurrent infection  -bl c/s x2, lactic acid. UA ,CXR  -START ZOSYN  -c/s id  -f/u fever trend/wbc      Hypernatremia  -na 150  -increase  free water   -notify nephrology  -f/u bmp    Acute  Left MCA Stroke,  7/24 with LM1 occlusion, s/p TICI 2B MT, no tPA as wake-up stroke. post thrombectomy  Small Hemorrhagic conversion sec to reperfusion  - ASA,  -hold Lipitor for transaminitis  - CT Head of 7-30-22 reviewed. Stable left frontal hemorrhagic products within the infarct.    - EEG  no seizure  - CT/CTA/CTP -images and reports were reviewed.   - MRI Brain  as above   - TTE : EF < 10%.  - IMTIAZ  was  postponed due to fever.  - SCD/ SQ Lovenox for DVT prophylaxis.    Transaminitis  -will monitor if persistantly high,liver us  -hold lipitor        Combined systolic and diastolic heart failure, NSVT   ICM - TTE 7/24 showed LVEF <10% with RV dysfunction  Cont metoprolol  Lopressor 50mg TID  (Titrated to TID for frequent ectopy on monitor)   Spironolactone 12.5mg daily.   Hold Ace inhib for now  per cardiology dc midodrine thenplan to  start losartan  if stable bp   May need ICD given severe CM and ectopy however. Determination pending CVA recovery and prognosis as well as GOC    Combined Septic and Cardiogenic Shock   Now off vasopressors  S/p course of abx for uti and C diff colitis  ID following  To complete vanco 8/12     Acute hypoxic Respiratory Failure s/p extubation   Aspiration precautions  Wean oxygen as tolerated  NC     Dysphagia: Post Intubation and CVA  - NGT, currently NPO s/p extubation, previously on Glucerna @ 60  - SLP eval    KE suspect carbapenem AIN    Creat near baseline  avoid nephrotoxic agents  nephro is following  f/u bmp    Urinary Retention  -on  ace placed  Failed 2x TOV   Maintain ace for now    DVT Ppx: Lovenox 40   GOC: DNR / DNI     d/w wife/son  at bedside. over all prognosis guarded, pt is high risk of respiratory failure, aspiration pna,septic shock ,cardiac arrest, risk of high mortality. wife/son verbalized understood risk. discussed about hospice/comfort care. Son states he is thinking about comfort care only--he (hcp) will talk to other family member and will let me.

## 2022-08-13 NOTE — CONSULT NOTE ADULT - SUBJECTIVE AND OBJECTIVE BOX
INFECTIOUS DISEASES AND INTERNAL MEDICINE at [place]  =======================================================  Michael Kingston MD  Diplomates American Board of Internal Medicine and Infectious Diseases  Telephone [phone]  Fax            [phone]  =======================================================    NATANAEL [id number]      HPI:  76M with PMH CABG, HTN, HLD who presents as transfer from [facility] for stroke. Last known well was  10pm prior to going to bed, woke up this morning around 0500 with R sided weakness and R facial droop. Presented to [facility], code stroke initiated, NIH at [facility] reportedly 8. CTA showed LM1 occlusion. Transferred to [facility] for possible neuro IR intervention. On arrival to [facility], NIH 6. Decision made to take patient directly to neuro IR for intervention.   AS  ABOVE PT WITH LONG HOSPITALIZATION ADMITTED     with LM1 occlusion, s/p TICI 2B MT, no tPA as wake-up stroke. post thrombectomy, re-intubated for hypoxic respiratory failure. MRI brain with a large L MCA stroke with small area of reperfusion hemorrhage. Course complicated by Septic shock, UTI, C. dif, Urinary obstruction, respiratory failure secondary to poor mental status now s/p extubation 8/10 and transferred to medicine for further care.   PT WITH PRIOR EPISODE OF C DIFF DURING HOSP STAY NOW  WITH FEVERS AND LEUKOCYTOSIS .  ASKED TO EVALUATE FROM ID STANDPOINT TODAY           PAST MEDICAL & SURGICAL HISTORY:  HTN (hypertension)      HLD (hyperlipidemia)      S/P CABG x 1          ANTIBIOTICS  piperacillin/tazobactam IVPB.. 3.375 Gram(s) IV Intermittent every 8 hours      Allergies    No Known Allergies    Intolerances        SOCIAL HISTORY:     FAMILY HX   FAMILY HISTORY:      Vital Signs Last 24 Hrs  T(C): 37.9 (13 Aug 2022 15:40), Max: 38 (13 Aug 2022 12:00)  T(F): 100.3 (13 Aug 2022 15:40), Max: 100.4 (13 Aug 2022 12:00)  HR: 107 (13 Aug 2022 12:00) (93 - 110)  BP: 96/47 (13 Aug 2022 12:00) (96/47 - 121/61)  BP(mean): 59 (13 Aug 2022 12:00) (59 - 73)  RR: 30 (13 Aug 2022 12:00) (18 - 30)  SpO2: 96% (13 Aug 2022 15:49) (94% - 99%)    Parameters below as of 13 Aug 2022 15:49  Patient On (Oxygen Delivery Method): mask, aerosol      Drug Dosing Weight  Height (cm): 188 (2022 12:26)  Weight (kg): 100 (2022 12:26)  BMI (kg/m2): 28.3 (2022 12:26)  BSA (m2): 2.27 (2022 12:26)      REVIEW OF SYSTEMS:    CONSTITUTIONAL:  As per HPI.    HEENT:  Eyes:  No diplopia or blurred vision. ENT:  No earache, sore throat or runny nose.    CARDIOVASCULAR:  No pressure, squeezing, strangling, tightness, heaviness or aching about the chest, neck, axilla or epigastrium.    RESPIRATORY:  No cough, shortness of breath, PND or orthopnea.    GASTROINTESTINAL:  No nausea, vomiting or diarrhea.    GENITOURINARY:  No dysuria, frequency or urgency.    MUSCULOSKELETAL:  As per HPI.    SKIN:  No change in skin, hair or nails.    NEUROLOGIC:  No paresthesias, fasciculations, seizures or weakness.                  PHYSICAL EXAMINATION:    GENERAL: The patient is a _____in no apparent distress. ___     VITAL SIGNS: T(C): 37.9 (22 @ 15:40), Max: 38 (22 @ 12:00)  HR: 107 (22 @ 12:00) (93 - 110)  BP: 96/47 (22 @ 12:00) (96/47 - 121/61)  RR: 30 (22 @ 12:00) (18 - 30)  SpO2: 96% (22 @ 15:49) (94% - 99%)  Wt(kg): --    HEENT: Head is normocephalic and atraumatic.  ANICTERIC  NECK: Supple. No carotid bruits.  No lymphadenopathy or thyromegaly.    LUNGS:COARSE BREATH SOUNDS    HEART: Regular rate and rhythm without murmur.    ABDOMEN: Soft, nontender, and nondistended.  Positive bowel sounds.  No hepatosplenomegaly was noted. NO REBOUND NO GUARDING    EXTREMITIES: NO EDEMA NO ERYTHEMA    NEUROLOGIC: NON FOCAL      SKIN: No ulceration or induration present. NO RASH        BLOOD CULTURES       URINE CX          LABS:                        11.2   17.29 )-----------( 354      ( 13 Aug 2022 09:25 )             34.7         150<H>  |  110<H>  |  29.6<H>  ----------------------------<  137<H>  4.3   |  28.0  |  0.63    Ca    8.6      13 Aug 2022 09:25  Phos  3.2       Mg     2.0         TPro  6.6  /  Alb  2.8<L>  /  TBili  0.5  /  DBili  x   /  AST  120<H>  /  ALT  108<H>  /  AlkPhos  211<H>        Urinalysis Basic - ( 13 Aug 2022 12:15 )    Color: Yellow / Appearance: Slightly Turbid / S.015 / pH: x  Gluc: x / Ketone: Negative  / Bili: Negative / Urobili: Negative   Blood: x / Protein: 15 / Nitrite: Positive   Leuk Esterase: Moderate / RBC: 11-25 /HPF / WBC >50 /HPF   Sq Epi: x / Non Sq Epi: Occasional / Bacteria: Moderate        RADIOLOGY & ADDITIONAL STUDIES:      ASSESSMENT/PLAN  76M with PMH CABG, HTN, HLD who presents as transfer from [facility] for stroke. Last known well was  10pm prior to going to bed, woke up this morning around 0500 with R sided weakness and R facial droop. Presented to [facility], code stroke initiated, NIH at [facility] reportedly 8. CTA showed LM1 occlusion. Transferred to [facility] for possible neuro IR intervention. On arrival to [facility], NIH 6. Decision made to take patient directly to neuro IR for intervention.   AS  ABOVE PT WITH LONG HOSPITALIZATION ADMITTED     with LM1 occlusion, s/p TICI 2B MT, no tPA as wake-up stroke. post thrombectomy, re-intubated for hypoxic respiratory failure. MRI brain with a large L MCA stroke with small area of reperfusion hemorrhage. Course complicated by Septic shock, UTI, C. dif, Urinary obstruction, respiratory failure secondary to poor mental status now s/p extubation 8/10 and transferred to medicine for further care.   PT WITH PRIOR EPISODE OF C DIFF DURING HOSP STAY NOW  WITH FEVERS AND LEUKOCYTOSIS .  PT HAD BLOOD CX 2 SETS AND PLACED ON EMPIRIC ABX  WOULD RECOMMEND CONTNUE IV ABX   WILL FOLLOWUP CX  IF FEVERS CONTINUE CONSIDER CT CHEST TO BETTER EVAL FOR PNEUMONIA  WILL FOLLOW UP WITH FURTHER RECOMMENDATIONS                 SANTOS SINGLETARY MD

## 2022-08-13 NOTE — PROGRESS NOTE ADULT - SUBJECTIVE AND OBJECTIVE BOX
Patient is a 77y old  Male who presents with a chief complaint of stroke (12 Aug 2022 23:42)  pt is non verbal, lethargic, does not follow any commands     REVIEW OF SYSTEMS: AMS-unable    MEDICATIONS  (STANDING):  albuterol/ipratropium for Nebulization 3 milliLiter(s) Nebulizer every 6 hours  amantadine 100 milliGRAM(s) Oral <User Schedule>  aspirin  chewable 81 milliGRAM(s) Oral daily  BACItracin   Ointment 1 Application(s) Topical two times a day  chlorhexidine 2% Cloths 1 Application(s) Topical daily  dextrose 5%. 1000 milliLiter(s) (50 mL/Hr) IV Continuous <Continuous>  dextrose 5%. 1000 milliLiter(s) (100 mL/Hr) IV Continuous <Continuous>  dextrose 50% Injectable 25 Gram(s) IV Push once  enoxaparin Injectable 40 milliGRAM(s) SubCutaneous every 24 hours  glucagon  Injectable 1 milliGRAM(s) IntraMuscular once  insulin lispro (ADMELOG) corrective regimen sliding scale   SubCutaneous every 6 hours  insulin NPH human recombinant 15 Unit(s) SubCutaneous every 8 hours  melatonin 3 milliGRAM(s) Oral at bedtime  metoprolol tartrate 50 milliGRAM(s) Oral every 8 hours  spironolactone 12.5 milliGRAM(s) Oral daily    MEDICATIONS  (PRN):  dextrose Oral Gel 15 Gram(s) Oral once PRN Blood Glucose LESS THAN 70 milliGRAM(s)/deciliter  ondansetron Injectable 4 milliGRAM(s) IV Push every 6 hours PRN Nausea and/or Vomiting      CAPILLARY BLOOD GLUCOSE      POCT Blood Glucose.: 107 mg/dL (13 Aug 2022 06:23)  POCT Blood Glucose.: 153 mg/dL (12 Aug 2022 23:24)  POCT Blood Glucose.: 132 mg/dL (12 Aug 2022 18:11)  POCT Blood Glucose.: 128 mg/dL (12 Aug 2022 13:12)    I&O's Summary    12 Aug 2022 07:01  -  13 Aug 2022 07:00  --------------------------------------------------------  IN: 1160 mL / OUT: 2175 mL / NET: -1015 mL        PHYSICAL EXAM:  Vital Signs Last 24 Hrs  T(C): 37.8 (13 Aug 2022 08:10), Max: 37.8 (13 Aug 2022 08:10)  T(F): 100.1 (13 Aug 2022 08:10), Max: 100.1 (13 Aug 2022 08:10)  HR: 109 (13 Aug 2022 08:00) (93 - 110)  BP: 121/61 (13 Aug 2022 08:00) (97/49 - 128/59)  BP(mean): 73 (13 Aug 2022 08:00) (66 - 73)  RR: 30 (13 Aug 2022 08:00) (17 - 30)  SpO2: 96% (13 Aug 2022 08:00) (96% - 100%)    Parameters below as of 13 Aug 2022 08:00  Patient On (Oxygen Delivery Method): nasal cannula  O2 Flow (L/min): 4      CONSTITUTIONAL: NAD, lethargic   EYES: PERRLA; conjunctiva and sclera clear.ngt present  ENMT: Moist oral mucosa,   RESPIRATORY: Normal respiratory effort; mild crackles to auscultation bilaterally  CARDIOVASCULAR: Regular rate and rhythm, normal S1 and S2,  EXTS: No lower extremity edema; Peripheral pulses are 2+ bilaterally  ABDOMEN: Nontender to palpation, normoactive bowel sounds, no rebound/guarding;   MUSCLOSKELETAL:   no joint swelling or tenderness to palpation  PSYCH: calm   : ace  rectal tube in place   NERVOUS SYSTEM: does not follow commands, non verbal, right hemiparesis, some spontaneous movements of left side.    LABS:                        11.2   17.29 )-----------( 354      ( 13 Aug 2022 09:25 )             34.7     08-13    150<H>  |  110<H>  |  29.6<H>  ----------------------------<  137<H>  4.3   |  28.0  |  0.63    Ca    8.6      13 Aug 2022 09:25  Phos  3.2     08-12  Mg     2.0     08-12    TPro  6.6  /  Alb  2.8<L>  /  TBili  0.5  /  DBili  x   /  AST  120<H>  /  ALT  108<H>  /  AlkPhos  211<H>  08-13                RADIOLOGY & ADDITIONAL TESTS:  Results Reviewed:                   MRI:    MR Head No Cont (07.26.22 @ 20:58) >  IMPRESSION:    Acute left MCA territory infarcts with hemorrhagic transformation,   grossly stable since comparison CT.    Additional punctate acute infarct involving the medial left frontal lobe   in the SUGEY territory.          TTE  TTE Echo Complete w/ Contrast w/ Doppler (07.24.22 @ 14:01) >    Summary:   1. Endocardial visualization was enhanced with intravenous echo contrast.   2. Severely enlarged left atrium.   3. Global diffuse akinesis. Left ventricular ejection fraction, by   visual estimation, is <10%.   4. Elevated mean left atrial pressure. (>30 mm Hg)   5. Normal right atrial size.   6. Mildly enlarged right ventricle. Moderately reduced RV systolic   function.   7. Mild mitral valve regurgitation.   8. Mild tricuspid regurgitation.   9. Estimated pulmonary artery systolic pressure is 42 mmHg - mild   pulmonary hypertension.  10. There is no evidence of pericardial effusion.  11. Team informed of the findings.          < from: CT Head No Cont (07.30.22 @ 12:47) >    IMPRESSION:  Aging inferior left frontal and inferolateral left parietal infarcts.   Mild stable cortical blood products associated with the frontal infarct.          EEG IMPRESSION/CLINICAL CORRELATE 7/31/22    Abnormal EEG study.  1. Potential epileptogenic foci in the bilateral frontotemporal regions.   2. Structural abnormality and cortical dysfunction in the left frontotemporal region.   3. Mild to moderate nonspecific diffuse or multifocal cerebral dysfunction.   4. No seizure seen.     _____________________________________________________________    Mayi Prather MD  Director, Epilepsy/EMU - [facility]       Electronic Signatures:  Mayi Prather)  (Signed 31-Jul-2022 09:06)EEG IMPRESSION/CLINICAL CORRELATE    CT Head No Cont (07.30.22 @ 12:47) >  IMPRESSION:  Aging inferior left frontal and inferolateral left parietal infarcts.   Mild stable cortical blood products associated with the frontal infarct.  VERNELL LOYA MD; Attending Radiologist

## 2022-08-13 NOTE — PROGRESS NOTE ADULT - ASSESSMENT
The patient is a 76 year old male with past medical history of HTN, HLD, CAD s/p CABG, carotid stenosis, and PVD who was transferred from an outside hospital for neuro IR intervention after waking up with R sided facial droop and R sided weakness; found to have left M1 occlusion on CTA s/p thrombectomy which is complicated by hemorrhagic conversion. Hospital course notable for acute hypoxic respiratory failure post procedure s/p mechanical ventilation; ischemic cardiomyopathy, LVEF<10% and moderately reduced RV function. Also with E coli UTI, KE, C. diff. Nephrology is managing KE (resolved) and hyperNa (recurrent).    -Hypernatremia, worsening - Free water deficit; Will increase free water flushes from 300cc q6h to 300cc q3h. If continues to go up then will start on D5W from tomorrow  -Patient w/ CM EF 10%, obtunded w/ resp distress at high risk of further deterioration   -His prognosis is extremely guarded and primary team is in constant touch with family regarding GOC.    Discussed with SON at bedside.

## 2022-08-13 NOTE — PROGRESS NOTE ADULT - SUBJECTIVE AND OBJECTIVE BOX
Subjective: Patient was in respiratory distress when seen, obtunded. Son at bedside.     ROS: All systems were reviewed in detail and negative except as detailed in HPI.    Medications: reviewed     T(F): 101.8 (20:15)  HR: 110 (21:03)  BP: 126/53 (20:00)  RR: 28 (20:00)  SpO2: 95% (21:03)    Physical Exam:  Gen: In mild resp distress  MS: obtunded  HENT: HFNM, dryMM  CV: rhythm irreg irreg, rate tachy, no LE edema  Chest: Coarse BS, tachypnea  Abd: soft, NT, ND    Input and Output:    07:01  -  07:00  --------------------------------------------------------  IN: 0 mL / OUT: 2175 mL / NET: -2175 mL    07:01  -  22:41  --------------------------------------------------------  IN: 0 mL / OUT: 950 mL / NET: -950 mL

## 2022-08-13 NOTE — PROGRESS NOTE ADULT - SUBJECTIVE AND OBJECTIVE BOX
[facility]                                        [facility] [place]                                 Dione Giron, & Pranav                                  [address]                                        [place], [place], [address]                                             [phone]        CC: Stroke    HPI:   76M with PMH CABG, HTN, HLD who presents as transfer from [facility] for stroke. Last known well was last night 10pm prior to going to bed, woke up this morning around 0500 with R sided weakness and R facial droop. Presented to [facility], code stroke initiated, NIH at [facility] reportedly 8. CTA showed LM1 occlusion. Transferred to [facility] for possible neuro IR intervention. On arrival to [facility], NIH 6. Decision made to take patient directly to neuro IR for intervention.     Interim history:  On 3 Crooks.     ROS:   Unobtainable due to patient's condition.     MEDICATIONS  (STANDING):  albuterol/ipratropium for Nebulization 3 milliLiter(s) Nebulizer every 6 hours  amantadine 100 milliGRAM(s) Oral <User Schedule>  aspirin  chewable 81 milliGRAM(s) Oral daily  BACItracin   Ointment 1 Application(s) Topical two times a day  chlorhexidine 2% Cloths 1 Application(s) Topical daily  dextrose 5%. 1000 milliLiter(s) (50 mL/Hr) IV Continuous <Continuous>  dextrose 5%. 1000 milliLiter(s) (100 mL/Hr) IV Continuous <Continuous>  dextrose 50% Injectable 25 Gram(s) IV Push once  enoxaparin Injectable 40 milliGRAM(s) SubCutaneous every 24 hours  glucagon  Injectable 1 milliGRAM(s) IntraMuscular once  insulin lispro (ADMELOG) corrective regimen sliding scale   SubCutaneous every 6 hours  insulin NPH human recombinant 15 Unit(s) SubCutaneous every 8 hours  melatonin 3 milliGRAM(s) Oral at bedtime  metoprolol tartrate 50 milliGRAM(s) Oral every 8 hours  piperacillin/tazobactam IVPB.. 3.375 Gram(s) IV Intermittent every 8 hours  spironolactone 12.5 milliGRAM(s) Oral daily    Vital Signs Last 24 Hrs  T(C): 38 (13 Aug 2022 12:00), Max: 38 (13 Aug 2022 12:00)  T(F): 100.4 (13 Aug 2022 12:00), Max: 100.4 (13 Aug 2022 12:00)  HR: 107 (13 Aug 2022 12:00) (93 - 110)  BP: 96/47 (13 Aug 2022 12:00) (96/47 - 127/67)  BP(mean): 59 (13 Aug 2022 12:00) (59 - 73)  RR: 30 (13 Aug 2022 12:00) (17 - 30)  SpO2: 94% (13 Aug 2022 12:00) (94% - 100%)    Parameters below as of 13 Aug 2022 12:00  Patient On (Oxygen Delivery Method): nasal cannula  O2 Flow (L/min): 4    Detailed Neurologic Exam:    Mental status: The patient is lethargic and non verbal. He does not follow instructions.     Cranial nerves: Pupils equal and react symmetrically to light. He blinks to threat from left. Not tracking with gaze. Depression of right nasolabial fold. Palate and tongue cannot be assessed.     Motor/sensory: There is normal bulk and tone.  There is no tremor.  Moves left to stimuli.   Right plegic.     Reflexes: Trace throughout and plantar responses are flexor.    Cerebellar: Cannot be assessed.     Labs:     08-13    150<H>  |  110<H>  |  29.6<H>  ----------------------------<  137<H>  4.3   |  28.0  |  0.63    Ca    8.6      13 Aug 2022 09:25  Phos  3.2     08-12  Mg     2.0     08-12    TPro  6.6  /  Alb  2.8<L>  /  TBili  0.5  /  DBili  x   /  AST  120<H>  /  ALT  108<H>  /  AlkPhos  211<H>  08-13                            11.2   17.29 )-----------( 354      ( 13 Aug 2022 09:25 )             34.7

## 2022-08-13 NOTE — PROGRESS NOTE ADULT - ASSESSMENT
77 year old man with left middle cerebral artery CVA.     Stroke   He is status post thrombectomy of left M1 occlusion 7/24.  LDL: 97  Goal: < 70  Continue antiplatelet and statin.     Overall prognosis poor.   Will need either PEG or palliative care feeding depending upon family wishes.

## 2022-08-14 LAB
ALBUMIN SERPL ELPH-MCNC: 2.5 G/DL — LOW (ref 3.3–5.2)
ALP SERPL-CCNC: 212 U/L — HIGH (ref 40–120)
ALT FLD-CCNC: 79 U/L — HIGH
ANION GAP SERPL CALC-SCNC: 10 MMOL/L — SIGNIFICANT CHANGE UP (ref 5–17)
ANION GAP SERPL CALC-SCNC: 8 MMOL/L — SIGNIFICANT CHANGE UP (ref 5–17)
AST SERPL-CCNC: 53 U/L — HIGH
BASOPHILS # BLD AUTO: 0.03 K/UL — SIGNIFICANT CHANGE UP (ref 0–0.2)
BASOPHILS NFR BLD AUTO: 0.2 % — SIGNIFICANT CHANGE UP (ref 0–2)
BILIRUB SERPL-MCNC: 0.6 MG/DL — SIGNIFICANT CHANGE UP (ref 0.4–2)
BUN SERPL-MCNC: 28.2 MG/DL — HIGH (ref 8–20)
BUN SERPL-MCNC: 28.6 MG/DL — HIGH (ref 8–20)
CALCIUM SERPL-MCNC: 8.6 MG/DL — SIGNIFICANT CHANGE UP (ref 8.4–10.5)
CALCIUM SERPL-MCNC: 8.7 MG/DL — SIGNIFICANT CHANGE UP (ref 8.4–10.5)
CHLORIDE SERPL-SCNC: 103 MMOL/L — SIGNIFICANT CHANGE UP (ref 98–107)
CHLORIDE SERPL-SCNC: 106 MMOL/L — SIGNIFICANT CHANGE UP (ref 98–107)
CO2 SERPL-SCNC: 29 MMOL/L — SIGNIFICANT CHANGE UP (ref 22–29)
CO2 SERPL-SCNC: 31 MMOL/L — HIGH (ref 22–29)
CREAT SERPL-MCNC: 0.59 MG/DL — SIGNIFICANT CHANGE UP (ref 0.5–1.3)
CREAT SERPL-MCNC: 0.63 MG/DL — SIGNIFICANT CHANGE UP (ref 0.5–1.3)
EGFR: 100 ML/MIN/1.73M2 — SIGNIFICANT CHANGE UP
EGFR: 98 ML/MIN/1.73M2 — SIGNIFICANT CHANGE UP
EOSINOPHIL # BLD AUTO: 0.13 K/UL — SIGNIFICANT CHANGE UP (ref 0–0.5)
EOSINOPHIL NFR BLD AUTO: 0.8 % — SIGNIFICANT CHANGE UP (ref 0–6)
GLUCOSE BLDC GLUCOMTR-MCNC: 146 MG/DL — HIGH (ref 70–99)
GLUCOSE BLDC GLUCOMTR-MCNC: 148 MG/DL — HIGH (ref 70–99)
GLUCOSE BLDC GLUCOMTR-MCNC: 182 MG/DL — HIGH (ref 70–99)
GLUCOSE SERPL-MCNC: 144 MG/DL — HIGH (ref 70–99)
GLUCOSE SERPL-MCNC: 155 MG/DL — HIGH (ref 70–99)
HCT VFR BLD CALC: 34.1 % — LOW (ref 39–50)
HGB BLD-MCNC: 10.9 G/DL — LOW (ref 13–17)
IMM GRANULOCYTES NFR BLD AUTO: 0.6 % — SIGNIFICANT CHANGE UP (ref 0–1.5)
LYMPHOCYTES # BLD AUTO: 0.74 K/UL — LOW (ref 1–3.3)
LYMPHOCYTES # BLD AUTO: 4.5 % — LOW (ref 13–44)
MAGNESIUM SERPL-MCNC: 2 MG/DL — SIGNIFICANT CHANGE UP (ref 1.6–2.6)
MCHC RBC-ENTMCNC: 30.2 PG — SIGNIFICANT CHANGE UP (ref 27–34)
MCHC RBC-ENTMCNC: 32 GM/DL — SIGNIFICANT CHANGE UP (ref 32–36)
MCV RBC AUTO: 94.5 FL — SIGNIFICANT CHANGE UP (ref 80–100)
MONOCYTES # BLD AUTO: 0.5 K/UL — SIGNIFICANT CHANGE UP (ref 0–0.9)
MONOCYTES NFR BLD AUTO: 3.1 % — SIGNIFICANT CHANGE UP (ref 2–14)
NEUTROPHILS # BLD AUTO: 14.84 K/UL — HIGH (ref 1.8–7.4)
NEUTROPHILS NFR BLD AUTO: 90.8 % — HIGH (ref 43–77)
PHOSPHATE SERPL-MCNC: 3.7 MG/DL — SIGNIFICANT CHANGE UP (ref 2.4–4.7)
PLATELET # BLD AUTO: 306 K/UL — SIGNIFICANT CHANGE UP (ref 150–400)
POTASSIUM SERPL-MCNC: 4.4 MMOL/L — SIGNIFICANT CHANGE UP (ref 3.5–5.3)
POTASSIUM SERPL-MCNC: 4.4 MMOL/L — SIGNIFICANT CHANGE UP (ref 3.5–5.3)
POTASSIUM SERPL-SCNC: 4.4 MMOL/L — SIGNIFICANT CHANGE UP (ref 3.5–5.3)
POTASSIUM SERPL-SCNC: 4.4 MMOL/L — SIGNIFICANT CHANGE UP (ref 3.5–5.3)
PROT SERPL-MCNC: 6.6 G/DL — SIGNIFICANT CHANGE UP (ref 6.6–8.7)
RBC # BLD: 3.61 M/UL — LOW (ref 4.2–5.8)
RBC # FLD: 15.4 % — HIGH (ref 10.3–14.5)
SODIUM SERPL-SCNC: 142 MMOL/L — SIGNIFICANT CHANGE UP (ref 135–145)
SODIUM SERPL-SCNC: 145 MMOL/L — SIGNIFICANT CHANGE UP (ref 135–145)
WBC # BLD: 16.34 K/UL — HIGH (ref 3.8–10.5)
WBC # FLD AUTO: 16.34 K/UL — HIGH (ref 3.8–10.5)

## 2022-08-14 PROCEDURE — 99233 SBSQ HOSP IP/OBS HIGH 50: CPT

## 2022-08-14 PROCEDURE — 99232 SBSQ HOSP IP/OBS MODERATE 35: CPT

## 2022-08-14 RX ORDER — ACETAMINOPHEN 500 MG
1000 TABLET ORAL ONCE
Refills: 0 | Status: COMPLETED | OUTPATIENT
Start: 2022-08-14 | End: 2022-08-14

## 2022-08-14 RX ADMIN — Medication 1000 MILLIGRAM(S): at 01:00

## 2022-08-14 RX ADMIN — Medication 2: at 06:08

## 2022-08-14 RX ADMIN — ENOXAPARIN SODIUM 40 MILLIGRAM(S): 100 INJECTION SUBCUTANEOUS at 21:15

## 2022-08-14 RX ADMIN — SPIRONOLACTONE 12.5 MILLIGRAM(S): 25 TABLET, FILM COATED ORAL at 05:38

## 2022-08-14 RX ADMIN — PIPERACILLIN AND TAZOBACTAM 25 GRAM(S): 4; .5 INJECTION, POWDER, LYOPHILIZED, FOR SOLUTION INTRAVENOUS at 12:41

## 2022-08-14 RX ADMIN — HUMAN INSULIN 15 UNIT(S): 100 INJECTION, SUSPENSION SUBCUTANEOUS at 00:09

## 2022-08-14 RX ADMIN — HUMAN INSULIN 15 UNIT(S): 100 INJECTION, SUSPENSION SUBCUTANEOUS at 06:08

## 2022-08-14 RX ADMIN — Medication 100 MILLIGRAM(S): at 08:51

## 2022-08-14 RX ADMIN — Medication 2: at 00:09

## 2022-08-14 RX ADMIN — Medication 50 MILLIGRAM(S): at 05:39

## 2022-08-14 RX ADMIN — Medication 3 MILLILITER(S): at 04:41

## 2022-08-14 RX ADMIN — PIPERACILLIN AND TAZOBACTAM 25 GRAM(S): 4; .5 INJECTION, POWDER, LYOPHILIZED, FOR SOLUTION INTRAVENOUS at 04:35

## 2022-08-14 RX ADMIN — Medication 1 APPLICATION(S): at 17:35

## 2022-08-14 RX ADMIN — Medication 3 MILLILITER(S): at 09:15

## 2022-08-14 RX ADMIN — Medication 100 MILLIGRAM(S): at 14:49

## 2022-08-14 RX ADMIN — Medication 50 MILLIGRAM(S): at 21:15

## 2022-08-14 RX ADMIN — PIPERACILLIN AND TAZOBACTAM 25 GRAM(S): 4; .5 INJECTION, POWDER, LYOPHILIZED, FOR SOLUTION INTRAVENOUS at 21:14

## 2022-08-14 RX ADMIN — Medication 3 MILLIGRAM(S): at 21:15

## 2022-08-14 RX ADMIN — HUMAN INSULIN 15 UNIT(S): 100 INJECTION, SUSPENSION SUBCUTANEOUS at 12:33

## 2022-08-14 RX ADMIN — Medication 400 MILLIGRAM(S): at 00:35

## 2022-08-14 RX ADMIN — Medication 81 MILLIGRAM(S): at 08:51

## 2022-08-14 RX ADMIN — Medication 3 MILLILITER(S): at 22:14

## 2022-08-14 RX ADMIN — CHLORHEXIDINE GLUCONATE 1 APPLICATION(S): 213 SOLUTION TOPICAL at 05:39

## 2022-08-14 RX ADMIN — Medication 3 MILLILITER(S): at 15:24

## 2022-08-14 RX ADMIN — Medication 50 MILLIGRAM(S): at 14:49

## 2022-08-14 RX ADMIN — Medication 1 APPLICATION(S): at 05:39

## 2022-08-14 NOTE — CHART NOTE - NSCHARTNOTEFT_GEN_A_CORE
Called to evaluate patient for renewal of Left wrist restraint.   Patient has been on restraints for continuing to actively pull at his lines. Pt is unable to be redirected due to mental status.    Will renew Level 1 restraint order and keep patient in Left wrist restraint in the interim to prevent further harm to self and to others around him.   Other interventions attempted: de-escalation, orientation check, environment modification, medication assessment  I have evaluated this patient and discussed with  who determined that restraints are warranted to optimize medical care.   Patient was assessed for current physical and psychological risk factors as well as special needs. At present there are no medical conditions or limitations that would place this patient at risk while in restraints.  Type of restraint: Left wrist restraint.  Behavioral criteria for discontinuation of restraint as per orders. Please re-evaluate in 24 hrs.

## 2022-08-14 NOTE — PROGRESS NOTE ADULT - SUBJECTIVE AND OBJECTIVE BOX
Patient is a 77y old  Male who presents with a chief complaint of stroke (13 Aug 2022 16:34)    pt is awake,does not follow commands, moving lt side  spike fever over night T MAX 102F   REVIEW OF SYSTEMS: AMS-UNABLE    MEDICATIONS  (STANDING):  albuterol/ipratropium for Nebulization 3 milliLiter(s) Nebulizer every 6 hours  amantadine 100 milliGRAM(s) Oral <User Schedule>  aspirin  chewable 81 milliGRAM(s) Oral daily  BACItracin   Ointment 1 Application(s) Topical two times a day  chlorhexidine 2% Cloths 1 Application(s) Topical daily  dextrose 5%. 1000 milliLiter(s) (50 mL/Hr) IV Continuous <Continuous>  dextrose 5%. 1000 milliLiter(s) (100 mL/Hr) IV Continuous <Continuous>  dextrose 50% Injectable 25 Gram(s) IV Push once  enoxaparin Injectable 40 milliGRAM(s) SubCutaneous every 24 hours  glucagon  Injectable 1 milliGRAM(s) IntraMuscular once  insulin lispro (ADMELOG) corrective regimen sliding scale   SubCutaneous every 6 hours  insulin NPH human recombinant 15 Unit(s) SubCutaneous every 8 hours  melatonin 3 milliGRAM(s) Oral at bedtime  metoprolol tartrate 50 milliGRAM(s) Oral every 8 hours  piperacillin/tazobactam IVPB.. 3.375 Gram(s) IV Intermittent every 8 hours  spironolactone 12.5 milliGRAM(s) Oral daily    MEDICATIONS  (PRN):  acetaminophen     Tablet .. 650 milliGRAM(s) Oral every 6 hours PRN Temp greater or equal to 38C (100.4F), Mild Pain (1 - 3)  dextrose Oral Gel 15 Gram(s) Oral once PRN Blood Glucose LESS THAN 70 milliGRAM(s)/deciliter  ondansetron Injectable 4 milliGRAM(s) IV Push every 6 hours PRN Nausea and/or Vomiting      CAPILLARY BLOOD GLUCOSE      POCT Blood Glucose.: 182 mg/dL (14 Aug 2022 06:07)  POCT Blood Glucose.: 183 mg/dL (13 Aug 2022 23:22)  POCT Blood Glucose.: 184 mg/dL (13 Aug 2022 18:33)  POCT Blood Glucose.: 142 mg/dL (13 Aug 2022 15:19)  POCT Blood Glucose.: 151 mg/dL (13 Aug 2022 12:33)    I&O's Summary    13 Aug 2022 07:01  -  14 Aug 2022 07:00  --------------------------------------------------------  IN: 3500 mL / OUT: 1850 mL / NET: 1650 mL    14 Aug 2022 07:01  -  14 Aug 2022 11:05  --------------------------------------------------------  IN: 540 mL / OUT: 0 mL / NET: 540 mL        PHYSICAL EXAM:  Vital Signs Last 24 Hrs  T(C): 36.9 (14 Aug 2022 07:37), Max: 38.9 (14 Aug 2022 00:00)  T(F): 98.4 (14 Aug 2022 07:37), Max: 102 (14 Aug 2022 00:00)  HR: 101 (14 Aug 2022 09:15) (99 - 119)  BP: 110/66 (14 Aug 2022 08:00) (96/47 - 126/53)  BP(mean): 77 (14 Aug 2022 08:00) (59 - 81)  RR: 23 (14 Aug 2022 08:00) (18 - 30)  SpO2: 97% (14 Aug 2022 09:15) (93% - 98%)    Parameters below as of 14 Aug 2022 09:15  Patient On (Oxygen Delivery Method): mask, aerosol        CONSTITUTIONAL: NAD,AWAKE AM  EYES: PERRLA; conjunctiva and sclera clear  ENMT: Moist oral mucosa, NGT  RESPIRATORY: Normal respiratory effort;MILD CRACKLES to auscultation bilaterally  CARDIOVASCULAR: Regular rate and rhythm, normal S1 and S2, no murmur   EXTS: No lower extremity edema; Peripheral pulses are 2+ bilaterally  ABDOMEN: Nontender to palpation, normoactive bowel sounds, no rebound/guarding;   MUSCLOSKELETAL: s; no joint swelling or tenderness to palpation  PSYCH: CALM  NEUROLOGY: Awake,non verbal,does not follow commands moving lt side      LABS:                        10.9   16.34 )-----------( 306      ( 14 Aug 2022 09:15 )             34.1         145  |  106  |  28.6<H>  ----------------------------<  155<H>  4.4   |  29.0  |  0.63    Ca    8.7      14 Aug 2022 09:15    TPro  6.6  /  Alb  2.5<L>  /  TBili  0.6  /  DBili  x   /  AST  53<H>  /  ALT  79<H>  /  AlkPhos  212<H>            Urinalysis Basic - ( 13 Aug 2022 12:15 )    Color: Yellow / Appearance: Slightly Turbid / S.015 / pH: x  Gluc: x / Ketone: Negative  / Bili: Negative / Urobili: Negative   Blood: x / Protein: 15 / Nitrite: Positive   Leuk Esterase: Moderate / RBC: 11-25 /HPF / WBC >50 /HPF   Sq Epi: x / Non Sq Epi: Occasional / Bacteria: Moderate          RADIOLOGY & ADDITIONAL TESTS:  Results Reviewed:

## 2022-08-14 NOTE — PROGRESS NOTE ADULT - SUBJECTIVE AND OBJECTIVE BOX
Subjective: Patient obtunded, non communicative. Spiked fever 102 last night. Thick Secretion +    ROS: All systems were reviewed in detail and negative except as detailed in HPI.    Medications: reviewed     Vital Signs Last 24 Hrs  T(C): 38 (14 Aug 2022 12:00), Max: 38.9 (14 Aug 2022 00:00)  T(F): 100.4 (14 Aug 2022 12:00), Max: 102 (14 Aug 2022 00:00)  HR: 99 (14 Aug 2022 12:00) (99 - 119)  BP: 121/55 (14 Aug 2022 12:00) (102/48 - 126/53)  BP(mean): 69 (14 Aug 2022 12:00) (65 - 81)  RR: 26 (14 Aug 2022 12:00) (18 - 30)  SpO2: 90% (14 Aug 2022 12:00) (90% - 98%)    Parameters below as of 14 Aug 2022 12:00  Patient On (Oxygen Delivery Method): mask, aerosol    Physical Exam:  Gen: In mild resp distress  MS: obtunded  HENT: HFNM,thick secretins oral+  CV: rhythm irreg irreg, rate tachy, no LE edema  Chest: Coarse BS, tachypnea  Abd: soft, NT, ND    Input and Output:  I&O's Summary    13 Aug 2022 07:01  -  14 Aug 2022 07:00  --------------------------------------------------------  IN: 3500 mL / OUT: 1850 mL / NET: 1650 mL    14 Aug 2022 07:01  -  14 Aug 2022 15:21  --------------------------------------------------------  IN: 540 mL / OUT: 0 mL / NET: 540 mL    CBC:            10.9   16.34 )-----------( 306      ( 08-14-22 @ 09:15 )             34.1     Chem:         ( 08-14-22 @ 09:15 )    145  |  106  |  28.6<H>  ----------------------------<  155<H>  4.4   |  29.0  |  0.63    Liver Functions: ( 08-14-22 @ 09:15 )  Alb: 2.5 g/dL / Pro: 6.6 g/dL / ALK PHOS: 212 U/L / ALT: 79 U/L / AST: 53 U/L / GGT: x

## 2022-08-14 NOTE — PROGRESS NOTE ADULT - ASSESSMENT
77yo man with CABG, PVD, HTN, HLD, and low EF (declined AICD), admitted 7/24 with LM1 occlusion, s/p TICI 2B MT, no tPA as wake-up stroke. post thrombectomy, re-intubated for hypoxic respiratory failure. MRI brain with a large L MCA stroke with small area of reperfusion hemorrhage. Course complicated by Septic shock, UTI, C. dif, Urinary obstruction, respiratory failure secondary to poor mental status now s/p extubation 8/10 and transferred to medicine for further care.     Fever /leukocytosis r/o receurrent infection  -tmax 102F  -bl c/s x2, la STABLE  -CT CHEST   -continue iv  ZOSYN  -F/U  id rec  -f/u fever trend/wbc      Hypernatremia  -improved na 145  -increase  free water   -f/u nephrology rec  -f/u bmp    Acute  Left MCA Stroke,  7/24 with LM1 occlusion, s/p TICI 2B MT, no tPA as wake-up stroke. post thrombectomy  Small Hemorrhagic conversion sec to reperfusion  - ASA,  -hold Lipitor for transaminitis  - CT Head of 7-30-22 reviewed. Stable left frontal hemorrhagic products within the infarct.    - EEG  no seizure  - CT/CTA/CTP -images and reports were reviewed.   - MRI Brain  as above   - TTE : EF < 10%.  - IMTIAZ  was  postponed due to fever.  - SCD/ SQ Lovenox for DVT prophylaxis.    Transaminitis  -trending down  -will monitor if persistently high,liver us  -hold lipitor        Combined systolic and diastolic heart failure, NSVT   ICM - TTE 7/24 showed LVEF <10% with RV dysfunction  Cont metoprolol  Lopressor 50mg TID  (Titrated to TID for frequent ectopy on monitor)   Spironolactone 12.5mg daily.   Hold Ace inhib for now  per cardiology dc midodrine then plan to  start losartan  if stable bp --bp soft   May need ICD given severe CM and ectopy however. Determination pending CVA recovery and prognosis as well as GOC    Combined Septic and Cardiogenic Shock   Now off vasopressors  S/p course of abx for uti and C diff colitis  ID following  To complete vanco 8/12     Acute hypoxic Respiratory Failure s/p extubation   Aspiration precautions  Wean oxygen as tolerated  NC     Dysphagia: Post Intubation and CVA  - NGT, currently NPO s/p extubation, previously on Glucerna @ 60  - SLP eval    KE suspect carbapenem AIN    Creat near baseline  avoid nephrotoxic agents  nephro is following  f/u bmp    Urinary Retention  -on  ace placed  Failed 2x TOV   Maintain ace for now    DVT Ppx: Lovenox 40   GOC: DNR / DNI     d/w wife/son  at bedside. over all prognosis guarded, pt is high risk of respiratory failure, aspiration pna,septic shock ,cardiac arrest, risk of high mortality. wife/son verbalized understood risk. discussed about hospice/comfort care. Son states he is thinking about comfort care only--he (hcp) will talk to other family member and will let me.    77yo man with CABG, PVD, HTN, HLD, and low EF (declined AICD), admitted 7/24 with LM1 occlusion, s/p TICI 2B MT, no tPA as wake-up stroke. post thrombectomy, re-intubated for hypoxic respiratory failure. MRI brain with a large L MCA stroke with small area of reperfusion hemorrhage. Course complicated by Septic shock, UTI, C. dif, Urinary obstruction, respiratory failure secondary to poor mental status now s/p extubation 8/10 and transferred to medicine for further care.     Fever /leukocytosis r/o receurrent infection  -tmax 102F  -bl c/s x2, la STABLE  -CT CHEST   -continue iv  ZOSYN  -F/U  id rec  -f/u fever trend/wbc      Hypernatremia  -improved na 145  -increase  free water   -f/u nephrology rec  -f/u bmp    Acute  Left MCA Stroke,  7/24 with LM1 occlusion, s/p TICI 2B MT, no tPA as wake-up stroke. post thrombectomy  Small Hemorrhagic conversion sec to reperfusion  - ASA,  -hold Lipitor for transaminitis  - CT Head of 7-30-22 reviewed. Stable left frontal hemorrhagic products within the infarct.    - EEG  no seizure  - CT/CTA/CTP -images and reports were reviewed.   - MRI Brain  as above   - TTE : EF < 10%.  - IMTIAZ  was  postponed due to fever.  - SCD/ SQ Lovenox for DVT prophylaxis.    Transaminitis  -trending down  -will monitor if persistently high,liver us  -hold lipitor        Combined systolic and diastolic heart failure, NSVT   ICM - TTE 7/24 showed LVEF <10% with RV dysfunction  Cont metoprolol  Lopressor 50mg TID  (Titrated to TID for frequent ectopy on monitor)   Spironolactone 12.5mg daily.   Hold Ace inhib for now  per cardiology dc midodrine then plan to  start losartan  if stable bp --bp soft   May need ICD given severe CM and ectopy however. Determination pending CVA recovery and prognosis as well as GOC    Combined Septic and Cardiogenic Shock   Now off vasopressors  S/p course of abx for uti and C diff colitis  ID following  To complete vanco 8/12     Acute hypoxic Respiratory Failure s/p extubation   Aspiration precautions  Wean oxygen as tolerated  NC     Dysphagia: Post Intubation and CVA  - NGT, currently NPO s/p extubation, previously on Glucerna @ 60  - SLP eval    KE suspect carbapenem AIN    Creat near baseline  avoid nephrotoxic agents  nephro is following  f/u bmp    Urinary Retention  -on  ace placed  Failed 2x TOV   Maintain ace for now    DVT Ppx: Lovenox 40   GOC: DNR / DNI     d/w wife  at bedside. over all prognosis guarded, pt is high risk of respiratory failure, aspiration pna,septic shock ,cardiac arrest, risk of high mortality. wife/son verbalized understood risk. discussed about hospice/comfort care. Son states he is thinking about comfort care only--he (hcp) will talk to other family member and will let me.   spoke with son

## 2022-08-14 NOTE — PROGRESS NOTE ADULT - ASSESSMENT
The patient is a 76 year old male with past medical history of HTN, HLD, CAD s/p CABG, carotid stenosis, and PVD who was transferred from an outside hospital for neuro IR intervention after waking up with R sided facial droop and R sided weakness; found to have left M1 occlusion on CTA s/p thrombectomy which is complicated by hemorrhagic conversion. Hospital course notable for acute hypoxic respiratory failure post procedure s/p mechanical ventilation; ischemic cardiomyopathy, LVEF<10% and moderately reduced RV function. Also with E coli UTI, KE, C. diff. Nephrology is managing KE (resolved) and hyperNa (recurrent).    -Hypernatremia, improved - Free water deficit; Will continue free water flushes 300cc q3h. Na down to 145  -Patient w/ CM EF 10%, obtunded w/ resp distress at high risk of further deterioration--> Aspiration pneumonia   -His prognosis is extremely guarded and primary team is in constant touch with family regarding GOC.    Stable from nephrology standpoint, will sign off.  Please call back w/ ?/concern

## 2022-08-15 LAB
ALBUMIN SERPL ELPH-MCNC: 2.5 G/DL — LOW (ref 3.3–5.2)
ALP SERPL-CCNC: 198 U/L — HIGH (ref 40–120)
ALT FLD-CCNC: 69 U/L — HIGH
ANION GAP SERPL CALC-SCNC: 10 MMOL/L — SIGNIFICANT CHANGE UP (ref 5–17)
AST SERPL-CCNC: 50 U/L — HIGH
BASOPHILS # BLD AUTO: 0.02 K/UL — SIGNIFICANT CHANGE UP (ref 0–0.2)
BASOPHILS NFR BLD AUTO: 0.1 % — SIGNIFICANT CHANGE UP (ref 0–2)
BILIRUB SERPL-MCNC: 0.4 MG/DL — SIGNIFICANT CHANGE UP (ref 0.4–2)
BUN SERPL-MCNC: 27.8 MG/DL — HIGH (ref 8–20)
CALCIUM SERPL-MCNC: 8.7 MG/DL — SIGNIFICANT CHANGE UP (ref 8.4–10.5)
CHLORIDE SERPL-SCNC: 105 MMOL/L — SIGNIFICANT CHANGE UP (ref 98–107)
CO2 SERPL-SCNC: 30 MMOL/L — HIGH (ref 22–29)
CREAT SERPL-MCNC: 0.65 MG/DL — SIGNIFICANT CHANGE UP (ref 0.5–1.3)
EGFR: 97 ML/MIN/1.73M2 — SIGNIFICANT CHANGE UP
EOSINOPHIL # BLD AUTO: 0.85 K/UL — HIGH (ref 0–0.5)
EOSINOPHIL NFR BLD AUTO: 6 % — SIGNIFICANT CHANGE UP (ref 0–6)
GLUCOSE BLDC GLUCOMTR-MCNC: 102 MG/DL — HIGH (ref 70–99)
GLUCOSE BLDC GLUCOMTR-MCNC: 105 MG/DL — HIGH (ref 70–99)
GLUCOSE BLDC GLUCOMTR-MCNC: 120 MG/DL — HIGH (ref 70–99)
GLUCOSE BLDC GLUCOMTR-MCNC: 129 MG/DL — HIGH (ref 70–99)
GLUCOSE BLDC GLUCOMTR-MCNC: 146 MG/DL — HIGH (ref 70–99)
GLUCOSE BLDC GLUCOMTR-MCNC: 170 MG/DL — HIGH (ref 70–99)
GLUCOSE SERPL-MCNC: 141 MG/DL — HIGH (ref 70–99)
GRAM STN FLD: SIGNIFICANT CHANGE UP
HCT VFR BLD CALC: 32.8 % — LOW (ref 39–50)
HGB BLD-MCNC: 10.8 G/DL — LOW (ref 13–17)
IMM GRANULOCYTES NFR BLD AUTO: 0.6 % — SIGNIFICANT CHANGE UP (ref 0–1.5)
LYMPHOCYTES # BLD AUTO: 0.91 K/UL — LOW (ref 1–3.3)
LYMPHOCYTES # BLD AUTO: 6.5 % — LOW (ref 13–44)
MCHC RBC-ENTMCNC: 31.1 PG — SIGNIFICANT CHANGE UP (ref 27–34)
MCHC RBC-ENTMCNC: 32.9 GM/DL — SIGNIFICANT CHANGE UP (ref 32–36)
MCV RBC AUTO: 94.5 FL — SIGNIFICANT CHANGE UP (ref 80–100)
MONOCYTES # BLD AUTO: 0.52 K/UL — SIGNIFICANT CHANGE UP (ref 0–0.9)
MONOCYTES NFR BLD AUTO: 3.7 % — SIGNIFICANT CHANGE UP (ref 2–14)
NEUTROPHILS # BLD AUTO: 11.7 K/UL — HIGH (ref 1.8–7.4)
NEUTROPHILS NFR BLD AUTO: 83.1 % — HIGH (ref 43–77)
PLATELET # BLD AUTO: 316 K/UL — SIGNIFICANT CHANGE UP (ref 150–400)
POTASSIUM SERPL-MCNC: 4.5 MMOL/L — SIGNIFICANT CHANGE UP (ref 3.5–5.3)
POTASSIUM SERPL-SCNC: 4.5 MMOL/L — SIGNIFICANT CHANGE UP (ref 3.5–5.3)
PROT SERPL-MCNC: 6.6 G/DL — SIGNIFICANT CHANGE UP (ref 6.6–8.7)
RBC # BLD: 3.47 M/UL — LOW (ref 4.2–5.8)
RBC # FLD: 15.2 % — HIGH (ref 10.3–14.5)
SODIUM SERPL-SCNC: 145 MMOL/L — SIGNIFICANT CHANGE UP (ref 135–145)
SPECIMEN SOURCE: SIGNIFICANT CHANGE UP
WBC # BLD: 14.09 K/UL — HIGH (ref 3.8–10.5)
WBC # FLD AUTO: 14.09 K/UL — HIGH (ref 3.8–10.5)

## 2022-08-15 PROCEDURE — 99233 SBSQ HOSP IP/OBS HIGH 50: CPT

## 2022-08-15 PROCEDURE — 99497 ADVNCD CARE PLAN 30 MIN: CPT | Mod: 25

## 2022-08-15 PROCEDURE — 71250 CT THORAX DX C-: CPT | Mod: 26

## 2022-08-15 PROCEDURE — 99232 SBSQ HOSP IP/OBS MODERATE 35: CPT

## 2022-08-15 RX ORDER — VANCOMYCIN HCL 1 G
125 VIAL (EA) INTRAVENOUS EVERY 6 HOURS
Refills: 0 | Status: DISCONTINUED | OUTPATIENT
Start: 2022-08-15 | End: 2022-08-16

## 2022-08-15 RX ADMIN — Medication 1 APPLICATION(S): at 17:29

## 2022-08-15 RX ADMIN — Medication 2: at 00:08

## 2022-08-15 RX ADMIN — SPIRONOLACTONE 12.5 MILLIGRAM(S): 25 TABLET, FILM COATED ORAL at 05:25

## 2022-08-15 RX ADMIN — HUMAN INSULIN 15 UNIT(S): 100 INJECTION, SUSPENSION SUBCUTANEOUS at 22:14

## 2022-08-15 RX ADMIN — Medication 100 MILLIGRAM(S): at 17:14

## 2022-08-15 RX ADMIN — CHLORHEXIDINE GLUCONATE 1 APPLICATION(S): 213 SOLUTION TOPICAL at 05:25

## 2022-08-15 RX ADMIN — HUMAN INSULIN 15 UNIT(S): 100 INJECTION, SUSPENSION SUBCUTANEOUS at 13:41

## 2022-08-15 RX ADMIN — Medication 100 MILLIGRAM(S): at 11:25

## 2022-08-15 RX ADMIN — HUMAN INSULIN 15 UNIT(S): 100 INJECTION, SUSPENSION SUBCUTANEOUS at 06:09

## 2022-08-15 RX ADMIN — Medication 3 MILLILITER(S): at 15:47

## 2022-08-15 RX ADMIN — Medication 3 MILLILITER(S): at 22:31

## 2022-08-15 RX ADMIN — HUMAN INSULIN 15 UNIT(S): 100 INJECTION, SUSPENSION SUBCUTANEOUS at 00:09

## 2022-08-15 RX ADMIN — Medication 50 MILLIGRAM(S): at 22:08

## 2022-08-15 RX ADMIN — Medication 3 MILLILITER(S): at 05:46

## 2022-08-15 RX ADMIN — Medication 81 MILLIGRAM(S): at 11:25

## 2022-08-15 RX ADMIN — PIPERACILLIN AND TAZOBACTAM 25 GRAM(S): 4; .5 INJECTION, POWDER, LYOPHILIZED, FOR SOLUTION INTRAVENOUS at 20:55

## 2022-08-15 RX ADMIN — PIPERACILLIN AND TAZOBACTAM 25 GRAM(S): 4; .5 INJECTION, POWDER, LYOPHILIZED, FOR SOLUTION INTRAVENOUS at 05:24

## 2022-08-15 RX ADMIN — ENOXAPARIN SODIUM 40 MILLIGRAM(S): 100 INJECTION SUBCUTANEOUS at 20:55

## 2022-08-15 RX ADMIN — Medication 1 APPLICATION(S): at 05:25

## 2022-08-15 RX ADMIN — Medication 3 MILLIGRAM(S): at 22:08

## 2022-08-15 RX ADMIN — PIPERACILLIN AND TAZOBACTAM 25 GRAM(S): 4; .5 INJECTION, POWDER, LYOPHILIZED, FOR SOLUTION INTRAVENOUS at 11:24

## 2022-08-15 NOTE — CONSULT NOTE ADULT - SUBJECTIVE AND OBJECTIVE BOX
Patient is a 77y old  Male who presents with a chief complaint of stroke (15 Aug 2022 12:38)      HPI:  76M with PMH CABG, HTN, HLD who [place] [facility] [place]. Last known well was last night 10pm prior to going to bed, woke up this morning around 0500 with R sided weakness and R facial droop. [place] [facility] initiated, NIH at [facility] reportedly 8. CTA showed LM1 occlusion. Transferred to [facility] for possible neuro IR intervention. On arrival to [facility], NIH 6. Decision made to take patient directly to neuro IR for intervention. GI evaluation now being requested for PEG tube placement. Pt's son was at bedside. Pt with recent Chest CT that showed multifocal pneumonia. Pt. is on Ventimask oxygenation with markedly audible bilateral rhonchi. Unable to obtain history from the pt.    REVIEW OF SYSTEMS:  Unobtainable in this pt.    PAST MEDICAL & SURGICAL HISTORY:  HTN (hypertension)      HLD (hyperlipidemia)      S/P CABG x 1          FAMILY HISTORY:      SOCIAL HISTORY:  Smoking Status: [ ] Current, [ x] Former, [ ] Never  Stopped smoking thirty years ago. No ETOH or drug abuse history.    MEDICATIONS:  MEDICATIONS  (STANDING):  albuterol/ipratropium for Nebulization 3 milliLiter(s) Nebulizer every 6 hours  amantadine 100 milliGRAM(s) Oral <User Schedule>  aspirin  chewable 81 milliGRAM(s) Oral daily  BACItracin   Ointment 1 Application(s) Topical two times a day  chlorhexidine 2% Cloths 1 Application(s) Topical daily  dextrose 5%. 1000 milliLiter(s) (50 mL/Hr) IV Continuous <Continuous>  dextrose 5%. 1000 milliLiter(s) (100 mL/Hr) IV Continuous <Continuous>  dextrose 50% Injectable 25 Gram(s) IV Push once  enoxaparin Injectable 40 milliGRAM(s) SubCutaneous every 24 hours  glucagon  Injectable 1 milliGRAM(s) IntraMuscular once  insulin lispro (ADMELOG) corrective regimen sliding scale   SubCutaneous every 6 hours  insulin NPH human recombinant 15 Unit(s) SubCutaneous every 8 hours  melatonin 3 milliGRAM(s) Oral at bedtime  metoprolol tartrate 50 milliGRAM(s) Oral every 8 hours  piperacillin/tazobactam IVPB.. 3.375 Gram(s) IV Intermittent every 8 hours  spironolactone 12.5 milliGRAM(s) Oral daily    MEDICATIONS  (PRN):  acetaminophen     Tablet .. 650 milliGRAM(s) Oral every 6 hours PRN Temp greater or equal to 38C (100.4F), Mild Pain (1 - 3)  dextrose Oral Gel 15 Gram(s) Oral once PRN Blood Glucose LESS THAN 70 milliGRAM(s)/deciliter  ondansetron Injectable 4 milliGRAM(s) IV Push every 6 hours PRN Nausea and/or Vomiting      Allergies    No Known Allergies    Intolerances        Vital Signs Last 24 Hrs  T(C): 37.8 (15 Aug 2022 12:11), Max: 37.9 (15 Aug 2022 07:40)  T(F): 100 (15 Aug 2022 12:11), Max: 100.3 (15 Aug 2022 07:40)  HR: 99 (15 Aug 2022 15:51) (90 - 100)  BP: 95/60 (15 Aug 2022 12:32) (95/60 - 117/54)  BP(mean): 66 (15 Aug 2022 12:32) (65 - 78)  RR: 25 (15 Aug 2022 12:32) (16 - 28)  SpO2: 97% (15 Aug 2022 15:51) (95% - 100%)    Parameters below as of 15 Aug 2022 15:51  Patient On (Oxygen Delivery Method): mask, aerosol        08-14 @ 07:01  -  08-15 @ 07:00  --------------------------------------------------------  IN: 3480 mL / OUT: 2600 mL / NET: 880 mL    08-15 @ 07:01  -  08-15 @ 15:56  --------------------------------------------------------  IN: 960 mL / OUT: 0 mL / NET: 960 mL          PHYSICAL EXAM:    General: Well developed; severely altered;  in no acute distress  HEENT: MMM, conjunctiva pink and sclera anicteric.  Lungs: Bilateral audible rhonchi with decreased breath sounds  Cor: RRR S1, S2 only.  Gastrointestinal: Soft, non-tender non-distended; Normal bowel sounds; No rebound or guarding or HSM. No surgical scars.  Extremities: Limited ROM and movement of lower extremities.  Neurological: Alert but extremely lethargic and altered.  Skin: Warm and dry. No obvious rash      LABS:                        10.8   14.09 )-----------( 316      ( 15 Aug 2022 05:30 )             32.8     08-15    145  |  105  |  27.8<H>  ----------------------------<  141<H>  4.5   |  30.0<H>  |  0.65    Ca    8.7      15 Aug 2022 05:30  Phos  3.7     08-14  Mg     2.0     08-14    TPro  6.6  /  Alb  2.5<L>  /  TBili  0.4  /  DBili  x   /  AST  50<H>  /  ALT  69<H>  /  AlkPhos  198<H>  08-15          RADIOLOGY & ADDITIONAL STUDIES:   < from: MR Head No Cont (07.26.22 @ 20:58) >  MR BRAIN                          PROCEDURE DATE:  07/26/2022          INTERPRETATION:  CLINICAL INDICATION: Stroke workup.    TECHNIQUE: Multi-planar multi-sequential MR imaging of the brain was   performed without intravenouscontrast.    COMPARISON: CT head 7/26/2022.    FINDINGS:  Acute infarcts are seen throughout the left MCA territory with   involvement of the left precentral gyrus. Hemorrhagic transformation is   again seen within the left inferior frontal lobe and insula, grossly   stable since comparison study.    Additional acute punctate infarct is seen within the medial left frontal   lobe (4-31).    Trace hemorrhage noted within the bilateral occipital horns. No   extra-axial fluid collections. The skull base flow voids are present.    The visualized intraorbital contents are normal. Mucosal thickening with   air-fluid level in the left sphenoid sinus. The mastoid air cells are   clear. The visualized osseous structures, soft tissues and partially   visualized parotid glands appear normal.    IMPRESSION:    Acute left MCA territory infarcts with hemorrhagic transformation,   grossly stable since comparison CT.    Additional punctate acute infarct involving the medial left frontal lobe   in the SUGEY territory.    --- End of Report ---            ORION ANDRADE MD; Attending Radiologist  This document has been electronically signed. Jul 27 2022  9:14AM    < end of copied text >  < from: CT Chest No Cont (08.15.22 @ 10:34) >  AM:  CT CHEST                          PROCEDURE DATE:  08/15/2022          INTERPRETATION:  INDICATION: Fever, pneumonia  TECHNIQUE: Unenhanced CT of the chest. Coronal, sagittal, and MIP images   were reconstructed and reviewed.  COMPARISON: No prior chest CT.    FINDINGS:    AIRWAYS, LUNGS and PLEURA: Patent central airways. Consolidation of right   lower lobe/right middle lobe, tree-in-bud opacities within posterior   right upper lobe and mild opacity within basilar left lower lobe   representing multifocal pneumonia. No pleural effusion.    MEDIASTINUM AND MAGALY: Mildly enlarged mediastinal lymph nodes for example   1.5 cm short axis subcarinal lymph node.    HEART AND VESSELS: Cardiomegaly. CABG. Coronary and aortic   calcifications. No pericardial effusion. Thoracic aorta and pulmonary   artery normal in diameter.    VISUALIZED UPPER ABDOMEN: Enteric tube within the stomach. Aortic   calcifications.    CHEST WALL AND BONES: No aggressive osseous lesion. Moderate compression   deformity of L1 vertebral body. Healed sternotomy.    LOWER NECK: Within normal limits.    IMPRESSION:    Consolidation of right lower lobe/right middle lobe, tree-in-bud   opacities within posterior right upper lobe and mild opacity within   basilar left lower lobe representing multifocal pneumonia.    --- End of Report ---            CHUY HIGGINS MD; Attending Radiologist  This document has been electronically signed. Aug 15 2022 10:53AM    < end of copied text >  < from: CT Head No Cont (07.30.22 @ 12:47) >    CT BRAIN                          PROCEDURE DATE:  07/30/2022          INTERPRETATION:  CT HEAD    CLINICAL HISTORY: stroke    TECHNIQUE:  Noncontrast CT.  Axial Acquisition.  Sagittal and coronal reformations.    COMPARISON:  Compared to study dated 7/26/2022    FINDINGS:  *  HEMORRHAGE/BRAIN PARENCHYMA: Aging inferior left frontal and   inferolateral left parietal infarcts. Mild stable cortical hemorrhage   associated with the left frontal infarct. Mild decreased associated mass   effect. Patient has moderate underlying brain atrophy. Mild chronic   periventricular white matter ischemic changes are seen  *  VENTRICLES / SHIFT:  No hydrocephalus. No midline shift.  *  EXTRA-AXIAL / BASAL CISTERNS:  No extra-axial mass.Basal cisterns   preserved.  *  CALVARIUM AND EXTRACRANIAL SOFT TISSUES:  No depressed calvarial   fracture.  *  SINUSES, ORBITS, MASTOIDS: Mild fluid within the left sphenoid sinus.   A nasogastric tube courses through the left nasal cavity    IMPRESSION:  Aging inferior left frontal and inferolateral left parietal infarcts.   Mild stable cortical blood products associated with the frontal infarct.    --- End of Report ---            VERNELL LOYA MD; Attending Radiologist  This document has been electronically signed. Jul 30 2022  3:26PM    < end of copied text >

## 2022-08-15 NOTE — PROGRESS NOTE ADULT - SUBJECTIVE AND OBJECTIVE BOX
Patient is a 77y old  Male who presents with a chief complaint of stroke (15 Aug 2022 09:06)    Pt is awake, moving lt side,non verbal, has eye contact ,turn head while calling on name, does not follow commands  On 5 l nc am   REVIEW OF SYSTEMS: AMS-UNABLE    MEDICATIONS  (STANDING):  albuterol/ipratropium for Nebulization 3 milliLiter(s) Nebulizer every 6 hours  amantadine 100 milliGRAM(s) Oral <User Schedule>  aspirin  chewable 81 milliGRAM(s) Oral daily  BACItracin   Ointment 1 Application(s) Topical two times a day  chlorhexidine 2% Cloths 1 Application(s) Topical daily  dextrose 5%. 1000 milliLiter(s) (50 mL/Hr) IV Continuous <Continuous>  dextrose 5%. 1000 milliLiter(s) (100 mL/Hr) IV Continuous <Continuous>  dextrose 50% Injectable 25 Gram(s) IV Push once  enoxaparin Injectable 40 milliGRAM(s) SubCutaneous every 24 hours  glucagon  Injectable 1 milliGRAM(s) IntraMuscular once  insulin lispro (ADMELOG) corrective regimen sliding scale   SubCutaneous every 6 hours  insulin NPH human recombinant 15 Unit(s) SubCutaneous every 8 hours  melatonin 3 milliGRAM(s) Oral at bedtime  metoprolol tartrate 50 milliGRAM(s) Oral every 8 hours  piperacillin/tazobactam IVPB.. 3.375 Gram(s) IV Intermittent every 8 hours  spironolactone 12.5 milliGRAM(s) Oral daily    MEDICATIONS  (PRN):  acetaminophen     Tablet .. 650 milliGRAM(s) Oral every 6 hours PRN Temp greater or equal to 38C (100.4F), Mild Pain (1 - 3)  dextrose Oral Gel 15 Gram(s) Oral once PRN Blood Glucose LESS THAN 70 milliGRAM(s)/deciliter  ondansetron Injectable 4 milliGRAM(s) IV Push every 6 hours PRN Nausea and/or Vomiting      CAPILLARY BLOOD GLUCOSE      POCT Blood Glucose.: 146 mg/dL (15 Aug 2022 05:59)  POCT Blood Glucose.: 170 mg/dL (14 Aug 2022 23:59)  POCT Blood Glucose.: 148 mg/dL (14 Aug 2022 17:34)  POCT Blood Glucose.: 146 mg/dL (14 Aug 2022 12:29)    I&O's Summary    14 Aug 2022 07:01  -  15 Aug 2022 07:00  --------------------------------------------------------  IN: 3480 mL / OUT: 2600 mL / NET: 880 mL        PHYSICAL EXAM:  Vital Signs Last 24 Hrs  T(C): 37.9 (15 Aug 2022 07:40), Max: 38 (14 Aug 2022 12:00)  T(F): 100.3 (15 Aug 2022 07:40), Max: 100.4 (14 Aug 2022 12:00)  HR: 100 (15 Aug 2022 08:00) (90 - 100)  BP: 114/67 (15 Aug 2022 08:00) (96/55 - 121/55)  BP(mean): 78 (15 Aug 2022 08:00) (65 - 78)  RR: 28 (15 Aug 2022 08:00) (16 - 28)  SpO2: 96% (15 Aug 2022 08:00) (90% - 100%)    Parameters below as of 15 Aug 2022 08:00  Patient On (Oxygen Delivery Method): mask, aerosol        CONSTITUTIONAL: NAD,  EYES: PERRLA; conjunctiva and sclera clear  ENMT: Moist oral mucosa,   RESPIRATORY: Normal respiratory effort; CRACKLES  to auscultation bilaterally. NO WHEEZE  CARDIOVASCULAR: Regular rate and rhythm, normal S1 and S2, no murmur   EXTS: No lower extremity edema; Peripheral pulses are 2+ bilaterally  ABDOMEN: Nontender to palpation, normoactive bowel sounds, no rebound/guarding;   MUSCLOSKELETAL:  no joint swelling or tenderness to palpation  PSYCH: CALM   NEUROLOGY: AWAKE ,DOES NOT FOLLOW COMMANDS, Moving lt exts      LABS:                        10.8   14.09 )-----------( 316      ( 15 Aug 2022 05:30 )             32.8     08-15    145  |  105  |  27.8<H>  ----------------------------<  141<H>  4.5   |  30.0<H>  |  0.65    Ca    8.7      15 Aug 2022 05:30  Phos  3.7     08-14  Mg     2.0     08-14    TPro  6.6  /  Alb  2.5<L>  /  TBili  0.4  /  DBili  x   /  AST  50<H>  /  ALT  69<H>  /  AlkPhos  198<H>  08-15          Urinalysis Basic - ( 13 Aug 2022 12:15 )    Color: Yellow / Appearance: Slightly Turbid / S.015 / pH: x  Gluc: x / Ketone: Negative  / Bili: Negative / Urobili: Negative   Blood: x / Protein: 15 / Nitrite: Positive   Leuk Esterase: Moderate / RBC: 11-25 /HPF / WBC >50 /HPF   Sq Epi: x / Non Sq Epi: Occasional / Bacteria: Moderate        Culture - Blood (collected 13 Aug 2022 13:43)  Source: .Blood Blood  Preliminary Report (15 Aug 2022 01:01):    No growth to date.    Culture - Blood (collected 13 Aug 2022 13:38)  Source: .Blood Blood  Preliminary Report (15 Aug 2022 01:01):    No growth to date.        RADIOLOGY & ADDITIONAL TESTS:  Results Reviewed:   c< from: CT Chest No Cont (08.15.22 @ 10:34) >    IMPRESSION:    Consolidation of right lower lobe/right middle lobe, tree-in-bud   opacities within posterior right upper lobe and mild opacity within   basilar left lower lobe representing multifocal pneumonia.    < end of copied text >

## 2022-08-15 NOTE — GOALS OF CARE CONVERSATION - ADVANCED CARE PLANNING - WHAT MATTERS MOST
That he continue to be treated aggressively, expect to be given 14 days to allow some type of meaningful recovery before decision to do a tracheostomy-is posed.
That he get aggressive care.    That communication from the Doctors be consistent, and they are kept informed of clinical decline/improvement

## 2022-08-15 NOTE — CONSULT NOTE ADULT - ASSESSMENT
Pt with large left MCA infarct with + C. Dificile infection and multifocal pneumonia. He is in no way optimized for PEG tube placement. He would need C. Dificile infection to resolve along with his multifocal pneumonia before considering PEG tube placement. He will also need pre-procedure Pulmonary and Cardiac clearances for eventual PEG tube placement. Continue current TF and rate. Case and management d/w the pt's son who was at the bedside. The risks vs. benefits of PEG tube placement were d/w him today and he is agreeable to proceeding with eventual PEG tube placement. Pt with large left MCA infarct with + C. Dificile infection and multifocal pneumonia. He is in no way optimized for PEG tube placement. He would need C. Dificile infection to resolve along with his multifocal pneumonia before considering PEG tube placement. He will also need pre-procedure Pulmonary and Cardiac clearances for eventual PEG tube placement. Continue current TF and rate. Case and management d/w the pt's son who was at the bedside. The risks vs. benefits of PEG tube placement were d/w him today and he is agreeable to proceeding with eventual PEG tube placement. When the above criteria have been met please call GI back to re-schedule EGD. Thank you.

## 2022-08-15 NOTE — PROGRESS NOTE ADULT - SUBJECTIVE AND OBJECTIVE BOX
OVERNIGHT EVENTS:  New Pneumonia T 100.3  O2 Sat 97% Aeorsol mask RR26  Just back from CT Radiology  More awake, still encephalopathic  No chills,     Present Symptoms:     Dyspnea: Moderate   Nausea/Vomiting:  No  Anxiety No  Depression:  No  Fatigue: Yes   Loss of appetite: NPO dysphagia  Constipation: no    Pain: no pain behaviors observed            Character-            Duration-            Effect-            Factors-            Frequency-            Location-            Severity-    Review of Systems: Reviewed                                           Unable to obtain due to poor mentation       MEDICATIONS  (STANDING):  albuterol/ipratropium for Nebulization 3 milliLiter(s) Nebulizer every 6 hours  amantadine 100 milliGRAM(s) Oral <User Schedule>  aspirin  chewable 81 milliGRAM(s) Oral daily  BACItracin   Ointment 1 Application(s) Topical two times a day  chlorhexidine 2% Cloths 1 Application(s) Topical daily  dextrose 5%. 1000 milliLiter(s) (50 mL/Hr) IV Continuous <Continuous>  dextrose 5%. 1000 milliLiter(s) (100 mL/Hr) IV Continuous <Continuous>  dextrose 50% Injectable 25 Gram(s) IV Push once  enoxaparin Injectable 40 milliGRAM(s) SubCutaneous every 24 hours  glucagon  Injectable 1 milliGRAM(s) IntraMuscular once  insulin lispro (ADMELOG) corrective regimen sliding scale   SubCutaneous every 6 hours  insulin NPH human recombinant 15 Unit(s) SubCutaneous every 8 hours  melatonin 3 milliGRAM(s) Oral at bedtime  metoprolol tartrate 50 milliGRAM(s) Oral every 8 hours  piperacillin/tazobactam IVPB.. 3.375 Gram(s) IV Intermittent every 8 hours  spironolactone 12.5 milliGRAM(s) Oral daily    MEDICATIONS  (PRN):  acetaminophen     Tablet .. 650 milliGRAM(s) Oral every 6 hours PRN Temp greater or equal to 38C (100.4F), Mild Pain (1 - 3)  dextrose Oral Gel 15 Gram(s) Oral once PRN Blood Glucose LESS THAN 70 milliGRAM(s)/deciliter  ondansetron Injectable 4 milliGRAM(s) IV Push every 6 hours PRN Nausea and/or Vomiting    PHYSICAL EXAM:    Vital Signs Last 24 Hrs  T(C): 37.8 (15 Aug 2022 12:11), Max: 37.9 (15 Aug 2022 07:40)  T(F): 100 (15 Aug 2022 12:11), Max: 100.3 (15 Aug 2022 07:40)  HR: 90 (15 Aug 2022 12:32) (90 - 100)  BP: 95/60 (15 Aug 2022 12:32) (95/60 - 117/54)  BP(mean): 66 (15 Aug 2022 12:32) (65 - 78)  RR: 25 (15 Aug 2022 12:32) (16 - 28)  SpO2: 98% (15 Aug 2022 12:32) (95% - 100%)    Parameters below as of 15 Aug 2022 12:32  Patient On (Oxygen Delivery Method): mask, aerosol    General: ___ lethargic                    nonverbal      Karnofsky: 10 %    HEENT: dry mouth      Lungs: comfortable   excessive secretions    CV: normal      GI: normal  distended                 /NG/tube  constipation  last BM:     : normal  incontinent   ace    MSK:  weakness          bedbound/    Skin: _  no rash    LABS:                          10.8   14.09 )-----------( 316      ( 15 Aug 2022 05:30 )             32.8     08-15    145  |  105  |  27.8<H>  ----------------------------<  141<H>  4.5   |  30.0<H>  |  0.65    Ca    8.7      15 Aug 2022 05:30  Phos  3.7     08-14  Mg     2.0     08-14    TPro  6.6  /  Alb  2.5<L>  /  TBili  0.4  /  DBili  x   /  AST  50<H>  /  ALT  69<H>  /  AlkPhos  198<H>  08-15      I&O's Summary    14 Aug 2022 07:01  -  15 Aug 2022 07:00  --------------------------------------------------------  IN: 3480 mL / OUT: 2600 mL / NET: 880 mL    15 Aug 2022 07:01  -  15 Aug 2022 12:38  --------------------------------------------------------  IN: 960 mL / OUT: 0 mL / NET: 960 mL    RADIOLOGY & ADDITIONAL STUDIES:    ADVANCE DIRECTIVES/TREATMENT PREFERENCES:  DNR YES  Completed on:                     MOLST  YES    Completed on:  Living Will  NO   Completed on:

## 2022-08-15 NOTE — PROGRESS NOTE ADULT - SUBJECTIVE AND OBJECTIVE BOX
[facility]                                        Neurology at [place]                                 Dione Giron, & Pranav                                  [address]                                        [place], [place], [address]                                             [phone]        CC: Stroke    HPI:   76M with PMH CABG, HTN, HLD who presents as transfer from [facility] for stroke. Last known well was last night 10pm prior to going to bed, woke up this morning around 0500 with R sided weakness and R facial droop. Presented to [facility], code stroke initiated, NIH at [facility] reportedly 8. CTA showed LM1 occlusion. Transferred to [facility] for possible neuro IR intervention. On arrival to [facility], NIH 6. Decision made to take patient directly to neuro IR for intervention. (MARCELINA)    Interim history: stable on 3 [place]    ROS:   Unobtainable due to patient's condition.     MEDICATIONS  (STANDING):  albuterol/ipratropium for Nebulization 3 milliLiter(s) Nebulizer every 6 hours  amantadine 100 milliGRAM(s) Oral <User Schedule>  aspirin  chewable 81 milliGRAM(s) Oral daily  BACItracin   Ointment 1 Application(s) Topical two times a day  chlorhexidine 2% Cloths 1 Application(s) Topical daily  dextrose 5%. 1000 milliLiter(s) (50 mL/Hr) IV Continuous <Continuous>  dextrose 5%. 1000 milliLiter(s) (100 mL/Hr) IV Continuous <Continuous>  dextrose 50% Injectable 25 Gram(s) IV Push once  enoxaparin Injectable 40 milliGRAM(s) SubCutaneous every 24 hours  glucagon  Injectable 1 milliGRAM(s) IntraMuscular once  insulin lispro (ADMELOG) corrective regimen sliding scale   SubCutaneous every 6 hours  insulin NPH human recombinant 15 Unit(s) SubCutaneous every 8 hours  melatonin 3 milliGRAM(s) Oral at bedtime  metoprolol tartrate 50 milliGRAM(s) Oral every 8 hours  piperacillin/tazobactam IVPB.. 3.375 Gram(s) IV Intermittent every 8 hours  spironolactone 12.5 milliGRAM(s) Oral daily    MEDICATIONS  (PRN):  acetaminophen     Tablet .. 650 milliGRAM(s) Oral every 6 hours PRN Temp greater or equal to 38C (100.4F), Mild Pain (1 - 3)  dextrose Oral Gel 15 Gram(s) Oral once PRN Blood Glucose LESS THAN 70 milliGRAM(s)/deciliter  ondansetron Injectable 4 milliGRAM(s) IV Push every 6 hours PRN Nausea and/or Vomiting      Vital Signs Last 24 Hrs  T(C): 37.8 (15 Aug 2022 12:11), Max: 37.9 (15 Aug 2022 07:40)  T(F): 100 (15 Aug 2022 12:11), Max: 100.3 (15 Aug 2022 07:40)  HR: 99 (15 Aug 2022 15:51) (90 - 100)  BP: 95/60 (15 Aug 2022 12:32) (95/60 - 117/54)  BP(mean): 66 (15 Aug 2022 12:32) (65 - 78)  RR: 25 (15 Aug 2022 12:32) (24 - 28)  SpO2: 97% (15 Aug 2022 15:51) (95% - 100%)    Parameters below as of 15 Aug 2022 15:51  Patient On (Oxygen Delivery Method): mask, aerosol      Detailed Neurologic Exam:    Mental status: The patient is lethargic and non verbal. He does not follow instructions on right, follows simple instructions on left    Cranial nerves: Pupils equal and react symmetrically to light. He blinks to threat from left. Not tracking with gaze. Depression of right nasolabial fold. Palate and tongue cannot be assessed.     Motor/sensory: There is normal bulk and tone.  There is no tremor.  Moves left to stimuli.   Right plegic.     Reflexes: Trace throughout and plantar responses are flexor.    Cerebellar: Cannot be assessed.     Labs:                           10.8   14.09 )-----------( 316      ( 15 Aug 2022 05:30 )             32.8     08-15    145  |  105  |  27.8<H>  ----------------------------<  141<H>  4.5   |  30.0<H>  |  0.65    Ca    8.7      15 Aug 2022 05:30  Phos  3.7     08-14  Mg     2.0     08-14    TPro  6.6  /  Alb  2.5<L>  /  TBili  0.4  /  DBili  x   /  AST  50<H>  /  ALT  69<H>  /  AlkPhos  198<H>  08-15    LIVER FUNCTIONS - ( 15 Aug 2022 05:30 )  Alb: 2.5 g/dL / Pro: 6.6 g/dL / ALK PHOS: 198 U/L / ALT: 69 U/L / AST: 50 U/L / GGT: x

## 2022-08-15 NOTE — PROGRESS NOTE ADULT - SUBJECTIVE AND OBJECTIVE BOX
Patient with limited wakefulness.   Grasps with right hand.   Limited command following.  No verbalizations.     REVIEW OF SYSTEMS  Constitutional - +fever,  +fatigue  Neurological - +loss of strength    FUNCTIONAL PROGRESS  Total A    VITALS  T(C): 37.9 (08-15-22 @ 07:40), Max: 38 (22 @ 12:00)  HR: 96 (08-15-22 @ 05:47) (90 - 99)  BP: 98/49 (08-15-22 @ 05:00) (96/55 - 121/55)  RR: 26 (08-15-22 @ 05:00) (16 - 28)  SpO2: 99% (08-15-22 @ 05:47) (90% - 100%)  Wt(kg): --    MEDICATIONS   acetaminophen     Tablet .. 650 milliGRAM(s) every 6 hours PRN  albuterol/ipratropium for Nebulization 3 milliLiter(s) every 6 hours  amantadine 100 milliGRAM(s) <User Schedule>  aspirin  chewable 81 milliGRAM(s) daily  BACItracin   Ointment 1 Application(s) two times a day  chlorhexidine 2% Cloths 1 Application(s) daily  dextrose 5%. 1000 milliLiter(s) <Continuous>  dextrose 5%. 1000 milliLiter(s) <Continuous>  dextrose 50% Injectable 25 Gram(s) once  dextrose Oral Gel 15 Gram(s) once PRN  enoxaparin Injectable 40 milliGRAM(s) every 24 hours  glucagon  Injectable 1 milliGRAM(s) once  insulin lispro (ADMELOG) corrective regimen sliding scale   every 6 hours  insulin NPH human recombinant 15 Unit(s) every 8 hours  melatonin 3 milliGRAM(s) at bedtime  metoprolol tartrate 50 milliGRAM(s) every 8 hours  ondansetron Injectable 4 milliGRAM(s) every 6 hours PRN  piperacillin/tazobactam IVPB.. 3.375 Gram(s) every 8 hours  spironolactone 12.5 milliGRAM(s) daily      RECENT LABS/IMAGING                          10.8   14.09 )-----------( 316      ( 15 Aug 2022 05:30 )             32.8     08-15    145  |  105  |  27.8<H>  ----------------------------<  141<H>  4.5   |  30.0<H>  |  0.65    Ca    8.7      15 Aug 2022 05:30  Phos  3.7     -  Mg     2.0     -    TPro  6.6  /  Alb  2.5<L>  /  TBili  0.4  /  DBili  x   /  AST  50<H>  /  ALT  69<H>  /  AlkPhos  198<H>  08-15      Urinalysis Basic - ( 13 Aug 2022 12:15 )    Color: Yellow / Appearance: Slightly Turbid / S.015 / pH: x  Gluc: x / Ketone: Negative  / Bili: Negative / Urobili: Negative   Blood: x / Protein: 15 / Nitrite: Positive   Leuk Esterase: Moderate / RBC: 11-25 /HPF / WBC >50 /HPF   Sq Epi: x / Non Sq Epi: Occasional / Bacteria: Moderate              MRI HEAD  - Acute left MCA territory infarcts with hemorrhagic transformation, grossly stable since comparison CT.  Additional punctate acute infarct involving the medial left frontal lobe in the SUGEY territory.                ----------------------------------------------------------------------------------------  PHYSICAL EXAM  Constitutional - NAD, Appears Uncomfortable  HEENT - NGT+  Extremities - Left UE swelling  Neurologic Exam -                    Cognitive - Awake/Alert, Tracks     Communication - Non-verbal      Cranial Nerves - Unable to assess     Motor -                     LEFT    UE -  4/5                    RIGHT UE - 0/5                    LEFT    LE - HF 2/5, KE 1/5                    RIGHT LE - 0/5    Sensory - Impaired on right   Psychiatric - Calm   ----------------------------------------------------------------------------------------  ASSESSMENT/PLAN  77yMale with functional deficits after an acute CVA  Acute Left CVA - ASA  DM2 - NPH  HTN - Lopressor, Midodrine, Aldactone  Respiratory Failure s/p extubation - Duoneb, Zosyn   Wakefulness - Amantadine 100mg BID (), Melatonin 3mg HS ()  Pain - Tylenol  DVT PPX - SCDs, Lovenox  Rehab - Patient awake and tracking. Limited in command following with stimulants. Stimulants will be limited in their ability to improve neurological recovery, given extent of right hemiplegia and command following. Recommend ongoing mobilization by staff to maintain cardiopulmonary function and prevention of secondary complications related to debility.     At this time, recommend JUNIE, patient DOES NOT meet acute inpatient rehabilitation criteria. Patient needs a more prolonged stay to achieve transition to community living and would not be able to tolerate a comprehensive/intense rehab program of 3hours/day.     Will sign off at this time. Thank you for allowing me to be part of your patient's care. Please reconsult PMR for additional rehab recommendations or dispo needs if functional status changes. Discussed with rehab clinical care team.

## 2022-08-15 NOTE — PROGRESS NOTE ADULT - ASSESSMENT
77yo man with CABG, PVD, HTN, HLD, and low EF (declined AICD), admitted 7/24 with LM1 occlusion, s/p TICI 2B MT, no tPA as wake-up stroke. post thrombectomy, re-intubated for hypoxic respiratory failure. MRI brain with a large L MCA stroke with small area of reperfusion hemorrhage. Course complicated by Septic shock, UTI, C. dif, Urinary obstruction, respiratory failure secondary to poor mental status now s/p extubation 8/10 and transferred to medicine for further care.     Acute hypoxic  Respiratory failure w/ Sepsis sec Multilober PNA likely GNR   -SEC aspiration, on 5 l nc  - s/p intubation ,extubation This admission  - temp 100.3 f am  - -continue iv  ZOSYN. spoke with ID, F./U FURTHER REC  -bl c/s x2 ngtd, la STABLE  -CT CHEST AS ABOVE   -f/u fever trend/wbc  - Pt is risk of recurrent aspiration pna      Dysphagia: Post Intubation and CVA  -failed swallow eval  -will do MBS  - NGT FEEDING  - SON TO MAKE DECISION About PEG        Hypernatremia  -improved na 145  -increase  free water   -f/u nephrology rec  -f/u bmp    Acute  Left MCA Stroke,  7/24 with LM1 occlusion, s/p TICI 2B MT, no tPA as wake-up stroke. post thrombectomy  Small Hemorrhagic conversion sec to reperfusion  - ASA,  -hold Lipitor for transaminitis  - CT Head of 7-30-22 reviewed. Stable left frontal hemorrhagic products within the infarct.    - EEG  no seizure  - CT/CTA/CTP -images and reports were reviewed.   - MRI Brain  as above   - TTE : EF < 10%.  - IMTIAZ  was  postponed due to fever.  - SCD/ SQ Lovenox for DVT prophylaxis.    Transaminitis  -trending down  -will monitor if persistently high,liver us  -hold lipitor        Combined systolic and diastolic heart failure, NSVT   ICM - TTE 7/24 showed LVEF <10% with RV dysfunction  Cont metoprolol  Lopressor 50mg TID  (Titrated to TID for frequent ectopy on monitor)   Spironolactone 12.5mg daily.   Hold Ace inhib for now  per cardiology dc midodrine then plan to  start losartan  if stable bp --bp soft   May need ICD given severe CM and ectopy however. 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 over all prognosis guarded, pt is high risk of respiratory failure, aspiration pna,septic shock ,cardiac arrest, risk of high mortality. wife/son verbalized understood risk. Discussed about hospice/comfort care. Son states he is thinking about comfort care only--he (hcp) will talk to other family member and will let me.   spoke with son YESTERDAY  called son -unable to reach  spoke with Palliative team   Will arrange a family meeting.

## 2022-08-15 NOTE — CHART NOTE - NSCHARTNOTEFT_GEN_A_CORE
Source: Patient [ ]  Family [ ]   other [X ]chart    Current Diet: Diet, NPO:   Tube Feeding Modality: Nasogastric  Glucerna 1.5 Jonathan (GLUCERNA1.5)  Total Volume for 24 Hours (mL): 1440  Continuous  Starting Tube Feed Rate {mL per Hour}: 10  Increase Tube Feed Rate by (mL): 10     Every 6 hours  Until Goal Tube Feed Rate (mL per Hour): 60  Tube Feed Duration (in Hours): 24  Tube Feed Start Time: 11:00 (08-11-22 @ 10:23)      Patient reports [ ] nausea  [ ] vomiting [ ] diarrhea [ ] constipation  [ ]chewing problems [ ] swallowing issues  [ ] other:     PO intake:  < 50% [ ]   50-75%  [ ]   %  [ ]  other :    Source for PO intake [ ] Patient [ ] family [ ] chart [ ] staff [ ] other    Enteral /Parenteral Nutrition:  Tube feeds at goal rate of 60 ml/hr provides 1440 ml, 2160 kcal, 108 g protein,  1092 ml free water, and >100% of RDIs for vitamins/minerals.     Current Weight: 90.9 kg 8/15    % Weight Change     Pertinent Medications: MEDICATIONS  (STANDING):  albuterol/ipratropium for Nebulization 3 milliLiter(s) Nebulizer every 6 hours  amantadine 100 milliGRAM(s) Oral <User Schedule>  aspirin  chewable 81 milliGRAM(s) Oral daily  BACItracin   Ointment 1 Application(s) Topical two times a day  chlorhexidine 2% Cloths 1 Application(s) Topical daily  dextrose 5%. 1000 milliLiter(s) (50 mL/Hr) IV Continuous <Continuous>  dextrose 5%. 1000 milliLiter(s) (100 mL/Hr) IV Continuous <Continuous>  dextrose 50% Injectable 25 Gram(s) IV Push once  enoxaparin Injectable 40 milliGRAM(s) SubCutaneous every 24 hours  glucagon  Injectable 1 milliGRAM(s) IntraMuscular once  insulin lispro (ADMELOG) corrective regimen sliding scale   SubCutaneous every 6 hours  insulin NPH human recombinant 15 Unit(s) SubCutaneous every 8 hours  melatonin 3 milliGRAM(s) Oral at bedtime  metoprolol tartrate 50 milliGRAM(s) Oral every 8 hours  piperacillin/tazobactam IVPB.. 3.375 Gram(s) IV Intermittent every 8 hours  spironolactone 12.5 milliGRAM(s) Oral daily    MEDICATIONS  (PRN):  acetaminophen     Tablet .. 650 milliGRAM(s) Oral every 6 hours PRN Temp greater or equal to 38C (100.4F), Mild Pain (1 - 3)  dextrose Oral Gel 15 Gram(s) Oral once PRN Blood Glucose LESS THAN 70 milliGRAM(s)/deciliter  ondansetron Injectable 4 milliGRAM(s) IV Push every 6 hours PRN Nausea and/or Vomiting    Pertinent Labs: CBC Full  -  ( 15 Aug 2022 05:30 )  WBC Count : 14.09 K/uL  RBC Count : 3.47 M/uL  Hemoglobin : 10.8 g/dL  Hematocrit : 32.8 %  Platelet Count - Automated : 316 K/uL  Mean Cell Volume : 94.5 fl  Mean Cell Hemoglobin : 31.1 pg  Mean Cell Hemoglobin Concentration : 32.9 gm/dL  Auto Neutrophil # : 11.70 K/uL  Auto Lymphocyte # : 0.91 K/uL  Auto Monocyte # : 0.52 K/uL  Auto Eosinophil # : 0.85 K/uL  Auto Basophil # : 0.02 K/uL  Auto Neutrophil % : 83.1 %  Auto Lymphocyte % : 6.5 %  Auto Monocyte % : 3.7 %  Auto Eosinophil % : 6.0 %  Auto Basophil % : 0.1 %        Skin: coccyx DTI on admission      Nutrition focused physical exam previously  conducted - found signs of malnutrition [ ]absent [x ]present    Subcutaneous fat loss: Mild [x ] Orbital fat pads region, [ ]Buccal fat region, [ ]Triceps region,  [ ]Ribs region    Muscle wasting: Mild [x ]Temples region, [ ]Clavicle region, [x ]Shoulder region, [ ]Scapula region, [ ]Interosseous region,  [ ]thigh region, [ ]Calf region    Estimated Needs:   [x ] no change since previous assessment  [ ] recalculated:     Current Nutrition Diagnosis: Pt remains at high nutrition risk secondary to malnutrition (moderate, acute) related to inability to meet sufficient protein energy requirements in setting of Large L MCA stroke, respiratory failure, renal failure, +CDIFF, septic shock and poor skin integrity as evidenced by physical signs of mild muscle/fat loss, meeting <75% nutrient needs >7 days, and 1+ generalized edema. Extubated on 8/10. Remains with poor prognosis, pt not a candidate for acute rehab. hospice/comfort care being discussed/considered. Pt DNR.     Recommendations:   1) Continue tube feeds as ordered at this time; additional free water per MD discretion.  2) Rx: MVI and vitamin C 500mg daily.  3) Obtain daily weights to monitor trends.   4) Continue to honor pt/pt family wishes regarding nutrition/hydration.     Monitoring and Evaluation:   [ ] PO intake [ ] Tolerance to diet prescription [X] Weights  [X] Follow up per protocol [X] Labs:

## 2022-08-15 NOTE — PROGRESS NOTE ADULT - ASSESSMENT
76M with PMH CABG, HTN, HLD who presents as transfer from [facility] for stroke. Last known well was  10pm prior to going to bed, woke up this morning around 0500 with R sided weakness and R facial droop. Presented to [facility], code stroke initiated, NIH at [facility] reportedly 8. CTA showed LM1 occlusion. Transferred to [facility] for possible neuro IR intervention. On arrival to [facility], NIH 6. Decision made to take patient directly to neuro IR for intervention.   AS  ABOVE PT WITH LONG HOSPITALIZATION ADMITTED JULY 24    with LM1 occlusion, s/p TICI 2B MT, no tPA as wake-up stroke. post thrombectomy, re-intubated for hypoxic respiratory failure. MRI brain with a large L MCA stroke with small area of reperfusion hemorrhage. Course complicated by Septic shock, UTI, C. dif, Urinary obstruction, respiratory failure secondary to poor mental status now s/p extubation 8/10 and transferred to medicine for further care.   PT WITH PRIOR EPISODE OF C DIFF DURING HOSP STAY NOW  WITH FEVERS AND LEUKOCYTOSIS .  PT HAD BLOOD CX 2 SETS AND PLACED ON EMPIRIC ABX  WOULD RECOMMEND CONTINUE IV ABX    PT HAD CT CHEST  8/15 with  MULTIFOCAL PNEUMONIA  SOME LOW GRADE TEMP BUT OVERALL  APPEARS MORE AWAKE TODAY   SPOKE TO FAMILY AT BEDSIDE  WILL FOLLOW UP WITH FURTHER RECOMMENDATIONS              76M with PMH CABG, HTN, HLD who presents as transfer from [facility] for stroke. Last known well was  10pm prior to going to bed, woke up this morning around 0500 with R sided weakness and R facial droop. Presented to [facility], code stroke initiated, NIH at [facility] reportedly 8. CTA showed LM1 occlusion. Transferred to [facility] for possible neuro IR intervention. On arrival to [facility], NIH 6. Decision made to take patient directly to neuro IR for intervention.   AS  ABOVE PT WITH LONG HOSPITALIZATION ADMITTED JULY 24    with LM1 occlusion, s/p TICI 2B MT, no tPA as wake-up stroke. post thrombectomy, re-intubated for hypoxic respiratory failure. MRI brain with a large L MCA stroke with small area of reperfusion hemorrhage. Course complicated by Septic shock, UTI, C. dif, Urinary obstruction, respiratory failure secondary to poor mental status now s/p extubation 8/10 and transferred to medicine for further care.   PT WITH PRIOR EPISODE OF C DIFF DURING HOSP STAY NOW  WITH FEVERS AND LEUKOCYTOSIS .  PT HAD BLOOD CX 2 SETS AND PLACED ON EMPIRIC ABX  WOULD RECOMMEND CONTINUE IV ABX    PT HAD CT CHEST  8/15 with  MULTIFOCAL PNEUMONIA  SOME LOW GRADE TEMP BUT OVERALL  WILL ADD BACK ORAL  VANCO FOR C DIFF   APPEARS MORE AWAKE TODAY   SPOKE TO FAMILY AT BEDSIDE  WILL FOLLOW UP WITH FURTHER RECOMMENDATIONS              76M with PMH CABG, HTN, HLD who presents as transfer from [facility] for stroke. Last known well was  10pm prior to going to bed, woke up this morning around 0500 with R sided weakness and R facial droop. Presented to [facility], code stroke initiated, NIH at [facility] reportedly 8. CTA showed LM1 occlusion. Transferred to [facility] for possible neuro IR intervention. On arrival to [facility], NIH 6. Decision made to take patient directly to neuro IR for intervention.   AS  ABOVE PT WITH LONG HOSPITALIZATION ADMITTED JULY 24    with LM1 occlusion, s/p TICI 2B MT, no tPA as wake-up stroke. post thrombectomy, re-intubated for hypoxic respiratory failure. MRI brain with a large L MCA stroke with small area of reperfusion hemorrhage. Course complicated by Septic shock, UTI, C. dif, Urinary obstruction, respiratory failure secondary to poor mental status now s/p extubation 8/10 and transferred to medicine for further care.   PT WITH PRIOR EPISODE OF C DIFF DURING HOSP STAY NOW  WITH FEVERS AND LEUKOCYTOSIS .  PT HAD BLOOD CX 2 SETS AND PLACED ON EMPIRIC ABX  WOULD RECOMMEND CONTINUE IV ABX    PT HAD CT CHEST  8/15 with  MULTIFOCAL PNEUMONIA  SOME LOW GRADE TEMP BUT OVERALL  WILL ADD BACK ORAL  VANCO FOR C DIFF   APPEARS MORE AWAKE TODAY   SPOKE TO FAMILY AT BEDSIDE  WILL FOLLOW UP  AS NEEDED   PLEASE CALL IF QUESTIONS    PLEASE COMPLETE 7 DAYS IV ABX  PT IS CLEAR FOR PEG FROM ID  STANDPOINT

## 2022-08-15 NOTE — PROGRESS NOTE ADULT - SUBJECTIVE AND OBJECTIVE BOX
INFECTIOUS DISEASES AND INTERNAL MEDICINE at [place]  =======================================================  Michael Kingston MD  Diplomates American Board of Internal Medicine and Infectious Diseases  Telephone [phone]  Fax            [phone]  =======================================================    NATANAEL ROWAN [id number]    Follow up: PNEUMONIA     Allergies:  No Known Allergies      Medications:  acetaminophen     Tablet .. 650 milliGRAM(s) Oral every 6 hours PRN  albuterol/ipratropium for Nebulization 3 milliLiter(s) Nebulizer every 6 hours  amantadine 100 milliGRAM(s) Oral <User Schedule>  aspirin  chewable 81 milliGRAM(s) Oral daily  BACItracin   Ointment 1 Application(s) Topical two times a day  chlorhexidine 2% Cloths 1 Application(s) Topical daily  dextrose 5%. 1000 milliLiter(s) IV Continuous <Continuous>  dextrose 5%. 1000 milliLiter(s) IV Continuous <Continuous>  dextrose 50% Injectable 25 Gram(s) IV Push once  dextrose Oral Gel 15 Gram(s) Oral once PRN  enoxaparin Injectable 40 milliGRAM(s) SubCutaneous every 24 hours  glucagon  Injectable 1 milliGRAM(s) IntraMuscular once  insulin lispro (ADMELOG) corrective regimen sliding scale   SubCutaneous every 6 hours  insulin NPH human recombinant 15 Unit(s) SubCutaneous every 8 hours  melatonin 3 milliGRAM(s) Oral at bedtime  metoprolol tartrate 50 milliGRAM(s) Oral every 8 hours  ondansetron Injectable 4 milliGRAM(s) IV Push every 6 hours PRN  piperacillin/tazobactam IVPB.. 3.375 Gram(s) IV Intermittent every 8 hours  spironolactone 12.5 milliGRAM(s) Oral daily    SOCIAL       FAMILY   FAMILY HISTORY:    REVIEW OF SYSTEMS:  UNABLE  TO Obtain           Physical Exam:  ICU Vital Signs Last 24 Hrs  T(C): 37.8 (15 Aug 2022 12:11), Max: 37.9 (15 Aug 2022 07:40)  T(F): 100 (15 Aug 2022 12:11), Max: 100.3 (15 Aug 2022 07:40)  HR: 100 (15 Aug 2022 16:00) (90 - 100)  BP: 127/60 (15 Aug 2022 16:00) (95/60 - 127/60)  BP(mean): 76 (15 Aug 2022 16:00) (65 - 78)  ABP: --  ABP(mean): --  RR: 30 (15 Aug 2022 16:00) (24 - 30)  SpO2: 94% (15 Aug 2022 16:00) (94% - 100%)    O2 Parameters below as of 15 Aug 2022 16:00  Patient On (Oxygen Delivery Method): mask, aerosol          GEN: NAD,  EYES OPEN  HEENT: normocephalic and atraumatic. EOMI. ELIGIO.    NECK: Supple. No carotid bruits.  No lymphadenopathy or thyromegaly.  LUNGS: Clear to auscultation.  HEART: Regular rate and rhythm without murmur.  ABDOMEN: Soft, nontender, and nondistended.  Positive bowel sounds.    : No CVA tenderness  EXTREMITIES: Without any cyanosis, clubbing, rash, lesions or edema.  MSK: no joint swelling  NEUROLOGIC:  EYES OPEN SEEMS TO RESPONDS TO COMMANDS        Labs:  Vitals:  ============  T(F): 100 (15 Aug 2022 12:11), Max: 100.3 (15 Aug 2022 07:40)  HR: 100 (15 Aug 2022 16:00)  BP: 127/60 (15 Aug 2022 16:00)  RR: 30 (15 Aug 2022 16:00)  SpO2: 94% (15 Aug 2022 16:00) (94% - 100%)  temp max in last 48H T(F): , Max: 102 (08-14-22 @ 00:00)    =======================================================  Current Antibiotics:  piperacillin/tazobactam IVPB.. 3.375 Gram(s) IV Intermittent every 8 hours    Other medications:  albuterol/ipratropium for Nebulization 3 milliLiter(s) Nebulizer every 6 hours  amantadine 100 milliGRAM(s) Oral <User Schedule>  aspirin  chewable 81 milliGRAM(s) Oral daily  BACItracin   Ointment 1 Application(s) Topical two times a day  chlorhexidine 2% Cloths 1 Application(s) Topical daily  dextrose 5%. 1000 milliLiter(s) IV Continuous <Continuous>  dextrose 5%. 1000 milliLiter(s) IV Continuous <Continuous>  dextrose 50% Injectable 25 Gram(s) IV Push once  enoxaparin Injectable 40 milliGRAM(s) SubCutaneous every 24 hours  glucagon  Injectable 1 milliGRAM(s) IntraMuscular once  insulin lispro (ADMELOG) corrective regimen sliding scale   SubCutaneous every 6 hours  insulin NPH human recombinant 15 Unit(s) SubCutaneous every 8 hours  melatonin 3 milliGRAM(s) Oral at bedtime  metoprolol tartrate 50 milliGRAM(s) Oral every 8 hours  spironolactone 12.5 milliGRAM(s) Oral daily      =======================================================  Labs:                        10.8   14.09 )-----------( 316      ( 15 Aug 2022 05:30 )             32.8     08-15    145  |  105  |  27.8<H>  ----------------------------<  141<H>  4.5   |  30.0<H>  |  0.65    Ca    8.7      15 Aug 2022 05:30  Phos  3.7     08-14  Mg     2.0     08-14    TPro  6.6  /  Alb  2.5<L>  /  TBili  0.4  /  DBili  x   /  AST  50<H>  /  ALT  69<H>  /  AlkPhos  198<H>  08-15      Culture - Blood (collected 08-13-22 @ 13:43)  Source: .Blood Blood    Culture - Blood (collected 08-13-22 @ 13:38)  Source: .Blood Blood    Culture - Blood (collected 08-02-22 @ 01:07)  Source: .Blood Blood-Peripheral  Final Report (08-07-22 @ 05:57):    No Growth Final    Culture - Blood (collected 08-02-22 @ 01:03)  Source: .Blood Blood-Peripheral  Final Report (08-07-22 @ 05:57):    No Growth Final      Creatinine, Serum: 0.65 mg/dL (08-15-22 @ 05:30)  Creatinine, Serum: 0.59 mg/dL (08-14-22 @ 17:18)  Creatinine, Serum: 0.63 mg/dL (08-14-22 @ 09:15)  Creatinine, Serum: 0.63 mg/dL (08-13-22 @ 09:25)  Creatinine, Serum: 0.63 mg/dL (08-12-22 @ 06:46)  Creatinine, Serum: 0.55 mg/dL (08-11-22 @ 03:02)    Procalcitonin, Serum: 0.64 ng/mL (08-02-22 @ 01:28)          WBC Count: 14.09 K/uL (08-15-22 @ 05:30)  WBC Count: 16.34 K/uL (08-14-22 @ 09:15)  WBC Count: 17.29 K/uL (08-13-22 @ 09:25)  WBC Count: 18.41 K/uL (08-12-22 @ 06:46)  WBC Count: 13.14 K/uL (08-11-22 @ 03:02)    COVID-19 PCR: NotDetec (08-04-22 @ 03:55)  COVID-19 PCR: NotDetec (07-28-22 @ 16:55)      Alkaline Phosphatase, Serum: 198 U/L (08-15-22 @ 05:30)  Alkaline Phosphatase, Serum: 212 U/L (08-14-22 @ 09:15)  Alkaline Phosphatase, Serum: 211 U/L (08-13-22 @ 09:25)  Alanine Aminotransferase (ALT/SGPT): 69 U/L (08-15-22 @ 05:30)  Alanine Aminotransferase (ALT/SGPT): 79 U/L (08-14-22 @ 09:15)  Alanine Aminotransferase (ALT/SGPT): 108 U/L (08-13-22 @ 09:25)  Aspartate Aminotransferase (AST/SGOT): 50 U/L (08-15-22 @ 05:30)  Aspartate Aminotransferase (AST/SGOT): 53 U/L (08-14-22 @ 09:15)  Aspartate Aminotransferase (AST/SGOT): 120 U/L (08-13-22 @ 09:25)  Bilirubin Total, Serum: 0.4 mg/dL (08-15-22 @ 05:30)  Bilirubin Total, Serum: 0.6 mg/dL (08-14-22 @ 09:15)  Bilirubin Total, Serum: 0.5 mg/dL (08-13-22 @ 09:25)

## 2022-08-15 NOTE — PROGRESS NOTE ADULT - ASSESSMENT
Acute hypoxic  Respiratory failure   Sepsis sec Multilober PNA   SEC aspiration, on 5 l nc  successfully extubated earlier in hospitalization  IV  ZOSYN.   ID following  Blood cultures (-)  CT CHEST this am  Temp 100.3 max  Recurrent aspiration PNA      Dysphagia  Following CVA  Failed swallow eval several assessments  Plan MBS  NGT placed and in use for Nutrtional supplementation  Decision to place Peg tube next discussion  Meeting with Son at 1400 today  - SON TO MAKE DECISION About PEG    Hypernatremia  Trending BMP                    145  |  105  |  27.8<H>  ----------------------------<  141<H>  4.5   |  30.0<H>  |  0.65  Increasing   free water   Nephrology rec    Acute  Left MCA Stroke,    7/24 with LM1 occlusion, s/p TICI 2B MT, no tPA as wake-up stroke. post thrombectomy  Small Hemorrhagic conversion sec to reperfusion  - ASA,  -hold Lipitor for transaminitis  - CT Head of 7-30-22 reviewed. Stable left frontal hemorrhagic products within the infarct.    - EEG  no seizure  - CT/CTA/CTP -images and reports were reviewed.   - MRI Brain  as above   - TTE : EF < 10%.  - IMTIAZ  was  postponed due to fever.  - SCD/ SQ Lovenox for DVT prophylaxis.    Combined systolic and diastolic heart failure,   NSVT   ICM - TTE 7/24 showed LVEF <10% with RV dysfunction  Metoprolol Lopressor 50mg > TID  for frequent ectopy   Spironolactone 12.5mg daily.   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

## 2022-08-15 NOTE — PROGRESS NOTE ADULT - ASSESSMENT
77 year old man with left middle cerebral artery CVA.     Stroke   He is status post thrombectomy of left M1 occlusion 7/24.  LDL: 97  Goal: < 70  Continue antiplatelet and statin.     Overall prognosis poor.   Will need either PEG or palliative care feeding depending upon family wishes  Family opting for PEG    Thank you for allowing me to participate in the care of your patient    Miguel A Parham MD, PhD   [id number]

## 2022-08-15 NOTE — GOALS OF CARE CONVERSATION - ADVANCED CARE PLANNING - CONVERSATION DETAILS
Son Juan Pablo, was late for our meeting, He started out being very negative, critisizing the mixed messages, he and his family  have been experiencing.  Family finally opted for Palliative extubation- with do not reintubate directive. They were prepared, to accept his Fathers passing away from this catastrophic stroke, and had agreed to "Comfort care". Instead he was transferred to SDU, where he continued to be   aggressively treated-Very disturbing and confusing for family to understand the plan of care going forward.  Now son is convinced that he is going to live. His Father was much more awake and alert today, eyes tracking his visitors  following some commands.   At same time, he had recurrent aspiration PNA with NGT feeding, not sure what to think.    Family taking it one day at a time.   They agree that once his PNA was treated, they would want a peg tube placed, so that he can go to [facility] or SNF and possibly continue his recovery.  They also understand and agree to hold NGT feeding at least overnight, to prevent further aspiration    Dr. Wylie answered most of his and daughter-in-laws questions, tried explaining improtant points. Family insisting they believe he will recover and leave the hospital.
I spent a great deal of time listening to Sherri, recalling finding her  after his stroke. He was able to talk with some difficulty  and was having trouble with his leg weakness.She seemed to be fully informed about her husbands Neurologic assessments;  and agrees with the fact that he is less responsive today, as opposed to yesterday.  He has C Diff diarrhea and fever, which is another setback to his ability to recover from a very serious stroke.  She asked about his heart, I explained the pumping action of his heart is very weak, eventually tied this fact into asking  if she has been thinking about his code status; would he want to have CPR if his heart should stop during this hospitalization.  Without hesitation, she said no he/she would not want him  to go through that.   is less reactive today, disappointing because he was clutching her hand and his eyes were fluttering when name was called. Only having passive movement of L leg.    I called their son Leeroy to introduce myself and Palliative care service; our team interdisciplinary approach. He immediately began complaining about the poor communication he had to endure until Dr. Zaidi took over his case. Very negative and   mistrusting. I let him know I spent time with his Mother at his Father's bedside, prepared him for the fact that his Dad was less responsive today, which was news he was having a hard time believing    I added that his Mother did say that she would not want his Father to go through CPR if his heart should stop. He was very upset that I spoke with her - without him being present-"my mother doesn't understand what is going on, she is upset and didn't know what you were talking about"    I reassured him, that I did not upset his mother, didn't dwell on this subject at all, and have no intention of entering  change in code status without his presence-he is making all decisions. He wants to speak to the new  assigned to his Father's case when he arrives to visit his Father in about an hour.

## 2022-08-16 LAB
ANION GAP SERPL CALC-SCNC: 11 MMOL/L — SIGNIFICANT CHANGE UP (ref 5–17)
BUN SERPL-MCNC: 28.1 MG/DL — HIGH (ref 8–20)
CALCIUM SERPL-MCNC: 8.5 MG/DL — SIGNIFICANT CHANGE UP (ref 8.4–10.5)
CHLORIDE SERPL-SCNC: 100 MMOL/L — SIGNIFICANT CHANGE UP (ref 98–107)
CO2 SERPL-SCNC: 28 MMOL/L — SIGNIFICANT CHANGE UP (ref 22–29)
CREAT SERPL-MCNC: 0.64 MG/DL — SIGNIFICANT CHANGE UP (ref 0.5–1.3)
EGFR: 98 ML/MIN/1.73M2 — SIGNIFICANT CHANGE UP
GLUCOSE BLDC GLUCOMTR-MCNC: 100 MG/DL — HIGH (ref 70–99)
GLUCOSE BLDC GLUCOMTR-MCNC: 106 MG/DL — HIGH (ref 70–99)
GLUCOSE BLDC GLUCOMTR-MCNC: 131 MG/DL — HIGH (ref 70–99)
GLUCOSE BLDC GLUCOMTR-MCNC: 132 MG/DL — HIGH (ref 70–99)
GLUCOSE BLDC GLUCOMTR-MCNC: 151 MG/DL — HIGH (ref 70–99)
GLUCOSE BLDC GLUCOMTR-MCNC: 97 MG/DL — SIGNIFICANT CHANGE UP (ref 70–99)
GLUCOSE SERPL-MCNC: 152 MG/DL — HIGH (ref 70–99)
HCT VFR BLD CALC: 30.7 % — LOW (ref 39–50)
HGB BLD-MCNC: 10.3 G/DL — LOW (ref 13–17)
MCHC RBC-ENTMCNC: 30.6 PG — SIGNIFICANT CHANGE UP (ref 27–34)
MCHC RBC-ENTMCNC: 33.6 GM/DL — SIGNIFICANT CHANGE UP (ref 32–36)
MCV RBC AUTO: 91.1 FL — SIGNIFICANT CHANGE UP (ref 80–100)
PLATELET # BLD AUTO: 270 K/UL — SIGNIFICANT CHANGE UP (ref 150–400)
POTASSIUM SERPL-MCNC: 4.4 MMOL/L — SIGNIFICANT CHANGE UP (ref 3.5–5.3)
POTASSIUM SERPL-SCNC: 4.4 MMOL/L — SIGNIFICANT CHANGE UP (ref 3.5–5.3)
RBC # BLD: 3.37 M/UL — LOW (ref 4.2–5.8)
RBC # FLD: 15.1 % — HIGH (ref 10.3–14.5)
SARS-COV-2 RNA SPEC QL NAA+PROBE: SIGNIFICANT CHANGE UP
SODIUM SERPL-SCNC: 139 MMOL/L — SIGNIFICANT CHANGE UP (ref 135–145)
WBC # BLD: 10.68 K/UL — HIGH (ref 3.8–10.5)
WBC # FLD AUTO: 10.68 K/UL — HIGH (ref 3.8–10.5)

## 2022-08-16 PROCEDURE — 99233 SBSQ HOSP IP/OBS HIGH 50: CPT

## 2022-08-16 RX ORDER — VANCOMYCIN HCL 1 G
125 VIAL (EA) INTRAVENOUS EVERY 6 HOURS
Refills: 0 | Status: COMPLETED | OUTPATIENT
Start: 2022-08-16 | End: 2022-08-29

## 2022-08-16 RX ADMIN — Medication 125 MILLIGRAM(S): at 18:06

## 2022-08-16 RX ADMIN — Medication 50 MILLIGRAM(S): at 22:34

## 2022-08-16 RX ADMIN — Medication 100 MILLIGRAM(S): at 12:24

## 2022-08-16 RX ADMIN — Medication 81 MILLIGRAM(S): at 12:24

## 2022-08-16 RX ADMIN — Medication 125 MILLIGRAM(S): at 12:23

## 2022-08-16 RX ADMIN — Medication 2: at 05:57

## 2022-08-16 RX ADMIN — Medication 1 APPLICATION(S): at 18:54

## 2022-08-16 RX ADMIN — PIPERACILLIN AND TAZOBACTAM 25 GRAM(S): 4; .5 INJECTION, POWDER, LYOPHILIZED, FOR SOLUTION INTRAVENOUS at 03:25

## 2022-08-16 RX ADMIN — ENOXAPARIN SODIUM 40 MILLIGRAM(S): 100 INJECTION SUBCUTANEOUS at 20:51

## 2022-08-16 RX ADMIN — Medication 3 MILLIGRAM(S): at 22:34

## 2022-08-16 RX ADMIN — Medication 3 MILLILITER(S): at 10:26

## 2022-08-16 RX ADMIN — Medication 125 MILLIGRAM(S): at 05:47

## 2022-08-16 RX ADMIN — Medication 650 MILLIGRAM(S): at 00:35

## 2022-08-16 RX ADMIN — Medication 650 MILLIGRAM(S): at 01:05

## 2022-08-16 RX ADMIN — Medication 3 MILLILITER(S): at 21:13

## 2022-08-16 RX ADMIN — Medication 1 APPLICATION(S): at 05:45

## 2022-08-16 RX ADMIN — Medication 3 MILLILITER(S): at 17:17

## 2022-08-16 RX ADMIN — SPIRONOLACTONE 12.5 MILLIGRAM(S): 25 TABLET, FILM COATED ORAL at 05:45

## 2022-08-16 RX ADMIN — HUMAN INSULIN 15 UNIT(S): 100 INJECTION, SUSPENSION SUBCUTANEOUS at 05:58

## 2022-08-16 RX ADMIN — PIPERACILLIN AND TAZOBACTAM 25 GRAM(S): 4; .5 INJECTION, POWDER, LYOPHILIZED, FOR SOLUTION INTRAVENOUS at 20:52

## 2022-08-16 RX ADMIN — PIPERACILLIN AND TAZOBACTAM 25 GRAM(S): 4; .5 INJECTION, POWDER, LYOPHILIZED, FOR SOLUTION INTRAVENOUS at 12:23

## 2022-08-16 RX ADMIN — CHLORHEXIDINE GLUCONATE 1 APPLICATION(S): 213 SOLUTION TOPICAL at 05:56

## 2022-08-16 RX ADMIN — Medication 50 MILLIGRAM(S): at 06:32

## 2022-08-16 RX ADMIN — Medication 100 MILLIGRAM(S): at 18:06

## 2022-08-16 RX ADMIN — Medication 125 MILLIGRAM(S): at 23:43

## 2022-08-16 RX ADMIN — Medication 3 MILLILITER(S): at 04:28

## 2022-08-16 NOTE — PROGRESS NOTE ADULT - NSPROGADDITIONALINFOA_GEN_ALL_CORE
Total Time Spent__20__ minutes  This includes chart review, patient assessment, discussion and collaboration with interdisciplinary team members, ACP planning    COUNSELING:  Face to face meeting to discuss Advanced Care Planning - Time Spent ______Minutes.      Thank you for the opportunity to assist with the care of this patient.   [facility], [place] Palliative Medicine Consult Service [phone].

## 2022-08-16 NOTE — PROGRESS NOTE ADULT - SUBJECTIVE AND OBJECTIVE BOX
OVERNIGHT EVENTS:  F/U Note  Patient awake, following some commands  tracking R to L no attempts to speak or mouth word  LLE he moves and falls over side of mattress  Wrist restrained to avoid pulling out lines    On Aerosol mask RR wnl    Present Symptoms:     Dyspnea: Mild   Nausea/Vomiting:  No  Anxiety:  Yes   Depression: No  Fatigue: Yes   Loss of appetite: NPO dysphagia  Constipation: incontinent    Pain: no pain behaviors observed            Character-            Duration-            Effect-            Factors-            Frequency-            Location-            Severity-    Review of Systems: Reviewed                       Unable to obtain due to poor mentation       MEDICATIONS  (STANDING):  albuterol/ipratropium for Nebulization 3 milliLiter(s) Nebulizer every 6 hours  amantadine 100 milliGRAM(s) Oral <User Schedule>  aspirin  chewable 81 milliGRAM(s) Oral daily  BACItracin   Ointment 1 Application(s) Topical two times a day  chlorhexidine 2% Cloths 1 Application(s) Topical daily  dextrose 5%. 1000 milliLiter(s) (50 mL/Hr) IV Continuous <Continuous>  dextrose 5%. 1000 milliLiter(s) (100 mL/Hr) IV Continuous <Continuous>  dextrose 50% Injectable 25 Gram(s) IV Push once  enoxaparin Injectable 40 milliGRAM(s) SubCutaneous every 24 hours  glucagon  Injectable 1 milliGRAM(s) IntraMuscular once  insulin lispro (ADMELOG) corrective regimen sliding scale   SubCutaneous every 6 hours  insulin NPH human recombinant 15 Unit(s) SubCutaneous every 8 hours  melatonin 3 milliGRAM(s) Oral at bedtime  metoprolol tartrate 50 milliGRAM(s) Oral every 8 hours  piperacillin/tazobactam IVPB.. 3.375 Gram(s) IV Intermittent every 8 hours  spironolactone 12.5 milliGRAM(s) Oral daily  vancomycin    Solution 125 milliGRAM(s) Enteral Tube every 6 hours    MEDICATIONS  (PRN):  acetaminophen     Tablet .. 650 milliGRAM(s) Oral every 6 hours PRN Temp greater or equal to 38C (100.4F), Mild Pain (1 - 3)  dextrose Oral Gel 15 Gram(s) Oral once PRN Blood Glucose LESS THAN 70 milliGRAM(s)/deciliter  ondansetron Injectable 4 milliGRAM(s) IV Push every 6 hours PRN Nausea and/or Vomiting      PHYSICAL EXAM:    Vital Signs Last 24 Hrs  T(C): 37.7 (16 Aug 2022 12:15), Max: 38.3 (16 Aug 2022 00:00)  T(F): 99.9 (16 Aug 2022 12:15), Max: 100.9 (16 Aug 2022 00:00)  HR: 89 (16 Aug 2022 10:35) (80 - 106)  BP: 105/60 (16 Aug 2022 08:00) (95/60 - 127/60)  BP(mean): 67 (16 Aug 2022 08:00) (65 - 76)  RR: 20 (16 Aug 2022 08:00) (19 - 30)  SpO2: 96% (16 Aug 2022 10:35) (94% - 99%)    Parameters below as of 16 Aug 2022 10:35  Patient On (Oxygen Delivery Method): mask, aerosol,50%    General: awake ____ lethargic mildly restless                  nonverbal      Karnofsky: 10 %    HEENT: dry mouth      Lungs: comfortable tachypnea/labored breathing  Less secretions since scopolamine patch applied    CV: normal      GI: normal  distended                 NG/tube  constipation  last BM:     :  malka    MSK:   weakness                bedbound    Skin:  pressure ulcers- Stage_____  no rash    LABS:                          10.3   10.68 )-----------( 270      ( 16 Aug 2022 06:00 )             30.7     08-16    139  |  100  |  28.1<H>  ----------------------------<  152<H>  4.4   |  28.0  |  0.64    Ca    8.5      16 Aug 2022 06:00  Phos  3.7     08-14  Mg     2.0     08-14    TPro  6.6  /  Alb  2.5<L>  /  TBili  0.4  /  DBili  x   /  AST  50<H>  /  ALT  69<H>  /  AlkPhos  198<H>  08-15      I&O's Summary    15 Aug 2022 07:01  -  16 Aug 2022 07:00  --------------------------------------------------------  IN: 4040 mL / OUT: 1700 mL / NET: 2340 mL    RADIOLOGY & ADDITIONAL STUDIES:  < from: CT Chest No Cont (08.15.22 @ 10:34) >    FINDINGS:    AIRWAYS, LUNGS and PLEURA: Patent central airways. Consolidation of right   lower lobe/right middle lobe, tree-in-bud opacities within posterior   right upper lobe and mild opacity within basilar left lower lobe   representing multifocal pneumonia. No pleural effusion.    MEDIASTINUM AND MAGALY: Mildly enlarged mediastinal lymph nodes for example   1.5 cm short axis subcarinal lymph node.    HEART AND VESSELS: Cardiomegaly. CABG. Coronary and aortic   calcifications. No pericardial effusion. Thoracic aorta and pulmonary   artery normal in diameter.    VISUALIZED UPPER ABDOMEN: Enteric tube within the stomach. Aortic   calcifications.    CHEST WALL AND BONES: No aggressive osseous lesion. Moderate compression   deformity of L1 vertebral body. Healed sternotomy.    LOWER NECK: Within normal limits.    IMPRESSION:    Consolidation of right lower lobe/right middle lobe, tree-in-bud   opacities within posterior right upper lobe and mild opacity within   basilar left lower lobe representing multifocal pneumonia.    < end of copied text >    ADVANCE DIRECTIVES/TREATMENT PREFERENCES:  DNR YES   Completed on:                     MOLST  YES    Completed on:  Living Will  YES NO   Completed on: OVERNIGHT EVENTS:  F/U Note  Patient awake, following some commands  tracking R to L no attempts to speak or mouth word  LLE he moves and falls over side of mattress  Wrist restrained to avoid pulling out lines  On Aerosol mask RR WNL maintaining P Ox in 90's  Secretions not as audible or problematic    Present Symptoms:     Dyspnea: Mild   Nausea/Vomiting:  No  Anxiety:  Yes   Depression: No  Fatigue: Yes   Loss of appetite: NPO dysphagia  Constipation: incontinent    Pain: no pain behaviors observed            Character-            Duration-            Effect-            Factors-            Frequency-            Location-            Severity-    Review of Systems: Reviewed                       Unable to obtain due to poor mentation       MEDICATIONS  (STANDING):  albuterol/ipratropium for Nebulization 3 milliLiter(s) Nebulizer every 6 hours  amantadine 100 milliGRAM(s) Oral <User Schedule>  aspirin  chewable 81 milliGRAM(s) Oral daily  BACItracin   Ointment 1 Application(s) Topical two times a day  chlorhexidine 2% Cloths 1 Application(s) Topical daily  dextrose 5%. 1000 milliLiter(s) (50 mL/Hr) IV Continuous <Continuous>  dextrose 5%. 1000 milliLiter(s) (100 mL/Hr) IV Continuous <Continuous>  dextrose 50% Injectable 25 Gram(s) IV Push once  enoxaparin Injectable 40 milliGRAM(s) SubCutaneous every 24 hours  glucagon  Injectable 1 milliGRAM(s) IntraMuscular once  insulin lispro (ADMELOG) corrective regimen sliding scale   SubCutaneous every 6 hours  insulin NPH human recombinant 15 Unit(s) SubCutaneous every 8 hours  melatonin 3 milliGRAM(s) Oral at bedtime  metoprolol tartrate 50 milliGRAM(s) Oral every 8 hours  piperacillin/tazobactam IVPB.. 3.375 Gram(s) IV Intermittent every 8 hours  spironolactone 12.5 milliGRAM(s) Oral daily  vancomycin    Solution 125 milliGRAM(s) Enteral Tube every 6 hours    MEDICATIONS  (PRN):  acetaminophen     Tablet .. 650 milliGRAM(s) Oral every 6 hours PRN Temp greater or equal to 38C (100.4F), Mild Pain (1 - 3)  dextrose Oral Gel 15 Gram(s) Oral once PRN Blood Glucose LESS THAN 70 milliGRAM(s)/deciliter  ondansetron Injectable 4 milliGRAM(s) IV Push every 6 hours PRN Nausea and/or Vomiting      PHYSICAL EXAM:    Vital Signs Last 24 Hrs  T(C): 37.7 (16 Aug 2022 12:15), Max: 38.3 (16 Aug 2022 00:00)  T(F): 99.9 (16 Aug 2022 12:15), Max: 100.9 (16 Aug 2022 00:00)  HR: 89 (16 Aug 2022 10:35) (80 - 106)  BP: 105/60 (16 Aug 2022 08:00) (95/60 - 127/60)  BP(mean): 67 (16 Aug 2022 08:00) (65 - 76)  RR: 20 (16 Aug 2022 08:00) (19 - 30)  SpO2: 96% (16 Aug 2022 10:35) (94% - 99%)    Parameters below as of 16 Aug 2022 10:35  Patient On (Oxygen Delivery Method): mask, aerosol,50%    General: awake ____ lethargic mildly restless                  nonverbal      Karnofsky: 10 %    HEENT: dry mouth  NGT    Lungs: comfortable tachypnea/labored breathing  Less secretions since scopolamine patch applied    CV: normal      GI: normal  distended                 NG/tube  constipation  last BM:     :  malka    MSK:   weakness                bedbound    Skin:  pressure ulcers- Stage_____  no rash    LABS:                          10.3   10.68 )-----------( 270      ( 16 Aug 2022 06:00 )             30.7     08-16    139  |  100  |  28.1<H>  ----------------------------<  152<H>  4.4   |  28.0  |  0.64    Ca    8.5      16 Aug 2022 06:00  Phos  3.7     08-14  Mg     2.0     08-14    TPro  6.6  /  Alb  2.5<L>  /  TBili  0.4  /  DBili  x   /  AST  50<H>  /  ALT  69<H>  /  AlkPhos  198<H>  08-15      I&O's Summary    15 Aug 2022 07:01  -  16 Aug 2022 07:00  --------------------------------------------------------  IN: 4040 mL / OUT: 1700 mL / NET: 2340 mL    RADIOLOGY & ADDITIONAL STUDIES:  < from: CT Chest No Cont (08.15.22 @ 10:34) >    FINDINGS:    AIRWAYS, LUNGS and PLEURA: Patent central airways. Consolidation of right   lower lobe/right middle lobe, tree-in-bud opacities within posterior   right upper lobe and mild opacity within basilar left lower lobe   representing multifocal pneumonia. No pleural effusion.    MEDIASTINUM AND MAGALY: Mildly enlarged mediastinal lymph nodes for example   1.5 cm short axis subcarinal lymph node.    HEART AND VESSELS: Cardiomegaly. CABG. Coronary and aortic   calcifications. No pericardial effusion. Thoracic aorta and pulmonary   artery normal in diameter.    VISUALIZED UPPER ABDOMEN: Enteric tube within the stomach. Aortic   calcifications.    CHEST WALL AND BONES: No aggressive osseous lesion. Moderate compression   deformity of L1 vertebral body. Healed sternotomy.    LOWER NECK: Within normal limits.    IMPRESSION:    Consolidation of right lower lobe/right middle lobe, tree-in-bud   opacities within posterior right upper lobe and mild opacity within   basilar left lower lobe representing multifocal pneumonia.    < end of copied text >    ADVANCE DIRECTIVES/TREATMENT PREFERENCES:  DNR YES   Completed on:                     MOLST  YES    Completed on:  Living Will  YES NO   Completed on:

## 2022-08-16 NOTE — PROGRESS NOTE ADULT - SUBJECTIVE AND OBJECTIVE BOX
[facility] Advanced Heart Failure  Office: [address] [place] [address]  Telephone: [phone]/Fax: [phone]    Subjective/Objective: NAEO    Medications:  MEDICATIONS  (STANDING):  albuterol/ipratropium for Nebulization 3 milliLiter(s) Nebulizer every 6 hours  amantadine 100 milliGRAM(s) Oral <User Schedule>  aspirin  chewable 81 milliGRAM(s) Oral daily  BACItracin   Ointment 1 Application(s) Topical two times a day  chlorhexidine 2% Cloths 1 Application(s) Topical daily  dextrose 5%. 1000 milliLiter(s) (50 mL/Hr) IV Continuous <Continuous>  dextrose 5%. 1000 milliLiter(s) (100 mL/Hr) IV Continuous <Continuous>  dextrose 50% Injectable 25 Gram(s) IV Push once  enoxaparin Injectable 40 milliGRAM(s) SubCutaneous every 24 hours  glucagon  Injectable 1 milliGRAM(s) IntraMuscular once  insulin lispro (ADMELOG) corrective regimen sliding scale   SubCutaneous every 6 hours  insulin NPH human recombinant 15 Unit(s) SubCutaneous every 8 hours  melatonin 3 milliGRAM(s) Oral at bedtime  metoprolol tartrate 50 milliGRAM(s) Oral every 8 hours  piperacillin/tazobactam IVPB.. 3.375 Gram(s) IV Intermittent every 8 hours  spironolactone 12.5 milliGRAM(s) Oral daily  vancomycin    Solution 125 milliGRAM(s) Enteral Tube every 6 hours    MEDICATIONS  (PRN):  acetaminophen     Tablet .. 650 milliGRAM(s) Oral every 6 hours PRN Temp greater or equal to 38C (100.4F), Mild Pain (1 - 3)  dextrose Oral Gel 15 Gram(s) Oral once PRN Blood Glucose LESS THAN 70 milliGRAM(s)/deciliter  ondansetron Injectable 4 milliGRAM(s) IV Push every 6 hours PRN Nausea and/or Vomiting      Vitals:  Vital Signs Last 24 Hrs  T(C): 37.3 (16 Aug 2022 08:46), Max: 38.3 (16 Aug 2022 00:00)  T(F): 99.1 (16 Aug 2022 08:46), Max: 100.9 (16 Aug 2022 00:00)  HR: 89 (16 Aug 2022 10:35) (80 - 106)  BP: 105/60 (16 Aug 2022 08:00) (95/60 - 127/60)  BP(mean): 67 (16 Aug 2022 08:00) (65 - 76)  RR: 20 (16 Aug 2022 08:00) (19 - 30)  SpO2: 96% (16 Aug 2022 10:35) (94% - 99%)    Parameters below as of 16 Aug 2022 10:35  Patient On (Oxygen Delivery Method): mask, aerosol,50%        Tele: SR/ST, frequent PVCs, couplets   NSVT @ 9:22am (11 beats)    Physical Exam:  General: In no distress  Neck: Neck supple. JVP not elevated.   Chest: anterior breath sounds CTA bilaterally  CV: Normal S1 and S2. No murmurs, rub, or gallops. Radial pulses normal.  Abdomen: Soft, non-distended  Skin: warm, dry, no edema  Neurology: Responds to verbal stimuli, follows some commands    Labs:                      08-16    139  |  100  |  28.1<H>  ----------------------------<  152<H>  4.4   |  28.0  |  0.64    Ca    8.5      16 Aug 2022 06:00  Phos  3.7     08-14  Mg     2.0     08-14    TPro  6.6  /  Alb  2.5<L>  /  TBili  0.4  /  DBili  x   /  AST  50<H>  /  ALT  69<H>  /  AlkPhos  198<H>  08-15             10.3   10.68 )-----------( 270      ( 16 Aug 2022 06:00 )             30.7

## 2022-08-16 NOTE — PROGRESS NOTE ADULT - SUBJECTIVE AND OBJECTIVE BOX
Patient is a 77y old  Male who presents with a chief complaint of stroke (15 Aug 2022 18:21)  pt is awake, moves lt side, non verbal,does not follow commands  REVIEW OF SYSTEMS: Ams-unable    MEDICATIONS  (STANDING):  albuterol/ipratropium for Nebulization 3 milliLiter(s) Nebulizer every 6 hours  amantadine 100 milliGRAM(s) Oral <User Schedule>  aspirin  chewable 81 milliGRAM(s) Oral daily  BACItracin   Ointment 1 Application(s) Topical two times a day  chlorhexidine 2% Cloths 1 Application(s) Topical daily  dextrose 5%. 1000 milliLiter(s) (50 mL/Hr) IV Continuous <Continuous>  dextrose 5%. 1000 milliLiter(s) (100 mL/Hr) IV Continuous <Continuous>  dextrose 50% Injectable 25 Gram(s) IV Push once  enoxaparin Injectable 40 milliGRAM(s) SubCutaneous every 24 hours  glucagon  Injectable 1 milliGRAM(s) IntraMuscular once  insulin lispro (ADMELOG) corrective regimen sliding scale   SubCutaneous every 6 hours  insulin NPH human recombinant 15 Unit(s) SubCutaneous every 8 hours  melatonin 3 milliGRAM(s) Oral at bedtime  metoprolol tartrate 50 milliGRAM(s) Oral every 8 hours  piperacillin/tazobactam IVPB.. 3.375 Gram(s) IV Intermittent every 8 hours  spironolactone 12.5 milliGRAM(s) Oral daily  vancomycin    Solution 125 milliGRAM(s) Enteral Tube every 6 hours    MEDICATIONS  (PRN):  acetaminophen     Tablet .. 650 milliGRAM(s) Oral every 6 hours PRN Temp greater or equal to 38C (100.4F), Mild Pain (1 - 3)  dextrose Oral Gel 15 Gram(s) Oral once PRN Blood Glucose LESS THAN 70 milliGRAM(s)/deciliter  ondansetron Injectable 4 milliGRAM(s) IV Push every 6 hours PRN Nausea and/or Vomiting      CAPILLARY BLOOD GLUCOSE      POCT Blood Glucose.: 151 mg/dL (16 Aug 2022 05:44)  POCT Blood Glucose.: 131 mg/dL (16 Aug 2022 00:13)  POCT Blood Glucose.: 129 mg/dL (15 Aug 2022 22:07)  POCT Blood Glucose.: 105 mg/dL (15 Aug 2022 17:13)  POCT Blood Glucose.: 120 mg/dL (15 Aug 2022 13:39)    I&O's Summary    15 Aug 2022 07:01  -  16 Aug 2022 07:00  --------------------------------------------------------  IN: 4040 mL / OUT: 1700 mL / NET: 2340 mL        PHYSICAL EXAM:  Vital Signs Last 24 Hrs  T(C): 37.3 (16 Aug 2022 08:46), Max: 38.3 (16 Aug 2022 00:00)  T(F): 99.1 (16 Aug 2022 08:46), Max: 100.9 (16 Aug 2022 00:00)  HR: 89 (16 Aug 2022 10:35) (80 - 106)  BP: 105/60 (16 Aug 2022 08:00) (95/60 - 127/60)  BP(mean): 67 (16 Aug 2022 08:00) (65 - 76)  RR: 20 (16 Aug 2022 08:00) (19 - 30)  SpO2: 96% (16 Aug 2022 10:35) (94% - 99%)    Parameters below as of 16 Aug 2022 10:35  Patient On (Oxygen Delivery Method): mask, aerosol,50%        CONSTITUTIONAL: NAD,  EYES: PERRLA; conjunctiva and sclera clear  ENMT: Moist oral mucosa,   RESPIRATORY: Normal respiratory effort; crackles  to auscultation bilaterally  CARDIOVASCULAR: Regular rate and rhythm, normal S1 and S2, no murmur   EXTS: No lower extremity edema; Peripheral pulses are 2+ bilaterally  ABDOMEN: Nontender to palpation, normoactive bowel sounds, no rebound/guarding;   MUSCLOSKELETAL:    no joint swelling or tenderness to palpation  PSYCH:calm  NEUROLOGY: Awake,nonverbal,does not follow commands. moves LT SIDE    LABS:                        10.3   10.68 )-----------( 270      ( 16 Aug 2022 06:00 )             30.7     08-16    139  |  100  |  28.1<H>  ----------------------------<  152<H>  4.4   |  28.0  |  0.64    Ca    8.5      16 Aug 2022 06:00  Phos  3.7     08-14  Mg     2.0     08-14    TPro  6.6  /  Alb  2.5<L>  /  TBili  0.4  /  DBili  x   /  AST  50<H>  /  ALT  69<H>  /  AlkPhos  198<H>  08-15              Culture - Sputum (collected 15 Aug 2022 11:50)  Source: .Sputum Sputum  Gram Stain (15 Aug 2022 22:12):    Few polymorphonuclear leukocytes per low power field    Moderate Squamous epithelial cells per low power field    Few Gram Positive Rods per oil power field    Few Gram positive cocci in pairs per oil power field    Culture - Blood (collected 13 Aug 2022 13:43)  Source: .Blood Blood  Preliminary Report (15 Aug 2022 01:01):    No growth to date.    Culture - Blood (collected 13 Aug 2022 13:38)  Source: .Blood Blood  Preliminary Report (15 Aug 2022 01:01):    No growth to date.        RADIOLOGY & ADDITIONAL TESTS:  Results Reviewed:

## 2022-08-16 NOTE — PROGRESS NOTE ADULT - ASSESSMENT
Assessment and Plan:   Acute hypoxic  Respiratory failure   Sepsis sec Multilober PNA   SEC aspiration, on 5 l nc  successfully extubated earlier in hospitalization  IV  ZOSYN.   ID following  Blood cultures (-)  CT CHEST   Temp 100.9 max  Recurrent aspiration PNA    Pneumonia  Prior infection with C Diff during this hosptialization; now Fevers and Leukocytosis  Low grade temp 100.9  Plan is to put back on Oral Vanco for CDiff-family was informed  BC's x 2 sets-treating with Empiric antibiotics, which will be continued  CT Chest yesterday, Multifocal Pneumonia identified      Dysphagia  Following CVA  Failed swallow eval several assessments  Plan MBS when stable enough to safely tolerate  NGT placed and in use for Nutritional supplementation  Decision to place Peg tube covered in yesterdays family meeting    Acute  Left MCA Stroke,    7/24 with LM1 occlusion, s/p TICI 2B MT, no tPA as wake-up stroke. post thrombectomy  Small Hemorrhagic conversion sec to reperfusion  ASA,   CT Head of 7-30-22 reviewed. Stable left frontal hemorrhagic products within the infarct.    EEG  no seizure  TTE : EF < 10%.   IMTIAZ  was  postponed due to fever.   SCD/ SQ Lovenox for DVT prophylaxis.    Combined systolic and diastolic heart failure,   NSVT   ICM - TTE 7/24 showed LVEF <10% with RV dysfunction  Metoprolol Lopressor 50mg > TID  for frequent ectopy   Spironolactone 12.5mg daily.   Holding ACE  per cardiology DC Midodrine then plan to  start losartan  if BP stable    Goals of Care  Family still wants DNR/DNI as code status  Wants aggressive treatment  Son strongly feels his Father is going to recover, and be transferred to rehab  With that in mind, he wants to have a Peg tube placed to support his recovery with adequate nutrition

## 2022-08-16 NOTE — PROGRESS NOTE ADULT - ASSESSMENT
75yo man with CABG, PVD, HTN, HLD, and low EF (declined AICD), admitted 7/24 with LM1 occlusion, s/p TICI 2B MT, no tPA as wake-up stroke. post thrombectomy, re-intubated for hypoxic respiratory failure. MRI brain with a large L MCA stroke with small area of reperfusion hemorrhage. Course complicated by Septic shock, UTI, C. dif, Urinary obstruction, respiratory failure secondary to poor mental status now s/p extubation 8/10 and transferred to medicine for further care.     Acute hypoxic  Respiratory failure w/ Sepsis sec Multilober PNA likely GNR/ CDIF COLITIS   -SEC aspiration, on VENTIMASK   - s/p intubation ,extubation This admission  - temp 100.9 f  -HAS LOOSE STOOL  - -continue iv  ZOSYN. Add po vanco by  ID, F./U FURTHER REC  -bl c/s x2 ngtd, la STABLE  -CT CHEST pna  -f/u fever trend/wbc  - Pt is risk of recurrent aspiration pna  -TF was on hold. will resume today      Dysphagia: Post Intubation and CVA  -failed swallow eval  - NGT FEEDING  - SON WANTS  PEG  -Per GI not stable for pEG, Needs to clear cdif colitis/pna with improve respiratory status, GI spoke with son        Hypernatremia  -improved na 139  -  free water   -f/u nephrology rec  -f/u bmp    Acute  Left MCA Stroke,  7/24 with LM1 occlusion, s/p TICI 2B MT, no tPA as wake-up stroke. post thrombectomy  Small Hemorrhagic conversion sec to reperfusion  - ASA,  -hold Lipitor for transaminitis  - CT Head of 7-30-22 reviewed. Stable left frontal hemorrhagic products within the infarct.    - EEG  no seizure  - CT/CTA/CTP -images and reports were reviewed.   - MRI Brain  as above   - TTE : EF < 10%.  - IMTIAZ  was  postponed due to fever.  - SCD/ SQ Lovenox for DVT prophylaxis.    Transaminitis  -trending down  -hold lipitor        Combined systolic and diastolic heart failure, NSVT   ICM - TTE 7/24 showed LVEF <10% with RV dysfunction  Cont metoprolol  Lopressor 50mg TID  (Titrated to TID for frequent ectopy on monitor)   Spironolactone 12.5mg daily.   Hold Ace inhib for now  per cardiology dc midodrine then plan to  start losartan  if stable bp --bp soft   May need ICD given severe CM and ectopy however. 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 explained  over all prognosis guarded, pt is high risk of respiratory failure, aspiration pna, septic shock ,cardiac arrest, risk of high mortality. wife/son verbalized understood risk. family wants no limitation  of medical treatment except intubation/resuscitation. wants. PEG tube

## 2022-08-16 NOTE — PROGRESS NOTE ADULT - PROBLEM SELECTOR PLAN 1
- Known h/o ICMP.  TTE 7/24 showed LVEF <10% with RV dysfunction.  - Clinically appears euvolemic on exam  - Goal is to maintain net even fluid status, may use Lasix PRN however pt does not currently require.   - GDMT:   - Increase Spironolactone to 25mg daily  - Continue Lopressor 50mg TID for PVC control (will eventually plan to switch to metoprolol succinate).  - Now off midodrine with adequate BP  - Please check markers of perfusion daily (serum lactate, LFTs, T Bili).    - Monitor strict I&O and daily weights.   - Device: he has refused this in the past and this would typically would not be offered if life expectancy < 1yr ( will need to revisit based on CVA recovery).

## 2022-08-17 PROBLEM — Z00.00 ENCOUNTER FOR PREVENTIVE HEALTH EXAMINATION: Status: ACTIVE | Noted: 2022-08-17

## 2022-08-17 LAB
ALBUMIN SERPL ELPH-MCNC: 2.4 G/DL — LOW (ref 3.3–5.2)
ALP SERPL-CCNC: 221 U/L — HIGH (ref 40–120)
ALT FLD-CCNC: 64 U/L — HIGH
ANION GAP SERPL CALC-SCNC: 11 MMOL/L — SIGNIFICANT CHANGE UP (ref 5–17)
AST SERPL-CCNC: 51 U/L — HIGH
BASOPHILS # BLD AUTO: 0.03 K/UL — SIGNIFICANT CHANGE UP (ref 0–0.2)
BASOPHILS NFR BLD AUTO: 0.3 % — SIGNIFICANT CHANGE UP (ref 0–2)
BILIRUB SERPL-MCNC: 0.7 MG/DL — SIGNIFICANT CHANGE UP (ref 0.4–2)
BUN SERPL-MCNC: 21.6 MG/DL — HIGH (ref 8–20)
CALCIUM SERPL-MCNC: 8.5 MG/DL — SIGNIFICANT CHANGE UP (ref 8.4–10.5)
CHLORIDE SERPL-SCNC: 97 MMOL/L — LOW (ref 98–107)
CO2 SERPL-SCNC: 29 MMOL/L — SIGNIFICANT CHANGE UP (ref 22–29)
CREAT SERPL-MCNC: 0.55 MG/DL — SIGNIFICANT CHANGE UP (ref 0.5–1.3)
CULTURE RESULTS: SIGNIFICANT CHANGE UP
EGFR: 102 ML/MIN/1.73M2 — SIGNIFICANT CHANGE UP
EOSINOPHIL # BLD AUTO: 0.5 K/UL — SIGNIFICANT CHANGE UP (ref 0–0.5)
EOSINOPHIL NFR BLD AUTO: 5.7 % — SIGNIFICANT CHANGE UP (ref 0–6)
GLUCOSE BLDC GLUCOMTR-MCNC: 347 MG/DL — HIGH (ref 70–99)
GLUCOSE BLDC GLUCOMTR-MCNC: 87 MG/DL — SIGNIFICANT CHANGE UP (ref 70–99)
GLUCOSE BLDC GLUCOMTR-MCNC: 99 MG/DL — SIGNIFICANT CHANGE UP (ref 70–99)
GLUCOSE SERPL-MCNC: 123 MG/DL — HIGH (ref 70–99)
HCT VFR BLD CALC: 34.4 % — LOW (ref 39–50)
HGB BLD-MCNC: 11.1 G/DL — LOW (ref 13–17)
IMM GRANULOCYTES NFR BLD AUTO: 1.3 % — SIGNIFICANT CHANGE UP (ref 0–1.5)
LYMPHOCYTES # BLD AUTO: 0.84 K/UL — LOW (ref 1–3.3)
LYMPHOCYTES # BLD AUTO: 9.6 % — LOW (ref 13–44)
MCHC RBC-ENTMCNC: 30.1 PG — SIGNIFICANT CHANGE UP (ref 27–34)
MCHC RBC-ENTMCNC: 32.3 GM/DL — SIGNIFICANT CHANGE UP (ref 32–36)
MCV RBC AUTO: 93.2 FL — SIGNIFICANT CHANGE UP (ref 80–100)
MONOCYTES # BLD AUTO: 0.59 K/UL — SIGNIFICANT CHANGE UP (ref 0–0.9)
MONOCYTES NFR BLD AUTO: 6.7 % — SIGNIFICANT CHANGE UP (ref 2–14)
NEUTROPHILS # BLD AUTO: 6.71 K/UL — SIGNIFICANT CHANGE UP (ref 1.8–7.4)
NEUTROPHILS NFR BLD AUTO: 76.4 % — SIGNIFICANT CHANGE UP (ref 43–77)
PLATELET # BLD AUTO: 281 K/UL — SIGNIFICANT CHANGE UP (ref 150–400)
POTASSIUM SERPL-MCNC: 4.7 MMOL/L — SIGNIFICANT CHANGE UP (ref 3.5–5.3)
POTASSIUM SERPL-SCNC: 4.7 MMOL/L — SIGNIFICANT CHANGE UP (ref 3.5–5.3)
PROT SERPL-MCNC: 6.7 G/DL — SIGNIFICANT CHANGE UP (ref 6.6–8.7)
RBC # BLD: 3.69 M/UL — LOW (ref 4.2–5.8)
RBC # FLD: 14.8 % — HIGH (ref 10.3–14.5)
SODIUM SERPL-SCNC: 137 MMOL/L — SIGNIFICANT CHANGE UP (ref 135–145)
SPECIMEN SOURCE: SIGNIFICANT CHANGE UP
WBC # BLD: 8.78 K/UL — SIGNIFICANT CHANGE UP (ref 3.8–10.5)
WBC # FLD AUTO: 8.78 K/UL — SIGNIFICANT CHANGE UP (ref 3.8–10.5)

## 2022-08-17 PROCEDURE — 99233 SBSQ HOSP IP/OBS HIGH 50: CPT

## 2022-08-17 RX ORDER — SODIUM CHLORIDE 9 MG/ML
1000 INJECTION, SOLUTION INTRAVENOUS
Refills: 0 | Status: DISCONTINUED | OUTPATIENT
Start: 2022-08-17 | End: 2022-08-20

## 2022-08-17 RX ADMIN — PIPERACILLIN AND TAZOBACTAM 25 GRAM(S): 4; .5 INJECTION, POWDER, LYOPHILIZED, FOR SOLUTION INTRAVENOUS at 06:08

## 2022-08-17 RX ADMIN — Medication 1 APPLICATION(S): at 17:11

## 2022-08-17 RX ADMIN — Medication 50 MILLIGRAM(S): at 06:08

## 2022-08-17 RX ADMIN — Medication 81 MILLIGRAM(S): at 12:13

## 2022-08-17 RX ADMIN — Medication 3 MILLILITER(S): at 16:30

## 2022-08-17 RX ADMIN — Medication 3 MILLIGRAM(S): at 21:20

## 2022-08-17 RX ADMIN — Medication 125 MILLIGRAM(S): at 17:11

## 2022-08-17 RX ADMIN — SPIRONOLACTONE 12.5 MILLIGRAM(S): 25 TABLET, FILM COATED ORAL at 06:07

## 2022-08-17 RX ADMIN — Medication 50 MILLIGRAM(S): at 21:20

## 2022-08-17 RX ADMIN — PIPERACILLIN AND TAZOBACTAM 25 GRAM(S): 4; .5 INJECTION, POWDER, LYOPHILIZED, FOR SOLUTION INTRAVENOUS at 21:17

## 2022-08-17 RX ADMIN — Medication 100 MILLIGRAM(S): at 14:47

## 2022-08-17 RX ADMIN — ENOXAPARIN SODIUM 40 MILLIGRAM(S): 100 INJECTION SUBCUTANEOUS at 21:17

## 2022-08-17 RX ADMIN — SODIUM CHLORIDE 55 MILLILITER(S): 9 INJECTION, SOLUTION INTRAVENOUS at 12:16

## 2022-08-17 RX ADMIN — Medication 125 MILLIGRAM(S): at 06:07

## 2022-08-17 RX ADMIN — CHLORHEXIDINE GLUCONATE 1 APPLICATION(S): 213 SOLUTION TOPICAL at 06:09

## 2022-08-17 RX ADMIN — Medication 3 MILLILITER(S): at 03:40

## 2022-08-17 RX ADMIN — Medication 3 MILLILITER(S): at 09:14

## 2022-08-17 RX ADMIN — Medication 50 MILLIGRAM(S): at 14:47

## 2022-08-17 RX ADMIN — Medication 3 MILLILITER(S): at 22:23

## 2022-08-17 RX ADMIN — Medication 125 MILLIGRAM(S): at 12:13

## 2022-08-17 RX ADMIN — Medication 8: at 06:09

## 2022-08-17 RX ADMIN — Medication 1 APPLICATION(S): at 06:07

## 2022-08-17 RX ADMIN — PIPERACILLIN AND TAZOBACTAM 25 GRAM(S): 4; .5 INJECTION, POWDER, LYOPHILIZED, FOR SOLUTION INTRAVENOUS at 14:47

## 2022-08-17 RX ADMIN — Medication 100 MILLIGRAM(S): at 09:11

## 2022-08-17 RX ADMIN — HUMAN INSULIN 15 UNIT(S): 100 INJECTION, SUSPENSION SUBCUTANEOUS at 06:09

## 2022-08-17 NOTE — SWALLOW BEDSIDE ASSESSMENT ADULT - COMMENTS
As per MD note: "75yo man with CABG, PVD, HTN, HLD, and low EF (declined AICD), admitted 7/24 with LM1 occlusion, s/p TICI 2B MT, no tPA as wake-up stroke. post thrombectomy, re-intubated for hypoxic respiratory failure. MRI brain with a large L MCA stroke with small area of reperfusion hemorrhage. Course complicated by Septic shock, UTI, C. dif, Urinary obstruction, respiratory failure secondary to poor mental status now s/p extubation 8/10 and transferred to medicine for further care."

## 2022-08-17 NOTE — PROGRESS NOTE ADULT - ASSESSMENT
77 year old man with left middle cerebral artery CVA.     Stroke   He is status post thrombectomy of left M1 occlusion 7/24.  LDL: 97  Goal: < 70  Continue antiplatelet and statin.     Overall prognosis poor.   Will need either PEG or palliative care feeding depending upon family wishes  Family opting for PEG  PEG to be done once infection clears    Thank you for allowing me to participate in the care of your patient    Miguel A Parham MD, PhD   [id number]

## 2022-08-17 NOTE — SWALLOW BEDSIDE ASSESSMENT ADULT - SWALLOW EVAL: RECOMMENDED DIET
NPO: short-term non-oral means of nutrition/hydration, as per pt/family wishes with planning for long-term means

## 2022-08-17 NOTE — SWALLOW BEDSIDE ASSESSMENT ADULT - SWALLOW EVAL: DIAGNOSIS
Severe oral dysphagia, impacted by poor cognition with no progression of bolus, once placed intraorally. Pharyngeal stage of swallow therefore, unable to be assessed at this time

## 2022-08-17 NOTE — PROGRESS NOTE ADULT - SUBJECTIVE AND OBJECTIVE BOX
[facility]                                        Neurology at [place]                                 Dione Giron, & Pranav                                  [address]                                        [place], [place], [address]                                             [phone]        CC: Stroke    HPI:   76M with PMH CABG, HTN, HLD who presents as transfer from [facility] for stroke. Last known well was last night 10pm prior to going to bed, woke up this morning around 0500 with R sided weakness and R facial droop. Presented to [facility], code stroke initiated, NIH at [facility] reportedly 8. CTA showed LM1 occlusion. Transferred to [facility] for possible neuro IR intervention. On arrival to [facility], NIH 6. Decision made to take patient directly to neuro IR for intervention. (MARCELINA)    Interim history: stable on 3 [place], no significant improvemnt    ROS:   Unobtainable due to patient's condition.     MEDICATIONS  (STANDING):  albuterol/ipratropium for Nebulization 3 milliLiter(s) Nebulizer every 6 hours  amantadine 100 milliGRAM(s) Oral <User Schedule>  aspirin  chewable 81 milliGRAM(s) Oral daily  BACItracin   Ointment 1 Application(s) Topical two times a day  chlorhexidine 2% Cloths 1 Application(s) Topical daily  dextrose 5% + lactated ringers. 1000 milliLiter(s) (55 mL/Hr) IV Continuous <Continuous>  dextrose 5%. 1000 milliLiter(s) (50 mL/Hr) IV Continuous <Continuous>  dextrose 5%. 1000 milliLiter(s) (100 mL/Hr) IV Continuous <Continuous>  dextrose 50% Injectable 25 Gram(s) IV Push once  enoxaparin Injectable 40 milliGRAM(s) SubCutaneous every 24 hours  glucagon  Injectable 1 milliGRAM(s) IntraMuscular once  insulin lispro (ADMELOG) corrective regimen sliding scale   SubCutaneous every 6 hours  melatonin 3 milliGRAM(s) Oral at bedtime  metoprolol tartrate 50 milliGRAM(s) Oral every 8 hours  piperacillin/tazobactam IVPB.. 3.375 Gram(s) IV Intermittent every 8 hours  spironolactone 12.5 milliGRAM(s) Oral daily  vancomycin    Solution 125 milliGRAM(s) Enteral Tube every 6 hours    MEDICATIONS  (PRN):  acetaminophen     Tablet .. 650 milliGRAM(s) Oral every 6 hours PRN Temp greater or equal to 38C (100.4F), Mild Pain (1 - 3)  dextrose Oral Gel 15 Gram(s) Oral once PRN Blood Glucose LESS THAN 70 milliGRAM(s)/deciliter  ondansetron Injectable 4 milliGRAM(s) IV Push every 6 hours PRN Nausea and/or Vomiting      Vital Signs Last 24 Hrs  T(C): 37.3 (17 Aug 2022 08:14), Max: 37.3 (17 Aug 2022 08:14)  T(F): 99.1 (17 Aug 2022 08:14), Max: 99.1 (17 Aug 2022 08:14)  HR: 96 (17 Aug 2022 12:00) (90 - 107)  BP: 104/70 (17 Aug 2022 12:00) (98/61 - 134/51)  BP(mean): 79 (17 Aug 2022 12:00) (63 - 85)  RR: 24 (17 Aug 2022 12:05) (19 - 26)  SpO2: 99% (17 Aug 2022 12:05) (96% - 100%)    Parameters below as of 17 Aug 2022 12:05  Patient On (Oxygen Delivery Method): nasal cannula  O2 Flow (L/min): 5        Detailed Neurologic Exam:    Mental status: The patient is lethargic and non verbal. He does not follow instructions.    Cranial nerves: Pupils equal and react symmetrically to light. He blinks to threat from left. Not tracking with gaze. Depression of right nasolabial fold. Palate and tongue cannot be assessed.     Motor/sensory: There is normal bulk and tone.  There is no tremor.  Moves left to stimuli.   Right plegic.     Reflexes: Trace throughout and plantar responses are flexor.    Cerebellar: Cannot be assessed.     Labs:                           11.1   8.78  )-----------( 281      ( 17 Aug 2022 06:50 )             34.4     08-17    137  |  97<L>  |  21.6<H>  ----------------------------<  123<H>  4.7   |  29.0  |  0.55    Ca    8.5      17 Aug 2022 06:50    TPro  6.7  /  Alb  2.4<L>  /  TBili  0.7  /  DBili  x   /  AST  51<H>  /  ALT  64<H>  /  AlkPhos  221<H>  08-17    LIVER FUNCTIONS - ( 17 Aug 2022 06:50 )  Alb: 2.4 g/dL / Pro: 6.7 g/dL / ALK PHOS: 221 U/L / ALT: 64 U/L / AST: 51 U/L / GGT: x

## 2022-08-17 NOTE — PROGRESS NOTE ADULT - SUBJECTIVE AND OBJECTIVE BOX
Patient is a 77y old  Male who presents with a chief complaint of stroke (16 Aug 2022 12:18)    pt is  awake,moveing lt side. non verbal does not follow commands  REVIEW OF SYSTEMS: Ams-unable    MEDICATIONS  (STANDING):  albuterol/ipratropium for Nebulization 3 milliLiter(s) Nebulizer every 6 hours  amantadine 100 milliGRAM(s) Oral <User Schedule>  aspirin  chewable 81 milliGRAM(s) Oral daily  BACItracin   Ointment 1 Application(s) Topical two times a day  chlorhexidine 2% Cloths 1 Application(s) Topical daily  dextrose 5% + lactated ringers. 1000 milliLiter(s) (55 mL/Hr) IV Continuous <Continuous>  dextrose 5%. 1000 milliLiter(s) (50 mL/Hr) IV Continuous <Continuous>  dextrose 5%. 1000 milliLiter(s) (100 mL/Hr) IV Continuous <Continuous>  dextrose 50% Injectable 25 Gram(s) IV Push once  enoxaparin Injectable 40 milliGRAM(s) SubCutaneous every 24 hours  glucagon  Injectable 1 milliGRAM(s) IntraMuscular once  insulin lispro (ADMELOG) corrective regimen sliding scale   SubCutaneous every 6 hours  insulin NPH human recombinant 15 Unit(s) SubCutaneous every 8 hours  melatonin 3 milliGRAM(s) Oral at bedtime  metoprolol tartrate 50 milliGRAM(s) Oral every 8 hours  piperacillin/tazobactam IVPB.. 3.375 Gram(s) IV Intermittent every 8 hours  spironolactone 12.5 milliGRAM(s) Oral daily  vancomycin    Solution 125 milliGRAM(s) Enteral Tube every 6 hours    MEDICATIONS  (PRN):  acetaminophen     Tablet .. 650 milliGRAM(s) Oral every 6 hours PRN Temp greater or equal to 38C (100.4F), Mild Pain (1 - 3)  dextrose Oral Gel 15 Gram(s) Oral once PRN Blood Glucose LESS THAN 70 milliGRAM(s)/deciliter  ondansetron Injectable 4 milliGRAM(s) IV Push every 6 hours PRN Nausea and/or Vomiting      CAPILLARY BLOOD GLUCOSE      POCT Blood Glucose.: 347 mg/dL (17 Aug 2022 06:05)  POCT Blood Glucose.: 100 mg/dL (16 Aug 2022 23:48)  POCT Blood Glucose.: 97 mg/dL (16 Aug 2022 22:36)  POCT Blood Glucose.: 106 mg/dL (16 Aug 2022 18:05)  POCT Blood Glucose.: 132 mg/dL (16 Aug 2022 12:22)    I&O's Summary    16 Aug 2022 07:01  -  17 Aug 2022 07:00  --------------------------------------------------------  IN: 1230 mL / OUT: 1550 mL / NET: -320 mL    17 Aug 2022 07:01  -  17 Aug 2022 11:58  --------------------------------------------------------  IN: 40 mL / OUT: 400 mL / NET: -360 mL        PHYSICAL EXAM:  Vital Signs Last 24 Hrs  T(C): 37.3 (17 Aug 2022 08:14), Max: 37.7 (16 Aug 2022 12:15)  T(F): 99.1 (17 Aug 2022 08:14), Max: 99.9 (16 Aug 2022 12:15)  HR: 95 (17 Aug 2022 10:00) (90 - 107)  BP: 104/51 (17 Aug 2022 10:00) (98/61 - 134/51)  BP(mean): 63 (17 Aug 2022 10:00) (63 - 85)  RR: 24 (17 Aug 2022 10:00) (19 - 26)  SpO2: 99% (17 Aug 2022 10:00) (96% - 99%)    Parameters below as of 17 Aug 2022 10:00  Patient On (Oxygen Delivery Method): mask, aerosol  O2 Flow (L/min): 6  O2 Concentration (%): 28    CONSTITUTIONAL: NAD,  EYES: PERRLA; conjunctiva and sclera clear  ENMT: Moist oral mucosa, ngt  RESPIRATORY: Normal respiratory effort ;crackles  to auscultation bilaterally  CARDIOVASCULAR: Regular rate and rhythm, normal S1 and S2,   EXTS: No lower extremity edema; Peripheral pulses are 2+ bilaterally  ABDOMEN: Nontender to palpation, normoactive bowel sounds, no rebound/guarding;   MUSCLOSKELETAL:    no joint swelling or tenderness to palpation  PSYCH: calm  NEUROLOGY: Awake,non verbal. moving lt side. does not follow commands      LABS:                        11.1   8.78  )-----------( 281      ( 17 Aug 2022 06:50 )             34.4     08-17    137  |  97<L>  |  21.6<H>  ----------------------------<  123<H>  4.7   |  29.0  |  0.55    Ca    8.5      17 Aug 2022 06:50    TPro  6.7  /  Alb  2.4<L>  /  TBili  0.7  /  DBili  x   /  AST  51<H>  /  ALT  64<H>  /  AlkPhos  221<H>  08-17              Culture - Sputum (collected 15 Aug 2022 11:50)  Source: .Sputum Sputum  Gram Stain (15 Aug 2022 22:12):    Few polymorphonuclear leukocytes per low power field    Moderate Squamous epithelial cells per low power field    Few Gram Positive Rods per oil power field    Few Gram positive cocci in pairs per oil power field  Preliminary Report (16 Aug 2022 17:10):    Normal Respiratory Margo present        RADIOLOGY & ADDITIONAL TESTS:  Results Reviewed:

## 2022-08-17 NOTE — PROGRESS NOTE ADULT - ASSESSMENT
77yo man with CABG, PVD, HTN, HLD, and low EF (declined AICD), admitted 7/24 with LM1 occlusion, s/p TICI 2B MT, no tPA as wake-up stroke. post thrombectomy, re-intubated for hypoxic respiratory failure. MRI brain with a large L MCA stroke with small area of reperfusion hemorrhage. Course complicated by Septic shock, UTI, C. dif, Urinary obstruction, respiratory failure secondary to poor mental status now s/p extubation 8/10 and transferred to medicine for further care.     Acute hypoxic  Respiratory failure w/ Sepsis sec Multilober PNA likely GNR/ CDIF COLITIS   -s/p aspiration, on VENTIMASK   - s/p intubation ,extubation This admission  -HAS LOOSE STOOL  - -continue iv  ZOSYN. Add po vanco by  ID, F./U FURTHER REC  -bl c/s x2 ngtd, la STABLE  -CT CHEST pna  -f/u fever trend/wbc  - Pt is risk of recurrent aspiration pna  -will hold TF'      Dysphagia: Post Intubation and CVA  -failed swallow eval TODAY 08/17/22  - NGT FEEDING will hold   - SON WANTS  PEG  -Per GI not stable for pEG, Needs to clear cdif colitis/pna with improve respiratory status, GI spoke with son        Hypernatremia  -improved na 137  -  free water   -f/u nephrology rec  -f/u bmp    Acute  Left MCA Stroke,  7/24 with LM1 occlusion, s/p TICI 2B MT, no tPA as wake-up stroke. post thrombectomy  Small Hemorrhagic conversion sec to reperfusion  - ASA,  -hold Lipitor for transaminitis  - CT Head of 7-30-22 reviewed. Stable left frontal hemorrhagic products within the infarct.    - EEG  no seizure  - CT/CTA/CTP -images and reports were reviewed.   - MRI Brain  as above   - TTE : EF < 10%.  - IMTIAZ  was  postponed due to fever.  - SCD/ SQ Lovenox for DVT prophylaxis.    Transaminitis  -trending down  -hold lipitor        Combined systolic and diastolic heart failure, NSVT   ICM - TTE 7/24 showed LVEF <10% with RV dysfunction  Cont metoprolol  Lopressor 50mg TID  (Titrated to TID for frequent ectopy on monitor)   Spironolactone 12.5mg daily.   Hold Ace inhib for now  per cardiology dc midodrine then plan to  start losartan  if stable bp --bp soft   May need ICD given severe CM and ectopy however. 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 explained  over all prognosis guarded, pt is high risk of respiratory failure, aspiration pna, septic shock ,cardiac arrest, risk of high mortality. wife/son verbalized understood risk. family wants no limitation  of medical treatment except intubation/resuscitation. wants. PEG tube  update family  carina rn

## 2022-08-17 NOTE — SWALLOW BEDSIDE ASSESSMENT ADULT - SLP GENERAL OBSERVATIONS
Pt received & seen seated upright in bed, awake/alert, baseline wet upper airway breath sounds & cough, 02 aerosol mask with sats: 100% t/o, nonverbal, varied command following in Spanish (son present, who translated & declined interpretation services), 0/10 nonverbal pain scale pre/post

## 2022-08-17 NOTE — SWALLOW BEDSIDE ASSESSMENT ADULT - ORAL PREPARATORY PHASE
no progression of bolus in spite of provided max multimodality input: bolus removed from oral cavity by this ST/Reduced oral grading/Bolus falls into anterior sulcus

## 2022-08-17 NOTE — CHART NOTE - NSCHARTNOTEFT_GEN_A_CORE
Called to evaluate patient for renewal of Left wrist restraint.   Patient has been on restraints for continuing to actively pull at his lines. Pt is unable to be redirected due to mental status.    Will renew Level 1 restraint order and keep patient in Left wrist restraint in the interim to prevent further harm to self and to others around him.   Other interventions attempted: de-escalation, orientation check, environment modification, medication assessment  I have evaluated this patient and determined that restraints are warranted to optimize medical care, nocturnist aware and agrees.   Patient was assessed for current physical and psychological risk factors as well as special needs. At present there are no medical conditions or limitations that would place this patient at risk while in restraints.  Type of restraint: Left wrist restraint.  Behavioral criteria for discontinuation of restraint as per orders. Please re-evaluate in 24 hrs.

## 2022-08-18 DIAGNOSIS — Z01.818 ENCOUNTER FOR OTHER PREPROCEDURAL EXAMINATION: ICD-10-CM

## 2022-08-18 LAB
ALBUMIN SERPL ELPH-MCNC: 2.5 G/DL — LOW (ref 3.3–5.2)
ALP SERPL-CCNC: 225 U/L — HIGH (ref 40–120)
ALT FLD-CCNC: 52 U/L — HIGH
ANION GAP SERPL CALC-SCNC: 11 MMOL/L — SIGNIFICANT CHANGE UP (ref 5–17)
ANISOCYTOSIS BLD QL: SLIGHT — SIGNIFICANT CHANGE UP
AST SERPL-CCNC: 35 U/L — SIGNIFICANT CHANGE UP
BASOPHILS # BLD AUTO: 0 K/UL — SIGNIFICANT CHANGE UP (ref 0–0.2)
BASOPHILS NFR BLD AUTO: 0 % — SIGNIFICANT CHANGE UP (ref 0–2)
BILIRUB SERPL-MCNC: 0.9 MG/DL — SIGNIFICANT CHANGE UP (ref 0.4–2)
BUN SERPL-MCNC: 20.5 MG/DL — HIGH (ref 8–20)
CALCIUM SERPL-MCNC: 8.4 MG/DL — SIGNIFICANT CHANGE UP (ref 8.4–10.5)
CHLORIDE SERPL-SCNC: 98 MMOL/L — SIGNIFICANT CHANGE UP (ref 98–107)
CO2 SERPL-SCNC: 28 MMOL/L — SIGNIFICANT CHANGE UP (ref 22–29)
CREAT SERPL-MCNC: 0.68 MG/DL — SIGNIFICANT CHANGE UP (ref 0.5–1.3)
EGFR: 96 ML/MIN/1.73M2 — SIGNIFICANT CHANGE UP
EOSINOPHIL # BLD AUTO: 0.17 K/UL — SIGNIFICANT CHANGE UP (ref 0–0.5)
EOSINOPHIL NFR BLD AUTO: 1.8 % — SIGNIFICANT CHANGE UP (ref 0–6)
GIANT PLATELETS BLD QL SMEAR: PRESENT — SIGNIFICANT CHANGE UP
GLUCOSE BLDC GLUCOMTR-MCNC: 142 MG/DL — HIGH (ref 70–99)
GLUCOSE BLDC GLUCOMTR-MCNC: 142 MG/DL — HIGH (ref 70–99)
GLUCOSE BLDC GLUCOMTR-MCNC: 147 MG/DL — HIGH (ref 70–99)
GLUCOSE BLDC GLUCOMTR-MCNC: 163 MG/DL — HIGH (ref 70–99)
GLUCOSE SERPL-MCNC: 169 MG/DL — HIGH (ref 70–99)
HCT VFR BLD CALC: 32.5 % — LOW (ref 39–50)
HGB BLD-MCNC: 10.7 G/DL — LOW (ref 13–17)
LYMPHOCYTES # BLD AUTO: 0.57 K/UL — LOW (ref 1–3.3)
LYMPHOCYTES # BLD AUTO: 6.1 % — LOW (ref 13–44)
MANUAL SMEAR VERIFICATION: SIGNIFICANT CHANGE UP
MCHC RBC-ENTMCNC: 30.8 PG — SIGNIFICANT CHANGE UP (ref 27–34)
MCHC RBC-ENTMCNC: 32.9 GM/DL — SIGNIFICANT CHANGE UP (ref 32–36)
MCV RBC AUTO: 93.7 FL — SIGNIFICANT CHANGE UP (ref 80–100)
MONOCYTES # BLD AUTO: 0.83 K/UL — SIGNIFICANT CHANGE UP (ref 0–0.9)
MONOCYTES NFR BLD AUTO: 8.8 % — SIGNIFICANT CHANGE UP (ref 2–14)
NEUTROPHILS # BLD AUTO: 7.85 K/UL — HIGH (ref 1.8–7.4)
NEUTROPHILS NFR BLD AUTO: 83.3 % — HIGH (ref 43–77)
PLAT MORPH BLD: NORMAL — SIGNIFICANT CHANGE UP
PLATELET # BLD AUTO: 280 K/UL — SIGNIFICANT CHANGE UP (ref 150–400)
POIKILOCYTOSIS BLD QL AUTO: SLIGHT — SIGNIFICANT CHANGE UP
POLYCHROMASIA BLD QL SMEAR: SLIGHT — SIGNIFICANT CHANGE UP
POTASSIUM SERPL-MCNC: 4.9 MMOL/L — SIGNIFICANT CHANGE UP (ref 3.5–5.3)
POTASSIUM SERPL-SCNC: 4.9 MMOL/L — SIGNIFICANT CHANGE UP (ref 3.5–5.3)
PROT SERPL-MCNC: 6.6 G/DL — SIGNIFICANT CHANGE UP (ref 6.6–8.7)
RBC # BLD: 3.47 M/UL — LOW (ref 4.2–5.8)
RBC # FLD: 14.7 % — HIGH (ref 10.3–14.5)
RBC BLD AUTO: ABNORMAL
SODIUM SERPL-SCNC: 137 MMOL/L — SIGNIFICANT CHANGE UP (ref 135–145)
STOMATOCYTES BLD QL SMEAR: SLIGHT — SIGNIFICANT CHANGE UP
WBC # BLD: 9.42 K/UL — SIGNIFICANT CHANGE UP (ref 3.8–10.5)
WBC # FLD AUTO: 9.42 K/UL — SIGNIFICANT CHANGE UP (ref 3.8–10.5)

## 2022-08-18 PROCEDURE — 99223 1ST HOSP IP/OBS HIGH 75: CPT

## 2022-08-18 PROCEDURE — 99233 SBSQ HOSP IP/OBS HIGH 50: CPT

## 2022-08-18 RX ORDER — SCOPALAMINE 1 MG/3D
1 PATCH, EXTENDED RELEASE TRANSDERMAL
Refills: 0 | Status: DISCONTINUED | OUTPATIENT
Start: 2022-08-18 | End: 2022-09-01

## 2022-08-18 RX ADMIN — SCOPALAMINE 1 PATCH: 1 PATCH, EXTENDED RELEASE TRANSDERMAL at 20:02

## 2022-08-18 RX ADMIN — Medication 100 MILLIGRAM(S): at 09:25

## 2022-08-18 RX ADMIN — Medication 125 MILLIGRAM(S): at 17:09

## 2022-08-18 RX ADMIN — Medication 125 MILLIGRAM(S): at 01:00

## 2022-08-18 RX ADMIN — Medication 3 MILLILITER(S): at 04:09

## 2022-08-18 RX ADMIN — Medication 100 MILLIGRAM(S): at 13:16

## 2022-08-18 RX ADMIN — Medication 3 MILLILITER(S): at 14:57

## 2022-08-18 RX ADMIN — Medication 125 MILLIGRAM(S): at 05:53

## 2022-08-18 RX ADMIN — Medication 1 APPLICATION(S): at 06:34

## 2022-08-18 RX ADMIN — SCOPALAMINE 1 PATCH: 1 PATCH, EXTENDED RELEASE TRANSDERMAL at 15:17

## 2022-08-18 RX ADMIN — SPIRONOLACTONE 12.5 MILLIGRAM(S): 25 TABLET, FILM COATED ORAL at 05:53

## 2022-08-18 RX ADMIN — Medication 2: at 11:47

## 2022-08-18 RX ADMIN — PIPERACILLIN AND TAZOBACTAM 25 GRAM(S): 4; .5 INJECTION, POWDER, LYOPHILIZED, FOR SOLUTION INTRAVENOUS at 20:52

## 2022-08-18 RX ADMIN — PIPERACILLIN AND TAZOBACTAM 25 GRAM(S): 4; .5 INJECTION, POWDER, LYOPHILIZED, FOR SOLUTION INTRAVENOUS at 05:25

## 2022-08-18 RX ADMIN — ENOXAPARIN SODIUM 40 MILLIGRAM(S): 100 INJECTION SUBCUTANEOUS at 20:53

## 2022-08-18 RX ADMIN — CHLORHEXIDINE GLUCONATE 1 APPLICATION(S): 213 SOLUTION TOPICAL at 05:54

## 2022-08-18 RX ADMIN — SODIUM CHLORIDE 55 MILLILITER(S): 9 INJECTION, SOLUTION INTRAVENOUS at 06:34

## 2022-08-18 RX ADMIN — Medication 3 MILLILITER(S): at 08:22

## 2022-08-18 RX ADMIN — Medication 3 MILLILITER(S): at 21:01

## 2022-08-18 RX ADMIN — Medication 50 MILLIGRAM(S): at 13:16

## 2022-08-18 RX ADMIN — Medication 125 MILLIGRAM(S): at 11:48

## 2022-08-18 RX ADMIN — Medication 1 APPLICATION(S): at 17:09

## 2022-08-18 RX ADMIN — Medication 81 MILLIGRAM(S): at 11:48

## 2022-08-18 RX ADMIN — Medication 50 MILLIGRAM(S): at 05:53

## 2022-08-18 RX ADMIN — PIPERACILLIN AND TAZOBACTAM 25 GRAM(S): 4; .5 INJECTION, POWDER, LYOPHILIZED, FOR SOLUTION INTRAVENOUS at 13:16

## 2022-08-18 NOTE — CONSULT NOTE ADULT - PROVIDER SPECIALTY LIST ADULT
Pulmonology
Electrophysiology
Rehab Medicine
Infectious Disease
Palliative Care
Nephrology
Neurology
Gastroenterology
Cardiology
Heart Failure

## 2022-08-18 NOTE — CONSULT NOTE ADULT - TIME BILLING
Chart and hospitalization reviewed including PACS
- Review of notes/records, telemetry, vital signs and daily labs.   - General and cardiovascular physical examination.  - Generation of cardiovascular treatment plan.  - Coordination of care with primary team.

## 2022-08-18 NOTE — CONSULT NOTE ADULT - ASSESSMENT
Unfortunate 76-year-old male with a history of coronary artery disease status post CABG, hypertension, hyperlipidemia, peripheral vascular disease seen today for preop evaluation for proposed PEG.  No prior history of lung disease.  Currently the patient appears to have a hospital-acquired pneumonia and is currently on empiric antibiotics.  Poor pulmonary toilet with inability to cough.  Oxygenation is adequate.    Plan:  1.  No pulmonary contraindication to proposed PEG.  2.  Complete empiric antibiotics  3.  As per neurosurgery  4.  Follow-up CAT scan and 6 to 8 weeks of the chest  5.  Please recall as needed

## 2022-08-18 NOTE — CONSULT NOTE ADULT - SUBJECTIVE AND OBJECTIVE BOX
PULMONARY CONSULT NOTE      NATANAEL ROWANVINAY-[id number]    Patient is a 77y old  Male who presents with a chief complaint of stroke (17 Aug 2022 13:56)      HISTORY OF PRESENT ILLNESS:  76-year-old female with a history of hypertension, coronary artery disease, hyperlipidemia status post CABG transferred from [facility] on 7/24/2022 with a stroke with L M1 occlusion.  Patient was intubated on 7/24/2022 and underwent a medical thrombectomy by interventional radiology on 7/26/2022.  Course complicated by severe cardiomyopathy with ejection fraction less than 10% felt to be on the basis of neurogenic cardiomyopathy.  He was successfully extubated on 8/10/2022 and transferred to medicine.  He underwent a swallowing evaluation which showed severe oral dysphagia the decision to undergo percutaneous gastrostomy placement.  Patient presently is awake but unable to give a history.  He continues to have a right plegia.    MEDICATIONS  (STANDING):  albuterol/ipratropium for Nebulization 3 milliLiter(s) Nebulizer every 6 hours  amantadine 100 milliGRAM(s) Oral <User Schedule>  aspirin  chewable 81 milliGRAM(s) Oral daily  BACItracin   Ointment 1 Application(s) Topical two times a day  chlorhexidine 2% Cloths 1 Application(s) Topical daily  dextrose 5% + lactated ringers. 1000 milliLiter(s) (55 mL/Hr) IV Continuous <Continuous>  dextrose 5%. 1000 milliLiter(s) (50 mL/Hr) IV Continuous <Continuous>  dextrose 5%. 1000 milliLiter(s) (100 mL/Hr) IV Continuous <Continuous>  dextrose 50% Injectable 25 Gram(s) IV Push once  enoxaparin Injectable 40 milliGRAM(s) SubCutaneous every 24 hours  glucagon  Injectable 1 milliGRAM(s) IntraMuscular once  insulin lispro (ADMELOG) corrective regimen sliding scale   SubCutaneous every 6 hours  melatonin 3 milliGRAM(s) Oral at bedtime  metoprolol tartrate 50 milliGRAM(s) Oral every 8 hours  piperacillin/tazobactam IVPB.. 3.375 Gram(s) IV Intermittent every 8 hours  spironolactone 12.5 milliGRAM(s) Oral daily  vancomycin    Solution 125 milliGRAM(s) Enteral Tube every 6 hours      MEDICATIONS  (PRN):  acetaminophen     Tablet .. 650 milliGRAM(s) Oral every 6 hours PRN Temp greater or equal to 38C (100.4F), Mild Pain (1 - 3)  dextrose Oral Gel 15 Gram(s) Oral once PRN Blood Glucose LESS THAN 70 milliGRAM(s)/deciliter  ondansetron Injectable 4 milliGRAM(s) IV Push every 6 hours PRN Nausea and/or Vomiting      Allergies    No Known Allergies    Intolerances        PAST MEDICAL & SURGICAL HISTORY:  HTN (hypertension)      HLD (hyperlipidemia)      S/P CABG x 1          FAMILY HISTORY:      SOCIAL HISTORY  Smoking History:     REVIEW OF SYSTEMS:    CONSTITUTIONAL:  No fevers, chills, sweats    HEENT:  Eyes:  No diplopia or blurred vision. ENT:  No earache, sore throat or runny nose. sinus headache or postnasl drip    CARDIOVASCULAR:  No pressure, squeezing, tightness, or heaviness about the chest; no palpitations, leg swelling, orthopnea or PND    RESPIRATORY:  No cough, shortness of breath, PND or orthopnea. Mild SOBOE    GASTROINTESTINAL:  No abdominal pain, nausea, vomiting or diarrhea.    GENITOURINARY:  No dysuria, frequency or urgency.    NEUROLOGIC:  No paresthesias, fasciculations, seizures or weakness.    PSYCHIATRIC:  No disorder of thought or mood.    Vital Signs Last 24 Hrs  T(C): 36.4 (18 Aug 2022 07:16), Max: 37.3 (17 Aug 2022 08:14)  T(F): 97.6 (18 Aug 2022 07:16), Max: 99.2 (17 Aug 2022 12:12)  HR: 100 (18 Aug 2022 04:24) (81 - 103)  BP: 105/64 (18 Aug 2022 04:01) (96/61 - 125/68)  BP(mean): 74 (18 Aug 2022 04:01) (63 - 79)  RR: 25 (18 Aug 2022 04:24) (20 - 26)  SpO2: 100% (18 Aug 2022 04:24) (97% - 100%)    Parameters below as of 18 Aug 2022 04:24  Patient On (Oxygen Delivery Method): nasal cannula  O2 Flow (L/min): 3      PHYSICAL EXAMINATION:    GENERAL: The patient is a well-developed, well-nourished _____in no apparent distress.     HEENT: Head is normocephalic and atraumatic. Extraocular muscles are intact. Mucous membranes are moist.     NECK: Supple.     LUNGS: Clear to auscultation without wheezing, rales, or rhonchi. Respirations unlabored    HEART: Regular rate and rhythm without murmur.    ABDOMEN: Soft, nontender, and nondistended.  No hepatosplenomegaly is noted.    EXTREMITIES: Without any cyanosis, clubbing, rash, lesions or edema.    NEUROLOGIC: Grossly intact.      LABS:                        11.1   8.78  )-----------( 281      ( 17 Aug 2022 06:50 )             34.4     08-17    137  |  97<L>  |  21.6<H>  ----------------------------<  123<H>  4.7   |  29.0  |  0.55    Ca    8.5      17 Aug 2022 06:50    TPro  6.7  /  Alb  2.4<L>  /  TBili  0.7  /  DBili  x   /  AST  51<H>  /  ALT  64<H>  /  AlkPhos  221<H>  08-17                        MICROBIOLOGY:    RADIOLOGY & ADDITIONAL STUDIES:  < from: CT Chest No Cont (08.15.22 @ 10:34) >    ACC: [id number] EXAM:  CT CHEST                          PROCEDURE DATE:  08/15/2022          INTERPRETATION:  INDICATION: Fever, pneumonia  TECHNIQUE: Unenhanced CT of the chest. Coronal, sagittal, and MIP images   were reconstructed and reviewed.  COMPARISON: No prior chest CT.    FINDINGS:    AIRWAYS, LUNGS and PLEURA: Patent central airways. Consolidation of right   lower lobe/right middle lobe, tree-in-bud opacities within posterior   right upper lobe and mild opacity within basilar left lower lobe   representing multifocal pneumonia. No pleural effusion.    MEDIASTINUM AND MAGALY: Mildly enlarged mediastinal lymph nodes for example   1.5 cm short axis subcarinal lymph node.    HEART AND VESSELS: Cardiomegaly. CABG. Coronary and aortic   calcifications. No pericardial effusion. Thoracic aorta and pulmonary   artery normal in diameter.    VISUALIZED UPPER ABDOMEN: Enteric tube within the stomach. Aortic   calcifications.    CHEST WALL AND BONES: No aggressive osseous lesion. Moderate compression   deformity of L1 vertebral body. Healed sternotomy.    LOWER NECK: Within normal limits.    IMPRESSION:    Consolidation of right lower lobe/right middle lobe, tree-in-bud   opacities within posterior right upper lobe and mild opacity within   basilar left lower lobe representing multifocal pneumonia.    --- End of Report ---            CHUY HIGGINS MD; Attending Radiologist  This document has been electronically signed. Aug 15 2022 10:53AM    < end of copied text >  < from: US Duplex Venous Lower Ext Complete, Bilateral (08.08.22 @ 13:18) >  ACC: [id number] EXAM:  US DPLX LWR EXT VEINS COMPL BI                          PROCEDURE DATE:  08/08/2022          INTERPRETATION:  CLINICAL INFORMATION: Stroke.    COMPARISON: None available.    TECHNIQUE: Duplex sonography of the BILATERAL LOWER extremity veins with   color and spectral Doppler, with and without compression.    FINDINGS:    RIGHT:  Normal compressibility of the RIGHT common femoral, femoral and popliteal   veins.  Doppler examination shows normal spontaneous and phasic flow.  No RIGHT calf vein thrombosis is detected.    LEFT:  Normal compressibility of the LEFT common femoral, femoral and popliteal   veins.  Doppler examination shows normal spontaneous and phasic flow.  No LEFT calf vein thrombosis is detected.    IMPRESSION:  No evidence of deep venous thrombosis in either lower extremity.          --- End of Report ---            DEEPA PALACIOS MD; Attending Radiologist  This document has been electronically signed. Aug  8 2022  1:30PM    < end of copied text >  ECHO:  < from: TTE Echo Complete w/ Contrast w/ Doppler (07.24.22 @ 14:01) >  Summary:   1. Endocardial visualization was enhanced with intravenous echo contrast.   2. Severely enlarged left atrium.   3. Global diffuse akinesis. Left ventricular ejection fraction, by   visual estimation, is <10%.   4. Elevated mean left atrial pressure. (>30 mm Hg)   5. Normal right atrial size.   6. Mildly enlarged right ventricle. Moderately reduced RV systolic   function.   7. Mild mitral valve regurgitation.   8. Mild tricuspid regurgitation.   9. Estimated pulmonary artery systolic pressure is 42 mmHg - mild   pulmonary hypertension.  10. There is no evidence of pericardial effusion.  11. Team informed of the findings.    MD Millie, RPVI Electronically signed on 7/24/2022 at 3:49:14 PM    < end of copied text >   PULMONARY CONSULT NOTE      NATANAEL ROWANVINAY-[id number]    Patient is a 77y old  Male who presents with a chief complaint of stroke (17 Aug 2022 13:56)      HISTORY OF PRESENT ILLNESS:  76-year-old female with a history of hypertension, coronary artery disease, hyperlipidemia status post CABG transferred from [facility] on 7/24/2022 with a stroke with L M1 occlusion.  Patient was intubated on 7/24/2022 and underwent a medical thrombectomy by interventional radiology on 7/26/2022.  Course complicated by severe cardiomyopathy with ejection fraction less than 10% felt to be on the basis of neurogenic cardiomyopathy.  He was successfully extubated on 8/10/2022 and transferred to medicine.  He underwent a swallowing evaluation which showed severe oral dysphagia the decision to undergo percutaneous gastrostomy placement.  Patient presently is arousable but unable to give a history.  He continues to have a right plegia. No cough or SOB, on NCO    MEDICATIONS  (STANDING):  albuterol/ipratropium for Nebulization 3 milliLiter(s) Nebulizer every 6 hours  amantadine 100 milliGRAM(s) Oral <User Schedule>  aspirin  chewable 81 milliGRAM(s) Oral daily  BACItracin   Ointment 1 Application(s) Topical two times a day  chlorhexidine 2% Cloths 1 Application(s) Topical daily  dextrose 5% + lactated ringers. 1000 milliLiter(s) (55 mL/Hr) IV Continuous <Continuous>  dextrose 5%. 1000 milliLiter(s) (50 mL/Hr) IV Continuous <Continuous>  dextrose 5%. 1000 milliLiter(s) (100 mL/Hr) IV Continuous <Continuous>  dextrose 50% Injectable 25 Gram(s) IV Push once  enoxaparin Injectable 40 milliGRAM(s) SubCutaneous every 24 hours  glucagon  Injectable 1 milliGRAM(s) IntraMuscular once  insulin lispro (ADMELOG) corrective regimen sliding scale   SubCutaneous every 6 hours  melatonin 3 milliGRAM(s) Oral at bedtime  metoprolol tartrate 50 milliGRAM(s) Oral every 8 hours  piperacillin/tazobactam IVPB.. 3.375 Gram(s) IV Intermittent every 8 hours  spironolactone 12.5 milliGRAM(s) Oral daily  vancomycin    Solution 125 milliGRAM(s) Enteral Tube every 6 hours      MEDICATIONS  (PRN):  acetaminophen     Tablet .. 650 milliGRAM(s) Oral every 6 hours PRN Temp greater or equal to 38C (100.4F), Mild Pain (1 - 3)  dextrose Oral Gel 15 Gram(s) Oral once PRN Blood Glucose LESS THAN 70 milliGRAM(s)/deciliter  ondansetron Injectable 4 milliGRAM(s) IV Push every 6 hours PRN Nausea and/or Vomiting      Allergies    No Known Allergies    Intolerances        PAST MEDICAL & SURGICAL HISTORY:  HTN (hypertension)      HLD (hyperlipidemia)      S/P CABG x 1          FAMILY HISTORY:      SOCIAL HISTORY  Smoking History:     REVIEW OF SYSTEMS:    unavailable    Vital Signs Last 24 Hrs  T(C): 36.4 (18 Aug 2022 07:16), Max: 37.3 (17 Aug 2022 08:14)  T(F): 97.6 (18 Aug 2022 07:16), Max: 99.2 (17 Aug 2022 12:12)  HR: 100 (18 Aug 2022 04:24) (81 - 103)  BP: 105/64 (18 Aug 2022 04:01) (96/61 - 125/68)  BP(mean): 74 (18 Aug 2022 04:01) (63 - 79)  RR: 25 (18 Aug 2022 04:24) (20 - 26)  SpO2: 100% (18 Aug 2022 04:24) (97% - 100%)    Parameters below as of 18 Aug 2022 04:24  Patient On (Oxygen Delivery Method): nasal cannula  O2 Flow (L/min): 3      PHYSICAL EXAMINATION:    GENERAL: The patient is in no apparent distress.     HEENT: Head is normocephalic and atraumatic. Extraocular muscles are intact. Mucous membranes are moist.     NECK: Supple.     LUNGS: few scattered rhonchi. Respirations unlabored    HEART: Regular rate and rhythm without murmur.    ABDOMEN: Soft, nontender, and nondistended.  No hepatosplenomegaly is noted.    EXTREMITIES: Without any cyanosis, clubbing, rash, lesions 1+ edema.    NEUROLOGIC: rt hemiplegi      LABS:                        11.1   8.78  )-----------( 281      ( 17 Aug 2022 06:50 )             34.4     08-17    137  |  97<L>  |  21.6<H>  ----------------------------<  123<H>  4.7   |  29.0  |  0.55    Ca    8.5      17 Aug 2022 06:50    TPro  6.7  /  Alb  2.4<L>  /  TBili  0.7  /  DBili  x   /  AST  51<H>  /  ALT  64<H>  /  AlkPhos  221<H>  08-17                        MICROBIOLOGY:    RADIOLOGY & ADDITIONAL STUDIES:  < from: CT Chest No Cont (08.15.22 @ 10:34) >    ACC: [id number] EXAM:  CT CHEST                          PROCEDURE DATE:  08/15/2022          INTERPRETATION:  INDICATION: Fever, pneumonia  TECHNIQUE: Unenhanced CT of the chest. Coronal, sagittal, and MIP images   were reconstructed and reviewed.  COMPARISON: No prior chest CT.    FINDINGS:    AIRWAYS, LUNGS and PLEURA: Patent central airways. Consolidation of right   lower lobe/right middle lobe, tree-in-bud opacities within posterior   right upper lobe and mild opacity within basilar left lower lobe   representing multifocal pneumonia. No pleural effusion.    MEDIASTINUM AND MAGALY: Mildly enlarged mediastinal lymph nodes for example   1.5 cm short axis subcarinal lymph node.    HEART AND VESSELS: Cardiomegaly. CABG. Coronary and aortic   calcifications. No pericardial effusion. Thoracic aorta and pulmonary   artery normal in diameter.    VISUALIZED UPPER ABDOMEN: Enteric tube within the stomach. Aortic   calcifications.    CHEST WALL AND BONES: No aggressive osseous lesion. Moderate compression   deformity of L1 vertebral body. Healed sternotomy.    LOWER NECK: Within normal limits.    IMPRESSION:    Consolidation of right lower lobe/right middle lobe, tree-in-bud   opacities within posterior right upper lobe and mild opacity within   basilar left lower lobe representing multifocal pneumonia.    --- End of Report ---            CHUY HIGGINS MD; Attending Radiologist  This document has been electronically signed. Aug 15 2022 10:53AM    < end of copied text >  < from: US Duplex Venous Lower Ext Complete, Bilateral (08.08.22 @ 13:18) >  ACC: [id number] EXAM:  US DPLX LWR EXT VEINS COMPL BI                          PROCEDURE DATE:  08/08/2022          INTERPRETATION:  CLINICAL INFORMATION: Stroke.    COMPARISON: None available.    TECHNIQUE: Duplex sonography of the BILATERAL LOWER extremity veins with   color and spectral Doppler, with and without compression.    FINDINGS:    RIGHT:  Normal compressibility of the RIGHT common femoral, femoral and popliteal   veins.  Doppler examination shows normal spontaneous and phasic flow.  No RIGHT calf vein thrombosis is detected.    LEFT:  Normal compressibility of the LEFT common femoral, femoral and popliteal   veins.  Doppler examination shows normal spontaneous and phasic flow.  No LEFT calf vein thrombosis is detected.    IMPRESSION:  No evidence of deep venous thrombosis in either lower extremity.          --- End of Report ---            DEEPA PALACIOS MD; Attending Radiologist  This document has been electronically signed. Aug  8 2022  1:30PM    < end of copied text >  ECHO:  < from: TTE Echo Complete w/ Contrast w/ Doppler (07.24.22 @ 14:01) >  Summary:   1. Endocardial visualization was enhanced with intravenous echo contrast.   2. Severely enlarged left atrium.   3. Global diffuse akinesis. Left ventricular ejection fraction, by   visual estimation, is <10%.   4. Elevated mean left atrial pressure. (>30 mm Hg)   5. Normal right atrial size.   6. Mildly enlarged right ventricle. Moderately reduced RV systolic   function.   7. Mild mitral valve regurgitation.   8. Mild tricuspid regurgitation.   9. Estimated pulmonary artery systolic pressure is 42 mmHg - mild   pulmonary hypertension.  10. There is no evidence of pericardial effusion.  11. Team informed of the findings.    MD Millie, RPVI Electronically signed on 7/24/2022 at 3:49:14 PM    < end of copied text >  < from: CT Head No Cont (07.30.22 @ 12:47) >  ACC: [id number] EXAM:  CT BRAIN                          PROCEDURE DATE:  07/30/2022          INTERPRETATION:  CT HEAD    CLINICAL HISTORY: stroke    TECHNIQUE:  Noncontrast CT.  Axial Acquisition.  Sagittal and coronal reformations.    COMPARISON:  Compared to study dated 7/26/2022    FINDINGS:  *  HEMORRHAGE/BRAIN PARENCHYMA: Aging inferior left frontal and   inferolateral left parietal infarcts. Mild stable cortical hemorrhage   associated with the left frontal infarct. Mild decreased associated mass   effect. Patient has moderate underlying brain atrophy. Mild chronic   periventricular white matter ischemic changes are seen  *  VENTRICLES / SHIFT:  No hydrocephalus. No midline shift.  *  EXTRA-AXIAL / BASAL CISTERNS:  No extra-axial mass.Basal cisterns   preserved.  *  CALVARIUM AND EXTRACRANIAL SOFT TISSUES:  No depressed calvarial   fracture.  *  SINUSES, ORBITS, MASTOIDS: Mild fluid within the left sphenoid sinus.   A nasogastric tube courses through the left nasal cavity    IMPRESSION:  Aging inferior left frontal and inferolateral left parietal infarcts.   Mild stable cortical blood products associated with the frontal infarct.    --- End of Report ---            VERNELL LOYA MD; Attending Radiologist  This document has been electronically signed. Jul 30 2022  3:26PM    < end of copied text >

## 2022-08-18 NOTE — CHART NOTE - NSCHARTNOTEFT_GEN_A_CORE
Chart reviewed and d/w NP REMI [facility].   Pt's overall prognosis is guarded at best with high risk of further decompensation and rehospitalization.   GOC established, family wishes to continue pursuit of all medical intervention including PEG.   Advance directives in place, DNR and DNI.      No further recommendations from a palliative standpoint. Will sign off. Please reconsult PRN and if goc should change.    Dispo planning per [facility]. Ongoing medical mgnt per primary team.

## 2022-08-18 NOTE — PROGRESS NOTE ADULT - SUBJECTIVE AND OBJECTIVE BOX
Patient is a 77y old  Male who presents with a chief complaint of stroke (18 Aug 2022 07:19)    pt is awake, does not follow commands, non verbal.  REVIEW OF SYSTEMS: AMS-unable    MEDICATIONS  (STANDING):  albuterol/ipratropium for Nebulization 3 milliLiter(s) Nebulizer every 6 hours  amantadine 100 milliGRAM(s) Oral <User Schedule>  aspirin  chewable 81 milliGRAM(s) Oral daily  BACItracin   Ointment 1 Application(s) Topical two times a day  chlorhexidine 2% Cloths 1 Application(s) Topical daily  dextrose 5% + lactated ringers. 1000 milliLiter(s) (55 mL/Hr) IV Continuous <Continuous>  dextrose 5%. 1000 milliLiter(s) (50 mL/Hr) IV Continuous <Continuous>  dextrose 5%. 1000 milliLiter(s) (100 mL/Hr) IV Continuous <Continuous>  dextrose 50% Injectable 25 Gram(s) IV Push once  enoxaparin Injectable 40 milliGRAM(s) SubCutaneous every 24 hours  glucagon  Injectable 1 milliGRAM(s) IntraMuscular once  insulin lispro (ADMELOG) corrective regimen sliding scale   SubCutaneous every 6 hours  melatonin 3 milliGRAM(s) Oral at bedtime  metoprolol tartrate 50 milliGRAM(s) Oral every 8 hours  piperacillin/tazobactam IVPB.. 3.375 Gram(s) IV Intermittent every 8 hours  scopolamine 1 mG/72 Hr(s) Patch 1 Patch Transdermal every 72 hours  spironolactone 12.5 milliGRAM(s) Oral daily  vancomycin    Solution 125 milliGRAM(s) Enteral Tube every 6 hours    MEDICATIONS  (PRN):  acetaminophen     Tablet .. 650 milliGRAM(s) Oral every 6 hours PRN Temp greater or equal to 38C (100.4F), Mild Pain (1 - 3)  dextrose Oral Gel 15 Gram(s) Oral once PRN Blood Glucose LESS THAN 70 milliGRAM(s)/deciliter  ondansetron Injectable 4 milliGRAM(s) IV Push every 6 hours PRN Nausea and/or Vomiting      CAPILLARY BLOOD GLUCOSE      POCT Blood Glucose.: 142 mg/dL (18 Aug 2022 06:36)  POCT Blood Glucose.: 142 mg/dL (18 Aug 2022 00:42)  POCT Blood Glucose.: 99 mg/dL (17 Aug 2022 17:10)  POCT Blood Glucose.: 87 mg/dL (17 Aug 2022 12:12)    I&O's Summary    17 Aug 2022 07:01  -  18 Aug 2022 07:00  --------------------------------------------------------  IN: 1120 mL / OUT: 2650 mL / NET: -1530 mL    18 Aug 2022 07:01  -  18 Aug 2022 11:03  --------------------------------------------------------  IN: 110 mL / OUT: 0 mL / NET: 110 mL        PHYSICAL EXAM:  Vital Signs Last 24 Hrs  T(C): 36.4 (18 Aug 2022 07:16), Max: 37.3 (17 Aug 2022 12:12)  T(F): 97.6 (18 Aug 2022 07:16), Max: 99.2 (17 Aug 2022 12:12)  HR: 88 (18 Aug 2022 09:42) (81 - 103)  BP: 133/45 (18 Aug 2022 09:42) (92/53 - 133/45)  BP(mean): 57 (18 Aug 2022 09:42) (57 - 79)  RR: 24 (18 Aug 2022 09:42) (20 - 25)  SpO2: 96% (18 Aug 2022 09:42) (96% - 100%)    Parameters below as of 18 Aug 2022 09:42  Patient On (Oxygen Delivery Method): nasal cannula  O2 Flow (L/min): 3      CONSTITUTIONAL: NAD,  EYES: PERRLA; conjunctiva and sclera clear  ENMT: Moist oral mucosa,   RESPIRATORY: Normal respiratory effort; mild crackles  to auscultation bilaterally  CARDIOVASCULAR: Regular rate and rhythm, normal S1 and S2,  EXTS: No lower extremity edema; Peripheral pulses are 2+ bilaterally  ABDOMEN: Nontender to palpation, normoactive bowel sounds, no rebound/guarding;   MUSCLOSKELETAL:  ; no joint swelling or tenderness to palpation  PSYCH: calm  NEUROLOGY: Awake. moving lt side. non verbal.does not follow commands      LABS:                        10.7   9.42  )-----------( 280      ( 18 Aug 2022 07:10 )             32.5     08-18    137  |  98  |  20.5<H>  ----------------------------<  169<H>  4.9   |  28.0  |  0.68    Ca    8.4      18 Aug 2022 07:10    TPro  6.6  /  Alb  2.5<L>  /  TBili  0.9  /  DBili  x   /  AST  35  /  ALT  52<H>  /  AlkPhos  225<H>  08-18              Culture - Sputum (collected 15 Aug 2022 11:50)  Source: .Sputum Sputum  Gram Stain (15 Aug 2022 22:12):    Few polymorphonuclear leukocytes per low power field    Moderate Squamous epithelial cells per low power field    Few Gram Positive Rods per oil power field    Few Gram positive cocci in pairs per oil power field  Final Report (17 Aug 2022 19:50):    Normal Respiratory Margo present        RADIOLOGY & ADDITIONAL TESTS:  Results Reviewed:

## 2022-08-18 NOTE — CONSULT NOTE ADULT - CONSULT REASON
cardiomyopathy
preop clearance
CVA
Large evolving LMCA stroke  Poor Neuro exam
Elevated BUN
PEG tube placement
STROKE
FEVERS
PVCs
Systolic heart failure

## 2022-08-18 NOTE — PROGRESS NOTE ADULT - ASSESSMENT
Progress Note - Juan C Fink 43 y o  male MRN: [id number]  Unit/Bed#: Janae Tsai 340-02 Encounter: [id number]      [facility] [address] )  Active Problems:    Seizure disorder ([facility])    Medical clearance for psychiatric admission    Essential hypertension    Tobacco abuse    Polysubstance abuse ([facility] [address] )    ERNA Mazariegos    Psychiatry, PGY-2 75yo man with CABG, PVD, HTN, HLD, and low EF (declined AICD), admitted 7/24 with LM1 occlusion, s/p TICI 2B MT, no tPA as wake-up stroke. post thrombectomy, re-intubated for hypoxic respiratory failure. MRI brain with a large L MCA stroke with small area of reperfusion hemorrhage. Course complicated by Septic shock, UTI, C. dif, Urinary obstruction, respiratory failure secondary to poor mental status now s/p extubation 8/10 and transferred to medicine for further care.     Acute hypoxic  Respiratory failure w/ Sepsis sec Multilober PNA likely GNR/ CDIF COLITIS   -s/p aspiration, on 3 l nc  - s/p intubation ,extubation This admission  -HAS LOOSE STOOL  - -continue iv  ZOSYN. Add po vanco by  ID, F./U FURTHER REC  -bl c/s x2 ngtd, la STABLE  -CT CHEST pna  -f/u fever trend/wbc  - Pt is risk of recurrent aspiration pna  -will hold TF'  -clear by pulmonary for peg, will f/u id rec for for clearance   - gi is following      Dysphagia: Post Intubation and CVA  -failed swallow eval TODAY 08/17/22  - NGT FEEDING will hold   - SON WANTS  PEG  -Per GI not stable for pEG, Needs to clear cdif colitis/pna with improve respiratory status, GI spoke with son        Hypernatremia  -improved na 137  -  free water   -f/u nephrology rec  -f/u bmp    Acute  Left MCA Stroke,  7/24 with LM1 occlusion, s/p TICI 2B MT, no tPA as wake-up stroke. post thrombectomy  Small Hemorrhagic conversion sec to reperfusion  - ASA,  -hold Lipitor for transaminitis  - CT Head of 7-30-22 reviewed. Stable left frontal hemorrhagic products within the infarct.    - EEG  no seizure  - CT/CTA/CTP -images and reports were reviewed.   - MRI Brain  as above   - TTE : EF < 10%.  - IMTIAZ  was  postponed due to fever.  - SCD/ SQ Lovenox for DVT prophylaxis.    Transaminitis  -trending down  -hold lipitor        Combined systolic and diastolic heart failure, NSVT   ICM - TTE 7/24 showed LVEF <10% with RV dysfunction  Cont metoprolol  Lopressor 50mg TID  (Titrated to TID for frequent ectopy on monitor)   Spironolactone 12.5mg daily.   Hold Ace inhib for now  per cardiology dc midodrine then plan to  start losartan  if stable bp --bp soft   May need ICD given severe CM and ectopy however. 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 explained  over all prognosis guarded, pt is high risk of respiratory failure, aspiration pna, septic shock ,cardiac arrest, risk of high mortality. wife/son verbalized understood risk. family wants no limitation  of medical treatment except intubation/resuscitation. wants. PEG tube    called son - left msg lim rn

## 2022-08-18 NOTE — CONSULT NOTE ADULT - CONSULT REQUESTED DATE/TIME
01-Aug-2022 10:21
02-Aug-2022 16:30
13-Aug-2022
25-Jul-2022 20:42
01-Aug-2022 09:00
11-Aug-2022 09:54
15-Aug-2022 15:54
18-Aug-2022
31-Jul-2022 14:59
24-Jul-2022 22:00

## 2022-08-18 NOTE — CONSULT NOTE ADULT - PROBLEM SELECTOR PROBLEM 1
Ischemic cardiomyopathy
Ischemic cardiomyopathy
NSVT (nonsustained ventricular tachycardia)
Ischemic cardiomyopathy

## 2022-08-19 DIAGNOSIS — Z43.1 ENCOUNTER FOR ATTENTION TO GASTROSTOMY: ICD-10-CM

## 2022-08-19 LAB
ALBUMIN SERPL ELPH-MCNC: 2.3 G/DL — LOW (ref 3.3–5.2)
ALP SERPL-CCNC: 217 U/L — HIGH (ref 40–120)
ALT FLD-CCNC: 42 U/L — HIGH
ANION GAP SERPL CALC-SCNC: 9 MMOL/L — SIGNIFICANT CHANGE UP (ref 5–17)
AST SERPL-CCNC: 34 U/L — SIGNIFICANT CHANGE UP
BILIRUB SERPL-MCNC: 1 MG/DL — SIGNIFICANT CHANGE UP (ref 0.4–2)
BUN SERPL-MCNC: 13.8 MG/DL — SIGNIFICANT CHANGE UP (ref 8–20)
CALCIUM SERPL-MCNC: 8.5 MG/DL — SIGNIFICANT CHANGE UP (ref 8.4–10.5)
CHLORIDE SERPL-SCNC: 100 MMOL/L — SIGNIFICANT CHANGE UP (ref 98–107)
CO2 SERPL-SCNC: 28 MMOL/L — SIGNIFICANT CHANGE UP (ref 22–29)
CREAT SERPL-MCNC: 0.55 MG/DL — SIGNIFICANT CHANGE UP (ref 0.5–1.3)
CULTURE RESULTS: SIGNIFICANT CHANGE UP
CULTURE RESULTS: SIGNIFICANT CHANGE UP
EGFR: 102 ML/MIN/1.73M2 — SIGNIFICANT CHANGE UP
GLUCOSE BLDC GLUCOMTR-MCNC: 127 MG/DL — HIGH (ref 70–99)
GLUCOSE BLDC GLUCOMTR-MCNC: 140 MG/DL — HIGH (ref 70–99)
GLUCOSE BLDC GLUCOMTR-MCNC: 142 MG/DL — HIGH (ref 70–99)
GLUCOSE BLDC GLUCOMTR-MCNC: 160 MG/DL — HIGH (ref 70–99)
GLUCOSE SERPL-MCNC: 142 MG/DL — HIGH (ref 70–99)
HCT VFR BLD CALC: 34.7 % — LOW (ref 39–50)
HGB BLD-MCNC: 10.9 G/DL — LOW (ref 13–17)
MAGNESIUM SERPL-MCNC: 1.7 MG/DL — SIGNIFICANT CHANGE UP (ref 1.6–2.6)
MCHC RBC-ENTMCNC: 30.1 PG — SIGNIFICANT CHANGE UP (ref 27–34)
MCHC RBC-ENTMCNC: 31.4 GM/DL — LOW (ref 32–36)
MCV RBC AUTO: 95.9 FL — SIGNIFICANT CHANGE UP (ref 80–100)
PHOSPHATE SERPL-MCNC: 2.8 MG/DL — SIGNIFICANT CHANGE UP (ref 2.4–4.7)
PLATELET # BLD AUTO: 273 K/UL — SIGNIFICANT CHANGE UP (ref 150–400)
POTASSIUM SERPL-MCNC: 4.2 MMOL/L — SIGNIFICANT CHANGE UP (ref 3.5–5.3)
POTASSIUM SERPL-SCNC: 4.2 MMOL/L — SIGNIFICANT CHANGE UP (ref 3.5–5.3)
PROT SERPL-MCNC: 6.3 G/DL — LOW (ref 6.6–8.7)
RBC # BLD: 3.62 M/UL — LOW (ref 4.2–5.8)
RBC # FLD: 14.9 % — HIGH (ref 10.3–14.5)
SODIUM SERPL-SCNC: 137 MMOL/L — SIGNIFICANT CHANGE UP (ref 135–145)
SPECIMEN SOURCE: SIGNIFICANT CHANGE UP
SPECIMEN SOURCE: SIGNIFICANT CHANGE UP
WBC # BLD: 9.71 K/UL — SIGNIFICANT CHANGE UP (ref 3.8–10.5)
WBC # FLD AUTO: 9.71 K/UL — SIGNIFICANT CHANGE UP (ref 3.8–10.5)

## 2022-08-19 PROCEDURE — 99233 SBSQ HOSP IP/OBS HIGH 50: CPT

## 2022-08-19 PROCEDURE — 71045 X-RAY EXAM CHEST 1 VIEW: CPT | Mod: 26

## 2022-08-19 PROCEDURE — 93010 ELECTROCARDIOGRAM REPORT: CPT

## 2022-08-19 RX ORDER — ATORVASTATIN CALCIUM 80 MG/1
40 TABLET, FILM COATED ORAL AT BEDTIME
Refills: 0 | Status: DISCONTINUED | OUTPATIENT
Start: 2022-08-19 | End: 2022-08-30

## 2022-08-19 RX ADMIN — ATORVASTATIN CALCIUM 40 MILLIGRAM(S): 80 TABLET, FILM COATED ORAL at 21:40

## 2022-08-19 RX ADMIN — PIPERACILLIN AND TAZOBACTAM 25 GRAM(S): 4; .5 INJECTION, POWDER, LYOPHILIZED, FOR SOLUTION INTRAVENOUS at 06:17

## 2022-08-19 RX ADMIN — Medication 81 MILLIGRAM(S): at 13:02

## 2022-08-19 RX ADMIN — Medication 1 APPLICATION(S): at 06:18

## 2022-08-19 RX ADMIN — Medication 50 MILLIGRAM(S): at 06:19

## 2022-08-19 RX ADMIN — Medication 125 MILLIGRAM(S): at 06:19

## 2022-08-19 RX ADMIN — Medication 125 MILLIGRAM(S): at 18:20

## 2022-08-19 RX ADMIN — Medication 3 MILLILITER(S): at 03:58

## 2022-08-19 RX ADMIN — Medication 3 MILLILITER(S): at 16:37

## 2022-08-19 RX ADMIN — Medication 125 MILLIGRAM(S): at 13:02

## 2022-08-19 RX ADMIN — ENOXAPARIN SODIUM 40 MILLIGRAM(S): 100 INJECTION SUBCUTANEOUS at 21:39

## 2022-08-19 RX ADMIN — Medication 100 MILLIGRAM(S): at 11:00

## 2022-08-19 RX ADMIN — CHLORHEXIDINE GLUCONATE 1 APPLICATION(S): 213 SOLUTION TOPICAL at 06:19

## 2022-08-19 RX ADMIN — Medication 3 MILLIGRAM(S): at 21:40

## 2022-08-19 RX ADMIN — SCOPALAMINE 1 PATCH: 1 PATCH, EXTENDED RELEASE TRANSDERMAL at 20:30

## 2022-08-19 RX ADMIN — SODIUM CHLORIDE 55 MILLILITER(S): 9 INJECTION, SOLUTION INTRAVENOUS at 18:16

## 2022-08-19 RX ADMIN — Medication 50 MILLIGRAM(S): at 21:40

## 2022-08-19 RX ADMIN — Medication 3 MILLILITER(S): at 20:18

## 2022-08-19 RX ADMIN — Medication 1 APPLICATION(S): at 18:27

## 2022-08-19 RX ADMIN — SODIUM CHLORIDE 55 MILLILITER(S): 9 INJECTION, SOLUTION INTRAVENOUS at 00:17

## 2022-08-19 RX ADMIN — SPIRONOLACTONE 12.5 MILLIGRAM(S): 25 TABLET, FILM COATED ORAL at 06:18

## 2022-08-19 RX ADMIN — PIPERACILLIN AND TAZOBACTAM 25 GRAM(S): 4; .5 INJECTION, POWDER, LYOPHILIZED, FOR SOLUTION INTRAVENOUS at 21:39

## 2022-08-19 RX ADMIN — Medication 50 MILLIGRAM(S): at 13:54

## 2022-08-19 RX ADMIN — Medication 3 MILLILITER(S): at 10:23

## 2022-08-19 RX ADMIN — SCOPALAMINE 1 PATCH: 1 PATCH, EXTENDED RELEASE TRANSDERMAL at 07:00

## 2022-08-19 RX ADMIN — Medication 100 MILLIGRAM(S): at 18:19

## 2022-08-19 RX ADMIN — Medication 125 MILLIGRAM(S): at 00:16

## 2022-08-19 RX ADMIN — Medication 2: at 06:26

## 2022-08-19 RX ADMIN — PIPERACILLIN AND TAZOBACTAM 25 GRAM(S): 4; .5 INJECTION, POWDER, LYOPHILIZED, FOR SOLUTION INTRAVENOUS at 13:09

## 2022-08-19 NOTE — PROCEDURE NOTE - NSINFORMCONSENT_GEN_A_CORE
This was an emergent procedure.
Benefits, risks, and possible complications of procedure explained to patient/caregiver who verbalized understanding and gave verbal consent.
This was an emergent procedure.
This was an emergent procedure.
Benefits, risks, and possible complications of procedure explained to patient/caregiver who verbalized understanding and gave verbal consent.

## 2022-08-19 NOTE — PROGRESS NOTE ADULT - ASSESSMENT
75yo man with CABG, PVD, HTN, HLD, and low EF (declined AICD), admitted 7/24 with LM1 occlusion, s/p TICI 2B MT, no tPA as wake-up stroke. post thrombectomy, re-intubated for hypoxic respiratory failure. MRI brain with a large L MCA stroke with small area of reperfusion hemorrhage. Course complicated by Septic shock, UTI, C. dif, Urinary obstruction, respiratory failure secondary to poor mental status now s/p extubation 8/10 and transferred to medicine for further care.     Acute hypoxic  Respiratory failure w/ Sepsis sec Multilober PNA likely GNR/ CDIF COLITIS   -s/p aspiration, on 3 l nc  - s/p intubation ,extubation This admission  -HAS LOOSE STOOL,mild improvemt  - -continue iv  ZOSYN. Add po vanco by  ID, F./U FURTHER REC  -bl c/s x2 ngtd, la STABLE  -CT CHEST pna  -f/u fever trend/wbc  - Pt is risk of recurrent aspiration pna  -will hold TF' now,will resume tomorrow with slow rate 20cc/hr to reach goal 60 cc  -clear by pulmonary/id for peg, waiting for cardiac clearance  - gi is following      Dysphagia: Post Intubation and CVA  -failed swallow eval TODAY 08/17/22  - NGT FEEDING on hold ,will resume tomorrow  -gi is follwing for PEG        Hypernatremia  -improved na 137  -  free water   -f/u nephrology rec  -f/u bmp    Acute  Left MCA Stroke,  7/24 with LM1 occlusion, s/p TICI 2B MT, no tPA as wake-up stroke. post thrombectomy  Small Hemorrhagic conversion sec to reperfusion  - ASA,  -hold Lipitor for transaminitis  - CT Head of 7-30-22 reviewed. Stable left frontal hemorrhagic products within the infarct.    - EEG  no seizure  - CT/CTA/CTP -images and reports were reviewed.   - MRI Brain  as above   - TTE : EF < 10%.  - IMTIAZ  was  postponed due to fever.  - SCD/ SQ Lovenox for DVT prophylaxis.    Transaminitis  -trending down  -hold lipitor        Combined systolic and diastolic heart failure, NSVT   ICM - TTE 7/24 showed LVEF <10% with RV dysfunction  Cont metoprolol  Lopressor 50mg TID  (Titrated to TID for frequent ectopy on monitor)   Spironolactone 12.5mg daily.   Hold Ace inhib for now  per cardiology dc midodrine then plan to  start losartan  if stable bp --bp soft   May need ICD given severe CM and ectopy however. 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 explained  over all prognosis guarded, pt is high risk of respiratory failure, aspiration pna, septic shock ,cardiac arrest, risk of high mortality. wife/son verbalized understood risk. family wants no limitation  of medical treatment except intubation/resuscitation. wants. PEG tube  DW WIFE AT BEDSIDE  DW Son over phone,up date care of plan-agreed  carina rn        77yo man with CABG, PVD, HTN, HLD, and low EF (declined AICD), admitted 7/24 with LM1 occlusion, s/p TICI 2B MT, no tPA as wake-up stroke. post thrombectomy, re-intubated for hypoxic respiratory failure. MRI brain with a large L MCA stroke with small area of reperfusion hemorrhage. Course complicated by Septic shock, UTI, C. dif, Urinary obstruction, respiratory failure secondary to poor mental status now s/p extubation 8/10 and transferred to medicine for further care.     Acute hypoxic  Respiratory failure w/ Sepsis sec Multilober PNA likely GNR/ CDIF COLITIS   -s/p aspiration, on 3 l nc  - s/p intubation ,extubation This admission  -HAS LOOSE STOOL,mild improvemt  - -continue iv  ZOSYN. Add po vanco by  ID, F./U FURTHER REC  -bl c/s x2 ngtd, la STABLE  -CT CHEST pna  -f/u fever trend/wbc  - Pt is risk of recurrent aspiration pna  -will hold TF' now,will resume tomorrow with slow rate 20cc/hr to reach goal 60 cc  -clear by pulmonary/id for peg, waiting for cardiac clearance  - gi is following      Dysphagia: Post Intubation and CVA  -failed swallow eval TODAY 08/17/22  - NGT FEEDING on hold ,will resume tomorrow  -gi is follwing for PEG        Hypernatremia  -improved na 137  -  free water   -f/u nephrology rec  -f/u bmp    Acute  Left MCA Stroke,  7/24 with LM1 occlusion, s/p TICI 2B MT, no tPA as wake-up stroke. post thrombectomy  Small Hemorrhagic conversion sec to reperfusion  - ASA,  -hold Lipitor for transaminitis  - CT Head of 7-30-22 reviewed. Stable left frontal hemorrhagic products within the infarct.    - EEG  no seizure  - CT/CTA/CTP -images and reports were reviewed.   - MRI Brain  as above   - TTE : EF < 10%.  - IMTIAZ  was  postponed due to fever.  - SCD/ SQ Lovenox for DVT prophylaxis.    Transaminitis  -trending down  -hold lipitor        Combined systolic and diastolic heart failure, NSVT   ICM - TTE 7/24 showed LVEF <10% with RV dysfunction  Cont metoprolol  Lopressor 50mg TID  (Titrated to TID for frequent ectopy on monitor)   Spironolactone 12.5mg daily.   Hold Ace inhib for now  per cardiology dc midodrine then plan to  start losartan  if stable bp --bp soft   May need ICD given severe CM and ectopy however. 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 explained  over all prognosis guarded, pt is high risk of respiratory failure, aspiration pna, septic shock ,cardiac arrest, risk of high mortality. wife/son verbalized understood risk. family wants no limitation  of medical treatment except intubation/resuscitation. wants. PEG tube  DW WIFE AT BEDSIDE  DW Son over phone,up date care of plan-agreed  carina rn       up date: will resume statin, monitor lfts marsha

## 2022-08-19 NOTE — PROGRESS NOTE ADULT - SUBJECTIVE AND OBJECTIVE BOX
Patient is a 77y old  Male who presents with a chief complaint of stroke (19 Aug 2022 12:02)    pt is awake, does not follow commands,non verbal  REVIEW OF SYSTEMS: unable ams    MEDICATIONS  (STANDING):  albuterol/ipratropium for Nebulization 3 milliLiter(s) Nebulizer every 6 hours  amantadine 100 milliGRAM(s) Oral <User Schedule>  aspirin  chewable 81 milliGRAM(s) Oral daily  BACItracin   Ointment 1 Application(s) Topical two times a day  chlorhexidine 2% Cloths 1 Application(s) Topical daily  dextrose 5% + lactated ringers. 1000 milliLiter(s) (55 mL/Hr) IV Continuous <Continuous>  dextrose 5%. 1000 milliLiter(s) (100 mL/Hr) IV Continuous <Continuous>  dextrose 5%. 1000 milliLiter(s) (50 mL/Hr) IV Continuous <Continuous>  dextrose 50% Injectable 25 Gram(s) IV Push once  enoxaparin Injectable 40 milliGRAM(s) SubCutaneous every 24 hours  glucagon  Injectable 1 milliGRAM(s) IntraMuscular once  insulin lispro (ADMELOG) corrective regimen sliding scale   SubCutaneous every 6 hours  melatonin 3 milliGRAM(s) Oral at bedtime  metoprolol tartrate 50 milliGRAM(s) Oral every 8 hours  piperacillin/tazobactam IVPB.. 3.375 Gram(s) IV Intermittent every 8 hours  scopolamine 1 mG/72 Hr(s) Patch 1 Patch Transdermal every 72 hours  spironolactone 12.5 milliGRAM(s) Oral daily  vancomycin    Solution 125 milliGRAM(s) Enteral Tube every 6 hours    MEDICATIONS  (PRN):  acetaminophen     Tablet .. 650 milliGRAM(s) Oral every 6 hours PRN Temp greater or equal to 38C (100.4F), Mild Pain (1 - 3)  dextrose Oral Gel 15 Gram(s) Oral once PRN Blood Glucose LESS THAN 70 milliGRAM(s)/deciliter  ondansetron Injectable 4 milliGRAM(s) IV Push every 6 hours PRN Nausea and/or Vomiting      CAPILLARY BLOOD GLUCOSE      POCT Blood Glucose.: 142 mg/dL (19 Aug 2022 11:05)  POCT Blood Glucose.: 160 mg/dL (19 Aug 2022 06:17)  POCT Blood Glucose.: 140 mg/dL (19 Aug 2022 00:15)  POCT Blood Glucose.: 147 mg/dL (18 Aug 2022 17:08)    I&O's Summary    18 Aug 2022 07:01  -  19 Aug 2022 07:00  --------------------------------------------------------  IN: 1310 mL / OUT: 1425 mL / NET: -115 mL        PHYSICAL EXAM:  Vital Signs Last 24 Hrs  T(C): 36.8 (19 Aug 2022 12:00), Max: 37.4 (19 Aug 2022 04:00)  T(F): 98.3 (19 Aug 2022 12:00), Max: 99.3 (19 Aug 2022 04:00)  HR: 86 (19 Aug 2022 13:45) (85 - 100)  BP: 104/67 (19 Aug 2022 13:45) (104/52 - 125/58)  BP(mean): 69 (19 Aug 2022 12:00) (64 - 78)  RR: 23 (19 Aug 2022 13:45) (22 - 25)  SpO2: 100% (19 Aug 2022 13:45) (91% - 100%)    Parameters below as of 19 Aug 2022 13:45  Patient On (Oxygen Delivery Method): nasal cannula  O2 Flow (L/min): 4      CONSTITUTIONAL: NAD,  EYES: PERRLA; conjunctiva and sclera clear  ENMT: Moist oral mucosa,   RESPIRATORY: Normal respiratory effort; crackles to auscultation bilaterally  CARDIOVASCULAR: Regular rate and rhythm, normal S1 and S2, no murmur   EXTS: No lower extremity edema; Peripheral pulses are 2+ bilaterally  ABDOMEN: Nontender to palpation, normoactive bowel sounds, no rebound/guarding;   MUSCLOSKELETAL:  no joint swelling or tenderness to palpation  PSYCH: calm  NEUROLOGY:awake,calm,does not follow commands.moving lt side      LABS:                        10.9   9.71  )-----------( 273      ( 19 Aug 2022 06:25 )             34.7     08-19    137  |  100  |  13.8  ----------------------------<  142<H>  4.2   |  28.0  |  0.55    Ca    8.5      19 Aug 2022 06:25  Phos  2.8     08-19  Mg     1.7     08-19    TPro  6.3<L>  /  Alb  2.3<L>  /  TBili  1.0  /  DBili  x   /  AST  34  /  ALT  42<H>  /  AlkPhos  217<H>  08-19                RADIOLOGY & ADDITIONAL TESTS:  Results Reviewed:   Imaging Personally Reviewed:   details… detailed exam

## 2022-08-19 NOTE — PROGRESS NOTE ADULT - ASSESSMENT
77 year old man with left middle cerebral artery CVA.     Stroke   He is status post thrombectomy of left M1 occlusion 7/24.  LDL: 97  Goal: < 70  Continue antiplatelet and statin.     Overall prognosis poor.   Will need either PEG or palliative care feeding depending upon family wishes  Family wishes for PEG  PEG to be done once infection clears    Thank you for allowing me to participate in the care of your patient    Miguel A Parham MD, PhD   [id number]

## 2022-08-19 NOTE — CHART NOTE - NSCHARTNOTEFT_GEN_A_CORE
Called by RN, pt pulled out NG tube.   At bedside, notified pt also had 12 beats NSVT and has been having multiple 3-4 beats NSVT.   Reviewed with monitor tech, also with more frequent PVCs compared to prior nights  STAT EKG ordered showing sinus tachycardia, ventricular trigeminy   Electrolytes added to AM labs  Other vitals stable, pt with audible gurgling, encourage suctioning    NG tube placed, CXR to confirm placement. Called by RN, pt pulled out NG tube.   At bedside, notified pt also had 12 beats NSVT and has been having multiple 3-4 beats NSVT.   Reviewed with monitor tech, also with more frequent PVCs compared to prior nights  STAT EKG ordered showing sinus tachycardia, multiple PVCs, ventricular trigeminy   Electrolytes added to AM labs   regular, /58, 100% on 3L, RR 25, T: 99.3  pt with audible gurgling--> encourage suctioning    NG tube placed, CXR to confirm placement.

## 2022-08-19 NOTE — PROGRESS NOTE ADULT - PROBLEM SELECTOR PLAN 1
Patient with Left MCA infarct with C-Diff infection and multifocal pneumonia.  Dobbhoff tube now in placed, X-ray showing Tip of the NG tube in stomach fundus.  Pulmonary and ID clearance appreciated.  Awaiting Cardiac clearance.  We will tentatively schedule patient for Peg tube placement on Monday, in the mean while please start tube feedings  Continue to monitor electrolytes and replenish as needed.

## 2022-08-19 NOTE — PROGRESS NOTE ADULT - SUBJECTIVE AND OBJECTIVE BOX
[facility]                                        Neurology at [place]                                 Dione Giron, & Pranav                                  [address]                                        [place], [place], [address]                                             [phone]        CC: Stroke    HPI:   76M with PMH CABG, HTN, HLD who [place] [facility] [place]. Last known well was last night 10pm prior to going to bed, woke up this morning around 0500 with R sided weakness and R facial droop. [place]ed [place] [facility], code stroke initiated, NIH at [facility] reportedly 8. CTA showed LM1 occlusion. Transferred to [facility] for possible neuro IR intervention. On arrival to [facility], NIH 6. Decision made to take patient directly to neuro IR for intervention. (JW)    Interim history: stable on 3 [place], no significant improvement, remains aphasic    ROS:   Unobtainable due to patient's condition.     MEDICATIONS  (STANDING):  albuterol/ipratropium for Nebulization 3 milliLiter(s) Nebulizer every 6 hours  amantadine 100 milliGRAM(s) Oral <User Schedule>  aspirin  chewable 81 milliGRAM(s) Oral daily  BACItracin   Ointment 1 Application(s) Topical two times a day  chlorhexidine 2% Cloths 1 Application(s) Topical daily  dextrose 5% + lactated ringers. 1000 milliLiter(s) (55 mL/Hr) IV Continuous <Continuous>  dextrose 5%. 1000 milliLiter(s) (50 mL/Hr) IV Continuous <Continuous>  dextrose 5%. 1000 milliLiter(s) (100 mL/Hr) IV Continuous <Continuous>  dextrose 50% Injectable 25 Gram(s) IV Push once  enoxaparin Injectable 40 milliGRAM(s) SubCutaneous every 24 hours  glucagon  Injectable 1 milliGRAM(s) IntraMuscular once  insulin lispro (ADMELOG) corrective regimen sliding scale   SubCutaneous every 6 hours  melatonin 3 milliGRAM(s) Oral at bedtime  metoprolol tartrate 50 milliGRAM(s) Oral every 8 hours  piperacillin/tazobactam IVPB.. 3.375 Gram(s) IV Intermittent every 8 hours  scopolamine 1 mG/72 Hr(s) Patch 1 Patch Transdermal every 72 hours  spironolactone 12.5 milliGRAM(s) Oral daily  vancomycin    Solution 125 milliGRAM(s) Enteral Tube every 6 hours    MEDICATIONS  (PRN):  acetaminophen     Tablet .. 650 milliGRAM(s) Oral every 6 hours PRN Temp greater or equal to 38C (100.4F), Mild Pain (1 - 3)  dextrose Oral Gel 15 Gram(s) Oral once PRN Blood Glucose LESS THAN 70 milliGRAM(s)/deciliter  ondansetron Injectable 4 milliGRAM(s) IV Push every 6 hours PRN Nausea and/or Vomiting      Vital Signs Last 24 Hrs  T(C): 36.6 (19 Aug 2022 07:31), Max: 37.4 (19 Aug 2022 04:00)  T(F): 97.9 (19 Aug 2022 07:31), Max: 99.3 (19 Aug 2022 04:00)  HR: 85 (19 Aug 2022 08:00) (85 - 100)  BP: 111/66 (19 Aug 2022 08:00) (104/52 - 125/58)  BP(mean): 77 (19 Aug 2022 08:00) (64 - 78)  RR: 23 (19 Aug 2022 08:00) (22 - 25)  SpO2: 100% (19 Aug 2022 08:00) (91% - 100%)    Parameters below as of 19 Aug 2022 08:00  Patient On (Oxygen Delivery Method): nasal cannula  O2 Flow (L/min): 3    Detailed Neurologic Exam:    Mental status: The patient is non verbal. He does not follow instructions.  (+) global aphasia    Cranial nerves: Pupils equal and react symmetrically to light. He blinks to threat from left. Not tracking with gaze. Depression of right nasolabial fold. Palate and tongue cannot be assessed.     Motor/sensory: There is normal bulk and tone.  There is no tremor.  Moves left to stimuli.   Right plegic.     Reflexes: Trace throughout and plantar responses are flexor.    Cerebellar: Cannot be assessed.     Labs:                           10.9   9.71  )-----------( 273      ( 19 Aug 2022 06:25 )             34.7     08-19    137  |  100  |  13.8  ----------------------------<  142<H>  4.2   |  28.0  |  0.55    Ca    8.5      19 Aug 2022 06:25  Phos  2.8     08-19  Mg     1.7     08-19    TPro  6.3<L>  /  Alb  2.3<L>  /  TBili  1.0  /  DBili  x   /  AST  34  /  ALT  42<H>  /  AlkPhos  217<H>  08-19    LIVER FUNCTIONS - ( 19 Aug 2022 06:25 )  Alb: 2.3 g/dL / Pro: 6.3 g/dL / ALK PHOS: 217 U/L / ALT: 42 U/L / AST: 34 U/L / GGT: x

## 2022-08-19 NOTE — PROGRESS NOTE ADULT - SUBJECTIVE AND OBJECTIVE BOX
Patient is a 77y old  Male who presents with a chief complaint of stroke (18 Aug 2022 11:03)      INTERVAL HPI/OVERNIGHT EVENTS: Patient seen and evaluated at bedside, no overnight events reported by nursing staff, Dobbhoff tube now in placed , X-ray showing Tip of the NG tube in stomach fundus,      MEDICATIONS  (STANDING):  albuterol/ipratropium for Nebulization 3 milliLiter(s) Nebulizer every 6 hours  amantadine 100 milliGRAM(s) Oral <User Schedule>  aspirin  chewable 81 milliGRAM(s) Oral daily  BACItracin   Ointment 1 Application(s) Topical two times a day  chlorhexidine 2% Cloths 1 Application(s) Topical daily  dextrose 5% + lactated ringers. 1000 milliLiter(s) (55 mL/Hr) IV Continuous <Continuous>  dextrose 5%. 1000 milliLiter(s) (100 mL/Hr) IV Continuous <Continuous>  dextrose 5%. 1000 milliLiter(s) (50 mL/Hr) IV Continuous <Continuous>  dextrose 50% Injectable 25 Gram(s) IV Push once  enoxaparin Injectable 40 milliGRAM(s) SubCutaneous every 24 hours  glucagon  Injectable 1 milliGRAM(s) IntraMuscular once  insulin lispro (ADMELOG) corrective regimen sliding scale   SubCutaneous every 6 hours  melatonin 3 milliGRAM(s) Oral at bedtime  metoprolol tartrate 50 milliGRAM(s) Oral every 8 hours  piperacillin/tazobactam IVPB.. 3.375 Gram(s) IV Intermittent every 8 hours  scopolamine 1 mG/72 Hr(s) Patch 1 Patch Transdermal every 72 hours  spironolactone 12.5 milliGRAM(s) Oral daily  vancomycin    Solution 125 milliGRAM(s) Enteral Tube every 6 hours    MEDICATIONS  (PRN):  acetaminophen     Tablet .. 650 milliGRAM(s) Oral every 6 hours PRN Temp greater or equal to 38C (100.4F), Mild Pain (1 - 3)  dextrose Oral Gel 15 Gram(s) Oral once PRN Blood Glucose LESS THAN 70 milliGRAM(s)/deciliter  ondansetron Injectable 4 milliGRAM(s) IV Push every 6 hours PRN Nausea and/or Vomiting      Allergies    No Known Allergies    Intolerances        Review of Systems: Unable to obtain      Vital Signs Last 24 Hrs  T(C): 36.6 (19 Aug 2022 07:31), Max: 37.4 (19 Aug 2022 04:00)  T(F): 97.9 (19 Aug 2022 07:31), Max: 99.3 (19 Aug 2022 04:00)  HR: 85 (19 Aug 2022 08:00) (85 - 100)  BP: 111/66 (19 Aug 2022 08:00) (104/52 - 125/58)  BP(mean): 77 (19 Aug 2022 08:00) (64 - 78)  RR: 23 (19 Aug 2022 08:00) (22 - 25)  SpO2: 100% (19 Aug 2022 08:00) (91% - 100%)    Parameters below as of 19 Aug 2022 08:00  Patient On (Oxygen Delivery Method): nasal cannula  O2 Flow (L/min): 3      PHYSICAL EXAM:  General: Well developed; severely altered;  in no acute distress  HEENT: MMM, conjunctiva pink and sclera anicteric.  Lungs: Bilateral audible rhonchi with decreased breath sounds  Cor: RRR S1, S2 only.  Gastrointestinal: Soft, non-tender non-distended; Normal bowel sounds; No rebound or guarding or HSM. No surgical scars.  Extremities: Limited ROM and movement of lower extremities.  Neurological: Alert but extremely lethargic and altered.  Skin: Warm and dry. No obvious rash    LABS:                        10.9   9.71  )-----------( 273      ( 19 Aug 2022 06:25 )             34.7     08-19    137  |  100  |  13.8  ----------------------------<  142<H>  4.2   |  28.0  |  0.55    Ca    8.5      19 Aug 2022 06:25  Phos  2.8     08-19  Mg     1.7     08-19    TPro  6.3<L>  /  Alb  2.3<L>  /  TBili  1.0  /  DBili  x   /  AST  34  /  ALT  42<H>  /  AlkPhos  217<H>  08-19        LIVER FUNCTIONS - ( 19 Aug 2022 06:25 )  Alb: 2.3 g/dL / Pro: 6.3 g/dL / ALK PHOS: 217 U/L / ALT: 42 U/L / AST: 34 U/L / GGT: x             RADIOLOGY & ADDITIONAL TESTS:  < from: Xray Chest 1 View- PORTABLE-Urgent (Xray Chest 1 View- PORTABLE-Urgent .) (08.19.22 @ 05:49) >  ACC: [id number] EXAM:  XR CHEST PORTABLE URGENT 1V                          PROCEDURE DATE:  08/19/2022          INTERPRETATION:  Clinical History: 77-year-old male, NG tube placement.    Portable/expiratory view of the chest is compared to 8/13/2022and   correlated with the chest CT of 8/15/2022.    FINDINGS: NG tube with the tip in the stomach fundus, new.    Mild cardiomegaly and normal pulmonary vasculature with no lobar   consolidation, left effusion, pneumothorax or acute osseous finding.    Elevated right hemidiaphragm with adjacent consolidation/air   bronchograms, unchanged.    IMPRESSION:  Tip of the NG tube in stomach fundus, new.    Right lower lobe pneumonia/air bronchograms/elevated right hemidiaphragm,   grossly unchanged    --- End of Report ---    < end of copied text >

## 2022-08-20 LAB
ALBUMIN SERPL ELPH-MCNC: 2.2 G/DL — LOW (ref 3.3–5.2)
ALP SERPL-CCNC: 206 U/L — HIGH (ref 40–120)
ALT FLD-CCNC: 38 U/L — SIGNIFICANT CHANGE UP
ANION GAP SERPL CALC-SCNC: 10 MMOL/L — SIGNIFICANT CHANGE UP (ref 5–17)
AST SERPL-CCNC: 35 U/L — SIGNIFICANT CHANGE UP
BILIRUB SERPL-MCNC: 0.7 MG/DL — SIGNIFICANT CHANGE UP (ref 0.4–2)
BUN SERPL-MCNC: 10 MG/DL — SIGNIFICANT CHANGE UP (ref 8–20)
CALCIUM SERPL-MCNC: 8.3 MG/DL — LOW (ref 8.4–10.5)
CHLORIDE SERPL-SCNC: 100 MMOL/L — SIGNIFICANT CHANGE UP (ref 98–107)
CO2 SERPL-SCNC: 28 MMOL/L — SIGNIFICANT CHANGE UP (ref 22–29)
CREAT SERPL-MCNC: 0.44 MG/DL — LOW (ref 0.5–1.3)
EGFR: 109 ML/MIN/1.73M2 — SIGNIFICANT CHANGE UP
GLUCOSE BLDC GLUCOMTR-MCNC: 113 MG/DL — HIGH (ref 70–99)
GLUCOSE BLDC GLUCOMTR-MCNC: 116 MG/DL — HIGH (ref 70–99)
GLUCOSE BLDC GLUCOMTR-MCNC: 117 MG/DL — HIGH (ref 70–99)
GLUCOSE BLDC GLUCOMTR-MCNC: 153 MG/DL — HIGH (ref 70–99)
GLUCOSE SERPL-MCNC: 150 MG/DL — HIGH (ref 70–99)
HCT VFR BLD CALC: 32.2 % — LOW (ref 39–50)
HGB BLD-MCNC: 10.4 G/DL — LOW (ref 13–17)
MCHC RBC-ENTMCNC: 30.7 PG — SIGNIFICANT CHANGE UP (ref 27–34)
MCHC RBC-ENTMCNC: 32.3 GM/DL — SIGNIFICANT CHANGE UP (ref 32–36)
MCV RBC AUTO: 95 FL — SIGNIFICANT CHANGE UP (ref 80–100)
PLATELET # BLD AUTO: 232 K/UL — SIGNIFICANT CHANGE UP (ref 150–400)
POTASSIUM SERPL-MCNC: 3.9 MMOL/L — SIGNIFICANT CHANGE UP (ref 3.5–5.3)
POTASSIUM SERPL-SCNC: 3.9 MMOL/L — SIGNIFICANT CHANGE UP (ref 3.5–5.3)
PROT SERPL-MCNC: 6 G/DL — LOW (ref 6.6–8.7)
RBC # BLD: 3.39 M/UL — LOW (ref 4.2–5.8)
RBC # FLD: 15 % — HIGH (ref 10.3–14.5)
SODIUM SERPL-SCNC: 137 MMOL/L — SIGNIFICANT CHANGE UP (ref 135–145)
WBC # BLD: 8.24 K/UL — SIGNIFICANT CHANGE UP (ref 3.8–10.5)
WBC # FLD AUTO: 8.24 K/UL — SIGNIFICANT CHANGE UP (ref 3.8–10.5)

## 2022-08-20 PROCEDURE — 99233 SBSQ HOSP IP/OBS HIGH 50: CPT

## 2022-08-20 RX ADMIN — Medication 125 MILLIGRAM(S): at 12:41

## 2022-08-20 RX ADMIN — Medication 100 MILLIGRAM(S): at 09:06

## 2022-08-20 RX ADMIN — Medication 3 MILLIGRAM(S): at 22:43

## 2022-08-20 RX ADMIN — Medication 3 MILLILITER(S): at 22:39

## 2022-08-20 RX ADMIN — Medication 125 MILLIGRAM(S): at 00:51

## 2022-08-20 RX ADMIN — CHLORHEXIDINE GLUCONATE 1 APPLICATION(S): 213 SOLUTION TOPICAL at 05:44

## 2022-08-20 RX ADMIN — Medication 1 APPLICATION(S): at 05:41

## 2022-08-20 RX ADMIN — Medication 1 APPLICATION(S): at 18:45

## 2022-08-20 RX ADMIN — SCOPALAMINE 1 PATCH: 1 PATCH, EXTENDED RELEASE TRANSDERMAL at 07:00

## 2022-08-20 RX ADMIN — Medication 125 MILLIGRAM(S): at 18:35

## 2022-08-20 RX ADMIN — Medication 3 MILLILITER(S): at 03:12

## 2022-08-20 RX ADMIN — ENOXAPARIN SODIUM 40 MILLIGRAM(S): 100 INJECTION SUBCUTANEOUS at 22:38

## 2022-08-20 RX ADMIN — SPIRONOLACTONE 12.5 MILLIGRAM(S): 25 TABLET, FILM COATED ORAL at 05:39

## 2022-08-20 RX ADMIN — ATORVASTATIN CALCIUM 40 MILLIGRAM(S): 80 TABLET, FILM COATED ORAL at 22:38

## 2022-08-20 RX ADMIN — Medication 50 MILLIGRAM(S): at 14:33

## 2022-08-20 RX ADMIN — Medication 3 MILLILITER(S): at 09:40

## 2022-08-20 RX ADMIN — Medication 100 MILLIGRAM(S): at 14:34

## 2022-08-20 RX ADMIN — Medication 81 MILLIGRAM(S): at 12:42

## 2022-08-20 RX ADMIN — PIPERACILLIN AND TAZOBACTAM 25 GRAM(S): 4; .5 INJECTION, POWDER, LYOPHILIZED, FOR SOLUTION INTRAVENOUS at 05:39

## 2022-08-20 RX ADMIN — SCOPALAMINE 1 PATCH: 1 PATCH, EXTENDED RELEASE TRANSDERMAL at 20:27

## 2022-08-20 RX ADMIN — Medication 2: at 06:36

## 2022-08-20 RX ADMIN — Medication 3 MILLILITER(S): at 17:48

## 2022-08-20 RX ADMIN — Medication 50 MILLIGRAM(S): at 22:38

## 2022-08-20 RX ADMIN — Medication 50 MILLIGRAM(S): at 05:41

## 2022-08-20 RX ADMIN — Medication 125 MILLIGRAM(S): at 05:39

## 2022-08-20 RX ADMIN — PIPERACILLIN AND TAZOBACTAM 25 GRAM(S): 4; .5 INJECTION, POWDER, LYOPHILIZED, FOR SOLUTION INTRAVENOUS at 12:50

## 2022-08-20 NOTE — PROGRESS NOTE ADULT - ASSESSMENT
77y/oM PMH CABG, PVD, HTN, HLD, low EF (declined AICD), initially presenting to [facility] with R-sided weakness and R-sided facial droop. Code stroke initiated, initial NIH 8. CTA with LM1 occlusion, transferred to [facility] for poss neuro IR intervention. On arrival, NIH 6, pt admitted 7/24 to neuro ICU with LM1 occlusion, s/p TICI 2B recanalization. pt initially extubated post procedure, required reintubation for respiratory distress. MRI with large L MCA stroke with small area of reperfusion hemorrhage.  Bedside echo with EF 15%, diuresed. Formal TTE 7/25 with EF <10%. Cardio rec ICD but pt previously refused as outpt. Course further complicated by septic shock, UTI, c. diff, urinary obstruction, respiratory failure, now s/p extubation 8/10, transferred to medicine 8/11.     Acute CVA with LM1 occlusion s/p TICI 2B MT, no tPA   Small hemorrhagic conversion   Dysphagia     -asa  -lovenox sq   -aspiration precautions   -resume TF   -tentatively plan for PEG Monday 8/22  -reconsult     Combined systolic and disatolic heart failure   -TTE 7/24 LVEF <10% with RV dysfunction   -metoprolol  -spironolactone   -    Combined septic and cardiogenic shock   UTI   Multilobar PNA   C diff   -s/p vasopressors   -s/p course abx for UTI   -   77y/oM PMH CABG, PVD, HTN, HLD, low EF (declined AICD), [place] [facility] [place]-sided [place] and R-sided facial droop. Code stroke initiated, initial NIH 8. CTA with LM1 occlusion, transferred to [facility] for poss neuro IR intervention. On arrival, NIH 6, pt admitted 7/24 to neuro ICU with LM1 occlusion, s/p TICI 2B recanalization. pt initially extubated post procedure, required reintubation for respiratory distress. MRI with large L MCA stroke with small area of reperfusion hemorrhage.  Bedside echo with EF 15%, diuresed. Formal TTE 7/25 with EF <10%. Cardio rec ICD but pt previously refused as outpt. Course further complicated by septic shock, UTI, c. diff, urinary obstruction, respiratory failure, now s/p extubation 8/10, transferred to medicine 8/11.     Acute CVA with LM1 occlusion s/p TICI 2B MT, no tPA   Small hemorrhagic conversion   Dysphagia   -asa  -lovenox sq   -s/p neuroicu   -neuro recs appreciated   -aspiration precautions   -resume TF via NGT  -tentatively plan for PEG Monday 8/22    Combined septic and cardiogenic shock   UTI   Multilobar PNA   C diff   -s/p vasopressors   -s/p course abx for UTI   -s/p 7 days IV abx for pna   -on PO vanc for cdiff   -ID recs appreciated     Combined systolic and disatolic heart failure   -TTE 7/24 LVEF <10% with RV dysfunction   -metoprolol  -spironolactone   - 77y/oM PMH CABG, PVD, HTN, HLD, low EF (declined AICD), initially presenting to [facility] with R-sided weakness and R-sided facial droop. Code stroke initiated, initial NIH 8. CTA with LM1 occlusion, transferred to [facility] for poss neuro IR intervention. On arrival, NIH 6, pt admitted 7/24 to neuro ICU with LM1 occlusion, s/p TICI 2B recanalization. pt initially extubated post procedure, required reintubation for respiratory distress. MRI with large L MCA stroke with small area of reperfusion hemorrhage.  Bedside echo with EF 15%, diuresed. Formal TTE 7/25 with EF <10%. Cardio rec ICD but pt previously refused as outpt. Course further complicated by septic shock, UTI, c. diff, urinary obstruction, respiratory failure, now s/p extubation 8/10, transferred to medicine 8/11.     Acute CVA with LM1 occlusion s/p TICI 2B MT, no tPA   Small hemorrhagic conversion   Dysphagia   -asa  -lovenox sq   -s/p neuroicu   -neuro recs appreciated   -lipitor held for transaminitis, now resumed, cont to monitor   -s/p EEG without seizure   -aspiration precautions   -resume TF via NGT  -tentatively plan for PEG Monday 8/22    Acute hypoxic respiratory failure  Combined septic and cardiogenic shock   UTI   Multilobar PNA   C diff   -s/p vasopressors   -s/p intubation, extubation, reintubation, extubated 8/10  -s/p course abx for UTI   -s/p 7 days IV abx for pna   -on PO vanc for cdiff   -ID recs appreciated   -pulm recs appreciated   -titrate supplemental O2 as tolerated     Combined systolic and disatolic heart failure   -TTE 7/24 LVEF <10% with RV dysfunction   -metoprolol  -spironolactone   -now off midodrine   -monitoring bp, start losartan if bp increasing     Transaminitis   -lipitor now resumed 8/19, cont to monitor     KE suspected carbapenem AIN, now improved   -nephro recs appreciated     Hypernatremia, now resolved   -cont free water     Urinary retention   -failed TOV x2   -maintain ace     vte ppx: lovenox sq     Code status: DNR/DNI

## 2022-08-20 NOTE — PROGRESS NOTE ADULT - SUBJECTIVE AND OBJECTIVE BOX
NATANAEL ROWAN    [id number]    77y      Male    CC: cva    INTERVAL HPI/OVERNIGHT EVENTS: pt seen and examined. no acute events reported o/n.     REVIEW OF SYSTEMS:  unable to obtain     Vital Signs Last 24 Hrs  T(C): 36.4 (20 Aug 2022 12:00), Max: 37.2 (20 Aug 2022 04:00)  T(F): 97.5 (20 Aug 2022 12:00), Max: 98.9 (20 Aug 2022 04:00)  HR: 87 (20 Aug 2022 09:40) (82 - 90)  BP: 113/64 (20 Aug 2022 08:00) (100/48 - 120/59)  BP(mean): 74 (20 Aug 2022 08:00) (71 - 79)  RR: 22 (20 Aug 2022 09:40) (15 - 24)  SpO2: 100% (20 Aug 2022 09:40) (98% - 100%)    Parameters below as of 20 Aug 2022 09:40  Patient On (Oxygen Delivery Method): nasal cannula  O2 Flow (L/min): 4      PHYSICAL EXAM:    GENERAL: NAD  HEENT: PERRL  NECK: soft, supple  CHEST/LUNG: course sounds b/l; respirations unlabored on 4LNC   HEART: S1S2+, Regular rate and rhythm  ABDOMEN: Soft, Nontender, Nondistended; Bowel sounds present  SKIN: warm, dry  NEURO: Awake, alert; global aphasia, not following commands;   PSYCH: calm    LABS:                        10.4   8.24  )-----------( 232      ( 20 Aug 2022 07:15 )             32.2     08-20    137  |  100  |  10.0  ----------------------------<  150<H>  3.9   |  28.0  |  0.44<L>    Ca    8.3<L>      20 Aug 2022 07:15  Phos  2.8     08-19  Mg     1.7     08-19    TPro  6.0<L>  /  Alb  2.2<L>  /  TBili  0.7  /  DBili  x   /  AST  35  /  ALT  38  /  AlkPhos  206<H>  08-20            MEDICATIONS  (STANDING):  albuterol/ipratropium for Nebulization 3 milliLiter(s) Nebulizer every 6 hours  amantadine 100 milliGRAM(s) Oral <User Schedule>  aspirin  chewable 81 milliGRAM(s) Oral daily  atorvastatin 40 milliGRAM(s) Oral at bedtime  BACItracin   Ointment 1 Application(s) Topical two times a day  chlorhexidine 2% Cloths 1 Application(s) Topical daily  dextrose 5%. 1000 milliLiter(s) (50 mL/Hr) IV Continuous <Continuous>  dextrose 5%. 1000 milliLiter(s) (100 mL/Hr) IV Continuous <Continuous>  dextrose 50% Injectable 25 Gram(s) IV Push once  enoxaparin Injectable 40 milliGRAM(s) SubCutaneous every 24 hours  glucagon  Injectable 1 milliGRAM(s) IntraMuscular once  insulin lispro (ADMELOG) corrective regimen sliding scale   SubCutaneous every 6 hours  melatonin 3 milliGRAM(s) Oral at bedtime  metoprolol tartrate 50 milliGRAM(s) Oral every 8 hours  scopolamine 1 mG/72 Hr(s) Patch 1 Patch Transdermal every 72 hours  spironolactone 12.5 milliGRAM(s) Oral daily  vancomycin    Solution 125 milliGRAM(s) Enteral Tube every 6 hours    MEDICATIONS  (PRN):  acetaminophen     Tablet .. 650 milliGRAM(s) Oral every 6 hours PRN Temp greater or equal to 38C (100.4F), Mild Pain (1 - 3)  dextrose Oral Gel 15 Gram(s) Oral once PRN Blood Glucose LESS THAN 70 milliGRAM(s)/deciliter  ondansetron Injectable 4 milliGRAM(s) IV Push every 6 hours PRN Nausea and/or Vomiting      RADIOLOGY & ADDITIONAL TESTS:

## 2022-08-21 DIAGNOSIS — Z01.810 ENCOUNTER FOR PREPROCEDURAL CARDIOVASCULAR EXAMINATION: ICD-10-CM

## 2022-08-21 LAB
ALBUMIN SERPL ELPH-MCNC: 2.3 G/DL — LOW (ref 3.3–5.2)
ALP SERPL-CCNC: 259 U/L — HIGH (ref 40–120)
ALT FLD-CCNC: 42 U/L — HIGH
ANION GAP SERPL CALC-SCNC: 10 MMOL/L — SIGNIFICANT CHANGE UP (ref 5–17)
AST SERPL-CCNC: 39 U/L — SIGNIFICANT CHANGE UP
BILIRUB SERPL-MCNC: 0.7 MG/DL — SIGNIFICANT CHANGE UP (ref 0.4–2)
BLD GP AB SCN SERPL QL: SIGNIFICANT CHANGE UP
BUN SERPL-MCNC: 9.8 MG/DL — SIGNIFICANT CHANGE UP (ref 8–20)
CALCIUM SERPL-MCNC: 8.6 MG/DL — SIGNIFICANT CHANGE UP (ref 8.4–10.5)
CHLORIDE SERPL-SCNC: 100 MMOL/L — SIGNIFICANT CHANGE UP (ref 98–107)
CO2 SERPL-SCNC: 28 MMOL/L — SIGNIFICANT CHANGE UP (ref 22–29)
CREAT SERPL-MCNC: 0.39 MG/DL — LOW (ref 0.5–1.3)
EGFR: 113 ML/MIN/1.73M2 — SIGNIFICANT CHANGE UP
GLUCOSE BLDC GLUCOMTR-MCNC: 118 MG/DL — HIGH (ref 70–99)
GLUCOSE BLDC GLUCOMTR-MCNC: 118 MG/DL — HIGH (ref 70–99)
GLUCOSE BLDC GLUCOMTR-MCNC: 121 MG/DL — HIGH (ref 70–99)
GLUCOSE BLDC GLUCOMTR-MCNC: 141 MG/DL — HIGH (ref 70–99)
GLUCOSE SERPL-MCNC: 132 MG/DL — HIGH (ref 70–99)
HCT VFR BLD CALC: 33.1 % — LOW (ref 39–50)
HGB BLD-MCNC: 10.6 G/DL — LOW (ref 13–17)
INR BLD: 1.38 RATIO — HIGH (ref 0.88–1.16)
MAGNESIUM SERPL-MCNC: 1.6 MG/DL — LOW (ref 1.8–2.6)
MCHC RBC-ENTMCNC: 30 PG — SIGNIFICANT CHANGE UP (ref 27–34)
MCHC RBC-ENTMCNC: 32 GM/DL — SIGNIFICANT CHANGE UP (ref 32–36)
MCV RBC AUTO: 93.8 FL — SIGNIFICANT CHANGE UP (ref 80–100)
PLATELET # BLD AUTO: 262 K/UL — SIGNIFICANT CHANGE UP (ref 150–400)
POTASSIUM SERPL-MCNC: 3.9 MMOL/L — SIGNIFICANT CHANGE UP (ref 3.5–5.3)
POTASSIUM SERPL-SCNC: 3.9 MMOL/L — SIGNIFICANT CHANGE UP (ref 3.5–5.3)
PROT SERPL-MCNC: 6.3 G/DL — LOW (ref 6.6–8.7)
PROTHROM AB SERPL-ACNC: 16.1 SEC — HIGH (ref 10.5–13.4)
RBC # BLD: 3.53 M/UL — LOW (ref 4.2–5.8)
RBC # FLD: 15.1 % — HIGH (ref 10.3–14.5)
SARS-COV-2 RNA SPEC QL NAA+PROBE: SIGNIFICANT CHANGE UP
SODIUM SERPL-SCNC: 138 MMOL/L — SIGNIFICANT CHANGE UP (ref 135–145)
WBC # BLD: 8.14 K/UL — SIGNIFICANT CHANGE UP (ref 3.8–10.5)
WBC # FLD AUTO: 8.14 K/UL — SIGNIFICANT CHANGE UP (ref 3.8–10.5)

## 2022-08-21 PROCEDURE — 71045 X-RAY EXAM CHEST 1 VIEW: CPT | Mod: 26

## 2022-08-21 PROCEDURE — 99233 SBSQ HOSP IP/OBS HIGH 50: CPT

## 2022-08-21 PROCEDURE — 99232 SBSQ HOSP IP/OBS MODERATE 35: CPT

## 2022-08-21 RX ORDER — MAGNESIUM SULFATE 500 MG/ML
2 VIAL (ML) INJECTION ONCE
Refills: 0 | Status: COMPLETED | OUTPATIENT
Start: 2022-08-21 | End: 2022-08-21

## 2022-08-21 RX ORDER — CEFAZOLIN SODIUM 1 G
2000 VIAL (EA) INJECTION ONCE
Refills: 0 | Status: DISCONTINUED | OUTPATIENT
Start: 2022-08-21 | End: 2022-08-28

## 2022-08-21 RX ORDER — CEFAZOLIN SODIUM 1 G
2000 VIAL (EA) INJECTION ONCE
Refills: 0 | Status: DISCONTINUED | OUTPATIENT
Start: 2022-08-21 | End: 2022-08-21

## 2022-08-21 RX ADMIN — SCOPALAMINE 1 PATCH: 1 PATCH, EXTENDED RELEASE TRANSDERMAL at 14:54

## 2022-08-21 RX ADMIN — ATORVASTATIN CALCIUM 40 MILLIGRAM(S): 80 TABLET, FILM COATED ORAL at 21:46

## 2022-08-21 RX ADMIN — SCOPALAMINE 1 PATCH: 1 PATCH, EXTENDED RELEASE TRANSDERMAL at 07:26

## 2022-08-21 RX ADMIN — CHLORHEXIDINE GLUCONATE 1 APPLICATION(S): 213 SOLUTION TOPICAL at 06:03

## 2022-08-21 RX ADMIN — Medication 125 MILLIGRAM(S): at 06:00

## 2022-08-21 RX ADMIN — SPIRONOLACTONE 12.5 MILLIGRAM(S): 25 TABLET, FILM COATED ORAL at 06:00

## 2022-08-21 RX ADMIN — Medication 125 MILLIGRAM(S): at 11:57

## 2022-08-21 RX ADMIN — Medication 3 MILLILITER(S): at 22:28

## 2022-08-21 RX ADMIN — Medication 50 MILLIGRAM(S): at 06:00

## 2022-08-21 RX ADMIN — Medication 81 MILLIGRAM(S): at 11:57

## 2022-08-21 RX ADMIN — Medication 100 MILLIGRAM(S): at 15:02

## 2022-08-21 RX ADMIN — Medication 3 MILLILITER(S): at 09:46

## 2022-08-21 RX ADMIN — SCOPALAMINE 1 PATCH: 1 PATCH, EXTENDED RELEASE TRANSDERMAL at 15:03

## 2022-08-21 RX ADMIN — Medication 50 MILLIGRAM(S): at 15:03

## 2022-08-21 RX ADMIN — Medication 100 MILLIGRAM(S): at 08:49

## 2022-08-21 RX ADMIN — Medication 1 APPLICATION(S): at 06:03

## 2022-08-21 RX ADMIN — Medication 125 MILLIGRAM(S): at 17:57

## 2022-08-21 RX ADMIN — Medication 125 MILLIGRAM(S): at 00:33

## 2022-08-21 RX ADMIN — Medication 25 GRAM(S): at 09:56

## 2022-08-21 RX ADMIN — Medication 3 MILLILITER(S): at 03:51

## 2022-08-21 RX ADMIN — Medication 3 MILLIGRAM(S): at 21:46

## 2022-08-21 RX ADMIN — Medication 3 MILLILITER(S): at 14:32

## 2022-08-21 RX ADMIN — Medication 1 APPLICATION(S): at 18:46

## 2022-08-21 NOTE — PROGRESS NOTE ADULT - PROBLEM SELECTOR PLAN 3
Cardiac Risk Stratification for Procedure  - METS <4  - RCRI Risk Stratification: Class V  Risk, 15%  Risk of Major Cardiac Event  - Elevated risk for procedure  - Patient has several unmodifiable risk factors however, benefits of surgery outweigh the risk.   -No further cardiac work up is needed.  Further cardiac work up will not change risk of the patient.  No active chest pain, acute coronary syndrome, decompensated CHF, significant valvular abnormality, or unstable arrhythmia.  Patient is currently optimized from a cardiac standpoint therefore no absolute cardiac contraindication to proceeding with procedure.  - Recc cardiac aesthesia on board

## 2022-08-21 NOTE — PROVIDER CONTACT NOTE (OTHER) - ACTION/TREATMENT ORDERED:
no new orders at this time
JACK Heredia came to bedside to assess. JACK Heredia pulled  [place] NGT and replaced it with a [place] Sump 18G. Pending C-XRAY for placement confirmation of new NGT to restart feedings.
Krystin SANTIAGO instructed RN to place SCD on RLE that there are no restrictions
Levo restarted and titrate to desired SBP Goal 100-160
NG tube advanced by PA. CXR pending. Will hold all meds and feeds until placement is confirmed.

## 2022-08-21 NOTE — PROGRESS NOTE ADULT - PROBLEM SELECTOR PLAN 2
Cardiac Risk Stratification for Procedure  - METS <4  - RCRI Risk Stratification: Class V  Risk, 15%  Risk of Major Cardiac Event  - Elevated risk for procedure  - Patient has several unmodifiable risk factors however, benefits of surgery outweigh the risk.   -No further cardiac work up is needed.  Further cardiac work up will not change risk of the patient.  No active chest pain, acute coronary syndrome, decompensated CHF, significant valvular abnormality, or unstable arrhythmia.  Patient is currently optimized from a cardiac standpoint therefore no absolute cardiac contraindication to proceeding with procedure. .   - H/o CABG/PCI  - C/w daily ASA/statin therapy.

## 2022-08-21 NOTE — PROGRESS NOTE ADULT - ASSESSMENT
77y/oM PMH CABG, PVD, HTN, HLD, low EF (declined AICD), [place] [facility] [place]sided [place] and R-sided facial droop. Code stroke initiated, initial NIH 8. CTA with LM1 occlusion, transferred to [facility] for poss neuro IR intervention. On arrival, NIH 6, pt admitted 7/24 to neuro ICU with LM1 occlusion, s/p TICI 2B recanalization. pt initially extubated post procedure, required reintubation for respiratory distress. MRI with large L MCA stroke with small area of reperfusion hemorrhage.  Bedside echo with EF 15%, diuresed. Formal TTE 7/25 with EF <10%. Cardio rec ICD but pt previously refused as outpt. Course further complicated by septic shock, UTI, c. diff, urinary obstruction, respiratory failure, now s/p extubation 8/10, transferred to medicine 8/11.

## 2022-08-21 NOTE — PROCEDURE NOTE - PROCEDURE DATE TIME, MLM
19-Aug-2022 04:03
05-Aug-2022 12:19
31-Jul-2022 18:18
21-Aug-2022 04:24
24-Jul-2022 10:15
24-Jul-2022 16:20

## 2022-08-21 NOTE — PROCEDURE NOTE - NSPROCNAME_GEN_A_CORE
Tracheal Intubation
Feeding Tube Replacement
Arterial Puncture/Cannulation
Feeding Tube Placement
Midline Insertion
Arterial Puncture/Cannulation

## 2022-08-21 NOTE — PROGRESS NOTE ADULT - PROBLEM SELECTOR PLAN 1
.  - TTE 7/24 LVEF <10% with RV dysfunction  - Advanced HF team following  - NSR with frequent PVCs and 3-4 beats NSVT  - clinically euvolemic on exam  - GDMT       Beta Blocker: metoprolol       MRA: spironolactone  - further GDMT per HF team reccs  - Strict I&O's  - Daily standing weights (if able).  - Keep K > 4, Mg > 2.    - Monitor renal function with ongoing diuresis. .  - TTE 7/24 LVEF <10% with RV dysfunction  - Advanced HF team following  - NSR with frequent PVCs and 3-4 beats NSVT  - clinically euvolemic on exam  - markers of perfusion all WNL, patient is warm and perfusing well  - GDMT       Beta Blocker: metoprolol       MRA: spironolactone  - further GDMT per HF team reccs  - Strict I&O's  - Daily standing weights (if able).  - Keep K > 4, Mg > 2.    - Monitor renal function with ongoing diuresis.

## 2022-08-21 NOTE — PROGRESS NOTE ADULT - SUBJECTIVE AND OBJECTIVE BOX
[facility]                                                         CARDIOLOGY AT [facility]                                                                  [address] [facility], [place]-[id number] [place] [address]- [facility]                                                         Telephone: [phone]. Fax:[phone]                                                                             PROGRESS NOTE    Reason for follow up: cardiac risk stratification for PEG placement  Update: asphasic. pneumonia appears  resolved    Vitals:  T(C): 37.1 (08-21-22 @ 08:00), Max: 37.1 (08-21-22 @ 08:00)  HR: 84 (08-21-22 @ 08:01) (80 - 98)  BP: 112/54 (08-21-22 @ 08:01) (112/52 - 138/66)  RR: 22 (08-21-22 @ 08:01) (20 - 25)  SpO2: 98% (08-21-22 @ 08:01) (94% - 100%)  Wt(kg): --  I&O's Summary    20 Aug 2022 07:01  -  21 Aug 2022 07:00  --------------------------------------------------------  IN: 80 mL / OUT: 1000 mL / NET: -920 mL          PHYSICAL EXAM:  Appearance: Comfortable. No acute distress  HEENT:  Atraumatic. Normocephalic.  Normal oral mucosa. NGT to R nare  Neurologic: A & O x 3, no gross focal deficits.  Cardiovascular: RRR S1 S2, No murmur, no rubs/gallops. No JVD  Respiratory: coarse crackles  Gastrointestinal:  Soft, Non-tender, + BS  Lower Extremities: 2+ Peripheral Pulses, No clubbing, cyanosis, or edema  Psychiatry: Patient is calm. No agitation.   Skin: warm and dry.    CURRENT CARDIAC MEDICATIONS:  metoprolol tartrate 50 milliGRAM(s) Oral every 8 hours  spironolactone 12.5 milliGRAM(s) Oral daily      CURRENT OTHER MEDICATIONS:  albuterol/ipratropium for Nebulization 3 milliLiter(s) Nebulizer every 6 hours  ceFAZolin  Injectable. 2000 milliGRAM(s) IV Push once  vancomycin    Solution 125 milliGRAM(s) Enteral Tube every 6 hours  acetaminophen     Tablet .. 650 milliGRAM(s) Oral every 6 hours PRN Temp greater or equal to 38C (100.4F), Mild Pain (1 - 3)  amantadine 100 milliGRAM(s) Oral <User Schedule>  melatonin 3 milliGRAM(s) Oral at bedtime  ondansetron Injectable 4 milliGRAM(s) IV Push every 6 hours PRN Nausea and/or Vomiting  scopolamine 1 mG/72 Hr(s) Patch 1 Patch Transdermal every 72 hours  atorvastatin 40 milliGRAM(s) Oral at bedtime  dextrose 50% Injectable 25 Gram(s) IV Push once, Stop order after: 1 Doses  dextrose Oral Gel 15 Gram(s) Oral once, Stop order after: 1 Doses PRN Blood Glucose LESS THAN 70 milliGRAM(s)/deciliter  glucagon  Injectable 1 milliGRAM(s) IntraMuscular once, Stop order after: 1 Doses  insulin lispro (ADMELOG) corrective regimen sliding scale   SubCutaneous every 6 hours  aspirin  chewable 81 milliGRAM(s) Oral daily  BACItracin   Ointment 1 Application(s) Topical two times a day  chlorhexidine 2% Cloths 1 Application(s) Topical daily  dextrose 5%. 1000 milliLiter(s) (50 mL/Hr) IV Continuous <Continuous>  dextrose 5%. 1000 milliLiter(s) (100 mL/Hr) IV Continuous <Continuous>      LABS:	 	                            10.6   8.14  )-----------( 262      ( 21 Aug 2022 07:40 )             33.1     08-21    138  |  100  |  9.8  ----------------------------<  132<H>  3.9   |  28.0  |  0.39<L>    Ca    8.6      21 Aug 2022 07:40  Mg     1.6     08-21    TPro  6.3<L>  /  Alb  2.3<L>  /  TBili  0.7  /  DBili  x   /  AST  39  /  ALT  42<H>  /  AlkPhos  259<H>  08-21    PT/INR/PTT ( 21 Aug 2022 07:40 )                       :                       :      16.1         :       X                     .        .                   .              .           .       1.38        .                                       Lipid Profile: Date: 07-25 @ 03:51  Total cholesterol 167; Direct LDL: --; HDL: 44; Triglycerides:132    HgA1c:   TSH:     TELEMETRY: NSR with multifocal PVCs, short runs NSVT  ECG:    DIAGNOSTIC TESTING:  [ ] Echocardiogram:   < from: TTE Echo Complete w/ Contrast w/ Doppler (07.24.22 @ 14:01) >    Summary:   1. Endocardial visualization was enhanced with intravenous echo contrast.   2. Severely enlarged left atrium.   3. Global diffuse akinesis. Left ventricular ejection fraction, by   visual estimation, is <10%.   4. Elevated mean left atrial pressure. (>30 mm Hg)   5. Normal right atrial size.   6. Mildly enlarged right ventricle. Moderately reduced RV systolic   function.   7. Mild mitral valve regurgitation.   8. Mild tricuspid regurgitation.   9. Estimated pulmonary artery systolic pressure is 42 mmHg - mild   pulmonary hypertension.  10. There is no evidence of pericardial effusion.  11. Team informed of the findings.    < end of copied text >  [ ]  Catheterization:  [ ] Stress Test:    OTHER:

## 2022-08-21 NOTE — PROVIDER CONTACT NOTE (OTHER) - SITUATION
RLE restrictions unknown of SCD device.
NG tube displaced
pt  hypotensive and outside SBP parameters
Pt. Jose Lowery NG tube clogged around 2am. Multiple attempts made by three RN's to unclog NG tube with no success.
patients jeff blood pressures dropping with PVCs to systolic 70s, not sustaining.

## 2022-08-21 NOTE — PROGRESS NOTE ADULT - SUBJECTIVE AND OBJECTIVE BOX
Pt seen and examined Follow-up for oropharyngeal dysphagia status post CVA on NG tube feeds    He continues to tolerate nasogastric tube feeds and is afebrile.  We have pulmonary and ID clearance for his recent pneumonia as well as C. difficile colitis for which she is still on Vancocin and for which she still has loose stools.  According to the nurse cardiology just saw the patient and says they will clear him but recommend a cardiothoracic anesthesiologist.  The patient himself is awake and alert barely verbal but in no acute distress and when asked if he has any abdominal pain he nodded no.    REVIEW OF SYSTEMS: unable to obtain      MEDICATIONS:  MEDICATIONS  (STANDING):  albuterol/ipratropium for Nebulization 3 milliLiter(s) Nebulizer every 6 hours  amantadine 100 milliGRAM(s) Oral <User Schedule>  aspirin  chewable 81 milliGRAM(s) Oral daily  atorvastatin 40 milliGRAM(s) Oral at bedtime  BACItracin   Ointment 1 Application(s) Topical two times a day  ceFAZolin  Injectable. 2000 milliGRAM(s) IV Push once  chlorhexidine 2% Cloths 1 Application(s) Topical daily  dextrose 5%. 1000 milliLiter(s) (50 mL/Hr) IV Continuous <Continuous>  dextrose 5%. 1000 milliLiter(s) (100 mL/Hr) IV Continuous <Continuous>  dextrose 50% Injectable 25 Gram(s) IV Push once  glucagon  Injectable 1 milliGRAM(s) IntraMuscular once  insulin lispro (ADMELOG) corrective regimen sliding scale   SubCutaneous every 6 hours  melatonin 3 milliGRAM(s) Oral at bedtime  metoprolol tartrate 50 milliGRAM(s) Oral every 8 hours  scopolamine 1 mG/72 Hr(s) Patch 1 Patch Transdermal every 72 hours  spironolactone 12.5 milliGRAM(s) Oral daily  vancomycin    Solution 125 milliGRAM(s) Enteral Tube every 6 hours    MEDICATIONS  (PRN):  acetaminophen     Tablet .. 650 milliGRAM(s) Oral every 6 hours PRN Temp greater or equal to 38C (100.4F), Mild Pain (1 - 3)  dextrose Oral Gel 15 Gram(s) Oral once PRN Blood Glucose LESS THAN 70 milliGRAM(s)/deciliter  ondansetron Injectable 4 milliGRAM(s) IV Push every 6 hours PRN Nausea and/or Vomiting      Allergies    No Known Allergies    Intolerances        Vital Signs Last 24 Hrs  T(C): 37.1 (21 Aug 2022 08:00), Max: 37.1 (21 Aug 2022 08:00)  T(F): 98.8 (21 Aug 2022 08:00), Max: 98.8 (21 Aug 2022 08:00)  HR: 84 (21 Aug 2022 08:01) (80 - 98)  BP: 112/54 (21 Aug 2022 08:01) (112/52 - 138/66)  BP(mean): 68 (21 Aug 2022 08:01) (68 - 68)  RR: 22 (21 Aug 2022 08:01) (20 - 25)  SpO2: 98% (21 Aug 2022 08:01) (94% - 100%)    Parameters below as of 21 Aug 2022 08:01  Patient On (Oxygen Delivery Method): nasal cannula  O2 Flow (L/min): 4      08-20 @ 07:01  -  08-21 @ 07:00  --------------------------------------------------------  IN: 80 mL / OUT: 1000 mL / NET: -920 mL        PHYSICAL EXAM:    General: overweight male in no acute distress, garbled speech but appears to understand  HEENT: MMM, conjunctiva and sclera clear, NG tube present  Gastrointestinal:Abdomen: Soft non-tender non-distended; Normal bowel sounds; No hepatosplenomegaly  Extremities: no cyanosis, clubbing or edema.  Skin: Warm and dry. No obvious rash    LABS:      CBC Full  -  ( 21 Aug 2022 07:40 )  WBC Count : 8.14 K/uL  RBC Count : 3.53 M/uL  Hemoglobin : 10.6 g/dL  Hematocrit : 33.1 %  Platelet Count - Automated : 262 K/uL  Mean Cell Volume : 93.8 fl  Mean Cell Hemoglobin : 30.0 pg  Mean Cell Hemoglobin Concentration : 32.0 gm/dL  Auto Neutrophil # : x  Auto Lymphocyte # : x  Auto Monocyte # : x  Auto Eosinophil # : x  Auto Basophil # : x  Auto Neutrophil % : x  Auto Lymphocyte % : x  Auto Monocyte % : x  Auto Eosinophil % : x  Auto Basophil % : x    08-21    138  |  100  |  9.8  ----------------------------<  132<H>  3.9   |  28.0  |  0.39<L>    Ca    8.6      21 Aug 2022 07:40  Mg     1.6     08-21    TPro  6.3<L>  /  Alb  2.3<L>  /  TBili  0.7  /  DBili  x   /  AST  39  /  ALT  42<H>  /  AlkPhos  259<H>  08-21    PT/INR - ( 21 Aug 2022 07:40 )   PT: 16.1 sec;   INR: 1.38 ratio

## 2022-08-21 NOTE — PROGRESS NOTE ADULT - ASSESSMENT
77y/oM PMH CABG, PVD, HTN, HLD, low EF (declined AICD), initially presenting to [facility] with R-sided weakness and R-sided facial droop. Code stroke initiated, initial NIH 8. CTA with LM1 occlusion, transferred to [facility] for poss neuro IR intervention. On arrival, NIH 6, pt admitted 7/24 to neuro ICU with LM1 occlusion, s/p TICI 2B recanalization. pt initially extubated post procedure, required reintubation for respiratory distress. MRI with large L MCA stroke with small area of reperfusion hemorrhage.  Bedside echo with EF 15%, diuresed. Formal TTE 7/25 with EF <10%. Cardio rec ICD but pt previously refused as outpt. Course further complicated by septic shock, UTI, c. diff, urinary obstruction, respiratory failure, now s/p extubation 8/10, transferred to medicine 8/11.     Acute CVA with LM1 occlusion s/p TICI 2B MT, no tPA   Small hemorrhagic conversion   Dysphagia   -asa  -lovenox sq   -s/p neuroicu   -neuro recs appreciated   -lipitor held for transaminitis, now resumed, cont to monitor   -s/p EEG without seizure   -aspiration precautions   -ID, pulm and cardio clearance  -cardiac anesthesia recommended   -TF via NGT, hold after midnight  -hold lovenox after midnight   -plan for PEG tomorrow 8/22    Acute hypoxic respiratory failure  Combined septic and cardiogenic shock   UTI   Multilobar PNA   C diff   -s/p vasopressors   -s/p intubation, extubation, reintubation, extubated 8/10  -s/p course abx for UTI   -s/p 7 days IV abx for pna   -on PO vanc for cdiff   -ID recs appreciated   -pulm recs appreciated   -titrate supplemental O2 as tolerated     Combined systolic and disatolic heart failure   -TTE 7/24 LVEF <10% with RV dysfunction   -metoprolol  -spironolactone   -now off midodrine   -monitoring bp, start losartan if bp increasing     Transaminitis   -lipitor now resumed 8/19, cont to monitor     KE suspected carbapenem AIN, now improved   -nephro recs appreciated     Hypernatremia, now resolved   -cont free water     Urinary retention   -failed TOV x2   -maintain ace     vte ppx: lovenox sq     Code status: DNR/DNI

## 2022-08-21 NOTE — PROGRESS NOTE ADULT - NS ATTEND OPT1A GEN_ALL_CORE
History/Exam/Medical decision making

## 2022-08-21 NOTE — PROGRESS NOTE ADULT - ASSESSMENT
Patient appears to be stable for percutaneous endoscopic gastrostomy tube placement and will proceed tomorrow as long as we are able to get cardiothoracic anesthesia.  Hold any Lovenox or other anticoagulation and will hold tube feeds after midnight.   Detail Level: Simple

## 2022-08-21 NOTE — PROGRESS NOTE ADULT - SUBJECTIVE AND OBJECTIVE BOX
NATANAEL ROWAN    [id number]    77y      Male    CC: cva    INTERVAL HPI/OVERNIGHT EVENTS: pt seen and examined. o/n ngt replaced    REVIEW OF SYSTEMS:  unable to obtain     Vital Signs Last 24 Hrs  T(C): 37.2 (21 Aug 2022 12:00), Max: 37.2 (21 Aug 2022 12:00)  T(F): 99 (21 Aug 2022 12:00), Max: 99 (21 Aug 2022 12:00)  HR: 85 (21 Aug 2022 12:01) (80 - 95)  BP: 112/52 (21 Aug 2022 12:01) (112/52 - 138/66)  BP(mean): 66 (21 Aug 2022 12:01) (66 - 68)  RR: 22 (21 Aug 2022 12:01) (20 - 25)  SpO2: 100% (21 Aug 2022 12:01) (94% - 100%)    Parameters below as of 21 Aug 2022 12:01  Patient On (Oxygen Delivery Method): nasal cannula  O2 Flow (L/min): 4      PHYSICAL EXAM:    GENERAL: NAD  HEENT: PERRL, +NGT  NECK: soft, supple  CHEST/LUNG: Course sounds b/l; respirations unlabored on 4LNC   HEART: S1S2+, Regular rate and rhythm   ABDOMEN: Soft, Nontender, Nondistended; Bowel sounds present  : +ace   SKIN: warm, dry  NEURO: Awake, alert; aphasic; following some commands     LABS:                        10.6   8.14  )-----------( 262      ( 21 Aug 2022 07:40 )             33.1     08-21    138  |  100  |  9.8  ----------------------------<  132<H>  3.9   |  28.0  |  0.39<L>    Ca    8.6      21 Aug 2022 07:40  Mg     1.6     08-21    TPro  6.3<L>  /  Alb  2.3<L>  /  TBili  0.7  /  DBili  x   /  AST  39  /  ALT  42<H>  /  AlkPhos  259<H>  08-21    PT/INR - ( 21 Aug 2022 07:40 )   PT: 16.1 sec;   INR: 1.38 ratio          MEDICATIONS  (STANDING):  albuterol/ipratropium for Nebulization 3 milliLiter(s) Nebulizer every 6 hours  amantadine 100 milliGRAM(s) Oral <User Schedule>  aspirin  chewable 81 milliGRAM(s) Oral daily  atorvastatin 40 milliGRAM(s) Oral at bedtime  BACItracin   Ointment 1 Application(s) Topical two times a day  ceFAZolin   IVPB 2000 milliGRAM(s) IV Intermittent once  chlorhexidine 2% Cloths 1 Application(s) Topical daily  dextrose 5%. 1000 milliLiter(s) (50 mL/Hr) IV Continuous <Continuous>  dextrose 5%. 1000 milliLiter(s) (100 mL/Hr) IV Continuous <Continuous>  dextrose 50% Injectable 25 Gram(s) IV Push once  glucagon  Injectable 1 milliGRAM(s) IntraMuscular once  insulin lispro (ADMELOG) corrective regimen sliding scale   SubCutaneous every 6 hours  melatonin 3 milliGRAM(s) Oral at bedtime  metoprolol tartrate 50 milliGRAM(s) Oral every 8 hours  scopolamine 1 mG/72 Hr(s) Patch 1 Patch Transdermal every 72 hours  spironolactone 12.5 milliGRAM(s) Oral daily  vancomycin    Solution 125 milliGRAM(s) Enteral Tube every 6 hours    MEDICATIONS  (PRN):  acetaminophen     Tablet .. 650 milliGRAM(s) Oral every 6 hours PRN Temp greater or equal to 38C (100.4F), Mild Pain (1 - 3)  dextrose Oral Gel 15 Gram(s) Oral once PRN Blood Glucose LESS THAN 70 milliGRAM(s)/deciliter  ondansetron Injectable 4 milliGRAM(s) IV Push every 6 hours PRN Nausea and/or Vomiting      RADIOLOGY & ADDITIONAL TESTS:

## 2022-08-21 NOTE — PROCEDURE NOTE - NSPOSTPRCRAD_GEN_A_CORE
post-procedure radiography performed
chest radiograph/feeding tube in correct gastric position/post-procedure radiography performed

## 2022-08-22 LAB
ALBUMIN SERPL ELPH-MCNC: 2.6 G/DL — LOW (ref 3.3–5.2)
ALP SERPL-CCNC: 268 U/L — HIGH (ref 40–120)
ALT FLD-CCNC: 45 U/L — HIGH
ANION GAP SERPL CALC-SCNC: 7 MMOL/L — SIGNIFICANT CHANGE UP (ref 5–17)
AST SERPL-CCNC: 41 U/L — HIGH
BILIRUB DIRECT SERPL-MCNC: 0.2 MG/DL — SIGNIFICANT CHANGE UP (ref 0–0.3)
BILIRUB INDIRECT FLD-MCNC: 0.3 MG/DL — SIGNIFICANT CHANGE UP (ref 0.2–1)
BILIRUB SERPL-MCNC: 0.5 MG/DL — SIGNIFICANT CHANGE UP (ref 0.4–2)
BUN SERPL-MCNC: 9.4 MG/DL — SIGNIFICANT CHANGE UP (ref 8–20)
CALCIUM SERPL-MCNC: 8.6 MG/DL — SIGNIFICANT CHANGE UP (ref 8.4–10.5)
CHLORIDE SERPL-SCNC: 100 MMOL/L — SIGNIFICANT CHANGE UP (ref 98–107)
CO2 SERPL-SCNC: 31 MMOL/L — HIGH (ref 22–29)
CREAT SERPL-MCNC: 0.42 MG/DL — LOW (ref 0.5–1.3)
EGFR: 111 ML/MIN/1.73M2 — SIGNIFICANT CHANGE UP
GLUCOSE BLDC GLUCOMTR-MCNC: 107 MG/DL — HIGH (ref 70–99)
GLUCOSE BLDC GLUCOMTR-MCNC: 124 MG/DL — HIGH (ref 70–99)
GLUCOSE BLDC GLUCOMTR-MCNC: 128 MG/DL — HIGH (ref 70–99)
GLUCOSE BLDC GLUCOMTR-MCNC: 133 MG/DL — HIGH (ref 70–99)
GLUCOSE BLDC GLUCOMTR-MCNC: 160 MG/DL — HIGH (ref 70–99)
GLUCOSE SERPL-MCNC: 133 MG/DL — HIGH (ref 70–99)
HCT VFR BLD CALC: 33.4 % — LOW (ref 39–50)
HGB BLD-MCNC: 10.7 G/DL — LOW (ref 13–17)
MAGNESIUM SERPL-MCNC: 1.8 MG/DL — SIGNIFICANT CHANGE UP (ref 1.6–2.6)
MCHC RBC-ENTMCNC: 30.1 PG — SIGNIFICANT CHANGE UP (ref 27–34)
MCHC RBC-ENTMCNC: 32 GM/DL — SIGNIFICANT CHANGE UP (ref 32–36)
MCV RBC AUTO: 93.8 FL — SIGNIFICANT CHANGE UP (ref 80–100)
PHOSPHATE SERPL-MCNC: 3.1 MG/DL — SIGNIFICANT CHANGE UP (ref 2.4–4.7)
PLATELET # BLD AUTO: 258 K/UL — SIGNIFICANT CHANGE UP (ref 150–400)
POTASSIUM SERPL-MCNC: 4.1 MMOL/L — SIGNIFICANT CHANGE UP (ref 3.5–5.3)
POTASSIUM SERPL-SCNC: 4.1 MMOL/L — SIGNIFICANT CHANGE UP (ref 3.5–5.3)
PROT SERPL-MCNC: 6.5 G/DL — LOW (ref 6.6–8.7)
RBC # BLD: 3.56 M/UL — LOW (ref 4.2–5.8)
RBC # FLD: 15.3 % — HIGH (ref 10.3–14.5)
SODIUM SERPL-SCNC: 138 MMOL/L — SIGNIFICANT CHANGE UP (ref 135–145)
WBC # BLD: 7.27 K/UL — SIGNIFICANT CHANGE UP (ref 3.8–10.5)
WBC # FLD AUTO: 7.27 K/UL — SIGNIFICANT CHANGE UP (ref 3.8–10.5)

## 2022-08-22 PROCEDURE — 99232 SBSQ HOSP IP/OBS MODERATE 35: CPT

## 2022-08-22 RX ORDER — MAGNESIUM SULFATE 500 MG/ML
2 VIAL (ML) INJECTION ONCE
Refills: 0 | Status: COMPLETED | OUTPATIENT
Start: 2022-08-22 | End: 2022-08-22

## 2022-08-22 RX ORDER — SODIUM CHLORIDE 9 MG/ML
1000 INJECTION, SOLUTION INTRAVENOUS
Refills: 0 | Status: COMPLETED | OUTPATIENT
Start: 2022-08-22 | End: 2022-08-22

## 2022-08-22 RX ADMIN — CHLORHEXIDINE GLUCONATE 1 APPLICATION(S): 213 SOLUTION TOPICAL at 05:23

## 2022-08-22 RX ADMIN — Medication 3 MILLILITER(S): at 14:43

## 2022-08-22 RX ADMIN — ATORVASTATIN CALCIUM 40 MILLIGRAM(S): 80 TABLET, FILM COATED ORAL at 21:43

## 2022-08-22 RX ADMIN — Medication 1 APPLICATION(S): at 17:26

## 2022-08-22 RX ADMIN — Medication 50 MILLIGRAM(S): at 05:23

## 2022-08-22 RX ADMIN — Medication 50 MILLIGRAM(S): at 21:43

## 2022-08-22 RX ADMIN — Medication 125 MILLIGRAM(S): at 05:23

## 2022-08-22 RX ADMIN — Medication 3 MILLILITER(S): at 22:23

## 2022-08-22 RX ADMIN — Medication 25 GRAM(S): at 08:39

## 2022-08-22 RX ADMIN — SCOPALAMINE 1 PATCH: 1 PATCH, EXTENDED RELEASE TRANSDERMAL at 07:21

## 2022-08-22 RX ADMIN — SPIRONOLACTONE 12.5 MILLIGRAM(S): 25 TABLET, FILM COATED ORAL at 05:23

## 2022-08-22 RX ADMIN — Medication 2: at 23:12

## 2022-08-22 RX ADMIN — SODIUM CHLORIDE 40 MILLILITER(S): 9 INJECTION, SOLUTION INTRAVENOUS at 14:18

## 2022-08-22 RX ADMIN — Medication 3 MILLILITER(S): at 09:32

## 2022-08-22 RX ADMIN — Medication 1 APPLICATION(S): at 05:23

## 2022-08-22 RX ADMIN — Medication 3 MILLILITER(S): at 04:41

## 2022-08-22 RX ADMIN — Medication 125 MILLIGRAM(S): at 17:27

## 2022-08-22 RX ADMIN — Medication 125 MILLIGRAM(S): at 00:41

## 2022-08-22 RX ADMIN — Medication 125 MILLIGRAM(S): at 23:09

## 2022-08-22 RX ADMIN — Medication 3 MILLIGRAM(S): at 21:43

## 2022-08-22 NOTE — PROVIDER CONTACT NOTE (OTHER) - REASON
NG tube clogged
hypotensive
tele alarm bigeminy and trigeminy
jad bryan
patient blood pressure dipping to systolic 70s on jeff, low MAPs in 50s
NG tube displaced

## 2022-08-22 NOTE — ADVANCED PRACTICE NURSE CONSULT - ASSESSMENT
Received patient laying supine in bed, turned to left (AirTAP body positioning system with wedges in place), HOB elevated 30 degrees. Pt intubated and sedated, total care with ADLs. Patient bedbound, very limited mobility in bed. 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 Ordered for NPO with tube feed (Jevity 1.5) diet, current Blaine score of 11. With assistance, patient turned patient to side for skin assessment.  Patient with large semi-formed fecal incontinence episode at time of assessment.  Patient cleaned with primary RN and NA present. Skin assessment as below:    Patient found to have Moisture Associated Skin Damage (MASD) to the intergluteal fold.  B/L buttock and gluteal cleft with partial-thickness injury with noted butterfly pattern or kissing lesions on Left and Right Buttocks due to IAD and Intertriginous Dermatitis (ITD).      ·	Coccyx into B/L Upper Buttocks - DTI with blistering to center (7x2cm) - persistent purple intact discoloration, irregular diffuse borders, boggy, warm, with intact scattered blister formation to center at gluteal cleft.  Periwound skin moist, boggy, blanching, intact Received patient laying supine in bed, turned to left (AirTAP body positioning system with wedges in place), HOB elevated 30 degrees. Pt intubated and sedated, total care with ADLs. Patient bedbound, very limited mobility in bed. 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 Ordered for NPO with tube feed (Glucerna 1.5) diet, current Blaine score of 11. With assistance, patient turned patient to side for skin assessment.  Patient with large semi-formed fecal incontinence episode at time of assessment.  Patient cleaned with primary RN and NA present. Skin assessment as below:    Patient found to have Moisture Associated Skin Damage (MASD) to the intergluteal fold.  B/L buttock and gluteal cleft with partial-thickness injury with noted butterfly pattern or kissing lesions on Left and Right Buttocks due to IAD and Intertriginous Dermatitis (ITD).      ·	Coccyx into B/L Upper Buttocks - DTI with blistering to center (7x2cm) - persistent purple intact discoloration, irregular diffuse borders, boggy, warm, with intact scattered blister formation to center at gluteal cleft.  Periwound skin moist, boggy, blanching, intact Received patient laying supine in bed, turned to left (poistioning pillows in place), HOB elevated 30 degrees. Pt intubated and sedated, total care with ADLs. Patient bedbound, very limited mobility in bed. 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 Ordered for NPO with tube feed (Glucerna 1.5) diet, current Blaine score of 12. With assistance, patient turned patient to side for skin assessment.  Skin assessment as below:    Patient found to have Moisture Associated Skin Damage (MASD) to the intergluteal fold.  B/L buttock and gluteal cleft with partial-thickness injury with noted butterfly pattern or kissing lesions on Left and Right Buttocks due to IAD and Intertriginous Dermatitis (ITD).      ·	Coccyx into B/L Upper Buttocks - DTI with blistering to center (7x2cm) - persistent purple intact discoloration, irregular diffuse borders, boggy, warm, with intact scattered blister formation to center at gluteal cleft.  Periwound skin moist, boggy, blanching, intact

## 2022-08-22 NOTE — PROGRESS NOTE ADULT - SUBJECTIVE AND OBJECTIVE BOX
Patient is a 77y old  Male seen for stroke , dysphagia , s/p PNA   NPO for PEG     d/w son on the phone earlier today     Pt is awake , dysarthria exp aphasia   weak , able to follow simple commands     ALLERGIES:  No Known Allergies    MEDICATIONS  (STANDING):  albuterol/ipratropium for Nebulization 3 milliLiter(s) Nebulizer every 6 hours  amantadine 100 milliGRAM(s) Oral <User Schedule>  aspirin  chewable 81 milliGRAM(s) Oral daily  atorvastatin 40 milliGRAM(s) Oral at bedtime  BACItracin   Ointment 1 Application(s) Topical two times a day  ceFAZolin   IVPB 2000 milliGRAM(s) IV Intermittent once  chlorhexidine 2% Cloths 1 Application(s) Topical daily  dextrose 5%. 1000 milliLiter(s) (100 mL/Hr) IV Continuous <Continuous>  dextrose 5%. 1000 milliLiter(s) (50 mL/Hr) IV Continuous <Continuous>  dextrose 50% Injectable 25 Gram(s) IV Push once  glucagon  Injectable 1 milliGRAM(s) IntraMuscular once  insulin lispro (ADMELOG) corrective regimen sliding scale   SubCutaneous every 6 hours  melatonin 3 milliGRAM(s) Oral at bedtime  metoprolol tartrate 50 milliGRAM(s) Oral every 8 hours  scopolamine 1 mG/72 Hr(s) Patch 1 Patch Transdermal every 72 hours  spironolactone 12.5 milliGRAM(s) Oral daily  vancomycin    Solution 125 milliGRAM(s) Enteral Tube every 6 hours    MEDICATIONS  (PRN):  acetaminophen     Tablet .. 650 milliGRAM(s) Oral every 6 hours PRN Temp greater or equal to 38C (100.4F), Mild Pain (1 - 3)  dextrose Oral Gel 15 Gram(s) Oral once PRN Blood Glucose LESS THAN 70 milliGRAM(s)/deciliter  ondansetron Injectable 4 milliGRAM(s) IV Push every 6 hours PRN Nausea and/or Vomiting    Vital Signs Last 24 Hrs  T(F): 98.3 (22 Aug 2022 04:40), Max: 99 (21 Aug 2022 12:00)  HR: 82 (22 Aug 2022 04:40) (82 - 91)  BP: 118/64 (22 Aug 2022 04:40) (98/56 - 118/64)  RR: 20 (22 Aug 2022 04:40) (20 - 22)  SpO2: 100% (22 Aug 2022 04:40) (98% - 100%)  I&O's Summary    21 Aug 2022 07:01  -  22 Aug 2022 07:00  --------------------------------------------------------  IN: 1320 mL / OUT: 2050 mL / NET: -730 mL        PHYSICAL EXAM:  General:   ENT:   Neck:   Lungs:   Cardio: RRR, S1/S2, No murmur  Abdomen: Soft, Nontender, Nondistended; Bowel sounds present  Extremities: No calf tenderness, No pitting edema    LABS:                        10.7   7.27  )-----------( 258      ( 22 Aug 2022 06:14 )             33.4     08-22    138  |  100  |  9.4  ----------------------------<  133  4.1   |  31.0  |  0.42    Ca    8.6      22 Aug 2022 06:14  Phos  3.1     08-22  Mg     1.8     08-22    TPro  6.5  /  Alb  2.6  /  TBili  0.5  /  DBili  0.2  /  AST  41  /  ALT  45  /  AlkPhos  268  08-22          PT/INR - ( 21 Aug 2022 07:40 )   PT: 16.1 sec;   INR: 1.38 ratio                   07-25 Chol 167 mg/dL LDL -- HDL 44 mg/dL Trig 132 mg/dL              POCT Blood Glucose.: 124 mg/dL (22 Aug 2022 05:19)  POCT Blood Glucose.: 128 mg/dL (22 Aug 2022 00:40)  POCT Blood Glucose.: 141 mg/dL (21 Aug 2022 17:56)  POCT Blood Glucose.: 118 mg/dL (21 Aug 2022 11:57)          Culture - Sputum (collected 15 Aug 2022 11:50)  Source: .Sputum Sputum  Gram Stain (15 Aug 2022 22:12):    Few polymorphonuclear leukocytes per low power field    Moderate Squamous epithelial cells per low power field    Few Gram Positive Rods per oil power field    Few Gram positive cocci in pairs per oil power field  Final Report (17 Aug 2022 19:50):    Normal Respiratory Margo present      COVID-19 PCR: NotDetec (08-21-22 @ 07:00)  COVID-19 PCR: NotDetec (08-16-22 @ 07:45)  COVID-19 PCR: NotDetec (08-04-22 @ 03:55)  COVID-19 PCR: NotDetec (07-28-22 @ 16:55)    RADIOLOGY & ADDITIONAL TESTS:       Patient is a 77y old  Male seen for stroke , dysphagia , s/p PNA   NPO for PEG     d/w son on the phone earlier today     Pt is awake , dysarthria exp aphasia   weak , able to follow simple commands     ALLERGIES:  No Known Allergies    MEDICATIONS  (STANDING):  albuterol/ipratropium for Nebulization 3 milliLiter(s) Nebulizer every 6 hours  amantadine 100 milliGRAM(s) Oral <User Schedule>  aspirin  chewable 81 milliGRAM(s) Oral daily  atorvastatin 40 milliGRAM(s) Oral at bedtime  BACItracin   Ointment 1 Application(s) Topical two times a day  ceFAZolin   IVPB 2000 milliGRAM(s) IV Intermittent once  chlorhexidine 2% Cloths 1 Application(s) Topical daily  dextrose 5%. 1000 milliLiter(s) (100 mL/Hr) IV Continuous <Continuous>  dextrose 5%. 1000 milliLiter(s) (50 mL/Hr) IV Continuous <Continuous>  dextrose 50% Injectable 25 Gram(s) IV Push once  glucagon  Injectable 1 milliGRAM(s) IntraMuscular once  insulin lispro (ADMELOG) corrective regimen sliding scale   SubCutaneous every 6 hours  melatonin 3 milliGRAM(s) Oral at bedtime  metoprolol tartrate 50 milliGRAM(s) Oral every 8 hours  scopolamine 1 mG/72 Hr(s) Patch 1 Patch Transdermal every 72 hours  spironolactone 12.5 milliGRAM(s) Oral daily  vancomycin    Solution 125 milliGRAM(s) Enteral Tube every 6 hours    MEDICATIONS  (PRN):  acetaminophen     Tablet .. 650 milliGRAM(s) Oral every 6 hours PRN Temp greater or equal to 38C (100.4F), Mild Pain (1 - 3)  dextrose Oral Gel 15 Gram(s) Oral once PRN Blood Glucose LESS THAN 70 milliGRAM(s)/deciliter  ondansetron Injectable 4 milliGRAM(s) IV Push every 6 hours PRN Nausea and/or Vomiting    Vital Signs Last 24 Hrs  T(F): 98.3 (22 Aug 2022 04:40), Max: 99 (21 Aug 2022 12:00)  HR: 82 (22 Aug 2022 04:40) (82 - 91)  BP: 118/64 (22 Aug 2022 04:40) (98/56 - 118/64)  RR: 20 (22 Aug 2022 04:40) (20 - 22)  SpO2: 100% (22 Aug 2022 04:40) (98% - 100%)  I&O's Summary    21 Aug 2022 07:01  -  22 Aug 2022 07:00  --------------------------------------------------------  IN: 1320 mL / OUT: 2050 mL / NET: -730 mL        PHYSICAL EXAM:  General: awake congested , no resp distress   ENT: NGT in place , off feeding   Neck: supple, no JVD   Lungs: poor inspiratory effort , rare rhonchi   Cardio: RRR, S1/S2, No murmur  Abdomen: Soft, Nontender, Nondistended; Bowel sounds present  Extremities: No calf tenderness, No pitting edema    LABS:                        10.7   7.27  )-----------( 258      ( 22 Aug 2022 06:14 )             33.4     08-22    138  |  100  |  9.4  ----------------------------<  133  4.1   |  31.0  |  0.42    Ca    8.6      22 Aug 2022 06:14  Phos  3.1     08-22  Mg     1.8     08-22    TPro  6.5  /  Alb  2.6  /  TBili  0.5  /  DBili  0.2  /  AST  41  /  ALT  45  /  AlkPhos  268  08-22          PT/INR - ( 21 Aug 2022 07:40 )   PT: 16.1 sec;   INR: 1.38 ratio                   07-25 Chol 167 mg/dL LDL -- HDL 44 mg/dL Trig 132 mg/dL              POCT Blood Glucose.: 124 mg/dL (22 Aug 2022 05:19)  POCT Blood Glucose.: 128 mg/dL (22 Aug 2022 00:40)  POCT Blood Glucose.: 141 mg/dL (21 Aug 2022 17:56)  POCT Blood Glucose.: 118 mg/dL (21 Aug 2022 11:57)          Culture - Sputum (collected 15 Aug 2022 11:50)  Source: .Sputum Sputum  Gram Stain (15 Aug 2022 22:12):    Few polymorphonuclear leukocytes per low power field    Moderate Squamous epithelial cells per low power field    Few Gram Positive Rods per oil power field    Few Gram positive cocci in pairs per oil power field  Final Report (17 Aug 2022 19:50):    Normal Respiratory Margo present      COVID-19 PCR: NotDetec (08-21-22 @ 07:00)  COVID-19 PCR: NotDetec (08-16-22 @ 07:45)  COVID-19 PCR: NotDetec (08-04-22 @ 03:55)  COVID-19 PCR: NotDetec (07-28-22 @ 16:55)    RADIOLOGY & ADDITIONAL TESTS:

## 2022-08-22 NOTE — PROGRESS NOTE ADULT - SUBJECTIVE AND OBJECTIVE BOX
[facility] Advanced Heart Failure  Office: [address] [place] [address]  Telephone: [phone]/Fax: [phone]    Subjective/Objective: Patient awake and alert, in no distress. NPO-planning for PEG placement.     Medications:  acetaminophen     Tablet .. 650 milliGRAM(s) Oral every 6 hours PRN  albuterol/ipratropium for Nebulization 3 milliLiter(s) Nebulizer every 6 hours  amantadine 100 milliGRAM(s) Oral <User Schedule>  aspirin  chewable 81 milliGRAM(s) Oral daily  atorvastatin 40 milliGRAM(s) Oral at bedtime  BACItracin   Ointment 1 Application(s) Topical two times a day  ceFAZolin   IVPB 2000 milliGRAM(s) IV Intermittent once  chlorhexidine 2% Cloths 1 Application(s) Topical daily  dextrose 5%. 1000 milliLiter(s) IV Continuous <Continuous>  dextrose 5%. 1000 milliLiter(s) IV Continuous <Continuous>  dextrose 50% Injectable 25 Gram(s) IV Push once  dextrose Oral Gel 15 Gram(s) Oral once PRN  glucagon  Injectable 1 milliGRAM(s) IntraMuscular once  insulin lispro (ADMELOG) corrective regimen sliding scale   SubCutaneous every 6 hours  melatonin 3 milliGRAM(s) Oral at bedtime  metoprolol tartrate 50 milliGRAM(s) Oral every 8 hours  ondansetron Injectable 4 milliGRAM(s) IV Push every 6 hours PRN  scopolamine 1 mG/72 Hr(s) Patch 1 Patch Transdermal every 72 hours  spironolactone 12.5 milliGRAM(s) Oral daily  vancomycin    Solution 125 milliGRAM(s) Enteral Tube every 6 hours    Vitals:  T(C): 36.8 (08-22-22 @ 04:40), Max: 37.2 (08-21-22 @ 12:00)  HR: 82 (08-22-22 @ 04:40) (82 - 91)  BP: 118/64 (08-22-22 @ 04:40) (98/56 - 118/64)  RR: 20 (08-22-22 @ 04:40) (20 - 22)  SpO2: 100% (08-22-22 @ 04:40) (98% - 100%)    I&O's Summary    21 Aug 2022 07:01  -  22 Aug 2022 07:00  --------------------------------------------------------  IN: 1320 mL / OUT: 2050 mL / NET: -730 mL    Tele: SR, frequent PVCs, couplets/bigeminy and triplets. NO sustained VT.    Physical Exam:  General: No distress. Comfortable.  Neck: Neck supple. JVP not elevated.   Chest: anterior BS course  CV: RRR. Normal S1 and S2. No murmurs, rub, or gallops. Warm peripherally.  PV: NO edema noted, pulses full/equal in all four extremities.  Abdomen: Soft, non-distended, +BS  Skin: dry, no rash  Neurology: Alert   Lines: Right nare NGT    Labs:                        10.7   7.27  )-----------( 258      ( 22 Aug 2022 06:14 )             33.4       138  |  100  |  9.4  ----------------------------<  133<H>  4.1   |  31.0<H>  |  0.42<L>    Ca    8.6      22 Aug 2022 06:14  Phos  3.1     08-22  Mg     1.8     08-22    TPro  6.5<L>  /  Alb  2.6<L>  /  TBili  0.5  /  DBili  0.2  /  AST  41<H>  /  ALT  45<H>  /  AlkPhos  268<H>  08-22    PT/INR - ( 21 Aug 2022 07:40 )   PT: 16.1 sec;   INR: 1.38 ratio

## 2022-08-22 NOTE — PROGRESS NOTE ADULT - PROBLEM SELECTOR PLAN 3
- H/o CABG/PCI  - C/w daily ASA/statin therapy  - Wean supplemental O2 as tolerated  - Needs aggressive PT/OT

## 2022-08-22 NOTE — PROGRESS NOTE ADULT - SUBJECTIVE AND OBJECTIVE BOX
CC: Stroke    HPI:   76M with PMH CABG, HTN, HLD who presents as transfer from [facility] for stroke. Last known well was last night 10pm prior to going to bed, woke up this morning around 0500 with R sided weakness and R facial droop. Presented to [facility], code stroke initiated, NIH at [facility] reportedly 8. CTA showed LM1 occlusion. Transferred to [facility] for possible neuro IR intervention. On arrival to [facility], NIH 6. Decision made to take patient directly to neuro IR for intervention. (JW)      Vital Signs Last 24 Hrs  T(C): 36.8 (22 Aug 2022 04:40), Max: 36.8 (22 Aug 2022 04:40)  T(F): 98.3 (22 Aug 2022 04:40), Max: 98.3 (22 Aug 2022 04:40)  HR: 82 (22 Aug 2022 04:40) (82 - 89)  BP: 118/64 (22 Aug 2022 04:40) (118/64 - 118/64)  BP(mean): --  RR: 20 (22 Aug 2022 04:40) (20 - 20)  SpO2: 100% (22 Aug 2022 04:40) (100% - 100%)    Parameters below as of 22 Aug 2022 04:40  Patient On (Oxygen Delivery Method): nasal cannula        MEDICATIONS    acetaminophen     Tablet .. 650 milliGRAM(s) Oral every 6 hours PRN  albuterol/ipratropium for Nebulization 3 milliLiter(s) Nebulizer every 6 hours  amantadine 100 milliGRAM(s) Oral <User Schedule>  aspirin  chewable 81 milliGRAM(s) Oral daily  atorvastatin 40 milliGRAM(s) Oral at bedtime  BACItracin   Ointment 1 Application(s) Topical two times a day  ceFAZolin   IVPB 2000 milliGRAM(s) IV Intermittent once  chlorhexidine 2% Cloths 1 Application(s) Topical daily  dextrose 5%. 1000 milliLiter(s) IV Continuous <Continuous>  dextrose 5%. 1000 milliLiter(s) IV Continuous <Continuous>  dextrose 50% Injectable 25 Gram(s) IV Push once  dextrose Oral Gel 15 Gram(s) Oral once PRN  glucagon  Injectable 1 milliGRAM(s) IntraMuscular once  insulin lispro (ADMELOG) corrective regimen sliding scale   SubCutaneous every 6 hours  melatonin 3 milliGRAM(s) Oral at bedtime  metoprolol tartrate 50 milliGRAM(s) Oral every 8 hours  ondansetron Injectable 4 milliGRAM(s) IV Push every 6 hours PRN  scopolamine 1 mG/72 Hr(s) Patch 1 Patch Transdermal every 72 hours  spironolactone 12.5 milliGRAM(s) Oral daily  vancomycin    Solution 125 milliGRAM(s) Enteral Tube every 6 hours         LABS:  CBC Full  -  ( 22 Aug 2022 06:14 )  WBC Count : 7.27 K/uL  RBC Count : 3.56 M/uL  Hemoglobin : 10.7 g/dL  Hematocrit : 33.4 %  Platelet Count - Automated : 258 K/uL  Mean Cell Volume : 93.8 fl  Mean Cell Hemoglobin : 30.1 pg  Mean Cell Hemoglobin Concentration : 32.0 gm/dL  Auto Neutrophil # : x  Auto Lymphocyte # : x  Auto Monocyte # : x  Auto Eosinophil # : x  Auto Basophil # : x  Auto Neutrophil % : x  Auto Lymphocyte % : x  Auto Monocyte % : x  Auto Eosinophil % : x  Auto Basophil % : x      08-22    138  |  100  |  9.4  ----------------------------<  133<H>  4.1   |  31.0<H>  |  0.42<L>    Ca    8.6      22 Aug 2022 06:14  Phos  3.1     08-22  Mg     1.8     08-22    TPro  6.5<L>  /  Alb  2.6<L>  /  TBili  0.5  /  DBili  0.2  /  AST  41<H>  /  ALT  45<H>  /  AlkPhos  268<H>  08-22    LIVER FUNCTIONS - ( 22 Aug 2022 06:14 )  Alb: 2.6 g/dL / Pro: 6.5 g/dL / ALK PHOS: 268 U/L / ALT: 45 U/L / AST: 41 U/L / GGT: x           Hemoglobin A1C:       PT/INR - ( 21 Aug 2022 07:40 )   PT: 16.1 sec;   INR: 1.38 ratio        Detailed Neurologic Exam:    Mental status: The patient is non verbal. He does not follow instructions.  (+) global aphasia    Cranial nerves: Pupils equal and react symmetrically to light. He blinks to threat from left. Not tracking with gaze. Depression of right nasolabial fold.   Motor/sensory: There is normal bulk and tone.  There is no tremor.  Moves left side spontaneously and can hold LUE and LLE antigravity.  Right plegic.     Cerebellar: Cannot be assessed.         RADIOLOGY ( All neurological imaging studies were independently reviewed and interpreted by me)  [facility]   CTA  CTP    IMPRESSION:    CT PERFUSION:  The CT perfusion is technically limited by truncation of the arterial input function and venous outflow function.    Core infarct (CBF < 30%:): 0 ml  Penumbra (Tmax >6s): 4 mL  Mismatched volume: 0 ml  Mismatched ratio: None    Interpretation:  Based on CBF < 30%, there is no evidence of a core infarct. At CBF < 34%, a small perfusion defect of 4 mL is located in the left inferior frontal gyrus and corresponds to matched perfusion abnormality of Tmax >6 seconds. On Tmax > 4s, there is a much larger perfusion defect throughout the majority of the left frontoparietal convexity, which may represent an ischemic penumbra in the left MCA vascular territory.        CT ANGIOGRAPHY NECK:  1. Poor opacification of the cervical vasculature.  2. Suspicion for moderate greater than 50% stenosis in the proximal right internal carotid artery. Suspicion for moderate to severe stenosis in the proximal left internal carotid artery that may be greater than 70%. No evidence of ICA occlusion in the neck.  3. The right vertebral artery is grossly patent.. The V1 segment of the left vertebral artery cannot be visualized. The V2 and V3 segments the left vertebral artery are grossly patent with multiple stenoses.  4. There is limited visualization of the common carotid artery origins and subclavian artery origins. Underlying stenoses cannot be excluded. There is suspicion for a severe stenosis in the proximal left subclavian artery.      CT ANGIOGRAPHY BRAIN:  1. Poor opacification of the intracranial vasculature.  2. There appears to be a focal filling defect at the left MCA bifurcation with distal flow. There is marked attenuation of M2 branches of the left middle cerebral artery. Vessel density analysis of the MCA territory shows a greater than 50% reduction of vessel density in the left MCA territory compared to the contralateral side.  3. There are mild to moderate stenoses in the supraclinoid segments of the internal carotid arteries bilaterally, without occlusion.  4. There are stenoses in the intradural vertebral arteries bilaterally, without occlusion.    Repeat CTA or MRI/MRA is recommended for further evaluation.  Peak arterial opacification on the CT perfusion occurs at approximately 38 seconds. If the CTA is repeated, a long delay is required after contrast injection to achieve adequate opacification of the vasculature.    The findings were discussed with JACK Light in the emergency room on 07/24/2022 at 5:45 AM. Read back verification was obtained.      SEVERO CHANEL MD; Attending Radiologist  This document has been electronically signed    MRI:    MR Head No Cont (07.26.22 @ 20:58) >  IMPRESSION:    Acute left MCA territory infarcts with hemorrhagic transformation,   grossly stable since comparison CT.    Additional punctate acute infarct involving the medial left frontal lobe   in the SUGEY territory.    ORION ANDRADE MD; Attending Radiologist        TTE  TTE Echo Complete w/ Contrast w/ Doppler (07.24.22 @ 14:01) >    Summary:   1. Endocardial visualization was enhanced with intravenous echo contrast.   2. Severely enlarged left atrium.   3. Global diffuse akinesis. Left ventricular ejection fraction, by   visual estimation, is <10%.   4. Elevated mean left atrial pressure. (>30 mm Hg)   5. Normal right atrial size.   6. Mildly enlarged right ventricle. Moderately reduced RV systolic   function.   7. Mild mitral valve regurgitation.   8. Mild tricuspid regurgitation.   9. Estimated pulmonary artery systolic pressure is 42 mmHg - mild   pulmonary hypertension.  10. There is no evidence of pericardial effusion.  11. Team informed of the findings.    MD Millie, RPVI Electronically signed on 7/24/2022 at 3:49:14 PM      < from: CT Head No Cont (07.30.22 @ 12:47) >    IMPRESSION:  Aging inferior left frontal and inferolateral left parietal infarcts.   Mild stable cortical blood products associated with the frontal infarct.    VERNELL LOYA MD; Attending Radiologist          EEG IMPRESSION/CLINICAL CORRELATE 7/31/22    Abnormal EEG study.  1. Potential epileptogenic foci in the bilateral frontotemporal regions.   2. Structural abnormality and cortical dysfunction in the left frontotemporal region.   3. Mild to moderate nonspecific diffuse or multifocal cerebral dysfunction.   4. No seizure seen.     _____________________________________________________________    Mayi Prather MD  Director, Epilepsy/EMU - [facility]       Electronic Signatures:  Mayi Prather)  (Signed 31-Jul-2022 09:06)EEG IMPRESSION/CLINICAL CORRELATE    CT Head No Cont (07.30.22 @ 12:47) >  IMPRESSION:  Aging inferior left frontal and inferolateral left parietal infarcts.   Mild stable cortical blood products associated with the frontal infarct.  VERNELL LOYA MD; Attending Radiologist

## 2022-08-22 NOTE — CHART NOTE - NSCHARTNOTEFT_GEN_A_CORE
GI Brief Note: Pt was scheduled to have PEG placement today but we could not reach the pt's wife to obtain consent despite multiple attempts. PEG placement cancelled. Will try to reach pt.'s wife sometime this evening to obtain telelphone consent for repeat attempt @ EGD / PEG tube placement tomorrow. Can resume TF tonight but make NPO after MN tonight for repeat attempt tomorrow.

## 2022-08-22 NOTE — PROGRESS NOTE ADULT - ASSESSMENT
77y/oM PMH CABG, PVD, HTN, HLD, low EF (declined AICD), initially presenting to [facility] [place] with R-sided weakness and R-sided facial droop. Code stroke initiated, initial NIH 8. CTA with LM1 occlusion, transferred to [facility] for poss neuro IR intervention. On arrival, NIH 6, pt admitted 7/24 to neuro ICU with LM1 occlusion, s/p TICI 2B recanalization. pt initially extubated post procedure, required reintubation for respiratory distress. MRI with large L MCA stroke with small area of reperfusion hemorrhage.  Bedside echo with EF 15%, diuresed. Formal TTE 7/25 with EF <10%. Cardio rec ICD but pt previously refused as outpt. Course further complicated by septic shock, UTI, c. diff, urinary obstruction, respiratory failure, now s/p extubation 8/10, transferred to medicine 8/11.     Acute CVA with LM1 occlusion s/p TICI 2B MT, no tPA   Small hemorrhagic conversion   Dysphagia   -asa  -lovenox sq   -s/p neuroicu   -neuro recs appreciated   -lipitor held for transaminitis, now resumed, cont to monitor   -s/p EEG without seizure   -aspiration precautions   -ID, pulm and cardio clearance  -cardiac anesthesia recommended   -TF via NGT, hold after midnight  -hold lovenox after midnight   -plan for PEG tomorrow 8/22    Acute hypoxic respiratory failure  Combined septic and cardiogenic shock   UTI   Multilobar PNA   C diff   -s/p vasopressors   -s/p intubation, extubation, reintubation, extubated 8/10  -s/p course abx for UTI   -s/p 7 days IV abx for pna   -on PO vanc for cdiff   -ID recs appreciated   -pulm recs appreciated   -titrate supplemental O2 as tolerated     Combined systolic and disatolic heart failure   -TTE 7/24 LVEF <10% with RV dysfunction   -metoprolol  -spironolactone   -now off midodrine   -monitoring bp, start losartan if bp increasing     Transaminitis   -lipitor now resumed 8/19, cont to monitor     KE suspected carbapenem AIN, now improved   -nephro recs appreciated     Hypernatremia, now resolved   -cont free water     Urinary retention   -failed TOV x2   -maintain ace     vte ppx: lovenox sq     Code status: DNR/DNI  77y/oM PMH CABG, PVD, HTN, HLD, low EF (declined AICD), initially presenting to [facility] [place] with R-sided weakness and R-sided facial droop. Code stroke initiated, initial NIH 8. CTA with LM1 occlusion, transferred to [facility] for poss neuro IR intervention. On arrival, NIH 6, pt admitted 7/24 to neuro ICU with LM1 occlusion, s/p TICI 2B recanalization. pt initially extubated post procedure, required reintubation for respiratory distress. MRI with large L MCA stroke with small area of reperfusion hemorrhage.  Bedside echo with EF 15%, diuresed. Formal TTE 7/25 with EF <10%. Cardio rec ICD but pt previously refused as outpt. Course further complicated by septic shock, UTI, c. diff, urinary obstruction, respiratory failure, now s/p extubation 8/10, transferred to medicine 8/11.     1-Acute CVA with LM1 occlusion s/p TICI 2B MT  Small hemorrhagic conversion   cont aspirin , lovenox, lipitor   stable   need rehab   with expressive aphasia and right sided weakness , dysphagia   -neuro recs appreciated   -s/p EEG without seizure   -aspiration precautions   -ID, pulm and cardio clearance appreciated for PEG   -TF on hold     2- Dysphagia   on tube feeding   risk of aspiration failed oral feed   d/w wife pt wants to eta however he is with difficulty clearing airway and recurrent   chest PT , orapharangeal suction as needed   cont scopalamine for secretion     3-Acute hypoxic respiratory failure due to both   Combined septic and cardiogenic shock , aspiration   improving   on nasal canula   agressive chest PT     4- s/p UTI . aspiration multilobar PNA , c diff infection   sepsis   improved   s/p iv abx , cont po vanco 1 more week per ID   pt is still with diarrhea   -ID recs appreciated   -pulm recs appreciated   -titrate supplemental O2 as tolerated     5-Combined systolic and diastolic heart failure   -TTE 7/24 LVEF <10% with RV dysfunction   on -metoprolol  -spironolactone   -now off midodrine   -monitoring bp  start losartan if BP is up     6- recurrent SVT . NSVT on monitor   cardiology si aware   keep mg over 2   cont metoprolol     7--Transaminitis   -lipitor now resumed 8/19,      8-KE suspected carbapenem AIN, now improved   -nephro recs appreciated     9-Hypernatremia, now resolved   -cont free water   add gentle iv hydration while NPO     10-Urinary retention   -failed TOV x2   -maintain ace       vte ppx: lovenox sq after PEG insertion     d/w son on the phone amd wife at the bedside     Code status: DNR/DNI

## 2022-08-22 NOTE — CHART NOTE - NSCHARTNOTEFT_GEN_A_CORE
I have evaluated this patient and have determined that restraints are warranted to optimize medical care. Patient was assessed for current physical and psychological risk factors as well as special needs. There are no medical conditions or limitations that would place this patient at risk while in restraints. Dr. Peters made aware.

## 2022-08-22 NOTE — PROGRESS NOTE ADULT - ASSESSMENT
78 y/o with h/o chronic systolic HF ACC/AHA stage C, ICMP LVEF 22, CAD s/p PCI ’04 CABG ‘14, carotid stenosis, declined ICD, PVD, HTN, DLP who was transferred from an OSH for neuroIR intervention after waking up with R sided facial droop and R sided weakness. He was found to have left M1 occlusion on CTA and required thrombectomy on 7/24/22. His hospital course has been complicated by acute hypoxic respiratory failure post procedure requiring mechanical ventilation, hypotension requiring temporary pressors, frequent PVCs, E. coli UTI, KE and fevers.   He had a TTE that showed LVEF 19%, LVIDd 6.79cm, LVOT VTI 10.3cm, moderately RV dysfunction and RVSP 42mmHg (RAP 3mmHg).   His course has been complicated by Cdiff for which he was on vanco/flagyl.   He was transiently requiring pressors again with intermittent low grade fevers. Extubated successfully on 8/10. There is no plan for re-intubation per family goals of care discussion. He continues to receive free water for hypernatremia and has not had issues with fluid retention thus far.  Planning for PEG placement-risk stratification done by general cardiology.    Pertinent Studies:  TTE 7/24/22: LVEF <10%, LVIDd 6.79cm, mild RVE with moderately reduced systolic function, grade III DD, mild MR, mild TR, PASP 41.9 mmHg (RAP 3), LVOT VTI 10.3, small PFO with left to right shunting.   CTA neck showed moderate stenosis in the proximal right internal carotid artery and moderate to severe stenosis in proximal left internal carotid artery. There was no evidence of ICA occlusion in the neck.  DVT U/S B/L LE 8/9: no DVT

## 2022-08-22 NOTE — PROVIDER CONTACT NOTE (OTHER) - DATE AND TIME:
04-Aug-2022 11:00
11-Aug-2022 22:00
21-Aug-2022 04:00
22-Aug-2022 08:18
25-Jul-2022 06:50
24-Jul-2022 21:15

## 2022-08-22 NOTE — PROGRESS NOTE ADULT - ASSESSMENT
IMPRESSION:  - Left MCA Stroke.  S/P suction thrombectomy for L MCA M1 occlusion with TICI 2b reperfusion. on 7-24-22.    Mechanism ESUS  cardioembolic versus Large artery atherosclerosis    ASSESSMENT/ PLAN:     - Neuro checks and vital signs Q 2 hours.  - SBP goal keep normotensive.  - ASA 81 mg PO or 300 IN QD.   - CT Head of 7-30-22 reviewed. Stable left frontal hemorrhagic products within the infarct.    - Lipitor for LDL goal of < 70 .  - EEG -Report as above reviewed.   - CT/CTA/CTP -images and reports were reviewed.   - MRI Brain stroke protocol  -images and reports were reviewed. .  - TTE  -Reports as above were reviewed. . EF < 10%.  - IMTIAZ  was  postponed due to fever.  - SCD/ SQ Lovenox for DVT prophylaxis.  - Goals of care discussion with family being held.  - Overall prognosis poor.   - Will need either PEG or palliative care feeding depending upon family wishes  - Family wishes for PEG  - PEG to be done once infection clears

## 2022-08-22 NOTE — CHART NOTE - NSCHARTNOTEFT_GEN_A_CORE
Source: Patient [ ]  Family [ ]   other [x ] EMR, staff and ID rounds     Current Diet:   Diet, NPO after Midnight:      NPO Start Date: 21-Aug-2022,   NPO Start Time: 23:59 (08-21-22 @ 10:00)  Diet, NPO:   Tube Feeding Modality: Nasogastric  Glucerna 1.5 Jonathan (GLUCERNA1.5)  Total Volume for 24 Hours (mL): 1440  Continuous  Starting Tube Feed Rate {mL per Hour}: 10  Increase Tube Feed Rate by (mL): 10     Every 6 hours  Until Goal Tube Feed Rate (mL per Hour): 60  Tube Feed Duration (in Hours): 24  Tube Feed Start Time: 11:00 (08-11-22 @ 10:23)    Enteral /Parenteral Nutrition: Diet order reads for Glucerna 1.5 to run @goal rate of 60 ml/hr (x20 hrs) to provide 1200 ml, 1800 kcal, 99g protein, 911 ml free water, and >100% of RDIs for vitamins/minerals. TF currently on hold.    Current Weight:   (8/20)  207.2 lbs  (8/19)  208.9 lbs   (8/18)  200.6 lbs   (8/10)  207.6 lbs   (8/3)   211.2 lbs   (7/30) 203.7 lbs   (7/26) 208.1 lbs   (7/25) 211.4 lbs  (7/24) 220.2 lbs     % Weight Change: Unclear accuracy of weight 2/2 inconsistency    Pertinent Medications: MEDICATIONS  (STANDING):  albuterol/ipratropium for Nebulization 3 milliLiter(s) Nebulizer every 6 hours  amantadine 100 milliGRAM(s) Oral <User Schedule>  aspirin  chewable 81 milliGRAM(s) Oral daily  atorvastatin 40 milliGRAM(s) Oral at bedtime  BACItracin   Ointment 1 Application(s) Topical two times a day  ceFAZolin   IVPB 2000 milliGRAM(s) IV Intermittent once  chlorhexidine 2% Cloths 1 Application(s) Topical daily  dextrose 5%. 1000 milliLiter(s) (50 mL/Hr) IV Continuous <Continuous>  dextrose 5%. 1000 milliLiter(s) (100 mL/Hr) IV Continuous <Continuous>  dextrose 50% Injectable 25 Gram(s) IV Push once  glucagon  Injectable 1 milliGRAM(s) IntraMuscular once  insulin lispro (ADMELOG) corrective regimen sliding scale   SubCutaneous every 6 hours  melatonin 3 milliGRAM(s) Oral at bedtime  metoprolol tartrate 50 milliGRAM(s) Oral every 8 hours  scopolamine 1 mG/72 Hr(s) Patch 1 Patch Transdermal every 72 hours  spironolactone 12.5 milliGRAM(s) Oral daily  vancomycin    Solution 125 milliGRAM(s) Enteral Tube every 6 hours    MEDICATIONS  (PRN):  acetaminophen     Tablet .. 650 milliGRAM(s) Oral every 6 hours PRN Temp greater or equal to 38C (100.4F), Mild Pain (1 - 3)  dextrose Oral Gel 15 Gram(s) Oral once PRN Blood Glucose LESS THAN 70 milliGRAM(s)/deciliter  ondansetron Injectable 4 milliGRAM(s) IV Push every 6 hours PRN Nausea and/or Vomiting    Pertinent Labs: CBC Full  -  ( 22 Aug 2022 06:14 )  WBC Count : 7.27 K/uL  RBC Count : 3.56 M/uL  Hemoglobin : 10.7 g/dL  Hematocrit : 33.4 %  Platelet Count - Automated : 258 K/uL  Mean Cell Volume : 93.8 fl  Mean Cell Hemoglobin : 30.1 pg  Mean Cell Hemoglobin Concentration : 32.0 gm/dL  08-22 Na138 mmol/L Glu 133 mg/dL<H> K+ 4.1 mmol/L Cr  0.42 mg/dL<L> BUN 9.4 mg/dL Phos 3.1 mg/dL Alb 2.6 g/dL<L> PAB n/a       Skin: Stage 2 coccyx, skin tear R scapula, wound penile shaft, woud R forearm  Edema: 1+ generalized edema     Nutrition focused physical exam previously  conducted - found signs of malnutrition [ ]absent [x ]present    Subcutaneous fat loss: Mild [x ] Orbital fat pads region, [ ]Buccal fat region, [ ]Triceps region,  [ ]Ribs region    Muscle wasting: Mild [x ]Temples region, [ ]Clavicle region, [x ]Shoulder region, [ ]Scapula region, [ ]Interosseous region,  [ ]thigh region, [ ]Calf region    Estimated Needs:   [ ] no change since previous assessment  [ ] recalculated:     Current Nutrition Diagnosis: Pt remains at high nutrition risk secondary to malnutrition (moderate, acute) related to inability to meet sufficient protein energy requirements in setting of Large L MCA stroke, respiratory failure, renal failure, +CDIFF, septic shock and poor skin integrity as evidenced by physical signs of mild muscle/fat loss, meeting <75% nutrient needs >7 days, and 1+ generalized edema. Pt s/p SLP jovanni 8/17 with recommendations for NPO 2/2 severe dysphagia. Pt currently NPO for PEG placement today per GI.     Recommendations:   1) Resume TF rate as tolerated via PEG   2) Rx MVI and vit C 500mg daily   3) Monitor weights daily for trend/accuracy     Monitoring and Evaluation:   [ ] PO intake [ x] Tolerance to diet prescription [X] Weights  [X] Follow up per protocol [X] Labs:

## 2022-08-22 NOTE — PROGRESS NOTE ADULT - PROBLEM SELECTOR PLAN 1
- Known h/o ICMP.  TTE 7/24 showed LVEF <10% with RV dysfunction.  - Now off midodrine w/ adequate BP control.  - Clinically appears euvolemic on exam.   - Goal is to maintain net even fluid status, may use Lasix PRN. Patient has not required standing diuretics.   - GDMT: Continue Lopressor 50mg TID for PVC control. Increase Spironolactone to 25mg daily (will not have significant BP effect). Can eventually plan to switch Lopressor to Toprol and add ARB/ARNi as tolerated once stable post PEG tube procedure.   - Please check markers of perfusion daily (serum lactate, LFTs, T Bili).    - Monitor strict I&O and daily weights.   - Device: he has refused this in the past and this would typically would not be offered if life expectancy < 1yr ( will need to revisit based on CVA recovery).

## 2022-08-22 NOTE — ADVANCED PRACTICE NURSE CONSULT - RECOMMEDATIONS
·	Coccyx - Cavilon (liquid skin barrier), Cover center of wound with Aquacel or Adaptic, cover with Allevyn foam dressing (DO NOT PLACE ALLEVYN DIRECTLY ONTO SKIN).    ·	Turn and Reposition Q2H,   ·	Offload sacral-coccygeal area with positioner pillow, alternate sides Q2H,   ·	Incontinence care with repositioning Q2H    -Continue turning/positioning patient from side-to-side q2h while in bed, q1h when/if OOB chair, or in accordance w/ pt's plan of care. Utilize pillows and/or Spry positioner pillow to assist w/ turning/positioning. When/if OOB chair, utilize pillows or chair cushion to offload pressure.   -Continue to offload heels from bed surface with soft pillow under calfs or by applying offloading boots to BLEs.   -Continue applying Coloplast Gudelia Protect moisture barrier cream to buttock and perineal area daily and prn after each incontinent episode.    -Continue utilizing one underpad underneath patient to contain incontinence episodes; change pad when saturated/soiled.   -Consider utilizing condom catheter (if patient candidate) to contain any urinary incontinence.   -Consider utilizing external fecal  (if patient candidate) or fecal management system/rectal tube/DigniShield (if patient candidate; MD order required) to contain loose fecal incontinence.   -Continue nutrition consult for optimal wound healing & nutritional status.   -Assess skin/wound qshift, report changes to primary provider.     Plan of Care: Primary RN made aware of above recs. Spoke w/ covering MD Fallon Griffin in regards to above. No further needs/recs from [facility] service at this time. Staff RN to perform routine skin/wound assessment and manage wound care. Questions or concerns or if wound worsens and reconsult needed, please contact [facility] ·	Coccyx - Cavilon (liquid skin barrier), Cover center of wound with Aquacel or Adaptic, cover with Allevyn foam dressing (DO NOT PLACE ALLEVYN DIRECTLY ONTO SKIN).    ·	Turn and Reposition Q2H,   ·	Offload sacral-coccygeal area with positioner pillow, alternate sides Q2H,   ·	Incontinence care with repositioning Q2H    -Continue turning/positioning patient from side-to-side q2h while in bed, q1h when/if OOB chair, or in accordance w/ pt's plan of care. Utilize pillows and/or Spry positioner pillow to assist w/ turning/positioning. When/if OOB chair, utilize pillows or chair cushion to offload pressure.   -Continue to offload heels from bed surface with soft pillow under calfs or by applying offloading boots to BLEs.   -Continue applying Coloplast Gudelia Protect moisture barrier cream to buttock and perineal area daily and prn after each incontinent episode.    -Continue utilizing one underpad underneath patient to contain incontinence episodes; change pad when saturated/soiled.   -Consider utilizing condom catheter (if patient candidate) to contain any urinary incontinence.   -Consider utilizing external fecal  (if patient candidate) or fecal management system/rectal tube/DigniShield (if patient candidate; MD order required) to contain loose fecal incontinence.   -Continue nutrition consult for optimal wound healing & nutritional status.   -Assess skin/wound qshift, report changes to primary provider.     Plan of Care: Primary RN made aware of above recs. Spoke w/ covering JACK Griffin in regards to above. No further needs/recs from [facility] service at this time. Staff RN to perform routine skin/wound assessment and manage wound care. Questions or concerns or if wound worsens and reconsult needed, please contact [facility]

## 2022-08-23 ENCOUNTER — TRANSCRIPTION ENCOUNTER (OUTPATIENT)
Age: 77
End: 2022-08-23

## 2022-08-23 LAB
ALBUMIN SERPL ELPH-MCNC: 2.3 G/DL — LOW (ref 3.3–5.2)
ANION GAP SERPL CALC-SCNC: 10 MMOL/L — SIGNIFICANT CHANGE UP (ref 5–17)
BUN SERPL-MCNC: 9.4 MG/DL — SIGNIFICANT CHANGE UP (ref 8–20)
CALCIUM SERPL-MCNC: 8.4 MG/DL — SIGNIFICANT CHANGE UP (ref 8.4–10.5)
CHLORIDE SERPL-SCNC: 100 MMOL/L — SIGNIFICANT CHANGE UP (ref 98–107)
CO2 SERPL-SCNC: 27 MMOL/L — SIGNIFICANT CHANGE UP (ref 22–29)
CREAT SERPL-MCNC: 0.37 MG/DL — LOW (ref 0.5–1.3)
EGFR: 115 ML/MIN/1.73M2 — SIGNIFICANT CHANGE UP
GLUCOSE BLDC GLUCOMTR-MCNC: 108 MG/DL — HIGH (ref 70–99)
GLUCOSE BLDC GLUCOMTR-MCNC: 118 MG/DL — HIGH (ref 70–99)
GLUCOSE BLDC GLUCOMTR-MCNC: 128 MG/DL — HIGH (ref 70–99)
GLUCOSE SERPL-MCNC: 148 MG/DL — HIGH (ref 70–99)
HCT VFR BLD CALC: 37.5 % — LOW (ref 39–50)
HGB BLD-MCNC: 12 G/DL — LOW (ref 13–17)
MAGNESIUM SERPL-MCNC: 1.7 MG/DL — SIGNIFICANT CHANGE UP (ref 1.6–2.6)
MCHC RBC-ENTMCNC: 30.1 PG — SIGNIFICANT CHANGE UP (ref 27–34)
MCHC RBC-ENTMCNC: 32 GM/DL — SIGNIFICANT CHANGE UP (ref 32–36)
MCV RBC AUTO: 94 FL — SIGNIFICANT CHANGE UP (ref 80–100)
PHOSPHATE SERPL-MCNC: 3.1 MG/DL — SIGNIFICANT CHANGE UP (ref 2.4–4.7)
PLATELET # BLD AUTO: 256 K/UL — SIGNIFICANT CHANGE UP (ref 150–400)
POTASSIUM SERPL-MCNC: 4.4 MMOL/L — SIGNIFICANT CHANGE UP (ref 3.5–5.3)
POTASSIUM SERPL-SCNC: 4.4 MMOL/L — SIGNIFICANT CHANGE UP (ref 3.5–5.3)
RBC # BLD: 3.99 M/UL — LOW (ref 4.2–5.8)
RBC # FLD: 15.2 % — HIGH (ref 10.3–14.5)
SODIUM SERPL-SCNC: 137 MMOL/L — SIGNIFICANT CHANGE UP (ref 135–145)
WBC # BLD: 8.82 K/UL — SIGNIFICANT CHANGE UP (ref 3.8–10.5)
WBC # FLD AUTO: 8.82 K/UL — SIGNIFICANT CHANGE UP (ref 3.8–10.5)

## 2022-08-23 PROCEDURE — 43246 EGD PLACE GASTROSTOMY TUBE: CPT

## 2022-08-23 DEVICE — PUSH KIT ENDOSCOPY GASTRSTMY STANDARD 20 FR.: Type: IMPLANTABLE DEVICE | Status: FUNCTIONAL

## 2022-08-23 RX ORDER — MAGNESIUM SULFATE 500 MG/ML
2 VIAL (ML) INJECTION ONCE
Refills: 0 | Status: COMPLETED | OUTPATIENT
Start: 2022-08-23 | End: 2022-08-24

## 2022-08-23 RX ORDER — MAGNESIUM SULFATE 500 MG/ML
2 VIAL (ML) INJECTION ONCE
Refills: 0 | Status: COMPLETED | OUTPATIENT
Start: 2022-08-23 | End: 2022-08-23

## 2022-08-23 RX ORDER — SPIRONOLACTONE 25 MG/1
25 TABLET, FILM COATED ORAL DAILY
Refills: 0 | Status: DISCONTINUED | OUTPATIENT
Start: 2022-08-23 | End: 2022-08-28

## 2022-08-23 RX ADMIN — CHLORHEXIDINE GLUCONATE 1 APPLICATION(S): 213 SOLUTION TOPICAL at 05:29

## 2022-08-23 RX ADMIN — Medication 3 MILLILITER(S): at 04:23

## 2022-08-23 RX ADMIN — Medication 25 GRAM(S): at 11:29

## 2022-08-23 RX ADMIN — Medication 1 APPLICATION(S): at 18:35

## 2022-08-23 RX ADMIN — Medication 3 MILLILITER(S): at 15:49

## 2022-08-23 RX ADMIN — Medication 1 APPLICATION(S): at 05:28

## 2022-08-23 RX ADMIN — Medication 3 MILLILITER(S): at 20:34

## 2022-08-23 RX ADMIN — Medication 3 MILLILITER(S): at 10:19

## 2022-08-23 NOTE — PROGRESS NOTE ADULT - SUBJECTIVE AND OBJECTIVE BOX
Patient is a 77y old  Male seen for stroke , dysphagia , s/p PNA     Peg was deferred yesterday since was not able to reach wife for consent Per GI and anesthesia   now consient obtained     pt is seen  in am , he is awake expressive aphasia   + upper airway congestion \      MEDICATIONS  (STANDING):  albuterol/ipratropium for Nebulization 3 milliLiter(s) Nebulizer every 6 hours  amantadine 100 milliGRAM(s) Oral <User Schedule>  aspirin  chewable 81 milliGRAM(s) Oral daily  atorvastatin 40 milliGRAM(s) Oral at bedtime  BACItracin   Ointment 1 Application(s) Topical two times a day  ceFAZolin   IVPB 2000 milliGRAM(s) IV Intermittent once  chlorhexidine 2% Cloths 1 Application(s) Topical daily  dextrose 5%. 1000 milliLiter(s) (50 mL/Hr) IV Continuous <Continuous>  dextrose 5%. 1000 milliLiter(s) (100 mL/Hr) IV Continuous <Continuous>  dextrose 50% Injectable 25 Gram(s) IV Push once  glucagon  Injectable 1 milliGRAM(s) IntraMuscular once  insulin lispro (ADMELOG) corrective regimen sliding scale   SubCutaneous every 6 hours  melatonin 3 milliGRAM(s) Oral at bedtime  metoprolol tartrate 50 milliGRAM(s) Oral every 8 hours  scopolamine 1 mG/72 Hr(s) Patch 1 Patch Transdermal every 72 hours  spironolactone 12.5 milliGRAM(s) Oral daily  vancomycin    Solution 125 milliGRAM(s) Enteral Tube every 6 hours  Vital Signs Last 24 Hrs  T(C): 36.4 (23 Aug 2022 04:40), Max: 36.4 (23 Aug 2022 04:40)  T(F): 97.6 (23 Aug 2022 04:40), Max: 97.6 (23 Aug 2022 04:40)  HR: 98 (23 Aug 2022 10:21) (87 - 98)  BP: 100/70 (23 Aug 2022 04:40) (100/70 - 100/70)  BP(mean): --  RR: 20 (23 Aug 2022 10:20) (18 - 20)  SpO2: 98% (23 Aug 2022 10:21) (97% - 100%)    Parameters below as of 23 Aug 2022 10:21  Patient On (Oxygen Delivery Method): nasal cannula,3L          PHYSICAL EXAM:  General: weak , expressive aphasia   ENT: upper airway congestion , NGT in place   Neck: supple   Lungs: rare rhonchi , otherwise CTA   Cardio: RRR, S1/S2, No murmur  Abdomen: Soft, Nontender, Nondistended; Bowel sounds present  Extremities: No calf tenderness, No pitting edema  Neuro : expressive aphasia , right sided weakness , can lift and move left arm and legs                           12.0   8.82  )-----------( 256      ( 23 Aug 2022 08:18 )             37.5   08-23    137  |  100  |  9.4  ----------------------------<  148<H>  4.4   |  27.0  |  0.37<L>    Ca    8.4      23 Aug 2022 08:18  Phos  3.1     08-23  Mg     1.7     08-23    TPro  x   /  Alb  2.3<L>  /  TBili  x   /  DBili  x   /  AST  x   /  ALT  x   /  AlkPhos  x   08-23      LABS:                CAPILLARY BLOOD GLUCOSE      POCT Blood Glucose.: 128 mg/dL (23 Aug 2022 05:30)  POCT Blood Glucose.: 160 mg/dL (22 Aug 2022 23:11)  POCT Blood Glucose.: 133 mg/dL (22 Aug 2022 17:23)  POCT Blood Glucose.: 107 mg/dL (22 Aug 2022 12:41)

## 2022-08-23 NOTE — PROGRESS NOTE ADULT - ASSESSMENT
77y/oM PMH CABG, PVD, HTN, HLD, low EF (declined AICD), initially presenting to [facility] [place] with R-sided weakness and R-sided facial droop. Code stroke initiated, initial NIH 8. CTA with LM1 occlusion, transferred to [facility] for poss neuro IR intervention. On arrival, NIH 6, pt admitted 7/24 to neuro ICU with LM1 occlusion, s/p TICI 2B recanalization. pt initially extubated post procedure, required reintubation for respiratory distress. MRI with large L MCA stroke with small area of reperfusion hemorrhage.  Bedside echo with EF 15%, diuresed. Formal TTE 7/25 with EF <10%. Cardio rec ICD but pt previously refused as outpt. Course further complicated by septic shock, UTI, c. diff, urinary obstruction, respiratory failure, now s/p extubation 8/10, transferred to medicine 8/11.     1-Acute CVA with LM1 occlusion s/p TICI 2B MT  Small hemorrhagic conversion ;    with expressive aphasia and right sided weakness , dysphagia   cont aspirin , statin   neuro stroke follow up appreciated   for PEG today   -s/p EEG without seizure   -aspiration precautions     2- Dysphagia   PEG today   hold tube feeding   agresive chest PT , orapharangeal suction as needed   cont scopalamine for secretion     3-Acute hypoxic respiratory failure due to    Combined septic and cardiogenic shock , aspiration pneumonia   improving   on nasal canula   agressive chest PT   PNA treated   keep head eleavted all times     4- s/p UTI . aspiration multilobar PNA , c diff infection   sepsis   resolved sepsis   s/p iv abx ,   cont po vanco 1 more week per ID   pt is still with diarrhea rectal tube , monitor output from tub e    5-Combined systolic and diastolic heart failure   -TTE 7/24 LVEF <10% with RV dysfunction   on -metoprolol  -spironolactone   -now off midodrine   -monitoring bp  HF team input noted     6- recurrent SVT / NSVT on monitor   cardiology si aware   keep mg over 2   replace IV mg for hypomagnesemia   cont metoprolol bid     7--Transaminitis   improved   -lipitor now resumed 8/19    8-KE suspected carbapenem AIN, now improved   -nephro recs appreciated     9-Hypernatremia,  resolved   -cont free water via peg once able to insert and use     10-Urinary retention   -failed TOV x2   -maintain ace         vte ppx: lovenox sq after PEG insertion     d/w son on the phone amd wife at the bedside     Code status: DNR/DNI

## 2022-08-24 DIAGNOSIS — R13.10 DYSPHAGIA, UNSPECIFIED: ICD-10-CM

## 2022-08-24 LAB
ALBUMIN SERPL ELPH-MCNC: 2.3 G/DL — LOW (ref 3.3–5.2)
ALP SERPL-CCNC: 242 U/L — HIGH (ref 40–120)
ALT FLD-CCNC: 41 U/L — HIGH
ANION GAP SERPL CALC-SCNC: 11 MMOL/L — SIGNIFICANT CHANGE UP (ref 5–17)
AST SERPL-CCNC: 39 U/L — SIGNIFICANT CHANGE UP
BILIRUB DIRECT SERPL-MCNC: 0.2 MG/DL — SIGNIFICANT CHANGE UP (ref 0–0.3)
BILIRUB INDIRECT FLD-MCNC: 0.4 MG/DL — SIGNIFICANT CHANGE UP (ref 0.2–1)
BILIRUB SERPL-MCNC: 0.7 MG/DL — SIGNIFICANT CHANGE UP (ref 0.4–2)
BUN SERPL-MCNC: 9.7 MG/DL — SIGNIFICANT CHANGE UP (ref 8–20)
CALCIUM SERPL-MCNC: 8.5 MG/DL — SIGNIFICANT CHANGE UP (ref 8.4–10.5)
CHLORIDE SERPL-SCNC: 99 MMOL/L — SIGNIFICANT CHANGE UP (ref 98–107)
CO2 SERPL-SCNC: 28 MMOL/L — SIGNIFICANT CHANGE UP (ref 22–29)
CREAT SERPL-MCNC: 0.37 MG/DL — LOW (ref 0.5–1.3)
EGFR: 115 ML/MIN/1.73M2 — SIGNIFICANT CHANGE UP
GLUCOSE BLDC GLUCOMTR-MCNC: 107 MG/DL — HIGH (ref 70–99)
GLUCOSE BLDC GLUCOMTR-MCNC: 125 MG/DL — HIGH (ref 70–99)
GLUCOSE BLDC GLUCOMTR-MCNC: 126 MG/DL — HIGH (ref 70–99)
GLUCOSE BLDC GLUCOMTR-MCNC: 128 MG/DL — HIGH (ref 70–99)
GLUCOSE SERPL-MCNC: 117 MG/DL — HIGH (ref 70–99)
LACTATE SERPL-SCNC: 1.4 MMOL/L — SIGNIFICANT CHANGE UP (ref 0.5–2)
MAGNESIUM SERPL-MCNC: 1.8 MG/DL — SIGNIFICANT CHANGE UP (ref 1.8–2.6)
PHOSPHATE SERPL-MCNC: 3.3 MG/DL — SIGNIFICANT CHANGE UP (ref 2.4–4.7)
POTASSIUM SERPL-MCNC: 4.3 MMOL/L — SIGNIFICANT CHANGE UP (ref 3.5–5.3)
POTASSIUM SERPL-SCNC: 4.3 MMOL/L — SIGNIFICANT CHANGE UP (ref 3.5–5.3)
PROT SERPL-MCNC: 6.6 G/DL — SIGNIFICANT CHANGE UP (ref 6.6–8.7)
SODIUM SERPL-SCNC: 138 MMOL/L — SIGNIFICANT CHANGE UP (ref 135–145)

## 2022-08-24 PROCEDURE — 99232 SBSQ HOSP IP/OBS MODERATE 35: CPT

## 2022-08-24 RX ORDER — ENOXAPARIN SODIUM 100 MG/ML
40 INJECTION SUBCUTANEOUS EVERY 24 HOURS
Refills: 0 | Status: DISCONTINUED | OUTPATIENT
Start: 2022-08-24 | End: 2022-09-01

## 2022-08-24 RX ORDER — MAGNESIUM SULFATE 500 MG/ML
1 VIAL (ML) INJECTION ONCE
Refills: 0 | Status: COMPLETED | OUTPATIENT
Start: 2022-08-24 | End: 2022-08-24

## 2022-08-24 RX ADMIN — SCOPALAMINE 1 PATCH: 1 PATCH, EXTENDED RELEASE TRANSDERMAL at 17:04

## 2022-08-24 RX ADMIN — Medication 3 MILLIGRAM(S): at 22:31

## 2022-08-24 RX ADMIN — Medication 25 GRAM(S): at 05:59

## 2022-08-24 RX ADMIN — Medication 3 MILLILITER(S): at 01:49

## 2022-08-24 RX ADMIN — Medication 125 MILLIGRAM(S): at 17:05

## 2022-08-24 RX ADMIN — Medication 50 MILLIGRAM(S): at 22:31

## 2022-08-24 RX ADMIN — Medication 125 MILLIGRAM(S): at 12:51

## 2022-08-24 RX ADMIN — Medication 1 APPLICATION(S): at 17:00

## 2022-08-24 RX ADMIN — Medication 100 MILLIGRAM(S): at 12:53

## 2022-08-24 RX ADMIN — ATORVASTATIN CALCIUM 40 MILLIGRAM(S): 80 TABLET, FILM COATED ORAL at 22:31

## 2022-08-24 RX ADMIN — Medication 81 MILLIGRAM(S): at 11:51

## 2022-08-24 RX ADMIN — Medication 3 MILLILITER(S): at 20:19

## 2022-08-24 RX ADMIN — ENOXAPARIN SODIUM 40 MILLIGRAM(S): 100 INJECTION SUBCUTANEOUS at 17:04

## 2022-08-24 RX ADMIN — Medication 125 MILLIGRAM(S): at 23:19

## 2022-08-24 RX ADMIN — Medication 3 MILLILITER(S): at 09:43

## 2022-08-24 RX ADMIN — SCOPALAMINE 1 PATCH: 1 PATCH, EXTENDED RELEASE TRANSDERMAL at 07:42

## 2022-08-24 RX ADMIN — Medication 3 MILLILITER(S): at 15:54

## 2022-08-24 RX ADMIN — Medication 1 APPLICATION(S): at 05:56

## 2022-08-24 RX ADMIN — Medication 100 GRAM(S): at 11:53

## 2022-08-24 RX ADMIN — CHLORHEXIDINE GLUCONATE 1 APPLICATION(S): 213 SOLUTION TOPICAL at 05:55

## 2022-08-24 RX ADMIN — SCOPALAMINE 1 PATCH: 1 PATCH, EXTENDED RELEASE TRANSDERMAL at 21:18

## 2022-08-24 NOTE — PROGRESS NOTE ADULT - SUBJECTIVE AND OBJECTIVE BOX
Chief complaint: GI Follow-up for oropharyngeal dysphagia status post CVA. Now s/p PEG tube placement yesterday.     Interval HPI: Patient seen and evaluated at bedside, in no acute distress noted. Now s/p gastrostomy tube placement yesterday. Site appears clean dry and intact. No evidence of bleeding of hematoma at the PEG site. Hemoglobin from today is pending.     REVIEW OF SYSTEMS:   Unable to obtain due to mental status.     PAST MEDICAL/SURGICAL HISTORY:  HTN (hypertension)    HLD (hyperlipidemia)    S/P CABG x 1      MEDICATIONS  (STANDING):  albuterol/ipratropium for Nebulization 3 milliLiter(s) Nebulizer every 6 hours  amantadine 100 milliGRAM(s) Oral <User Schedule>  aspirin  chewable 81 milliGRAM(s) Oral daily  atorvastatin 40 milliGRAM(s) Oral at bedtime  BACItracin   Ointment 1 Application(s) Topical two times a day  ceFAZolin   IVPB 2000 milliGRAM(s) IV Intermittent once  chlorhexidine 2% Cloths 1 Application(s) Topical daily  dextrose 5%. 1000 milliLiter(s) (50 mL/Hr) IV Continuous <Continuous>  dextrose 5%. 1000 milliLiter(s) (100 mL/Hr) IV Continuous <Continuous>  dextrose 50% Injectable 25 Gram(s) IV Push once  glucagon  Injectable 1 milliGRAM(s) IntraMuscular once  insulin lispro (ADMELOG) corrective regimen sliding scale   SubCutaneous every 6 hours  melatonin 3 milliGRAM(s) Oral at bedtime  metoprolol tartrate 50 milliGRAM(s) Oral every 8 hours  scopolamine 1 mG/72 Hr(s) Patch 1 Patch Transdermal every 72 hours  spironolactone 25 milliGRAM(s) Oral daily  vancomycin    Solution 125 milliGRAM(s) Enteral Tube every 6 hours    MEDICATIONS  (PRN):  acetaminophen     Tablet .. 650 milliGRAM(s) Oral every 6 hours PRN Temp greater or equal to 38C (100.4F), Mild Pain (1 - 3)  dextrose Oral Gel 15 Gram(s) Oral once PRN Blood Glucose LESS THAN 70 milliGRAM(s)/deciliter  ondansetron Injectable 4 milliGRAM(s) IV Push every 6 hours PRN Nausea and/or Vomiting    No Known Allergies    T(C): 37.1 (08-24-22 @ 04:55), Max: 37.1 (08-24-22 @ 04:55)  HR: 101 (08-24-22 @ 04:55) (63 - 101)  BP: 123/72 (08-24-22 @ 04:55) (102/68 - 129/75)  RR: 20 (08-24-22 @ 04:55) (18 - 20)  SpO2: 92% (08-24-22 @ 04:55) (92% - 100%)    I&O's Summary    23 Aug 2022 07:01  -  24 Aug 2022 07:00  --------------------------------------------------------  IN: 0 mL / OUT: 1000 mL / NET: -1000 mL      PHYSICAL EXAM:    General: overweight male in no acute distress, garbled speech but appears to understand  HEENT: MMM, conjunctiva and sclera clear  Gastrointestinal/Abdomen: Soft non-tender non-distended; Normal bowel sounds; No hepatosplenomegaly. PEG tube site clean dry and intact, no bleeding or hematoma noted.   Extremities: no cyanosis, clubbing or edema.  Skin: Warm and dry. No obvious rash      LABS:               12.0   8.82  )-----------( 256      ( 08-23 @ 08:18 )             37.5                10.7   7.27  )-----------( 258      ( 08-22 @ 06:14 )             33.4       08-24    138  |  99  |  9.7  ----------------------------<  117<H>  4.3   |  28.0  |  0.37<L>    Ca    8.5      24 Aug 2022 05:05  Phos  3.3     08-24  Mg     1.8     08-24    TPro  6.6  /  Alb  2.3<L>  /  TBili  0.7  /  DBili  0.2  /  AST  39  /  ALT  41<H>  /  AlkPhos  242<H>  08-24    LIVER FUNCTIONS - ( 24 Aug 2022 05:05 )  Alb: 2.3 g/dL / Pro: 6.6 g/dL / ALK PHOS: 242 U/L / ALT: 41 U/L / AST: 39 U/L / GGT: x                 < from: EGD w/ PEG Placement (08.23.22 @ 13:09) >      Findings:    Esophagus Normal esophagus.    Stomach Normal stomach.    Duodenum Mucosa Erythema of the mucosa was noted in the duodenum. These findings    are compatible with duodenitis.    Impressions:    Normal esophagus.    Normal stomach.      Erythema in the duodenum compatible with duodenitis.    Plan:    Return to floor for further management    PEG may be used for Medications today and Feedings in 24 h    Flush PEG tube with 50 cc of water q8hrs.    < end of copied text >

## 2022-08-24 NOTE — PROGRESS NOTE ADULT - SUBJECTIVE AND OBJECTIVE BOX
Patient is a 77y old  Male seen for stroke , dysphagia , s/p PNA     Peg is in place        pt is seen  in am , sleeping , congested   d.w nurse need orapharangeal suction agressive chest PT       ROs   limited due to his neurologic status     MEDICATIONS  (STANDING):  albuterol/ipratropium for Nebulization 3 milliLiter(s) Nebulizer every 6 hours  amantadine 100 milliGRAM(s) Oral <User Schedule>  aspirin  chewable 81 milliGRAM(s) Oral daily  atorvastatin 40 milliGRAM(s) Oral at bedtime  BACItracin   Ointment 1 Application(s) Topical two times a day  ceFAZolin   IVPB 2000 milliGRAM(s) IV Intermittent once  chlorhexidine 2% Cloths 1 Application(s) Topical daily  dextrose 5%. 1000 milliLiter(s) (50 mL/Hr) IV Continuous <Continuous>  dextrose 5%. 1000 milliLiter(s) (100 mL/Hr) IV Continuous <Continuous>  dextrose 50% Injectable 25 Gram(s) IV Push once  glucagon  Injectable 1 milliGRAM(s) IntraMuscular once  insulin lispro (ADMELOG) corrective regimen sliding scale   SubCutaneous every 6 hours  melatonin 3 milliGRAM(s) Oral at bedtime  metoprolol tartrate 50 milliGRAM(s) Oral every 8 hours  scopolamine 1 mG/72 Hr(s) Patch 1 Patch Transdermal every 72 hours  spironolactone 12.5 milliGRAM(s) Oral daily  vancomycin    Solution 125 milliGRAM(s) Enteral Tube every 6 hours      Vital Signs Last 24 Hrs  T(C): 37.1 (24 Aug 2022 11:14), Max: 37.1 (24 Aug 2022 04:55)  T(F): 98.7 (24 Aug 2022 11:14), Max: 98.8 (24 Aug 2022 04:55)  HR: 99 (24 Aug 2022 11:14) (63 - 101)  BP: 102/65 (24 Aug 2022 11:14) (102/65 - 129/75)  BP(mean): --  RR: 18 (24 Aug 2022 11:14) (18 - 20)  SpO2: 100% (24 Aug 2022 11:14) (92% - 100%)    Parameters below as of 24 Aug 2022 11:14  Patient On (Oxygen Delivery Method): nasal cannula  O2 Flow (L/min): 5        PHYSICAL EXAM:  General: weak , no distress   ENT: upper airway congestion   Neck: supple   Lungs: rare rhonchi , otherwise CTA poor inspiratory effort   Cardio: RRR, S1/S2, No murmur  Abdomen: Soft, Nontender, Nondistended; Bowel sounds present  Extremities: No calf tenderness, No pitting edema  Neuro : expressive aphasia , right sided weakness                           12.0   8.82  )-----------( 256      ( 23 Aug 2022 08:18 )             37.5   08-24    138  |  99  |  9.7  ----------------------------<  117<H>  4.3   |  28.0  |  0.37<L>    Ca    8.5      24 Aug 2022 05:05  Phos  3.3     08-24  Mg     1.8     08-24    TPro  6.6  /  Alb  2.3<L>  /  TBili  0.7  /  DBili  0.2  /  AST  39  /  ALT  41<H>  /  AlkPhos  242<H>  08-24      CAPILLARY BLOOD GLUCOSE      POCT Blood Glucose.: 125 mg/dL (24 Aug 2022 11:49)  POCT Blood Glucose.: 107 mg/dL (24 Aug 2022 04:36)  POCT Blood Glucose.: 118 mg/dL (23 Aug 2022 18:58)

## 2022-08-24 NOTE — PROGRESS NOTE ADULT - ASSESSMENT
77y/oM PMH CABG, PVD, HTN, HLD, low EF (declined AICD), initially presenting to [facility] [place] with R-sided weakness and R-sided facial droop. Code stroke initiated, initial NIH 8. CTA with LM1 occlusion, transferred to [facility] for poss neuro IR intervention. On arrival, NIH 6, pt admitted 7/24 to neuro ICU with LM1 occlusion, s/p TICI 2B recanalization. pt initially extubated post procedure, required reintubation for respiratory distress. MRI with large L MCA stroke with small area of reperfusion hemorrhage.  Bedside echo with EF 15%, diuresed. Formal TTE 7/25 with EF <10%. Cardio rec ICD but pt previously refused as outpt. Course further complicated by septic shock, UTI, c. diff, urinary obstruction, respiratory failure, now s/p extubation 8/10, transferred to medicine 8/11.     1-Acute CVA with LM1 occlusion s/p TICI 2B MT  Small hemorrhagic conversion ;    with expressive aphasia and right sided weakness , dysphagia   cont aspirin , statin   neuro stroke follow up appreciated   s/p PG feeding started this am   -s/p EEG without seizure   -aspiration precautions   keep head elevated     2- Dysphagia   PEG in place started feeding   cont to monitor advnace as tolerate     3-Acute hypoxic respiratory failure due to    Combined septic and cardiogenic shock , aspiration pneumonia   poor airway clearance 'need agressive chest PT . chest vest   on oxygen   head elevated all times     4- s/p UTI . aspiration multilobar PNA , c diff infection   sepsis , resolved   s/p treatment   cont po vanco 1 more week per ID for c diff   rectal tube in place monitor output       5-Combined systolic and diastolic heart failure   -TTE 7/24 LVEF <10% with RV dysfunction   on -metoprolol  -spironolactone   -monitoring bp  HF team input noted     6- recurrent SVT / NSVT on monitor   cardiology si aware   keep mg over 2   replace IV mg for hypomagnesemia   cont metoprolol bid   overall prognosis is poor     7--Transaminitis   improved   -lipitor now resumed 8/19    8-KE suspected carbapenem AIN, now improved   -nephro signed off     9-Hypernatremia,  resolved   -cont free water via peg     10-Urinary retention   -failed TOV x2   -maintain ace     11- moderate protein helena malnutrition       vte ppx: lovenox sq       Code status: DNR/DNI

## 2022-08-24 NOTE — PROGRESS NOTE ADULT - ASSESSMENT
77y/oM PMH CABG, PVD, HTN, HLD, low EF (declined AICD), initially presenting to [facility] [place] with R-sided weakness and R-sided facial droop. MRI with large L MCA stroke with LM1 occlusion with small area of reperfusion hemorrhage. GI consult after dysphagia for gastrostomy tub placement.

## 2022-08-24 NOTE — PROGRESS NOTE ADULT - PROBLEM SELECTOR PLAN 1
Dysphagia after CVA. Now s/p gastrostomy tube placement yesterday. PEG site clean dry and intact. No bleeding or hematoma.   Can resume tube feeds today, orders placed. Slowly advance to goal as tolerated  No further intervention is indicated from GI and therefore  we will sign off at this time.

## 2022-08-25 LAB
GLUCOSE BLDC GLUCOMTR-MCNC: 111 MG/DL — HIGH (ref 70–99)
GLUCOSE BLDC GLUCOMTR-MCNC: 113 MG/DL — HIGH (ref 70–99)
GLUCOSE BLDC GLUCOMTR-MCNC: 134 MG/DL — HIGH (ref 70–99)
GLUCOSE BLDC GLUCOMTR-MCNC: 147 MG/DL — HIGH (ref 70–99)

## 2022-08-25 PROCEDURE — 99232 SBSQ HOSP IP/OBS MODERATE 35: CPT

## 2022-08-25 PROCEDURE — 99233 SBSQ HOSP IP/OBS HIGH 50: CPT

## 2022-08-25 RX ORDER — METOPROLOL TARTRATE 50 MG
100 TABLET ORAL DAILY
Refills: 0 | Status: DISCONTINUED | OUTPATIENT
Start: 2022-08-25 | End: 2022-08-30

## 2022-08-25 RX ORDER — LOSARTAN POTASSIUM 100 MG/1
12.5 TABLET, FILM COATED ORAL DAILY
Refills: 0 | Status: DISCONTINUED | OUTPATIENT
Start: 2022-08-25 | End: 2022-08-28

## 2022-08-25 RX ADMIN — CHLORHEXIDINE GLUCONATE 1 APPLICATION(S): 213 SOLUTION TOPICAL at 06:07

## 2022-08-25 RX ADMIN — Medication 3 MILLIGRAM(S): at 23:00

## 2022-08-25 RX ADMIN — Medication 100 MILLIGRAM(S): at 11:13

## 2022-08-25 RX ADMIN — Medication 125 MILLIGRAM(S): at 23:00

## 2022-08-25 RX ADMIN — ATORVASTATIN CALCIUM 40 MILLIGRAM(S): 80 TABLET, FILM COATED ORAL at 23:00

## 2022-08-25 RX ADMIN — Medication 125 MILLIGRAM(S): at 11:11

## 2022-08-25 RX ADMIN — Medication 81 MILLIGRAM(S): at 11:11

## 2022-08-25 RX ADMIN — Medication 1 APPLICATION(S): at 06:06

## 2022-08-25 RX ADMIN — Medication 3 MILLILITER(S): at 01:42

## 2022-08-25 RX ADMIN — Medication 125 MILLIGRAM(S): at 17:49

## 2022-08-25 RX ADMIN — Medication 3 MILLILITER(S): at 10:02

## 2022-08-25 RX ADMIN — Medication 3 MILLILITER(S): at 21:40

## 2022-08-25 RX ADMIN — Medication 1 APPLICATION(S): at 17:50

## 2022-08-25 RX ADMIN — Medication 3 MILLILITER(S): at 16:38

## 2022-08-25 RX ADMIN — SPIRONOLACTONE 25 MILLIGRAM(S): 25 TABLET, FILM COATED ORAL at 06:06

## 2022-08-25 RX ADMIN — SCOPALAMINE 1 PATCH: 1 PATCH, EXTENDED RELEASE TRANSDERMAL at 07:00

## 2022-08-25 RX ADMIN — ENOXAPARIN SODIUM 40 MILLIGRAM(S): 100 INJECTION SUBCUTANEOUS at 17:50

## 2022-08-25 RX ADMIN — Medication 100 MILLIGRAM(S): at 17:50

## 2022-08-25 RX ADMIN — Medication 125 MILLIGRAM(S): at 06:07

## 2022-08-25 RX ADMIN — Medication 50 MILLIGRAM(S): at 06:06

## 2022-08-25 RX ADMIN — SCOPALAMINE 1 PATCH: 1 PATCH, EXTENDED RELEASE TRANSDERMAL at 18:58

## 2022-08-25 NOTE — PROGRESS NOTE ADULT - PROBLEM SELECTOR PROBLEM 1
Acute ischemic stroke
Ischemic cardiomyopathy
Encounter for PEG (percutaneous endoscopic gastrostomy)
Acute ischemic stroke
Dysphagia
Acute ischemic stroke
Chronic combined systolic and diastolic heart failure
Acute ischemic stroke
Chronic combined systolic and diastolic heart failure
Ischemic cardiomyopathy
Chronic combined systolic and diastolic heart failure

## 2022-08-25 NOTE — PROGRESS NOTE ADULT - ASSESSMENT
77y/oM PMH CABG, PVD, HTN, HLD, low EF (declined AICD), [place] [facility] [place] and R-sided facial droop. Code stroke initiated, initial NIH 8. CTA with LM1 occlusion, transferred to [facility] for poss neuro IR intervention. On arrival, NIH 6, pt admitted 7/24 to neuro ICU with LM1 occlusion, s/p TICI 2B recanalization. pt initially extubated post procedure, required reintubation for respiratory distress. MRI with large L MCA stroke with small area of reperfusion hemorrhage.  Bedside echo with EF 15%, diuresed. Formal TTE 7/25 with EF <10%. Cardio rec ICD but pt previously refused as outpt. Course further complicated by septic shock, UTI, c. diff, urinary obstruction, respiratory failure, now s/p extubation 8/10, transferred to medicine 8/11.     #Acute CVA with LM1 occlusion s/p TICI 2B MT  - w/ Small hemorrhagic conversion  - with expressive aphasia and right sided weakness , dysphagia   - aspirin , statin   - neurology consult appreciated  - w/ dysphagia- peg feeds  - aspiration precautions     #Dysphagia   - PEG feeds    #s/p UTI . aspiration multilobar PNA , c diff infection   - sepsis , resolved   - po vanco   - rectal tube in place monitor output     #Combined systolic and diastolic heart failure   - TTE 7/24 LVEF <10% with RV dysfunction   - metoprolol (changed to XL), losartan spironolactone  - HF consult appreciated    #recurrent SVT / NSVT on monitor   - cardio consult appreciated  - metoprolol    #Urinary retention   - failed TOV x2   - maintain ace     #moderate protein calorie malnutrition  - nutritionist consult appreciated    #DVT Prophylaxis  - lovenox SC    attempted to call patient son [phone] no answer

## 2022-08-25 NOTE — PROGRESS NOTE ADULT - SUBJECTIVE AND OBJECTIVE BOX
Patient is a 77y old  Male who presents with a chief complaint of stroke (25 Aug 2022 12:18)    Patient seen and examined at bedside.     ALLERGIES:  No Known Allergies    MEDICATIONS  (STANDING):  albuterol/ipratropium for Nebulization 3 milliLiter(s) Nebulizer every 6 hours  amantadine 100 milliGRAM(s) Oral <User Schedule>  aspirin  chewable 81 milliGRAM(s) Oral daily  atorvastatin 40 milliGRAM(s) Oral at bedtime  BACItracin   Ointment 1 Application(s) Topical two times a day  ceFAZolin   IVPB 2000 milliGRAM(s) IV Intermittent once  chlorhexidine 2% Cloths 1 Application(s) Topical daily  dextrose 5%. 1000 milliLiter(s) (50 mL/Hr) IV Continuous <Continuous>  dextrose 5%. 1000 milliLiter(s) (100 mL/Hr) IV Continuous <Continuous>  dextrose 50% Injectable 25 Gram(s) IV Push once  enoxaparin Injectable 40 milliGRAM(s) SubCutaneous every 24 hours  glucagon  Injectable 1 milliGRAM(s) IntraMuscular once  insulin lispro (ADMELOG) corrective regimen sliding scale   SubCutaneous every 6 hours  losartan 12.5 milliGRAM(s) Oral daily  melatonin 3 milliGRAM(s) Oral at bedtime  metoprolol succinate  milliGRAM(s) Oral daily  scopolamine 1 mG/72 Hr(s) Patch 1 Patch Transdermal every 72 hours  spironolactone 25 milliGRAM(s) Oral daily  vancomycin    Solution 125 milliGRAM(s) Enteral Tube every 6 hours    MEDICATIONS  (PRN):  acetaminophen     Tablet .. 650 milliGRAM(s) Oral every 6 hours PRN Temp greater or equal to 38C (100.4F), Mild Pain (1 - 3)  dextrose Oral Gel 15 Gram(s) Oral once PRN Blood Glucose LESS THAN 70 milliGRAM(s)/deciliter  ondansetron Injectable 4 milliGRAM(s) IV Push every 6 hours PRN Nausea and/or Vomiting    Vital Signs Last 24 Hrs  T(F): 97.7 (25 Aug 2022 12:00), Max: 98.9 (24 Aug 2022 17:52)  HR: 86 (25 Aug 2022 12:00) (86 - 107)  BP: 90/61 (25 Aug 2022 12:00) (90/61 - 118/65)  RR: 18 (25 Aug 2022 12:00) (18 - 20)  SpO2: 95% (25 Aug 2022 12:00) (95% - 100%)  I&O's Summary    24 Aug 2022 07:01  -  25 Aug 2022 07:00  --------------------------------------------------------  IN: 40 mL / OUT: 1700 mL / NET: -1660 mL      PHYSICAL EXAM:  General: NAD    ENT: MMM, no thrush  Neck: Supple, No JVD  Lungs: Clear to auscultation bilaterally, good air entry, non-labored breathing  Cardio: +s1/s2  Abdomen: Soft, Nontender, Nondistended; Bowel sounds present  Extremities: No calf tenderness     LABS:                        12.0   8.82  )-----------( 256      ( 23 Aug 2022 08:18 )             37.5     08-24    138  |  99  |  9.7  ----------------------------<  117  4.3   |  28.0  |  0.37    Ca    8.5      24 Aug 2022 05:05  Phos  3.3     08-24  Mg     1.8     08-24    TPro  6.6  /  Alb  2.3  /  TBili  0.7  /  DBili  0.2  /  AST  39  /  ALT  41  /  AlkPhos  242  08-24    Lactate, Blood: 1.4 mmol/L (08-24 @ 05:05)    07-25 Chol 167 mg/dL LDL -- HDL 44 mg/dL Trig 132 mg/dL    Glucose  POCT Blood Glucose.: 134 mg/dL (25 Aug 2022 11:12)  POCT Blood Glucose.: 147 mg/dL (25 Aug 2022 06:04)  POCT Blood Glucose.: 128 mg/dL (24 Aug 2022 23:17)  POCT Blood Glucose.: 126 mg/dL (24 Aug 2022 17:03)    COVID-19 PCR: NotDetec (08-21-22 @ 07:00)  COVID-19 PCR: NotDetec (08-16-22 @ 07:45)  COVID-19 PCR: NotDetec (08-04-22 @ 03:55)  COVID-19 PCR: NotDetec (07-28-22 @ 16:55)    RADIOLOGY & ADDITIONAL TESTS:  - no new tests

## 2022-08-25 NOTE — PROGRESS NOTE ADULT - ASSESSMENT
HF Cardiologist for outpt follow-up: Dr. Carmenza Kern    78 y/o with h/o chronic systolic HF ACC/AHA stage C, ICMP LVEF 22, CAD s/p PCI ’04 CABG ‘14, carotid stenosis, declined ICD, PVD, HTN, DLP who was transferred from an OSH for neuroIR intervention after waking up with R sided facial droop and R sided weakness. He was found to have left M1 occlusion on CTA and required thrombectomy on 7/24/22. His hospital course has been complicated by acute hypoxic respiratory failure post procedure requiring mechanical ventilation, hypotension requiring temporary pressors, frequent PVCs, E. coli UTI, KE and fevers.   He had a TTE that showed LVEF 19%, LVIDd 6.79cm, LVOT VTI 10.3cm, moderately RV dysfunction and RVSP 42mmHg (RAP 3mmHg).   His course has been complicated by Cdiff for which he was on vanco/flagyl.   He was transiently requiring pressors again with intermittent low grade fevers. Extubated successfully on 8/10. There is no plan for re-intubation per family goals of care discussion. He continues to receive free water for hypernatremia and has not had issues with fluid retention thus far.   Underwent PEG placement earlier this week. BP improving. Will slowly optimize GDMT as tolerated.    Pertinent Studies:  TTE 7/24/22: LVEF <10%, LVIDd 6.79cm, mild RVE with moderately reduced systolic function, grade III DD, mild MR, mild TR, PASP 41.9 mmHg (RAP 3), LVOT VTI 10.3, small PFO with left to right shunting.   CTA neck showed moderate stenosis in the proximal right internal carotid artery and moderate to severe stenosis in proximal left internal carotid artery. There was no evidence of ICA occlusion in the neck.  DVT U/S B/L LE 8/9: no DVT

## 2022-08-25 NOTE — PROGRESS NOTE ADULT - PROBLEM SELECTOR PROBLEM 3
CAD (coronary artery disease)
Acute ischemic stroke
CAD (coronary artery disease)
Encounter for pre-operative cardiovascular clearance

## 2022-08-25 NOTE — PROGRESS NOTE ADULT - NS ATTEND OPT1 GEN_ALL_CORE

## 2022-08-25 NOTE — PROGRESS NOTE ADULT - NS ATTEND AMEND GEN_ALL_CORE FT
PVCs/NSVT. No sustained VT. Continue metoprolol. Previously known to have severely LV dysfunction and has refused primary prevention ICD. Can be readdressed if patient makes a full neurological recovery.
I evaluated this pt. with my NP and agree with the above assessment and management plan. Pulmonary and ID clearances for PEG placement appreciated. Pt's pneumonia appears to have clinically resolved. Dobhoff tube now in place. can begin TF via Dobhoff tube today and over the weekend, Will also need cardiac clearance for EGD / PEG placement which will be tentatively scheduled for this upcoming Monday assuming pt. remains stable over the weekend.
Patient stable status post PEG placement yesterday with a clean site with no discharge or erythema very minimal tenderness with no abdominal distention and good bowel sounds.  We will start feeds with Glucerna.  We will see only as needed your request going forward.
Patient intubated, off sedation, not following commands.   Off pressors   SR with episodes of NSVT on telemetry   Continue ASA  Statin on hold due to transaminitis   Patient appears clinically euvolemic.  Continue lopressor, to be switched to Toprol XL on d/c   ACE on hold. Add further GDMT as tolerated   Given severely decreased LVEF and CVA, can empirically anticoagulate if okay from neurology perspective   Would plan for repeat LHC and primary prevention ICD if patient is extubated and mental status improves.   If he is not agreeable, would benefit from ILR  EP, HF following
Patient seen and examined by me.  I have discussed my recommendation with the PA which are outlined above.  Will follow.      MEDICATIONS  (STANDING):  aspirin  chewable 81 milliGRAM(s) Oral daily  atorvastatin 80 milliGRAM(s) Oral daily  carvedilol 6.25 milliGRAM(s) Oral every 12 hours  cefTRIAXone   IVPB      cefTRIAXone   IVPB 1000 milliGRAM(s) IV Intermittent every 24 hours  chlorhexidine 0.12% Liquid 15 milliLiter(s) Oral Mucosa every 12 hours  chlorhexidine 2% Cloths 1 Application(s) Topical daily  dextrose 5%. 1000 milliLiter(s) (50 mL/Hr) IV Continuous <Continuous>  dextrose 5%. 1000 milliLiter(s) (100 mL/Hr) IV Continuous <Continuous>  dextrose 50% Injectable 25 Gram(s) IV Push once  enalapril 5 milliGRAM(s) Oral <User Schedule>  enoxaparin Injectable 40 milliGRAM(s) SubCutaneous every 24 hours  famotidine Injectable 20 milliGRAM(s) IV Push daily  furosemide   Injectable 20 milliGRAM(s) IV Push daily  glucagon  Injectable 1 milliGRAM(s) IntraMuscular once  insulin lispro (ADMELOG) corrective regimen sliding scale   SubCutaneous every 6 hours  polyethylene glycol 3350 17 Gram(s) Oral daily  senna 2 Tablet(s) Oral at bedtime
Patient intubated, off sedation, not following commands.   Off pressors   SR with episodes of NSVT on telemetry   Continue ASA, statin  Patient appears clinically euvolemic. Switch lasix to PO tomorrow  Was started on coreg, enalapril yesterday  Switch coreg to metoprolol as patient needs higher dose of beta blocker due to continued episodes of NSVT and BP is currently borderline. Start lopressor 12.5mg q12. Switch to Toprol XL prior to discharge.   Would eventually switch enalapril to entresto. Will need 36 hour washout period.   Assess for spironolactone and farxiga prior to discharge if BP is able to tolerate   Patient planned for IMTIAZ due to acute CVA, however was febrile this AM. If remains febrile, will need to postpone.  Given severely decreased LVEF and CVA, can empirically anticoagulate if okay from neurology perspective   If EF remains <10%, would plan for repeat LHC pending hospital course  Will need to discuss ICD with patient once extubated and mental status improves (previously declined). If he is not agreeable, would benefit from ILR.    D/w Neuro ICU team
Patient seen and examined by me.  I have discussed my recommendation with the PA which are outlined above.  Will follow.
Patient seen and examined at bedside. The patient is warm and well perfused on exam without signs of edema. Tele was reviewed and burden of NSVT has decreased signficantly  Patient is ready to be discharged to rehab  Will send home with losartan 12.5mg daily, spironolactone 25mg daily, and metoprolol tartrate 100mg daily  Can go home on lasix PRN, has not required it recently  CHF will sign off, please reconsult as needed
Seen and examined at beside. Patient is warm and well perfused on exam. Vitals are stable but is noted to have frequent PVCs and NSVT.   Can increase metoprolol to 75mg TID  Increase spironolactone to 25mg daily  Plan for PEG and will make further medication changes afterwards
Patient was seen and examined at bedside with the advanced care provider.  Remains minimally responsive to voice post CVA.  Etiology of CVA remains possible cardioembolic versus large atherosclerosis.  Echocardiogram done this admission with contrast did not reveal any LV thrombus however, there was evidence of a small PFO with left to right shunt.  Unclear if this is the cause of his CVA however, would need to investigate for additional thrombus such as DVT ultrasound.  Also, would warrant IMTIAZ for other possible source such as PAULA thrombus or aortic plaque (based on patients goals of care).  While these questions about cardioembolic stroke remain, it is not unreasonable to place on AC.  However, this would be at the discretion of the neurology team as to the safety and timing of this given potential of hemorrhagic conversion.      As to his ischemic cardiomyopathy, remains euvolemic on exam today with JVP low at clavicle and no LE edema. Can continue on metoprolol tartrate 50mg q8hrs for PVC control.  BP goals per neurology.  However, would consider add on some short acting afterload reduction, can do hydralazine 5mg TID and up titrate as BP can tolerate.   Eventually will need to transition to ACE/ARB/ARNi  however, renal function is only starting to improve.  With regards to his candidacy for ICD, he has refused this in the past and this would typically would not be offered if life expectancy < 1yr.
Patient was seen and examined at bedside with the advanced care provider.  S/p successful extubation yesterday.  Remains euvolemic on exam.  No need for diuretics.  Started on spironolactone yesterday.  Continue to wean midodrine.  Will start on afterload reduction once able and off midodrine.
seen as above,    75M h/o CAD/CABG/stents, ICM/HFrEF 20-25% declined AICD in the past (follows with Dr. Castillo), PAD found with acute CVA transferred from [facility] for mechanical thrombectomy of L-MCA occlusion c/b acute hypoxic respiratory failure/intubated, repeat Echo here LV EF <10%  -suspect cardioembolic CVA given severely reduced LV EF, per guideline recommendation reasonable to empirically anticoagulate long term given severe cardiomyopathy with acute CVA, pending brain MRI, will obtain IMTIAZ at bedside tomorrow to exclude other intracardiac pathologies  -eventual resumption of GDMT  -will readdress need for AICD when patient is extubated   -discussed with patient's wife at bedside  -overall prognosis can be guarded       aDron Joshi DO, Merged with [facility]  Faculty Non-Invasive Cardiologist  [phone]
Encounter for pre-operative cardiovascular clearance. METS <4  RCRI Risk Stratification: Class V  Risk, 15%  Risk of Major Cardiac Event  Elevated risk for procedure  Patient has several unmodifiable risk factors however, benefits of surgery outweigh the risk.   No further cardiac work up is needed.  Further cardiac work up will not change risk of the patient.  No active chest pain, acute coronary syndrome, decompensated CHF, significant valvular abnormality, or unstable arrhythmia.  Patient is currently optimized from a cardiac standpoint therefore no absolute cardiac contraindication to proceeding with procedure.  [facility] cardiac aesthesia on board.
severe BI V failure.  cerebrovascular accident . transaminitis. Urem.a   still making urine output. uremic.  Still intubated. treated with abx.  Unclear reason for liver injury.   Quick bedside hand held (point of care ultrasound) POCUS echo : Bi V failure. IVC dilated.  Bladder distended.  (Has condom catheter).   On labs dehydrated? Multiorgan failure.    Unclear if diabetes insipidus. High urine output despite suspicion for volume depletion. and not on diuresis for last few days.   Urine lytes. check serum and urine osmolalitiy.  check urine sodium.    check for DI.   check lactate.    if DI, then DDAVP.   on beta-blocker and ACE-I  will get heart failure on board. Evaluate for mix cardiogenic vs distributive shocks.      Daily bladder scan consider ace if signifciant residuals.   will get CHF on board and get Right heart cath and see if beenfits form inotrope.  will defer today as frequent PVCs.    EP consulted to evaluate for stabilizing his polymophric PVcs.  high risk for having arrhtyhmia.    discussed with wife.  Patient has poor pgornosis with very severe biV failure.  not sure if multiorgan failure would be easy to reverse.   wife comprehends.  NO IMTIAZ . as this will nto  at this time.
Remains hemodynamically stable. However BP is lower after lopressor was uptitrated.   Clinically close to euvolemia. No need for diuretics.  Eventually will add RAASi. Will hold off while the patient is having active diarrhea/c. diff.   Family is considering PEG/trach.   Pt had refused ICD in the past. NeuroICU team is requesting to rediscuss with patient's family.
Patient was seen and examined at bedside with the advanced care provider.  Marked improvement today.  Able to squeeze my hand with his left hand on voice command.  Off midodrine this AM.  If BP stable off midodrine for 24hrs would introduce low dose losartan 12.5mg daily.  Can increase spironolactone to 25mg daily.  Keep metoprolol at current doses for now.  Will need to eventually change to succinate formulation on discharge.  He still remains euvolemic.  No need for diuretics at this time.  Telemetry this AM showed an 11 beat run of non sustained VT.  This correlated with the time he was receiving nebulizer treatment which can increase catecholamine response triggering VT.  Continue on telemetry.  Should longer VT runs persists may need anti-arrhythmic therapy as he has deferred ICD and will be left unprotected.
Pt remains critically ill on mechanical ventilation.   No purposeful movements or following commands.   Clinically close to euvolemia, warm extremities.  His lopressor was increased to 75mg TID and he became hypotensive requiring levophed temporarily. Lopressor was downtitrated 50mg TID.   He remains with loose stools through rectal tube in setting of c. difficile.   He is hypernatremic now on D5.   Urine output is adequate w/o diuretics.  Given his multiple comorbidities including chronic bi-ventricular systolic HF ACC/AHA stage C, ICMP LVEF 22%, frequent PVCs, CAD s/p PCI+CABG, carotid stenosis, recent stroke and c. diff he has moderate-high risk of cardiac events perioperatively. His volume status is close to euvolemia and he is tolerating BB. He is vent dependent and as per neurosurgery his family has agreed to proceed with tracheostomy/PEG tube. No further cardiac testing needed at this point. Should continue betablockers perioperatively. We will be available for further management if needed.

## 2022-08-25 NOTE — PROGRESS NOTE ADULT - PROBLEM SELECTOR PLAN 1
- Known h/o ICMP.  TTE 7/24 showed LVEF <10% with RV dysfunction.  - Now off midodrine w/ adequate BP control.  - Clinically appears euvolemic on exam.   - Goal is to maintain net even fluid status, may use Lasix PRN. Patient has not required standing diuretics.   - GDMT: Start Lopressor 12.5mg daily. Switch Lopressor to Toprol-XL 100mg daily and continue Spironolactone 25mg daily.   - Please check markers of perfusion daily (serum lactate, LFTs, T Bili).    - Monitor strict I&O and daily weights.   - Device: he has refused this in the past and this would typically would not be offered if life expectancy < 1yr (will need to revisit based on CVA recovery).  - Would recommend outpt HF follow-up in our [place] office if able to arrange transport from rehab center. - Known h/o ICMP.  TTE 7/24 showed LVEF <10% with RV dysfunction.  - Now off midodrine w/ adequate BP control.  - Clinically appears euvolemic on exam.   - Goal is to maintain net even fluid status, may use Lasix PRN. Patient has not required standing diuretics.   - GDMT: Start losartan 12.5mg daily. Switch Lopressor to Toprol-XL 100mg daily and continue Spironolactone 25mg daily.   - Please check markers of perfusion daily (serum lactate, LFTs, T Bili).    - Monitor strict I&O and daily weights.   - Device: he has refused this in the past and this would typically would not be offered if life expectancy < 1yr (will need to revisit based on CVA recovery).  - Would recommend outpt HF follow-up in our [place] office if able to arrange transport from rehab center.

## 2022-08-25 NOTE — PROGRESS NOTE ADULT - SUBJECTIVE AND OBJECTIVE BOX
[facility] Advanced Heart Failure  Office: [address] [place] [address]  Telephone: [phone]/Fax: [phone]    Subjective/Objective: RAD. Underwent PEG placement without incident. Plan for DC to rehab likely tomorrow.    Medications:  acetaminophen     Tablet .. 650 milliGRAM(s) Oral every 6 hours PRN  albuterol/ipratropium for Nebulization 3 milliLiter(s) Nebulizer every 6 hours  amantadine 100 milliGRAM(s) Oral <User Schedule>  aspirin  chewable 81 milliGRAM(s) Oral daily  atorvastatin 40 milliGRAM(s) Oral at bedtime  BACItracin   Ointment 1 Application(s) Topical two times a day  ceFAZolin   IVPB 2000 milliGRAM(s) IV Intermittent once  chlorhexidine 2% Cloths 1 Application(s) Topical daily  dextrose 5%. 1000 milliLiter(s) IV Continuous <Continuous>  dextrose 5%. 1000 milliLiter(s) IV Continuous <Continuous>  dextrose 50% Injectable 25 Gram(s) IV Push once  dextrose Oral Gel 15 Gram(s) Oral once PRN  enoxaparin Injectable 40 milliGRAM(s) SubCutaneous every 24 hours  glucagon  Injectable 1 milliGRAM(s) IntraMuscular once  insulin lispro (ADMELOG) corrective regimen sliding scale   SubCutaneous every 6 hours  losartan 12.5 milliGRAM(s) Oral daily  melatonin 3 milliGRAM(s) Oral at bedtime  metoprolol succinate  milliGRAM(s) Oral daily  ondansetron Injectable 4 milliGRAM(s) IV Push every 6 hours PRN  scopolamine 1 mG/72 Hr(s) Patch 1 Patch Transdermal every 72 hours  spironolactone 25 milliGRAM(s) Oral daily  vancomycin    Solution 125 milliGRAM(s) Enteral Tube every 6 hours    Vitals:  T(C): 36.5 (08-25-22 @ 12:00), Max: 37.2 (08-24-22 @ 17:52)  HR: 86 (08-25-22 @ 12:00) (86 - 107)  BP: 90/61 (08-25-22 @ 12:00) (90/61 - 118/65)  RR: 18 (08-25-22 @ 12:00) (18 - 20)  SpO2: 95% (08-25-22 @ 12:00) (95% - 100%)    I&O's Summary    24 Aug 2022 07:01  -  25 Aug 2022 07:00  --------------------------------------------------------  IN: 40 mL / OUT: 1700 mL / NET: -1660 mL    Tele: SR first degree AVB, frequent PVCs    Physical Exam:  General: Sleeping comfortably.  Neck: Neck supple. JVP not elevated.   Chest: course anterior BS  CV: RRR. Normal S1 and S2. No murmurs, rub, or gallops. Warm peripherally.  PV: No edema. Pulses full/equal in all four extremities.  Abdomen: Soft, non-distended, +BS, PEG  Skin: dry, no rash    Labs:    08-24    138  |  99  |  9.7  ----------------------------<  117<H>  4.3   |  28.0  |  0.37<L>    Ca    8.5      24 Aug 2022 05:05  Phos  3.3     08-24  Mg     1.8     08-24    TPro  6.6  /  Alb  2.3<L>  /  TBili  0.7  /  DBili  0.2  /  AST  39  /  ALT  41<H>  /  AlkPhos  242<H>  08-24    Lactate, Blood: 1.4 mmol/L (08-24 @ 05:05)

## 2022-08-25 NOTE — PROGRESS NOTE ADULT - TIME BILLING
- Review of notes/records, telemetry, vital signs and daily labs.   - General and cardiovascular physical examination.  - Generation of cardiovascular treatment plan.  - Coordination of care with primary team.
Time spent at bedside interviewing and examining the patient, reviewing the chart, and coordinating care with the primary team.
D/W RN, NSICU JACK Gay     Chart review, meds, labs, imaging, coordination of care with other providers and disciplines re pain/symptom, cva, debility, respiratory failure
stroke
as above.
- Generation of cardiovascular treatment plan.  - Coordination of care with primary team.
- Review of notes/records, telemetry, vital signs and daily labs.   - General and cardiovascular physical examination.  - Generation of cardiovascular treatment plan.  - Coordination of care with primary team.
- Generation of cardiovascular treatment plan.  - Coordination of care with primary team.
- Review of notes/records, telemetry, vital signs and daily labs.   - General and cardiovascular physical examination.  - Generation of cardiovascular treatment plan.  - Coordination of care with primary team.
Acute CVA, Chronic HFrEF, ICM
Time spent at bedside interviewing and examining the patient, reviewing the chart, and coordinating care with the primary team.
- Review of notes/records, telemetry, vital signs and daily labs.   - General and cardiovascular physical examination.  - Generation of cardiovascular treatment plan.  - Coordination of care with primary team.

## 2022-08-26 ENCOUNTER — TRANSCRIPTION ENCOUNTER (OUTPATIENT)
Age: 77
End: 2022-08-26

## 2022-08-26 LAB
GLUCOSE BLDC GLUCOMTR-MCNC: 119 MG/DL — HIGH (ref 70–99)
GLUCOSE BLDC GLUCOMTR-MCNC: 149 MG/DL — HIGH (ref 70–99)
GLUCOSE BLDC GLUCOMTR-MCNC: 174 MG/DL — HIGH (ref 70–99)
GLUCOSE BLDC GLUCOMTR-MCNC: 187 MG/DL — HIGH (ref 70–99)
SARS-COV-2 RNA SPEC QL NAA+PROBE: SIGNIFICANT CHANGE UP

## 2022-08-26 PROCEDURE — 99232 SBSQ HOSP IP/OBS MODERATE 35: CPT

## 2022-08-26 RX ADMIN — Medication 2: at 11:27

## 2022-08-26 RX ADMIN — Medication 2: at 05:43

## 2022-08-26 RX ADMIN — Medication 81 MILLIGRAM(S): at 11:28

## 2022-08-26 RX ADMIN — ATORVASTATIN CALCIUM 40 MILLIGRAM(S): 80 TABLET, FILM COATED ORAL at 23:28

## 2022-08-26 RX ADMIN — Medication 3 MILLILITER(S): at 20:55

## 2022-08-26 RX ADMIN — Medication 125 MILLIGRAM(S): at 23:28

## 2022-08-26 RX ADMIN — Medication 100 MILLIGRAM(S): at 13:40

## 2022-08-26 RX ADMIN — ENOXAPARIN SODIUM 40 MILLIGRAM(S): 100 INJECTION SUBCUTANEOUS at 17:00

## 2022-08-26 RX ADMIN — SPIRONOLACTONE 25 MILLIGRAM(S): 25 TABLET, FILM COATED ORAL at 05:41

## 2022-08-26 RX ADMIN — Medication 1 APPLICATION(S): at 17:00

## 2022-08-26 RX ADMIN — CHLORHEXIDINE GLUCONATE 1 APPLICATION(S): 213 SOLUTION TOPICAL at 05:41

## 2022-08-26 RX ADMIN — Medication 125 MILLIGRAM(S): at 06:12

## 2022-08-26 RX ADMIN — Medication 3 MILLILITER(S): at 16:36

## 2022-08-26 RX ADMIN — Medication 3 MILLILITER(S): at 08:53

## 2022-08-26 RX ADMIN — Medication 100 MILLIGRAM(S): at 08:31

## 2022-08-26 RX ADMIN — Medication 125 MILLIGRAM(S): at 11:28

## 2022-08-26 RX ADMIN — SCOPALAMINE 1 PATCH: 1 PATCH, EXTENDED RELEASE TRANSDERMAL at 06:12

## 2022-08-26 RX ADMIN — Medication 3 MILLILITER(S): at 02:40

## 2022-08-26 RX ADMIN — Medication 125 MILLIGRAM(S): at 17:00

## 2022-08-26 RX ADMIN — Medication 1 APPLICATION(S): at 05:41

## 2022-08-26 RX ADMIN — Medication 100 MILLIGRAM(S): at 09:00

## 2022-08-26 RX ADMIN — Medication 3 MILLIGRAM(S): at 23:28

## 2022-08-26 NOTE — DISCHARGE NOTE PROVIDER - NSDCCPCAREPLAN_GEN_ALL_CORE_FT
PRINCIPAL DISCHARGE DIAGNOSIS  Diagnosis: Acute cerebrovascular accident (CVA)  Assessment and Plan of Treatment: - Acute CVA with LM1 occlusion s/p TICI 2B MT  - With residual expressive aphasia and right sided weakness , dysphagia   - Peg tube was placed for feeds secondary to persistent dysphagia  - Continue aspirin and statin      SECONDARY DISCHARGE DIAGNOSES  Diagnosis: Dysphagia  Assessment and Plan of Treatment: - Peg tube was placed for feeds secondary to persistent dysphagia  - Continue with tube feeds    Diagnosis: Sepsis  Assessment and Plan of Treatment: - Due to UTI, pneumonia and c.diff infection  - You completed a course of antibiotics  - Sepsis now resolved    Diagnosis: Chronic combined systolic and diastolic heart failure  Assessment and Plan of Treatment: - Continue cardiac meds as outlined  - Outpatient follow up on [facility] with heart failure team.      Diagnosis: SVT (supraventricular tachycardia)  Assessment and Plan of Treatment: -Continue metoprolol    Diagnosis: Urinary retention  Assessment and Plan of Treatment: -Maintain ace  -Eventual trial of void on discharge  -Outpatient urology follow up     PRINCIPAL DISCHARGE DIAGNOSIS  Diagnosis: Acute cerebrovascular accident (CVA)  Assessment and Plan of Treatment: - Acute CVA with LM1 occlusion s/p TICI 2B MT  - With residual expressive aphasia and right sided weakness , dysphagia   - Peg tube was placed for feeds secondary to persistent dysphagia  - Continue aspirin and statin      SECONDARY DISCHARGE DIAGNOSES  Diagnosis: Dysphagia  Assessment and Plan of Treatment: - Peg tube was placed for feeds secondary to persistent dysphagia  - Continue with tube feeds    Diagnosis: Sepsis  Assessment and Plan of Treatment: - Due to UTI, pneumonia and c.diff infection  - Sepsis now resolved  - Complete course of antibiotics as prescribed    Diagnosis: Chronic combined systolic and diastolic heart failure  Assessment and Plan of Treatment: - Continue cardiac meds as outlined  - Outpatient follow up on [facility] with heart failure team.      Diagnosis: SVT (supraventricular tachycardia)  Assessment and Plan of Treatment: -Continue metoprolol    Diagnosis: Urinary retention  Assessment and Plan of Treatment: -Maintain ace  -Eventual trial of void on discharge  -Outpatient urology follow up     PRINCIPAL DISCHARGE DIAGNOSIS  Diagnosis: Acute cerebrovascular accident (CVA)  Assessment and Plan of Treatment: - Acute CVA with LM1 occlusion s/p TICI 2B MT  - With residual expressive aphasia and right sided weakness , dysphagia   - Peg tube was placed for feeds secondary to persistent dysphagia  - Continue aspirin and statin, speech pyhsical therapy      SECONDARY DISCHARGE DIAGNOSES  Diagnosis: Dysphagia  Assessment and Plan of Treatment: - Peg tube was placed for feeds secondary to persistent dysphagia  - Continue with tube feeds, agressive chest pt   keep head elevated 30-45 degree all the times    Diagnosis: Sepsis  Assessment and Plan of Treatment: - Due to UTI, pneumonia and c.diff infection  resolved  - Completed course of antibiotics    Diagnosis: Chronic combined systolic and diastolic heart failure  Assessment and Plan of Treatment: - Continue cardiac meds as outlined  - Outpatient follow up on [facility] with heart failure team  poor prognosis       Diagnosis: SVT (supraventricular tachycardia)  Assessment and Plan of Treatment: -Continue metoprolol  supplemnet lytes as needed    Diagnosis: Urinary retention  Assessment and Plan of Treatment: -Maintain ace  -Eventual trial of void   -Outpatient urology follow up    Diagnosis: Vitamin D deficiency  Assessment and Plan of Treatment:     Diagnosis: CAD (coronary artery disease)  Assessment and Plan of Treatment:     Diagnosis: Protein-calorie malnutrition, moderate  Assessment and Plan of Treatment:      PRINCIPAL DISCHARGE DIAGNOSIS  Diagnosis: Acute cerebrovascular accident (CVA)  Assessment and Plan of Treatment: - Acute CVA with LM1 occlusion s/p TICI 2B MT  - With residual expressive aphasia and right sided weakness , dysphagia   - Peg tube was placed for feeds secondary to persistent dysphagia  - Continue aspirin and statin, speech pyhsical therapy      SECONDARY DISCHARGE DIAGNOSES  Diagnosis: Dysphagia  Assessment and Plan of Treatment: - Peg tube was placed for feeds secondary to persistent dysphagia  aspiration precautions   - Continue with tube feeds, agressive chest pt   keep head elevated 30-45 degree all the times    Diagnosis: Sepsis  Assessment and Plan of Treatment: - Due to UTI, pneumonia and c.diff infection  resolved treated   - Completed course of antibiotics    Diagnosis: Chronic combined systolic and diastolic heart failure  Assessment and Plan of Treatment: - Continue cardiac meds , if blood pressure tolerates start low dose losartan per cardiology team   - Outpatient follow up on [facility] with heart failure team  poor prognosis       Diagnosis: SVT (supraventricular tachycardia)  Assessment and Plan of Treatment: -Continue metoprolol  supplemnet lytes as needed keep mg over 2 , K over 4   reevaluate by his cardiologist for AICD due to risk of Vt arythmia    Diagnosis: Urinary retention  Assessment and Plan of Treatment: -Maintain aec  -Eventual trial of void   -Outpatient urology follow up    Diagnosis: Vitamin D deficiency  Assessment and Plan of Treatment: on weekly spplemet    Diagnosis: CAD (coronary artery disease)  Assessment and Plan of Treatment:     Diagnosis: Protein-calorie malnutrition, moderate  Assessment and Plan of Treatment:

## 2022-08-26 NOTE — DISCHARGE NOTE PROVIDER - NSDCQMMRS_NEU_ALL_CORE
2 - Slight disability. Able to lookafter own affairs without assistance, but unable to carry out all previous activities 5 - Severe disability.  Requires constant nursing care and attention, bedridden, incontinent.

## 2022-08-26 NOTE — PROGRESS NOTE ADULT - SUBJECTIVE AND OBJECTIVE BOX
Patient is a 77y old  Male who presents with a chief complaint of stroke (26 Aug 2022 10:25)    Patient seen and examined at bedside.     ALLERGIES:  No Known Allergies    MEDICATIONS  (STANDING):  albuterol/ipratropium for Nebulization 3 milliLiter(s) Nebulizer every 6 hours  amantadine 100 milliGRAM(s) Oral <User Schedule>  aspirin  chewable 81 milliGRAM(s) Oral daily  atorvastatin 40 milliGRAM(s) Oral at bedtime  BACItracin   Ointment 1 Application(s) Topical two times a day  ceFAZolin   IVPB 2000 milliGRAM(s) IV Intermittent once  chlorhexidine 2% Cloths 1 Application(s) Topical daily  dextrose 5%. 1000 milliLiter(s) (50 mL/Hr) IV Continuous <Continuous>  dextrose 5%. 1000 milliLiter(s) (100 mL/Hr) IV Continuous <Continuous>  dextrose 50% Injectable 25 Gram(s) IV Push once  enoxaparin Injectable 40 milliGRAM(s) SubCutaneous every 24 hours  glucagon  Injectable 1 milliGRAM(s) IntraMuscular once  insulin lispro (ADMELOG) corrective regimen sliding scale   SubCutaneous every 6 hours  losartan 12.5 milliGRAM(s) Oral daily  melatonin 3 milliGRAM(s) Oral at bedtime  metoprolol succinate  milliGRAM(s) Oral daily  scopolamine 1 mG/72 Hr(s) Patch 1 Patch Transdermal every 72 hours  spironolactone 25 milliGRAM(s) Oral daily  vancomycin    Solution 125 milliGRAM(s) Enteral Tube every 6 hours    MEDICATIONS  (PRN):  acetaminophen     Tablet .. 650 milliGRAM(s) Oral every 6 hours PRN Temp greater or equal to 38C (100.4F), Mild Pain (1 - 3)  dextrose Oral Gel 15 Gram(s) Oral once PRN Blood Glucose LESS THAN 70 milliGRAM(s)/deciliter  ondansetron Injectable 4 milliGRAM(s) IV Push every 6 hours PRN Nausea and/or Vomiting    Vital Signs Last 24 Hrs  T(F): 97.5 (26 Aug 2022 11:01), Max: 97.5 (26 Aug 2022 11:01)  HR: 98 (26 Aug 2022 11:01) (96 - 108)  BP: 94/71 (26 Aug 2022 11:01) (94/71 - 105/58)  RR: 19 (26 Aug 2022 11:01) (18 - 20)  SpO2: 95% (26 Aug 2022 11:01) (93% - 100%)  I&O's Summary    25 Aug 2022 07:01  -  26 Aug 2022 07:00  --------------------------------------------------------  IN: 0 mL / OUT: 450 mL / NET: -450 mL      PHYSICAL EXAM:  General: NAD    ENT: MMM, no thrush  Neck: Supple, No JVD  Lungs: Clear to auscultation bilaterally, good air entry, non-labored breathing  Cardio: +s1/s2  Abdomen: Soft, Nontender, Nondistended; Bowel sounds present  Extremities: No calf tenderness       LABS:    08-24    138  |  99  |  9.7  ----------------------------<  117  4.3   |  28.0  |  0.37    Ca    8.5      24 Aug 2022 05:05  Phos  3.3     08-24  Mg     1.8     08-24    TPro  6.6  /  Alb  2.3  /  TBili  0.7  /  DBili  0.2  /  AST  39  /  ALT  41  /  AlkPhos  242  08-24    Lactate, Blood: 1.4 mmol/L (08-24 @ 05:05)    07-25 Chol 167 mg/dL LDL -- HDL 44 mg/dL Trig 132 mg/dL    Glucose  POCT Blood Glucose.: 174 mg/dL (26 Aug 2022 11:10)  POCT Blood Glucose.: 187 mg/dL (26 Aug 2022 05:40)  POCT Blood Glucose.: 111 mg/dL (25 Aug 2022 23:02)  POCT Blood Glucose.: 113 mg/dL (25 Aug 2022 17:52)    COVID  COVID-19 PCR: NotDetec (08-25-22 @ 22:45)  COVID-19 PCR: NotDetec (08-21-22 @ 07:00)  COVID-19 PCR: NotDetec (08-16-22 @ 07:45)  COVID-19 PCR: NotDetec (08-04-22 @ 03:55)  COVID-19 PCR: NotDetec (07-28-22 @ 16:55)    RADIOLOGY & ADDITIONAL TESTS:  - no new tests

## 2022-08-26 NOTE — DISCHARGE NOTE PROVIDER - NSDCMRMEDTOKEN_GEN_ALL_CORE_FT
atorvastatin 20 mg oral tablet: 1 tab(s) orally once a day  carvedilol 6.25 mg oral tablet: 1 tab(s) orally 2 times a day  lisinopril 10 mg oral tablet: 1 tab(s) orally once a day   acetaminophen 325 mg oral tablet: 2 tab(s) orally every 6 hours, As needed, Temp greater or equal to 38C (100.4F), Mild Pain (1 - 3)  amantadine 100 mg oral capsule: 1 cap(s) by PEG tube 2 times a day at 8 am and 2 pm   aspirin 81 mg oral tablet, chewable: 1 tab(s) by gastrostomy tube once a day  atorvastatin 40 mg oral tablet: 1 tab(s) by gastrostomy tube once a day (at bedtime)  enoxaparin: 40 milligram(s) subcutaneous once a day  ergocalciferol: 66239 unit(s) by PEG tube every 7 days  melatonin 3 mg oral tablet: 1 tab(s) orally once a day (at bedtime)  Metoprolol Succinate  mg oral tablet, extended release: 1 tab(s) by gastrostomy tube once a day  scopolamine 1 mg/72 hr transdermal film, extended release: 1 film(s) transdermal every 72 hours  spironolactone 25 mg oral tablet: 0.5 tab(s) by gastrostomy tube once a day   acetaminophen 325 mg oral tablet: 2 tab(s) orally every 6 hours, As needed, Temp greater or equal to 38C (100.4F), Mild Pain (1 - 3)  amantadine 100 mg oral capsule: 1 cap(s) by PEG tube 2 times a day at 8 am and 2 pm   aspirin 81 mg oral tablet, chewable: 1 tab(s) by gastrostomy tube once a day  atorvastatin 40 mg oral tablet: 1 tab(s) by gastrostomy tube once a day (at bedtime)  enoxaparin: 40 milligram(s) subcutaneous once a day  ergocalciferol: 96518 unit(s) by PEG tube every 7 days  magnesium oxide 400 mg oral tablet: 1 tab(s) by PEG tube every 12 hours for 3 days   monitor electrolytes   melatonin 3 mg oral tablet: 1 tab(s) orally once a day (at bedtime)  Metoprolol Succinate ER 50 mg oral tablet, extended release: 1 tab(s) by gastrostomy tube 2 times a day hold for SBP &lt; 95   midodrine 5 mg oral tablet: 1 tab(s) by PEG tube 3 times a day hold for SBP over 130   scopolamine 1 mg/72 hr transdermal film, extended release: 1 film(s) transdermal every 72 hours  spironolactone 25 mg oral tablet: 0.5 tab(s) by gastrostomy tube once a day start on saturday

## 2022-08-26 NOTE — DISCHARGE NOTE PROVIDER - HOSPITAL COURSE
77y/oM PMH CABG, PVD, HTN, HLD, low EF (declined AICD), initially presenting to [facility] [place] [facility] with R-sided weakness and R-sided facial droop. Code stroke initiated, initial NIH 8. CTA with LM1 occlusion, transferred to [facility] for poss neuro IR intervention. On arrival, NIH 6, pt admitted 7/24 to neuro ICU with LM1 occlusion, s/p TICI 2B recanalization. Pt initially extubated post procedure, required reintubation for respiratory distress. Eventually extubated on 8/10. MRI with large L MCA stroke with small area of reperfusion hemorrhage.  Bedside echo with EF 15%, diuresed. Formal TTE 7/25 with EF <10%. Cardio rec ICD but pt previously refused as outpt. Course further complicated by septic shock, UTI, c. diff, urinary retention requiring ace, respiratory failure. Pt underwent peg tube placement on 8/23 with GI 2/2 to residual dysphagia from CVA.       77y/oM PMH CABG, PVD, HTN, HLD, low EF (declined AICD), initially presenting to [facility] [place] with R-sided weakness and R-sided facial droop. Code stroke initiated, initial NIH 8. CTA with LM1 occlusion, transferred to [facility] for poss neuro IR intervention. On arrival, NIH 6, pt admitted 7/24 to neuro ICU with LM1 occlusion, s/p TICI 2B recanalization. Pt initially extubated post procedure, required reintubation for respiratory distress. Eventually extubated on 8/10. MRI with large L MCA stroke with small area of reperfusion hemorrhage.  Bedside echo with EF 15%, diuresed. Formal TTE 7/25 with EF <10%. Cardio rec ICD but pt previously refused as outpt. Course further complicated by septic shock, UTI, c. diff, respiratory failure possible aspiration required Iv abx , completed course of vanco po for C diff infection , diarrhea improving, pt also was followd by heart failure team , having frequent PVC/ SVTs metoprolol dose adjsuted , added low dose losartan on diuretics , BP sis soft episodes of hypotension losartan stopped   iv fludi bolus given 250 cc at times improving  , labs CBC , lytes monitored closely replaced k , mg as needed , stable . pt is getting chest PT and oropharyngeal suction as needed to help with secretion mobilization    Pt underwent peg tube placement on 8/23 with GI 2/2 to residual dysphagia from CVA. Started tube feeding at goal tolerating   PT was evaluated by PM and R not candidate for acute rehab , seen by PT rec [facility] , family si informed, pr son request referal to acute rehab send [facility] declined the case     overall prognosis is poor   DC to [facility] today      77y/oM PMH CABG, PVD, HTN, HLD, low EF (declined AICD), [place] [facility] [place]sided weakness and R-sided facial droop. Code stroke initiated, initial NIH 8. CTA with LM1 occlusion, transferred to [facility] for poss neuro IR intervention. On arrival, NIH 6, pt admitted 7/24 to neuro ICU with LM1 occlusion, s/p TICI 2B recanalization. Pt initially extubated post procedure, required reintubation for respiratory distress. Eventually extubated on 8/10. MRI with large L MCA stroke with small area of reperfusion hemorrhage.  Bedside echo with EF 15%, diuresed. Formal TTE 7/25 with EF <10%. Cardio rec ICD but pt previously refused as outpt. Course further complicated by septic shock, UTI, c. diff, respiratory failure possible aspiration required Iv abx , completed course of vanco po for C diff infection , diarrhea improving, pt also was followd by heart failure team , having frequent PVC/ SVTs metoprolol dose adjsuted , added low dose losartan on diuretics , BP sis soft episodes of hypotension losartan stopped , added midodrine on 8/31   iv fludi bolus given 250 cc at times improving  , labs CBC , lytes monitored closely replaced k , mg as needed , stable . pt is getting chest PT and oropharyngeal suction as needed to help with secretion mobilization    Pt underwent peg tube placement on 8/23 with GI 2/2 to residual dysphagia from CVA. Started tube feeding at goal tolerating   PT was evaluated by PM and R not candidate for acute rehab , seen by PT rec [facility] , family si informed, pr son request referal to acute rehab send [facility] declined the case     overall prognosis is poor   risk of aspiration and multiple comorbidites   DNr/ DNI     DC to [facility] today

## 2022-08-26 NOTE — DISCHARGE NOTE PROVIDER - CARE PROVIDER_API CALL
Rashmi Taylor)  Cardiovascular Disease; Internal Medicine  [address] [place] [id number]  Phone: [phone]  Fax: [phone]  Follow Up Time: 2 weeks

## 2022-08-26 NOTE — PROGRESS NOTE ADULT - ASSESSMENT
77y/oM PMH CABG, PVD, HTN, HLD, low EF (declined AICD), initially presenting to [facility] [place] with R-sided weakness and R-sided facial droop. Code stroke initiated, initial NIH 8. CTA with LM1 occlusion, transferred to [facility] for poss neuro IR intervention. On arrival, NIH 6, pt admitted 7/24 to neuro ICU with LM1 occlusion, s/p TICI 2B recanalization. pt initially extubated post procedure, required reintubation for respiratory distress. MRI with large L MCA stroke with small area of reperfusion hemorrhage.  Bedside echo with EF 15%, diuresed. Formal TTE 7/25 with EF <10%. Cardio rec ICD but pt previously refused as outpt. Course further complicated by septic shock, UTI, c. diff, urinary obstruction, respiratory failure, now s/p extubation 8/10, transferred to medicine 8/11.     #Acute CVA with LM1 occlusion s/p TICI 2B MT  - w/ Small hemorrhagic conversion  - with expressive aphasia and right sided weakness , dysphagia   - aspirin , statin   - neurology consult appreciated  - w/ dysphagia- peg feeds  - aspiration precautions     #Dysphagia   - PEG feeds    #s/p UTI . aspiration multilobar PNA , c diff infection   - sepsis , resolved   - po vanco   - rectal tube in place monitor output     #Combined systolic and diastolic heart failure   - TTE 7/24 LVEF <10% with RV dysfunction   - metoprolol (changed to XL), losartan spironolactone  - HF consult appreciated    #recurrent SVT / NSVT on monitor   - cardio consult appreciated  - metoprolol    #Urinary retention   - failed TOV x2   - maintain ace     #moderate protein calorie malnutrition  - nutritionist consult appreciated    #DVT Prophylaxis  - lovenox SC    called and spoke to patient aneta Umaña [phone]    son upset has not spoken to doctors prior to today per him-> advised did try to contact him yesterday to which he stated he was "working and was unable to answer". would like to speak to PT and wants father to be discharged to Acute Rehab as friends recommended that advised that he did not qualify given current function per PT notes and they are recommending [facility].    PT aware son would like to talk to them will follow up.    patient continues to be stable for discharge to rehab.

## 2022-08-26 NOTE — DISCHARGE NOTE PROVIDER - DETAILS OF MALNUTRITION DIAGNOSIS/DIAGNOSES
This patient has been assessed with a concern for Malnutrition and was treated during this hospitalization for the following Nutrition diagnosis/diagnoses:     -  08/05/2022: Moderate protein-calorie malnutrition

## 2022-08-26 NOTE — DISCHARGE NOTE PROVIDER - NSDCPNSUBOBJ_GEN_ALL_CORE
pt is seen in am , awake eyes open , answer questions yes no   intermittent cough upper airway   no overnight events reported   chart reviewed   EMIR is on board there  is [facility] bed avaliable today   son called by EMIR re DC   called his wife unable to reach     Vital Signs Last 24 Hrs  T(C): 36.7 (31 Aug 2022 10:50), Max: 36.7 (31 Aug 2022 10:50)  T(F): 98.1 (31 Aug 2022 10:50), Max: 98.1 (31 Aug 2022 10:50)  HR: 99 (31 Aug 2022 10:50) (93 - 116)  BP: 99/55 (31 Aug 2022 10:50) (70/50 - 113/76)  BP(mean): --  RR: 18 (31 Aug 2022 10:50) (18 - 18)  SpO2: 96% (31 Aug 2022 10:50) (92% - 98%)    Parameters below as of 31 Aug 2022 10:50  Patient On (Oxygen Delivery Method): nasal cannula      General : weak fragile , no resp distress on oxygen via NC   Llungs : rare exp rhonchi scattered   Heart : regular s1 /s2   Abd soft no distention   Ext :  no pretibial edema   Neuro : expressive aphasia , right sided hemiplegia , speech aphasia expressive   Skin : warm     A/P   1- Acute CVA on admission with right sided weakness . exp aphasia / dysphagia   2-sepsis due to UTI , C diff aspiration with resp failure treated   3-Dysphagia PEG in place tolerating   4- Severe CM / acute systolic diastolic CHf on cronic  resolved stable fludi status   cpnt diuretuics and metoprolol  losartan on hodl due to soft BP   5- Malnutrition moderate   6- urinary retention ace in place   failed void trial twice   electrolyte replaced   stable   overall prognosis is poor multiple comorbidities   DNr/ DNI   discharge today

## 2022-08-27 LAB
ANION GAP SERPL CALC-SCNC: 11 MMOL/L — SIGNIFICANT CHANGE UP (ref 5–17)
BUN SERPL-MCNC: 15.8 MG/DL — SIGNIFICANT CHANGE UP (ref 8–20)
CALCIUM SERPL-MCNC: 8.9 MG/DL — SIGNIFICANT CHANGE UP (ref 8.4–10.5)
CHLORIDE SERPL-SCNC: 91 MMOL/L — LOW (ref 98–107)
CO2 SERPL-SCNC: 28 MMOL/L — SIGNIFICANT CHANGE UP (ref 22–29)
CREAT SERPL-MCNC: 0.45 MG/DL — LOW (ref 0.5–1.3)
EGFR: 108 ML/MIN/1.73M2 — SIGNIFICANT CHANGE UP
GLUCOSE BLDC GLUCOMTR-MCNC: 139 MG/DL — HIGH (ref 70–99)
GLUCOSE BLDC GLUCOMTR-MCNC: 148 MG/DL — HIGH (ref 70–99)
GLUCOSE BLDC GLUCOMTR-MCNC: 163 MG/DL — HIGH (ref 70–99)
GLUCOSE SERPL-MCNC: 168 MG/DL — HIGH (ref 70–99)
HCT VFR BLD CALC: 39.8 % — SIGNIFICANT CHANGE UP (ref 39–50)
HGB BLD-MCNC: 13 G/DL — SIGNIFICANT CHANGE UP (ref 13–17)
LACTATE SERPL-SCNC: 1.7 MMOL/L — SIGNIFICANT CHANGE UP (ref 0.5–2)
MAGNESIUM SERPL-MCNC: 1.5 MG/DL — LOW (ref 1.6–2.6)
MCHC RBC-ENTMCNC: 30 PG — SIGNIFICANT CHANGE UP (ref 27–34)
MCHC RBC-ENTMCNC: 32.7 GM/DL — SIGNIFICANT CHANGE UP (ref 32–36)
MCV RBC AUTO: 91.7 FL — SIGNIFICANT CHANGE UP (ref 80–100)
PLATELET # BLD AUTO: 344 K/UL — SIGNIFICANT CHANGE UP (ref 150–400)
POTASSIUM SERPL-MCNC: 4.9 MMOL/L — SIGNIFICANT CHANGE UP (ref 3.5–5.3)
POTASSIUM SERPL-SCNC: 4.9 MMOL/L — SIGNIFICANT CHANGE UP (ref 3.5–5.3)
RBC # BLD: 4.34 M/UL — SIGNIFICANT CHANGE UP (ref 4.2–5.8)
RBC # FLD: 14.6 % — HIGH (ref 10.3–14.5)
SODIUM SERPL-SCNC: 130 MMOL/L — LOW (ref 135–145)
WBC # BLD: 9.27 K/UL — SIGNIFICANT CHANGE UP (ref 3.8–10.5)
WBC # FLD AUTO: 9.27 K/UL — SIGNIFICANT CHANGE UP (ref 3.8–10.5)

## 2022-08-27 RX ORDER — MIDODRINE HYDROCHLORIDE 2.5 MG/1
5 TABLET ORAL ONCE
Refills: 0 | Status: COMPLETED | OUTPATIENT
Start: 2022-08-27 | End: 2022-08-27

## 2022-08-27 RX ORDER — DIGOXIN 250 MCG
250 TABLET ORAL ONCE
Refills: 0 | Status: COMPLETED | OUTPATIENT
Start: 2022-08-27 | End: 2022-08-27

## 2022-08-27 RX ORDER — MIDODRINE HYDROCHLORIDE 2.5 MG/1
10 TABLET ORAL ONCE
Refills: 0 | Status: COMPLETED | OUTPATIENT
Start: 2022-08-27 | End: 2022-08-27

## 2022-08-27 RX ADMIN — Medication 1 APPLICATION(S): at 05:33

## 2022-08-27 RX ADMIN — Medication 100 MILLIGRAM(S): at 09:47

## 2022-08-27 RX ADMIN — Medication 125 MILLIGRAM(S): at 17:23

## 2022-08-27 RX ADMIN — Medication 100 MILLIGRAM(S): at 13:27

## 2022-08-27 RX ADMIN — Medication 3 MILLILITER(S): at 21:29

## 2022-08-27 RX ADMIN — Medication 3 MILLILITER(S): at 04:39

## 2022-08-27 RX ADMIN — SCOPALAMINE 1 PATCH: 1 PATCH, EXTENDED RELEASE TRANSDERMAL at 17:24

## 2022-08-27 RX ADMIN — Medication 1 APPLICATION(S): at 17:23

## 2022-08-27 RX ADMIN — Medication 3 MILLILITER(S): at 15:11

## 2022-08-27 RX ADMIN — Medication 2: at 05:45

## 2022-08-27 RX ADMIN — Medication 125 MILLIGRAM(S): at 05:34

## 2022-08-27 RX ADMIN — Medication 81 MILLIGRAM(S): at 13:27

## 2022-08-27 RX ADMIN — ATORVASTATIN CALCIUM 40 MILLIGRAM(S): 80 TABLET, FILM COATED ORAL at 21:13

## 2022-08-27 RX ADMIN — Medication 125 MILLIGRAM(S): at 13:27

## 2022-08-27 RX ADMIN — SPIRONOLACTONE 25 MILLIGRAM(S): 25 TABLET, FILM COATED ORAL at 05:35

## 2022-08-27 RX ADMIN — SCOPALAMINE 1 PATCH: 1 PATCH, EXTENDED RELEASE TRANSDERMAL at 17:21

## 2022-08-27 RX ADMIN — CHLORHEXIDINE GLUCONATE 1 APPLICATION(S): 213 SOLUTION TOPICAL at 05:34

## 2022-08-27 RX ADMIN — MIDODRINE HYDROCHLORIDE 10 MILLIGRAM(S): 2.5 TABLET ORAL at 21:13

## 2022-08-27 RX ADMIN — Medication 3 MILLILITER(S): at 08:38

## 2022-08-27 RX ADMIN — ENOXAPARIN SODIUM 40 MILLIGRAM(S): 100 INJECTION SUBCUTANEOUS at 17:22

## 2022-08-27 NOTE — CHART NOTE - NSCHARTNOTEFT_GEN_A_CORE
Called by RN for pt's BP of 76/64 P 110s.  Pt w/ hx of Atrial fib, HF EF <10% admitted for CVA.  Pt is aphasic, able to answer yes or no questions.  Pt's wife by bedside.  Pt denies any symptoms currently.  Reviewed labs, CBC w/o leukocytosis; lactate 1.7 from today; BMP not done for past 3 days.  Pt hasn't gotten ordered metoprolol due to hypotension.  Stat BMP, Mg ordered to monitor electrolytes.  Stat Midodrine 10mg po via PEG   After reviewing labs, will order digoxin for HR  RN to continue to monitor pt closely and escalate for change in pt's status. Called by RN for pt's BP of 76/64 P 110s.  Pt w/ hx of Atrial fib, HF EF <10% admitted for CVA.  Pt is aphasic, able to answer yes or no questions.  Pt's wife by bedside.  Pt denies any symptoms currently.  Reviewed labs, CBC w/o leukocytosis; lactate 1.7 from today; BMP not done for past 3 days.  Pt hasn't gotten ordered metoprolol due to hypotension.  Stat BMP, Mg ordered to monitor electrolytes.  Stat Midodrine 10mg po via PEG   After reviewing labs, will order digoxin for HR  RN to continue to monitor pt closely and escalate for change in pt's status.    23:30  Repeat Vitals BP 82/58 P 116, irregular  Additional Midodrine 5mg pox1  Digoxin 250 mcg IVPx1   Labs - pending  Will f/u. Called by RN for pt's BP of 76/64 P 110s.  Pt w/ hx of HF EF <10% admitted for CVA.  Pt is aphasic, able to answer yes or no questions.  Pt's wife by bedside.  Pt denies any symptoms currently.  Reviewed labs, CBC w/o leukocytosis; lactate 1.7 from today; BMP not done for past 3 days.  Pt hasn't gotten ordered metoprolol due to hypotension.  Stat BMP, Mg ordered to monitor electrolytes.  Stat Midodrine 10mg po via PEG   After reviewing labs, will order digoxin for HR  RN to continue to monitor pt closely and escalate for change in pt's status.    23:30  Repeat Vitals BP 82/58 P 116  EKG stat  Additional Midodrine 5mg pox1  Digoxin 250 mcg IVPx1   Labs - pending  Will f/u. Called by RN for pt's BP of 76/64 P 110s.  Pt w/ hx of HF EF <10% admitted for CVA.  Pt is aphasic, able to answer yes or no questions.  Pt's wife by bedside.  Pt denies any symptoms currently.  Reviewed labs, CBC w/o leukocytosis; lactate 1.7 from today; BMP not done for past 3 days.  Pt hasn't gotten ordered metoprolol due to hypotension.  Stat BMP, Mg ordered to monitor electrolytes.  Stat Midodrine 10mg po via PEG   After reviewing labs, will order digoxin for HR  RN to continue to monitor pt closely and escalate for change in pt's status.    23:30  Repeat Vitals BP 82/58 P 116  EKG stat  Additional Midodrine 5mg pox1  Labs - pending  Will f/u.    8/28/22  00:15    08-27    130<L>  |  91<L>  |  15.8  ----------------------------<  168<H>  4.9   |  28.0  |  0.45<L>    Ca    8.9      27 Aug 2022 23:21  Mg     1.5     08-27    MgSO4 2gm IVPB stat w/ repeat Mg in am  RN was made aware Called by RN for pt's BP of 76/64 P 110s.  Pt w/ hx of HF EF <10% admitted for CVA.  Pt is aphasic, able to answer yes or no questions.  Pt's wife by bedside.  Pt denies any symptoms currently.  Reviewed labs, CBC w/o leukocytosis; lactate 1.7 from today; BMP not done for past 3 days.  Pt hasn't gotten ordered metoprolol due to hypotension.  Stat BMP, Mg ordered to monitor electrolytes.  Stat Midodrine 10mg po via PEG   RN to continue to monitor pt closely and escalate for change in pt's status.    23:30  Repeat Vitals BP 82/58 P 116  EKG stat  Additional Midodrine 5mg pox1  Labs - pending  Will f/u.    8/28/22  00:15    08-27    130<L>  |  91<L>  |  15.8  ----------------------------<  168<H>  4.9   |  28.0  |  0.45<L>    Ca    8.9      27 Aug 2022 23:21  Mg     1.5     08-27    MgSO4 2gm IVPB stat w/ repeat Mg in am  RN was made aware

## 2022-08-27 NOTE — PROGRESS NOTE ADULT - ASSESSMENT
77y/oM PMH CABG, PVD, HTN, HLD, low EF (declined AICD), initially presenting to [facility] with R-sided weakness and R-sided facial droop. Code stroke initiated, initial NIH 8. CTA with LM1 occlusion, transferred to [facility] for poss neuro IR intervention. On arrival, NIH 6, pt admitted 7/24 to neuro ICU with LM1 occlusion, s/p TICI 2B recanalization. pt initially extubated post procedure, required reintubation for respiratory distress. MRI with large L MCA stroke with small area of reperfusion hemorrhage.  Bedside echo with EF 15%, diuresed. Formal TTE 7/25 with EF <10%. Cardio rec ICD but pt previously refused as outpt. Course further complicated by septic shock, UTI, c. diff, urinary obstruction, respiratory failure, now s/p extubation 8/10, transferred to medicine 8/11.     1-Acute CVA with LM1 occlusion s/p TICI 2B MT  - w/ Small hemorrhagic conversion  - with expressive aphasia and right sided weakness , dysphagia   - aspirin , statin   -need PT   son requesting DC to acute rehab   will speak to PT team to call son explain the pricess   I tried to speak to son about the rec and process and he sattes that he does not have sim and does not trust our instution  will recall PMR on Monday to reconsult as well   - aspiration precautions     2-Dysphagia   - PEG feeds  tolerating     3-s/p UTI . aspiration multilobar PNA , c diff infection   - sepsis , resolved   - po vanco   - rectal tube in place monitor output     4-Combined systolic and diastolic heart failure   - TTE 7/24 LVEF <10% with RV dysfunction   - metoprolol (changed to XL), losartan spironolactone  - HF consult appreciated  on diuretics po     5-recurrent SVT / NSVT on monitor   - cardio consult appreciated  on metoprolol    6-Urinary retention   - failed TOV x2   - maintain ace     7-moderate protein calorie malnutrition  - nutritionist consult appreciated    #DVT Prophylaxis  lovenox SC    called and spoke to patient aneta Umaña [phone]

## 2022-08-27 NOTE — PROGRESS NOTE ADULT - SUBJECTIVE AND OBJECTIVE BOX
Patient is a 77y old  Male with stroke (26 Aug 2022 10:25)    Patient seen and examined at bedside.   awake expressive aphasia same   right sided weakness   tolerating PEg feed       Vital Signs Last 24 Hrs  T(C): 36.4 (27 Aug 2022 04:51), Max: 36.6 (26 Aug 2022 18:25)  T(F): 97.6 (27 Aug 2022 04:51), Max: 97.9 (26 Aug 2022 18:25)  HR: 92 (27 Aug 2022 08:44) (83 - 99)  BP: 87/62 (27 Aug 2022 04:51) (74/59 - 94/71)  BP(mean): --  RR: 20 (27 Aug 2022 08:42) (18 - 20)  SpO2: 100% (27 Aug 2022 08:44) (95% - 100%)    Parameters below as of 27 Aug 2022 08:44  Patient On (Oxygen Delivery Method): nasal cannula,5L        PHYSICAL EXAM:  General: NAD  , exp aphasia   ENT: MMM, no thrush  Neck: Supple, No JVD  Lungs: Clear to auscultation , diminished BS ,no congestion   Cardio: regular rate +s1/s2  Abdomen: Soft, Nontender, Nondistended; Bowel sounds present  Extremities: No calf tenderness   Neuro: right side weakness                           13.0   9.27  )-----------( 344      ( 27 Aug 2022 08:57 )             39.8   CAPILLARY BLOOD GLUCOSE      POCT Blood Glucose.: 163 mg/dL (27 Aug 2022 05:42)  POCT Blood Glucose.: 149 mg/dL (26 Aug 2022 23:26)  POCT Blood Glucose.: 119 mg/dL (26 Aug 2022 16:22)  POCT Blood Glucose.: 174 mg/dL (26 Aug 2022 11:10)

## 2022-08-28 LAB
ALBUMIN SERPL ELPH-MCNC: 2.6 G/DL — LOW (ref 3.3–5.2)
ALP SERPL-CCNC: 248 U/L — HIGH (ref 40–120)
ALT FLD-CCNC: 39 U/L — SIGNIFICANT CHANGE UP
ANION GAP SERPL CALC-SCNC: 10 MMOL/L — SIGNIFICANT CHANGE UP (ref 5–17)
AST SERPL-CCNC: 39 U/L — SIGNIFICANT CHANGE UP
BILIRUB SERPL-MCNC: 0.4 MG/DL — SIGNIFICANT CHANGE UP (ref 0.4–2)
BUN SERPL-MCNC: 14.7 MG/DL — SIGNIFICANT CHANGE UP (ref 8–20)
CALCIUM SERPL-MCNC: 9 MG/DL — SIGNIFICANT CHANGE UP (ref 8.4–10.5)
CHLORIDE SERPL-SCNC: 93 MMOL/L — LOW (ref 98–107)
CO2 SERPL-SCNC: 31 MMOL/L — HIGH (ref 22–29)
CREAT SERPL-MCNC: 0.47 MG/DL — LOW (ref 0.5–1.3)
EGFR: 107 ML/MIN/1.73M2 — SIGNIFICANT CHANGE UP
GLUCOSE BLDC GLUCOMTR-MCNC: 123 MG/DL — HIGH (ref 70–99)
GLUCOSE BLDC GLUCOMTR-MCNC: 138 MG/DL — HIGH (ref 70–99)
GLUCOSE BLDC GLUCOMTR-MCNC: 141 MG/DL — HIGH (ref 70–99)
GLUCOSE BLDC GLUCOMTR-MCNC: 161 MG/DL — HIGH (ref 70–99)
GLUCOSE BLDC GLUCOMTR-MCNC: 177 MG/DL — HIGH (ref 70–99)
GLUCOSE SERPL-MCNC: 130 MG/DL — HIGH (ref 70–99)
MAGNESIUM SERPL-MCNC: 2.2 MG/DL — SIGNIFICANT CHANGE UP (ref 1.6–2.6)
POTASSIUM SERPL-MCNC: 4.8 MMOL/L — SIGNIFICANT CHANGE UP (ref 3.5–5.3)
POTASSIUM SERPL-SCNC: 4.8 MMOL/L — SIGNIFICANT CHANGE UP (ref 3.5–5.3)
PROT SERPL-MCNC: 6.9 G/DL — SIGNIFICANT CHANGE UP (ref 6.6–8.7)
SODIUM SERPL-SCNC: 133 MMOL/L — LOW (ref 135–145)

## 2022-08-28 PROCEDURE — 93010 ELECTROCARDIOGRAM REPORT: CPT

## 2022-08-28 RX ORDER — SPIRONOLACTONE 25 MG/1
12.5 TABLET, FILM COATED ORAL DAILY
Refills: 0 | Status: DISCONTINUED | OUTPATIENT
Start: 2022-08-30 | End: 2022-08-30

## 2022-08-28 RX ORDER — MAGNESIUM SULFATE 500 MG/ML
2 VIAL (ML) INJECTION ONCE
Refills: 0 | Status: COMPLETED | OUTPATIENT
Start: 2022-08-28 | End: 2022-08-28

## 2022-08-28 RX ADMIN — Medication 100 MILLIGRAM(S): at 10:56

## 2022-08-28 RX ADMIN — Medication 125 MILLIGRAM(S): at 10:56

## 2022-08-28 RX ADMIN — Medication 125 MILLIGRAM(S): at 23:45

## 2022-08-28 RX ADMIN — Medication 1 APPLICATION(S): at 05:40

## 2022-08-28 RX ADMIN — Medication 100 MILLIGRAM(S): at 13:31

## 2022-08-28 RX ADMIN — Medication 125 MILLIGRAM(S): at 05:41

## 2022-08-28 RX ADMIN — ATORVASTATIN CALCIUM 40 MILLIGRAM(S): 80 TABLET, FILM COATED ORAL at 23:45

## 2022-08-28 RX ADMIN — CHLORHEXIDINE GLUCONATE 1 APPLICATION(S): 213 SOLUTION TOPICAL at 05:40

## 2022-08-28 RX ADMIN — Medication 3 MILLIGRAM(S): at 23:45

## 2022-08-28 RX ADMIN — Medication 125 MILLIGRAM(S): at 16:29

## 2022-08-28 RX ADMIN — Medication 3 MILLILITER(S): at 09:36

## 2022-08-28 RX ADMIN — ENOXAPARIN SODIUM 40 MILLIGRAM(S): 100 INJECTION SUBCUTANEOUS at 16:30

## 2022-08-28 RX ADMIN — Medication 3 MILLIGRAM(S): at 00:03

## 2022-08-28 RX ADMIN — Medication 2: at 10:58

## 2022-08-28 RX ADMIN — Medication 3 MILLILITER(S): at 05:09

## 2022-08-28 RX ADMIN — Medication 3 MILLILITER(S): at 21:23

## 2022-08-28 RX ADMIN — MIDODRINE HYDROCHLORIDE 5 MILLIGRAM(S): 2.5 TABLET ORAL at 00:03

## 2022-08-28 RX ADMIN — Medication 25 GRAM(S): at 01:27

## 2022-08-28 RX ADMIN — Medication 3 MILLILITER(S): at 16:08

## 2022-08-28 RX ADMIN — SCOPALAMINE 1 PATCH: 1 PATCH, EXTENDED RELEASE TRANSDERMAL at 19:27

## 2022-08-28 RX ADMIN — Medication 81 MILLIGRAM(S): at 10:57

## 2022-08-28 RX ADMIN — Medication 2: at 00:32

## 2022-08-28 RX ADMIN — SCOPALAMINE 1 PATCH: 1 PATCH, EXTENDED RELEASE TRANSDERMAL at 08:46

## 2022-08-28 RX ADMIN — Medication 125 MILLIGRAM(S): at 00:03

## 2022-08-28 RX ADMIN — Medication 250 MICROGRAM(S): at 00:04

## 2022-08-28 NOTE — PROGRESS NOTE ADULT - SUBJECTIVE AND OBJECTIVE BOX
Patient is a 77y old  Male with stroke , expressive aphasia, right sided weakness     Pt is seen in am , awake   exp aphasia   had hypotension last night , PA input noted     labs reviewed       MEDICATIONS  (STANDING):  albuterol/ipratropium for Nebulization 3 milliLiter(s) Nebulizer every 6 hours  amantadine 100 milliGRAM(s) Oral <User Schedule>  aspirin  chewable 81 milliGRAM(s) Oral daily  atorvastatin 40 milliGRAM(s) Oral at bedtime  BACItracin   Ointment 1 Application(s) Topical two times a day  chlorhexidine 2% Cloths 1 Application(s) Topical daily  dextrose 5%. 1000 milliLiter(s) (50 mL/Hr) IV Continuous <Continuous>  dextrose 5%. 1000 milliLiter(s) (100 mL/Hr) IV Continuous <Continuous>  dextrose 50% Injectable 25 Gram(s) IV Push once  enoxaparin Injectable 40 milliGRAM(s) SubCutaneous every 24 hours  glucagon  Injectable 1 milliGRAM(s) IntraMuscular once  insulin lispro (ADMELOG) corrective regimen sliding scale   SubCutaneous every 6 hours  melatonin 3 milliGRAM(s) Oral at bedtime  metoprolol succinate  milliGRAM(s) Oral daily  scopolamine 1 mG/72 Hr(s) Patch 1 Patch Transdermal every 72 hours  spironolactone 25 milliGRAM(s) Oral daily  vancomycin    Solution 125 milliGRAM(s) Enteral Tube every 6 hours    Vital Signs Last 24 Hrs  T(C): 36.4 (27 Aug 2022 04:51), Max: 36.6 (26 Aug 2022 18:25)  T(F): 97.6 (27 Aug 2022 04:51), Max: 97.9 (26 Aug 2022 18:25)  HR: 92 (27 Aug 2022 08:44) (83 - 99)  BP: 87/62 (27 Aug 2022 04:51) (74/59 - 94/71)  BP(mean): --  RR: 20 (27 Aug 2022 08:42) (18 - 20)  SpO2: 100% (27 Aug 2022 08:44) (95% - 100%)    Parameters below as of 27 Aug 2022 08:44  Patient On (Oxygen Delivery Method): nasal cannula,5L        PHYSICAL EXAM:  General: NAD  , exp aphasia   ENT: MMM, no thrush  Neck: Supple, No JVD  Lungs: Clear to auscultation , diminished BS ,no congestion   Cardio: regular rate +s1/s2  Abdomen: Soft, Nontender, Nondistended; Bowel sounds present  Extremities: No calf tenderness   Neuro: right side weakness , dysarthria ; expressive , abler to follow simple commands                                              13.0   9.27  )-----------( 344      ( 27 Aug 2022 08:57 )             39.8   08-28    133<L>  |  93<L>  |  14.7  ----------------------------<  130<H>  4.8   |  31.0<H>  |  0.47<L>    Ca    9.0      28 Aug 2022 04:39  Mg     2.2     08-28    TPro  6.9  /  Alb  2.6<L>  /  TBili  0.4  /  DBili  x   /  AST  39  /  ALT  39  /  AlkPhos  248<H>  08-28

## 2022-08-28 NOTE — PROGRESS NOTE ADULT - ASSESSMENT
77y/oM PMH CABG, PVD, HTN, HLD, low EF (declined AICD), initially presenting to [facility] with R-sided weakness and R-sided facial droop. Code stroke initiated, initial NIH 8. CTA with LM1 occlusion, transferred to [facility] for poss neuro IR intervention. On arrival, NIH 6, pt admitted 7/24 to neuro ICU with LM1 occlusion, s/p TICI 2B recanalization. pt initially extubated post procedure, required reintubation for respiratory distress. MRI with large L MCA stroke with small area of reperfusion hemorrhage.  Bedside echo with EF 15%, diuresed. Formal TTE 7/25 with EF <10%. Cardio rec ICD but pt previously refused as outpt. Course further complicated by septic shock, UTI, c. diff, urinary obstruction, respiratory failure, now s/p extubation 8/10, transferred to medicine 8/11.     1-Acute CVA with LM1 occlusion s/p TICI 2B MT  - w/ Small hemorrhagic conversion  has been stable , with expressive aphasia and right sided weakness , dysphagia   on  aspirin , statin via Peg   -Pm and R reconsult ; son requesting DC to acute rehab   son  will speak to PT team to call son and explain the process   - aspiration precautions     2-Dysphagia   s/p PEG feeds  tolerating feeding   keep head elavtaed aspiration precautions     3- Hyopotension labile   on midodrine   hold losartan , cont lower dose metoprolol     4-s/p UTI . aspiration multilobar PNA  and c diff infection   - sepsis , resolved   - po vanco   - rectal tube in place for diarrhea amount 300 cc over 24 hours   will remove the R tube     4-Combined systolic and diastolic heart failure   - TTE 7/24 LVEF <10% with RV dysfunction   - metoprolol (changed to XL), losartan spironolactone  - HF consult appreciated  on diuretics aldactone   hold for low BP     5-recurrent SVT / NSVT on monitor   - cardio consult appreciated  on metoprolol Er 100 mg daily     6- Hypmagnesemia   replaced   repeat labs normal Mg and K     7-Urinary retention   - failed TOV x2   - maintain ace     8--moderate protein calorie malnutrition  - nutritionist consult appreciated  cont feeding via tube     #DVT Prophylaxis  lovenox SC    called and spoke to patient son Leeroy [phone] on 8/27

## 2022-08-29 LAB
24R-OH-CALCIDIOL SERPL-MCNC: 16 NG/ML — LOW (ref 30–80)
GLUCOSE BLDC GLUCOMTR-MCNC: 139 MG/DL — HIGH (ref 70–99)
GLUCOSE BLDC GLUCOMTR-MCNC: 154 MG/DL — HIGH (ref 70–99)
GLUCOSE BLDC GLUCOMTR-MCNC: 156 MG/DL — HIGH (ref 70–99)
GLUCOSE BLDC GLUCOMTR-MCNC: 157 MG/DL — HIGH (ref 70–99)
NT-PROBNP SERPL-SCNC: 3945 PG/ML — HIGH (ref 0–300)
TSH SERPL-MCNC: 4.25 UIU/ML — HIGH (ref 0.27–4.2)
VIT B12 SERPL-MCNC: 1026 PG/ML — SIGNIFICANT CHANGE UP (ref 232–1245)

## 2022-08-29 PROCEDURE — 99233 SBSQ HOSP IP/OBS HIGH 50: CPT

## 2022-08-29 RX ORDER — LOSARTAN POTASSIUM 100 MG/1
12.5 TABLET, FILM COATED ORAL DAILY
Refills: 0 | Status: DISCONTINUED | OUTPATIENT
Start: 2022-08-29 | End: 2022-08-30

## 2022-08-29 RX ADMIN — Medication 3 MILLILITER(S): at 10:50

## 2022-08-29 RX ADMIN — Medication 81 MILLIGRAM(S): at 12:39

## 2022-08-29 RX ADMIN — Medication 3 MILLILITER(S): at 20:36

## 2022-08-29 RX ADMIN — Medication 1 APPLICATION(S): at 05:53

## 2022-08-29 RX ADMIN — Medication 2: at 17:46

## 2022-08-29 RX ADMIN — Medication 125 MILLIGRAM(S): at 17:46

## 2022-08-29 RX ADMIN — Medication 100 MILLIGRAM(S): at 12:39

## 2022-08-29 RX ADMIN — Medication 3 MILLILITER(S): at 16:35

## 2022-08-29 RX ADMIN — SCOPALAMINE 1 PATCH: 1 PATCH, EXTENDED RELEASE TRANSDERMAL at 22:26

## 2022-08-29 RX ADMIN — Medication 3 MILLIGRAM(S): at 23:50

## 2022-08-29 RX ADMIN — Medication 125 MILLIGRAM(S): at 12:38

## 2022-08-29 RX ADMIN — Medication 3 MILLILITER(S): at 05:31

## 2022-08-29 RX ADMIN — Medication 100 MILLIGRAM(S): at 17:47

## 2022-08-29 RX ADMIN — CHLORHEXIDINE GLUCONATE 1 APPLICATION(S): 213 SOLUTION TOPICAL at 05:56

## 2022-08-29 RX ADMIN — Medication 2: at 23:51

## 2022-08-29 RX ADMIN — Medication 1 APPLICATION(S): at 17:46

## 2022-08-29 RX ADMIN — ENOXAPARIN SODIUM 40 MILLIGRAM(S): 100 INJECTION SUBCUTANEOUS at 17:46

## 2022-08-29 RX ADMIN — Medication 125 MILLIGRAM(S): at 05:55

## 2022-08-29 RX ADMIN — Medication 2: at 12:38

## 2022-08-29 RX ADMIN — ATORVASTATIN CALCIUM 40 MILLIGRAM(S): 80 TABLET, FILM COATED ORAL at 23:50

## 2022-08-29 NOTE — PROGRESS NOTE ADULT - SUBJECTIVE AND OBJECTIVE BOX
Patient awake, hypophonic.  Able to follow commands.   Reports no pain.     REVIEW OF SYSTEMS  Constitutional - No fever,  +fatigue  Neurological - +loss of strength, +numbness   Musculoskeletal - No joint pain, No joint swelling, No muscle pain    FUNCTIONAL PROGRESS  8/29 PMR  Total Assist with Bed mobility     8/24 PT  Bed Mobility  Bed Mobility Training Rolling/Turning: moderate assist (50% patient effort);  maximum assist (25% patient effort);  2 person assist  Bed Mobility Training Sit-to-Supine: maximum assist (25% patient effort);  2 person assist  Bed Mobility Training Supine-to-Sit: maximum assist (25% patient effort);  2 person assist  Bed Mobility Training Limitations: decreased strength;  impaired postural control;  decreased ability to use arms for pushing/pulling;  decreased ability to use legs for bridging/pushing    VITALS  T(C): 37 (08-29-22 @ 04:00), Max: 37 (08-29-22 @ 04:00)  HR: 94 (08-29-22 @ 05:32) (80 - 115)  BP: 128/84 (08-29-22 @ 04:00) (91/60 - 128/84)  RR: 18 (08-29-22 @ 04:00) (18 - 20)  SpO2: 96% (08-29-22 @ 05:32) (93% - 96%)  Wt(kg): --    MEDICATIONS   acetaminophen     Tablet .. 650 milliGRAM(s) every 6 hours PRN  albuterol/ipratropium for Nebulization 3 milliLiter(s) every 6 hours  amantadine 100 milliGRAM(s) <User Schedule>  aspirin  chewable 81 milliGRAM(s) daily  atorvastatin 40 milliGRAM(s) at bedtime  BACItracin   Ointment 1 Application(s) two times a day  chlorhexidine 2% Cloths 1 Application(s) daily  dextrose 5%. 1000 milliLiter(s) <Continuous>  dextrose 5%. 1000 milliLiter(s) <Continuous>  dextrose 50% Injectable 25 Gram(s) once  dextrose Oral Gel 15 Gram(s) once PRN  enoxaparin Injectable 40 milliGRAM(s) every 24 hours  glucagon  Injectable 1 milliGRAM(s) once  insulin lispro (ADMELOG) corrective regimen sliding scale   every 6 hours  melatonin 3 milliGRAM(s) at bedtime  metoprolol succinate  milliGRAM(s) daily  ondansetron Injectable 4 milliGRAM(s) every 6 hours PRN  scopolamine 1 mG/72 Hr(s) Patch 1 Patch every 72 hours  vancomycin    Solution 125 milliGRAM(s) every 6 hours      RECENT LABS/IMAGING                          13.0   9.27  )-----------( 344      ( 27 Aug 2022 08:57 )             39.8     08-28    133<L>  |  93<L>  |  14.7  ----------------------------<  130<H>  4.8   |  31.0<H>  |  0.47<L>    Ca    9.0      28 Aug 2022 04:39  Mg     2.2     08-28    TPro  6.9  /  Alb  2.6<L>  /  TBili  0.4  /  DBili  x   /  AST  39  /  ALT  39  /  AlkPhos  248<H>  08-28                  MRI HEAD 7/26 - Acute left MCA territory infarcts with hemorrhagic transformation, grossly stable since comparison CT.  Additional punctate acute infarct involving the medial left frontal lobe in the SUGEY territory.                ----------------------------------------------------------------------------------------  PHYSICAL EXAM  Constitutional - NAD, Comfortable  Extremities - Atrophy of Right LE  Neurologic Exam -                    Cognitive - AAO xself     Communication - Hypophonic     Motor -                     LEFT    UE - ShAB 4/5, EF 4/5, EE 4/5,  4/5                    RIGHT UE - ShAB 0/5, EF 0/5, EE 0/5,  0/5                    LEFT    LE - HF 4/5, KE 4/5, DF 3/5                     RIGHT LE - HF 0/5, KE 0/5, DF 0/5       Sensory - Impaired on right LE   Psychiatric - Calm   ----------------------------------------------------------------------------------------  ASSESSMENT/PLAN  77yMale with functional deficits after an acute CVA  Acute Left CVA - ASA, Lipitor  HTN - Toprol XL, Midodrine, Aldactone  CDIFF - Vanco   Wakefulness - Amantadine 100mg BID (8/12), Melatonin 3mg HS (8/12)  Pain - Tylenol  DVT PPX - SCDs, Lovenox  Rehab - Although patient had a stroke, it impacted patient significantly with right hemiplegia which has not improved since the date of last evaluation and why his functional status remains at a total assist. Therefore, continue to recommend [facility], patient DsinOES NOT meet acute inpatient rehabilitation criteria. Patient needs a more prolonged stay to achieve transition to community living and would not be able to tolerate a comprehensive/intense rehab program of 3hours/day.     Will sign off at this time. Thank you for allowing me to be part of your patient's care. Please reconsult PMR for additional rehab recommendations or dispo needs if functional status changes. Discussed with rehab clinical care team.

## 2022-08-29 NOTE — PROGRESS NOTE ADULT - ASSESSMENT
77y/oM PMH CABG, PVD, HTN, HLD, low EF (declined AICD), initially presenting to [facility] with R-sided weakness and R-sided facial droop. Code stroke initiated, initial NIH 8. CTA with LM1 occlusion, transferred to [facility] for poss neuro IR intervention. On arrival, NIH 6, pt admitted 7/24 to neuro ICU with LM1 occlusion, s/p TICI 2B recanalization. pt initially extubated post procedure, required reintubation for respiratory distress. MRI with large L MCA stroke with small area of reperfusion hemorrhage.  Bedside echo with EF 15%, diuresed. Formal TTE 7/25 with EF <10%. Cardio rec ICD but pt previously refused as outpt. Course further complicated by septic shock, UTI, c. diff, urinary obstruction, respiratory failure, now s/p extubation 8/10, transferred to medicine 8/11.     1-Acute CVA with LM1 occlusion s/p TICI 2B MT  - w/ Small hemorrhagic conversion  has been stable , with expressive aphasia and right sided weakness , dysphagia   on  aspirin , statin and feeding via pEG   resp status stable   keep head elavted   PM&R follow up requested; not candidate for acute rehab   wife is aware   [facility] acute [facility]hab declined the pt   [facility] placement , pending insurance aut     2-Dysphagia   s/p PEG feeds  tolerating feeding   keep head elavated aspiration precautions     3- Hypotension,  labile   BP is better   on midodrine   resume low dose ace inh monitor   hold losartan , cont lower dose metoprolol     4-s/p UTI . aspiration multilobar PNA  and c diff infection   - sepsis , resolved   - po vanco complete course today   will remove the R tube     5-Combined systolic and diastolic heart failure   - TTE 7/24 LVEF <10% with RV dysfunction   - on metoprolol XL ,low dose  losartan spironolactone    6-recurrent SVT / NSVT   due to low EF likely   cardio consulted   cont metoprolol     7-Hypomagnesemia   replaced   repeat labs normal Mg and K     8-Urinary retention   - failed TOV x2   - maintain ace     9--moderate protein calorie malnutrition  - nutritionist consult appreciated  cont feeding via tube     #DVT Prophylaxis  lovenox SC    son [facility] [phone]

## 2022-08-29 NOTE — PROGRESS NOTE ADULT - SUBJECTIVE AND OBJECTIVE BOX
Patient is a 77y old  Male with stroke , expressive aphasia, right sided weakness     Pt is seen in am , awake alert , minimally verbal communication   right sided weakness ., no change stable condition   d/w wife at the bedside around 1:45 pm   all questions answered , updated about plan , PT and PM r recommandation are reviewed     BP is stable     Vital Signs Last 24 Hrs  T(C): 36.7 (29 Aug 2022 11:34), Max: 37 (29 Aug 2022 04:00)  T(F): 98 (29 Aug 2022 11:34), Max: 98.6 (29 Aug 2022 04:00)  HR: 114 (29 Aug 2022 11:34) (94 - 115)  BP: 130/80 (29 Aug 2022 11:34) (94/58 - 130/80)  BP(mean): --  RR: 18 (29 Aug 2022 11:34) (18 - 20)  SpO2: 97% (29 Aug 2022 11:34) (94% - 97%)    Parameters below as of 29 Aug 2022 11:34  Patient On (Oxygen Delivery Method): nasal cannula  O2 Flow (L/min): 3      PHYSICAL EXAM:  General: NAD  , expressive  aphasia   Neck: Supple, No JVD  Lungs: Clear to auscultation , diminished BS ,no congestion   Cardio: regular rate , +s1/s2  Abdomen: Soft, Nontender, Nondistended; Bowel sounds present  Extremities: No calf tenderness   Neuro: right side weakness , dysarthria ; expressive , able to follow simple commands   Skin : normal color , warm                      08-28    133<L>  |  93<L>  |  14.7  ----------------------------<  130<H>  4.8   |  31.0<H>  |  0.47<L>    Ca    9.0      28 Aug 2022 04:39  Mg     2.2     08-28    TPro  6.9  /  Alb  2.6<L>  /  TBili  0.4  /  DBili  x   /  AST  39  /  ALT  39  /  AlkPhos  248<H>  08-28

## 2022-08-30 LAB
ANION GAP SERPL CALC-SCNC: 12 MMOL/L — SIGNIFICANT CHANGE UP (ref 5–17)
BUN SERPL-MCNC: 16.7 MG/DL — SIGNIFICANT CHANGE UP (ref 8–20)
CALCIUM SERPL-MCNC: 8.9 MG/DL — SIGNIFICANT CHANGE UP (ref 8.4–10.5)
CHLORIDE SERPL-SCNC: 92 MMOL/L — LOW (ref 98–107)
CO2 SERPL-SCNC: 28 MMOL/L — SIGNIFICANT CHANGE UP (ref 22–29)
CREAT SERPL-MCNC: 0.46 MG/DL — LOW (ref 0.5–1.3)
EGFR: 108 ML/MIN/1.73M2 — SIGNIFICANT CHANGE UP
GLUCOSE BLDC GLUCOMTR-MCNC: 128 MG/DL — HIGH (ref 70–99)
GLUCOSE BLDC GLUCOMTR-MCNC: 137 MG/DL — HIGH (ref 70–99)
GLUCOSE BLDC GLUCOMTR-MCNC: 154 MG/DL — HIGH (ref 70–99)
GLUCOSE BLDC GLUCOMTR-MCNC: 156 MG/DL — HIGH (ref 70–99)
GLUCOSE SERPL-MCNC: 131 MG/DL — HIGH (ref 70–99)
HCT VFR BLD CALC: 37 % — LOW (ref 39–50)
HGB BLD-MCNC: 12.1 G/DL — LOW (ref 13–17)
MCHC RBC-ENTMCNC: 29.8 PG — SIGNIFICANT CHANGE UP (ref 27–34)
MCHC RBC-ENTMCNC: 32.7 GM/DL — SIGNIFICANT CHANGE UP (ref 32–36)
MCV RBC AUTO: 91.1 FL — SIGNIFICANT CHANGE UP (ref 80–100)
PLATELET # BLD AUTO: 306 K/UL — SIGNIFICANT CHANGE UP (ref 150–400)
POTASSIUM SERPL-MCNC: 4.5 MMOL/L — SIGNIFICANT CHANGE UP (ref 3.5–5.3)
POTASSIUM SERPL-SCNC: 4.5 MMOL/L — SIGNIFICANT CHANGE UP (ref 3.5–5.3)
RBC # BLD: 4.06 M/UL — LOW (ref 4.2–5.8)
RBC # FLD: 15 % — HIGH (ref 10.3–14.5)
SARS-COV-2 RNA SPEC QL NAA+PROBE: SIGNIFICANT CHANGE UP
SODIUM SERPL-SCNC: 132 MMOL/L — LOW (ref 135–145)
WBC # BLD: 7.82 K/UL — SIGNIFICANT CHANGE UP (ref 3.8–10.5)
WBC # FLD AUTO: 7.82 K/UL — SIGNIFICANT CHANGE UP (ref 3.8–10.5)

## 2022-08-30 PROCEDURE — 71045 X-RAY EXAM CHEST 1 VIEW: CPT | Mod: 26

## 2022-08-30 RX ORDER — SODIUM CHLORIDE 9 MG/ML
250 INJECTION INTRAMUSCULAR; INTRAVENOUS; SUBCUTANEOUS ONCE
Refills: 0 | Status: COMPLETED | OUTPATIENT
Start: 2022-08-30 | End: 2022-08-30

## 2022-08-30 RX ORDER — ATORVASTATIN CALCIUM 80 MG/1
40 TABLET, FILM COATED ORAL AT BEDTIME
Refills: 0 | Status: DISCONTINUED | OUTPATIENT
Start: 2022-08-30 | End: 2022-09-01

## 2022-08-30 RX ORDER — METOPROLOL TARTRATE 50 MG
100 TABLET ORAL DAILY
Refills: 0 | Status: DISCONTINUED | OUTPATIENT
Start: 2022-08-31 | End: 2022-08-31

## 2022-08-30 RX ORDER — SPIRONOLACTONE 25 MG/1
12.5 TABLET, FILM COATED ORAL DAILY
Refills: 0 | Status: DISCONTINUED | OUTPATIENT
Start: 2022-08-31 | End: 2022-08-31

## 2022-08-30 RX ORDER — ERGOCALCIFEROL 1.25 MG/1
50000 CAPSULE ORAL
Refills: 0 | Status: DISCONTINUED | OUTPATIENT
Start: 2022-08-30 | End: 2022-09-01

## 2022-08-30 RX ADMIN — SCOPALAMINE 1 PATCH: 1 PATCH, EXTENDED RELEASE TRANSDERMAL at 08:51

## 2022-08-30 RX ADMIN — Medication 81 MILLIGRAM(S): at 12:50

## 2022-08-30 RX ADMIN — Medication 1 APPLICATION(S): at 17:05

## 2022-08-30 RX ADMIN — Medication 100 MILLIGRAM(S): at 12:50

## 2022-08-30 RX ADMIN — Medication 3 MILLIGRAM(S): at 21:18

## 2022-08-30 RX ADMIN — LOSARTAN POTASSIUM 12.5 MILLIGRAM(S): 100 TABLET, FILM COATED ORAL at 05:51

## 2022-08-30 RX ADMIN — Medication 3 MILLILITER(S): at 15:15

## 2022-08-30 RX ADMIN — SPIRONOLACTONE 12.5 MILLIGRAM(S): 25 TABLET, FILM COATED ORAL at 05:50

## 2022-08-30 RX ADMIN — ATORVASTATIN CALCIUM 40 MILLIGRAM(S): 80 TABLET, FILM COATED ORAL at 21:18

## 2022-08-30 RX ADMIN — SODIUM CHLORIDE 1000 MILLILITER(S): 9 INJECTION INTRAMUSCULAR; INTRAVENOUS; SUBCUTANEOUS at 18:06

## 2022-08-30 RX ADMIN — SCOPALAMINE 1 PATCH: 1 PATCH, EXTENDED RELEASE TRANSDERMAL at 14:51

## 2022-08-30 RX ADMIN — Medication 1 APPLICATION(S): at 05:50

## 2022-08-30 RX ADMIN — Medication 3 MILLILITER(S): at 20:22

## 2022-08-30 RX ADMIN — Medication 2: at 23:43

## 2022-08-30 RX ADMIN — Medication 100 MILLIGRAM(S): at 08:49

## 2022-08-30 RX ADMIN — Medication 3 MILLILITER(S): at 03:44

## 2022-08-30 RX ADMIN — ENOXAPARIN SODIUM 40 MILLIGRAM(S): 100 INJECTION SUBCUTANEOUS at 17:05

## 2022-08-30 RX ADMIN — Medication 2: at 17:06

## 2022-08-30 RX ADMIN — CHLORHEXIDINE GLUCONATE 1 APPLICATION(S): 213 SOLUTION TOPICAL at 05:50

## 2022-08-30 NOTE — PROGRESS NOTE ADULT - ASSESSMENT
77y/oM PMH CABG, PVD, HTN, HLD, low EF (declined AICD), initially presenting to [facility] with R-sided weakness and R-sided facial droop. Code stroke initiated, initial NIH 8. CTA with LM1 occlusion, transferred to [facility] for poss neuro IR intervention. On arrival, NIH 6, pt admitted 7/24 to neuro ICU with LM1 occlusion, s/p TICI 2B recanalization. pt initially extubated post procedure, required reintubation for respiratory distress. MRI with large L MCA stroke with small area of reperfusion hemorrhage.  Bedside echo with EF 15%, diuresed. Formal TTE 7/25 with EF <10%. Cardio rec ICD but pt previously refused as outpt. Course further complicated by septic shock, UTI, c. diff, urinary obstruction, respiratory failure, now s/p extubation 8/10, transferred to medicine 8/11.     1-Acute CVA with LM1 occlusion s/p TICI 2B MT  - w/ Small hemorrhagic conversion  has been stable , with expressive aphasia and right sided weakness , dysphagia   on  aspirin , statin and feeding via pEG   resp status stable   keep head elavted   PM&R follow up requested; not candidate for acute rehab   wife is aware   [facility] acute [facility] declined the pt   for [facility]   pending insurance aut     2-Dysphagia   s/p PEG feeds  tolerating feeding   keep head elavated aspiration precautions     3- Hypotension,  labile   BP is better   on midodrine   resume low dose ace inh monitor   hold losartan , cont lower dose metoprolol     4-s/p UTI . aspiration multilobar PNA  and c diff infection   - sepsis , resolved   - po vanco complete course today   will remove the R tube     5-Combined systolic and diastolic heart failure   - TTE 7/24 LVEF <10% with RV dysfunction   - on metoprolol XL ,low dose  losartan spironolactone    6-recurrent SVT / NSVT   due to low EF likely   cardio consulted   cont metoprolol     7-Hypomagnesemia   replaced   repeat labs normal Mg and K     8-Urinary retention   - failed TOV x2   - maintain ace     9--moderate protein calorie malnutrition  - nutritionist consult appreciated  cont feeding via tube     #DVT Prophylaxis  lovenox SC    aneta [facility] [phone]

## 2022-08-30 NOTE — PROGRESS NOTE ADULT - SUBJECTIVE AND OBJECTIVE BOX
Patient is a 77y old  Male with stroke , expressive aphasia, right sided weakness     Pt is seen in am , awake alert , minimally verbal  feels well denies pain when asked     VVital Signs Last 24 Hrs  T(C): 36.4 (30 Aug 2022 12:01), Max: 37.1 (29 Aug 2022 16:30)  T(F): 97.6 (30 Aug 2022 12:01), Max: 98.8 (29 Aug 2022 16:30)  HR: 93 (30 Aug 2022 15:16) (93 - 102)  BP: 102/63 (30 Aug 2022 12:01) (102/63 - 111/74)  BP(mean): --  RR: 18 (30 Aug 2022 12:01) (18 - 20)  SpO2: 98% (30 Aug 2022 15:16) (92% - 98%)    Parameters below as of 30 Aug 2022 15:16  Patient On (Oxygen Delivery Method): nasal cannula,4 lpm        PHYSICAL EXAM:  General: NAD  , expressive  aphasia   Neck: Supple, No JVD  Lungs: Clear to auscultation , diminished BS  Cardio: regular rate , +s1/s2  Abdomen: Soft, Nontender, Nondistended; Bowel sounds present  Extremities: No calf tenderness   Neuro: right side weakness , dysarthria ; expressive , able to follow simple commands                             12.1   7.82  )-----------( 306      ( 30 Aug 2022 04:55 )             37.0   08-30    132<L>  |  92<L>  |  16.7  ----------------------------<  131<H>  4.5   |  28.0  |  0.46<L>    Ca    8.9      30 Aug 2022 04:55                         08-28    133<L>  |  93<L>  |  14.7  ----------------------------<  130<H>  4.8   |  31.0<H>  |  0.47<L>    Ca    9.0      28 Aug 2022 04:39  Mg     2.2     08-28    TPro  6.9  /  Alb  2.6<L>  /  TBili  0.4  /  DBili  x   /  AST  39  /  ALT  39  /  AlkPhos  248<H>  08-28

## 2022-08-31 LAB
ALBUMIN SERPL ELPH-MCNC: 2.8 G/DL — LOW (ref 3.3–5.2)
ALP SERPL-CCNC: 264 U/L — HIGH (ref 40–120)
ALT FLD-CCNC: 42 U/L — HIGH
ANION GAP SERPL CALC-SCNC: 11 MMOL/L — SIGNIFICANT CHANGE UP (ref 5–17)
AST SERPL-CCNC: 45 U/L — HIGH
BILIRUB SERPL-MCNC: 0.4 MG/DL — SIGNIFICANT CHANGE UP (ref 0.4–2)
BUN SERPL-MCNC: 16.2 MG/DL — SIGNIFICANT CHANGE UP (ref 8–20)
CALCIUM SERPL-MCNC: 8.9 MG/DL — SIGNIFICANT CHANGE UP (ref 8.4–10.5)
CHLORIDE SERPL-SCNC: 93 MMOL/L — LOW (ref 98–107)
CO2 SERPL-SCNC: 32 MMOL/L — HIGH (ref 22–29)
CREAT SERPL-MCNC: 0.41 MG/DL — LOW (ref 0.5–1.3)
EGFR: 112 ML/MIN/1.73M2 — SIGNIFICANT CHANGE UP
GLUCOSE BLDC GLUCOMTR-MCNC: 123 MG/DL — HIGH (ref 70–99)
GLUCOSE BLDC GLUCOMTR-MCNC: 124 MG/DL — HIGH (ref 70–99)
GLUCOSE BLDC GLUCOMTR-MCNC: 138 MG/DL — HIGH (ref 70–99)
GLUCOSE BLDC GLUCOMTR-MCNC: 161 MG/DL — HIGH (ref 70–99)
GLUCOSE SERPL-MCNC: 135 MG/DL — HIGH (ref 70–99)
HCT VFR BLD CALC: 39 % — SIGNIFICANT CHANGE UP (ref 39–50)
HGB BLD-MCNC: 12.7 G/DL — LOW (ref 13–17)
LACTATE BLDV-MCNC: 1.6 MMOL/L — SIGNIFICANT CHANGE UP (ref 0.5–2)
MAGNESIUM SERPL-MCNC: 1.8 MG/DL — SIGNIFICANT CHANGE UP (ref 1.6–2.6)
MCHC RBC-ENTMCNC: 30 PG — SIGNIFICANT CHANGE UP (ref 27–34)
MCHC RBC-ENTMCNC: 32.6 GM/DL — SIGNIFICANT CHANGE UP (ref 32–36)
MCV RBC AUTO: 92 FL — SIGNIFICANT CHANGE UP (ref 80–100)
PLATELET # BLD AUTO: 335 K/UL — SIGNIFICANT CHANGE UP (ref 150–400)
POTASSIUM SERPL-MCNC: 4.7 MMOL/L — SIGNIFICANT CHANGE UP (ref 3.5–5.3)
POTASSIUM SERPL-SCNC: 4.7 MMOL/L — SIGNIFICANT CHANGE UP (ref 3.5–5.3)
PROCALCITONIN SERPL-MCNC: 0.46 NG/ML — HIGH (ref 0.02–0.1)
PROT SERPL-MCNC: 7.1 G/DL — SIGNIFICANT CHANGE UP (ref 6.6–8.7)
RBC # BLD: 4.24 M/UL — SIGNIFICANT CHANGE UP (ref 4.2–5.8)
RBC # FLD: 14.9 % — HIGH (ref 10.3–14.5)
SODIUM SERPL-SCNC: 136 MMOL/L — SIGNIFICANT CHANGE UP (ref 135–145)
WBC # BLD: 7.41 K/UL — SIGNIFICANT CHANGE UP (ref 3.8–10.5)
WBC # FLD AUTO: 7.41 K/UL — SIGNIFICANT CHANGE UP (ref 3.8–10.5)

## 2022-08-31 PROCEDURE — 71045 X-RAY EXAM CHEST 1 VIEW: CPT | Mod: 26

## 2022-08-31 RX ORDER — AMANTADINE HCL 100 MG
1 CAPSULE ORAL
Qty: 0 | Refills: 0 | DISCHARGE
Start: 2022-08-31

## 2022-08-31 RX ORDER — VANCOMYCIN HCL 1 G
1000 VIAL (EA) INTRAVENOUS ONCE
Refills: 0 | Status: COMPLETED | OUTPATIENT
Start: 2022-08-31 | End: 2022-09-01

## 2022-08-31 RX ORDER — ACETAMINOPHEN 500 MG
2 TABLET ORAL
Qty: 0 | Refills: 0 | DISCHARGE
Start: 2022-08-31

## 2022-08-31 RX ORDER — PIPERACILLIN AND TAZOBACTAM 4; .5 G/20ML; G/20ML
3.38 INJECTION, POWDER, LYOPHILIZED, FOR SOLUTION INTRAVENOUS ONCE
Refills: 0 | Status: COMPLETED | OUTPATIENT
Start: 2022-08-31 | End: 2022-08-31

## 2022-08-31 RX ORDER — MIDODRINE HYDROCHLORIDE 2.5 MG/1
5 TABLET ORAL THREE TIMES A DAY
Refills: 0 | Status: DISCONTINUED | OUTPATIENT
Start: 2022-08-31 | End: 2022-09-01

## 2022-08-31 RX ORDER — ATORVASTATIN CALCIUM 80 MG/1
1 TABLET, FILM COATED ORAL
Qty: 0 | Refills: 0 | DISCHARGE
Start: 2022-08-31

## 2022-08-31 RX ORDER — ASPIRIN/CALCIUM CARB/MAGNESIUM 324 MG
1 TABLET ORAL
Qty: 0 | Refills: 0 | DISCHARGE
Start: 2022-08-31

## 2022-08-31 RX ORDER — LANOLIN ALCOHOL/MO/W.PET/CERES
1 CREAM (GRAM) TOPICAL
Qty: 0 | Refills: 0 | DISCHARGE
Start: 2022-08-31

## 2022-08-31 RX ORDER — SPIRONOLACTONE 25 MG/1
0.5 TABLET, FILM COATED ORAL
Qty: 0 | Refills: 0 | DISCHARGE
Start: 2022-08-31

## 2022-08-31 RX ORDER — ENOXAPARIN SODIUM 100 MG/ML
40 INJECTION SUBCUTANEOUS
Qty: 0 | Refills: 0 | DISCHARGE
Start: 2022-08-31

## 2022-08-31 RX ORDER — METOPROLOL TARTRATE 50 MG
1 TABLET ORAL
Qty: 0 | Refills: 0 | DISCHARGE
Start: 2022-08-31

## 2022-08-31 RX ORDER — SODIUM CHLORIDE 9 MG/ML
250 INJECTION INTRAMUSCULAR; INTRAVENOUS; SUBCUTANEOUS ONCE
Refills: 0 | Status: COMPLETED | OUTPATIENT
Start: 2022-08-31 | End: 2022-08-31

## 2022-08-31 RX ORDER — METOPROLOL TARTRATE 50 MG
50 TABLET ORAL
Refills: 0 | Status: DISCONTINUED | OUTPATIENT
Start: 2022-09-01 | End: 2022-09-01

## 2022-08-31 RX ORDER — ERGOCALCIFEROL 1.25 MG/1
50000 CAPSULE ORAL
Qty: 0 | Refills: 0 | DISCHARGE
Start: 2022-08-31

## 2022-08-31 RX ADMIN — Medication 100 MILLIGRAM(S): at 08:19

## 2022-08-31 RX ADMIN — ENOXAPARIN SODIUM 40 MILLIGRAM(S): 100 INJECTION SUBCUTANEOUS at 16:33

## 2022-08-31 RX ADMIN — PIPERACILLIN AND TAZOBACTAM 200 GRAM(S): 4; .5 INJECTION, POWDER, LYOPHILIZED, FOR SOLUTION INTRAVENOUS at 23:13

## 2022-08-31 RX ADMIN — ERGOCALCIFEROL 50000 UNIT(S): 1.25 CAPSULE ORAL at 12:42

## 2022-08-31 RX ADMIN — Medication 3 MILLILITER(S): at 14:39

## 2022-08-31 RX ADMIN — Medication 100 MILLIGRAM(S): at 06:14

## 2022-08-31 RX ADMIN — Medication 3 MILLIGRAM(S): at 22:25

## 2022-08-31 RX ADMIN — CHLORHEXIDINE GLUCONATE 1 APPLICATION(S): 213 SOLUTION TOPICAL at 06:16

## 2022-08-31 RX ADMIN — Medication 3 MILLILITER(S): at 20:27

## 2022-08-31 RX ADMIN — SPIRONOLACTONE 12.5 MILLIGRAM(S): 25 TABLET, FILM COATED ORAL at 06:14

## 2022-08-31 RX ADMIN — SODIUM CHLORIDE 1000 MILLILITER(S): 9 INJECTION INTRAMUSCULAR; INTRAVENOUS; SUBCUTANEOUS at 16:54

## 2022-08-31 RX ADMIN — SCOPALAMINE 1 PATCH: 1 PATCH, EXTENDED RELEASE TRANSDERMAL at 07:00

## 2022-08-31 RX ADMIN — SODIUM CHLORIDE 250 MILLILITER(S): 9 INJECTION INTRAMUSCULAR; INTRAVENOUS; SUBCUTANEOUS at 18:33

## 2022-08-31 RX ADMIN — ATORVASTATIN CALCIUM 40 MILLIGRAM(S): 80 TABLET, FILM COATED ORAL at 22:25

## 2022-08-31 RX ADMIN — Medication 3 MILLILITER(S): at 03:41

## 2022-08-31 RX ADMIN — Medication 2: at 06:17

## 2022-08-31 RX ADMIN — Medication 3 MILLILITER(S): at 09:03

## 2022-08-31 RX ADMIN — Medication 1 APPLICATION(S): at 16:34

## 2022-08-31 RX ADMIN — Medication 100 MILLIGRAM(S): at 12:43

## 2022-08-31 RX ADMIN — Medication 1 APPLICATION(S): at 06:14

## 2022-08-31 RX ADMIN — Medication 81 MILLIGRAM(S): at 12:42

## 2022-08-31 RX ADMIN — MIDODRINE HYDROCHLORIDE 5 MILLIGRAM(S): 2.5 TABLET ORAL at 16:54

## 2022-08-31 NOTE — CHART NOTE - NSCHARTNOTEFT_GEN_A_CORE
Called by RN earlier that BP 82/58. Pt with CHF, EF <10% and responded well yesterday to IVF bolus. 250cc bolus ordered, d/w MD ordered Midodrine 5mg tid. Repeat BP shortly after 79/60 and then repeat manual BP 86/58, HR in 80s. Oral temp 98.4F, rectal temperature 99F. Pt appears dehydrated on exam. Laying in bed in no acute distress. Pt is awake and alert, oriented, aphasic but nods and mumbles appropriately to questions. D/w MD who decreased Metoprolol dose. Dc Aldactone, VS q4hr, ordered STAT labs like CBC, CMP, lactate, procalcitonin, additional NS 250cc bolus. Pt with a cough that has been present per RN. CXR ordered. Endorsed to night staff for follow up.

## 2022-08-31 NOTE — PROGRESS NOTE ADULT - ASSESSMENT
77y/oM PMH CABG, PVD, HTN, HLD, low EF (declined AICD), initially presenting to [facility] with R-sided weakness and R-sided facial droop. Code stroke initiated, initial NIH 8. CTA with LM1 occlusion, transferred to [facility] for poss neuro IR intervention. On arrival, NIH 6, pt admitted 7/24 to neuro ICU with LM1 occlusion, s/p TICI 2B recanalization. pt initially extubated post procedure, required reintubation for respiratory distress. MRI with large L MCA stroke with small area of reperfusion hemorrhage.  Bedside echo with EF 15%, diuresed. Formal TTE 7/25 with EF <10%. Cardio rec ICD but pt previously refused as outpt. Course further complicated by septic shock, UTI, c. diff, urinary obstruction, respiratory failure, now s/p extubation 8/10, transferred to medicine 8/11.     1-s/p Acute CVA with LM1 occlusion s/p TICI 2B MT  - w/ Small hemorrhagic conversion  stable with  residual ;  expressive aphasia and right sided weakness , dysphagia   cont ASA and statin   resp status stable   keep head elavated . agressive Chest Pt , suction as needed   Dc to [facility] once bed avaliable     2-Dysphagia   s/p PEG feeds  tolerating feeding   keep head elevated aspiration precautions     3- Hypotension,  labile   cont midodrine   BP is low , will stop losartan   on metoprolol for SVT     4-s/p UTI . 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

## 2022-08-31 NOTE — PROGRESS NOTE ADULT - SUBJECTIVE AND OBJECTIVE BOX
Patient is a 77y old  Male with stroke , expressive aphasia, right sided weakness     Pt is seen in am , screams at times   during my evaluation he is awake   answer questions yes no , denies any pain   mild intermittent cough noted     d/w wife earlier   discharge on hold , no J[facility]IE yet per        Vital Signs Last 24 Hrs  T(C): 36.7 (31 Aug 2022 10:50), Max: 36.7 (31 Aug 2022 10:50)  T(F): 98.1 (31 Aug 2022 10:50), Max: 98.1 (31 Aug 2022 10:50)  HR: 95 (31 Aug 2022 14:39) (95 - 116)  BP: 99/55 (31 Aug 2022 10:50) (70/50 - 113/76)  BP(mean): --  RR: 18 (31 Aug 2022 10:50) (18 - 18)  SpO2: 95% (31 Aug 2022 14:39) (95% - 96%)    Parameters below as of 31 Aug 2022 14:39  Patient On (Oxygen Delivery Method): room air        PHYSICAL EXAM:  General: NAD  , expressive  aphasia   Neck: Supple, No JVD  Lungs: Clear to auscultation , diminished BS  Cardio: regular rate , +s1/s2  Abdomen: Soft, Nontender, Nondistended; Bowel sounds present  Extremities: No calf tenderness , no pretibial edema   Neuro: right side weakness , dysarthria ; expressive , able to follow simple commands                                      12.1   7.82  )-----------( 306      ( 30 Aug 2022 04:55 )             37.0   08-30    132<L>  |  92<L>  |  16.7  ----------------------------<  131<H>  4.5   |  28.0  |  0.46<L>    Ca    8.9      30 Aug 2022 04:55

## 2022-08-31 NOTE — CHART NOTE - NSCHARTNOTEFT_GEN_A_CORE
Repeat  B/P 123/74 P 91  Pt lying in bed  Appears comfortable, NAD  breathing easy and unlabored   Continue to monitor Repeat  B/P 123/74 P 91  Pt lying in bed  Appears comfortable, NAD  breathing easy and unlabored   Continue to monitor    22:30   afebrile  WBC 7.41   procal 0.46  Lactate 1.60  UA and Blood cultures pending  CXR no obvious infiltrates, pending official read  CBC with diff in am  Continue to monitor Repeat  B/P 123/74 P 91  Pt lying in bed  Appears comfortable, NAD  breathing easy and unlabored   Continue to monitor    22:30   afebrile  WBC 7.41   procal 0.46  Lactate 1.60  UA and Blood cultures pending  CXR possible infiltrate right lower lobe, pending official read  Vanco 1000 mg X1 Zosyn 3.375mg x 1  CBC with diff in am  Continue to monitor

## 2022-08-31 NOTE — CHART NOTE - NSCHARTNOTEFT_GEN_A_CORE
Source: Patient [ ]  Family [ ]   other [x ]    Current Diet:   Diet, NPO with Tube Feed:   Tube Feeding Modality: Gastrostomy  Glucerna 1.5 Jonathan (GLUCERNA1.5)  Total Volume for 24 Hours (mL): 1440  Continuous  Starting Tube Feed Rate {mL per Hour}: 20  Increase Tube Feed Rate by (mL): 10     Every 8 hours  Until Goal Tube Feed Rate (mL per Hour): 60  Tube Feed Duration (in Hours): 24  Tube Feed Start Time: 12:00 (08-24-22 @ 08:53)    Enteral /Parenteral Nutrition: Current diet order reads for Glucerna 1.5 running @ goal rate of 60 ml/hr (x20 hrs) to provide 1200 ml, 1800 kcal, 99g protein, 911 ml free water, and >100% of RDIs for vitamins/minerals.    Current Weight:   (8/31)  188 lbs RD bedscale weight  (8/20)  207.2 lbs   (8/19)  208.9 lbs   (8/18)  200.6 lbs   (8/10)  207.6 lbs   (8/3)   211.2 lbs   (7/30) 203.7 lbs   (7/26) 208.1 lbs   (7/25) 211.4 lbs  (7/24) 220.2 lbs     % Weight Change: Question accuracy of recent weight, will continue to monitor     Pertinent Medications: MEDICATIONS  (STANDING):  albuterol/ipratropium for Nebulization 3 milliLiter(s) Nebulizer every 6 hours  amantadine 100 milliGRAM(s) Oral <User Schedule>  aspirin  chewable 81 milliGRAM(s) Oral daily  atorvastatin 40 milliGRAM(s) Oral at bedtime  BACItracin   Ointment 1 Application(s) Topical two times a day  chlorhexidine 2% Cloths 1 Application(s) Topical daily  dextrose 5%. 1000 milliLiter(s) (100 mL/Hr) IV Continuous <Continuous>  dextrose 5%. 1000 milliLiter(s) (50 mL/Hr) IV Continuous <Continuous>  dextrose 50% Injectable 25 Gram(s) IV Push once  enoxaparin Injectable 40 milliGRAM(s) SubCutaneous every 24 hours  ergocalciferol 96624 Unit(s) Oral every week  glucagon  Injectable 1 milliGRAM(s) IntraMuscular once  insulin lispro (ADMELOG) corrective regimen sliding scale   SubCutaneous every 6 hours  melatonin 3 milliGRAM(s) Oral at bedtime  metoprolol succinate  milliGRAM(s) Oral daily  scopolamine 1 mG/72 Hr(s) Patch 1 Patch Transdermal every 72 hours  spironolactone 12.5 milliGRAM(s) Oral daily    MEDICATIONS  (PRN):  acetaminophen     Tablet .. 650 milliGRAM(s) Oral every 6 hours PRN Temp greater or equal to 38C (100.4F), Mild Pain (1 - 3)  dextrose Oral Gel 15 Gram(s) Oral once PRN Blood Glucose LESS THAN 70 milliGRAM(s)/deciliter  ondansetron Injectable 4 milliGRAM(s) IV Push every 6 hours PRN Nausea and/or Vomiting    Pertinent Labs: CBC Full  -  ( 30 Aug 2022 04:55 )  WBC Count : 7.82 K/uL  RBC Count : 4.06 M/uL  Hemoglobin : 12.1 g/dL  Hematocrit : 37.0 %  Platelet Count - Automated : 306 K/uL  Mean Cell Volume : 91.1 fl  Mean Cell Hemoglobin : 29.8 pg  Mean Cell Hemoglobin Concentration : 32.7 gm/dL    Skin: Stage 2 L coccyx    Nutrition focused physical exam previously  conducted - found signs of malnutrition [ ]absent [x ]present    Subcutaneous fat loss: Mild [x ] Orbital fat pads region, [ ]Buccal fat region, [ ]Triceps region,  [ ]Ribs region    Muscle wasting: Mild [x ]Temples region, [ ]Clavicle region, [x ]Shoulder region, [ ]Scapula region, [ ]Interosseous region,  [ ]thigh region, [ ]Calf region    Estimated Needs:   [ ] no change since previous assessment  [ ] recalculated:     Current Nutrition Diagnosis: Pt remains at high nutrition risk secondary to malnutrition (moderate, acute) related to inability to meet sufficient protein energy requirements in setting of Large L MCA stroke, respiratory failure, renal failure, +CDIFF, septic shock and poor skin integrity as evidenced by physical signs of mild muscle/fat loss, meeting <75% nutrient needs >7 days, and 1+ generalized edema. Pt s/p PEG placement 8/23, tolerating TF well.     Recommendations:   1) Continue TF as tolerated   2) Rx MVI and vit C 500mg daily   3) Monitor weights daily for trend/accuracy     Monitoring and Evaluation:   [ ] PO intake [x ] Tolerance to diet prescription [X] Weights  [X] Follow up per protocol [X] Labs:

## 2022-09-01 VITALS
TEMPERATURE: 98 F | DIASTOLIC BLOOD PRESSURE: 63 MMHG | HEART RATE: 78 BPM | RESPIRATION RATE: 17 BRPM | OXYGEN SATURATION: 99 % | SYSTOLIC BLOOD PRESSURE: 87 MMHG

## 2022-09-01 LAB
ANION GAP SERPL CALC-SCNC: 12 MMOL/L — SIGNIFICANT CHANGE UP (ref 5–17)
BASOPHILS # BLD AUTO: 0.05 K/UL — SIGNIFICANT CHANGE UP (ref 0–0.2)
BASOPHILS NFR BLD AUTO: 0.7 % — SIGNIFICANT CHANGE UP (ref 0–2)
BUN SERPL-MCNC: 13.5 MG/DL — SIGNIFICANT CHANGE UP (ref 8–20)
CALCIUM SERPL-MCNC: 8.9 MG/DL — SIGNIFICANT CHANGE UP (ref 8.4–10.5)
CHLORIDE SERPL-SCNC: 94 MMOL/L — LOW (ref 98–107)
CO2 SERPL-SCNC: 30 MMOL/L — HIGH (ref 22–29)
CREAT SERPL-MCNC: 0.37 MG/DL — LOW (ref 0.5–1.3)
EGFR: 115 ML/MIN/1.73M2 — SIGNIFICANT CHANGE UP
EOSINOPHIL # BLD AUTO: 0.22 K/UL — SIGNIFICANT CHANGE UP (ref 0–0.5)
EOSINOPHIL NFR BLD AUTO: 3.1 % — SIGNIFICANT CHANGE UP (ref 0–6)
GLUCOSE BLDC GLUCOMTR-MCNC: 143 MG/DL — HIGH (ref 70–99)
GLUCOSE BLDC GLUCOMTR-MCNC: 158 MG/DL — HIGH (ref 70–99)
GLUCOSE SERPL-MCNC: 144 MG/DL — HIGH (ref 70–99)
HCT VFR BLD CALC: 39.9 % — SIGNIFICANT CHANGE UP (ref 39–50)
HGB BLD-MCNC: 13 G/DL — SIGNIFICANT CHANGE UP (ref 13–17)
IMM GRANULOCYTES NFR BLD AUTO: 1.5 % — SIGNIFICANT CHANGE UP (ref 0–1.5)
LYMPHOCYTES # BLD AUTO: 0.79 K/UL — LOW (ref 1–3.3)
LYMPHOCYTES # BLD AUTO: 11 % — LOW (ref 13–44)
MAGNESIUM SERPL-MCNC: 1.8 MG/DL — SIGNIFICANT CHANGE UP (ref 1.6–2.6)
MCHC RBC-ENTMCNC: 29.7 PG — SIGNIFICANT CHANGE UP (ref 27–34)
MCHC RBC-ENTMCNC: 32.6 GM/DL — SIGNIFICANT CHANGE UP (ref 32–36)
MCV RBC AUTO: 91.3 FL — SIGNIFICANT CHANGE UP (ref 80–100)
MONOCYTES # BLD AUTO: 0.85 K/UL — SIGNIFICANT CHANGE UP (ref 0–0.9)
MONOCYTES NFR BLD AUTO: 11.8 % — SIGNIFICANT CHANGE UP (ref 2–14)
NEUTROPHILS # BLD AUTO: 5.18 K/UL — SIGNIFICANT CHANGE UP (ref 1.8–7.4)
NEUTROPHILS NFR BLD AUTO: 71.9 % — SIGNIFICANT CHANGE UP (ref 43–77)
PLATELET # BLD AUTO: 353 K/UL — SIGNIFICANT CHANGE UP (ref 150–400)
POTASSIUM SERPL-MCNC: 4.5 MMOL/L — SIGNIFICANT CHANGE UP (ref 3.5–5.3)
POTASSIUM SERPL-SCNC: 4.5 MMOL/L — SIGNIFICANT CHANGE UP (ref 3.5–5.3)
RBC # BLD: 4.37 M/UL — SIGNIFICANT CHANGE UP (ref 4.2–5.8)
RBC # FLD: 14.7 % — HIGH (ref 10.3–14.5)
SODIUM SERPL-SCNC: 136 MMOL/L — SIGNIFICANT CHANGE UP (ref 135–145)
WBC # BLD: 7.2 K/UL — SIGNIFICANT CHANGE UP (ref 3.8–10.5)
WBC # FLD AUTO: 7.2 K/UL — SIGNIFICANT CHANGE UP (ref 3.8–10.5)

## 2022-09-01 PROCEDURE — C1887: CPT

## 2022-09-01 PROCEDURE — 92610 EVALUATE SWALLOWING FUNCTION: CPT

## 2022-09-01 PROCEDURE — 97167 OT EVAL HIGH COMPLEX 60 MIN: CPT

## 2022-09-01 PROCEDURE — 84100 ASSAY OF PHOSPHORUS: CPT

## 2022-09-01 PROCEDURE — U0003: CPT

## 2022-09-01 PROCEDURE — 81001 URINALYSIS AUTO W/SCOPE: CPT

## 2022-09-01 PROCEDURE — 93005 ELECTROCARDIOGRAM TRACING: CPT

## 2022-09-01 PROCEDURE — 82607 VITAMIN B-12: CPT

## 2022-09-01 PROCEDURE — 70551 MRI BRAIN STEM W/O DYE: CPT

## 2022-09-01 PROCEDURE — 94003 VENT MGMT INPAT SUBQ DAY: CPT

## 2022-09-01 PROCEDURE — P9047: CPT

## 2022-09-01 PROCEDURE — 97163 PT EVAL HIGH COMPLEX 45 MIN: CPT

## 2022-09-01 PROCEDURE — 61645 PERQ ART M-THROMBECT &/NFS: CPT

## 2022-09-01 PROCEDURE — 83935 ASSAY OF URINE OSMOLALITY: CPT

## 2022-09-01 PROCEDURE — 87086 URINE CULTURE/COLONY COUNT: CPT

## 2022-09-01 PROCEDURE — 70450 CT HEAD/BRAIN W/O DYE: CPT

## 2022-09-01 PROCEDURE — C1894: CPT

## 2022-09-01 PROCEDURE — 80076 HEPATIC FUNCTION PANEL: CPT

## 2022-09-01 PROCEDURE — 85027 COMPLETE CBC AUTOMATED: CPT

## 2022-09-01 PROCEDURE — 85730 THROMBOPLASTIN TIME PARTIAL: CPT

## 2022-09-01 PROCEDURE — 84133 ASSAY OF URINE POTASSIUM: CPT

## 2022-09-01 PROCEDURE — 84443 ASSAY THYROID STIM HORMONE: CPT

## 2022-09-01 PROCEDURE — 82435 ASSAY OF BLOOD CHLORIDE: CPT

## 2022-09-01 PROCEDURE — 82553 CREATINE MB FRACTION: CPT

## 2022-09-01 PROCEDURE — 82306 VITAMIN D 25 HYDROXY: CPT

## 2022-09-01 PROCEDURE — 93970 EXTREMITY STUDY: CPT

## 2022-09-01 PROCEDURE — 82570 ASSAY OF URINE CREATININE: CPT

## 2022-09-01 PROCEDURE — 82248 BILIRUBIN DIRECT: CPT

## 2022-09-01 PROCEDURE — C9399: CPT

## 2022-09-01 PROCEDURE — 94640 AIRWAY INHALATION TREATMENT: CPT

## 2022-09-01 PROCEDURE — C1769: CPT

## 2022-09-01 PROCEDURE — 83036 HEMOGLOBIN GLYCOSYLATED A1C: CPT

## 2022-09-01 PROCEDURE — 80053 COMPREHEN METABOLIC PANEL: CPT

## 2022-09-01 PROCEDURE — 80074 ACUTE HEPATITIS PANEL: CPT

## 2022-09-01 PROCEDURE — 76705 ECHO EXAM OF ABDOMEN: CPT

## 2022-09-01 PROCEDURE — 95714 VEEG EA 12-26 HR UNMNTR: CPT

## 2022-09-01 PROCEDURE — 71045 X-RAY EXAM CHEST 1 VIEW: CPT

## 2022-09-01 PROCEDURE — U0005: CPT

## 2022-09-01 PROCEDURE — 31720 CLEARANCE OF AIRWAYS: CPT

## 2022-09-01 PROCEDURE — 36600 WITHDRAWAL OF ARTERIAL BLOOD: CPT

## 2022-09-01 PROCEDURE — C8929: CPT

## 2022-09-01 PROCEDURE — 94760 N-INVAS EAR/PLS OXIMETRY 1: CPT

## 2022-09-01 PROCEDURE — 85018 HEMOGLOBIN: CPT

## 2022-09-01 PROCEDURE — 82247 BILIRUBIN TOTAL: CPT

## 2022-09-01 PROCEDURE — 83690 ASSAY OF LIPASE: CPT

## 2022-09-01 PROCEDURE — 82947 ASSAY GLUCOSE BLOOD QUANT: CPT

## 2022-09-01 PROCEDURE — 95711 VEEG 2-12 HR UNMONITORED: CPT

## 2022-09-01 PROCEDURE — 87640 STAPH A DNA AMP PROBE: CPT

## 2022-09-01 PROCEDURE — 94002 VENT MGMT INPAT INIT DAY: CPT

## 2022-09-01 PROCEDURE — 82962 GLUCOSE BLOOD TEST: CPT

## 2022-09-01 PROCEDURE — 82550 ASSAY OF CK (CPK): CPT

## 2022-09-01 PROCEDURE — 76775 US EXAM ABDO BACK WALL LIM: CPT

## 2022-09-01 PROCEDURE — 97750 PHYSICAL PERFORMANCE TEST: CPT

## 2022-09-01 PROCEDURE — 36415 COLL VENOUS BLD VENIPUNCTURE: CPT

## 2022-09-01 PROCEDURE — 84295 ASSAY OF SERUM SODIUM: CPT

## 2022-09-01 PROCEDURE — 80048 BASIC METABOLIC PNL TOTAL CA: CPT

## 2022-09-01 PROCEDURE — L8699: CPT

## 2022-09-01 PROCEDURE — 87186 SC STD MICRODIL/AGAR DIL: CPT

## 2022-09-01 PROCEDURE — 82040 ASSAY OF SERUM ALBUMIN: CPT

## 2022-09-01 PROCEDURE — 86900 BLOOD TYPING SEROLOGIC ABO: CPT

## 2022-09-01 PROCEDURE — 84300 ASSAY OF URINE SODIUM: CPT

## 2022-09-01 PROCEDURE — 85025 COMPLETE CBC W/AUTO DIFF WBC: CPT

## 2022-09-01 PROCEDURE — 86803 HEPATITIS C AB TEST: CPT

## 2022-09-01 PROCEDURE — 83605 ASSAY OF LACTIC ACID: CPT

## 2022-09-01 PROCEDURE — 97112 NEUROMUSCULAR REEDUCATION: CPT

## 2022-09-01 PROCEDURE — 87641 MR-STAPH DNA AMP PROBE: CPT

## 2022-09-01 PROCEDURE — 87070 CULTURE OTHR SPECIMN AEROBIC: CPT

## 2022-09-01 PROCEDURE — 84484 ASSAY OF TROPONIN QUANT: CPT

## 2022-09-01 PROCEDURE — 85014 HEMATOCRIT: CPT

## 2022-09-01 PROCEDURE — 82436 ASSAY OF URINE CHLORIDE: CPT

## 2022-09-01 PROCEDURE — 84145 PROCALCITONIN (PCT): CPT

## 2022-09-01 PROCEDURE — P9045: CPT

## 2022-09-01 PROCEDURE — 84132 ASSAY OF SERUM POTASSIUM: CPT

## 2022-09-01 PROCEDURE — 71250 CT THORAX DX C-: CPT

## 2022-09-01 PROCEDURE — 86901 BLOOD TYPING SEROLOGIC RH(D): CPT

## 2022-09-01 PROCEDURE — 83735 ASSAY OF MAGNESIUM: CPT

## 2022-09-01 PROCEDURE — 85610 PROTHROMBIN TIME: CPT

## 2022-09-01 PROCEDURE — 82803 BLOOD GASES ANY COMBINATION: CPT

## 2022-09-01 PROCEDURE — 95700 EEG CONT REC W/VID EEG TECH: CPT

## 2022-09-01 PROCEDURE — 83880 ASSAY OF NATRIURETIC PEPTIDE: CPT

## 2022-09-01 PROCEDURE — 80061 LIPID PANEL: CPT

## 2022-09-01 PROCEDURE — 86850 RBC ANTIBODY SCREEN: CPT

## 2022-09-01 PROCEDURE — 94668 MNPJ CHEST WALL SBSQ: CPT

## 2022-09-01 PROCEDURE — 83930 ASSAY OF BLOOD OSMOLALITY: CPT

## 2022-09-01 PROCEDURE — 87040 BLOOD CULTURE FOR BACTERIA: CPT

## 2022-09-01 PROCEDURE — 87493 C DIFF AMPLIFIED PROBE: CPT

## 2022-09-01 PROCEDURE — 82330 ASSAY OF CALCIUM: CPT

## 2022-09-01 RX ORDER — MIDODRINE HYDROCHLORIDE 2.5 MG/1
1 TABLET ORAL
Qty: 0 | Refills: 0 | DISCHARGE
Start: 2022-09-01

## 2022-09-01 RX ORDER — MAGNESIUM SULFATE 500 MG/ML
2 VIAL (ML) INJECTION ONCE
Refills: 0 | Status: COMPLETED | OUTPATIENT
Start: 2022-09-01 | End: 2022-09-01

## 2022-09-01 RX ORDER — MAGNESIUM OXIDE 400 MG ORAL TABLET 241.3 MG
1 TABLET ORAL
Qty: 0 | Refills: 0 | DISCHARGE
Start: 2022-09-01

## 2022-09-01 RX ORDER — MAGNESIUM OXIDE 400 MG ORAL TABLET 241.3 MG
400 TABLET ORAL EVERY 12 HOURS
Refills: 0 | Status: DISCONTINUED | OUTPATIENT
Start: 2022-09-01 | End: 2022-09-01

## 2022-09-01 RX ADMIN — CHLORHEXIDINE GLUCONATE 1 APPLICATION(S): 213 SOLUTION TOPICAL at 05:45

## 2022-09-01 RX ADMIN — Medication 1 APPLICATION(S): at 05:45

## 2022-09-01 RX ADMIN — Medication 25 GRAM(S): at 10:32

## 2022-09-01 RX ADMIN — Medication 50 MILLIGRAM(S): at 05:45

## 2022-09-01 RX ADMIN — MIDODRINE HYDROCHLORIDE 5 MILLIGRAM(S): 2.5 TABLET ORAL at 05:45

## 2022-09-01 RX ADMIN — Medication 250 MILLIGRAM(S): at 02:50

## 2022-09-01 RX ADMIN — Medication 100 MILLIGRAM(S): at 08:23

## 2022-09-01 RX ADMIN — Medication 3 MILLILITER(S): at 09:17

## 2022-09-01 RX ADMIN — Medication 3 MILLILITER(S): at 03:08

## 2022-09-01 NOTE — CHART NOTE - NSCHARTNOTESELECT_GEN_ALL_CORE
Event Note
GOC DISCUSSION/Event Note
Nutrition Services
Nutrition Services
Palliative/Off Service Note
Event Note
Neuro ICU Downgrade Note/Transfer Note
Nutrition Services

## 2022-09-01 NOTE — PROGRESS NOTE ADULT - REASON FOR ADMISSION
CVA
stroke
CVA
CVA
stroke
CVA
CVA
stroke
CVA
CVA
stroke

## 2022-09-01 NOTE — PROGRESS NOTE ADULT - SUBJECTIVE AND OBJECTIVE BOX
Patient is a 77y old  Male with stroke , expressive aphasia, right sided weakness     Pt is seen this morning ,  he is awake   able to answer some questions dytsartria   follows simple command lifted left arm when asked   Right sided weakness no change   yesterday events noted , hypotension responded to fluid , aldactone stopped   labs reviewed   BP is improved stable since 7 pm 123- 116 range overnight and this am     REVIEW OF SYSTEMS  limited due to stroke nd dystrhria mental status     Vital Signs Last 24 Hrs  T(C): 36.4 (01 Sep 2022 04:46), Max: 37.6 (31 Aug 2022 18:31)  T(F): 97.5 (01 Sep 2022 04:46), Max: 99.7 (31 Aug 2022 18:31)  HR: 91 (01 Sep 2022 04:46) (90 - 99)  BP: 116/72 (01 Sep 2022 04:46) (68/46 - 123/74)  BP(mean): --  RR: 16 (01 Sep 2022 04:46) (16 - 18)  SpO2: 100% (01 Sep 2022 04:46) (95% - 100%)    Parameters below as of 01 Sep 2022 04:46  Patient On (Oxygen Delivery Method): room air    PHYSICAL EXAM:  General: No respiratory distress   , expressive  aphasia   Neck: Supple, No JVD  Lungs: Clear to auscultation ,poor inspiratory effort   Cardio: regular rate , +s1/s2  Abdomen: Soft, Nontender, Nondistended; Bowel sounds present  Extremities: No calf tenderness , no pretibial edema   Neuro: right side weakness , dysarthria ; expressive  aphasia , lifts LUE   legs weak , poor cognition                           13.0   7.20  )-----------( 353      ( 01 Sep 2022 06:56 )             39.9   09-01    136  |  94<L>  |  13.5  ----------------------------<  144<H>  4.5   |  30.0<H>  |  0.37<L>    Ca    8.9      01 Sep 2022 06:56  Mg     1.8     09-01    TPro  7.1  /  Alb  2.8<L>  /  TBili  0.4  /  DBili  x   /  AST  45<H>  /  ALT  42<H>  /  AlkPhos  264<H>  08-31

## 2022-09-01 NOTE — PROGRESS NOTE ADULT - NUTRITIONAL ASSESSMENT
This patient has been assessed with a concern for Malnutrition and has been determined to have a diagnosis/diagnoses of Moderate protein-calorie malnutrition    This patient is being managed with:   Diet NPO with Tube Feed-  Tube Feeding Modality: Gastrostomy  Glucerna 1.5 Jonathan (GLUCERNA1.5)  Total Volume for 24 Hours (mL): 1440  Continuous  Starting Tube Feed Rate {mL per Hour}: 20  Increase Tube Feed Rate by (mL): 10     Every 8 hours  Until Goal Tube Feed Rate (mL per Hour): 60  Tube Feed Duration (in Hours): 24  Tube Feed Start Time: 12:00  Entered: Aug 24 2022  8:53AM
This patient has been assessed with a concern for Malnutrition and has been determined to have a diagnosis/diagnoses of Moderate protein-calorie malnutrition.    This patient is being managed with:   Diet NPO with Tube Feed-  Tube Feeding Modality: Nasogastric  Jevity 1.5 Jonathan (JEVITY1.5)  Total Volume for 24 Hours (mL): 1440  Continuous  Starting Tube Feed Rate {mL per Hour}: 10  Increase Tube Feed Rate by (mL): 10     Every 4 hours  Until Goal Tube Feed Rate (mL per Hour): 60  Tube Feed Duration (in Hours): 24  Tube Feed Start Time: 10:30  Entered: Aug  6 2022  3:59PM    
This patient has been assessed with a concern for Malnutrition and has been determined to have a diagnosis/diagnoses of Moderate protein-calorie malnutrition.    This patient is being managed with:   Diet NPO with Tube Feed-  Tube Feeding Modality: Nasogastric  Jevity 1.5 Jonathan (JEVITY1.5)  Total Volume for 24 Hours (mL): 1440  Continuous  Starting Tube Feed Rate {mL per Hour}: 10  Increase Tube Feed Rate by (mL): 10     Every 4 hours  Until Goal Tube Feed Rate (mL per Hour): 60  Tube Feed Duration (in Hours): 24  Tube Feed Start Time: 10:30  Entered: Aug  6 2022  3:59PM    
This patient has been assessed with a concern for Malnutrition and has been determined to have a diagnosis/diagnoses of Moderate protein-calorie malnutrition.    This patient is being managed with:   Diet NPO-  Tube Feeding Modality: Nasogastric  Glucerna 1.5 Jonathan (GLUCERNA1.5)  Total Volume for 24 Hours (mL): 1440  Continuous  Starting Tube Feed Rate {mL per Hour}: 10  Increase Tube Feed Rate by (mL): 10     Every 6 hours  Until Goal Tube Feed Rate (mL per Hour): 60  Tube Feed Duration (in Hours): 24  Tube Feed Start Time: 11:00  Entered: Aug 11 2022 10:23AM    
This patient has been assessed with a concern for Malnutrition and has been determined to have a diagnosis/diagnoses of Moderate protein-calorie malnutrition.    This patient is being managed with:   Diet NPO with Tube Feed-  Tube Feeding Modality: Gastrostomy  Glucerna 1.5 Jonathan (GLUCERNA1.5)  Total Volume for 24 Hours (mL): 1440  Continuous  Starting Tube Feed Rate {mL per Hour}: 20  Increase Tube Feed Rate by (mL): 10     Every 8 hours  Until Goal Tube Feed Rate (mL per Hour): 60  Tube Feed Duration (in Hours): 24  Tube Feed Start Time: 12:00  Entered: Aug 24 2022  8:53AM    
This patient has been assessed with a concern for Malnutrition and has been determined to have a diagnosis/diagnoses of Moderate protein-calorie malnutrition.    This patient is being managed with:   Diet NPO with Tube Feed-  Tube Feeding Modality: Nasogastric  Nepro (NEPRORTH)  Total Volume for 24 Hours (mL): 1200  Continuous  Starting Tube Feed Rate {mL per Hour}: 10  Increase Tube Feed Rate by (mL): 10     Every 4 hours  Until Goal Tube Feed Rate (mL per Hour): 50  Tube Feed Duration (in Hours): 24  Tube Feed Start Time: 10:30  Entered: Aug  5 2022  2:48AM    
This patient has been assessed with a concern for Malnutrition and has been determined to have a diagnosis/diagnoses of Moderate protein-calorie malnutrition.    This patient is being managed with:   Diet NPO-  Entered: Aug 23 2022  8:08AM    Diet NPO after Midnight-     NPO Start Date: 22-Aug-2022   NPO Start Time: 23:59  Entered: Aug 22 2022  3:28PM    
This patient has been assessed with a concern for Malnutrition and has been determined to have a diagnosis/diagnoses of Moderate protein-calorie malnutrition.    This patient is being managed with:   Diet NPO-  Tube Feeding Modality: Nasogastric  Glucerna 1.5 Jonathan (GLUCERNA1.5)  Total Volume for 24 Hours (mL): 1440  Continuous  Starting Tube Feed Rate {mL per Hour}: 10  Increase Tube Feed Rate by (mL): 10     Every 6 hours  Until Goal Tube Feed Rate (mL per Hour): 60  Tube Feed Duration (in Hours): 24  Tube Feed Start Time: 11:00  Entered: Aug 11 2022 10:23AM    
This patient has been assessed with a concern for Malnutrition and has been determined to have a diagnosis/diagnoses of Moderate protein-calorie malnutrition.    This patient is being managed with:   Diet NPO-  Tube Feeding Modality: Nasogastric  Glucerna 1.5 Jonathan (GLUCERNA1.5)  Total Volume for 24 Hours (mL): 1440  Continuous  Starting Tube Feed Rate {mL per Hour}: 10  Increase Tube Feed Rate by (mL): 10     Every 6 hours  Until Goal Tube Feed Rate (mL per Hour): 60  Tube Feed Duration (in Hours): 24  Tube Feed Start Time: 11:00  Entered: Aug 11 2022 10:23AM    
This patient has been assessed with a concern for Malnutrition and has been determined to have a diagnosis/diagnoses of Moderate protein-calorie malnutrition.    This patient is being managed with:   Diet NPO after Midnight-     NPO Start Date: 21-Aug-2022   NPO Start Time: 23:59  Entered: Aug 21 2022  9:58AM    Diet NPO-  Tube Feeding Modality: Nasogastric  Glucerna 1.5 Jonathan (GLUCERNA1.5)  Total Volume for 24 Hours (mL): 1440  Continuous  Starting Tube Feed Rate {mL per Hour}: 10  Increase Tube Feed Rate by (mL): 10     Every 6 hours  Until Goal Tube Feed Rate (mL per Hour): 60  Tube Feed Duration (in Hours): 24  Tube Feed Start Time: 11:00  Entered: Aug 11 2022 10:23AM    
This patient has been assessed with a concern for Malnutrition and has been determined to have a diagnosis/diagnoses of Moderate protein-calorie malnutrition.    This patient is being managed with:   Diet NPO after Midnight-     NPO Start Date: 21-Aug-2022   NPO Start Time: 23:59  Entered: Aug 21 2022  9:58AM    Diet NPO-  Tube Feeding Modality: Nasogastric  Glucerna 1.5 Jonathan (GLUCERNA1.5)  Total Volume for 24 Hours (mL): 1440  Continuous  Starting Tube Feed Rate {mL per Hour}: 10  Increase Tube Feed Rate by (mL): 10     Every 6 hours  Until Goal Tube Feed Rate (mL per Hour): 60  Tube Feed Duration (in Hours): 24  Tube Feed Start Time: 11:00  Entered: Aug 11 2022 10:23AM    
This patient has been assessed with a concern for Malnutrition and has been determined to have a diagnosis/diagnoses of Moderate protein-calorie malnutrition.    This patient is being managed with:   Diet NPO with Tube Feed-  Tube Feeding Modality: Gastrostomy  Glucerna 1.5 Jonathan (GLUCERNA1.5)  Total Volume for 24 Hours (mL): 1440  Continuous  Starting Tube Feed Rate {mL per Hour}: 20  Increase Tube Feed Rate by (mL): 10     Every 8 hours  Until Goal Tube Feed Rate (mL per Hour): 60  Tube Feed Duration (in Hours): 24  Tube Feed Start Time: 12:00  Entered: Aug 24 2022  8:53AM    
This patient has been assessed with a concern for Malnutrition and has been determined to have a diagnosis/diagnoses of Moderate protein-calorie malnutrition.    This patient is being managed with:   Diet NPO-  Except Medications  Entered: Aug 10 2022  7:08PM    
This patient has been assessed with a concern for Malnutrition and has been determined to have a diagnosis/diagnoses of Moderate protein-calorie malnutrition.    This patient is being managed with:   Diet NPO-  Tube Feeding Modality: Nasogastric  Glucerna 1.5 Jonathan (GLUCERNA1.5)  Total Volume for 24 Hours (mL): 1440  Continuous  Starting Tube Feed Rate {mL per Hour}: 10  Increase Tube Feed Rate by (mL): 10     Every 6 hours  Until Goal Tube Feed Rate (mL per Hour): 60  Tube Feed Duration (in Hours): 24  Tube Feed Start Time: 11:00  Entered: Aug 11 2022 10:23AM    
This patient has been assessed with a concern for Malnutrition and has been determined to have a diagnosis/diagnoses of Moderate protein-calorie malnutrition.    This patient is being managed with:   Diet NPO with Tube Feed-  Tube Feeding Modality: Nasogastric  Jevity 1.5 Jonathan (JEVITY1.5)  Total Volume for 24 Hours (mL): 1440  Continuous  Starting Tube Feed Rate {mL per Hour}: 10  Increase Tube Feed Rate by (mL): 10     Every 4 hours  Until Goal Tube Feed Rate (mL per Hour): 60  Tube Feed Duration (in Hours): 24  Tube Feed Start Time: 10:30  Entered: Aug  6 2022  3:59PM    
This patient has been assessed with a concern for Malnutrition and has been determined to have a diagnosis/diagnoses of Moderate protein-calorie malnutrition.    This patient is being managed with:   Diet NPO with Tube Feed-  Tube Feeding Modality: Nasogastric  Jevity 1.5 Jonathan (JEVITY1.5)  Total Volume for 24 Hours (mL): 1440  Continuous  Starting Tube Feed Rate {mL per Hour}: 10  Increase Tube Feed Rate by (mL): 10     Every 4 hours  Until Goal Tube Feed Rate (mL per Hour): 60  Tube Feed Duration (in Hours): 24  Tube Feed Start Time: 10:30  Entered: Aug  6 2022  3:59PM

## 2022-09-01 NOTE — PROGRESS NOTE ADULT - PROVIDER SPECIALTY LIST ADULT
Cardiology
Hospitalist
Nephrology
Neurology
Cardiology
Gastroenterology
Hospitalist
Hospitalist
Infectious Disease
Internal Medicine
NSICU
Nephrology
Nephrology
Neurology
Palliative Care
Rehab Medicine
Rehab Medicine
Heart Failure
Heart Failure
Hospitalist
Intervent Radiology
Intervent Radiology
NSICU
Nephrology
Neurology
Palliative Care
Rehab Medicine
Cardiology
Electrophysiology
Heart Failure
Heart Failure
Hospitalist
Hospitalist
NSICU
Nephrology
Neurology
Heart Failure
Hospitalist
NSICU
Palliative Care
Gastroenterology
Gastroenterology
Palliative Care
Cardiology
NSICU
Heart Failure
Cardiology
NSICU
Cardiology
Heart Failure
Cardiology
Heart Failure

## 2022-09-01 NOTE — PROGRESS NOTE ADULT - ASSESSMENT
77y/oM PMH CABG, PVD, HTN, HLD, low EF (declined AICD), initially presenting to [facility] with R-sided weakness and R-sided facial droop. Code stroke initiated, initial NIH 8. CTA with LM1 occlusion, transferred to [facility] for poss neuro IR intervention. On arrival, NIH 6, pt admitted 7/24 to neuro ICU with LM1 occlusion, s/p TICI 2B recanalization. pt initially extubated post procedure, required reintubation for respiratory distress. MRI with large L MCA stroke with small area of reperfusion hemorrhage.  Bedside echo with EF 15%, diuresed. Formal TTE 7/25 with EF <10%. Cardio rec ICD but pt previously refused as outpt. Course further complicated by septic shock, UTI, c. diff, urinary obstruction, respiratory failure, now s/p extubation 8/10, transferred to medicine 8/11.       1- Hypotension episodes   no sign of infection , normal wbc , no fever   responded to IVF , midodrine added   compression stockings   hold losartan aldacton   risk of recurrent hypotension due to severe CM   metoprolol changed to 50 mg bid with holding parameters     2-s/p Acute CVA with LM1 occlusion s/p TICI 2B MT  - w/ Small hemorrhagic conversion  stable with  residual ;  expressive aphasia and right sided weakness , dysphagia   cont ASA and statin   keep head elevated risk of apsiration   agressive Chest Pt , suction as needed   Dc to [facility] today     2-Dysphagia   s/p PEG feeds  tolerating feeding   keep head elevated aspiration precautions     4-s/p UTI . 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 bigemeny due to   due to low EF most likely   cont metoprolol  cardio signed off   keep mg over 2, supplement ordered   K over 3.8     8-Urinary retention   - failed TOV x2   - maintain ace   void trail in rehab     9--moderate protein calorie malnutrition  - nutritionist consult appreciated  cont feeding via tube     #DVT Prophylaxis  lovenox SC

## 2022-09-20 ENCOUNTER — APPOINTMENT (OUTPATIENT)
Dept: CARDIOLOGY | Facility: CLINIC | Age: 77
End: 2022-09-20

## 2022-09-20 NOTE — HISTORY OF PRESENT ILLNESS
[FreeTextEntry1] : NATANAEL ROWAN  is a 77 year old  M\par \par \par with a history of known coronary artery disease s/p MI/CABG, dilated iCMP, PAD, HTN, hyperlipidemia (elevated CRP), \par There is no history of symptomatic congestive heart failure, rheumatic heart disease or atrial fibrillation.\par \par In 2004, he presented with symptoms of dizziness, near syncope and was diagnosed with an acute myocardial infarction. \par Initially was seen at [facility] and underwent thrombolysis. Subsequently received 2 stents. \par \par Follow up echocardiogram demonstrated reduced LV function. \par Catheterization in June 2014 LVEF 25% left main three-vessel disease \par Four-vessel CABG [facility] Dr. Goodman June 2014.\par \par There was persistently reduced LV function and he has been seen by Dr. Santos and Dr. Valentino with adjustment of medical therapy. \par Also he was seen by Dr. Lugo, declined electrophysiology evaluation, AICD. \par \par After TENISHA, referred to vascular surgery. \par Initially prescribed exercise regimen for claudication. \par Persistent symptoms. \par Underwent endarterectomy with Dr. Montano. \par He reports less pain. \par No further claudication. \par \par At baseline, he is physically active. \par He works in [employer], long hours at [employer] course in physical labor without limitations\par There has been no chest pain, pressure or discomfort. \par No significant dyspnea on exertion, orthopnea\par No symptomatic palpitations, lightheadedness, dizziness or syncope. \par \par Today he is seen with his wife \par in the off-season he is active chopping wood \par he has symptoms of joint pain which he relates to the Lipitor\par he would like to reduce his medications\par he believes he is on too much statin \par I discussed use of injectable lipid-lowering therapy \par \par December 2021 hemoglobin 13.6 creatinine 0.8 LDL 89 A1c 6.8 TSH 5.3 \par Abdominal ultrasound aortic dimensions 3 cm moderate atherosclerosis \par Echocardiogram ejection fraction 20s moderate mitral regurgitation \par Carotid Doppler left side moderate stenosisd \par EKG demonstrates normal sinus rhythm with frequent PVCs.\par Cardiac catheterization from June 2014: Distal left main 70% stenosis. Ejection fraction at 30%. \par \par Nuclear stress test. July 2015. Gildardo protocol. 7 minutes. Heart rate 146. No symptoms. Large perfusion defect involving inferolateral & apical regions. Fixed defect. No ischemia. Dilated left ventricle. Global hypokinesis. Severely reduced LV function. Nondiagnostic EKG due to rate-related left bundle branch block. Ischemic cardiomyopathy. Large infarction in the apical & lateral regions. Infarction involving the inferolateral, apical and anterior regions.\par \par monitor thyroid profile \par labs reviewed \par add Zetia continue remainder of medications follow-up ultrasound imaging \par he declines defibrillator understands risk of sudden cardiac death \par clinical follow-up will be scheduled after ultrasound imaging \par instructed to call if adverse effect \par \par Ischemic cardiomyopathy, dilated\par Moderate valvular heart disease\par BNP elevated\par Severely reduced LV function despite optimal medical therapy. \par \par Shared decision-making\par Discussed risk of sudden cardiac death associated with reduced LV function, iCMP. \par Saw electrophysiology.  Declines defibrillator.  \par Declines up titration of medications.  \par All of his questions have been answered\par \par Beta blocker. \par Continue ACE inhibitor. \par Does not require SBE ppx\par \par CAD\par Old myocardial infarction \par s/p PCI then CABG\par Daily antiplatelet therapy. \par statin therapy. \par Beta blocker. He declines up titration of beta blocker. \par Continue ACE inhibitor.\par \par Peripheral vascular disease\par Prior endarterectomy, improvement in claudication\par Carotid disease\par Atherosclerosis of aorta\par Differential blood pressures (RUE>LUE).  Asx\par Aspirin statin and blood pressure control\par Monitor carotid, ao\par \par Dyslipidemia, low HDL\par CRP elevated\par \par discussed change to Entresto, addition of aldactone, does not want any changes to his medications \par discussed risk of sudden cardiac death declines defibrillator\par \par Follow-up with primary physician glucose management \par

## 2022-11-27 NOTE — PROGRESS NOTE ADULT - PROBLEM/PLAN-1
DISPLAY PLAN FREE TEXT
IMPROVE-DD Application Not Available

## 2022-12-29 RX ORDER — METOPROLOL TARTRATE 50 MG
1 TABLET ORAL
Qty: 0 | Refills: 0 | DISCHARGE

## 2022-12-29 RX ORDER — ATORVASTATIN CALCIUM 80 MG/1
1 TABLET, FILM COATED ORAL
Qty: 0 | Refills: 0 | DISCHARGE

## 2022-12-29 RX ORDER — SCOPALAMINE 1 MG/3D
1 PATCH, EXTENDED RELEASE TRANSDERMAL
Qty: 0 | Refills: 0 | DISCHARGE

## 2022-12-29 RX ORDER — CARVEDILOL PHOSPHATE 80 MG/1
1 CAPSULE, EXTENDED RELEASE ORAL
Qty: 0 | Refills: 0 | DISCHARGE

## 2022-12-29 RX ORDER — LISINOPRIL 2.5 MG/1
1 TABLET ORAL
Qty: 0 | Refills: 0 | DISCHARGE

## 2023-01-01 NOTE — ASSESSMENT
[FreeTextEntry1] : monitor thyroid profile \par labs reviewed \par add Zetia continue remainder of medications follow-up ultrasound imaging \par he declines defibrillator understands risk of sudden cardiac death \par clinical follow-up will be scheduled after ultrasound imaging \par instructed to call if adverse effect \par \par Ischemic cardiomyopathy, dilated\par Moderate valvular heart disease\par BNP elevated\par Severely reduced LV function despite optimal medical therapy. \par \par Shared decision-making\par Discussed risk of sudden cardiac death associated with reduced LV function, iCMP. \par Saw electrophysiology.  Declines defibrillator.  \par Declines up titration of medications.  \par All of his questions have been answered\par \par Beta blocker. \par Continue ACE inhibitor. \par Does not require SBE ppx\par \par CAD\par Old myocardial infarction \par s/p PCI then CABG\par Daily antiplatelet therapy. \par statin therapy. \par Beta blocker. He declines up titration of beta blocker. \par Continue ACE inhibitor.\par \par Peripheral vascular disease\par Prior endarterectomy, improvement in claudication\par Carotid disease\par Atherosclerosis of aorta\par Differential blood pressures (RUE>LUE).  Asx\par Aspirin statin and blood pressure control\par Monitor carotid, ao\par \par Dyslipidemia, low HDL\par CRP elevated\par \par discussed change to Entresto, addition of aldactone, does not want any changes to his medications \par discussed risk of sudden cardiac death declines defibrillator\par \par Follow-up with primary physician glucose management \par 
15.9

## 2023-09-27 NOTE — PROGRESS NOTE ADULT - SUBJECTIVE AND OBJECTIVE BOX
[id number] [facility] Advanced Heart Failure  Office: [address] [place] [address]  Telephone: [phone]/Fax: [phone]    Advanced Heart Failure - FOLLOW UP NOTE    Interval History:  Planned for trial of extubation today with family present (no plans for re-intubation).  Tried on hydralazine yesterday 10mg TID however, BP low and required being put back on levophed.    Medications:  aspirin  chewable 81 milliGRAM(s) Oral daily  BACItracin   Ointment 1 Application(s) Topical two times a day  chlorhexidine 0.12% Liquid 15 milliLiter(s) Oral Mucosa every 12 hours  chlorhexidine 2% Cloths 1 Application(s) Topical daily  dextrose 5%. 1000 milliLiter(s) IV Continuous <Continuous>  dextrose 5%. 1000 milliLiter(s) IV Continuous <Continuous>  dextrose 50% Injectable 25 Gram(s) IV Push once  dextrose Oral Gel 15 Gram(s) Oral once PRN  enoxaparin Injectable 40 milliGRAM(s) SubCutaneous every 24 hours  famotidine Injectable 20 milliGRAM(s) IV Push every 12 hours  fentaNYL    Injectable 25 MICROGram(s) IV Push every 4 hours PRN  glucagon  Injectable 1 milliGRAM(s) IntraMuscular once  insulin lispro (ADMELOG) corrective regimen sliding scale   SubCutaneous every 6 hours  insulin NPH human recombinant 15 Unit(s) SubCutaneous every 8 hours  metoprolol tartrate 50 milliGRAM(s) Oral every 8 hours  midodrine 10 milliGRAM(s) Oral every 8 hours  norepinephrine Infusion 0.05 MICROgram(s)/kG/Min IV Continuous <Continuous>  ondansetron Injectable 4 milliGRAM(s) IV Push every 6 hours PRN  vancomycin    Solution 250 milliGRAM(s) Enteral Tube every 6 hours      Vitals:  T(C): 38.3 (08-10-22 @ 09:45), Max: 38.3 (08-10-22 @ 09:30)  HR: 99 (08-10-22 @ 09:45) (89 - 109)  BP: 119/62 (08-10-22 @ 01:00) (91/61 - 151/68)  BP(mean): 81 (08-10-22 @ 01:00) (63 - 100)  RR: 26 (08-10-22 @ 09:45) (14 - 34)  SpO2: 99% (08-10-22 @ 09:45) (93% - 100%)    Daily     Daily Weight in k.1 (09 Aug 2022 23:43)    I&O's Summary    09 Aug 2022 07:01  -  10 Aug 2022 07:00  --------------------------------------------------------  IN: 3411.1 mL / OUT: 1700 mL / NET: 1711.1 mL    10 Aug 2022 07:  -  10 Aug 2022 09:49  --------------------------------------------------------  IN: 544.9 mL / OUT: 0 mL / NET: 544.9 mL      Physical Exam:  General: Intubated  Neck: Neck supple. JVP at clavile.  Chest: anterior lung fields CTA  CV: Normal S1 and S2. No murmurs, rub, or gallops. Lukewarm peripherally.  PV: No edema noted.   Abdomen: Soft, non-distended  Skin: dry, no rash, generalized ecchymosis  Neurology: moves to noxious stimuli      Labs:                        10.5   11.68 )-----------( 239      ( 09 Aug 2022 03:30 )             31.9     08-09    149<H>  |  114<H>  |  31.1<H>  ----------------------------<  121<H>  4.2   |  26.0  |  0.54    Ca    8.5      09 Aug 2022 03:30  Phos  2.9     08-09  Mg     1.9     0809        TELEMETRY: NSR with isolated PVCs and short runs (2-4beats).   .

## 2025-05-19 NOTE — ASSESSMENT
FINAL PRIOR AUTHORIZATION STATUS:    1.  Name of Medication & Dose: Betamethasone Dipropionate 0.05% ointment      2. Prior Auth Status: Approved through 5/19/2026     3. Action Taken: Pharmacy Notified: N\A Patient Notified: N\A   [FreeTextEntry1] : Ischemic cardiomyopathy, dilated\par Moderate valvular heart disease. \par Severely reduced LV function despite optimal medical therapy. \par Shared decision-making\par Discussed risk of sudden cardiac death associated with reduced LV function, iCMP. \par Referred to electrophysiology for ICD.\par PreviouslydDeclines implantable defibrillator placement. Understands risks and benefits. \par All of his questions have been answered. \par Euvolemic\par Beta blocker. \par Continue ACE inhibitor. \par If renal function is stable, then addition of spironolactone. ? Entresto\par Does not require SBE ppx\par \par CAD\par Old myocardial infarction \par s/p PCI then CABG\par Last stress with fixed defects\par No angina sx\par Daily antiplatelet therapy. \par High-intensity statin therapy. \par Beta blocker. He declines up titration of beta blocker. \par Continue ACE inhibitor.\par \par Peripheral vascular disease\par Prior endarterectomy, improvement in claudication\par Carotid disease\par Atherosclerosis of aorta\par Differential blood pressures (RUE>>LUE).  Asx\par Aspirin statin and blood pressure control \par \par Dyslipidemia, low HDL\par Return to Crestor. \par Potential candidate for clinical trial. \par No adverse effects\par Adjunctive dietary measures\par \par Follow up with primary physician regarding elevated TSH and hemoglobin A1c.\par

## (undated) DEVICE — VALVE ENDOSCOPE DEFENDO SINGLE USE